# Patient Record
Sex: MALE | Race: WHITE | NOT HISPANIC OR LATINO
[De-identification: names, ages, dates, MRNs, and addresses within clinical notes are randomized per-mention and may not be internally consistent; named-entity substitution may affect disease eponyms.]

---

## 2016-07-23 RX ORDER — ATORVASTATIN CALCIUM 80 MG/1
1 TABLET, FILM COATED ORAL
Qty: 0 | Refills: 0 | COMMUNITY
Start: 2016-07-23

## 2016-07-23 RX ORDER — DILTIAZEM HCL 120 MG
1 CAPSULE, EXT RELEASE 24 HR ORAL
Qty: 0 | Refills: 0 | COMMUNITY
Start: 2016-07-23 | End: 2016-08-22

## 2017-03-02 ENCOUNTER — APPOINTMENT (OUTPATIENT)
Dept: CARDIOLOGY | Facility: CLINIC | Age: 67
End: 2017-03-02

## 2017-03-02 ENCOUNTER — RX RENEWAL (OUTPATIENT)
Age: 67
End: 2017-03-02

## 2017-03-02 VITALS
OXYGEN SATURATION: 98 % | BODY MASS INDEX: 24.69 KG/M2 | HEIGHT: 65.5 IN | WEIGHT: 150 LBS | DIASTOLIC BLOOD PRESSURE: 79 MMHG | SYSTOLIC BLOOD PRESSURE: 127 MMHG | HEART RATE: 77 BPM

## 2017-03-02 DIAGNOSIS — I63.9 CEREBRAL INFARCTION, UNSPECIFIED: ICD-10-CM

## 2017-03-06 ENCOUNTER — MEDICATION RENEWAL (OUTPATIENT)
Age: 67
End: 2017-03-06

## 2017-03-23 ENCOUNTER — APPOINTMENT (OUTPATIENT)
Dept: PULMONOLOGY | Facility: CLINIC | Age: 67
End: 2017-03-23

## 2017-03-23 VITALS
DIASTOLIC BLOOD PRESSURE: 70 MMHG | BODY MASS INDEX: 24.42 KG/M2 | SYSTOLIC BLOOD PRESSURE: 120 MMHG | HEART RATE: 92 BPM | OXYGEN SATURATION: 97 % | WEIGHT: 149 LBS

## 2017-04-25 ENCOUNTER — RX RENEWAL (OUTPATIENT)
Age: 67
End: 2017-04-25

## 2017-04-27 ENCOUNTER — APPOINTMENT (OUTPATIENT)
Dept: PULMONOLOGY | Facility: CLINIC | Age: 67
End: 2017-04-27

## 2017-04-27 VITALS — BODY MASS INDEX: 24.09 KG/M2 | DIASTOLIC BLOOD PRESSURE: 70 MMHG | SYSTOLIC BLOOD PRESSURE: 156 MMHG | WEIGHT: 147 LBS

## 2017-04-27 VITALS — HEART RATE: 108 BPM | OXYGEN SATURATION: 90 %

## 2017-04-27 DIAGNOSIS — Z87.09 PERSONAL HISTORY OF OTHER DISEASES OF THE RESPIRATORY SYSTEM: ICD-10-CM

## 2017-04-27 RX ORDER — ALPRAZOLAM 0.5 MG/1
0.5 TABLET ORAL
Qty: 90 | Refills: 0 | Status: ACTIVE | COMMUNITY
Start: 2016-11-02

## 2017-05-15 ENCOUNTER — RX RENEWAL (OUTPATIENT)
Age: 67
End: 2017-05-15

## 2017-06-15 ENCOUNTER — MEDICATION RENEWAL (OUTPATIENT)
Age: 67
End: 2017-06-15

## 2017-07-13 ENCOUNTER — RX RENEWAL (OUTPATIENT)
Age: 67
End: 2017-07-13

## 2017-07-27 ENCOUNTER — APPOINTMENT (OUTPATIENT)
Dept: PULMONOLOGY | Facility: CLINIC | Age: 67
End: 2017-07-27
Payer: OTHER MISCELLANEOUS

## 2017-07-27 VITALS
SYSTOLIC BLOOD PRESSURE: 142 MMHG | HEART RATE: 110 BPM | OXYGEN SATURATION: 83 % | WEIGHT: 150 LBS | DIASTOLIC BLOOD PRESSURE: 80 MMHG | BODY MASS INDEX: 24.58 KG/M2

## 2017-07-27 VITALS — OXYGEN SATURATION: 91 % | HEART RATE: 93 BPM

## 2017-07-27 PROCEDURE — 99215 OFFICE O/P EST HI 40 MIN: CPT

## 2017-09-14 ENCOUNTER — APPOINTMENT (OUTPATIENT)
Dept: ULTRASOUND IMAGING | Facility: CLINIC | Age: 67
End: 2017-09-14

## 2017-09-14 ENCOUNTER — APPOINTMENT (OUTPATIENT)
Dept: CT IMAGING | Facility: CLINIC | Age: 67
End: 2017-09-14

## 2017-09-14 ENCOUNTER — NON-APPOINTMENT (OUTPATIENT)
Age: 67
End: 2017-09-14

## 2017-09-14 ENCOUNTER — OUTPATIENT (OUTPATIENT)
Dept: OUTPATIENT SERVICES | Facility: HOSPITAL | Age: 67
LOS: 1 days | End: 2017-09-14
Payer: MEDICARE

## 2017-09-14 ENCOUNTER — APPOINTMENT (OUTPATIENT)
Dept: CARDIOLOGY | Facility: CLINIC | Age: 67
End: 2017-09-14
Payer: MEDICARE

## 2017-09-14 VITALS
WEIGHT: 148 LBS | HEART RATE: 97 BPM | BODY MASS INDEX: 24.25 KG/M2 | DIASTOLIC BLOOD PRESSURE: 77 MMHG | SYSTOLIC BLOOD PRESSURE: 161 MMHG | OXYGEN SATURATION: 90 %

## 2017-09-14 DIAGNOSIS — N50.89 OTHER SPECIFIED DISORDERS OF THE MALE GENITAL ORGANS: ICD-10-CM

## 2017-09-14 DIAGNOSIS — R60.0 LOCALIZED EDEMA: ICD-10-CM

## 2017-09-14 PROCEDURE — 82565 ASSAY OF CREATININE: CPT

## 2017-09-14 PROCEDURE — 93000 ELECTROCARDIOGRAM COMPLETE: CPT

## 2017-09-14 PROCEDURE — 74177 CT ABD & PELVIS W/CONTRAST: CPT

## 2017-09-14 PROCEDURE — 76870 US EXAM SCROTUM: CPT | Mod: 26

## 2017-09-14 PROCEDURE — 99214 OFFICE O/P EST MOD 30 MIN: CPT

## 2017-09-14 PROCEDURE — 76870 US EXAM SCROTUM: CPT

## 2017-09-14 PROCEDURE — 74177 CT ABD & PELVIS W/CONTRAST: CPT | Mod: 26

## 2017-09-14 RX ORDER — PREDNISONE 10 MG/1
10 TABLET ORAL DAILY
Qty: 90 | Refills: 1 | Status: DISCONTINUED | COMMUNITY
Start: 2017-07-27 | End: 2017-09-14

## 2017-09-15 ENCOUNTER — APPOINTMENT (OUTPATIENT)
Dept: CARDIOLOGY | Facility: CLINIC | Age: 67
End: 2017-09-15
Payer: MEDICARE

## 2017-09-15 PROCEDURE — 93306 TTE W/DOPPLER COMPLETE: CPT

## 2017-10-27 ENCOUNTER — RESULT REVIEW (OUTPATIENT)
Age: 67
End: 2017-10-27

## 2017-10-30 ENCOUNTER — APPOINTMENT (OUTPATIENT)
Dept: UROLOGY | Facility: CLINIC | Age: 67
End: 2017-10-30
Payer: MEDICARE

## 2017-10-30 VITALS
HEART RATE: 109 BPM | RESPIRATION RATE: 20 BRPM | SYSTOLIC BLOOD PRESSURE: 138 MMHG | TEMPERATURE: 97.7 F | DIASTOLIC BLOOD PRESSURE: 84 MMHG

## 2017-10-30 VITALS — OXYGEN SATURATION: 88 %

## 2017-10-30 DIAGNOSIS — N43.3 HYDROCELE, UNSPECIFIED: ICD-10-CM

## 2017-10-30 PROCEDURE — 99203 OFFICE O/P NEW LOW 30 MIN: CPT

## 2017-11-04 PROBLEM — N43.3 HYDROCELE OF TESTIS: Status: ACTIVE | Noted: 2017-11-04

## 2017-11-07 ENCOUNTER — RX RENEWAL (OUTPATIENT)
Age: 67
End: 2017-11-07

## 2017-11-13 ENCOUNTER — APPOINTMENT (OUTPATIENT)
Dept: UROLOGY | Facility: CLINIC | Age: 67
End: 2017-11-13
Payer: MEDICARE

## 2017-11-13 ENCOUNTER — APPOINTMENT (OUTPATIENT)
Dept: PULMONOLOGY | Facility: CLINIC | Age: 67
End: 2017-11-13
Payer: OTHER MISCELLANEOUS

## 2017-11-13 VITALS — BODY MASS INDEX: 23.6 KG/M2 | WEIGHT: 144 LBS

## 2017-11-13 VITALS
HEART RATE: 112 BPM | DIASTOLIC BLOOD PRESSURE: 78 MMHG | OXYGEN SATURATION: 92 % | SYSTOLIC BLOOD PRESSURE: 132 MMHG | RESPIRATION RATE: 20 BRPM

## 2017-11-13 DIAGNOSIS — Z87.891 PERSONAL HISTORY OF NICOTINE DEPENDENCE: ICD-10-CM

## 2017-11-13 DIAGNOSIS — R31.0 GROSS HEMATURIA: ICD-10-CM

## 2017-11-13 LAB
BILIRUB UR QL STRIP: NEGATIVE
CLARITY UR: NORMAL
COLLECTION METHOD: NORMAL
GLUCOSE UR-MCNC: NEGATIVE
HCG UR QL: 1 EU/DL
HGB UR QL STRIP.AUTO: NORMAL
KETONES UR-MCNC: NEGATIVE
LEUKOCYTE ESTERASE UR QL STRIP: NEGATIVE
NITRITE UR QL STRIP: NEGATIVE
PH UR STRIP: 6
PROT UR STRIP-MCNC: NORMAL
SP GR UR STRIP: >=1.03

## 2017-11-13 PROCEDURE — 99213 OFFICE O/P EST LOW 20 MIN: CPT

## 2017-11-13 PROCEDURE — 81003 URINALYSIS AUTO W/O SCOPE: CPT | Mod: QW

## 2017-11-13 PROCEDURE — 99215 OFFICE O/P EST HI 40 MIN: CPT

## 2017-11-13 RX ORDER — DILTIAZEM HYDROCHLORIDE 180 MG/1
180 CAPSULE, COATED, EXTENDED RELEASE ORAL
Refills: 0 | Status: ACTIVE | COMMUNITY

## 2017-11-13 RX ORDER — METFORMIN HYDROCHLORIDE 500 MG/1
500 TABLET, EXTENDED RELEASE ORAL
Refills: 0 | Status: ACTIVE | COMMUNITY

## 2017-11-13 RX ORDER — LORATADINE 5 MG/5 ML
10 SOLUTION, ORAL ORAL
Refills: 0 | Status: ACTIVE | COMMUNITY

## 2017-11-13 RX ORDER — DOCUSATE SODIUM 100 MG/1
100 CAPSULE, LIQUID FILLED ORAL
Refills: 0 | Status: ACTIVE | COMMUNITY

## 2017-11-13 RX ORDER — DEXTROMETHORPHAN HYDROBROMIDE, GUAIFENESIN, AND PHENYLEPHRINE HYDROCHLORIDE 5; 100; 2.5 MG/5ML; MG/5ML; MG/5ML
2.5-5-1 SUSPENSION ORAL
Refills: 0 | Status: ACTIVE | COMMUNITY

## 2017-11-13 RX ORDER — PANTOPRAZOLE SODIUM 40 MG/10ML
40 INJECTION, POWDER, FOR SOLUTION INTRAVENOUS
Refills: 0 | Status: DISCONTINUED | COMMUNITY
End: 2017-11-13

## 2017-11-13 RX ORDER — CALCIUM CARBONATE/VITAMIN D3 500 MG-2.5
500-100 TABLET,CHEWABLE ORAL
Refills: 0 | Status: ACTIVE | COMMUNITY

## 2017-11-13 RX ORDER — LEVOTHYROXINE SODIUM 0.2 MG/1
200 TABLET ORAL
Refills: 0 | Status: ACTIVE | COMMUNITY

## 2017-11-13 RX ORDER — ASPIRIN 81 MG/1
81 TABLET, CHEWABLE ORAL
Refills: 0 | Status: ACTIVE | COMMUNITY

## 2017-11-15 LAB
APPEARANCE: ABNORMAL
BACTERIA: NEGATIVE
BILIRUBIN URINE: NEGATIVE
BLOOD URINE: ABNORMAL
CALCIUM OXALATE CRYSTALS: ABNORMAL
COLOR: ABNORMAL
CORE LAB FLUID CYTOLOGY: NORMAL
GLUCOSE QUALITATIVE U: NEGATIVE MG/DL
HYALINE CASTS: 0 /LPF
KETONES URINE: NEGATIVE
LEUKOCYTE ESTERASE URINE: NEGATIVE
MICROSCOPIC-UA: NORMAL
NITRITE URINE: NEGATIVE
PH URINE: 5
PROTEIN URINE: 30 MG/DL
RED BLOOD CELLS URINE: >720 /HPF
SPECIFIC GRAVITY URINE: 1.03
SQUAMOUS EPITHELIAL CELLS: 0 /HPF
UROBILINOGEN URINE: 1 MG/DL
WHITE BLOOD CELLS URINE: 2 /HPF

## 2017-11-20 PROBLEM — R31.0 GROSS HEMATURIA: Status: ACTIVE | Noted: 2017-11-13

## 2017-11-28 ENCOUNTER — INPATIENT (INPATIENT)
Facility: HOSPITAL | Age: 67
LOS: 78 days | Discharge: HOME | DRG: 981 | End: 2018-02-15
Attending: SURGERY | Admitting: HOSPITALIST
Payer: COMMERCIAL

## 2017-11-28 VITALS
RESPIRATION RATE: 20 BRPM | TEMPERATURE: 98 F | HEART RATE: 112 BPM | OXYGEN SATURATION: 83 % | SYSTOLIC BLOOD PRESSURE: 156 MMHG | DIASTOLIC BLOOD PRESSURE: 92 MMHG | WEIGHT: 143.96 LBS

## 2017-11-28 LAB
ALBUMIN SERPL ELPH-MCNC: 3.6 G/DL — SIGNIFICANT CHANGE UP (ref 3.3–5.2)
ALP SERPL-CCNC: 105 U/L — SIGNIFICANT CHANGE UP (ref 40–120)
ALT FLD-CCNC: 15 U/L — SIGNIFICANT CHANGE UP
ANION GAP SERPL CALC-SCNC: 16 MMOL/L — SIGNIFICANT CHANGE UP (ref 5–17)
AST SERPL-CCNC: 22 U/L — SIGNIFICANT CHANGE UP
BASOPHILS # BLD AUTO: 0 K/UL — SIGNIFICANT CHANGE UP (ref 0–0.2)
BASOPHILS NFR BLD AUTO: 0.2 % — SIGNIFICANT CHANGE UP (ref 0–2)
BILIRUB SERPL-MCNC: 0.5 MG/DL — SIGNIFICANT CHANGE UP (ref 0.4–2)
BUN SERPL-MCNC: 16 MG/DL — SIGNIFICANT CHANGE UP (ref 8–20)
CALCIUM SERPL-MCNC: 8.6 MG/DL — SIGNIFICANT CHANGE UP (ref 8.6–10.2)
CHLORIDE SERPL-SCNC: 100 MMOL/L — SIGNIFICANT CHANGE UP (ref 98–107)
CO2 SERPL-SCNC: 24 MMOL/L — SIGNIFICANT CHANGE UP (ref 22–29)
CREAT SERPL-MCNC: 0.73 MG/DL — SIGNIFICANT CHANGE UP (ref 0.5–1.3)
EOSINOPHIL # BLD AUTO: 0.1 K/UL — SIGNIFICANT CHANGE UP (ref 0–0.5)
EOSINOPHIL NFR BLD AUTO: 0.8 % — SIGNIFICANT CHANGE UP (ref 0–5)
GLUCOSE SERPL-MCNC: 144 MG/DL — HIGH (ref 70–115)
HCT VFR BLD CALC: 43.6 % — SIGNIFICANT CHANGE UP (ref 42–52)
HGB BLD-MCNC: 14.1 G/DL — SIGNIFICANT CHANGE UP (ref 14–18)
LYMPHOCYTES # BLD AUTO: 1.1 K/UL — SIGNIFICANT CHANGE UP (ref 1–4.8)
LYMPHOCYTES # BLD AUTO: 7.8 % — LOW (ref 20–55)
MCHC RBC-ENTMCNC: 28.8 PG — SIGNIFICANT CHANGE UP (ref 27–31)
MCHC RBC-ENTMCNC: 32.3 G/DL — SIGNIFICANT CHANGE UP (ref 32–36)
MCV RBC AUTO: 89 FL — SIGNIFICANT CHANGE UP (ref 80–94)
MONOCYTES # BLD AUTO: 0.9 K/UL — HIGH (ref 0–0.8)
MONOCYTES NFR BLD AUTO: 5.9 % — SIGNIFICANT CHANGE UP (ref 3–10)
NEUTROPHILS # BLD AUTO: 12.4 K/UL — HIGH (ref 1.8–8)
NEUTROPHILS NFR BLD AUTO: 85 % — HIGH (ref 37–73)
NT-PROBNP SERPL-SCNC: 374 PG/ML — HIGH (ref 0–300)
PLATELET # BLD AUTO: 296 K/UL — SIGNIFICANT CHANGE UP (ref 150–400)
POTASSIUM SERPL-MCNC: 4.3 MMOL/L — SIGNIFICANT CHANGE UP (ref 3.5–5.3)
POTASSIUM SERPL-SCNC: 4.3 MMOL/L — SIGNIFICANT CHANGE UP (ref 3.5–5.3)
PROT SERPL-MCNC: 7.3 G/DL — SIGNIFICANT CHANGE UP (ref 6.6–8.7)
RBC # BLD: 4.9 M/UL — SIGNIFICANT CHANGE UP (ref 4.6–6.2)
RBC # FLD: 15 % — SIGNIFICANT CHANGE UP (ref 11–15.6)
SODIUM SERPL-SCNC: 140 MMOL/L — SIGNIFICANT CHANGE UP (ref 135–145)
TROPONIN T SERPL-MCNC: <0.01 NG/ML — SIGNIFICANT CHANGE UP (ref 0–0.06)
WBC # BLD: 14.6 K/UL — HIGH (ref 4.8–10.8)
WBC # FLD AUTO: 14.6 K/UL — HIGH (ref 4.8–10.8)

## 2017-11-28 PROCEDURE — 71010: CPT | Mod: 26

## 2017-11-28 RX ORDER — IPRATROPIUM/ALBUTEROL SULFATE 18-103MCG
3 AEROSOL WITH ADAPTER (GRAM) INHALATION ONCE
Qty: 0 | Refills: 0 | Status: COMPLETED | OUTPATIENT
Start: 2017-11-28 | End: 2017-11-28

## 2017-11-28 RX ORDER — MAGNESIUM SULFATE 500 MG/ML
2 VIAL (ML) INJECTION ONCE
Qty: 0 | Refills: 0 | Status: COMPLETED | OUTPATIENT
Start: 2017-11-28 | End: 2017-11-28

## 2017-11-28 RX ADMIN — Medication 3 MILLILITER(S): at 23:07

## 2017-11-28 RX ADMIN — Medication 50 GRAM(S): at 22:57

## 2017-11-28 RX ADMIN — Medication 3 MILLILITER(S): at 22:57

## 2017-11-28 RX ADMIN — Medication 125 MILLIGRAM(S): at 22:57

## 2017-11-28 NOTE — ED PROVIDER NOTE - MEDICAL DECISION MAKING DETAILS
Pt removed off bipap, no resp distress. Pulse ox 88-90% on 4 L NC. pt will be admitted for copd eacerbation

## 2017-11-28 NOTE — ED PROVIDER NOTE - OBJECTIVE STATEMENT
66 yo M, oxygen dependent with hx of stage 4 COPD, presents to ED c/o low oxygenation saturation. Pt is o2 dependent on 4L NC but states his concentrator broke at 0600 today, therefore, was only able to be on 3L NC today. Pt denies SOB but was prompted to ED by wife who states pt appeared to have difficulty breathing. Highest SpO2 was 70% today. Pt denies difficulty breathing and chest pain. Wife also states pt is following urology for hematuria and may have passed a renal stone. No further complaints at this time.

## 2017-11-29 ENCOUNTER — APPOINTMENT (OUTPATIENT)
Dept: UROLOGY | Facility: CLINIC | Age: 67
End: 2017-11-29

## 2017-11-29 DIAGNOSIS — Z98.890 OTHER SPECIFIED POSTPROCEDURAL STATES: Chronic | ICD-10-CM

## 2017-11-29 DIAGNOSIS — Z86.73 PERSONAL HISTORY OF TRANSIENT ISCHEMIC ATTACK (TIA), AND CEREBRAL INFARCTION WITHOUT RESIDUAL DEFICITS: ICD-10-CM

## 2017-11-29 DIAGNOSIS — R91.8 OTHER NONSPECIFIC ABNORMAL FINDING OF LUNG FIELD: ICD-10-CM

## 2017-11-29 DIAGNOSIS — J44.1 CHRONIC OBSTRUCTIVE PULMONARY DISEASE WITH (ACUTE) EXACERBATION: ICD-10-CM

## 2017-11-29 DIAGNOSIS — Z29.9 ENCOUNTER FOR PROPHYLACTIC MEASURES, UNSPECIFIED: ICD-10-CM

## 2017-11-29 DIAGNOSIS — R59.0 LOCALIZED ENLARGED LYMPH NODES: ICD-10-CM

## 2017-11-29 DIAGNOSIS — E03.9 HYPOTHYROIDISM, UNSPECIFIED: ICD-10-CM

## 2017-11-29 DIAGNOSIS — E11.9 TYPE 2 DIABETES MELLITUS WITHOUT COMPLICATIONS: ICD-10-CM

## 2017-11-29 DIAGNOSIS — I10 ESSENTIAL (PRIMARY) HYPERTENSION: ICD-10-CM

## 2017-11-29 DIAGNOSIS — J18.9 PNEUMONIA, UNSPECIFIED ORGANISM: ICD-10-CM

## 2017-11-29 DIAGNOSIS — R09.02 HYPOXEMIA: ICD-10-CM

## 2017-11-29 DIAGNOSIS — N20.0 CALCULUS OF KIDNEY: ICD-10-CM

## 2017-11-29 LAB
GLUCOSE BLDC GLUCOMTR-MCNC: 179 MG/DL — HIGH (ref 70–99)
GLUCOSE BLDC GLUCOMTR-MCNC: 198 MG/DL — HIGH (ref 70–99)
GLUCOSE BLDC GLUCOMTR-MCNC: 221 MG/DL — HIGH (ref 70–99)
GLUCOSE BLDC GLUCOMTR-MCNC: 227 MG/DL — HIGH (ref 70–99)
RAPID RVP RESULT: SIGNIFICANT CHANGE UP

## 2017-11-29 PROCEDURE — 93010 ELECTROCARDIOGRAM REPORT: CPT

## 2017-11-29 PROCEDURE — 99223 1ST HOSP IP/OBS HIGH 75: CPT | Mod: GC

## 2017-11-29 PROCEDURE — 99291 CRITICAL CARE FIRST HOUR: CPT

## 2017-11-29 PROCEDURE — 99223 1ST HOSP IP/OBS HIGH 75: CPT

## 2017-11-29 RX ORDER — ALBUTEROL 90 UG/1
2.5 AEROSOL, METERED ORAL EVERY 4 HOURS
Qty: 0 | Refills: 0 | Status: DISCONTINUED | OUTPATIENT
Start: 2017-11-29 | End: 2018-01-16

## 2017-11-29 RX ORDER — DEXTROSE 50 % IN WATER 50 %
1 SYRINGE (ML) INTRAVENOUS ONCE
Qty: 0 | Refills: 0 | Status: DISCONTINUED | OUTPATIENT
Start: 2017-11-29 | End: 2017-12-11

## 2017-11-29 RX ORDER — ASPIRIN/CALCIUM CARB/MAGNESIUM 324 MG
81 TABLET ORAL DAILY
Qty: 0 | Refills: 0 | Status: DISCONTINUED | OUTPATIENT
Start: 2017-11-29 | End: 2017-12-07

## 2017-11-29 RX ORDER — AZITHROMYCIN 500 MG/1
TABLET, FILM COATED ORAL
Qty: 0 | Refills: 0 | Status: DISCONTINUED | OUTPATIENT
Start: 2017-11-29 | End: 2017-11-29

## 2017-11-29 RX ORDER — AZITHROMYCIN 500 MG/1
500 TABLET, FILM COATED ORAL ONCE
Qty: 0 | Refills: 0 | Status: DISCONTINUED | OUTPATIENT
Start: 2017-11-29 | End: 2017-11-29

## 2017-11-29 RX ORDER — FUROSEMIDE 40 MG
1 TABLET ORAL
Qty: 0 | Refills: 0 | COMMUNITY

## 2017-11-29 RX ORDER — DILTIAZEM HCL 120 MG
180 CAPSULE, EXT RELEASE 24 HR ORAL
Qty: 0 | Refills: 0 | Status: DISCONTINUED | OUTPATIENT
Start: 2017-11-29 | End: 2018-01-14

## 2017-11-29 RX ORDER — LORATADINE 10 MG/1
10 TABLET ORAL DAILY
Qty: 0 | Refills: 0 | Status: DISCONTINUED | OUTPATIENT
Start: 2017-11-29 | End: 2017-11-29

## 2017-11-29 RX ORDER — INSULIN LISPRO 100/ML
VIAL (ML) SUBCUTANEOUS
Qty: 0 | Refills: 0 | Status: DISCONTINUED | OUTPATIENT
Start: 2017-11-29 | End: 2017-12-11

## 2017-11-29 RX ORDER — DEXTROSE 50 % IN WATER 50 %
25 SYRINGE (ML) INTRAVENOUS ONCE
Qty: 0 | Refills: 0 | Status: DISCONTINUED | OUTPATIENT
Start: 2017-11-29 | End: 2017-12-11

## 2017-11-29 RX ORDER — SODIUM CHLORIDE 9 MG/ML
1000 INJECTION, SOLUTION INTRAVENOUS
Qty: 0 | Refills: 0 | Status: DISCONTINUED | OUTPATIENT
Start: 2017-11-29 | End: 2017-12-11

## 2017-11-29 RX ORDER — CLOPIDOGREL BISULFATE 75 MG/1
75 TABLET, FILM COATED ORAL DAILY
Qty: 0 | Refills: 0 | Status: DISCONTINUED | OUTPATIENT
Start: 2017-11-29 | End: 2017-12-07

## 2017-11-29 RX ORDER — LEVOTHYROXINE SODIUM 125 MCG
50 TABLET ORAL DAILY
Qty: 0 | Refills: 0 | Status: DISCONTINUED | OUTPATIENT
Start: 2017-11-29 | End: 2018-01-16

## 2017-11-29 RX ORDER — ALPRAZOLAM 0.25 MG
0.25 TABLET ORAL THREE TIMES A DAY
Qty: 0 | Refills: 0 | Status: DISCONTINUED | OUTPATIENT
Start: 2017-11-29 | End: 2017-11-29

## 2017-11-29 RX ORDER — SODIUM CHLORIDE 9 MG/ML
1000 INJECTION, SOLUTION INTRAVENOUS
Qty: 0 | Refills: 0 | Status: COMPLETED | OUTPATIENT
Start: 2017-11-29 | End: 2017-11-29

## 2017-11-29 RX ORDER — PANTOPRAZOLE SODIUM 20 MG/1
40 TABLET, DELAYED RELEASE ORAL
Qty: 0 | Refills: 0 | Status: DISCONTINUED | OUTPATIENT
Start: 2017-11-29 | End: 2017-12-20

## 2017-11-29 RX ORDER — FUROSEMIDE 40 MG
20 TABLET ORAL DAILY
Qty: 0 | Refills: 0 | Status: DISCONTINUED | OUTPATIENT
Start: 2017-11-29 | End: 2017-11-29

## 2017-11-29 RX ORDER — IPRATROPIUM/ALBUTEROL SULFATE 18-103MCG
3 AEROSOL WITH ADAPTER (GRAM) INHALATION EVERY 6 HOURS
Qty: 0 | Refills: 0 | Status: DISCONTINUED | OUTPATIENT
Start: 2017-11-29 | End: 2017-12-17

## 2017-11-29 RX ORDER — LORATADINE 10 MG/1
10 TABLET ORAL DAILY
Qty: 0 | Refills: 0 | Status: DISCONTINUED | OUTPATIENT
Start: 2017-11-30 | End: 2018-01-16

## 2017-11-29 RX ORDER — SACCHAROMYCES BOULARDII 250 MG
250 POWDER IN PACKET (EA) ORAL
Qty: 0 | Refills: 0 | Status: DISCONTINUED | OUTPATIENT
Start: 2017-11-29 | End: 2017-12-05

## 2017-11-29 RX ORDER — AZITHROMYCIN 500 MG/1
500 TABLET, FILM COATED ORAL EVERY 24 HOURS
Qty: 0 | Refills: 0 | Status: DISCONTINUED | OUTPATIENT
Start: 2017-11-29 | End: 2017-11-29

## 2017-11-29 RX ORDER — ACETYLCYSTEINE 200 MG/ML
1 VIAL (ML) MISCELLANEOUS EVERY 4 HOURS
Qty: 0 | Refills: 0 | Status: DISCONTINUED | OUTPATIENT
Start: 2017-11-29 | End: 2018-01-02

## 2017-11-29 RX ORDER — GLUCAGON INJECTION, SOLUTION 0.5 MG/.1ML
1 INJECTION, SOLUTION SUBCUTANEOUS ONCE
Qty: 0 | Refills: 0 | Status: DISCONTINUED | OUTPATIENT
Start: 2017-11-29 | End: 2017-12-11

## 2017-11-29 RX ORDER — DEXTROSE 50 % IN WATER 50 %
12.5 SYRINGE (ML) INTRAVENOUS ONCE
Qty: 0 | Refills: 0 | Status: DISCONTINUED | OUTPATIENT
Start: 2017-11-29 | End: 2017-12-11

## 2017-11-29 RX ORDER — ATORVASTATIN CALCIUM 80 MG/1
80 TABLET, FILM COATED ORAL AT BEDTIME
Qty: 0 | Refills: 0 | Status: DISCONTINUED | OUTPATIENT
Start: 2017-11-29 | End: 2018-01-16

## 2017-11-29 RX ORDER — ENOXAPARIN SODIUM 100 MG/ML
40 INJECTION SUBCUTANEOUS DAILY
Qty: 0 | Refills: 0 | Status: DISCONTINUED | OUTPATIENT
Start: 2017-11-29 | End: 2017-12-07

## 2017-11-29 RX ORDER — ALPRAZOLAM 0.25 MG
0.25 TABLET ORAL THREE TIMES A DAY
Qty: 0 | Refills: 0 | Status: DISCONTINUED | OUTPATIENT
Start: 2017-11-29 | End: 2017-12-06

## 2017-11-29 RX ORDER — ALPRAZOLAM 0.25 MG
0.25 TABLET ORAL ONCE
Qty: 0 | Refills: 0 | Status: DISCONTINUED | OUTPATIENT
Start: 2017-11-29 | End: 2017-11-29

## 2017-11-29 RX ADMIN — Medication 0.25 MILLIGRAM(S): at 04:04

## 2017-11-29 RX ADMIN — Medication 250 MILLIGRAM(S): at 06:31

## 2017-11-29 RX ADMIN — Medication 1200 MILLIGRAM(S): at 06:46

## 2017-11-29 RX ADMIN — Medication 1: at 21:59

## 2017-11-29 RX ADMIN — SODIUM CHLORIDE 100 MILLILITER(S): 9 INJECTION, SOLUTION INTRAVENOUS at 12:38

## 2017-11-29 RX ADMIN — Medication 0.25 MILLIGRAM(S): at 12:38

## 2017-11-29 RX ADMIN — Medication 0.25 MILLIGRAM(S): at 23:20

## 2017-11-29 RX ADMIN — LORATADINE 10 MILLIGRAM(S): 10 TABLET ORAL at 12:39

## 2017-11-29 RX ADMIN — Medication 180 MILLIGRAM(S): at 06:31

## 2017-11-29 RX ADMIN — Medication 180 MILLIGRAM(S): at 18:35

## 2017-11-29 RX ADMIN — Medication 3 MILLILITER(S): at 21:34

## 2017-11-29 RX ADMIN — CLOPIDOGREL BISULFATE 75 MILLIGRAM(S): 75 TABLET, FILM COATED ORAL at 12:39

## 2017-11-29 RX ADMIN — Medication 60 MILLIGRAM(S): at 18:35

## 2017-11-29 RX ADMIN — Medication 60 MILLIGRAM(S): at 12:39

## 2017-11-29 RX ADMIN — ATORVASTATIN CALCIUM 80 MILLIGRAM(S): 80 TABLET, FILM COATED ORAL at 23:20

## 2017-11-29 RX ADMIN — Medication 200 MILLIGRAM(S): at 06:45

## 2017-11-29 RX ADMIN — ENOXAPARIN SODIUM 40 MILLIGRAM(S): 100 INJECTION SUBCUTANEOUS at 12:46

## 2017-11-29 RX ADMIN — Medication 81 MILLIGRAM(S): at 12:39

## 2017-11-29 RX ADMIN — Medication 60 MILLIGRAM(S): at 23:21

## 2017-11-29 RX ADMIN — Medication 250 MILLIGRAM(S): at 18:35

## 2017-11-29 RX ADMIN — SODIUM CHLORIDE 100 MILLILITER(S): 9 INJECTION, SOLUTION INTRAVENOUS at 06:31

## 2017-11-29 RX ADMIN — Medication 60 MILLIGRAM(S): at 06:31

## 2017-11-29 RX ADMIN — Medication: at 12:52

## 2017-11-29 RX ADMIN — Medication 2: at 18:34

## 2017-11-29 RX ADMIN — Medication 3 MILLILITER(S): at 07:38

## 2017-11-29 RX ADMIN — Medication 50 MICROGRAM(S): at 06:31

## 2017-11-29 RX ADMIN — Medication 3 MILLILITER(S): at 14:34

## 2017-11-29 NOTE — ED ADULT NURSE NOTE - PMH
Chronic obstructive pulmonary disease, unspecified COPD type    CVA (cerebral vascular accident)    Hypertension

## 2017-11-29 NOTE — H&P ADULT - NSHPOUTPATIENTPROVIDERS_GEN_ALL_CORE
PMD: Russel Garcia  Pulmonology: CRYSTAL Trivedi ([facility])  Cardio: Dr Villegas PMD: Russel Garcia  Pulmonology: CRYSTAL Trivedi ([facility])  Cardio: Dr Villegas  Neuro: Dr Ho  Uro: pt doesn't remember the MD's name

## 2017-11-29 NOTE — PROGRESS NOTE ADULT - ASSESSMENT
67 YOM w/PMH of COPD stage 4 s/p right lung reduction in 2010, currently on home oxygen 4L (sat 92-93%), HTN, CVA on 2014, DM, hypothyroidism hydrocele, kidney stones admitted for COPD exacerbation      Assessment/Plan:    1. Acute on chronic hypoxic respiratory failure possibly secondary to o2 concentrator malfunction:  Wean o2 as tolerated to 4 liters.   Social work consult  Follow up CTA chest     2. Exacerbation of severe copd: IV steroids, nebs, antibiotics, o2    3. Hypertension: continue cardizem    4. Hyperlipidemia on statin therapy    VTE_ lovenox subcut

## 2017-11-29 NOTE — ED ADULT NURSE REASSESSMENT NOTE - NS ED NURSE REASSESS COMMENT FT1
pt status unchanged, refer to flowsheet and chart, pt safety maintained, pt hemodynamically stable, pt stable on venturi mask

## 2017-11-29 NOTE — H&P ADULT - NSHPREVIEWOFSYSTEMS_GEN_ALL_CORE
CONSTITUTIONAL: No weakness, fevers or chills  EYES/ENT: No visual changes;  No vertigo or throat pain   NECK: No pain or stiffness  RESPIRATORY: As per HPI  CARDIOVASCULAR: No chest pain or palpitations  GASTROINTESTINAL: No abdominal or epigastric pain. No nausea, vomiting, or hematemesis; No diarrhea or constipation. No melena or hematochezia.  GENITOURINARY: No dysuria, frequency or hematuria. Mentions passing a renal calculi 1 week ago and that was scheduled for cystoscopy tomorrow. Rest as per HPI  NEUROLOGICAL: No numbness or weakness  SKIN: No itching, burning, rashes, or lesions   All other review of systems is negative unless indicated above.

## 2017-11-29 NOTE — CONSULT NOTE ADULT - ASSESSMENT
Severe COPD with large emphysematous  component  problems with 02 delivery at home , denies any change in sputum or dyspnea  CTA - not read officially , i dont see PE, increased adenopathy with R hilar  mass like density, narrowing bronchus  with probable post obstructive Pna  hx CVA, no longer follwoed at [facility]  at this point would treat as exacerbation / pna  IV medrol, nebs, abx, wean 02 to 4 L   abg if any change in status  out pt PET scan, not candidate for any aggressive intervention  follows in our office  prognosis extremely guarded Severe COPD with large emphysematous  component  problems with 02 delivery at home , denies any change in sputum or dyspnea  CTA negative for PE, increased adenopathy, RLL with mass like density vs mucus plugging/ pneumonia  hx CVA, no longer followed at [place]  at this point would treat as exacerbation / pna  IV medrol, nebs, abx, wean 02 to 4 L   abg if any change in status  out pt CT scan follow up. not candidate for any aggressive intervention  pulmonary toilet nebs  follows in our office  prognosis extremely guarded Severe COPD with large emphysematous  component  problems with 02 delivery at home , denies any change in sputum or dyspnea  CTA negative for PE, increased adenopathy, RLL with mucus plugging/ pneumonia, endobronchial  abnormality not excluded  hx CVA, no longer followed at [facility]   treat as exacerbation / pna  IV medrol, nebs, abx, wean 02 to 4 L   abg if any change in status  out pt CT scan follow up  pulmonary toilet nebs  follows in our office  prognosis extremely guarded

## 2017-11-29 NOTE — H&P ADULT - HISTORY OF PRESENT ILLNESS
67 YOM w/PMH of COPD stage 4 s/p right lung reduction in 2010, currently on home oxygen 4L (sat 92-93%), HTN, CVA on 2014, DM, hypothyroidism hydrocele, kidney stones who came because he noted that his O2 sat was 76% today. He mentions more difficult to talk in full sencentces compared to his baseline. He also has a chronic prodive cough which has not changed in frequency or color. He mentions that his oxygen machine broke 4 days ago and he got a new one since, but is not sure if its working properly. He denies SOB, increased cough, chest pain, hemoptysis nausea, weakness. Pt has to sleep in a sit position since he gets SOB if he lyes flat, conditions that he attributes to his postnasal drip 67 YOM w/PMH of COPD stage 4 s/p right lung reduction in 2010, currently on home oxygen 4L (sat 92-93%), HTN, CVA on 2014, DM, hypothyroidism, hydrocele, kidney stones who came because he noted that his O2 sat was 76% today. He mentions more difficult to talk in full sencentces compared to his baseline. He also has a chronic productive cough which has not changed in frequency or color. He mentions that his oxygen machine broke 4 days ago and he got a new one since, but is not sure if its working properly. He denies SOB, increased cough, chest pain, hemoptysis nausea, weakness. Pt has to sleep in a sit position with 2 pillows since he gets SOB if he lyes flat, condition that he attributes to his postnasal drip

## 2017-11-29 NOTE — ED ADULT NURSE NOTE - OBJECTIVE STATEMENT
pt with complaints of SOB difficulty breathing, pt w/o oxygen at home today, noted with low O2 upon arrival with tachypnea.

## 2017-11-29 NOTE — H&P ADULT - PROBLEM SELECTOR PLAN 3
start ISS  hypoglicemia protocol  Follow HbA1c in the morning Start ISS  hypoglycemia protocol  Follow HbA1c in the morning c/w ASA 81mg  c/w plavix 75mg  c/w atorvastatin 80mg

## 2017-11-29 NOTE — H&P ADULT - NSHPSOCIALHISTORY_GEN_ALL_CORE
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

## 2017-11-29 NOTE — H&P ADULT - PROBLEM SELECTOR PLAN 2
Uncontrolled 156/92 (range <150/80 for this pt)  Will continue home meds Start ISS  hypoglycemia protocol  Follow HbA1c in the morning

## 2017-11-29 NOTE — H&P ADULT - ASSESSMENT
67 YOM w/PMH of COPD stage 4 s/p right lung reduction in 2010, currently on home oxygen 4L (sat 92-93%), HTN, CVA on 2014, DM, hypothyroidism hydrocele, kidney stones admitted for COPD exacerbation

## 2017-11-29 NOTE — PROGRESS NOTE ADULT - SUBJECTIVE AND OBJECTIVE BOX
CC: Hypoxia     INTERVAL HPI/OVERNIGHT EVENTS: Patient seen and examined, denies shortness of breath or worsening cough. Afebrile. Feels better now.       Vital Signs Last 24 Hrs  T(C): 36.7 (29 Nov 2017 03:50), Max: 36.8 (28 Nov 2017 21:35)  T(F): 98 (29 Nov 2017 03:50), Max: 98.2 (28 Nov 2017 21:35)  HR: 95 (29 Nov 2017 08:36) (95 - 118)  BP: 147/83 (29 Nov 2017 08:36) (127/74 - 156/92)  BP(mean): --  RR: 20 (29 Nov 2017 08:36) (20 - 20)  SpO2: 95% (29 Nov 2017 08:36) (83% - 96%)    PHYSICAL EXAM:    GENERAL: NAD, AOX3  HEAD:  Atraumatic, Normocephalic  EYES: EOMI, PERRLA, conjunctiva and sclera clear  ENMT: Moist mucous membranes  NECK: Supple, No JVD  CHEST/LUNG: Decreased breath sounds bilaterally   HEART: Regular rate and rhythm; No murmurs, rubs, or gallops  ABDOMEN: Soft, Nontender, Nondistended; Bowel sounds present  EXTREMITIES:  2+ Peripheral Pulses, No clubbing, cyanosis, or edema        MEDICATIONS  (STANDING):  ALBUTerol/ipratropium for Nebulization 3 milliLiter(s) Nebulizer every 6 hours  aspirin enteric coated 81 milliGRAM(s) Oral daily  atorvastatin 80 milliGRAM(s) Oral at bedtime  clopidogrel Tablet 75 milliGRAM(s) Oral daily  dextrose 5%. 1000 milliLiter(s) (50 mL/Hr) IV Continuous <Continuous>  dextrose 50% Injectable 12.5 Gram(s) IV Push once  dextrose 50% Injectable 25 Gram(s) IV Push once  dextrose 50% Injectable 25 Gram(s) IV Push once  diltiazem    milliGRAM(s) Oral <User Schedule>  enoxaparin Injectable 40 milliGRAM(s) SubCutaneous daily  guaiFENesin  milliGRAM(s) Oral every 12 hours  insulin lispro (HumaLOG) corrective regimen sliding scale   SubCutaneous Before meals and at bedtime  levoFLOXacin IVPB      levothyroxine 50 MICROGram(s) Oral daily  loratadine 10 milliGRAM(s) Oral daily  methylPREDNISolone sodium succinate Injectable 60 milliGRAM(s) IV Push every 6 hours  pantoprazole    Tablet 40 milliGRAM(s) Oral before breakfast  saccharomyces boulardii 250 milliGRAM(s) Oral two times a day    MEDICATIONS  (PRN):  acetylcysteine 20% Inhalation 1 milliLiter(s) Inhalation every 4 hours PRN poor cough clearance  ALBUTerol    0.083% 2.5 milliGRAM(s) Nebulizer every 4 hours PRN Shortness of Breath and/or Wheezing  ALPRAZolam 0.25 milliGRAM(s) Oral three times a day PRN anxiety  dextrose Gel 1 Dose(s) Oral once PRN Blood Glucose LESS THAN 70 milliGRAM(s)/deciliter  glucagon  Injectable 1 milliGRAM(s) IntraMuscular once PRN Glucose LESS THAN 70 milligrams/deciliter      Allergies    codeine (Unknown)  penicillin (Unknown)    Intolerances    Symbicort (Short breath)        LABS:                          14.1   14.6  )-----------( 296      ( 28 Nov 2017 22:06 )             43.6     11-28    140  |  100  |  16.0  ----------------------------<  144<H>  4.3   |  24.0  |  0.73    Ca    8.6      28 Nov 2017 22:06    TPro  7.3  /  Alb  3.6  /  TBili  0.5  /  DBili  x   /  AST  22  /  ALT  15  /  AlkPhos  105  11-28          RADIOLOGY & ADDITIONAL TESTS:

## 2017-11-29 NOTE — H&P ADULT - ATTENDING COMMENTS
Patient seen and evaluated in ER for COPD Exacerbation. Continue Steroids, Nebs and Antibiotics. Will consult  for oxygen arrangement at home.

## 2017-11-29 NOTE — ED ADULT NURSE REASSESSMENT NOTE - NS ED NURSE REASSESS COMMENT FT1
pt status unchanged, refer to flowsheet and chart, pt safety maintained, pt hemodynamically stable, report given to MASON Giordano

## 2017-11-29 NOTE — H&P ADULT - PROBLEM SELECTOR PLAN 1
Likely 2/2 oxygen therapy failure  continue supplemental O2, 4L  Maintenance of O2 sat 88-94&  Admit to hospitalist service, monitored bed with   Chest X ray done  Follow CTA  Will call pulmonology  Follow swallow evaluation  Diet: DASH, carbohydrate consistent when pt is not NPO and after swallow eval  Activity as tolerated Likely 2/2 oxygen therapy failure  continue supplemental O2, 4L  Maintenance of O2 sat 88-94%  Admit to hospitalist service, monitored bed with   Chest X ray done  Follow CTA  Will call pulmonology  Follow swallow evaluation  Diet: DASH, carbohydrate consistent when pt is not NPO and after swallow eval  Activity as tolerated Likely 2/2 oxygen therapy failure vs underlying pneumonia  continue supplemental O2, 4L  Maintenance of O2 sat 88-94%  Admit to hospitalist service, monitored bed with   Chest X ray done  Follow CTA  Will call pulmonology  Follow swallow evaluation  Diet: DASH, carbohydrate consistent  Activity as tolerated  Duonebs q6hrs  Solumedrol 60mg  q6hrs  Albuterol  start Azytromicin   Start rocephin Likely 2/2 oxygen therapy failure vs underlying pneumonia  continue supplemental O2, 4L  Maintenance of O2 sat 88-94%  Admit to hospitalist service, monitored bed with   Chest X ray done  Follow CTA  Will call pulmonology  Follow swallow evaluation  Diet: DASH, carbohydrate consistent  Activity as tolerated  Duonebs q6hrs  Solumedrol 60mg  q6hrs  Albuterol PRN q4hrs  start levoquin since pt is allergic to PCN  Start rocephin continue supplemental O2, 4L  Maintenance of O2 sat 88-94%  Admit to hospitalist service, monitored bed with   Chest X ray done  Follow CTA  Will call pulmonology  Diet: DASH, carbohydrate consistent  Activity as tolerated  Duonebs q6hrs  Solumedrol 60mg  q6hrs  Albuterol PRN q4hrs  start Levaquin since pt is allergic to PCN continue supplemental O2, 4L  Maintenance of O2 sat 88-94%  Admit to hospitalist service, monitored bed with   Chest X ray done  Follow CTA  Will call pulmonology  Diet: DASH, carbohydrate consistent  Activity as tolerated  Duonebs q6hrs  Solumedrol 60mg  q6hrs  Albuterol PRN q4hrs  Levaquin 500 mg continue supplemental O2, 10L. Will try to wane off as tolerated  Maintenance of O2 sat 88-94%  Admit to hospitalist service, monitored bed with   Chest X ray done  Follow CTA  Will call pulmonology  Diet: DASH, carbohydrate consistent  Activity as tolerated  Duonebs q6hrs  Solumedrol 60mg  q6hrs  Albuterol PRN q4hrs  Levaquin 500 mg

## 2017-11-29 NOTE — H&P ADULT - NSHPPHYSICALEXAM_GEN_ALL_CORE
Vital Signs Last 24 Hrs  T(C): 36.8 (28 Nov 2017 21:35), Max: 36.8 (28 Nov 2017 21:35)  T(F): 98.2 (28 Nov 2017 21:35), Max: 98.2 (28 Nov 2017 21:35)  HR: 118 (28 Nov 2017 22:15) (112 - 118)  BP: 156/92 (28 Nov 2017 21:35) (156/92 - 156/92)  RR: 20 (28 Nov 2017 21:35) (20 - 20)  SpO2: 95% (28 Nov 2017 22:15) (83% - 95%)    Tele: Synus tachycardia. O2 sat:     General: WN/WD NAD  Neurology: A&Ox3, no focalziation signs  Head:  Normocephalic, atraumatic  ENT:  Mucosa moist, no ulcerations  Neck:  Supple, no sinuses or palpable masses  Respiratory: CTA B/L, diminished breath sounds on both lungs  CV: RRR, S1S2, no murmur  Back: surgical scar noted on right CVA  Abdominal: Soft, NT, ND no palpable mass  MSK: No edema, + peripheral pulses, FROM all 4 extremity Vital Signs Last 24 Hrs  T(C): 36.8 (28 Nov 2017 21:35), Max: 36.8 (28 Nov 2017 21:35)  T(F): 98.2 (28 Nov 2017 21:35), Max: 98.2 (28 Nov 2017 21:35)  HR: 118 (28 Nov 2017 22:15) (112 - 118)  BP: 156/92 (28 Nov 2017 21:35) (156/92 - 156/92)  RR: 20 (28 Nov 2017 21:35) (20 - 20)  SpO2: 95% (28 Nov 2017 22:15) (83% - 95%)    Tele: Synus tachycardia. O2 sat: 94% on supplemental O2    General: WN/WD NAD  Neurology: A&Ox3, no focalization signs  Head:  Normocephalic, atraumatic  ENT:  Mucosa moist, no ulcerations  Neck:  Supple, no sinuses or palpable masses  Respiratory: CTA B/L, diminished breath sounds on both lungs  CV: RRR, S1S2, no murmur  Back: surgical scar noted on right CVA  Abdominal: Soft, NT, ND no palpable mass  MSK: No edema, + peripheral pulses, FROM all 4 extremity Vital Signs Last 24 Hrs  T(C): 36.8 (28 Nov 2017 21:35), Max: 36.8 (28 Nov 2017 21:35)  T(F): 98.2 (28 Nov 2017 21:35), Max: 98.2 (28 Nov 2017 21:35)  HR: 118 (28 Nov 2017 22:15) (112 - 118)  BP: 156/92 (28 Nov 2017 21:35) (156/92 - 156/92)  RR: 20 (28 Nov 2017 21:35) (20 - 20)  SpO2: 95% (28 Nov 2017 22:15) (83% - 95%)    Tele: Synus tachycardia. O2 sat: 94% on supplemental O2 10L through mask    General: WN/WD NAD  Neurology: A&Ox3, no focalization signs  Head:  Normocephalic, atraumatic  ENT:  Mucosa moist, no ulcerations  Neck:  Supple, no sinuses or palpable masses  Respiratory: CTA B/L, diminished breath sounds on both lungs  CV: RRR, S1S2, no murmur  Back: surgical scar noted on right CVA  Abdominal: Soft, NT, ND no palpable mass  MSK: No edema, + peripheral pulses, FROM all 4 extremity

## 2017-11-29 NOTE — CONSULT NOTE ADULT - SUBJECTIVE AND OBJECTIVE BOX
PULMONARY CONSULT NOTE      LILY FRANDYJANE-[id number]    Patient is a 67y old  Male who presents with a chief complaint of Came for rehab (29 Nov 2017 10:08)  67 YOM w/PMH of COPD stage 4 s/p right lung reduction in 2010, currently on home oxygen 4L (sat 92-93%), HTN, CVA on 2014, DM, hypothyroidism, hydrocele, kidney stones who came because he noted that his O2 sat was 76% today. He mentions more difficult to talk in full sentences  compared to his baseline. He also has a chronic productive cough which has not changed in frequency or color. He mentions that his oxygen machine broke 4 days ago and he got a new one since, but is not sure if its working properly. He denies SOB, increased cough, chest pain, hemoptysis nausea, weakness. Pt has to sleep in a sit position with 2 pillows since he gets SOB if he lyes flat, condition that he attributes to his postnasal drip     HISTORY OF PRESENT ILLNESS:    MEDICATIONS  (STANDING):  ALBUTerol/ipratropium for Nebulization 3 milliLiter(s) Nebulizer every 6 hours  aspirin enteric coated 81 milliGRAM(s) Oral daily  atorvastatin 80 milliGRAM(s) Oral at bedtime  clopidogrel Tablet 75 milliGRAM(s) Oral daily  dextrose 5%. 1000 milliLiter(s) (50 mL/Hr) IV Continuous <Continuous>  dextrose 50% Injectable 12.5 Gram(s) IV Push once  dextrose 50% Injectable 25 Gram(s) IV Push once  dextrose 50% Injectable 25 Gram(s) IV Push once  diltiazem    milliGRAM(s) Oral <User Schedule>  enoxaparin Injectable 40 milliGRAM(s) SubCutaneous daily  insulin lispro (HumaLOG) corrective regimen sliding scale   SubCutaneous Before meals and at bedtime  levoFLOXacin IVPB      levothyroxine 50 MICROGram(s) Oral daily  loratadine 10 milliGRAM(s) Oral daily  methylPREDNISolone sodium succinate Injectable 60 milliGRAM(s) IV Push every 6 hours  multiple electrolytes Injection Type 1 1000 milliLiter(s) (100 mL/Hr) IV Continuous <Continuous>  pantoprazole    Tablet 40 milliGRAM(s) Oral before breakfast  saccharomyces boulardii 250 milliGRAM(s) Oral two times a day      MEDICATIONS  (PRN):  ALBUTerol    0.083% 2.5 milliGRAM(s) Nebulizer every 4 hours PRN Shortness of Breath and/or Wheezing  ALPRAZolam 0.25 milliGRAM(s) Oral three times a day PRN anxiety  benzonatate 200 milliGRAM(s) Oral daily PRN Cough  dextrose Gel 1 Dose(s) Oral once PRN Blood Glucose LESS THAN 70 milliGRAM(s)/deciliter  glucagon  Injectable 1 milliGRAM(s) IntraMuscular once PRN Glucose LESS THAN 70 milligrams/deciliter  guaiFENesin ER 1200 milliGRAM(s) Oral every 12 hours PRN Cough      Allergies    codeine (Unknown)  penicillin (Unknown)    Intolerances    Symbicort (Short breath)      PAST MEDICAL & SURGICAL HISTORY:  Hypothyroidism  Hydrocele  Renal calculi  Diabetes mellitus  Hypertension  CVA (cerebral vascular accident)  Chronic obstructive pulmonary disease, unspecified COPD type  History of lung surgery      FAMILY HISTORY:  No pertinent family history in first degree relatives      SOCIAL HISTORY  Smoking History:     REVIEW OF SYSTEMS:    CONSTITUTIONAL:  No fevers, chills, sweats    HEENT:  Eyes:  No diplopia or blurred vision. ENT:  No earache, sore throat or runny nose.    CARDIOVASCULAR:  No pressure, squeezing, tightness, or heaviness about the chest; no palpitations.    RESPIRATORY:  No cough, shortness of breath, PND or orthopnea. Mild SOBOE    GASTROINTESTINAL:  No abdominal pain, nausea, vomiting or diarrhea.    GENITOURINARY:  No dysuria, frequency or urgency.    NEUROLOGIC:  No paresthesias, fasciculations, seizures or weakness.    PSYCHIATRIC:  No disorder of thought or mood.    Vital Signs Last 24 Hrs  T(C): 36.7 (29 Nov 2017 03:50), Max: 36.8 (28 Nov 2017 21:35)  T(F): 98 (29 Nov 2017 03:50), Max: 98.2 (28 Nov 2017 21:35)  HR: 95 (29 Nov 2017 08:36) (95 - 118)  BP: 147/83 (29 Nov 2017 08:36) (127/74 - 156/92)  BP(mean): --  RR: 20 (29 Nov 2017 08:36) (20 - 20)  SpO2: 95% (29 Nov 2017 08:36) (83% - 96%)    PHYSICAL EXAMINATION:    GENERAL: The patient is a well-developed, well-nourished _____in no apparent distress.     HEENT: Head is normocephalic and atraumatic. Extraocular muscles are intact. Mucous membranes are moist.     NECK: Supple.     LUNGS: Clear to auscultation without wheezing, rales, or rhonchi. Respirations unlabored    HEART: Regular rate and rhythm without murmur.    ABDOMEN: Soft, nontender, and nondistended.  No hepatosplenomegaly is noted.    EXTREMITIES: Without any cyanosis, clubbing, rash, lesions or edema.    NEUROLOGIC: Grossly intact.      LABS:                        14.1   14.6  )-----------( 296      ( 28 Nov 2017 22:06 )             43.6     11-28    140  |  100  |  16.0  ----------------------------<  144<H>  4.3   |  24.0  |  0.73    Ca    8.6      28 Nov 2017 22:06    TPro  7.3  /  Alb  3.6  /  TBili  0.5  /  DBili  x   /  AST  22  /  ALT  15  /  AlkPhos  105  11-28          CARDIAC MARKERS ( 28 Nov 2017 22:06 )  x     / <0.01 ng/mL / x     / x     / x            Serum Pro-Brain Natriuretic Peptide: 374 pg/mL (11-28-17 @ 22:06)          MICROBIOLOGY:    RADIOLOGY & ADDITIONAL STUDIES: PULMONARY CONSULT NOTE      FRANDY BAUTISTAJANE-[id number]    Patient is a 67y old  Male who presents with a chief complaint of Came for rehab (29 Nov 2017 10:08)  67 YOM w/PMH of COPD stage 4 s/p right lung reduction in 2010, currently on home oxygen 4L (sat 92-93%), HTN, CVA on 2014, DM, hypothyroidism, hydrocele, kidney stones who came because he noted that his O2 sat was 76% today. He mentions more difficult to talk in full sentences  compared to his baseline. He also has a chronic productive cough which has not changed in frequency or color. He mentions that his oxygen machine broke 4 days ago and he got a new one since, but is not sure if its working properly. He denies SOB, increased cough, chest pain, hemoptysis nausea, weakness. Pt has to sleep in a sit position with 2 pillows since he gets SOB if he lyes flat, condition that he attributes to his postnasal drip     HISTORY OF PRESENT ILLNESS:  feels sig better  denies fever, chills, chest pain  cough at baseline per pt  problems with 02 concentrator   on 4 L, uses advair and spiriva, has home neb- rarely uses  no longer followed at [facility]  currently feels sig better  MEDICATIONS  (STANDING):  ALBUTerol/ipratropium for Nebulization 3 milliLiter(s) Nebulizer every 6 hours  aspirin enteric coated 81 milliGRAM(s) Oral daily  atorvastatin 80 milliGRAM(s) Oral at bedtime  clopidogrel Tablet 75 milliGRAM(s) Oral daily  dextrose 5%. 1000 milliLiter(s) (50 mL/Hr) IV Continuous <Continuous>  dextrose 50% Injectable 12.5 Gram(s) IV Push once  dextrose 50% Injectable 25 Gram(s) IV Push once  dextrose 50% Injectable 25 Gram(s) IV Push once  diltiazem    milliGRAM(s) Oral <User Schedule>  enoxaparin Injectable 40 milliGRAM(s) SubCutaneous daily  insulin lispro (HumaLOG) corrective regimen sliding scale   SubCutaneous Before meals and at bedtime  levoFLOXacin IVPB      levothyroxine 50 MICROGram(s) Oral daily  loratadine 10 milliGRAM(s) Oral daily  methylPREDNISolone sodium succinate Injectable 60 milliGRAM(s) IV Push every 6 hours  multiple electrolytes Injection Type 1 1000 milliLiter(s) (100 mL/Hr) IV Continuous <Continuous>  pantoprazole    Tablet 40 milliGRAM(s) Oral before breakfast  saccharomyces boulardii 250 milliGRAM(s) Oral two times a day      MEDICATIONS  (PRN):  ALBUTerol    0.083% 2.5 milliGRAM(s) Nebulizer every 4 hours PRN Shortness of Breath and/or Wheezing  ALPRAZolam 0.25 milliGRAM(s) Oral three times a day PRN anxiety  benzonatate 200 milliGRAM(s) Oral daily PRN Cough  dextrose Gel 1 Dose(s) Oral once PRN Blood Glucose LESS THAN 70 milliGRAM(s)/deciliter  glucagon  Injectable 1 milliGRAM(s) IntraMuscular once PRN Glucose LESS THAN 70 milligrams/deciliter  guaiFENesin ER 1200 milliGRAM(s) Oral every 12 hours PRN Cough      Allergies    codeine (Unknown)  penicillin (Unknown)    Intolerances    Symbicort (Short breath)      PAST MEDICAL & SURGICAL HISTORY:  Hypothyroidism  Hydrocele  Renal calculi  Diabetes mellitus  Hypertension  CVA (cerebral vascular accident)  Chronic obstructive pulmonary disease, unspecified COPD type  History of lung surgery      FAMILY HISTORY:  No pertinent family history in first degree relatives      SOCIAL HISTORY  Smoking History:     REVIEW OF SYSTEMS:    CONSTITUTIONAL:  No fevers, chills, sweats    HEENT:  Eyes:  No diplopia or blurred vision. ENT:  No earache, sore throat or runny nose.    CARDIOVASCULAR:  No pressure, squeezing, tightness, or heaviness about the chest; no palpitations.    RESPIRATORY:  see hpi    GASTROINTESTINAL:  No abdominal pain, nausea, vomiting or diarrhea.    GENITOURINARY:  No dysuria, frequency or urgency.    NEUROLOGIC:  No paresthesias, fasciculations, seizures or weakness.    PSYCHIATRIC:  No disorder of thought or mood.    Vital Signs Last 24 Hrs  T(C): 36.7 (29 Nov 2017 03:50), Max: 36.8 (28 Nov 2017 21:35)  T(F): 98 (29 Nov 2017 03:50), Max: 98.2 (28 Nov 2017 21:35)  HR: 95 (29 Nov 2017 08:36) (95 - 118)  BP: 147/83 (29 Nov 2017 08:36) (127/74 - 156/92)  BP(mean): --  RR: 20 (29 Nov 2017 08:36) (20 - 20)  SpO2: 95% (29 Nov 2017 08:36) (83% - 96%)    PHYSICAL EXAMINATION:    GENERAL: The patient is a well-developed, well-nourished in no apparent distress.     HEENT: Head is normocephalic and atraumatic. Extraocular muscles are intact. Mucous membranes are moist.     NECK: Supple.     LUNGS: moderate air entry decr R base, without wheezing, rales, or rhonchi. Respirations unlabored    HEART: Regular rate and rhythm without murmur.    ABDOMEN: Soft, nontender, and nondistended.  No hepatosplenomegaly is noted.    EXTREMITIES: Without any cyanosis, clubbing, rash, lesions or edema.    NEUROLOGIC: Grossly intact.      LABS:                        14.1   14.6  )-----------( 296      ( 28 Nov 2017 22:06 )             43.6     11-28    140  |  100  |  16.0  ----------------------------<  144<H>  4.3   |  24.0  |  0.73    Ca    8.6      28 Nov 2017 22:06    TPro  7.3  /  Alb  3.6  /  TBili  0.5  /  DBili  x   /  AST  22  /  ALT  15  /  AlkPhos  105  11-28          CARDIAC MARKERS ( 28 Nov 2017 22:06 )  x     / <0.01 ng/mL / x     / x     / x            Serum Pro-Brain Natriuretic Peptide: 374 pg/mL (11-28-17 @ 22:06)          MICROBIOLOGY:    RADIOLOGY & ADDITIONAL STUDIES:  cxr and Ct scans reviewed and compared  office records reviewed

## 2017-11-29 NOTE — H&P ADULT - PROBLEM SELECTOR PLAN 4
Currently asymptomatic  pt is following with urology as outpatient c/w synthroid 50mcg  follow TSH in AM Will continue home meds

## 2017-11-29 NOTE — H&P ADULT - PMH
Chronic obstructive pulmonary disease, unspecified COPD type    CVA (cerebral vascular accident)    Diabetes mellitus    Hydrocele    Hypertension    Renal calculi Chronic obstructive pulmonary disease, unspecified COPD type    CVA (cerebral vascular accident)    Diabetes mellitus    Hydrocele    Hypertension    Hypothyroidism    Renal calculi

## 2017-11-29 NOTE — H&P ADULT - NSHPLABSRESULTS_GEN_ALL_CORE
CBC Full  -  ( 28 Nov 2017 22:06 )  WBC Count : 14.6 K/uL  Hemoglobin : 14.1 g/dL  Hematocrit : 43.6 %  Platelet Count - Automated : 296 K/uL  Mean Cell Volume : 89.0 fl  Mean Cell Hemoglobin : 28.8 pg  Mean Cell Hemoglobin Concentration : 32.3 g/dL  Auto Neutrophil # : 12.4 K/uL  Auto Lymphocyte # : 1.1 K/uL  Auto Monocyte # : 0.9 K/uL  Auto Eosinophil # : 0.1 K/uL  Auto Basophil # : 0.0 K/uL  Auto Neutrophil % : 85.0 %  Auto Lymphocyte % : 7.8 %  Auto Monocyte % : 5.9 %  Auto Eosinophil % : 0.8 %  Auto Basophil % : 0.2 %      11-28    140  |  100  |  16.0  ----------------------------<  144<H>  4.3   |  24.0  |  0.73    Ca    8.6      28 Nov 2017 22:06    TPro  7.3  /  Alb  3.6  /  TBili  0.5  /  DBili  x   /  AST  22  /  ALT  15  /  AlkPhos  105  11-28

## 2017-11-30 LAB
ANION GAP SERPL CALC-SCNC: 13 MMOL/L — SIGNIFICANT CHANGE UP (ref 5–17)
APPEARANCE UR: CLEAR — SIGNIFICANT CHANGE UP
BILIRUB UR-MCNC: NEGATIVE — SIGNIFICANT CHANGE UP
BUN SERPL-MCNC: 22 MG/DL — HIGH (ref 8–20)
CALCIUM SERPL-MCNC: 8.4 MG/DL — LOW (ref 8.6–10.2)
CHLORIDE SERPL-SCNC: 99 MMOL/L — SIGNIFICANT CHANGE UP (ref 98–107)
CO2 SERPL-SCNC: 28 MMOL/L — SIGNIFICANT CHANGE UP (ref 22–29)
COLOR SPEC: YELLOW — SIGNIFICANT CHANGE UP
CREAT SERPL-MCNC: 0.77 MG/DL — SIGNIFICANT CHANGE UP (ref 0.5–1.3)
CULTURE RESULTS: SIGNIFICANT CHANGE UP
DIFF PNL FLD: NEGATIVE — SIGNIFICANT CHANGE UP
EPI CELLS # UR: SIGNIFICANT CHANGE UP
GLUCOSE BLDC GLUCOMTR-MCNC: 219 MG/DL — HIGH (ref 70–99)
GLUCOSE BLDC GLUCOMTR-MCNC: 280 MG/DL — HIGH (ref 70–99)
GLUCOSE BLDC GLUCOMTR-MCNC: 287 MG/DL — HIGH (ref 70–99)
GLUCOSE BLDC GLUCOMTR-MCNC: 312 MG/DL — HIGH (ref 70–99)
GLUCOSE SERPL-MCNC: 230 MG/DL — HIGH (ref 70–115)
GLUCOSE UR QL: 50 MG/DL
GRAM STN FLD: SIGNIFICANT CHANGE UP
HBA1C BLD-MCNC: 6.1 % — HIGH (ref 4–5.6)
HCT VFR BLD CALC: 38.6 % — LOW (ref 42–52)
HGB BLD-MCNC: 12.4 G/DL — LOW (ref 14–18)
KETONES UR-MCNC: ABNORMAL
LEUKOCYTE ESTERASE UR-ACNC: NEGATIVE — SIGNIFICANT CHANGE UP
MCHC RBC-ENTMCNC: 28.4 PG — SIGNIFICANT CHANGE UP (ref 27–31)
MCHC RBC-ENTMCNC: 32.1 G/DL — SIGNIFICANT CHANGE UP (ref 32–36)
MCV RBC AUTO: 88.5 FL — SIGNIFICANT CHANGE UP (ref 80–94)
NITRITE UR-MCNC: NEGATIVE — SIGNIFICANT CHANGE UP
PH UR: 6 — SIGNIFICANT CHANGE UP (ref 5–8)
PLATELET # BLD AUTO: 281 K/UL — SIGNIFICANT CHANGE UP (ref 150–400)
POTASSIUM SERPL-MCNC: 4.6 MMOL/L — SIGNIFICANT CHANGE UP (ref 3.5–5.3)
POTASSIUM SERPL-SCNC: 4.6 MMOL/L — SIGNIFICANT CHANGE UP (ref 3.5–5.3)
PROT UR-MCNC: 30 MG/DL
RBC # BLD: 4.36 M/UL — LOW (ref 4.6–6.2)
RBC # FLD: 14.8 % — SIGNIFICANT CHANGE UP (ref 11–15.6)
SODIUM SERPL-SCNC: 140 MMOL/L — SIGNIFICANT CHANGE UP (ref 135–145)
SP GR SPEC: 1.02 — SIGNIFICANT CHANGE UP (ref 1.01–1.02)
SPECIMEN SOURCE: SIGNIFICANT CHANGE UP
TSH SERPL-MCNC: 0.98 UIU/ML — SIGNIFICANT CHANGE UP (ref 0.27–4.2)
UROBILINOGEN FLD QL: 1 MG/DL
WBC # BLD: 12.2 K/UL — HIGH (ref 4.8–10.8)
WBC # FLD AUTO: 12.2 K/UL — HIGH (ref 4.8–10.8)
WBC UR QL: SIGNIFICANT CHANGE UP

## 2017-11-30 PROCEDURE — 99233 SBSQ HOSP IP/OBS HIGH 50: CPT

## 2017-11-30 RX ADMIN — Medication 250 MILLIGRAM(S): at 17:13

## 2017-11-30 RX ADMIN — Medication 3 MILLILITER(S): at 03:58

## 2017-11-30 RX ADMIN — Medication 3: at 12:30

## 2017-11-30 RX ADMIN — Medication 50 MICROGRAM(S): at 05:56

## 2017-11-30 RX ADMIN — Medication 3: at 21:13

## 2017-11-30 RX ADMIN — Medication 3 MILLILITER(S): at 15:46

## 2017-11-30 RX ADMIN — ATORVASTATIN CALCIUM 80 MILLIGRAM(S): 80 TABLET, FILM COATED ORAL at 21:11

## 2017-11-30 RX ADMIN — Medication 4: at 17:13

## 2017-11-30 RX ADMIN — Medication 60 MILLIGRAM(S): at 12:30

## 2017-11-30 RX ADMIN — PANTOPRAZOLE SODIUM 40 MILLIGRAM(S): 20 TABLET, DELAYED RELEASE ORAL at 05:55

## 2017-11-30 RX ADMIN — Medication 3 MILLILITER(S): at 08:39

## 2017-11-30 RX ADMIN — Medication 250 MILLIGRAM(S): at 05:55

## 2017-11-30 RX ADMIN — Medication 60 MILLIGRAM(S): at 23:27

## 2017-11-30 RX ADMIN — Medication 180 MILLIGRAM(S): at 17:13

## 2017-11-30 RX ADMIN — Medication 60 MILLIGRAM(S): at 05:56

## 2017-11-30 RX ADMIN — LORATADINE 10 MILLIGRAM(S): 10 TABLET ORAL at 12:47

## 2017-11-30 RX ADMIN — Medication 60 MILLIGRAM(S): at 17:12

## 2017-11-30 RX ADMIN — Medication 0.25 MILLIGRAM(S): at 05:56

## 2017-11-30 RX ADMIN — CLOPIDOGREL BISULFATE 75 MILLIGRAM(S): 75 TABLET, FILM COATED ORAL at 12:30

## 2017-11-30 RX ADMIN — Medication 0.25 MILLIGRAM(S): at 12:30

## 2017-11-30 RX ADMIN — Medication 2: at 08:25

## 2017-11-30 RX ADMIN — Medication 180 MILLIGRAM(S): at 05:56

## 2017-11-30 RX ADMIN — Medication 100 MILLIGRAM(S): at 12:30

## 2017-11-30 RX ADMIN — Medication 100 MILLIGRAM(S): at 21:11

## 2017-11-30 RX ADMIN — Medication 81 MILLIGRAM(S): at 12:30

## 2017-11-30 RX ADMIN — Medication 0.25 MILLIGRAM(S): at 21:11

## 2017-11-30 RX ADMIN — Medication 3 MILLILITER(S): at 21:42

## 2017-11-30 NOTE — PROGRESS NOTE ADULT - ASSESSMENT
Severe , home 02 dep  no PE , mucus plugging and adenopathy  episodes of desaturation noted, refused ABG, currently in no distress , oxygenating adeqautely  improved mucociliary clearance  continue nebs, medrol, abx  mobilize as tolerated  prognosis overall extremely guarded

## 2017-11-30 NOTE — PROVIDER CONTACT NOTE (CHANGE IN STATUS NOTIFICATION) - SITUATION
Patient keeps desaturating down to 78% and low 80's, pt currently on 5L NC, pt O2 dependent at home 3L,

## 2017-11-30 NOTE — PROGRESS NOTE ADULT - ASSESSMENT
67 YOM w/PMH of COPD stage 4 s/p right lung reduction in 2010, currently on home oxygen 4L (sat 92-93%), HTN, CVA on 2014, DM, hypothyroidism hydrocele, kidney stones admitted for COPD exacerbation      Assessment/Plan:    1. Acute on chronic hypoxic respiratory failure possibly secondary to o2 concentrator malfunction:  CTA of the chest negative for PE, showed bilateral lower lobe infiltrates with mucous plugging. IV steroids, nebs, mucomyst. O2 as tolerated, stat abg ordered    2. Exacerbation of severe copd with bilateral lower lobe pneumonia:  IV steroids, nebs, antibiotics, o2    3. Hypertension: continue cardizem    4. Hyperlipidemia on statin therapy    VTE_ lovenox subcut     CM consulted for o2 needs at home

## 2017-11-30 NOTE — PROGRESS NOTE ADULT - SUBJECTIVE AND OBJECTIVE BOX
CC: SOB    INTERVAL HPI/OVERNIGHT EVENTS: patient seen and examined, laying comfortably in bed. COugh at baseline. Denies shortness of breath. Had an episode of hypoxia this morning, spo2 in the 70s on  home 4 liters of o2 increased to 5 liters.       Vital Signs Last 24 Hrs  T(C): 36.4 (30 Nov 2017 08:15), Max: 37.2 (29 Nov 2017 19:59)  T(F): 97.6 (30 Nov 2017 08:15), Max: 98.9 (29 Nov 2017 19:59)  HR: 93 (30 Nov 2017 08:15) (93 - 102)  BP: 114/64 (30 Nov 2017 08:15) (112/65 - 142/92)  BP(mean): --  RR: 18 (30 Nov 2017 08:15) (17 - 20)  SpO2: 98% (30 Nov 2017 04:24) (92% - 98%)    PHYSICAL EXAM:    GENERAL: NAD, well-groomed, well-developed  HEAD:  Atraumatic, Normocephalic  EYES: EOMI, PERRLA, conjunctiva and sclera clear  ENMT: Moist mucous membranes  NECK: Supple, No JVD  CHEST/LUNG: Decreased breath sounds bilaterally   HEART: Regular rate and rhythm; No murmurs, rubs, or gallops  ABDOMEN: Soft, Nontender, Nondistended; Bowel sounds present  EXTREMITIES:  2+ Peripheral Pulses, No clubbing, cyanosis, or edema        MEDICATIONS  (STANDING):  ALBUTerol/ipratropium for Nebulization 3 milliLiter(s) Nebulizer every 6 hours  ALPRAZolam 0.25 milliGRAM(s) Oral three times a day  aspirin enteric coated 81 milliGRAM(s) Oral daily  atorvastatin 80 milliGRAM(s) Oral at bedtime  benzonatate 100 milliGRAM(s) Oral three times a day  clopidogrel Tablet 75 milliGRAM(s) Oral daily  dextrose 5%. 1000 milliLiter(s) (50 mL/Hr) IV Continuous <Continuous>  dextrose 50% Injectable 12.5 Gram(s) IV Push once  dextrose 50% Injectable 25 Gram(s) IV Push once  dextrose 50% Injectable 25 Gram(s) IV Push once  diltiazem    milliGRAM(s) Oral <User Schedule>  enoxaparin Injectable 40 milliGRAM(s) SubCutaneous daily  Gauifenesin/Dextromethorphan 1200/600mg 1 Tablet(s) 1 Tablet(s) Oral two times a day  insulin lispro (HumaLOG) corrective regimen sliding scale   SubCutaneous Before meals and at bedtime  levoFLOXacin IVPB 500 milliGRAM(s) IV Intermittent every 24 hours  levoFLOXacin IVPB      levothyroxine 50 MICROGram(s) Oral daily  loratadine 10 milliGRAM(s) Oral daily  methylPREDNISolone sodium succinate Injectable 60 milliGRAM(s) IV Push every 6 hours  pantoprazole    Tablet 40 milliGRAM(s) Oral before breakfast  saccharomyces boulardii 250 milliGRAM(s) Oral two times a day    MEDICATIONS  (PRN):  acetylcysteine 20% Inhalation 1 milliLiter(s) Inhalation every 4 hours PRN poor cough clearance  ALBUTerol    0.083% 2.5 milliGRAM(s) Nebulizer every 4 hours PRN Shortness of Breath and/or Wheezing  dextrose Gel 1 Dose(s) Oral once PRN Blood Glucose LESS THAN 70 milliGRAM(s)/deciliter  glucagon  Injectable 1 milliGRAM(s) IntraMuscular once PRN Glucose LESS THAN 70 milligrams/deciliter      Allergies    codeine (Unknown)  penicillin (Unknown)    Intolerances    Symbicort (Short breath)        LABS:                          12.4   12.2  )-----------( 281      ( 30 Nov 2017 08:23 )             38.6     11-30    140  |  99  |  22.0<H>  ----------------------------<  230<H>  4.6   |  28.0  |  0.77    Ca    8.4<L>      30 Nov 2017 08:23    TPro  7.3  /  Alb  3.6  /  TBili  0.5  /  DBili  x   /  AST  22  /  ALT  15  /  AlkPhos  105  11-28          RADIOLOGY & ADDITIONAL TESTS:

## 2017-11-30 NOTE — PROGRESS NOTE ADULT - SUBJECTIVE AND OBJECTIVE BOX
PULMONARY PROGRESS NOTE      FRANDY BAUTISTAJANE-[id number]    Patient is a 67y old  Male who presents with a chief complaint of Came for rehab (2017 10:08)      INTERVAL HPI/OVERNIGHT EVENTS:  episodes of desaturation noted  currently resting comfortably in cahair  reading paper, on cannula sp02 92%  MEDICATIONS  (STANDING):  ALBUTerol/ipratropium for Nebulization 3 milliLiter(s) Nebulizer every 6 hours  ALPRAZolam 0.25 milliGRAM(s) Oral three times a day  aspirin enteric coated 81 milliGRAM(s) Oral daily  atorvastatin 80 milliGRAM(s) Oral at bedtime  benzonatate 100 milliGRAM(s) Oral three times a day  clopidogrel Tablet 75 milliGRAM(s) Oral daily  dextrose 5%. 1000 milliLiter(s) (50 mL/Hr) IV Continuous <Continuous>  dextrose 50% Injectable 12.5 Gram(s) IV Push once  dextrose 50% Injectable 25 Gram(s) IV Push once  dextrose 50% Injectable 25 Gram(s) IV Push once  diltiazem    milliGRAM(s) Oral <User Schedule>  enoxaparin Injectable 40 milliGRAM(s) SubCutaneous daily  Gauifenesin/Dextromethorphan 1200/600mg 1 Tablet(s) 1 Tablet(s) Oral two times a day  insulin lispro (HumaLOG) corrective regimen sliding scale   SubCutaneous Before meals and at bedtime  levoFLOXacin IVPB 500 milliGRAM(s) IV Intermittent every 24 hours  levoFLOXacin IVPB      levothyroxine 50 MICROGram(s) Oral daily  loratadine 10 milliGRAM(s) Oral daily  methylPREDNISolone sodium succinate Injectable 60 milliGRAM(s) IV Push every 6 hours  pantoprazole    Tablet 40 milliGRAM(s) Oral before breakfast  saccharomyces boulardii 250 milliGRAM(s) Oral two times a day      MEDICATIONS  (PRN):  acetylcysteine 20% Inhalation 1 milliLiter(s) Inhalation every 4 hours PRN poor cough clearance  ALBUTerol    0.083% 2.5 milliGRAM(s) Nebulizer every 4 hours PRN Shortness of Breath and/or Wheezing  dextrose Gel 1 Dose(s) Oral once PRN Blood Glucose LESS THAN 70 milliGRAM(s)/deciliter  glucagon  Injectable 1 milliGRAM(s) IntraMuscular once PRN Glucose LESS THAN 70 milligrams/deciliter      Allergies    codeine (Unknown)  penicillin (Unknown)    Intolerances    Symbicort (Short breath)      PAST MEDICAL & SURGICAL HISTORY:  Hypothyroidism  Hydrocele  Renal calculi  Diabetes mellitus  Hypertension  CVA (cerebral vascular accident)  Chronic obstructive pulmonary disease, unspecified COPD type  History of lung surgery      SOCIAL HISTORY  Smoking History:       REVIEW OF SYSTEMS:    CONSTITUTIONAL:  No distress    HEENT:  Eyes:  No diplopia or blurred vision. ENT:  No earache, sore throat or runny nose.    CARDIOVASCULAR:  No pressure, squeezing, tightness, heaviness or aching about the chest; no palpitations.    RESPIRATORY:  see hpi  GASTROINTESTINAL:  No nausea, vomiting or diarrhea.    GENITOURINARY:  No dysuria, frequency or urgency.    NEUROLOGIC:  No paresthesias, fasciculations, seizures or weakness.    PSYCHIATRIC:  No disorder of thought or mood.    Vital Signs Last 24 Hrs  T(C): 36.4 (2017 08:15), Max: 37.2 (2017 19:59)  T(F): 97.6 (2017 08:15), Max: 98.9 (2017 19:59)  HR: 93 (2017 08:15) (93 - 102)  BP: 114/64 (2017 08:15) (112/65 - 142/92)  BP(mean): --  RR: 18 (2017 08:15) (17 - 20)  SpO2: 98% (2017 04:24) (92% - 98%)    PHYSICAL EXAMINATION:    GENERAL: The patient is awake and alert in no apparent distress.     HEENT: Head is normocephalic and atraumatic. Extraocular muscles are intact. Mucous membranes are moist.    NECK: Supple.    LUNGS: moderate air entry with few crackles basei; respirations unlabored    HEART: Regular rate and rhythm without murmur.    ABDOMEN: Soft, nontender, and nondistended.      EXTREMITIES: Without any cyanosis, clubbing, rash, lesions or edema.    NEUROLOGIC: Grossly intact.    LABS:                        12.4   12.2  )-----------( 281      ( 2017 08:23 )             38.6     11-    140  |  99  |  22.0<H>  ----------------------------<  230<H>  4.6   |  28.0  |  0.77    Ca    8.4<L>      2017 08:23    TPro  7.3  /  Alb  3.6  /  TBili  0.5  /  DBili  x   /  AST  22  /  ALT  15  /  AlkPhos  105        Urinalysis Basic - ( 2017 14:09 )    Color: Yellow / Appearance: Clear / S.025 / pH: x  Gluc: x / Ketone: Trace  / Bili: Negative / Urobili: 1 mg/dL   Blood: x / Protein: 30 mg/dL / Nitrite: Negative   Leuk Esterase: Negative / RBC: x / WBC x   Sq Epi: x / Non Sq Epi: x / Bacteria: x        CARDIAC MARKERS ( 2017 22:06 )  x     / <0.01 ng/mL / x     / x     / x            Serum Pro-Brain Natriuretic Peptide: 374 pg/mL (17 @ 22:06)          MICROBIOLOGY:    RADIOLOGY & ADDITIONAL STUDIES: PULMONARY PROGRESS NOTE      FRANDY BAUTISTAJANE-[id number]    Patient is a 67y old  Male who presents with a chief complaint of Came for rehab (2017 10:08)      INTERVAL HPI/OVERNIGHT EVENTS:  episodes of desaturation noted  currently resting comfortably in cahair  reading paper, on cannula 4 L sp02 92%  MEDICATIONS  (STANDING):  ALBUTerol/ipratropium for Nebulization 3 milliLiter(s) Nebulizer every 6 hours  ALPRAZolam 0.25 milliGRAM(s) Oral three times a day  aspirin enteric coated 81 milliGRAM(s) Oral daily  atorvastatin 80 milliGRAM(s) Oral at bedtime  benzonatate 100 milliGRAM(s) Oral three times a day  clopidogrel Tablet 75 milliGRAM(s) Oral daily  dextrose 5%. 1000 milliLiter(s) (50 mL/Hr) IV Continuous <Continuous>  dextrose 50% Injectable 12.5 Gram(s) IV Push once  dextrose 50% Injectable 25 Gram(s) IV Push once  dextrose 50% Injectable 25 Gram(s) IV Push once  diltiazem    milliGRAM(s) Oral <User Schedule>  enoxaparin Injectable 40 milliGRAM(s) SubCutaneous daily  Gauifenesin/Dextromethorphan 1200/600mg 1 Tablet(s) 1 Tablet(s) Oral two times a day  insulin lispro (HumaLOG) corrective regimen sliding scale   SubCutaneous Before meals and at bedtime  levoFLOXacin IVPB 500 milliGRAM(s) IV Intermittent every 24 hours  levoFLOXacin IVPB      levothyroxine 50 MICROGram(s) Oral daily  loratadine 10 milliGRAM(s) Oral daily  methylPREDNISolone sodium succinate Injectable 60 milliGRAM(s) IV Push every 6 hours  pantoprazole    Tablet 40 milliGRAM(s) Oral before breakfast  saccharomyces boulardii 250 milliGRAM(s) Oral two times a day      MEDICATIONS  (PRN):  acetylcysteine 20% Inhalation 1 milliLiter(s) Inhalation every 4 hours PRN poor cough clearance  ALBUTerol    0.083% 2.5 milliGRAM(s) Nebulizer every 4 hours PRN Shortness of Breath and/or Wheezing  dextrose Gel 1 Dose(s) Oral once PRN Blood Glucose LESS THAN 70 milliGRAM(s)/deciliter  glucagon  Injectable 1 milliGRAM(s) IntraMuscular once PRN Glucose LESS THAN 70 milligrams/deciliter      Allergies    codeine (Unknown)  penicillin (Unknown)    Intolerances    Symbicort (Short breath)      PAST MEDICAL & SURGICAL HISTORY:  Hypothyroidism  Hydrocele  Renal calculi  Diabetes mellitus  Hypertension  CVA (cerebral vascular accident)  Chronic obstructive pulmonary disease, unspecified COPD type  History of lung surgery      SOCIAL HISTORY  Smoking History:       REVIEW OF SYSTEMS:    CONSTITUTIONAL:  No distress    HEENT:  Eyes:  No diplopia or blurred vision. ENT:  No earache, sore throat or runny nose.    CARDIOVASCULAR:  No pressure, squeezing, tightness, heaviness or aching about the chest; no palpitations.    RESPIRATORY:  see hpi  GASTROINTESTINAL:  No nausea, vomiting or diarrhea.    GENITOURINARY:  No dysuria, frequency or urgency.    NEUROLOGIC:  No paresthesias, fasciculations, seizures or weakness.    PSYCHIATRIC:  No disorder of thought or mood.    Vital Signs Last 24 Hrs  T(C): 36.4 (2017 08:15), Max: 37.2 (2017 19:59)  T(F): 97.6 (2017 08:15), Max: 98.9 (2017 19:59)  HR: 93 (2017 08:15) (93 - 102)  BP: 114/64 (2017 08:15) (112/65 - 142/92)  BP(mean): --  RR: 18 (2017 08:15) (17 - 20)  SpO2: 98% (2017 04:24) (92% - 98%)    PHYSICAL EXAMINATION:    GENERAL: The patient is awake and alert in no apparent distress.     HEENT: Head is normocephalic and atraumatic. Extraocular muscles are intact. Mucous membranes are moist.    NECK: Supple.    LUNGS: moderate air entry with few crackles basei; respirations unlabored    HEART: Regular rate and rhythm without murmur.    ABDOMEN: Soft, nontender, and nondistended.      EXTREMITIES: Without any cyanosis, clubbing, rash, lesions or edema.    NEUROLOGIC: Grossly intact.    LABS:                        12.4   12.2  )-----------( 281      ( 2017 08:23 )             38.6     -    140  |  99  |  22.0<H>  ----------------------------<  230<H>  4.6   |  28.0  |  0.77    Ca    8.4<L>      2017 08:23    TPro  7.3  /  Alb  3.6  /  TBili  0.5  /  DBili  x   /  AST  22  /  ALT  15  /  AlkPhos  105        Urinalysis Basic - ( 2017 14:09 )    Color: Yellow / Appearance: Clear / S.025 / pH: x  Gluc: x / Ketone: Trace  / Bili: Negative / Urobili: 1 mg/dL   Blood: x / Protein: 30 mg/dL / Nitrite: Negative   Leuk Esterase: Negative / RBC: x / WBC x   Sq Epi: x / Non Sq Epi: x / Bacteria: x        CARDIAC MARKERS ( 2017 22:06 )  x     / <0.01 ng/mL / x     / x     / x            Serum Pro-Brain Natriuretic Peptide: 374 pg/mL (17 @ 22:06)          MICROBIOLOGY:    RADIOLOGY & ADDITIONAL STUDIES:

## 2017-12-01 LAB
CULTURE RESULTS: SIGNIFICANT CHANGE UP
GLUCOSE BLDC GLUCOMTR-MCNC: 207 MG/DL — HIGH (ref 70–99)
GLUCOSE BLDC GLUCOMTR-MCNC: 229 MG/DL — HIGH (ref 70–99)
GLUCOSE BLDC GLUCOMTR-MCNC: 248 MG/DL — HIGH (ref 70–99)
GLUCOSE BLDC GLUCOMTR-MCNC: 353 MG/DL — HIGH (ref 70–99)

## 2017-12-01 PROCEDURE — 99232 SBSQ HOSP IP/OBS MODERATE 35: CPT

## 2017-12-01 PROCEDURE — 93010 ELECTROCARDIOGRAM REPORT: CPT

## 2017-12-01 PROCEDURE — 99233 SBSQ HOSP IP/OBS HIGH 50: CPT

## 2017-12-01 RX ORDER — SIMETHICONE 80 MG/1
80 TABLET, CHEWABLE ORAL ONCE
Qty: 0 | Refills: 0 | Status: COMPLETED | OUTPATIENT
Start: 2017-12-01 | End: 2017-12-01

## 2017-12-01 RX ADMIN — Medication 0.25 MILLIGRAM(S): at 21:47

## 2017-12-01 RX ADMIN — Medication 100 MILLIGRAM(S): at 12:39

## 2017-12-01 RX ADMIN — Medication 50 MICROGRAM(S): at 06:15

## 2017-12-01 RX ADMIN — Medication 3 MILLILITER(S): at 19:53

## 2017-12-01 RX ADMIN — Medication 4: at 12:39

## 2017-12-01 RX ADMIN — Medication 40 MILLIGRAM(S): at 17:52

## 2017-12-01 RX ADMIN — Medication 3 MILLILITER(S): at 03:31

## 2017-12-01 RX ADMIN — Medication 2: at 08:30

## 2017-12-01 RX ADMIN — Medication 0.25 MILLIGRAM(S): at 06:15

## 2017-12-01 RX ADMIN — Medication 100 MILLIGRAM(S): at 06:15

## 2017-12-01 RX ADMIN — Medication 250 MILLIGRAM(S): at 17:52

## 2017-12-01 RX ADMIN — Medication 100 MILLIGRAM(S): at 21:47

## 2017-12-01 RX ADMIN — Medication 60 MILLIGRAM(S): at 06:14

## 2017-12-01 RX ADMIN — Medication 180 MILLIGRAM(S): at 17:52

## 2017-12-01 RX ADMIN — CLOPIDOGREL BISULFATE 75 MILLIGRAM(S): 75 TABLET, FILM COATED ORAL at 12:39

## 2017-12-01 RX ADMIN — Medication 3 MILLILITER(S): at 15:39

## 2017-12-01 RX ADMIN — LORATADINE 10 MILLIGRAM(S): 10 TABLET ORAL at 12:39

## 2017-12-01 RX ADMIN — Medication 2: at 17:53

## 2017-12-01 RX ADMIN — PANTOPRAZOLE SODIUM 40 MILLIGRAM(S): 20 TABLET, DELAYED RELEASE ORAL at 06:15

## 2017-12-01 RX ADMIN — Medication 40 MILLIGRAM(S): at 21:47

## 2017-12-01 RX ADMIN — Medication 81 MILLIGRAM(S): at 12:39

## 2017-12-01 RX ADMIN — SIMETHICONE 80 MILLIGRAM(S): 80 TABLET, CHEWABLE ORAL at 21:06

## 2017-12-01 RX ADMIN — Medication 2: at 21:47

## 2017-12-01 RX ADMIN — ATORVASTATIN CALCIUM 80 MILLIGRAM(S): 80 TABLET, FILM COATED ORAL at 21:47

## 2017-12-01 RX ADMIN — Medication 0.25 MILLIGRAM(S): at 17:52

## 2017-12-01 RX ADMIN — Medication 250 MILLIGRAM(S): at 06:15

## 2017-12-01 RX ADMIN — Medication 3 MILLILITER(S): at 09:15

## 2017-12-01 RX ADMIN — Medication 180 MILLIGRAM(S): at 06:15

## 2017-12-01 NOTE — PROGRESS NOTE ADULT - SUBJECTIVE AND OBJECTIVE BOX
PULMONARY PROGRESS NOTE      FRANDY BAUTISTA  [id number]    Patient is a 67y old  Male who presents with a chief complaint of Came for rehab (2017 10:08)      INTERVAL HPI/OVERNIGHT EVENTS:    Patient is awake and alert  Comfortable   Less SOB    MEDICATIONS  (STANDING):  ALBUTerol/ipratropium for Nebulization 3 milliLiter(s) Nebulizer every 6 hours  ALPRAZolam 0.25 milliGRAM(s) Oral three times a day  aspirin enteric coated 81 milliGRAM(s) Oral daily  atorvastatin 80 milliGRAM(s) Oral at bedtime  benzonatate 100 milliGRAM(s) Oral three times a day  clopidogrel Tablet 75 milliGRAM(s) Oral daily  dextrose 5%. 1000 milliLiter(s) (50 mL/Hr) IV Continuous <Continuous>  dextrose 50% Injectable 12.5 Gram(s) IV Push once  dextrose 50% Injectable 25 Gram(s) IV Push once  dextrose 50% Injectable 25 Gram(s) IV Push once  diltiazem    milliGRAM(s) Oral <User Schedule>  enoxaparin Injectable 40 milliGRAM(s) SubCutaneous daily  Gauifenesin/Dextromethorphan 1200/600mg 1 Tablet(s) 1 Tablet(s) Oral two times a day  insulin lispro (HumaLOG) corrective regimen sliding scale   SubCutaneous Before meals and at bedtime  levoFLOXacin IVPB 500 milliGRAM(s) IV Intermittent every 24 hours  levoFLOXacin IVPB      levothyroxine 50 MICROGram(s) Oral daily  loratadine 10 milliGRAM(s) Oral daily  methylPREDNISolone sodium succinate Injectable 40 milliGRAM(s) IV Push every 8 hours  pantoprazole    Tablet 40 milliGRAM(s) Oral before breakfast  saccharomyces boulardii 250 milliGRAM(s) Oral two times a day      MEDICATIONS  (PRN):  acetylcysteine 20% Inhalation 1 milliLiter(s) Inhalation every 4 hours PRN poor cough clearance  ALBUTerol    0.083% 2.5 milliGRAM(s) Nebulizer every 4 hours PRN Shortness of Breath and/or Wheezing  dextrose Gel 1 Dose(s) Oral once PRN Blood Glucose LESS THAN 70 milliGRAM(s)/deciliter  glucagon  Injectable 1 milliGRAM(s) IntraMuscular once PRN Glucose LESS THAN 70 milligrams/deciliter      Allergies    codeine (Unknown)  penicillin (Unknown)    Intolerances    Symbicort (Short breath)      PAST MEDICAL & SURGICAL HISTORY:  Hypothyroidism  Hydrocele  Renal calculi  Diabetes mellitus  Hypertension  CVA (cerebral vascular accident)  Chronic obstructive pulmonary disease, unspecified COPD type  History of lung surgery        REVIEW OF SYSTEMS:    CONSTITUTIONAL:  No distress    HEENT:  Eyes:  No diplopia or blurred vision. ENT:  No earache, sore throat or runny nose.    CARDIOVASCULAR:  No pressure, squeezing, tightness, heaviness or aching about the chest; no palpitations.    RESPIRATORY:  Improved cough, shortness of breath, no PND or orthopnea. Mild SOBOE    GASTROINTESTINAL:  No nausea, vomiting or diarrhea.    GENITOURINARY:  No dysuria, frequency or urgency.    NEUROLOGIC:  No paresthesias, fasciculations, seizures or weakness.    PSYCHIATRIC:  No disorder of thought or mood.    Vital Signs Last 24 Hrs  T(C): 36.6 (01 Dec 2017 15:40), Max: 37.1 (01 Dec 2017 11:00)  T(F): 97.8 (01 Dec 2017 15:40), Max: 98.7 (01 Dec 2017 11:00)  HR: 87 (01 Dec 2017 15:40) (80 - 97)  BP: 133/80 (01 Dec 2017 15:40) (106/65 - 133/80)  BP(mean): --  RR: 18 (01 Dec 2017 11:) (18 - 20)  SpO2: 92% (01 Dec 2017 11:) (92% - 96%)    PHYSICAL EXAMINATION:    GENERAL: The patient is awake and alert in no apparent distress.     HEENT: Head is normocephalic and atraumatic. Extraocular muscles are intact. Mucous membranes are moist.    NECK: Supple.    LUNGS: Decreased BS; clear to auscultation without wheezing, rales or rhonchi; respirations unlabored    HEART: Regular rate and rhythm without murmur.    ABDOMEN: Soft, nontender, and nondistended.      EXTREMITIES: Without any cyanosis, clubbing, rash, lesions or edema.    NEUROLOGIC: Grossly intact.    LABS:                        12.4   12.2  )-----------( 281      ( 2017 08:23 )             38.6         140  |  99  |  22.0<H>  ----------------------------<  230<H>  4.6   |  28.0  |  0.77    Ca    8.4<L>      2017 08:23        Urinalysis Basic - ( 2017 14:09 )    Color: Yellow / Appearance: Clear / S.025 / pH: x  Gluc: x / Ketone: Trace  / Bili: Negative / Urobili: 1 mg/dL   Blood: x / Protein: 30 mg/dL / Nitrite: Negative   Leuk Esterase: Negative / RBC: x / WBC 0-2   Sq Epi: x / Non Sq Epi: Occasional / Bacteria: x              Serum Pro-Brain Natriuretic Peptide: 374 pg/mL (17 @ 22:06)          MICROBIOLOGY:    Culture - Sputum . (17 @ 08:52)    Gram Stain:   Greater than 10 Squamous epithelial cells per low power field.    Specimen Source: .Sputum Sputum    Culture Results:   Specimen unacceptable for culture. Please recollect and resubmit a new  specimen for culture, if necessary.  .  TYPE: (C=Critical, N=Notification, A=Abnormal) N  TESTS:  _ C spt  DATE/TIME CALLED: _ 2017 09:51:20  CALLED TO: Cait Yang RN  READ BACK (2 Patient Identifiers)(Y/N): _ Y  READ BACK VALUES (Y/N): _ Y  CALLED BY: Cait Borden        RADIOLOGY & ADDITIONAL STUDIES:       EXAM:  CT ANGIO CHEST (W)AW IC                          PROCEDURE DATE:  2017          INTERPRETATION:  CLINICAL INFORMATION: Shortness of breath and tachycardia    COMPARISON: CTA chest 2016    PROCEDURE:   CTA of the Chest was performed with intravenous contrast.  90 ml of Omnipaque 350 was injected intravenously. 0 ml were discarded.  Sagittal and coronal reformats were performed as well as 3D   Reconstructions.      FINDINGS:    CHEST:     LUNGS AND LARGE AIRWAYS: Diffuse emphysema with severe involvement of the   lower lobes, similar to prior study. Hyperlucent expanded left lower   lobe. Bilateral lower lobe bronchi or mucoid impacted extending into   distal airways. Bilateral lower lobe infiltrates are present.   Subsegmental atelectasis in the lingula.  PLEURA: No pleural effusion.  VESSELS: No evidence for pulmonary embolism.  HEART: Heart size is normal.No pericardial effusion.  MEDIASTINUM AND TEZ: Mediastinal lymphadenopathy noted. For example, a   subcarinal lymph node measures 3.0 x 2.4 cm..  CHEST WALL AND LOWER NECK: Within normal limits.  VISUALIZED UPPER ABDOMEN: Stable left-sided more gagging hernia   containing unobstructed colon..  BONES: Within normal limits.    IMPRESSION:     No evidence for pulmonary embolism.    Bilateral lower lobe infiltrates and lower lobe mucoid impacted airways.        PAN THOMAS M.D., ATTENDING RADIOLOGIST  This document has been electronically signed. 2017  6:30AM

## 2017-12-01 NOTE — DIETITIAN INITIAL EVALUATION ADULT. - PERTINENT LABORATORY DATA
11-30 Na140 mmol/L Glu 230 mg/dL<H> K+ 4.6 mmol/L Cr  0.77 mg/dL BUN 22.0 mg/dL<H> Phos n/a   Alb n/a   PAB n/a

## 2017-12-01 NOTE — PROGRESS NOTE ADULT - SUBJECTIVE AND OBJECTIVE BOX
CC: Feels weak     INTERVAL HPI/OVERNIGHT EVENTS: Patient seen and examined, while ambulating to the bathroom with assistance complaints of feeling weak and unsteady on his feet. Denies shortness of breath, cough or wheezing.       Vital Signs Last 24 Hrs  T(C): 36.9 (01 Dec 2017 04:46), Max: 36.9 (01 Dec 2017 04:46)  T(F): 98.5 (01 Dec 2017 04:46), Max: 98.5 (01 Dec 2017 04:46)  HR: 80 (01 Dec 2017 04:46) (80 - 97)  BP: 106/65 (01 Dec 2017 04:46) (106/65 - 131/701)  BP(mean): --  RR: 20 (01 Dec 2017 04:) (20 - 20)  SpO2: 96% (01 Dec 2017 04:) (93% - 96%)    PHYSICAL EXAM:    GENERAL: NAD, well-groomed, well-developed  HEAD:  Atraumatic, Normocephalic  EYES: EOMI, PERRLA, conjunctiva and sclera clear  ENMT: Moist mucous membranes  CHEST/LUNG: Decreased breath sounds bilaterally.   HEART: Regular rate and rhythm; No murmurs, rubs, or gallops  ABDOMEN: Soft, Nontender, Nondistended; Bowel sounds present  EXTREMITIES:  2+ Peripheral Pulses, No clubbing, cyanosis, or edema        MEDICATIONS  (STANDING):  ALBUTerol/ipratropium for Nebulization 3 milliLiter(s) Nebulizer every 6 hours  ALPRAZolam 0.25 milliGRAM(s) Oral three times a day  aspirin enteric coated 81 milliGRAM(s) Oral daily  atorvastatin 80 milliGRAM(s) Oral at bedtime  benzonatate 100 milliGRAM(s) Oral three times a day  clopidogrel Tablet 75 milliGRAM(s) Oral daily  dextrose 5%. 1000 milliLiter(s) (50 mL/Hr) IV Continuous <Continuous>  dextrose 50% Injectable 12.5 Gram(s) IV Push once  dextrose 50% Injectable 25 Gram(s) IV Push once  dextrose 50% Injectable 25 Gram(s) IV Push once  diltiazem    milliGRAM(s) Oral <User Schedule>  enoxaparin Injectable 40 milliGRAM(s) SubCutaneous daily  Gauifenesin/Dextromethorphan 1200/600mg 1 Tablet(s) 1 Tablet(s) Oral two times a day  insulin lispro (HumaLOG) corrective regimen sliding scale   SubCutaneous Before meals and at bedtime  levoFLOXacin IVPB 500 milliGRAM(s) IV Intermittent every 24 hours  levoFLOXacin IVPB      levothyroxine 50 MICROGram(s) Oral daily  loratadine 10 milliGRAM(s) Oral daily  methylPREDNISolone sodium succinate Injectable 40 milliGRAM(s) IV Push every 8 hours  pantoprazole    Tablet 40 milliGRAM(s) Oral before breakfast  saccharomyces boulardii 250 milliGRAM(s) Oral two times a day    MEDICATIONS  (PRN):  acetylcysteine 20% Inhalation 1 milliLiter(s) Inhalation every 4 hours PRN poor cough clearance  ALBUTerol    0.083% 2.5 milliGRAM(s) Nebulizer every 4 hours PRN Shortness of Breath and/or Wheezing  dextrose Gel 1 Dose(s) Oral once PRN Blood Glucose LESS THAN 70 milliGRAM(s)/deciliter  glucagon  Injectable 1 milliGRAM(s) IntraMuscular once PRN Glucose LESS THAN 70 milligrams/deciliter      Allergies    codeine (Unknown)  penicillin (Unknown)    Intolerances    Symbicort (Short breath)        LABS:                          12.4   12.2  )-----------( 281      ( 2017 08:23 )             38.6     11-30    140  |  99  |  22.0<H>  ----------------------------<  230<H>  4.6   |  28.0  |  0.77    Ca    8.4<L>      2017 08:23        Urinalysis Basic - ( 2017 14:09 )    Color: Yellow / Appearance: Clear / S.025 / pH: x  Gluc: x / Ketone: Trace  / Bili: Negative / Urobili: 1 mg/dL   Blood: x / Protein: 30 mg/dL / Nitrite: Negative   Leuk Esterase: Negative / RBC: x / WBC 0-2   Sq Epi: x / Non Sq Epi: Occasional / Bacteria: x        RADIOLOGY & ADDITIONAL TESTS:

## 2017-12-01 NOTE — DIETITIAN INITIAL EVALUATION ADULT. - PROBLEM SELECTOR PLAN 1
continue supplemental O2, 10L. Will try to wane off as tolerated  Maintenance of O2 sat 88-94%  Admit to hospitalist service, monitored bed with   Chest X ray done  Follow CTA  Will call pulmonology  Diet: DASH, carbohydrate consistent  Activity as tolerated  Duonebs q6hrs  Solumedrol 60mg  q6hrs  Albuterol PRN q4hrs  Levaquin 500 mg

## 2017-12-01 NOTE — PROGRESS NOTE ADULT - ASSESSMENT
67 YOM w/PMH of COPD stage 4 s/p right lung reduction in 2010, currently on home oxygen 4L (sat 92-93%), HTN, CVA on 2014, DM, hypothyroidism hydrocele, kidney stones admitted for COPD exacerbation. Weaned off ventimask to 4 liters nasal cannula. CTA of the chest was negative for PE but showed bilateral lower lobe infiltrates with mucous plugging> Evaluated by pulmonary medicine, started on iv steroids, nebulizer treatments, mucomyst and iv levaquin.       Assessment/Plan:    1. Acute on chronic hypoxic respiratory failure: Refused ABG yesterday, had an episode of hypoxia yesterday down to the 70s improved with increased o2 to 5 liters NC.   Taper steroids, continue nebs and Mucomyst  Day 3 of IV Levaquin  Ambulate with PT today  Oxygen was arranged by case management    2. Exacerbation of severe copd with bilateral lower lobe pneumonia:  wean IV steroids, nebs, antibiotics, o2    3. Hypertension: continue cardizem    4. Hyperlipidemia on statin therapy    VTE_ lovenox subcut     Guarded prognosis, Palliative care consult  PT evaluation ordered.     CM consulted for o2 needs at home

## 2017-12-01 NOTE — PROGRESS NOTE ADULT - ASSESSMENT
Severe COPD  Chronic hypoxia, home 02 dep  CT without PE but with mucus plugging and adenopathy and basilar infiltrates  Episodes of desaturation noted, refused ABG, currently in no distress , oxygenating well    Plan:  Encourage cough and mobilization of secretions  Drug nebs  IV steroids - decrease as tolerates  F/u chest CT as outpt  Mucomyst as needed  Abx  Mobilize as tolerated  Prognosis overall extremely guarded  GI/DVT prophylaxis

## 2017-12-01 NOTE — GOALS OF CARE CONVERSATION - PERSONAL ADVANCE DIRECTIVE - CONVERSATION DETAILS
Mtg with Pt to review Plan of Care.  Pt is alert and oriented O2 nasally in place.  Pt is waiting for his wife to bring in his walker so he can ambulate more.  Pt has mild dyspnea when speaking.  Pt verbalizes that his wife is his HCP and she is aware of his directives.  Pt did not wish to discuss.  Palliataive will follow as needed.

## 2017-12-01 NOTE — DIETITIAN INITIAL EVALUATION ADULT. - NUTRITION INTERVENTIONS
high protein snacks, add consistent CHO/nutrient dense snacks/food preferences as requested by patient (specify)

## 2017-12-02 LAB
ANION GAP SERPL CALC-SCNC: 12 MMOL/L — SIGNIFICANT CHANGE UP (ref 5–17)
BUN SERPL-MCNC: 23 MG/DL — HIGH (ref 8–20)
CALCIUM SERPL-MCNC: 8.2 MG/DL — LOW (ref 8.6–10.2)
CHLORIDE SERPL-SCNC: 97 MMOL/L — LOW (ref 98–107)
CO2 SERPL-SCNC: 28 MMOL/L — SIGNIFICANT CHANGE UP (ref 22–29)
CREAT SERPL-MCNC: 0.66 MG/DL — SIGNIFICANT CHANGE UP (ref 0.5–1.3)
GLUCOSE BLDC GLUCOMTR-MCNC: 194 MG/DL — HIGH (ref 70–99)
GLUCOSE BLDC GLUCOMTR-MCNC: 254 MG/DL — HIGH (ref 70–99)
GLUCOSE BLDC GLUCOMTR-MCNC: 278 MG/DL — HIGH (ref 70–99)
GLUCOSE BLDC GLUCOMTR-MCNC: 279 MG/DL — HIGH (ref 70–99)
GLUCOSE SERPL-MCNC: 277 MG/DL — HIGH (ref 70–115)
HCT VFR BLD CALC: 41.3 % — LOW (ref 42–52)
HGB BLD-MCNC: 13.3 G/DL — LOW (ref 14–18)
LEGIONELLA AG UR QL: NEGATIVE — SIGNIFICANT CHANGE UP
MCHC RBC-ENTMCNC: 28.4 PG — SIGNIFICANT CHANGE UP (ref 27–31)
MCHC RBC-ENTMCNC: 32.2 G/DL — SIGNIFICANT CHANGE UP (ref 32–36)
MCV RBC AUTO: 88.2 FL — SIGNIFICANT CHANGE UP (ref 80–94)
PLATELET # BLD AUTO: 350 K/UL — SIGNIFICANT CHANGE UP (ref 150–400)
POTASSIUM SERPL-MCNC: 4.6 MMOL/L — SIGNIFICANT CHANGE UP (ref 3.5–5.3)
POTASSIUM SERPL-SCNC: 4.6 MMOL/L — SIGNIFICANT CHANGE UP (ref 3.5–5.3)
RBC # BLD: 4.68 M/UL — SIGNIFICANT CHANGE UP (ref 4.6–6.2)
RBC # FLD: 15 % — SIGNIFICANT CHANGE UP (ref 11–15.6)
SODIUM SERPL-SCNC: 137 MMOL/L — SIGNIFICANT CHANGE UP (ref 135–145)
WBC # BLD: 14.8 K/UL — HIGH (ref 4.8–10.8)
WBC # FLD AUTO: 14.8 K/UL — HIGH (ref 4.8–10.8)

## 2017-12-02 PROCEDURE — 74000: CPT | Mod: 26

## 2017-12-02 PROCEDURE — 99233 SBSQ HOSP IP/OBS HIGH 50: CPT

## 2017-12-02 PROCEDURE — 99232 SBSQ HOSP IP/OBS MODERATE 35: CPT

## 2017-12-02 RX ORDER — INSULIN GLARGINE 100 [IU]/ML
5 INJECTION, SOLUTION SUBCUTANEOUS AT BEDTIME
Qty: 0 | Refills: 0 | Status: DISCONTINUED | OUTPATIENT
Start: 2017-12-02 | End: 2017-12-11

## 2017-12-02 RX ORDER — SENNA PLUS 8.6 MG/1
2 TABLET ORAL AT BEDTIME
Qty: 0 | Refills: 0 | Status: DISCONTINUED | OUTPATIENT
Start: 2017-12-02 | End: 2017-12-12

## 2017-12-02 RX ORDER — POLYETHYLENE GLYCOL 3350 17 G/17G
17 POWDER, FOR SOLUTION ORAL DAILY
Qty: 0 | Refills: 0 | Status: DISCONTINUED | OUTPATIENT
Start: 2017-12-02 | End: 2017-12-12

## 2017-12-02 RX ORDER — ONDANSETRON 8 MG/1
4 TABLET, FILM COATED ORAL EVERY 8 HOURS
Qty: 0 | Refills: 0 | Status: DISCONTINUED | OUTPATIENT
Start: 2017-12-02 | End: 2018-01-16

## 2017-12-02 RX ORDER — DOCUSATE SODIUM 100 MG
100 CAPSULE ORAL
Qty: 0 | Refills: 0 | Status: DISCONTINUED | OUTPATIENT
Start: 2017-12-02 | End: 2017-12-08

## 2017-12-02 RX ADMIN — INSULIN GLARGINE 5 UNIT(S): 100 INJECTION, SOLUTION SUBCUTANEOUS at 22:59

## 2017-12-02 RX ADMIN — ATORVASTATIN CALCIUM 80 MILLIGRAM(S): 80 TABLET, FILM COATED ORAL at 22:59

## 2017-12-02 RX ADMIN — Medication 3 MILLILITER(S): at 08:15

## 2017-12-02 RX ADMIN — Medication 10 MILLIGRAM(S): at 15:10

## 2017-12-02 RX ADMIN — Medication 180 MILLIGRAM(S): at 18:08

## 2017-12-02 RX ADMIN — Medication 250 MILLIGRAM(S): at 05:19

## 2017-12-02 RX ADMIN — Medication 0.25 MILLIGRAM(S): at 13:44

## 2017-12-02 RX ADMIN — LORATADINE 10 MILLIGRAM(S): 10 TABLET ORAL at 13:45

## 2017-12-02 RX ADMIN — Medication 0.25 MILLIGRAM(S): at 05:19

## 2017-12-02 RX ADMIN — POLYETHYLENE GLYCOL 3350 17 GRAM(S): 17 POWDER, FOR SOLUTION ORAL at 12:33

## 2017-12-02 RX ADMIN — SENNA PLUS 2 TABLET(S): 8.6 TABLET ORAL at 22:59

## 2017-12-02 RX ADMIN — ONDANSETRON 4 MILLIGRAM(S): 8 TABLET, FILM COATED ORAL at 14:21

## 2017-12-02 RX ADMIN — Medication 40 MILLIGRAM(S): at 13:44

## 2017-12-02 RX ADMIN — Medication 0.25 MILLIGRAM(S): at 22:59

## 2017-12-02 RX ADMIN — Medication 81 MILLIGRAM(S): at 13:44

## 2017-12-02 RX ADMIN — Medication 100 MILLIGRAM(S): at 05:19

## 2017-12-02 RX ADMIN — Medication 3: at 13:44

## 2017-12-02 RX ADMIN — Medication 40 MILLIGRAM(S): at 05:19

## 2017-12-02 RX ADMIN — Medication 1: at 23:00

## 2017-12-02 RX ADMIN — Medication 40 MILLIGRAM(S): at 22:59

## 2017-12-02 RX ADMIN — Medication 100 MILLIGRAM(S): at 22:59

## 2017-12-02 RX ADMIN — Medication 100 MILLIGRAM(S): at 13:45

## 2017-12-02 RX ADMIN — Medication 3 MILLILITER(S): at 15:22

## 2017-12-02 RX ADMIN — Medication 180 MILLIGRAM(S): at 06:03

## 2017-12-02 RX ADMIN — Medication 3 MILLILITER(S): at 02:18

## 2017-12-02 RX ADMIN — Medication 3: at 18:08

## 2017-12-02 RX ADMIN — Medication 3: at 09:06

## 2017-12-02 RX ADMIN — Medication 3 MILLILITER(S): at 20:18

## 2017-12-02 RX ADMIN — Medication 100 MILLIGRAM(S): at 18:08

## 2017-12-02 RX ADMIN — Medication 50 MICROGRAM(S): at 05:19

## 2017-12-02 RX ADMIN — PANTOPRAZOLE SODIUM 40 MILLIGRAM(S): 20 TABLET, DELAYED RELEASE ORAL at 05:19

## 2017-12-02 RX ADMIN — CLOPIDOGREL BISULFATE 75 MILLIGRAM(S): 75 TABLET, FILM COATED ORAL at 13:44

## 2017-12-02 RX ADMIN — Medication 250 MILLIGRAM(S): at 18:08

## 2017-12-02 NOTE — PROGRESS NOTE ADULT - ASSESSMENT
Stable respiratory status on supplemental O2, treated for pneumonitis but he does not appear acute infected.  Major problem appears to be nausea and abdominal distention.

## 2017-12-02 NOTE — PROGRESS NOTE ADULT - SUBJECTIVE AND OBJECTIVE BOX
PULMONARY PROGRESS NOTE      LILY [id number]    Patient is a 67y old  Male who presents with a chief complaint of Came for rehab (2017 10:08)    Patient has abdominal distention and gas.   Is very uncomfortable, but abdomen is soft, tho.    respiratory status appears stable on 4 L/Min O2  INTERVAL HPI/OVERNIGHT EVENTS:    MEDICATIONS  (STANDING):  ALBUTerol/ipratropium for Nebulization 3 milliLiter(s) Nebulizer every 6 hours  ALPRAZolam 0.25 milliGRAM(s) Oral three times a day  aspirin enteric coated 81 milliGRAM(s) Oral daily  atorvastatin 80 milliGRAM(s) Oral at bedtime  benzonatate 100 milliGRAM(s) Oral three times a day  clopidogrel Tablet 75 milliGRAM(s) Oral daily  dextrose 5%. 1000 milliLiter(s) (50 mL/Hr) IV Continuous <Continuous>  dextrose 50% Injectable 12.5 Gram(s) IV Push once  dextrose 50% Injectable 25 Gram(s) IV Push once  dextrose 50% Injectable 25 Gram(s) IV Push once  diltiazem    milliGRAM(s) Oral <User Schedule>  enoxaparin Injectable 40 milliGRAM(s) SubCutaneous daily  Gauifenesin/Dextromethorphan 1200/600mg 1 Tablet(s) 1 Tablet(s) Oral two times a day  insulin lispro (HumaLOG) corrective regimen sliding scale   SubCutaneous Before meals and at bedtime  levoFLOXacin IVPB 500 milliGRAM(s) IV Intermittent every 24 hours  levoFLOXacin IVPB      levothyroxine 50 MICROGram(s) Oral daily  loratadine 10 milliGRAM(s) Oral daily  methylPREDNISolone sodium succinate Injectable 40 milliGRAM(s) IV Push every 8 hours  pantoprazole    Tablet 40 milliGRAM(s) Oral before breakfast  saccharomyces boulardii 250 milliGRAM(s) Oral two times a day      MEDICATIONS  (PRN):  acetylcysteine 20% Inhalation 1 milliLiter(s) Inhalation every 4 hours PRN poor cough clearance  ALBUTerol    0.083% 2.5 milliGRAM(s) Nebulizer every 4 hours PRN Shortness of Breath and/or Wheezing  dextrose Gel 1 Dose(s) Oral once PRN Blood Glucose LESS THAN 70 milliGRAM(s)/deciliter  glucagon  Injectable 1 milliGRAM(s) IntraMuscular once PRN Glucose LESS THAN 70 milligrams/deciliter      Allergies    codeine (Unknown)  penicillin (Unknown)    Intolerances    Symbicort (Short breath)      PAST MEDICAL & SURGICAL HISTORY:  Hypothyroidism  Hydrocele  Renal calculi  Diabetes mellitus  Hypertension  CVA (cerebral vascular accident)  Chronic obstructive pulmonary disease, unspecified COPD type  History of lung surgery      SOCIAL HISTORY  Smoking History:       REVIEW OF SYSTEMS:    CONSTITUTIONAL:  No distress    HEENT:  Eyes:  No diplopia or blurred vision. ENT:  No earache, sore throat or runny nose.    CARDIOVASCULAR:  No pressure, squeezing, tightness, heaviness or aching about the chest; no palpitations.    RESPIRATORY:  No cough, shortness of breath, PND or orthopnea. Mild SOBOE    GASTROINTESTINAL:  No nausea, vomiting or diarrhea.    GENITOURINARY:  No dysuria, frequency or urgency.    MUSCULOSKELETAL:  No joint pain    SKIN:  No new lesions.    NEUROLOGIC:  No paresthesias, fasciculations, seizures or weakness.    PSYCHIATRIC:  No disorder of thought or mood.    ENDOCRINE:  No heat or cold intolerance, polyuria or polydipsia.    HEMATOLOGICAL:  No easy bruising or bleeding.     Vital Signs Last 24 Hrs  T(C): 36.6 (02 Dec 2017 09:40), Max: 36.6 (01 Dec 2017 15:40)  T(F): 97.8 (02 Dec 2017 09:40), Max: 97.9 (02 Dec 2017 04:30)  HR: 97 (02 Dec 2017 09:40) (87 - 97)  BP: 152/83 (02 Dec 2017 09:40) (133/80 - 154/87)  BP(mean): --  RR: 18 (02 Dec 2017 09:40) (18 - 91)  SpO2: 94% (02 Dec 2017 09:40) (94% - 98%)    PHYSICAL EXAMINATION:    GENERAL: The patient is awake and alert in no apparent distress.     HEENT: Head is normocephalic and atraumatic. Extraocular muscles are intact. Mucous membranes are moist.    NECK: Supple.    LUNGS: Clear to auscultation without wheezing, rales or rhonchi; respirations unlabored    HEART: Regular rate and rhythm without murmur.    ABDOMEN: Soft, nontender, and nondistended.      EXTREMITIES: Without any cyanosis, clubbing, rash, lesions or edema.    NEUROLOGIC: Grossly intact.    SKIN: No ulceration or induration present.      LABS:                        13.3   14.8  )-----------( 350      ( 02 Dec 2017 06:40 )             41.3     12-    137  |  97<L>  |  23.0<H>  ----------------------------<  277<H>  4.6   |  28.0  |  0.66    Ca    8.2<L>      02 Dec 2017 06:40        Urinalysis Basic - ( 2017 14:09 )    Color: Yellow / Appearance: Clear / S.025 / pH: x  Gluc: x / Ketone: Trace  / Bili: Negative / Urobili: 1 mg/dL   Blood: x / Protein: 30 mg/dL / Nitrite: Negative   Leuk Esterase: Negative / RBC: x / WBC 0-2   Sq Epi: x / Non Sq Epi: Occasional / Bacteria: x                      MICROBIOLOGY:    RADIOLOGY & ADDITIONAL STUDIES:

## 2017-12-02 NOTE — PROGRESS NOTE ADULT - SUBJECTIVE AND OBJECTIVE BOX
CHIEF COMPLAINT/INTERVAL HISTORY:    Patient is a 67y old  Male who presents with a chief complaint of Came for rehab (29 Nov 2017 10:08)    SUBJECTIVE & OBJECTIVE: Pt seen and examined at bedside. No overnight events. Complaining of dry heaves and constipation. Small BM last night.    ROS: No chest pain, palpitations, SOB, lightheadedness, dizziness, headache, fevers/chills, abdominal pain, dysuria or increased urinary frequency.    ICU Vital Signs Last 24 Hrs  T(C): 36.6 (02 Dec 2017 09:40), Max: 36.6 (01 Dec 2017 15:40)  T(F): 97.8 (02 Dec 2017 09:40), Max: 97.9 (02 Dec 2017 04:30)  HR: 97 (02 Dec 2017 09:40) (87 - 97)  BP: 152/83 (02 Dec 2017 09:40) (133/80 - 154/87)  RR: 18 (02 Dec 2017 09:40) (18 - 91)  SpO2: 94% (02 Dec 2017 09:40) (94% - 98%)    MEDICATIONS  (STANDING):  ALBUTerol/ipratropium for Nebulization 3 milliLiter(s) Nebulizer every 6 hours  ALPRAZolam 0.25 milliGRAM(s) Oral three times a day  aspirin enteric coated 81 milliGRAM(s) Oral daily  atorvastatin 80 milliGRAM(s) Oral at bedtime  benzonatate 100 milliGRAM(s) Oral three times a day  clopidogrel Tablet 75 milliGRAM(s) Oral daily  dextrose 5%. 1000 milliLiter(s) (50 mL/Hr) IV Continuous <Continuous>  dextrose 50% Injectable 12.5 Gram(s) IV Push once  dextrose 50% Injectable 25 Gram(s) IV Push once  dextrose 50% Injectable 25 Gram(s) IV Push once  diltiazem    milliGRAM(s) Oral <User Schedule>  docusate sodium 100 milliGRAM(s) Oral two times a day  enoxaparin Injectable 40 milliGRAM(s) SubCutaneous daily  Gauifenesin/Dextromethorphan 1200/600mg 1 Tablet(s) 1 Tablet(s) Oral two times a day  insulin lispro (HumaLOG) corrective regimen sliding scale   SubCutaneous Before meals and at bedtime  levoFLOXacin IVPB 500 milliGRAM(s) IV Intermittent every 24 hours  levoFLOXacin IVPB      levothyroxine 50 MICROGram(s) Oral daily  loratadine 10 milliGRAM(s) Oral daily  methylPREDNISolone sodium succinate Injectable 40 milliGRAM(s) IV Push every 8 hours  pantoprazole    Tablet 40 milliGRAM(s) Oral before breakfast  saccharomyces boulardii 250 milliGRAM(s) Oral two times a day  senna 2 Tablet(s) Oral at bedtime    MEDICATIONS  (PRN):  acetylcysteine 20% Inhalation 1 milliLiter(s) Inhalation every 4 hours PRN poor cough clearance  ALBUTerol    0.083% 2.5 milliGRAM(s) Nebulizer every 4 hours PRN Shortness of Breath and/or Wheezing  bisacodyl Suppository 10 milliGRAM(s) Rectal daily PRN Constipation  dextrose Gel 1 Dose(s) Oral once PRN Blood Glucose LESS THAN 70 milliGRAM(s)/deciliter  glucagon  Injectable 1 milliGRAM(s) IntraMuscular once PRN Glucose LESS THAN 70 milligrams/deciliter  ondansetron Injectable 4 milliGRAM(s) IV Push every 8 hours PRN Nausea and/or Vomiting  polyethylene glycol 3350 17 Gram(s) Oral daily PRN Constipation      LABS:                        13.3   14.8  )-----------( 350      ( 02 Dec 2017 06:40 )             41.3     12-02    137  |  97<L>  |  23.0<H>  ----------------------------<  277<H>  4.6   |  28.0  |  0.66    Ca    8.2<L>      02 Dec 2017 06:40      CAPILLARY BLOOD GLUCOSE      POCT Blood Glucose.: 254 mg/dL (02 Dec 2017 12:07)  POCT Blood Glucose.: 279 mg/dL (02 Dec 2017 08:23)  POCT Blood Glucose.: 229 mg/dL (01 Dec 2017 21:36)  POCT Blood Glucose.: 207 mg/dL (01 Dec 2017 16:31)      PHYSICAL EXAM:    GENERAL: elderly male, laying in bed, chronically ill appearing  HEAD:  Atraumatic, Normocephalic  EYES: EOMI, PERRLA, conjunctiva and sclera clear  ENMT: Moist mucous membranes  NECK: Supple, No JVD  NERVOUS SYSTEM:  Alert & Oriented X3   CHEST/LUNG: decreased air entry  HEART: Regular rate and rhythm; No murmurs, rubs, or gallops  ABDOMEN: Soft, Nontender, distended  EXTREMITIES:  No edema

## 2017-12-02 NOTE — PROGRESS NOTE ADULT - ASSESSMENT
Patient is a 67 year old male with PMH of COPD stage 4 s/p right lung reduction in 2010 (on home oxygen 4L (sat 92-93%), HTN, CVA on 2014, DM, hypothyroidism, hydrocele, and nephrolithiasis who is admitted for a COPD exacerbation. Patient has been weaned off ventimask to 4 liters nasal cannula and respiratory status is stable. CTA of the chest was negative for PE but showed bilateral lower lobe infiltrates with mucous plugging for which he was started on Levaquin.      Plan:    1. Acute on chronic hypoxic respiratory failure  -secondary to COPD exacerbation  -on home oxygen (4 liters)  -respiratory status stable on 4 liters  -continue bronchodilators and solumedrol - will taper as tolerated  -continue Mucomyst Refused ABG yesterday, had an episode of hypoxia yesterday down to the 70s improved with increased o2 to 5 liters NC.   -continue IV Levaquin (day 4)  -OOB to chair; PT evaluation  -CM on board to arrange for Home Oxygen   -Pulmonary on board - recommendations appreciated  -Overall prognosis very guarded - palliative care consult requested    2. CAP  -imaging with infiltrates and mucous plugging  -continue Levaquin (day 4)  -monitor WBC count     3. Hypertension  -BP acceptable  -continue cardizem    4. Hyperlipidemia   -continue Cardizem      5. Constipation  -start colace/senna  -miralax  -dulcolax suppository of the above regimen does not work    6. Hyperglycemia  -HbA1c 6.1 - pre-diabetic   -likely steroid induced  -continue sliding scale and add lantus at bedtime while on steroids    7. DVT Prophylaxis - Lovenox

## 2017-12-03 LAB
ANION GAP SERPL CALC-SCNC: 10 MMOL/L — SIGNIFICANT CHANGE UP (ref 5–17)
ANISOCYTOSIS BLD QL: SLIGHT — SIGNIFICANT CHANGE UP
BUN SERPL-MCNC: 24 MG/DL — HIGH (ref 8–20)
BURR CELLS BLD QL SMEAR: PRESENT — SIGNIFICANT CHANGE UP
CALCIUM SERPL-MCNC: 8.3 MG/DL — LOW (ref 8.6–10.2)
CHLORIDE SERPL-SCNC: 99 MMOL/L — SIGNIFICANT CHANGE UP (ref 98–107)
CO2 SERPL-SCNC: 32 MMOL/L — HIGH (ref 22–29)
CREAT SERPL-MCNC: 0.69 MG/DL — SIGNIFICANT CHANGE UP (ref 0.5–1.3)
EOSINOPHIL # BLD AUTO: 0 K/UL — SIGNIFICANT CHANGE UP (ref 0–0.5)
EOSINOPHIL NFR BLD AUTO: 0 % — SIGNIFICANT CHANGE UP (ref 0–6)
GLUCOSE BLDC GLUCOMTR-MCNC: 200 MG/DL — HIGH (ref 70–99)
GLUCOSE BLDC GLUCOMTR-MCNC: 202 MG/DL — HIGH (ref 70–99)
GLUCOSE BLDC GLUCOMTR-MCNC: 229 MG/DL — HIGH (ref 70–99)
GLUCOSE BLDC GLUCOMTR-MCNC: 233 MG/DL — HIGH (ref 70–99)
GLUCOSE SERPL-MCNC: 215 MG/DL — HIGH (ref 70–115)
HCT VFR BLD CALC: 42 % — SIGNIFICANT CHANGE UP (ref 42–52)
HGB BLD-MCNC: 13.4 G/DL — LOW (ref 14–18)
LYMPHOCYTES # BLD AUTO: 0.3 K/UL — LOW (ref 1–4.8)
LYMPHOCYTES # BLD AUTO: 2.8 % — LOW (ref 20–55)
MACROCYTES BLD QL: SLIGHT — SIGNIFICANT CHANGE UP
MAGNESIUM SERPL-MCNC: 2.3 MG/DL — SIGNIFICANT CHANGE UP (ref 1.8–2.6)
MCHC RBC-ENTMCNC: 28.3 PG — SIGNIFICANT CHANGE UP (ref 27–31)
MCHC RBC-ENTMCNC: 31.9 G/DL — LOW (ref 32–36)
MCV RBC AUTO: 88.8 FL — SIGNIFICANT CHANGE UP (ref 80–94)
MICROCYTES BLD QL: SLIGHT — SIGNIFICANT CHANGE UP
MONOCYTES # BLD AUTO: 0.5 K/UL — SIGNIFICANT CHANGE UP (ref 0–0.8)
MONOCYTES NFR BLD AUTO: 4.1 % — SIGNIFICANT CHANGE UP (ref 3–10)
NEUTROPHILS # BLD AUTO: 11.1 K/UL — HIGH (ref 1.8–8)
NEUTROPHILS NFR BLD AUTO: 92.8 % — HIGH (ref 37–73)
OVALOCYTES BLD QL SMEAR: SLIGHT — SIGNIFICANT CHANGE UP
PHOSPHATE SERPL-MCNC: 3.6 MG/DL — SIGNIFICANT CHANGE UP (ref 2.4–4.7)
PLAT MORPH BLD: ABNORMAL
PLATELET # BLD AUTO: 314 K/UL — SIGNIFICANT CHANGE UP (ref 150–400)
PLATELET CLUMP BLD QL SMEAR: SIGNIFICANT CHANGE UP
POIKILOCYTOSIS BLD QL AUTO: SLIGHT — SIGNIFICANT CHANGE UP
POTASSIUM SERPL-MCNC: 5.4 MMOL/L — HIGH (ref 3.5–5.3)
POTASSIUM SERPL-SCNC: 5.4 MMOL/L — HIGH (ref 3.5–5.3)
RBC # BLD: 4.73 M/UL — SIGNIFICANT CHANGE UP (ref 4.6–6.2)
RBC # FLD: 14.8 % — SIGNIFICANT CHANGE UP (ref 11–15.6)
RBC BLD AUTO: ABNORMAL
SODIUM SERPL-SCNC: 141 MMOL/L — SIGNIFICANT CHANGE UP (ref 135–145)
WBC # BLD: 11.9 K/UL — HIGH (ref 4.8–10.8)
WBC # FLD AUTO: 11.9 K/UL — HIGH (ref 4.8–10.8)

## 2017-12-03 PROCEDURE — 99233 SBSQ HOSP IP/OBS HIGH 50: CPT

## 2017-12-03 RX ORDER — SODIUM POLYSTYRENE SULFONATE 4.1 MEQ/G
30 POWDER, FOR SUSPENSION ORAL ONCE
Qty: 0 | Refills: 0 | Status: COMPLETED | OUTPATIENT
Start: 2017-12-03 | End: 2017-12-03

## 2017-12-03 RX ADMIN — Medication 2: at 17:34

## 2017-12-03 RX ADMIN — Medication 3 MILLILITER(S): at 15:04

## 2017-12-03 RX ADMIN — Medication 0.25 MILLIGRAM(S): at 13:43

## 2017-12-03 RX ADMIN — Medication 2: at 11:15

## 2017-12-03 RX ADMIN — INSULIN GLARGINE 5 UNIT(S): 100 INJECTION, SOLUTION SUBCUTANEOUS at 22:31

## 2017-12-03 RX ADMIN — PANTOPRAZOLE SODIUM 40 MILLIGRAM(S): 20 TABLET, DELAYED RELEASE ORAL at 05:49

## 2017-12-03 RX ADMIN — Medication 250 MILLIGRAM(S): at 05:49

## 2017-12-03 RX ADMIN — SODIUM POLYSTYRENE SULFONATE 30 GRAM(S): 4.1 POWDER, FOR SUSPENSION ORAL at 10:09

## 2017-12-03 RX ADMIN — Medication 100 MILLIGRAM(S): at 05:48

## 2017-12-03 RX ADMIN — Medication 180 MILLIGRAM(S): at 05:49

## 2017-12-03 RX ADMIN — Medication 81 MILLIGRAM(S): at 11:18

## 2017-12-03 RX ADMIN — CLOPIDOGREL BISULFATE 75 MILLIGRAM(S): 75 TABLET, FILM COATED ORAL at 11:18

## 2017-12-03 RX ADMIN — Medication 180 MILLIGRAM(S): at 17:34

## 2017-12-03 RX ADMIN — Medication 3 MILLILITER(S): at 03:30

## 2017-12-03 RX ADMIN — Medication 40 MILLIGRAM(S): at 05:49

## 2017-12-03 RX ADMIN — ONDANSETRON 4 MILLIGRAM(S): 8 TABLET, FILM COATED ORAL at 15:25

## 2017-12-03 RX ADMIN — Medication 40 MILLIGRAM(S): at 17:34

## 2017-12-03 RX ADMIN — Medication 1: at 22:30

## 2017-12-03 RX ADMIN — Medication 2: at 08:13

## 2017-12-03 RX ADMIN — Medication 100 MILLIGRAM(S): at 05:49

## 2017-12-03 RX ADMIN — Medication 3 MILLILITER(S): at 20:33

## 2017-12-03 RX ADMIN — SENNA PLUS 2 TABLET(S): 8.6 TABLET ORAL at 22:16

## 2017-12-03 RX ADMIN — ATORVASTATIN CALCIUM 80 MILLIGRAM(S): 80 TABLET, FILM COATED ORAL at 22:16

## 2017-12-03 RX ADMIN — Medication 50 MICROGRAM(S): at 05:50

## 2017-12-03 RX ADMIN — Medication 100 MILLIGRAM(S): at 21:39

## 2017-12-03 RX ADMIN — Medication 100 MILLIGRAM(S): at 17:34

## 2017-12-03 RX ADMIN — Medication 0.25 MILLIGRAM(S): at 22:16

## 2017-12-03 RX ADMIN — Medication 250 MILLIGRAM(S): at 17:35

## 2017-12-03 RX ADMIN — Medication 0.25 MILLIGRAM(S): at 05:48

## 2017-12-03 RX ADMIN — POLYETHYLENE GLYCOL 3350 17 GRAM(S): 17 POWDER, FOR SOLUTION ORAL at 13:44

## 2017-12-03 RX ADMIN — LORATADINE 10 MILLIGRAM(S): 10 TABLET ORAL at 11:18

## 2017-12-03 NOTE — PROGRESS NOTE ADULT - SUBJECTIVE AND OBJECTIVE BOX
CHIEF COMPLAINT/INTERVAL HISTORY:    Patient is a 67y old  Male who presents with a chief complaint of Came for rehab (29 Nov 2017 10:08)    SUBJECTIVE & OBJECTIVE: Pt seen and examined at bedside. No overnight events. Reports feeling better today, but still has not have a bowel movement. Passing gas, denies nausea or vomiting this morning. Refusing enema. AXR with large stool burden but no evidence of obstruction.    ROS: No chest pain, palpitations, SOB, lightheadedness, dizziness, headache, nausea/vomiting, fevers/chills, abdominal pain, dysuria or increased urinary frequency.    ICU Vital Signs Last 24 Hrs  T(C): 36.6 (03 Dec 2017 10:08), Max: 36.6 (02 Dec 2017 16:36)  T(F): 97.9 (03 Dec 2017 10:08), Max: 97.9 (02 Dec 2017 16:36)  HR: 79 (03 Dec 2017 10:08) (79 - 89)  BP: 120/72 (03 Dec 2017 10:08) (120/72 - 157/89)  RR: 20 (03 Dec 2017 10:08) (18 - 20)  SpO2: 96% (03 Dec 2017 10:08) (95% - 98%)    MEDICATIONS  (STANDING):  ALBUTerol/ipratropium for Nebulization 3 milliLiter(s) Nebulizer every 6 hours  ALPRAZolam 0.25 milliGRAM(s) Oral three times a day  aspirin enteric coated 81 milliGRAM(s) Oral daily  atorvastatin 80 milliGRAM(s) Oral at bedtime  benzonatate 100 milliGRAM(s) Oral three times a day  clopidogrel Tablet 75 milliGRAM(s) Oral daily  dextrose 5%. 1000 milliLiter(s) (50 mL/Hr) IV Continuous <Continuous>  dextrose 50% Injectable 12.5 Gram(s) IV Push once  dextrose 50% Injectable 25 Gram(s) IV Push once  dextrose 50% Injectable 25 Gram(s) IV Push once  diltiazem    milliGRAM(s) Oral <User Schedule>  docusate sodium 100 milliGRAM(s) Oral two times a day  enoxaparin Injectable 40 milliGRAM(s) SubCutaneous daily  Gauifenesin/Dextromethorphan 1200/600mg 1 Tablet(s) 1 Tablet(s) Oral two times a day  insulin glargine Injectable (LANTUS) 5 Unit(s) SubCutaneous at bedtime  insulin lispro (HumaLOG) corrective regimen sliding scale   SubCutaneous Before meals and at bedtime  levoFLOXacin IVPB 500 milliGRAM(s) IV Intermittent every 24 hours  levoFLOXacin IVPB      levothyroxine 50 MICROGram(s) Oral daily  loratadine 10 milliGRAM(s) Oral daily  methylPREDNISolone sodium succinate Injectable 40 milliGRAM(s) IV Push every 12 hours  pantoprazole    Tablet 40 milliGRAM(s) Oral before breakfast  saccharomyces boulardii 250 milliGRAM(s) Oral two times a day  senna 2 Tablet(s) Oral at bedtime    MEDICATIONS  (PRN):  acetylcysteine 20% Inhalation 1 milliLiter(s) Inhalation every 4 hours PRN poor cough clearance  ALBUTerol    0.083% 2.5 milliGRAM(s) Nebulizer every 4 hours PRN Shortness of Breath and/or Wheezing  bisacodyl Suppository 10 milliGRAM(s) Rectal daily PRN Constipation  dextrose Gel 1 Dose(s) Oral once PRN Blood Glucose LESS THAN 70 milliGRAM(s)/deciliter  glucagon  Injectable 1 milliGRAM(s) IntraMuscular once PRN Glucose LESS THAN 70 milligrams/deciliter  ondansetron Injectable 4 milliGRAM(s) IV Push every 8 hours PRN Nausea and/or Vomiting  polyethylene glycol 3350 17 Gram(s) Oral daily PRN Constipation      LABS:                        13.4   11.9  )-----------( 314      ( 03 Dec 2017 06:50 )             42.0     12-03    141  |  99  |  24.0<H>  ----------------------------<  215<H>  5.4<H>   |  32.0<H>  |  0.69    Ca    8.3<L>      03 Dec 2017 06:50  Phos  3.6     12-03  Mg     2.3     12-03            CAPILLARY BLOOD GLUCOSE      POCT Blood Glucose.: 229 mg/dL (03 Dec 2017 10:58)  POCT Blood Glucose.: 233 mg/dL (03 Dec 2017 07:56)  POCT Blood Glucose.: 194 mg/dL (02 Dec 2017 22:57)  POCT Blood Glucose.: 278 mg/dL (02 Dec 2017 18:05)  POCT Blood Glucose.: 254 mg/dL (02 Dec 2017 12:07)    PHYSICAL EXAM:    GENERAL: elderly male, laying in bed, chronically ill appearing  HEAD:  Atraumatic, Normocephalic  EYES: EOMI, PERRLA, conjunctiva and sclera clear  ENMT: Moist mucous membranes  NECK: Supple, No JVD  NERVOUS SYSTEM:  Alert & Oriented X3   CHEST/LUNG: decreased air entry  HEART: Regular rate and rhythm; No murmurs, rubs, or gallops  ABDOMEN: Soft, Nontender, distended  EXTREMITIES:  No edema

## 2017-12-03 NOTE — PROGRESS NOTE ADULT - ASSESSMENT
Patient is a 67 year old male with PMH of COPD stage 4 s/p right lung reduction in 2010 (on home oxygen 4L (sat 92-93%), HTN, CVA on 2014, DM, hypothyroidism, hydrocele, and nephrolithiasis who is admitted for a COPD exacerbation. Patient has been weaned off ventimask to 4 liters nasal cannula and respiratory status is stable. CTA of the chest was negative for PE but showed bilateral lower lobe infiltrates with mucous plugging for which he was started on Levaquin.      Plan:    1. Acute on chronic hypoxic respiratory failure  -secondary to COPD exacerbation  -on home oxygen (4 liters)  -respiratory status stable on 4 liters  -bicarb trending up likely metabolic compensation - will check blood gas  -continue bronchodilators and taper solumedrol  to q12 hrs  -continue Mucomyst   -continue IV Levaquin (day 5)  -OOB to chair; PT evaluation  -CM on board to arrange for Home Oxygen   -Pulmonary on board - recommendations appreciated  -Overall prognosis very guarded - palliative care consult requested    2. CAP  -imaging with infiltrates and mucous plugging  -continue Levaquin (day 5)  -monitor WBC count     3. Hypertension  -BP acceptable  -continue cardizem    4. Hyperlipidemia   -continue Cardizem      5. Constipation  -no BM  -patient refusing enema  -continue colace/senna/miralax  -dulcolax suppository   -AXR with large stool burden. No evidence of obstruction. Passing gas.    6. Hyperglycemia  -HbA1c 6.1 - pre-diabetic   -likely steroid induced  -continue sliding scale and lantus at bedtime while on steroids    7. Hyperkalemia  -mild; K+ 5.4   -Kayexalate x1  -low K+ diet    8. DVT Prophylaxis - Lovenox       Attending Attestation:   I was physically present for the key portions of the evaluation and management (E/M) service provided.  I agree with the above history, physical, and plan which I have reviewed and edited where appropriate.     Plan discussed with patient.

## 2017-12-04 DIAGNOSIS — Z51.5 ENCOUNTER FOR PALLIATIVE CARE: ICD-10-CM

## 2017-12-04 DIAGNOSIS — K59.00 CONSTIPATION, UNSPECIFIED: ICD-10-CM

## 2017-12-04 LAB
ANION GAP SERPL CALC-SCNC: 12 MMOL/L — SIGNIFICANT CHANGE UP (ref 5–17)
BASOPHILS # BLD AUTO: 0 K/UL — SIGNIFICANT CHANGE UP (ref 0–0.2)
BASOPHILS NFR BLD AUTO: 0.1 % — SIGNIFICANT CHANGE UP (ref 0–2)
BUN SERPL-MCNC: 23 MG/DL — HIGH (ref 8–20)
CALCIUM SERPL-MCNC: 7.9 MG/DL — LOW (ref 8.6–10.2)
CHLORIDE SERPL-SCNC: 97 MMOL/L — LOW (ref 98–107)
CO2 SERPL-SCNC: 29 MMOL/L — SIGNIFICANT CHANGE UP (ref 22–29)
CREAT SERPL-MCNC: 0.71 MG/DL — SIGNIFICANT CHANGE UP (ref 0.5–1.3)
EOSINOPHIL # BLD AUTO: 0 K/UL — SIGNIFICANT CHANGE UP (ref 0–0.5)
EOSINOPHIL NFR BLD AUTO: 0 % — SIGNIFICANT CHANGE UP (ref 0–5)
GLUCOSE BLDC GLUCOMTR-MCNC: 144 MG/DL — HIGH (ref 70–99)
GLUCOSE BLDC GLUCOMTR-MCNC: 146 MG/DL — HIGH (ref 70–99)
GLUCOSE BLDC GLUCOMTR-MCNC: 181 MG/DL — HIGH (ref 70–99)
GLUCOSE SERPL-MCNC: 206 MG/DL — HIGH (ref 70–115)
HCT VFR BLD CALC: 41.8 % — LOW (ref 42–52)
HGB BLD-MCNC: 13.5 G/DL — LOW (ref 14–18)
LYMPHOCYTES # BLD AUTO: 0.5 K/UL — LOW (ref 1–4.8)
LYMPHOCYTES # BLD AUTO: 3 % — LOW (ref 20–55)
MAGNESIUM SERPL-MCNC: 2.2 MG/DL — SIGNIFICANT CHANGE UP (ref 1.6–2.6)
MCHC RBC-ENTMCNC: 28.1 PG — SIGNIFICANT CHANGE UP (ref 27–31)
MCHC RBC-ENTMCNC: 32.3 G/DL — SIGNIFICANT CHANGE UP (ref 32–36)
MCV RBC AUTO: 87.1 FL — SIGNIFICANT CHANGE UP (ref 80–94)
MONOCYTES # BLD AUTO: 1.3 K/UL — HIGH (ref 0–0.8)
MONOCYTES NFR BLD AUTO: 7.4 % — SIGNIFICANT CHANGE UP (ref 3–10)
NEUTROPHILS # BLD AUTO: 15.3 K/UL — HIGH (ref 1.8–8)
NEUTROPHILS NFR BLD AUTO: 89.2 % — HIGH (ref 37–73)
PHOSPHATE SERPL-MCNC: 3.1 MG/DL — SIGNIFICANT CHANGE UP (ref 2.4–4.7)
PLATELET # BLD AUTO: 296 K/UL — SIGNIFICANT CHANGE UP (ref 150–400)
POTASSIUM SERPL-MCNC: 4.6 MMOL/L — SIGNIFICANT CHANGE UP (ref 3.5–5.3)
POTASSIUM SERPL-SCNC: 4.6 MMOL/L — SIGNIFICANT CHANGE UP (ref 3.5–5.3)
RBC # BLD: 4.8 M/UL — SIGNIFICANT CHANGE UP (ref 4.6–6.2)
RBC # FLD: 14.7 % — SIGNIFICANT CHANGE UP (ref 11–15.6)
SODIUM SERPL-SCNC: 138 MMOL/L — SIGNIFICANT CHANGE UP (ref 135–145)
WBC # BLD: 17.2 K/UL — HIGH (ref 4.8–10.8)
WBC # FLD AUTO: 17.2 K/UL — HIGH (ref 4.8–10.8)

## 2017-12-04 PROCEDURE — 99233 SBSQ HOSP IP/OBS HIGH 50: CPT

## 2017-12-04 PROCEDURE — 99222 1ST HOSP IP/OBS MODERATE 55: CPT

## 2017-12-04 RX ORDER — LACTULOSE 10 G/15ML
10 SOLUTION ORAL THREE TIMES A DAY
Qty: 0 | Refills: 0 | Status: DISCONTINUED | OUTPATIENT
Start: 2017-12-04 | End: 2017-12-05

## 2017-12-04 RX ORDER — MINERAL OIL
133 OIL (ML) MISCELLANEOUS ONCE
Qty: 0 | Refills: 0 | Status: COMPLETED | OUTPATIENT
Start: 2017-12-04 | End: 2017-12-04

## 2017-12-04 RX ADMIN — Medication 250 MILLIGRAM(S): at 17:08

## 2017-12-04 RX ADMIN — Medication 81 MILLIGRAM(S): at 12:05

## 2017-12-04 RX ADMIN — PANTOPRAZOLE SODIUM 40 MILLIGRAM(S): 20 TABLET, DELAYED RELEASE ORAL at 06:21

## 2017-12-04 RX ADMIN — Medication 50 MICROGRAM(S): at 06:21

## 2017-12-04 RX ADMIN — Medication 0.25 MILLIGRAM(S): at 13:23

## 2017-12-04 RX ADMIN — Medication 100 MILLIGRAM(S): at 06:21

## 2017-12-04 RX ADMIN — Medication 180 MILLIGRAM(S): at 17:08

## 2017-12-04 RX ADMIN — LORATADINE 10 MILLIGRAM(S): 10 TABLET ORAL at 12:05

## 2017-12-04 RX ADMIN — Medication 180 MILLIGRAM(S): at 06:21

## 2017-12-04 RX ADMIN — Medication 3 MILLILITER(S): at 15:30

## 2017-12-04 RX ADMIN — Medication 40 MILLIGRAM(S): at 17:08

## 2017-12-04 RX ADMIN — Medication 40 MILLIGRAM(S): at 06:21

## 2017-12-04 RX ADMIN — Medication 0.25 MILLIGRAM(S): at 06:21

## 2017-12-04 RX ADMIN — Medication 100 MILLIGRAM(S): at 17:08

## 2017-12-04 RX ADMIN — Medication 3 MILLILITER(S): at 08:23

## 2017-12-04 RX ADMIN — Medication 133 MILLILITER(S): at 10:42

## 2017-12-04 RX ADMIN — Medication 250 MILLIGRAM(S): at 06:21

## 2017-12-04 RX ADMIN — Medication 3 MILLILITER(S): at 03:46

## 2017-12-04 RX ADMIN — Medication 3 MILLILITER(S): at 20:39

## 2017-12-04 RX ADMIN — CLOPIDOGREL BISULFATE 75 MILLIGRAM(S): 75 TABLET, FILM COATED ORAL at 12:05

## 2017-12-04 NOTE — PROGRESS NOTE ADULT - SUBJECTIVE AND OBJECTIVE BOX
CHIEF COMPLAINT/INTERVAL HISTORY:    Patient is a 67y old  Male who presents with a chief complaint of Came for rehab (29 Nov 2017 10:08)    SUBJECTIVE & OBJECTIVE: Pt seen and examined at bedside. No overnight events. Denies a bowel movement despite bowel regimen, in addition to 3 different enemas given throughout the day today. Refusing lactulose. Denies nausea, vomiting, abdominal pain. Passing gas. Abdomen soft. Made NPO. CT abdomen ordered. Surgery evaluation requested.    ROS: No chest pain, palpitations, SOB, lightheadedness, dizziness, headache, nausea/vomiting, fevers/chills, abdominal pain, dysuria or increased urinary frequency.    ICU Vital Signs Last 24 Hrs  T(C): 36.7 (04 Dec 2017 16:10), Max: 37 (04 Dec 2017 09:10)  T(F): 98.1 (04 Dec 2017 16:10), Max: 98.6 (04 Dec 2017 09:10)  HR: 109 (04 Dec 2017 16:10) (101 - 109)  BP: 148/72 (04 Dec 2017 17:15) (142/92 - 170/98)  RR: 18 (04 Dec 2017 16:10) (18 - 20)  SpO2: 90% (04 Dec 2017 09:10) (90% - 90%)      MEDICATIONS  (STANDING):  ALBUTerol/ipratropium for Nebulization 3 milliLiter(s) Nebulizer every 6 hours  ALPRAZolam 0.25 milliGRAM(s) Oral three times a day  aspirin enteric coated 81 milliGRAM(s) Oral daily  atorvastatin 80 milliGRAM(s) Oral at bedtime  benzonatate 100 milliGRAM(s) Oral three times a day  clopidogrel Tablet 75 milliGRAM(s) Oral daily  dextrose 5%. 1000 milliLiter(s) (50 mL/Hr) IV Continuous <Continuous>  dextrose 50% Injectable 12.5 Gram(s) IV Push once  dextrose 50% Injectable 25 Gram(s) IV Push once  dextrose 50% Injectable 25 Gram(s) IV Push once  diltiazem    milliGRAM(s) Oral <User Schedule>  docusate sodium 100 milliGRAM(s) Oral two times a day  enoxaparin Injectable 40 milliGRAM(s) SubCutaneous daily  Gauifenesin/Dextromethorphan 1200/600mg 1 Tablet(s) 1 Tablet(s) Oral two times a day  insulin glargine Injectable (LANTUS) 5 Unit(s) SubCutaneous at bedtime  insulin lispro (HumaLOG) corrective regimen sliding scale   SubCutaneous Before meals and at bedtime  lactulose Syrup 10 Gram(s) Oral three times a day  levoFLOXacin IVPB 500 milliGRAM(s) IV Intermittent every 24 hours  levoFLOXacin IVPB      levothyroxine 50 MICROGram(s) Oral daily  loratadine 10 milliGRAM(s) Oral daily  pantoprazole    Tablet 40 milliGRAM(s) Oral before breakfast  saccharomyces boulardii 250 milliGRAM(s) Oral two times a day  senna 2 Tablet(s) Oral at bedtime    MEDICATIONS  (PRN):  acetylcysteine 20% Inhalation 1 milliLiter(s) Inhalation every 4 hours PRN poor cough clearance  ALBUTerol    0.083% 2.5 milliGRAM(s) Nebulizer every 4 hours PRN Shortness of Breath and/or Wheezing  bisacodyl Suppository 10 milliGRAM(s) Rectal daily PRN Constipation  dextrose Gel 1 Dose(s) Oral once PRN Blood Glucose LESS THAN 70 milliGRAM(s)/deciliter  glucagon  Injectable 1 milliGRAM(s) IntraMuscular once PRN Glucose LESS THAN 70 milligrams/deciliter  ondansetron Injectable 4 milliGRAM(s) IV Push every 8 hours PRN Nausea and/or Vomiting  polyethylene glycol 3350 17 Gram(s) Oral daily PRN Constipation      LABS:                        13.5   17.2  )-----------( 296      ( 04 Dec 2017 05:49 )             41.8     12-04    138  |  97<L>  |  23.0<H>  ----------------------------<  206<H>  4.6   |  29.0  |  0.71    Ca    7.9<L>      04 Dec 2017 05:47  Phos  3.1     12-04  Mg     2.2     12-04      CAPILLARY BLOOD GLUCOSE      POCT Blood Glucose.: 144 mg/dL (04 Dec 2017 16:35)  POCT Blood Glucose.: 146 mg/dL (04 Dec 2017 11:25)  POCT Blood Glucose.: 181 mg/dL (04 Dec 2017 07:38)  POCT Blood Glucose.: 200 mg/dL (03 Dec 2017 22:29)    TELE MONITOR   -strip reviewed - artifact     PHYSICAL EXAM:  GENERAL: elderly male, laying in bed, chronically ill appearing  HEAD:  Atraumatic, Normocephalic  EYES: EOMI, PERRLA, conjunctiva and sclera clear  ENMT: Moist mucous membranes  NECK: Supple, No JVD  NERVOUS SYSTEM:  Alert & Oriented X3   CHEST/LUNG: decreased air entry  HEART: Regular rate and rhythm; audible S1/S2  ABDOMEN: Soft, Nontender, distended, +BS, no rebound, no guarding  EXTREMITIES:  No edema

## 2017-12-04 NOTE — CONSULT NOTE ADULT - SUBJECTIVE AND OBJECTIVE BOX
66 y/o M with Stage 4 COPD admitted for acute on chronic COPD exacerbation 11/29 with constipation, last BM approximately 11/29. Pt denies h/o constipation and normally has BM at least every 2 days. Since admission pt states "florastor given initially caused constipation, but has otherwise been tolerating regular diet. Yesterday pt noticed bloating of abdomen with some nausea without vomiting. Denies f/c/CP. Pt has received senna/colase, miralax, lactulose and dulcolax suppositories without relief of symptoms. Received 3 enemas today passed flatus without BM        Patient History:    Past Medical History:  Chronic obstructive pulmonary disease, unspecified COPD type    CVA (cerebral vascular accident)    Diabetes mellitus    Hydrocele    Hypertension    Hypothyroidism    Renal calculi.     Past Surgical History:  History of lung surgery.        Home Medications:   * Patient Currently Takes Medications as of 29-Nov-2017 02:22 documented in Structured Notes  · 	Accu-check FastClix lancet : 1 application subcutaneous 2 times a day   	Dx Code: E11.8   · 	accuchcek Rebecca Plus test strip : 1 application subcutaneous 2 times a day   	Dx Code: E11.8   · 	accuchcek Rebecca test strip : 1 application subcutaneous 2 times a day  · 	diltiazem 180 mg/24 hours oral capsule, extended release: 1 cap(s) orally 2 times a day  · 	levothyroxine 50 mcg (0.05 mg) oral tablet: 1 tab(s) orally once a day  · 	metFORMIN 500 mg oral tablet: 1 tab(s) orally 2 times a day  · 	atorvastatin 80 mg oral tablet: 1 tab(s) orally once a day  · 	Vitamin D3 1000 intl units oral tablet: 1 tab(s) orally once a day  · 	pantoprazole 40 mg oral delayed release tablet: 1 tab(s) orally once a day (before a meal)  · 	clopidogrel 75 mg oral tablet: 1 tab(s) orally once a day  · 	Xanax 0.25 mg oral tablet: 1 tab(s) orally 3 times a day, As Needed  · 	benzonatate 200 mg oral capsule: 1 cap(s) orally once a day, As Needed  · 	Claritin 10 mg oral tablet: 1 tab(s) orally once a day  · 	Spiriva 18 mcg inhalation capsule: 1 each inhaled once a day  · 	Advair Diskus 250 mcg-50 mcg inhalation powder: 1 puff(s) inhaled 2 times a day  · 	aspirin 81 mg oral tablet: 1 tab(s) orally once a day  · 	Mucinex 1200 mg oral tablet, extended release: 1 tab(s) orally every 12 hours  · 	furosemide 20 mg oral tablet: 1 tab(s) orally once a day  · 	albuterol 2.5 mg/3 mL (0.083%) inhalation solution: 3 milliliter(s) inhaled every 6 hours, As Needed  · 	ProAir HFA 90 mcg/inh inhalation aerosol: 2 puff(s) inhaled 4 times a day, As Needed    .  Allergies and Intolerances:        Allergies:  	penicillin: Drug, Unknown  	codeine: Drug, Unknown       Intolerances:  	Symbicort: Drug, Short breath    Vital Signs Last 24 Hrs  T(C): 36.7 (04 Dec 2017 16:10), Max: 37 (04 Dec 2017 09:10)  T(F): 98.1 (04 Dec 2017 16:10), Max: 98.6 (04 Dec 2017 09:10)  HR: 109 (04 Dec 2017 16:10) (101 - 109)  BP: 148/72 (04 Dec 2017 17:15) (142/92 - 170/98)  BP(mean): --  RR: 18 (04 Dec 2017 16:10) (18 - 20)  SpO2: 90% (04 Dec 2017 09:10) (90% - 90%)    NAD, AAOX3  Head NCAT, EOMI  Chest expansion symmetric, Lungs decreased breath sounds b/l  RRR, S1S2nl  Abd soft, mildly distended NTTP, no rebound/guarding

## 2017-12-04 NOTE — CONSULT NOTE ADULT - PROBLEM SELECTOR RECOMMENDATION 4
Patient was seen by PC nurse Mireya Beasley on 12/1/17- did not wish to discuss AD at the time.  Met with patient and wife. Attributes current exacerbation to a broken Oxygen machine., States he has been fine at home otherwise. Last hospitalization July 2016. Patient anxious to go home. Will refer to [facility] upon discharge.  Further goals of care discussion ongoing

## 2017-12-04 NOTE — CONSULT NOTE ADULT - ASSESSMENT
68 y/o M with constipation 2/2 immobility  Hemodynamically stable without clinical signs of acute abdomen  -Rectal disimpaction bedside  -C/W stool softeners/laxatives  -oob ambulation with PT  -Rest of care per primary team

## 2017-12-04 NOTE — CONSULT NOTE ADULT - SUBJECTIVE AND OBJECTIVE BOX
Palliative Medicine Initial Consultation Note    HPI:  67yr man hx of COPD on home O2 4L presented with hs of low O2 sats. Denies SOB, cough. Patient admitted with exac of COPD    PERTINENT PMH REVIEWED:  [x ] YES [ ] NO         Chronic obstructive pulmonary disease, unspecified COPD type    CVA (cerebral vascular accident)    Diabetes mellitus    Hydrocele    Hypertension    Hypothyroidism    Renal calculi.     Past Surgical History:  History of lung    SOCIAL HISTORY:  EtOH [ ] Yes  [ x] No                                    Drugs [ ] Yes [ x] No                                   [ x] former smoker [ ] nonsmoker                                    Admitted from: [ x] home [ ] SNF _________ [ ] TOM ________    Surrogate/HCP/Guardian:  NO HCP form in chart- Wife Sandra Cartagena surrogate    FAMILY HISTORY:  No pertinent family history in first degree relatives      Baseline ADLs (prior to admission):  Independent [ ] moderately [ ] fully   Dependent   [ ] moderately [ ]fully    MEDICATIONS  (STANDING):  ALBUTerol/ipratropium for Nebulization 3 milliLiter(s) Nebulizer every 6 hours  ALPRAZolam 0.25 milliGRAM(s) Oral three times a day  aspirin enteric coated 81 milliGRAM(s) Oral daily  atorvastatin 80 milliGRAM(s) Oral at bedtime  benzonatate 100 milliGRAM(s) Oral three times a day  clopidogrel Tablet 75 milliGRAM(s) Oral daily  dextrose 5%. 1000 milliLiter(s) (50 mL/Hr) IV Continuous <Continuous>  dextrose 50% Injectable 12.5 Gram(s) IV Push once  dextrose 50% Injectable 25 Gram(s) IV Push once  dextrose 50% Injectable 25 Gram(s) IV Push once  diltiazem    milliGRAM(s) Oral <User Schedule>  docusate sodium 100 milliGRAM(s) Oral two times a day  enoxaparin Injectable 40 milliGRAM(s) SubCutaneous daily  Gauifenesin/Dextromethorphan 1200/600mg 1 Tablet(s) 1 Tablet(s) Oral two times a day  insulin glargine Injectable (LANTUS) 5 Unit(s) SubCutaneous at bedtime  insulin lispro (HumaLOG) corrective regimen sliding scale   SubCutaneous Before meals and at bedtime  lactulose Syrup 10 Gram(s) Oral three times a day  levoFLOXacin IVPB 500 milliGRAM(s) IV Intermittent every 24 hours  levoFLOXacin IVPB      levothyroxine 50 MICROGram(s) Oral daily  loratadine 10 milliGRAM(s) Oral daily  methylPREDNISolone sodium succinate Injectable 40 milliGRAM(s) IV Push every 12 hours  pantoprazole    Tablet 40 milliGRAM(s) Oral before breakfast  saccharomyces boulardii 250 milliGRAM(s) Oral two times a day  senna 2 Tablet(s) Oral at bedtime    MEDICATIONS  (PRN):  acetylcysteine 20% Inhalation 1 milliLiter(s) Inhalation every 4 hours PRN poor cough clearance  ALBUTerol    0.083% 2.5 milliGRAM(s) Nebulizer every 4 hours PRN Shortness of Breath and/or Wheezing  bisacodyl Suppository 10 milliGRAM(s) Rectal daily PRN Constipation  dextrose Gel 1 Dose(s) Oral once PRN Blood Glucose LESS THAN 70 milliGRAM(s)/deciliter  glucagon  Injectable 1 milliGRAM(s) IntraMuscular once PRN Glucose LESS THAN 70 milligrams/deciliter  ondansetron Injectable 4 milliGRAM(s) IV Push every 8 hours PRN Nausea and/or Vomiting  polyethylene glycol 3350 17 Gram(s) Oral daily PRN Constipation      Allergies    codeine (Unknown)  penicillin (Unknown)    Intolerances    Symbicort (Short breath)      REVIEW OF SYSTEMS       [ ] Unable to obtain due to poor mentation     General: no fevers, no fatigue, no loss of appetite    Skin: no rashes, skin changes  	  Ophthalmologic: no eye pain or blurred vision  	  ENMT:	no sore throat, no ear pain    Respiratory and Thorax: no cough,  no  SOB 	    Cardiovascular:	no chest pain, no leg swelling    Gastrointestinal:	no  n/v/d,c    Genitourinary:	no FUD    Musculoskeletal: no muscle pain	    Neurological: no seizures, no dizziness    Hematology/Lymphatics: no petechia or purpura	    Endocrine: no polyuria, no polydipsia	      Karnofsky Performance Score/Palliative Performance Status Version 2:         %    Vital Signs Last 24 Hrs  T(C): 37 (04 Dec 2017 09:10), Max: 37 (04 Dec 2017 09:10)  T(F): 98.6 (04 Dec 2017 09:10), Max: 98.6 (04 Dec 2017 09:10)  HR: 102 (04 Dec 2017 09:10) (100 - 110)  BP: 142/92 (04 Dec 2017 09:10) (140/82 - 161/94)  BP(mean): --  RR: 18 (04 Dec 2017 09:10) (18 - 20)  SpO2: 90% (04 Dec 2017 09:10) (90% - 90%)    PHYSICAL EXAM:    General: [ ] alert  [ ] oriented x ____ [ ] lethargic [ ] agitated                  [ ] cachexia  [ ] nonverbal  [ ] coma    HEENT: [ ] normal  [ ] dry mouth  [ ] ET tube/trach    Lungs: [ ] comfortable [ ] tachypnea/labored breathing  [ ] excessive secretions    CV: [ ] normal  [ ] tachycardia    GI: [ ] normal  [ ] distended  [ ] tender  [ ] no BS               [ ] PEG/NG/OG tube    : [ ] normal  [ ] incontinent  [ ] oliguria/anuria  [ ] whitman    MSK: [ ] normal  [ ] weakness  [ ] edema             [ ] ambulatory  [ ] bedbound/wheelchair bound    Skin: [ ] normal  [ ] pressure ulcers- Stage_____  [ ] no rash    LABS:                        13.5   17.2  )-----------( 296      ( 04 Dec 2017 05:49 )             41.8     12-04    138  |  97<L>  |  23.0<H>  ----------------------------<  206<H>  4.6   |  29.0  |  0.71    Ca    7.9<L>      04 Dec 2017 05:47  Phos  3.1     12-04  Mg     2.2     12-04          I&O's Summary    03 Dec 2017 07:01  -  04 Dec 2017 07:00  --------------------------------------------------------  IN: 237 mL / OUT: 0 mL / NET: 237 mL        RADIOLOGY & ADDITIONAL STUDIES:  < from: Xray Chest 1 View AP/PA. (11.28.17 @ 22:52) >     EXAM:  CHEST SINGLE VIEW FRONTAL                          PROCEDURE DATE:  11/28/2017          INTERPRETATION:  Chest portable semierect single AP view:    Clinical history:    Shortness of breath.    Findings:    Cardiac size appears normal. Calcified tortuous thoracic atherosclerotic   aorta.    Bilateral bullous emphysema, left greater than the right. Right upper   lobe and lower lobe focal segmental airspace lung disease. Small right   pleural fluid. No pneumothorax.    Impression:    Bullous emphysema.    Right upper lobe and lower lobe segmental airspace disease suggestive of   pneumonia, clinical and lab correlation requested.      < end of copied text >      < from: Xray Abdomen 1 View- AP Only (12.02.17 @ 19:47) >   EXAM:  XR ABDOMEN AP 1 VIEW                          PROCEDURE DATE:  12/02/2017          INTERPRETATION:  Clinical Information: Constipation and abdominal   distention.    Technique: AP abdomen.    Comparison: 09/14/2017.    Impression: Nonspecific bowel gas pattern. No dilated loops of small   bowel. The colon is =dilated. There is a large stool burden. No free air.          < end of copied text >  ADVANCE DIRECTIVES:  [ ] YES [x ] NO   DNR [ ] YES [ x] NO  Completed on:                     MOLST  [ ] YES [ x] NO   Completed on:  Living Will  [ ] YES [x ] NO   Completed on:    Thank you for the opportunity to assist with the care of this patient.   [facility] Consult Service [phone]. Palliative Medicine Initial Consultation Note    HPI:  67yr man hx of COPD on home O2 4L presented with hs of low O2 sats. Denies SOB, cough. Patient admitted with exac of COPD    PERTINENT PMH REVIEWED:  [x ] YES [ ] NO         Chronic obstructive pulmonary disease, unspecified COPD type    CVA (cerebral vascular accident)    Diabetes mellitus    Hydrocele    Hypertension    Hypothyroidism    Renal calculi.     Past Surgical History:  History of lung    SOCIAL HISTORY:  EtOH [ ] Yes  [ x] No                                    Drugs [ ] Yes [ x] No                                   [ x] former smoker [ ] nonsmoker                                    Admitted from: [ x] home [ ] SNF _________ [ ] TOM ________    Surrogate/HCP/Guardian:  NO HCP form in chart- Wife Sandra Cartagena surrogate    FAMILY HISTORY:  No pertinent family history in first degree relatives      Baseline ADLs (prior to admission):  Independent [ x] moderately [ ] fully   Dependent   [ ] moderately [ ]fully    MEDICATIONS  (STANDING):  ALBUTerol/ipratropium for Nebulization 3 milliLiter(s) Nebulizer every 6 hours  ALPRAZolam 0.25 milliGRAM(s) Oral three times a day  aspirin enteric coated 81 milliGRAM(s) Oral daily  atorvastatin 80 milliGRAM(s) Oral at bedtime  benzonatate 100 milliGRAM(s) Oral three times a day  clopidogrel Tablet 75 milliGRAM(s) Oral daily  dextrose 5%. 1000 milliLiter(s) (50 mL/Hr) IV Continuous <Continuous>  dextrose 50% Injectable 12.5 Gram(s) IV Push once  dextrose 50% Injectable 25 Gram(s) IV Push once  dextrose 50% Injectable 25 Gram(s) IV Push once  diltiazem    milliGRAM(s) Oral <User Schedule>  docusate sodium 100 milliGRAM(s) Oral two times a day  enoxaparin Injectable 40 milliGRAM(s) SubCutaneous daily  Gauifenesin/Dextromethorphan 1200/600mg 1 Tablet(s) 1 Tablet(s) Oral two times a day  insulin glargine Injectable (LANTUS) 5 Unit(s) SubCutaneous at bedtime  insulin lispro (HumaLOG) corrective regimen sliding scale   SubCutaneous Before meals and at bedtime  lactulose Syrup 10 Gram(s) Oral three times a day  levoFLOXacin IVPB 500 milliGRAM(s) IV Intermittent every 24 hours  levoFLOXacin IVPB      levothyroxine 50 MICROGram(s) Oral daily  loratadine 10 milliGRAM(s) Oral daily  methylPREDNISolone sodium succinate Injectable 40 milliGRAM(s) IV Push every 12 hours  pantoprazole    Tablet 40 milliGRAM(s) Oral before breakfast  saccharomyces boulardii 250 milliGRAM(s) Oral two times a day  senna 2 Tablet(s) Oral at bedtime    MEDICATIONS  (PRN):  acetylcysteine 20% Inhalation 1 milliLiter(s) Inhalation every 4 hours PRN poor cough clearance  ALBUTerol    0.083% 2.5 milliGRAM(s) Nebulizer every 4 hours PRN Shortness of Breath and/or Wheezing  bisacodyl Suppository 10 milliGRAM(s) Rectal daily PRN Constipation  dextrose Gel 1 Dose(s) Oral once PRN Blood Glucose LESS THAN 70 milliGRAM(s)/deciliter  glucagon  Injectable 1 milliGRAM(s) IntraMuscular once PRN Glucose LESS THAN 70 milligrams/deciliter  ondansetron Injectable 4 milliGRAM(s) IV Push every 8 hours PRN Nausea and/or Vomiting  polyethylene glycol 3350 17 Gram(s) Oral daily PRN Constipation      Allergies    codeine (Unknown)  penicillin (Unknown)    Intolerances    Symbicort (Short breath)      REVIEW OF SYSTEMS     General: no fevers, no fatigue, no loss of appetite    Skin: no rashes, skin changes  	  Ophthalmologic: no eye pain or blurred vision  	  ENMT:	no sore throat, no ear pain    Respiratory and Thorax: no cough,  no  SOB 	    Cardiovascular:	no chest pain, no leg swelling    Gastrointestinal:	no  n/v/d,c    Genitourinary:	no FUD    Musculoskeletal: no muscle pain	    Neurological: no seizures, no dizziness    Hematology/Lymphatics: no petechia or purpura	    Endocrine: no polyuria, no polydipsia	      Karnofsky Performance Score/Palliative Performance Status Version 2:    40    %    Vital Signs Last 24 Hrs  T(C): 37 (04 Dec 2017 09:10), Max: 37 (04 Dec 2017 09:10)  T(F): 98.6 (04 Dec 2017 09:10), Max: 98.6 (04 Dec 2017 09:10)  HR: 102 (04 Dec 2017 09:10) (100 - 110)  BP: 142/92 (04 Dec 2017 09:10) (140/82 - 161/94)  BP(mean): --  RR: 18 (04 Dec 2017 09:10) (18 - 20)  SpO2: 90% (04 Dec 2017 09:10) (90% - 90%)    PHYSICAL EXAM:    General: [x ] alert  [ x] oriented x 3____ [ ] lethargic [ ] agitated                  [ ] cachexia  [ ] nonverbal  [ ] coma    HEENT: [x ] normal  [ ] dry mouth  [ ] ET tube/trach    Lungs: [x ] comfortable [ ] tachypnea/labored breathing  [ ] excessive secretions    CV: [ x] normal  [ ] tachycardia    GI: [ x] normal  [ ] distended  [ ] tender  [ ] no BS               [ ] PEG/NG/OG tube    : [xnormal  [ ] incontinent  [ ] oliguria/anuria  [ ] whitman    MSK: [ x] normal  [ ] weakness  [ ] edema             [ ] ambulatory  [ ] bedbound/wheelchair bound    Skin: [ ] normal  [ ] pressure ulcers- Stage_____  [x ] no rash    LABS:                        13.5   17.2  )-----------( 296      ( 04 Dec 2017 05:49 )             41.8     12-04    138  |  97<L>  |  23.0<H>  ----------------------------<  206<H>  4.6   |  29.0  |  0.71    Ca    7.9<L>      04 Dec 2017 05:47  Phos  3.1     12-04  Mg     2.2     12-04          I&O's Summary    03 Dec 2017 07:01  -  04 Dec 2017 07:00  --------------------------------------------------------  IN: 237 mL / OUT: 0 mL / NET: 237 mL        RADIOLOGY & ADDITIONAL STUDIES:  < from: Xray Chest 1 View AP/PA. (11.28.17 @ 22:52) >     EXAM:  CHEST SINGLE VIEW FRONTAL                          PROCEDURE DATE:  11/28/2017          INTERPRETATION:  Chest portable semierect single AP view:    Clinical history:    Shortness of breath.    Findings:    Cardiac size appears normal. Calcified tortuous thoracic atherosclerotic   aorta.    Bilateral bullous emphysema, left greater than the right. Right upper   lobe and lower lobe focal segmental airspace lung disease. Small right   pleural fluid. No pneumothorax.    Impression:    Bullous emphysema.    Right upper lobe and lower lobe segmental airspace disease suggestive of   pneumonia, clinical and lab correlation requested.      < end of copied text >      < from: Xray Abdomen 1 View- AP Only (12.02.17 @ 19:47) >   EXAM:  XR ABDOMEN AP 1 VIEW                          PROCEDURE DATE:  12/02/2017          INTERPRETATION:  Clinical Information: Constipation and abdominal   distention.    Technique: AP abdomen.    Comparison: 09/14/2017.    Impression: Nonspecific bowel gas pattern. No dilated loops of small   bowel. The colon is =dilated. There is a large stool burden. No free air.          < end of copied text >  ADVANCE DIRECTIVES:  [ ] YES [x ] NO   DNR [ ] YES [ x] NO  Completed on:                     MOLST  [ ] YES [ x] NO   Completed on:  Living Will  [ ] YES [x ] NO   Completed on:    Thank you for the opportunity to assist with the care of this patient.   [facility] Consult Service [phone].

## 2017-12-04 NOTE — PROGRESS NOTE ADULT - ASSESSMENT
Patient is a 67 year old male with PMH of COPD stage 4 s/p right lung reduction in 2010 (on home oxygen 4L (sat 92-93%), HTN, CVA on 2014, DM, hypothyroidism, hydrocele, and nephrolithiasis who is admitted for a COPD exacerbation. Patient has been weaned off ventimask to 4 liters nasal cannula and respiratory status is stable. CTA of the chest was negative for PE but showed bilateral lower lobe infiltrates with mucous plugging for which he was started on Levaquin.  Respiratory status stable and steroids tapered to PO today. Hospital course complicated by constipation, which has not improved despite aggressive bowel regimen, laxatives and enemas. XRAY with large stool burden. Surgery consulted.    Plan:  1. Acute on chronic hypoxic respiratory failure - stable  -secondary to COPD exacerbation  -on home oxygen (4 liters)  -respiratory status stable on 4 liters  -bicarb elevated likely due to metabolic compensation for resp acidosis although patient refused blood gases    -continue bronchodilators and taper solumedrol  to prednisone today  -continue Mucomyst   -continue IV Levaquin (day 6); last day tomorrow  -OOB to chair; PT evaluation  -CM on board to arrange for Home Oxygen   -Pulmonary on board - recommendations appreciated  -Overall prognosis very guarded - palliative care consult appreciated. Patient not ready to address advanced directives.    2. CAP  -imaging with infiltrates and mucous plugging  -WBC trending up today, although patient is on steroids  -Denies fever or chills  -will check procal in AM and monitor WBC trend  -continue Levaquin (day 6/7)    3. Hypertension  -BP acceptable  -continue cardizem    4. Hyperlipidemia   -continue statin    5. Constipation  -history of chronic constipation likely from hypothyroidism  -no BM despite stool softeners, laxatives, and enemas  -refused lactulose but 3 different enemas administered today  -AXR with large stool burden. No evidence of obstruction. Passing gas.  -denies abdominal pain, no nausea/vomiting, passing gas, +BM  -NPO, CT abdomen ordered with PO and IV contrast after discussed with surgery  -Will follow up surgery recommendations    6. Hyperglycemia  -HbA1c 6.1 - pre-diabetic   -likely steroid induced  -continue sliding scale and lantus at bedtime while on steroids    7. Scrotal edema  -chronic  -patient following with  as outpatient  -scrotal elevation    8. DVT Prophylaxis - Lovenox     PT evaluation      Attending Attestation:   I was physically present for the key portions of the evaluation and management (E/M) service provided.  I agree with the above history, physical, and plan which I have reviewed and edited where appropriate.     Plan discussed with patient. Patient is a 67 year old male with PMH of COPD stage 4 s/p right lung reduction in 2010 (on home oxygen 4L (sat 92-93%), HTN, CVA on 2014, DM, hypothyroidism, hydrocele, and nephrolithiasis who is admitted for a COPD exacerbation. Patient has been weaned off ventimask to 4 liters nasal cannula and respiratory status is stable. CTA of the chest was negative for PE but showed bilateral lower lobe infiltrates with mucous plugging for which he was started on Levaquin.  Respiratory status stable and steroids tapered to PO today. Hospital course complicated by constipation, which has not improved despite aggressive bowel regimen, laxatives and enemas. XRAY with large stool burden. Surgery consulted.    Plan:  1. Acute on chronic hypoxic respiratory failure - stable  -secondary to COPD exacerbation  -on home oxygen (4 liters)  -respiratory status stable on 4 liters  -bicarb elevated likely due to metabolic compensation for resp acidosis although patient refused blood gases    -continue bronchodilators and taper solumedrol  to prednisone today  -continue Mucomyst   -continue IV Levaquin (day 6); last day tomorrow  -OOB to chair; PT evaluation  -CM on board to arrange for Home Oxygen   -Pulmonary on board - recommendations appreciated  -Overall prognosis very guarded - palliative care consult appreciated. Patient not ready to address advanced directives.    2. CAP  -imaging with infiltrates and mucous plugging  -WBC trending up today, although patient is on steroids  -Denies fever or chills  -will check procal in AM and monitor WBC trend  -continue Levaquin (day 6/7)    3. Hypertension  -BP acceptable  -continue cardizem    4. Hyperlipidemia   -continue statin    5. Constipation  -history of chronic constipation likely from hypothyroidism superimposed on immobility  -no BM despite stool softeners, laxatives, and enemas  -refused lactulose but 3 different enemas administered today  -AXR with large stool burden. No evidence of obstruction. Passing gas.  -denies abdominal pain, no nausea/vomiting, passing gas, +BM  -NPO, CT abdomen ordered with PO and IV contrast after discussed with surgery  -Will follow up surgery recommendations    6. Hyperglycemia  -HbA1c 6.1 - pre-diabetic   -likely steroid induced  -continue sliding scale and lantus at bedtime while on steroids    7. Scrotal edema  -chronic  -patient following with  as outpatient  -scrotal elevation    8. DVT Prophylaxis - Lovenox     PT evaluation      Attending Attestation:   I was physically present for the key portions of the evaluation and management (E/M) service provided.  I agree with the above history, physical, and plan which I have reviewed and edited where appropriate.     Plan discussed with patient.

## 2017-12-04 NOTE — PROGRESS NOTE ADULT - SUBJECTIVE AND OBJECTIVE BOX
PULMONARY PROGRESS NOTE      FRANDY BAUTISTAJANE-[id number]    Patient is a 67y old  Male who presents with a chief complaint of Came for rehab (29 Nov 2017 10:08)      INTERVAL HPI/OVERNIGHT EVENTS: Less sob. Nearer to baseline. Walked the length of the [place] yesterday. Has O2 at home 4 LPM. Coughing up thick, dark yellow sputum; no blood. Biggest complaint now is he has had constipation. Given enema.    MEDICATIONS  (STANDING):  ALBUTerol/ipratropium for Nebulization 3 milliLiter(s) Nebulizer every 6 hours  ALPRAZolam 0.25 milliGRAM(s) Oral three times a day  aspirin enteric coated 81 milliGRAM(s) Oral daily  atorvastatin 80 milliGRAM(s) Oral at bedtime  benzonatate 100 milliGRAM(s) Oral three times a day  clopidogrel Tablet 75 milliGRAM(s) Oral daily  dextrose 5%. 1000 milliLiter(s) (50 mL/Hr) IV Continuous <Continuous>  dextrose 50% Injectable 12.5 Gram(s) IV Push once  dextrose 50% Injectable 25 Gram(s) IV Push once  dextrose 50% Injectable 25 Gram(s) IV Push once  diltiazem    milliGRAM(s) Oral <User Schedule>  docusate sodium 100 milliGRAM(s) Oral two times a day  enoxaparin Injectable 40 milliGRAM(s) SubCutaneous daily  Gauifenesin/Dextromethorphan 1200/600mg 1 Tablet(s) 1 Tablet(s) Oral two times a day  insulin glargine Injectable (LANTUS) 5 Unit(s) SubCutaneous at bedtime  insulin lispro (HumaLOG) corrective regimen sliding scale   SubCutaneous Before meals and at bedtime  lactulose Syrup 10 Gram(s) Oral three times a day  levoFLOXacin IVPB 500 milliGRAM(s) IV Intermittent every 24 hours  levoFLOXacin IVPB      levothyroxine 50 MICROGram(s) Oral daily  loratadine 10 milliGRAM(s) Oral daily  methylPREDNISolone sodium succinate Injectable 40 milliGRAM(s) IV Push every 12 hours  pantoprazole    Tablet 40 milliGRAM(s) Oral before breakfast  saccharomyces boulardii 250 milliGRAM(s) Oral two times a day  senna 2 Tablet(s) Oral at bedtime      MEDICATIONS  (PRN):  acetylcysteine 20% Inhalation 1 milliLiter(s) Inhalation every 4 hours PRN poor cough clearance  ALBUTerol    0.083% 2.5 milliGRAM(s) Nebulizer every 4 hours PRN Shortness of Breath and/or Wheezing  bisacodyl Suppository 10 milliGRAM(s) Rectal daily PRN Constipation  dextrose Gel 1 Dose(s) Oral once PRN Blood Glucose LESS THAN 70 milliGRAM(s)/deciliter  glucagon  Injectable 1 milliGRAM(s) IntraMuscular once PRN Glucose LESS THAN 70 milligrams/deciliter  ondansetron Injectable 4 milliGRAM(s) IV Push every 8 hours PRN Nausea and/or Vomiting  polyethylene glycol 3350 17 Gram(s) Oral daily PRN Constipation      Allergies    codeine (Unknown)  penicillin (Unknown)    Intolerances    Symbicort (Short breath)      PAST MEDICAL & SURGICAL HISTORY:  Hypothyroidism  Hydrocele  Renal calculi  Diabetes mellitus  Hypertension  CVA (cerebral vascular accident)  Chronic obstructive pulmonary disease, unspecified COPD type  History of lung surgery      SOCIAL HISTORY  Smoking History: former smoker      REVIEW OF SYSTEMS:    CONSTITUTIONAL:  No distress    HEENT:  Eyes:  No diplopia or blurred vision. ENT:  No earache, sore throat or runny nose.    CARDIOVASCULAR:  No pressure, squeezing, tightness, heaviness or aching about the chest; no palpitations.    RESPIRATORY:  per HPI     GASTROINTESTINAL: per HPI.    GENITOURINARY:  No dysuria, frequency or urgency.    NEUROLOGIC:  No paresthesias, fasciculations, seizures or weakness.    PSYCHIATRIC:  No disorder of thought or mood.    Vital Signs Last 24 Hrs  T(C): 37 (04 Dec 2017 09:10), Max: 37 (04 Dec 2017 09:10)  T(F): 98.6 (04 Dec 2017 09:10), Max: 98.6 (04 Dec 2017 09:10)  HR: 102 (04 Dec 2017 09:10) (100 - 110)  BP: 142/92 (04 Dec 2017 09:10) (140/82 - 161/94)  BP(mean): --  RR: 18 (04 Dec 2017 09:10) (18 - 20)  SpO2: 90% (04 Dec 2017 09:10) (90% - 90%)    PHYSICAL EXAMINATION:    GENERAL: The patient is awake and alert in no apparent distress.     HEENT: Head is normocephalic and atraumatic. Extraocular muscles are intact. Mucous membranes are moist.    NECK: Supple.    LUNGS: Decreased at apices, no wheezing, rales or rhonchi; respirations unlabored    HEART: Regular rate and rhythm      ABDOMEN: Soft, nontender, and nondistended.      EXTREMITIES: Without any cyanosis, clubbing, rash, lesions or edema.    NEUROLOGIC: Grossly intact.    LABS:                        13.5   17.2  )-----------( 296      ( 04 Dec 2017 05:49 )             41.8     12-04    138  |  97<L>  |  23.0<H>  ----------------------------<  206<H>  4.6   |  29.0  |  0.71    Ca    7.9<L>      04 Dec 2017 05:47  Phos  3.1     12-04  Mg     2.2     12-04           RADIOLOGY & ADDITIONAL STUDIES:  < from: CT Angio Chest w/ IV Cont (11.30.17 @ 11:11) >     EXAM:  CT ANGIO CHEST (W)AW IC                          PROCEDURE DATE:  11/30/2017          INTERPRETATION:  CLINICAL INFORMATION: Shortness of breath and tachycardia    COMPARISON: CTA chest 7/20/2016    PROCEDURE:   CTA of the Chest was performed with intravenous contrast.  90 ml of Omnipaque 350 was injected intravenously. 0 ml were discarded.  Sagittal and coronal reformats were performed as well as 3D   Reconstructions.      FINDINGS:    CHEST:     LUNGS AND LARGE AIRWAYS: Diffuse emphysema with severe involvement of the   lower lobes, similar to prior study. Hyperlucent expanded left lower   lobe. Bilateral lower lobe bronchi or mucoid impacted extending into   distal airways. Bilateral lower lobe infiltrates are present.   Subsegmental atelectasis in the lingula.  PLEURA: No pleural effusion.  VESSELS: No evidence for pulmonary embolism.  HEART: Heart size is normal.No pericardial effusion.  MEDIASTINUM AND TEZ: Mediastinal lymphadenopathy noted. For example, a   subcarinal lymph node measures 3.0 x 2.4 cm..  CHEST WALL AND LOWER NECK: Within normal limits.  VISUALIZED UPPER ABDOMEN: Stable left-sided more gagging hernia   containing unobstructed colon..  BONES: Within normal limits.    IMPRESSION:     No evidence for pulmonary embolism.    Bilateral lower lobe infiltrates and lower lobe mucoid impacted airways.                    PAN THOMAS M.D., ATTENDING RADIOLOGIST    < end of copied text >

## 2017-12-04 NOTE — PROGRESS NOTE ADULT - ASSESSMENT
-Very severe GOLD stage 4 COPD  -Chronic hypoxic resp failure  -Abnormal CT chest; mucous plugging.  -Cough with mucous.  -AECOPD  -Refusing ABG    RECC:   Steroids with taper. Nebs. Add back LABA/ICS. Abx. Mucomyst prn. O2. Ambulate. Need f/u CT chest as outpt in 2-3 months. -Very severe GOLD stage 4 COPD  -Chronic hypoxic resp failure  -Abnormal CT chest; mucous plugging.  -Cough with mucous.  -AECOPD  -Refusing ABG    RECC:   Steroids with taper. Nebs.  Abx. Mucomyst prn. O2. Ambulate. Palliative f/u. Need f/u CT chest as outpt in 2-3 months.

## 2017-12-05 LAB
ANION GAP SERPL CALC-SCNC: 13 MMOL/L — SIGNIFICANT CHANGE UP (ref 5–17)
BASOPHILS # BLD AUTO: 0 K/UL — SIGNIFICANT CHANGE UP (ref 0–0.2)
BASOPHILS NFR BLD AUTO: 0.1 % — SIGNIFICANT CHANGE UP (ref 0–2)
BUN SERPL-MCNC: 17 MG/DL — SIGNIFICANT CHANGE UP (ref 8–20)
CALCIUM SERPL-MCNC: 8.2 MG/DL — LOW (ref 8.6–10.2)
CHLORIDE SERPL-SCNC: 97 MMOL/L — LOW (ref 98–107)
CO2 SERPL-SCNC: 28 MMOL/L — SIGNIFICANT CHANGE UP (ref 22–29)
CREAT SERPL-MCNC: 0.65 MG/DL — SIGNIFICANT CHANGE UP (ref 0.5–1.3)
EOSINOPHIL # BLD AUTO: 0 K/UL — SIGNIFICANT CHANGE UP (ref 0–0.5)
EOSINOPHIL NFR BLD AUTO: 0.1 % — SIGNIFICANT CHANGE UP (ref 0–5)
GLUCOSE BLDC GLUCOMTR-MCNC: 142 MG/DL — HIGH (ref 70–99)
GLUCOSE BLDC GLUCOMTR-MCNC: 161 MG/DL — HIGH (ref 70–99)
GLUCOSE BLDC GLUCOMTR-MCNC: 166 MG/DL — HIGH (ref 70–99)
GLUCOSE SERPL-MCNC: 140 MG/DL — HIGH (ref 70–115)
HCT VFR BLD CALC: 44.9 % — SIGNIFICANT CHANGE UP (ref 42–52)
HGB BLD-MCNC: 15 G/DL — SIGNIFICANT CHANGE UP (ref 14–18)
LYMPHOCYTES # BLD AUTO: 0.8 K/UL — LOW (ref 1–4.8)
LYMPHOCYTES # BLD AUTO: 5.2 % — LOW (ref 20–55)
MAGNESIUM SERPL-MCNC: 2.1 MG/DL — SIGNIFICANT CHANGE UP (ref 1.6–2.6)
MCHC RBC-ENTMCNC: 28.8 PG — SIGNIFICANT CHANGE UP (ref 27–31)
MCHC RBC-ENTMCNC: 33.4 G/DL — SIGNIFICANT CHANGE UP (ref 32–36)
MCV RBC AUTO: 86.2 FL — SIGNIFICANT CHANGE UP (ref 80–94)
MONOCYTES # BLD AUTO: 1.4 K/UL — HIGH (ref 0–0.8)
MONOCYTES NFR BLD AUTO: 8.4 % — SIGNIFICANT CHANGE UP (ref 3–10)
NEUTROPHILS # BLD AUTO: 14 K/UL — HIGH (ref 1.8–8)
NEUTROPHILS NFR BLD AUTO: 85.9 % — HIGH (ref 37–73)
PHOSPHATE SERPL-MCNC: 2.4 MG/DL — SIGNIFICANT CHANGE UP (ref 2.4–4.7)
PLATELET # BLD AUTO: 276 K/UL — SIGNIFICANT CHANGE UP (ref 150–400)
POTASSIUM SERPL-MCNC: 3.9 MMOL/L — SIGNIFICANT CHANGE UP (ref 3.5–5.3)
POTASSIUM SERPL-SCNC: 3.9 MMOL/L — SIGNIFICANT CHANGE UP (ref 3.5–5.3)
PROCALCITONIN SERPL-MCNC: 0.06 NG/ML — HIGH (ref 0–0.04)
RBC # BLD: 5.21 M/UL — SIGNIFICANT CHANGE UP (ref 4.6–6.2)
RBC # FLD: 14.7 % — SIGNIFICANT CHANGE UP (ref 11–15.6)
SODIUM SERPL-SCNC: 138 MMOL/L — SIGNIFICANT CHANGE UP (ref 135–145)
WBC # BLD: 16.3 K/UL — HIGH (ref 4.8–10.8)
WBC # FLD AUTO: 16.3 K/UL — HIGH (ref 4.8–10.8)

## 2017-12-05 PROCEDURE — 99233 SBSQ HOSP IP/OBS HIGH 50: CPT

## 2017-12-05 PROCEDURE — 93010 ELECTROCARDIOGRAM REPORT: CPT

## 2017-12-05 PROCEDURE — 74177 CT ABD & PELVIS W/CONTRAST: CPT | Mod: 26

## 2017-12-05 PROCEDURE — 99222 1ST HOSP IP/OBS MODERATE 55: CPT

## 2017-12-05 RX ORDER — IBUPROFEN 200 MG
400 TABLET ORAL EVERY 6 HOURS
Qty: 0 | Refills: 0 | Status: DISCONTINUED | OUTPATIENT
Start: 2017-12-05 | End: 2017-12-05

## 2017-12-05 RX ORDER — IBUPROFEN 200 MG
400 TABLET ORAL EVERY 6 HOURS
Qty: 0 | Refills: 0 | Status: DISCONTINUED | OUTPATIENT
Start: 2017-12-05 | End: 2017-12-07

## 2017-12-05 RX ORDER — MULTIVIT WITH MIN/MFOLATE/K2 340-15/3 G
300 POWDER (GRAM) ORAL ONCE
Qty: 0 | Refills: 0 | Status: COMPLETED | OUTPATIENT
Start: 2017-12-05 | End: 2017-12-05

## 2017-12-05 RX ORDER — LACTULOSE 10 G/15ML
30 SOLUTION ORAL THREE TIMES A DAY
Qty: 0 | Refills: 0 | Status: DISCONTINUED | OUTPATIENT
Start: 2017-12-05 | End: 2017-12-08

## 2017-12-05 RX ADMIN — Medication 81 MILLIGRAM(S): at 12:19

## 2017-12-05 RX ADMIN — Medication 50 MICROGRAM(S): at 05:53

## 2017-12-05 RX ADMIN — LACTULOSE 10 GRAM(S): 10 SOLUTION ORAL at 12:24

## 2017-12-05 RX ADMIN — CLOPIDOGREL BISULFATE 75 MILLIGRAM(S): 75 TABLET, FILM COATED ORAL at 12:19

## 2017-12-05 RX ADMIN — Medication 3 MILLILITER(S): at 08:08

## 2017-12-05 RX ADMIN — LACTULOSE 10 GRAM(S): 10 SOLUTION ORAL at 01:06

## 2017-12-05 RX ADMIN — Medication 0.25 MILLIGRAM(S): at 17:01

## 2017-12-05 RX ADMIN — Medication 100 MILLIGRAM(S): at 01:06

## 2017-12-05 RX ADMIN — Medication 300 MILLILITER(S): at 19:55

## 2017-12-05 RX ADMIN — Medication 0.25 MILLIGRAM(S): at 01:10

## 2017-12-05 RX ADMIN — Medication 180 MILLIGRAM(S): at 05:53

## 2017-12-05 RX ADMIN — INSULIN GLARGINE 5 UNIT(S): 100 INJECTION, SOLUTION SUBCUTANEOUS at 01:09

## 2017-12-05 RX ADMIN — Medication 100 MILLIGRAM(S): at 05:53

## 2017-12-05 RX ADMIN — Medication 180 MILLIGRAM(S): at 17:03

## 2017-12-05 RX ADMIN — Medication 100 MILLIGRAM(S): at 17:03

## 2017-12-05 RX ADMIN — Medication 100 MILLIGRAM(S): at 12:20

## 2017-12-05 RX ADMIN — LACTULOSE 10 GRAM(S): 10 SOLUTION ORAL at 05:53

## 2017-12-05 RX ADMIN — Medication 400 MILLIGRAM(S): at 20:00

## 2017-12-05 RX ADMIN — LORATADINE 10 MILLIGRAM(S): 10 TABLET ORAL at 12:19

## 2017-12-05 RX ADMIN — Medication 3 MILLILITER(S): at 03:20

## 2017-12-05 RX ADMIN — Medication 3 MILLILITER(S): at 14:28

## 2017-12-05 RX ADMIN — Medication 400 MILLIGRAM(S): at 19:29

## 2017-12-05 RX ADMIN — Medication 400 MILLIGRAM(S): at 12:28

## 2017-12-05 RX ADMIN — PANTOPRAZOLE SODIUM 40 MILLIGRAM(S): 20 TABLET, DELAYED RELEASE ORAL at 05:58

## 2017-12-05 RX ADMIN — SENNA PLUS 2 TABLET(S): 8.6 TABLET ORAL at 01:07

## 2017-12-05 RX ADMIN — ONDANSETRON 4 MILLIGRAM(S): 8 TABLET, FILM COATED ORAL at 19:29

## 2017-12-05 RX ADMIN — Medication 60 MILLIGRAM(S): at 05:53

## 2017-12-05 RX ADMIN — Medication 400 MILLIGRAM(S): at 12:58

## 2017-12-05 RX ADMIN — Medication 0.25 MILLIGRAM(S): at 05:58

## 2017-12-05 RX ADMIN — Medication 1 ENEMA: at 22:02

## 2017-12-05 RX ADMIN — ATORVASTATIN CALCIUM 80 MILLIGRAM(S): 80 TABLET, FILM COATED ORAL at 01:05

## 2017-12-05 RX ADMIN — Medication 250 MILLIGRAM(S): at 17:02

## 2017-12-05 NOTE — CONSULT NOTE ADULT - SUBJECTIVE AND OBJECTIVE BOX
HPI:  67 YOM w/PMH of COPD stage 4 s/p right lung reduction in 2010, currently on home oxygen 4L (sat 92-93%), HTN, CVA on 2014, DM, hypothyroidism, hydrocele, kidney stones who came because he noted that his O2 sat was 76% today. He mentions more difficult to talk in full sencentces compared to his baseline. He also has a chronic productive cough which has not changed in frequency or color. He mentions that his oxygen machine broke 4 days ago and he got a new one since, but is not sure if its working properly. He denies SOB, increased cough, chest pain, hemoptysis nausea, weakness. Pt has to sleep in a sit position with 2 pillows since he gets SOB if he lyes flat, condition that he attributes to his postnasal drip (29 Nov 2017 02:05)      PAST MEDICAL & SURGICAL HISTORY:  Hypothyroidism  Hydrocele  Renal calculi  Diabetes mellitus  Hypertension  CVA (cerebral vascular accident)  Chronic obstructive pulmonary disease, unspecified COPD type  History of lung surgery      ROS:  No Heartburn, regurgitation, dysphagia, odynophagia.  No dyspepsia  No abdominal pain.    No Nausea, vomiting.  No Bleeding.  No hematemesis.   No diarrhea.    No hematochesia.  No weight loss, anorexia.  No edema.      MEDICATIONS  (STANDING):  ALBUTerol/ipratropium for Nebulization 3 milliLiter(s) Nebulizer every 6 hours  ALPRAZolam 0.25 milliGRAM(s) Oral three times a day  aspirin enteric coated 81 milliGRAM(s) Oral daily  atorvastatin 80 milliGRAM(s) Oral at bedtime  benzonatate 100 milliGRAM(s) Oral three times a day  clopidogrel Tablet 75 milliGRAM(s) Oral daily  dextrose 5%. 1000 milliLiter(s) (50 mL/Hr) IV Continuous <Continuous>  dextrose 50% Injectable 12.5 Gram(s) IV Push once  dextrose 50% Injectable 25 Gram(s) IV Push once  dextrose 50% Injectable 25 Gram(s) IV Push once  diltiazem    milliGRAM(s) Oral <User Schedule>  docusate sodium 100 milliGRAM(s) Oral two times a day  enoxaparin Injectable 40 milliGRAM(s) SubCutaneous daily  Gauifenesin/Dextromethorphan 1200/600mg 1 Tablet(s) 1 Tablet(s) Oral two times a day  insulin glargine Injectable (LANTUS) 5 Unit(s) SubCutaneous at bedtime  insulin lispro (HumaLOG) corrective regimen sliding scale   SubCutaneous Before meals and at bedtime  lactulose Syrup 10 Gram(s) Oral three times a day  levoFLOXacin IVPB 500 milliGRAM(s) IV Intermittent every 24 hours  levoFLOXacin IVPB      levothyroxine 50 MICROGram(s) Oral daily  loratadine 10 milliGRAM(s) Oral daily  pantoprazole    Tablet 40 milliGRAM(s) Oral before breakfast  predniSONE   Tablet 60 milliGRAM(s) Oral daily  saccharomyces boulardii 250 milliGRAM(s) Oral two times a day  senna 2 Tablet(s) Oral at bedtime    MEDICATIONS  (PRN):  acetylcysteine 20% Inhalation 1 milliLiter(s) Inhalation every 4 hours PRN poor cough clearance  ALBUTerol    0.083% 2.5 milliGRAM(s) Nebulizer every 4 hours PRN Shortness of Breath and/or Wheezing  bisacodyl Suppository 10 milliGRAM(s) Rectal daily PRN Constipation  dextrose Gel 1 Dose(s) Oral once PRN Blood Glucose LESS THAN 70 milliGRAM(s)/deciliter  glucagon  Injectable 1 milliGRAM(s) IntraMuscular once PRN Glucose LESS THAN 70 milligrams/deciliter  ibuprofen  Tablet 400 milliGRAM(s) Oral every 6 hours PRN severe pain  ondansetron Injectable 4 milliGRAM(s) IV Push every 8 hours PRN Nausea and/or Vomiting  polyethylene glycol 3350 17 Gram(s) Oral daily PRN Constipation      Allergies    codeine (Unknown)  penicillin (Unknown)    Intolerances    Symbicort (Short breath)      SOCIAL HISTORY:    ENDOSCOPIC/GI HISTORY:    FAMILY HISTORY:  No pertinent family history in first degree relatives      Vital Signs Last 24 Hrs  T(C): 37 (05 Dec 2017 16:28), Max: 37 (05 Dec 2017 16:28)  T(F): 98.6 (05 Dec 2017 16:28), Max: 98.6 (05 Dec 2017 16:28)  HR: 116 (05 Dec 2017 16:28) (103 - 137)  BP: 144/92 (05 Dec 2017 16:28) (127/87 - 156/91)  BP(mean): --  RR: 20 (05 Dec 2017 16:28) (18 - 20)  SpO2: 95% (05 Dec 2017 04:25) (92% - 95%)    PHYSICAL EXAM:    GENERAL: NAD, well-groomed, well-developed  HEAD:  Atraumatic, Normocephalic  EYES: EOMI, PERRLA, conjunctiva and sclera clear  ENMT: No tonsillar erythema, exudates, or enlargement; Moist mucous membranes, Good dentition, No lesions  NECK: Supple, No JVD, Normal thyroid  CHEST/LUNG: Clear to percussion bilaterally; No rales, rhonchi, wheezing, or rubs  HEART: Regular rate and rhythm; No murmurs, rubs, or gallops  ABDOMEN: Soft, Nontender, Nondistended; Bowel sounds present  EXTREMITIES:  2+ Peripheral Pulses, No clubbing, cyanosis, or edema  LYMPH: No lymphadenopathy noted  SKIN: No rashes or lesions      LABS:                        15.0   16.3  )-----------( 276      ( 05 Dec 2017 06:28 )             44.9     12-05    138  |  97<L>  |  17.0  ----------------------------<  140<H>  3.9   |  28.0  |  0.65    Ca    8.2<L>      05 Dec 2017 06:28  Phos  2.4     12-05  Mg     2.1     12-05                   RADIOLOGY & ADDITIONAL STUDIES: HPI:  67 YOM w/PMH of COPD stage 4 s/p right lung reduction in 2010, currently on home oxygen 4L (sat 92-93%), HTN, CVA on 2014, DM, hypothyroidism, hydrocele, kidney stones who came because he noted that his O2 sat was 76% today. He mentions more difficult to talk in full sencentces compared to his baseline.    GI evaluation was called for constipation. Patient has not moved his bowel since thursday. He has not been passing flatus. He was evaluated by surgery. He has been receiving enemas and even miralax with no response. His abdomen is mildly distended. He is denying any nausea. His last colonoscopy was about 5 years ago which was normal. He feels constipated whenever he has to take a lot of medications.     PAST MEDICAL & SURGICAL HISTORY:  Hypothyroidism  Hydrocele  Renal calculi  Diabetes mellitus  Hypertension  CVA (cerebral vascular accident)  Chronic obstructive pulmonary disease, unspecified COPD type  History of lung surgery      ROS:  No Heartburn, regurgitation, dysphagia, odynophagia.  No dyspepsia  No abdominal pain.    No Nausea, vomiting.  No Bleeding.  No hematemesis.   No diarrhea.    No hematochezia  No weight loss, anorexia.  No edema.   Constipation and abdominal distension +     MEDICATIONS  (STANDING):  ALBUTerol/ipratropium for Nebulization 3 milliLiter(s) Nebulizer every 6 hours  ALPRAZolam 0.25 milliGRAM(s) Oral three times a day  aspirin enteric coated 81 milliGRAM(s) Oral daily  atorvastatin 80 milliGRAM(s) Oral at bedtime  benzonatate 100 milliGRAM(s) Oral three times a day  clopidogrel Tablet 75 milliGRAM(s) Oral daily  dextrose 5%. 1000 milliLiter(s) (50 mL/Hr) IV Continuous <Continuous>  dextrose 50% Injectable 12.5 Gram(s) IV Push once  dextrose 50% Injectable 25 Gram(s) IV Push once  dextrose 50% Injectable 25 Gram(s) IV Push once  diltiazem    milliGRAM(s) Oral <User Schedule>  docusate sodium 100 milliGRAM(s) Oral two times a day  enoxaparin Injectable 40 milliGRAM(s) SubCutaneous daily  Gauifenesin/Dextromethorphan 1200/600mg 1 Tablet(s) 1 Tablet(s) Oral two times a day  insulin glargine Injectable (LANTUS) 5 Unit(s) SubCutaneous at bedtime  insulin lispro (HumaLOG) corrective regimen sliding scale   SubCutaneous Before meals and at bedtime  lactulose Syrup 10 Gram(s) Oral three times a day  levoFLOXacin IVPB 500 milliGRAM(s) IV Intermittent every 24 hours  levoFLOXacin IVPB      levothyroxine 50 MICROGram(s) Oral daily  loratadine 10 milliGRAM(s) Oral daily  pantoprazole    Tablet 40 milliGRAM(s) Oral before breakfast  predniSONE   Tablet 60 milliGRAM(s) Oral daily  saccharomyces boulardii 250 milliGRAM(s) Oral two times a day  senna 2 Tablet(s) Oral at bedtime    MEDICATIONS  (PRN):  acetylcysteine 20% Inhalation 1 milliLiter(s) Inhalation every 4 hours PRN poor cough clearance  ALBUTerol    0.083% 2.5 milliGRAM(s) Nebulizer every 4 hours PRN Shortness of Breath and/or Wheezing  bisacodyl Suppository 10 milliGRAM(s) Rectal daily PRN Constipation  dextrose Gel 1 Dose(s) Oral once PRN Blood Glucose LESS THAN 70 milliGRAM(s)/deciliter  glucagon  Injectable 1 milliGRAM(s) IntraMuscular once PRN Glucose LESS THAN 70 milligrams/deciliter  ibuprofen  Tablet 400 milliGRAM(s) Oral every 6 hours PRN severe pain  ondansetron Injectable 4 milliGRAM(s) IV Push every 8 hours PRN Nausea and/or Vomiting  polyethylene glycol 3350 17 Gram(s) Oral daily PRN Constipation      Allergies    codeine (Unknown)  penicillin (Unknown)    Intolerances    Symbicort (Short breath)      SOCIAL HISTORY:NC    ENDOSCOPIC/GI HISTORY:As in HPI    FAMILY HISTORY:  No pertinent family history in first degree relatives      Vital Signs Last 24 Hrs  T(C): 37 (05 Dec 2017 16:28), Max: 37 (05 Dec 2017 16:28)  T(F): 98.6 (05 Dec 2017 16:28), Max: 98.6 (05 Dec 2017 16:28)  HR: 116 (05 Dec 2017 16:28) (103 - 137)  BP: 144/92 (05 Dec 2017 16:28) (127/87 - 156/91)  BP(mean): --  RR: 20 (05 Dec 2017 16:28) (18 - 20)  SpO2: 95% (05 Dec 2017 04:25) (92% - 95%)    PHYSICAL EXAM:    GENERAL: NAD, well-groomed, well-developed  HEAD:  Atraumatic, Normocephalic  EYES: EOMI, PERRLA, conjunctiva and sclera clear  ENMT: No tonsillar erythema, exudates, or enlargement; Moist mucous membranes, Good dentition, No lesions  NECK: Supple, No JVD, Normal thyroid  CHEST/LUNG:Barrell chest. Distant breath sounds  HEART: Regular rate and rhythm; No murmurs, rubs, or gallops  ABDOMEN: Soft, Nontender, Nondistended; Bowel sounds present  RECTAL: Enlarged prostate. Empty rectal vault  : Hydrocele, urinary catheter in place  EXTREMITIES:  2+ Peripheral Pulses, No clubbing, cyanosis, or edema  LYMPH: No lymphadenopathy noted  SKIN: No rashes or lesions      LABS:                        15.0   16.3  )-----------( 276      ( 05 Dec 2017 06:28 )             44.9     12-05    138  |  97<L>  |  17.0  ----------------------------<  140<H>  3.9   |  28.0  |  0.65    Ca    8.2<L>      05 Dec 2017 06:28  Phos  2.4     12-05  Mg     2.1     12-05                   RADIOLOGY & ADDITIONAL STUDIES:  < from: CT Abdomen and Pelvis w/ Oral Cont and w/ IV Cont (12.05.17 @ 00:38) >    IMPRESSION:     Stool and air distended right and transverse colon. Relative transition   point distal to the splenic flecture without obvious obstructing mass.   Overall favor ileus/obstipation, however follow-up colonoscopy to exclude   underlying structures recommended.    Colon containing left anterior diaphragmatic hernia without acute   associated findings or obstruction at this time, grossly similar to 9/14.    Bibasilar pneumonia as described.    Other incidental commentsas above.    < end of copied text >

## 2017-12-05 NOTE — PROGRESS NOTE ADULT - ASSESSMENT
66 y/o M with constipation 2/2 immobility. Hemodynamically stable without clinical signs of acute abdomen  -Rectal disimpaction bedside unsuccessful 2/2 no stool in the rectal vault  -C/W stool softeners/laxatives, May continue to try soap suds enema along with adding dulcolex suppository.   -oob ambulation with PT  -recommend IVF if continued NPO  -Continue care per primary team 66 y/o M with constipation 2/2 immobility. Hemodynamically stable without clinical signs of acute abdomen.     -Rectal disimpaction bedside unsuccessful 2/2 no stool in the rectal vault  -C/W stool softeners/laxatives, May continue to try soap suds enema along with adding dulcolex suppository.   -oob ambulation with PT  -recommend IVF if continued NPO  -Continue care per primary team

## 2017-12-05 NOTE — CONSULT NOTE ADULT - SUBJECTIVE AND OBJECTIVE BOX
Vascular Attending:        HPI:  67 YOM w/PMH of COPD stage 4 s/p right lung reduction in 2010, currently on home oxygen 4L (sat 92-93%), HTN, CVA on 2014, DM, hypothyroidism, hydrocele, kidney stones who came because he noted that his O2 sat was 76% today. He mentions more difficult to talk in full sencentces compared to his baseline. He also has a chronic productive cough which has not changed in frequency or color. He mentions that his oxygen machine broke 4 days ago and he got a new one since, but is not sure if its working properly. He denies SOB, increased cough, chest pain, hemoptysis nausea, weakness. Pt has to sleep in a sit position with 2 pillows since he gets SOB if he lyes flat, condition that he attributes to his postnasal drip (29 Nov 2017 02:05)    Vascular Surgery HPI:  67 year old male with multiple comorbidities admitted secondary to desaturation , scrotal edema.  Currently complaining of constipation. Investigative CTA of abdomen pelvis revealed incidental finding of right CFA localized dissection.  Patient with no complaint of extremity pain at rest.  Reports difficulty with ambulating secondary to his dependency on supplemental O2.  Denies calf pain with ambulation.  No reports of recent intravenous catheterizations to groin. Patient denies any history of past catheterizations.       PAST MEDICAL & SURGICAL HISTORY:  Hypothyroidism  Hydrocele  Renal calculi  Diabetes mellitus  Hypertension  CVA (cerebral vascular accident)  Chronic obstructive pulmonary disease, unspecified COPD type  History of lung surgery      REVIEW OF SYSTEMS    negative except what is listed in the HPI        	      MEDICATIONS  (STANDING):  ALBUTerol/ipratropium for Nebulization 3 milliLiter(s) Nebulizer every 6 hours  ALPRAZolam 0.25 milliGRAM(s) Oral three times a day  aspirin enteric coated 81 milliGRAM(s) Oral daily  atorvastatin 80 milliGRAM(s) Oral at bedtime  clopidogrel Tablet 75 milliGRAM(s) Oral daily  dextrose 5%. 1000 milliLiter(s) (50 mL/Hr) IV Continuous <Continuous>  dextrose 50% Injectable 12.5 Gram(s) IV Push once  dextrose 50% Injectable 25 Gram(s) IV Push once  dextrose 50% Injectable 25 Gram(s) IV Push once  diltiazem    milliGRAM(s) Oral <User Schedule>  docusate sodium 100 milliGRAM(s) Oral two times a day  enoxaparin Injectable 40 milliGRAM(s) SubCutaneous daily  Gauifenesin/Dextromethorphan 1200/600mg 1 Tablet(s) 1 Tablet(s) Oral two times a day  insulin glargine Injectable (LANTUS) 5 Unit(s) SubCutaneous at bedtime  insulin lispro (HumaLOG) corrective regimen sliding scale   SubCutaneous Before meals and at bedtime  lactulose Syrup 10 Gram(s) Oral three times a day  levothyroxine 50 MICROGram(s) Oral daily  loratadine 10 milliGRAM(s) Oral daily  magnesium citrate Solution 300 milliLiter(s) Oral once  pantoprazole    Tablet 40 milliGRAM(s) Oral before breakfast  predniSONE   Tablet 60 milliGRAM(s) Oral daily  senna 2 Tablet(s) Oral at bedtime  sodium biphosphate Rectal Enema 1 Enema Rectal every 4 hours    MEDICATIONS  (PRN):  acetylcysteine 20% Inhalation 1 milliLiter(s) Inhalation every 4 hours PRN poor cough clearance  ALBUTerol    0.083% 2.5 milliGRAM(s) Nebulizer every 4 hours PRN Shortness of Breath and/or Wheezing  bisacodyl Suppository 10 milliGRAM(s) Rectal daily PRN Constipation  dextrose Gel 1 Dose(s) Oral once PRN Blood Glucose LESS THAN 70 milliGRAM(s)/deciliter  glucagon  Injectable 1 milliGRAM(s) IntraMuscular once PRN Glucose LESS THAN 70 milligrams/deciliter  ibuprofen  Tablet 400 milliGRAM(s) Oral every 6 hours PRN severe pain  ondansetron Injectable 4 milliGRAM(s) IV Push every 8 hours PRN Nausea and/or Vomiting  polyethylene glycol 3350 17 Gram(s) Oral daily PRN Constipation      Allergies    codeine (Unknown)  penicillin (Unknown)    Intolerances    Symbicort (Short breath)      SOCIAL HISTORY:      Vital Signs Last 24 Hrs  T(C): 37 (05 Dec 2017 16:28), Max: 37 (05 Dec 2017 16:28)  T(F): 98.6 (05 Dec 2017 16:28), Max: 98.6 (05 Dec 2017 16:28)  HR: 116 (05 Dec 2017 16:28) (103 - 137)  BP: 144/92 (05 Dec 2017 16:28) (127/87 - 156/91)  BP(mean): --  RR: 20 (05 Dec 2017 16:28) (18 - 20)  SpO2: 95% (05 Dec 2017 04:25) (92% - 95%)    PHYSICAL EXAM:      Constitutional:  No distress, speaks in short sentences in between breaths    ENMT:  Moist     Neck:  supple , no audible bruit     Chest:  bowel sounds heard left lower lung field     Cardiovascular: s1/s2    Gastrointestinal:  markedly distended, + bowel sounds    :  scrotal edema     Extremities:  warm no ulcers, no gross edema    Vascular:  equal palpable pulses to bilateral Femorals/Popliteals/PT/DP    Neurological:  no gross distal sensory deficits        LABS:                        15.0   16.3  )-----------( 276      ( 05 Dec 2017 06:28 )             44.9     12-05    138  |  97<L>  |  17.0  ----------------------------<  140<H>  3.9   |  28.0  |  0.65      Ca    8.2<L>      05 Dec 2017 06:28  Phos  2.4     12-05  Mg     2.1     12-05    RADIOLOGY & ADDITIONAL STUDies:      < from: CT Abdomen and Pelvis w/ Oral Cont and w/ IV Cont (12.05.17 @ 00:38) >      IMPRESSION:     Stool and air distended right and transverse colon. Relative transition   point distal to the splenic flecture without obvious obstructing mass.   Overall favor ileus/obstipation, however follow-up colonoscopy to exclude   underlying structures recommended.    Colon containing left anterior diaphragmatic hernia without acute   associated findings or obstruction at this time, grossly similar to 9/14.    < end of copied text >    ** Right focal region of CFA dissection before the SFA/DFA bifurcation.. The distal sfa/dfa than can be seen remains patent.    Impression and Plan:    Patient with constipation, diaphragmatic herniation   Radiological incidental finding of Right CFA focal dissection. Likely secondary to past access/catherization.   Patient asymptomatic, distal extremity perfused well    - No further vascular surgical intervention warranted at this time   - Will report findings to attending

## 2017-12-05 NOTE — CONSULT NOTE ADULT - ASSESSMENT
Patient with COPD exacerbation and now with constipation.     1. Will recommend to give fleet enema again twice  2. Try magnesium citrate 300 mL po x 1 dose  3. Assess the response. if no bowel movement, will try 2 L golytely Patient with COPD exacerbation and now with constipation.     1. Will recommend to give fleet enema again twice  2. Try magnesium citrate 300 mL po x 1 dose  3. Assess the response. if no bowel movement, will try 2 L golytely    4. if possible discontinue Dextromethorphan

## 2017-12-05 NOTE — PROGRESS NOTE ADULT - ASSESSMENT
-Very severe GOLD stage 4 COPD  -Chronic hypoxic resp failure  -Abnormal CT chest; mucous plugging.  -Cough with mucous. Slight improvement.   -AECOPD  -Refusing ABG    RECC:   Steroids with taper. Nebs.  Abx. Mucomyst prn. O2. Ambulate. Palliative f/u. Need f/u CT chest as outpt in 2-3 months. Consitipation per surgery, PMT.

## 2017-12-05 NOTE — PROGRESS NOTE ADULT - SUBJECTIVE AND OBJECTIVE BOX
CHIEF COMPLAINT/INTERVAL HISTORY:    Patient is a 67y old  Male who presents with a chief complaint of Came for rehab (29 Nov 2017 10:08)    SUBJECTIVE & OBJECTIVE: Pt seen and examined at bedside. No BM overnight. Made NPO. CT Abdomen completed. Surgery and GI consulted. Not passing gas today. Denies abdominal pain, nausea or vomiting.    ROS: No chest pain, palpitations, SOB, lightheadedness, dizziness, headache, nausea/vomiting, fevers/chills, abdominal pain, dysuria or increased urinary frequency.    ICU Vital Signs Last 24 Hrs  T(C): 37 (05 Dec 2017 16:28), Max: 37 (05 Dec 2017 16:28)  T(F): 98.6 (05 Dec 2017 16:28), Max: 98.6 (05 Dec 2017 16:28)  HR: 116 (05 Dec 2017 16:28) (103 - 137)  BP: 144/92 (05 Dec 2017 16:28) (127/87 - 156/91)  RR: 20 (05 Dec 2017 16:28) (18 - 20)  SpO2: 95% (05 Dec 2017 04:25) (92% - 95%)    MEDICATIONS  (STANDING):  ALBUTerol/ipratropium for Nebulization 3 milliLiter(s) Nebulizer every 6 hours  ALPRAZolam 0.25 milliGRAM(s) Oral three times a day  aspirin enteric coated 81 milliGRAM(s) Oral daily  atorvastatin 80 milliGRAM(s) Oral at bedtime  benzonatate 100 milliGRAM(s) Oral three times a day  clopidogrel Tablet 75 milliGRAM(s) Oral daily  dextrose 5%. 1000 milliLiter(s) (50 mL/Hr) IV Continuous <Continuous>  dextrose 50% Injectable 12.5 Gram(s) IV Push once  dextrose 50% Injectable 25 Gram(s) IV Push once  dextrose 50% Injectable 25 Gram(s) IV Push once  diltiazem    milliGRAM(s) Oral <User Schedule>  docusate sodium 100 milliGRAM(s) Oral two times a day  enoxaparin Injectable 40 milliGRAM(s) SubCutaneous daily  Gauifenesin/Dextromethorphan 1200/600mg 1 Tablet(s) 1 Tablet(s) Oral two times a day  insulin glargine Injectable (LANTUS) 5 Unit(s) SubCutaneous at bedtime  insulin lispro (HumaLOG) corrective regimen sliding scale   SubCutaneous Before meals and at bedtime  lactulose Syrup 10 Gram(s) Oral three times a day  levoFLOXacin IVPB 500 milliGRAM(s) IV Intermittent every 24 hours  levoFLOXacin IVPB      levothyroxine 50 MICROGram(s) Oral daily  loratadine 10 milliGRAM(s) Oral daily  pantoprazole    Tablet 40 milliGRAM(s) Oral before breakfast  predniSONE   Tablet 60 milliGRAM(s) Oral daily  saccharomyces boulardii 250 milliGRAM(s) Oral two times a day  senna 2 Tablet(s) Oral at bedtime    MEDICATIONS  (PRN):  acetylcysteine 20% Inhalation 1 milliLiter(s) Inhalation every 4 hours PRN poor cough clearance  ALBUTerol    0.083% 2.5 milliGRAM(s) Nebulizer every 4 hours PRN Shortness of Breath and/or Wheezing  bisacodyl Suppository 10 milliGRAM(s) Rectal daily PRN Constipation  dextrose Gel 1 Dose(s) Oral once PRN Blood Glucose LESS THAN 70 milliGRAM(s)/deciliter  glucagon  Injectable 1 milliGRAM(s) IntraMuscular once PRN Glucose LESS THAN 70 milligrams/deciliter  ibuprofen  Tablet 400 milliGRAM(s) Oral every 6 hours PRN severe pain  ondansetron Injectable 4 milliGRAM(s) IV Push every 8 hours PRN Nausea and/or Vomiting  polyethylene glycol 3350 17 Gram(s) Oral daily PRN Constipation      LABS:                        15.0   16.3  )-----------( 276      ( 05 Dec 2017 06:28 )             44.9     12-05    138  |  97<L>  |  17.0  ----------------------------<  140<H>  3.9   |  28.0  |  0.65    Ca    8.2<L>      05 Dec 2017 06:28  Phos  2.4     12-05  Mg     2.1     12-05      CAPILLARY BLOOD GLUCOSE      POCT Blood Glucose.: 166 mg/dL (05 Dec 2017 16:42)  POCT Blood Glucose.: 161 mg/dL (05 Dec 2017 08:20)  POCT Blood Glucose.: 142 mg/dL (05 Dec 2017 01:02)      RECENT CULTURES:      RADIOLOGY & ADDITIONAL TESTS:  < from: CT Abdomen and Pelvis w/ Oral Cont and w/ IV Cont (12.05.17 @ 00:38) >    Stool and air distended right and transverse colon. Relative transition   point distal to the splenic flecture without obvious obstructing mass.   Overall favor ileus/obstipation, however follow-up colonoscopy to exclude   underlying structures recommended.    Colon containing left anterior diaphragmatic hernia without acute   associated findings or obstruction at this time, grossly similar to 9/14.    Bibasilar pneumonia as described.    < end of copied text >    PHYSICAL EXAM:  GENERAL: elderly male, laying in bed, chronically ill appearing  HEAD:  Atraumatic, Normocephalic  EYES: EOMI, PERRLA, conjunctiva and sclera clear  ENMT: Moist mucous membranes  NECK: Supple, No JVD  NERVOUS SYSTEM:  Alert & Oriented X3   CHEST/LUNG: diminished breath sounds  HEART: Regular rate and rhythm; audible S1/S2  ABDOMEN: Soft, Nontender, distended, hypoactive breath sounds, no rebound, no guarding  EXTREMITIES:  No edema

## 2017-12-05 NOTE — PROGRESS NOTE ADULT - SUBJECTIVE AND OBJECTIVE BOX
PULMONARY PROGRESS NOTE      FRANDY BAUTISTAJANE-[id number]    Patient is a 67y old  Male who presents with a chief complaint of Came for rehab (29 Nov 2017 10:08)      INTERVAL HPI/OVERNIGHT EVENTS: Fatigued. Dyspnea improving. Cough less. Still issues with constipation.     MEDICATIONS  (STANDING):  ALBUTerol/ipratropium for Nebulization 3 milliLiter(s) Nebulizer every 6 hours  ALPRAZolam 0.25 milliGRAM(s) Oral three times a day  aspirin enteric coated 81 milliGRAM(s) Oral daily  atorvastatin 80 milliGRAM(s) Oral at bedtime  benzonatate 100 milliGRAM(s) Oral three times a day  clopidogrel Tablet 75 milliGRAM(s) Oral daily  dextrose 5%. 1000 milliLiter(s) (50 mL/Hr) IV Continuous <Continuous>  dextrose 50% Injectable 12.5 Gram(s) IV Push once  dextrose 50% Injectable 25 Gram(s) IV Push once  dextrose 50% Injectable 25 Gram(s) IV Push once  diltiazem    milliGRAM(s) Oral <User Schedule>  docusate sodium 100 milliGRAM(s) Oral two times a day  enoxaparin Injectable 40 milliGRAM(s) SubCutaneous daily  Gauifenesin/Dextromethorphan 1200/600mg 1 Tablet(s) 1 Tablet(s) Oral two times a day  insulin glargine Injectable (LANTUS) 5 Unit(s) SubCutaneous at bedtime  insulin lispro (HumaLOG) corrective regimen sliding scale   SubCutaneous Before meals and at bedtime  lactulose Syrup 10 Gram(s) Oral three times a day  levoFLOXacin IVPB 500 milliGRAM(s) IV Intermittent every 24 hours  levoFLOXacin IVPB      levothyroxine 50 MICROGram(s) Oral daily  loratadine 10 milliGRAM(s) Oral daily  pantoprazole    Tablet 40 milliGRAM(s) Oral before breakfast  predniSONE   Tablet 60 milliGRAM(s) Oral daily  saccharomyces boulardii 250 milliGRAM(s) Oral two times a day  senna 2 Tablet(s) Oral at bedtime      MEDICATIONS  (PRN):  acetylcysteine 20% Inhalation 1 milliLiter(s) Inhalation every 4 hours PRN poor cough clearance  ALBUTerol    0.083% 2.5 milliGRAM(s) Nebulizer every 4 hours PRN Shortness of Breath and/or Wheezing  bisacodyl Suppository 10 milliGRAM(s) Rectal daily PRN Constipation  dextrose Gel 1 Dose(s) Oral once PRN Blood Glucose LESS THAN 70 milliGRAM(s)/deciliter  glucagon  Injectable 1 milliGRAM(s) IntraMuscular once PRN Glucose LESS THAN 70 milligrams/deciliter  ondansetron Injectable 4 milliGRAM(s) IV Push every 8 hours PRN Nausea and/or Vomiting  polyethylene glycol 3350 17 Gram(s) Oral daily PRN Constipation      Allergies    codeine (Unknown)  penicillin (Unknown)    Intolerances    Symbicort (Short breath)      PAST MEDICAL & SURGICAL HISTORY:  Hypothyroidism  Hydrocele  Renal calculi  Diabetes mellitus  Hypertension  CVA (cerebral vascular accident)  Chronic obstructive pulmonary disease, unspecified COPD type  History of lung surgery      SOCIAL HISTORY  Smoking History: former smoker      REVIEW OF SYSTEMS:    CONSTITUTIONAL:  No distress    HEENT:  Eyes:  No diplopia or blurred vision. ENT:  No earache, sore throat or runny nose.    CARDIOVASCULAR:  No pressure, squeezing, tightness, heaviness or aching about the chest; no palpitations.    RESPIRATORY:  per HPI     GASTROINTESTINAL:  No nausea, vomiting or diarrhea.    GENITOURINARY:  No dysuria, frequency or urgency.    NEUROLOGIC:  No paresthesias, fasciculations, seizures or weakness.    PSYCHIATRIC:  No disorder of thought or mood.    Vital Signs Last 24 Hrs  T(C): 36.5 (05 Dec 2017 10:38), Max: 36.9 (05 Dec 2017 01:00)  T(F): 97.7 (05 Dec 2017 10:38), Max: 98.5 (05 Dec 2017 01:00)  HR: 137 (05 Dec 2017 10:44) (103 - 137)  BP: 156/91 (05 Dec 2017 10:38) (127/87 - 170/98)  BP(mean): --  RR: 18 (05 Dec 2017 10:38) (18 - 18)  SpO2: 95% (05 Dec 2017 04:25) (92% - 95%)    PHYSICAL EXAMINATION:    GENERAL: The patient is awake and alert in no apparent distress.     HEENT: Head is normocephalic and atraumatic. Extraocular muscles are intact. Mucous membranes are moist.    NECK: Supple.    LUNGS: currently w/o wheeze, diminished at apices, respirations unlabored    HEART: Regular rate and rhythm      ABDOMEN: Soft, nontender,        EXTREMITIES: Without any cyanosis, clubbing, rash, lesions or edema.    NEUROLOGIC: Grossly intact.    LABS:                        15.0   16.3  )-----------( 276      ( 05 Dec 2017 06:28 )             44.9     12-05    138  |  97<L>  |  17.0  ----------------------------<  140<H>  3.9   |  28.0  |  0.65    Ca    8.2<L>      05 Dec 2017 06:28  Phos  2.4     12-05  Mg     2.1     12-05

## 2017-12-05 NOTE — PROGRESS NOTE ADULT - ASSESSMENT
Patient is a 67 year old male with PMH of COPD stage 4 s/p right lung reduction in 2010 (on home oxygen 4L (sat 92-93%), HTN, CVA on 2014, DM, hypothyroidism, hydrocele, and nephrolithiasis who is admitted for a COPD exacerbation. Patient has been weaned off ventimask to 4 liters nasal cannula and respiratory status is stable. CTA of the chest was negative for PE but showed bilateral lower lobe infiltrates with mucous plugging for which he was started on Levaquin.  Respiratory status stable and steroids tapered to PO today. Hospital course complicated by constipation, which has not improved despite aggressive bowel regimen, laxatives and enemas. XRAY with large stool burden. Surgery consulted.    Plan:  1. Acute on chronic hypoxic respiratory failure - stable  -secondary to COPD exacerbation  -on home oxygen (4 liters)  -respiratory status stable on 4 liters  -bicarb elevated likely due to metabolic compensation for resp acidosis although patient refuses blood gases    -continue bronchodilators and prednisone as per pulm  -continue Mucomyst PRN  -continue IV Levaquin day 7/7  -OOB to chair; PT evaluation pending  -CM on board to arrange for Home Oxygen   -Overall prognosis very guarded - palliative care consult appreciated. Patient not ready to address advanced directives.    2. CAP  -imaging with infiltrates and mucous plugging  -productive cough with tan mucous improving  -Denies fever or chills  -Procal very minimally elevated  -complete course of Levaquin today and monitor WBC count    3. Constipation  -history of chronic constipation likely from hypothyroidism + immobility  -no BM despite stool softeners, laxatives, and enemas  -AXR with large stool burden. No evidence of obstruction.   -denies abdominal pain, no nausea/vomiting, but is not passing gas today  -NPO  -CT abdomen reviewed - ileus vs obstipation.   -Surgery recommendations appreciated - continue medical management as above  -GI evaluation pending.    4. Hypertension  -BP acceptable  -continue cardizem    5. Hyperlipidemia   -continue statin    6. Hyperglycemia  -HbA1c 6.1 - pre-diabetic   -likely steroid induced  -continue sliding scale and lantus at bedtime while on steroids    7. Scrotal edema  -chronic  -patient following with  as outpatient  -scrotal elevation  -s/p whitman insertion by surgery (CT without bladder distention).   -TOV in AM    8. Right femoral artery dissection  -incidental finding on CT  -vascular evaluation    9. DVT Prophylaxis - Lovenox     PT evaluation ordered     Plan discussed with patient.

## 2017-12-05 NOTE — PROGRESS NOTE ADULT - SUBJECTIVE AND OBJECTIVE BOX
INTERVAL HPI/OVERNIGHT EVENTS: No acute events overnight    SUBJECTIVE: Denies pain this AM. Afebrile. NPO. Minimal ambuation. +Urination. - Flatus. -BM after 3 enemas and multiple medications. Denies F/C/N/V/CP/SOB.       MEDICATIONS  (STANDING):  ALBUTerol/ipratropium for Nebulization 3 milliLiter(s) Nebulizer every 6 hours  ALPRAZolam 0.25 milliGRAM(s) Oral three times a day  aspirin enteric coated 81 milliGRAM(s) Oral daily  atorvastatin 80 milliGRAM(s) Oral at bedtime  benzonatate 100 milliGRAM(s) Oral three times a day  clopidogrel Tablet 75 milliGRAM(s) Oral daily  dextrose 5%. 1000 milliLiter(s) (50 mL/Hr) IV Continuous <Continuous>  dextrose 50% Injectable 12.5 Gram(s) IV Push once  dextrose 50% Injectable 25 Gram(s) IV Push once  dextrose 50% Injectable 25 Gram(s) IV Push once  diltiazem    milliGRAM(s) Oral <User Schedule>  docusate sodium 100 milliGRAM(s) Oral two times a day  enoxaparin Injectable 40 milliGRAM(s) SubCutaneous daily  Gauifenesin/Dextromethorphan 1200/600mg 1 Tablet(s) 1 Tablet(s) Oral two times a day  insulin glargine Injectable (LANTUS) 5 Unit(s) SubCutaneous at bedtime  insulin lispro (HumaLOG) corrective regimen sliding scale   SubCutaneous Before meals and at bedtime  lactulose Syrup 10 Gram(s) Oral three times a day  levoFLOXacin IVPB 500 milliGRAM(s) IV Intermittent every 24 hours  levoFLOXacin IVPB      levothyroxine 50 MICROGram(s) Oral daily  loratadine 10 milliGRAM(s) Oral daily  pantoprazole    Tablet 40 milliGRAM(s) Oral before breakfast  predniSONE   Tablet 60 milliGRAM(s) Oral daily  saccharomyces boulardii 250 milliGRAM(s) Oral two times a day  senna 2 Tablet(s) Oral at bedtime    MEDICATIONS  (PRN):  acetylcysteine 20% Inhalation 1 milliLiter(s) Inhalation every 4 hours PRN poor cough clearance  ALBUTerol    0.083% 2.5 milliGRAM(s) Nebulizer every 4 hours PRN Shortness of Breath and/or Wheezing  bisacodyl Suppository 10 milliGRAM(s) Rectal daily PRN Constipation  dextrose Gel 1 Dose(s) Oral once PRN Blood Glucose LESS THAN 70 milliGRAM(s)/deciliter  glucagon  Injectable 1 milliGRAM(s) IntraMuscular once PRN Glucose LESS THAN 70 milligrams/deciliter  ondansetron Injectable 4 milliGRAM(s) IV Push every 8 hours PRN Nausea and/or Vomiting  polyethylene glycol 3350 17 Gram(s) Oral daily PRN Constipation      Vital Signs Last 24 Hrs  T(C): 36.9 (05 Dec 2017 04:25), Max: 37 (04 Dec 2017 09:10)  T(F): 98.5 (05 Dec 2017 04:25), Max: 98.6 (04 Dec 2017 09:10)  HR: 103 (05 Dec 2017 04:25) (102 - 109)  BP: 150/87 (05 Dec 2017 04:25) (127/87 - 170/98)  BP(mean): --  RR: 18 (05 Dec 2017 04:25) (18 - 18)  SpO2: 95% (05 Dec 2017 04:25) (90% - 95%)    PE  Gen: AAO x3, NAD  Pulm: CTAB, Symmetrical chest rise. Left thoracic wall tenderness.   CV: RRR  Abd: Soft, NT, mildly distended, -R/-G  CRISTIAN: No evidence of anal fissures or external hemorrhoids. Significant prostate enlargement, but is not tender or boggy. Minimal stool in the rectal vault. No palpable masses. No gail blood.   Ext: No C/C/E  Vasc: 2+ Radial, DP pulses  Neuro: No focal neurological deficits      I&O's Detail    04 Dec 2017 07:01  -  05 Dec 2017 07:00  --------------------------------------------------------  IN:    Solution: 100 mL  Total IN: 100 mL    OUT:    Voided: 1200 mL  Total OUT: 1200 mL    Total NET: -1100 mL          LABS:                        15.0   16.3  )-----------( 276      ( 05 Dec 2017 06:28 )             44.9     12-05    138  |  97<L>  |  17.0  ----------------------------<  140<H>  3.9   |  28.0  |  0.65    Ca    8.2<L>      05 Dec 2017 06:28  Phos  2.4     12-05  Mg     2.1     12-05 INTERVAL HPI/OVERNIGHT EVENTS: No acute events overnight    SUBJECTIVE: Denies pain this AM. Afebrile. NPO. Minimal ambuation. +Urination. - Flatus. -BM after 3 enemas and multiple medications. Denies F/C/N/V/CP/SOB.   Roy inserted by ACS/trauma team this AM with 300 output immediately upon insertion.       MEDICATIONS  (STANDING):  ALBUTerol/ipratropium for Nebulization 3 milliLiter(s) Nebulizer every 6 hours  ALPRAZolam 0.25 milliGRAM(s) Oral three times a day  aspirin enteric coated 81 milliGRAM(s) Oral daily  atorvastatin 80 milliGRAM(s) Oral at bedtime  benzonatate 100 milliGRAM(s) Oral three times a day  clopidogrel Tablet 75 milliGRAM(s) Oral daily  dextrose 5%. 1000 milliLiter(s) (50 mL/Hr) IV Continuous <Continuous>  dextrose 50% Injectable 12.5 Gram(s) IV Push once  dextrose 50% Injectable 25 Gram(s) IV Push once  dextrose 50% Injectable 25 Gram(s) IV Push once  diltiazem    milliGRAM(s) Oral <User Schedule>  docusate sodium 100 milliGRAM(s) Oral two times a day  enoxaparin Injectable 40 milliGRAM(s) SubCutaneous daily  Gauifenesin/Dextromethorphan 1200/600mg 1 Tablet(s) 1 Tablet(s) Oral two times a day  insulin glargine Injectable (LANTUS) 5 Unit(s) SubCutaneous at bedtime  insulin lispro (HumaLOG) corrective regimen sliding scale   SubCutaneous Before meals and at bedtime  lactulose Syrup 10 Gram(s) Oral three times a day  levoFLOXacin IVPB 500 milliGRAM(s) IV Intermittent every 24 hours  levoFLOXacin IVPB      levothyroxine 50 MICROGram(s) Oral daily  loratadine 10 milliGRAM(s) Oral daily  pantoprazole    Tablet 40 milliGRAM(s) Oral before breakfast  predniSONE   Tablet 60 milliGRAM(s) Oral daily  saccharomyces boulardii 250 milliGRAM(s) Oral two times a day  senna 2 Tablet(s) Oral at bedtime    MEDICATIONS  (PRN):  acetylcysteine 20% Inhalation 1 milliLiter(s) Inhalation every 4 hours PRN poor cough clearance  ALBUTerol    0.083% 2.5 milliGRAM(s) Nebulizer every 4 hours PRN Shortness of Breath and/or Wheezing  bisacodyl Suppository 10 milliGRAM(s) Rectal daily PRN Constipation  dextrose Gel 1 Dose(s) Oral once PRN Blood Glucose LESS THAN 70 milliGRAM(s)/deciliter  glucagon  Injectable 1 milliGRAM(s) IntraMuscular once PRN Glucose LESS THAN 70 milligrams/deciliter  ondansetron Injectable 4 milliGRAM(s) IV Push every 8 hours PRN Nausea and/or Vomiting  polyethylene glycol 3350 17 Gram(s) Oral daily PRN Constipation      Vital Signs Last 24 Hrs  T(C): 36.9 (05 Dec 2017 04:25), Max: 37 (04 Dec 2017 09:10)  T(F): 98.5 (05 Dec 2017 04:25), Max: 98.6 (04 Dec 2017 09:10)  HR: 103 (05 Dec 2017 04:25) (102 - 109)  BP: 150/87 (05 Dec 2017 04:25) (127/87 - 170/98)  BP(mean): --  RR: 18 (05 Dec 2017 04:25) (18 - 18)  SpO2: 95% (05 Dec 2017 04:25) (90% - 95%)    PE  Gen: AAO x3, NAD  Pulm: CTAB, Symmetrical chest rise. Left thoracic wall tenderness.   CV: RRR  Abd: Soft, NT, mildly distended, -R/-G  CRISTIAN: No evidence of anal fissures or external hemorrhoids. Significant prostate enlargement, but is not tender or boggy. Minimal stool in the rectal vault. No palpable masses. No gail blood.   Ext: No C/C/E  Vasc: 2+ Radial, DP pulses  Neuro: No focal neurological deficits      I&O's Detail    04 Dec 2017 07:01  -  05 Dec 2017 07:00  --------------------------------------------------------  IN:    Solution: 100 mL  Total IN: 100 mL    OUT:    Voided: 1200 mL  Total OUT: 1200 mL    Total NET: -1100 mL          LABS:                        15.0   16.3  )-----------( 276      ( 05 Dec 2017 06:28 )             44.9     12-05    138  |  97<L>  |  17.0  ----------------------------<  140<H>  3.9   |  28.0  |  0.65    Ca    8.2<L>      05 Dec 2017 06:28  Phos  2.4     12-05  Mg     2.1     12-05

## 2017-12-06 DIAGNOSIS — D72.829 ELEVATED WHITE BLOOD CELL COUNT, UNSPECIFIED: ICD-10-CM

## 2017-12-06 DIAGNOSIS — K59.00 CONSTIPATION, UNSPECIFIED: ICD-10-CM

## 2017-12-06 LAB
ANION GAP SERPL CALC-SCNC: 12 MMOL/L — SIGNIFICANT CHANGE UP (ref 5–17)
BASOPHILS # BLD AUTO: 0 K/UL — SIGNIFICANT CHANGE UP (ref 0–0.2)
BUN SERPL-MCNC: 42 MG/DL — HIGH (ref 8–20)
CALCIUM SERPL-MCNC: 8.2 MG/DL — LOW (ref 8.6–10.2)
CHLORIDE SERPL-SCNC: 99 MMOL/L — SIGNIFICANT CHANGE UP (ref 98–107)
CO2 SERPL-SCNC: 30 MMOL/L — HIGH (ref 22–29)
CREAT SERPL-MCNC: 0.77 MG/DL — SIGNIFICANT CHANGE UP (ref 0.5–1.3)
EOSINOPHIL # BLD AUTO: 0 K/UL — SIGNIFICANT CHANGE UP (ref 0–0.5)
EOSINOPHIL NFR BLD AUTO: 0.1 % — SIGNIFICANT CHANGE UP (ref 0–5)
GLUCOSE BLDC GLUCOMTR-MCNC: 144 MG/DL — HIGH (ref 70–99)
GLUCOSE BLDC GLUCOMTR-MCNC: 227 MG/DL — HIGH (ref 70–99)
GLUCOSE SERPL-MCNC: 180 MG/DL — HIGH (ref 70–115)
HCT VFR BLD CALC: 47.5 % — SIGNIFICANT CHANGE UP (ref 42–52)
HGB BLD-MCNC: 15.7 G/DL — SIGNIFICANT CHANGE UP (ref 14–18)
LYMPHOCYTES # BLD AUTO: 0.8 K/UL — LOW (ref 1–4.8)
LYMPHOCYTES # BLD AUTO: 3.6 % — LOW (ref 20–55)
MAGNESIUM SERPL-MCNC: 3.4 MG/DL — HIGH (ref 1.8–2.6)
MAGNESIUM SERPL-MCNC: 4.1 MG/DL — HIGH (ref 1.8–2.6)
MCHC RBC-ENTMCNC: 28.4 PG — SIGNIFICANT CHANGE UP (ref 27–31)
MCHC RBC-ENTMCNC: 33.1 G/DL — SIGNIFICANT CHANGE UP (ref 32–36)
MCV RBC AUTO: 85.9 FL — SIGNIFICANT CHANGE UP (ref 80–94)
MONOCYTES # BLD AUTO: 1.6 K/UL — HIGH (ref 0–0.8)
MONOCYTES NFR BLD AUTO: 7.7 % — SIGNIFICANT CHANGE UP (ref 3–10)
NEUTROPHILS # BLD AUTO: 18.4 K/UL — HIGH (ref 1.8–8)
NEUTROPHILS NFR BLD AUTO: 88.2 % — HIGH (ref 37–73)
PHOSPHATE SERPL-MCNC: 3.8 MG/DL — SIGNIFICANT CHANGE UP (ref 2.4–4.7)
PLATELET # BLD AUTO: 307 K/UL — SIGNIFICANT CHANGE UP (ref 150–400)
POTASSIUM SERPL-MCNC: 4.2 MMOL/L — SIGNIFICANT CHANGE UP (ref 3.5–5.3)
POTASSIUM SERPL-SCNC: 4.2 MMOL/L — SIGNIFICANT CHANGE UP (ref 3.5–5.3)
RBC # BLD: 5.53 M/UL — SIGNIFICANT CHANGE UP (ref 4.6–6.2)
RBC # FLD: 14.6 % — SIGNIFICANT CHANGE UP (ref 11–15.6)
SODIUM SERPL-SCNC: 141 MMOL/L — SIGNIFICANT CHANGE UP (ref 135–145)
WBC # BLD: 20.8 K/UL — HIGH (ref 4.8–10.8)
WBC # FLD AUTO: 20.8 K/UL — HIGH (ref 4.8–10.8)

## 2017-12-06 PROCEDURE — 99233 SBSQ HOSP IP/OBS HIGH 50: CPT

## 2017-12-06 PROCEDURE — 99222 1ST HOSP IP/OBS MODERATE 55: CPT

## 2017-12-06 PROCEDURE — 99232 SBSQ HOSP IP/OBS MODERATE 35: CPT

## 2017-12-06 PROCEDURE — 74020: CPT | Mod: 26

## 2017-12-06 RX ORDER — ALPRAZOLAM 0.25 MG
0.25 TABLET ORAL THREE TIMES A DAY
Qty: 0 | Refills: 0 | Status: DISCONTINUED | OUTPATIENT
Start: 2017-12-07 | End: 2017-12-14

## 2017-12-06 RX ORDER — FUROSEMIDE 40 MG
20 TABLET ORAL ONCE
Qty: 0 | Refills: 0 | Status: COMPLETED | OUTPATIENT
Start: 2017-12-06 | End: 2017-12-06

## 2017-12-06 RX ORDER — METHYLNALTREXONE BROMIDE 12 MG/.6ML
12 INJECTION, SOLUTION SUBCUTANEOUS EVERY OTHER DAY
Qty: 0 | Refills: 0 | Status: DISCONTINUED | OUTPATIENT
Start: 2017-12-06 | End: 2017-12-07

## 2017-12-06 RX ORDER — SODIUM CHLORIDE 9 MG/ML
500 INJECTION INTRAMUSCULAR; INTRAVENOUS; SUBCUTANEOUS
Qty: 0 | Refills: 0 | Status: COMPLETED | OUTPATIENT
Start: 2017-12-06 | End: 2017-12-06

## 2017-12-06 RX ADMIN — SENNA PLUS 2 TABLET(S): 8.6 TABLET ORAL at 00:04

## 2017-12-06 RX ADMIN — Medication 0.25 MILLIGRAM(S): at 22:27

## 2017-12-06 RX ADMIN — Medication 0.25 MILLIGRAM(S): at 13:23

## 2017-12-06 RX ADMIN — LACTULOSE 20 GRAM(S): 10 SOLUTION ORAL at 00:05

## 2017-12-06 RX ADMIN — Medication 30 MILLILITER(S): at 01:01

## 2017-12-06 RX ADMIN — Medication 50 MICROGRAM(S): at 06:07

## 2017-12-06 RX ADMIN — Medication 60 MILLIGRAM(S): at 06:07

## 2017-12-06 RX ADMIN — Medication 3 MILLILITER(S): at 21:08

## 2017-12-06 RX ADMIN — Medication 1 ENEMA: at 03:08

## 2017-12-06 RX ADMIN — Medication 20 MILLIGRAM(S): at 08:45

## 2017-12-06 RX ADMIN — Medication 400 MILLIGRAM(S): at 18:09

## 2017-12-06 RX ADMIN — Medication 10 MILLIGRAM(S): at 03:52

## 2017-12-06 RX ADMIN — Medication 400 MILLIGRAM(S): at 18:39

## 2017-12-06 RX ADMIN — SODIUM CHLORIDE 75 MILLILITER(S): 9 INJECTION INTRAMUSCULAR; INTRAVENOUS; SUBCUTANEOUS at 18:16

## 2017-12-06 RX ADMIN — INSULIN GLARGINE 5 UNIT(S): 100 INJECTION, SOLUTION SUBCUTANEOUS at 00:14

## 2017-12-06 RX ADMIN — Medication 180 MILLIGRAM(S): at 18:10

## 2017-12-06 RX ADMIN — ATORVASTATIN CALCIUM 80 MILLIGRAM(S): 80 TABLET, FILM COATED ORAL at 00:03

## 2017-12-06 NOTE — PROGRESS NOTE ADULT - ASSESSMENT
Patient is a 67 year old male with PMH of COPD stage 4 s/p right lung reduction in 2010 (on home oxygen 4L (sat 92-93%), HTN, CVA on 2014, DM, hypothyroidism, hydrocele, and nephrolithiasis who is admitted for a COPD exacerbation. Patient has been weaned off ventimask to 4 liters nasal cannula and respiratory status is stable. CTA of the chest was negative for PE but showed bilateral lower lobe infiltrates with mucous plugging for which he was started on Levaquin.  Respiratory status stable and steroids tapered to PO today. Hospital course complicated by constipation, which has not improved despite aggressive bowel regimen, laxatives and enemas. XRAY with large stool burden. Surgery consulted.    Plan:  1. Acute on chronic hypoxic respiratory failure - stable  -secondary to COPD exacerbation  -on home oxygen (4 liters)  -respiratory status stable on 4 liters  -bicarb elevated likely due to metabolic compensation for resp acidosis although patient refuses blood gases    -continue bronchodilators and prednisone as per pulm  -continue Mucomyst PRN  -completed course of Levaquin   -OOB to chair; PT evaluation pending  -CM on board to arrange for Home Oxygen   -Overall prognosis very guarded - palliative care consult appreciated. Patient not ready to address advanced directives.    2. CAP  -imaging with infiltrates and mucous plugging  -productive cough with tan mucous improving  -Procal very minimally elevated  -complete course of Levaquin on 12/5 but leukocytosis worsening  -remains afebrile, no chills - ID evaluation     3. Constipation  -history of chronic constipation likely from hypothyroidism + immobility  -no BM despite stool softeners, laxatives, and enemas  -no BM overnight despite mag citrate and fleet enemas  -AXR with large stool burden.    -denies abdominal pain, but episodes of coffee ground emesis today  -passing gas  -CT abdomen reviewed - ileus vs obstipation.   -GI recommendations appreciated but patient refusing Relistor  -NPO   -Surgery on board, recommendations appreciated - OR in AM    4. Hypertension  -BP acceptable  -continue cardizem    5. Hyperlipidemia   -continue statin    6. Hyperglycemia  -HbA1c 6.1 - pre-diabetic   -likely steroid induced  -continue sliding scale      7. Scrotal edema  -chronic  -patient following with  as outpatient  -scrotal elevation  -s/p whitman insertion by surgery   -CT without bladder distention  -Will maintain whitman today since patient is going to the OR in AM    8. Right femoral artery dissection  -incidental finding on CT  -no intervention as per vascular    9. Hypermagnesemia   -after receiving mag citrate on 12/5  -s/p IV lasix - repeat Mag trending down  -gentle hydration and repeat labs in AM    10. DVT Prophylaxis - Lovenox given today but will hold going forth. OR in AM    Plan discussed with patient, wife, Dr. Solano, Surgery PA and RN

## 2017-12-06 NOTE — PROGRESS NOTE ADULT - SUBJECTIVE AND OBJECTIVE BOX
PULMONARY PROGRESS NOTE      FRANDY BAUTISTA  MRN-[id number]    Patient is a 67y old  Male who presents with a chief complaint of Came for rehab (29 Nov 2017 10:08)      INTERVAL HPI/OVERNIGHT EVENTS:    Patient is awake and alert  Breathing is at baseline  Patient without BM if a few days and w/u including abd xray and CT c/w LBO  Surgical intervention is planned    MEDICATIONS  (STANDING):  ALBUTerol/ipratropium for Nebulization 3 milliLiter(s) Nebulizer every 6 hours  ALPRAZolam 0.25 milliGRAM(s) Oral three times a day  aspirin enteric coated 81 milliGRAM(s) Oral daily  atorvastatin 80 milliGRAM(s) Oral at bedtime  clopidogrel Tablet 75 milliGRAM(s) Oral daily  dextrose 5%. 1000 milliLiter(s) (50 mL/Hr) IV Continuous <Continuous>  dextrose 50% Injectable 12.5 Gram(s) IV Push once  dextrose 50% Injectable 25 Gram(s) IV Push once  dextrose 50% Injectable 25 Gram(s) IV Push once  diltiazem    milliGRAM(s) Oral <User Schedule>  docusate sodium 100 milliGRAM(s) Oral two times a day  enoxaparin Injectable 40 milliGRAM(s) SubCutaneous daily  Gauifenesin/Dextromethorphan 1200/600mg 1 Tablet(s) 1 Tablet(s) Oral two times a day  insulin glargine Injectable (LANTUS) 5 Unit(s) SubCutaneous at bedtime  insulin lispro (HumaLOG) corrective regimen sliding scale   SubCutaneous Before meals and at bedtime  lactulose Syrup 30 Gram(s) Oral three times a day  levothyroxine 50 MICROGram(s) Oral daily  loratadine 10 milliGRAM(s) Oral daily  methylnaltrexone Injectable 12 milliGRAM(s) SubCutaneous every other day  pantoprazole    Tablet 40 milliGRAM(s) Oral before breakfast  predniSONE   Tablet 60 milliGRAM(s) Oral daily  senna 2 Tablet(s) Oral at bedtime  sodium chloride 0.9%. 500 milliLiter(s) (75 mL/Hr) IV Continuous <Continuous>      MEDICATIONS  (PRN):  acetylcysteine 20% Inhalation 1 milliLiter(s) Inhalation every 4 hours PRN poor cough clearance  ALBUTerol    0.083% 2.5 milliGRAM(s) Nebulizer every 4 hours PRN Shortness of Breath and/or Wheezing  bisacodyl Suppository 10 milliGRAM(s) Rectal daily PRN Constipation  dextrose Gel 1 Dose(s) Oral once PRN Blood Glucose LESS THAN 70 milliGRAM(s)/deciliter  glucagon  Injectable 1 milliGRAM(s) IntraMuscular once PRN Glucose LESS THAN 70 milligrams/deciliter  ibuprofen  Tablet 400 milliGRAM(s) Oral every 6 hours PRN severe pain  ondansetron Injectable 4 milliGRAM(s) IV Push every 8 hours PRN Nausea and/or Vomiting  polyethylene glycol 3350 17 Gram(s) Oral daily PRN Constipation      Allergies    codeine (Unknown)  penicillin (Unknown)    Intolerances    Symbicort (Short breath)      PAST MEDICAL & SURGICAL HISTORY:  Hypothyroidism  Hydrocele  Renal calculi  Diabetes mellitus  Hypertension  CVA (cerebral vascular accident)  Chronic obstructive pulmonary disease, unspecified COPD type  History of lung surgery        REVIEW OF SYSTEMS:    CONSTITUTIONAL:  No distress    HEENT:  Eyes:  No diplopia or blurred vision. ENT:  No earache, sore throat or runny nose.    CARDIOVASCULAR:  No pressure, squeezing, tightness, heaviness or aching about the chest; no palpitations.    RESPIRATORY:  No cough, shortness of breath, PND or orthopnea. Mild SOBOE    GASTROINTESTINAL:  No nausea, vomiting or diarrhea.    GENITOURINARY:  No dysuria, frequency or urgency.    NEUROLOGIC:  No paresthesias, fasciculations, seizures or weakness.    PSYCHIATRIC:  No disorder of thought or mood.    Vital Signs Last 24 Hrs  T(C): 36.3 (06 Dec 2017 08:41), Max: 37 (05 Dec 2017 16:28)  T(F): 97.4 (06 Dec 2017 08:41), Max: 98.6 (05 Dec 2017 16:28)  HR: 99 (06 Dec 2017 08:41) (95 - 116)  BP: 140/90 (06 Dec 2017 10:02) (122/79 - 150/101)  BP(mean): --  RR: 18 (06 Dec 2017 08:41) (18 - 20)  SpO2: 93% (06 Dec 2017 14:21) (93% - 97%)    PHYSICAL EXAMINATION:    GENERAL: The patient is awake and alert in no apparent distress.     HEENT: Head is normocephalic and atraumatic. Extraocular muscles are intact. Mucous membranes are moist.    NECK: Supple.    LUNGS: Clear to auscultation without wheezing, rales or rhonchi; respirations unlabored    HEART: Regular rate and rhythm without murmur.    ABDOMEN: Soft, nontender, and nondistended.      EXTREMITIES: Without any cyanosis, clubbing, rash, lesions or edema.    NEUROLOGIC: Grossly intact.    LABS:                        15.7   20.8  )-----------( 307      ( 06 Dec 2017 05:59 )             47.5     12-06    141  |  99  |  42.0<H>  ----------------------------<  180<H>  4.2   |  30.0<H>  |  0.77    Ca    8.2<L>      06 Dec 2017 05:59  Phos  3.8     12-06  Mg     4.1     12-06      Procalcitonin, Serum: 0.06 ng/mL (12-05-17 @ 06:28)      MICROBIOLOGY:    Culture - Sputum . (11.30.17 @ 08:52)    Gram Stain:   Greater than 10 Squamous epithelial cells per low power field.    Specimen Source: .Sputum Sputum    Culture Results:   Specimen unacceptable for culture. Please recollect and resubmit a new  specimen for culture, if necessary.  .  TYPE: (C=Critical, N=Notification, A=Abnormal) N  TESTS:  _ C spt  DATE/TIME CALLED: _ 11/30/2017 09:51:20  CALLED TO: Cait Yang RN  READ BACK (2 Patient Identifiers)(Y/N): _ Y  READ BACK VALUES (Y/N): _ Y  CALLED BY: Cait Borden        RADIOLOGY & ADDITIONAL STUDIES:     EXAM:  ABDOMEN FLAT & UPRIGHT DECUB                          PROCEDURE DATE:  12/06/2017          INTERPRETATION:    Exam Date: 12/6/2017 1:10 PM  Clinical Information: Abdominal pain, constipation  Technique: Supine and upright views of the abdomen with comparison to   12/2/2016    Findings:    Markedly air distended small and large bowel with air in the rectum. Mild   amount stool left colon. No definite free air. Marked elevation of left   hemidiaphragm. Degenerative changes of visualized osseous structures.    Impression:    Diffuse air distended small and large bowel particularly the right colon   with air seen into the rectum      DAVID ALEXANDRE M.D., ATTENDING RADIOLOGIST  This document has been electronically signed. Dec  6 2017  2:39PM         EXAM:  CT ABDOMEN AND PELVIS OC IC                          PROCEDURE DATE:  12/05/2017          INTERPRETATION:  Clinical information: constipation with dilated colon on   XRAY and no BM despite multiple enemas    Comparison: 9/14    PROCEDURE:   CT of the Abdomen and Pelvis was performed with intravenous contrast.  90ml of Omnipaque 350 was injected intravenously. 10cc was discarded.    FINDINGS:    LOWER CHEST: Extensive bibasilar endobronchial mucoid impaction and   bibasilar airspace opacities, grossly stable from chest CT 11/29..   Emphysema. Extensive coronary calcifications.    ABDOMEN:  LIVER: Within normal limits  BILE DUCTS: Normal caliber  GALLBLADDER: No calcified gallstones. Normal caliber wall  PANCREAS: Within normal limits  SPLEEN: Within normal limits  ADRENALS: Within normal limits  KIDNEYS: Bilateral cysts and hypodensities to small to characterize.    PELVIS:  REPRODUCTIVE ORGANS: No pelvic masses  BLADDER: Within normal limits    PERITONEUM:No ascites, no free air.  BOWEL: NG tube noted is dilated colon up to 7 cm filled with air and   stool. There is a relative transition point in the proximal ascending   colon just distal to the flecture, no compelling evidence for obstructing   mass. Large left anterior diaphragmatic hernia containing a loop of the   transverse colon, however no evidence of obstruction or incarceration at   this time. The hernia is stable from prior. Enteric contrast is seen to   the distal small bowel. Reflux of enteric contrast in thedistal   esophagus.  VESSELS: Focal dissection right common femoral artery, stable. Extensive   arterial atherosclerotic changes.  RETROPERITONEUM: No retroperitoneal or pelvic adenopathy  ABDOMINAL WALL: Within normal limits  MUSCULOSKELETAL: Withinnormal limits    IMPRESSION:     Stool and air distended right and transverse colon. Relative transition   point distal to the splenic flecture without obvious obstructing mass.   Overall favor ileus/obstipation, however follow-up colonoscopy to exclude   underlying structures recommended.    Colon containing left anterior diaphragmatic hernia without acute   associated findings or obstruction at this time, grossly similar to 9/14.    Bibasilar pneumonia as described.    Other incidental commentsas above.        DELANEY SEGURA M.D., ATTENDING RADIOLOGIST  This document has been electronically signed. Dec  5 2017  9:32AM       EXAM:  CT ANGIO CHEST (W)AW IC                          PROCEDURE DATE:  11/30/2017          INTERPRETATION:  CLINICAL INFORMATION: Shortness of breath and tachycardia    COMPARISON: CTA chest 7/20/2016    PROCEDURE:   CTA of the Chest was performed with intravenous contrast.  90 ml of Omnipaque 350 was injected intravenously. 0 ml were discarded.  Sagittal and coronal reformats were performed as well as 3D   Reconstructions.      FINDINGS:    CHEST:     LUNGS AND LARGE AIRWAYS: Diffuse emphysema with severe involvement of the   lower lobes, similar to prior study. Hyperlucent expanded left lower   lobe. Bilateral lower lobe bronchi or mucoid impacted extending into   distal airways. Bilateral lower lobe infiltrates are present.   Subsegmental atelectasis in the lingula.  PLEURA: No pleural effusion.  VESSELS: No evidence for pulmonary embolism.  HEART: Heart size is normal.No pericardial effusion.  MEDIASTINUM AND TEZ: Mediastinal lymphadenopathy noted. For example, a   subcarinal lymph node measures 3.0 x 2.4 cm..  CHEST WALL AND LOWER NECK: Within normal limits.  VISUALIZED UPPER ABDOMEN: Stable left-sided more gagging hernia   containing unobstructed colon..  BONES: Within normal limits.    IMPRESSION:     No evidence for pulmonary embolism.    Bilateral lower lobe infiltrates and lower lobe mucoid impacted airways.        PAN THOMAS M.D., ATTENDING RADIOLOGIST  This document has been electronically signed. Nov 29 2017  6:30AM

## 2017-12-06 NOTE — PROGRESS NOTE ADULT - SUBJECTIVE AND OBJECTIVE BOX
CHIEF COMPLAINT/INTERVAL HISTORY:    Patient is a 67y old  Male who presents with a chief complaint of Came for rehab (29 Nov 2017 10:08)    SUBJECTIVE & OBJECTIVE: Pt seen and examined at bedside.     ROS: No chest pain, palpitations, SOB, lightheadedness, dizziness, headache, nausea/vomiting, fevers/chills, abdominal pain, dysuria or increased urinary frequency.      ICU Vital Signs Last 24 Hrs  T(C): 36.3 (06 Dec 2017 08:41), Max: 36.7 (06 Dec 2017 04:21)  T(F): 97.4 (06 Dec 2017 08:41), Max: 98 (06 Dec 2017 04:21)  HR: 99 (06 Dec 2017 08:41) (95 - 110)  BP: 140/90 (06 Dec 2017 10:02) (122/79 - 150/101)  RR: 18 (06 Dec 2017 08:41) (18 - 20)  SpO2: 93% (06 Dec 2017 14:21) (93% - 97%)    MEDICATIONS  (STANDING):  ALBUTerol/ipratropium for Nebulization 3 milliLiter(s) Nebulizer every 6 hours  ALPRAZolam 0.25 milliGRAM(s) Oral three times a day  aspirin enteric coated 81 milliGRAM(s) Oral daily  atorvastatin 80 milliGRAM(s) Oral at bedtime  clopidogrel Tablet 75 milliGRAM(s) Oral daily  dextrose 5%. 1000 milliLiter(s) (50 mL/Hr) IV Continuous <Continuous>  dextrose 50% Injectable 12.5 Gram(s) IV Push once  dextrose 50% Injectable 25 Gram(s) IV Push once  dextrose 50% Injectable 25 Gram(s) IV Push once  diltiazem    milliGRAM(s) Oral <User Schedule>  docusate sodium 100 milliGRAM(s) Oral two times a day  enoxaparin Injectable 40 milliGRAM(s) SubCutaneous daily  Gauifenesin/Dextromethorphan 1200/600mg 1 Tablet(s) 1 Tablet(s) Oral two times a day  insulin glargine Injectable (LANTUS) 5 Unit(s) SubCutaneous at bedtime  insulin lispro (HumaLOG) corrective regimen sliding scale   SubCutaneous Before meals and at bedtime  lactulose Syrup 30 Gram(s) Oral three times a day  levothyroxine 50 MICROGram(s) Oral daily  loratadine 10 milliGRAM(s) Oral daily  methylnaltrexone Injectable 12 milliGRAM(s) SubCutaneous every other day  pantoprazole    Tablet 40 milliGRAM(s) Oral before breakfast  predniSONE   Tablet 60 milliGRAM(s) Oral daily  senna 2 Tablet(s) Oral at bedtime  sodium chloride 0.9%. 500 milliLiter(s) (75 mL/Hr) IV Continuous <Continuous>    MEDICATIONS  (PRN):  acetylcysteine 20% Inhalation 1 milliLiter(s) Inhalation every 4 hours PRN poor cough clearance  ALBUTerol    0.083% 2.5 milliGRAM(s) Nebulizer every 4 hours PRN Shortness of Breath and/or Wheezing  bisacodyl Suppository 10 milliGRAM(s) Rectal daily PRN Constipation  dextrose Gel 1 Dose(s) Oral once PRN Blood Glucose LESS THAN 70 milliGRAM(s)/deciliter  glucagon  Injectable 1 milliGRAM(s) IntraMuscular once PRN Glucose LESS THAN 70 milligrams/deciliter  ibuprofen  Tablet 400 milliGRAM(s) Oral every 6 hours PRN severe pain  ondansetron Injectable 4 milliGRAM(s) IV Push every 8 hours PRN Nausea and/or Vomiting  polyethylene glycol 3350 17 Gram(s) Oral daily PRN Constipation      LABS:                        15.7   20.8  )-----------( 307      ( 06 Dec 2017 05:59 )             47.5     12-06    141  |  99  |  42.0<H>  ----------------------------<  180<H>  4.2   |  30.0<H>  |  0.77    Ca    8.2<L>      06 Dec 2017 05:59  Phos  3.8     12-06  Mg     3.4     12-06      CAPILLARY BLOOD GLUCOSE      POCT Blood Glucose.: 227 mg/dL (06 Dec 2017 16:14)  POCT Blood Glucose.: 144 mg/dL (06 Dec 2017 00:09)      RECENT CULTURES:      RADIOLOGY & ADDITIONAL TESTS:      PHYSICAL EXAM:    GENERAL: NAD, well-groomed, well-developed  HEAD:  Atraumatic, Normocephalic  EYES: EOMI, PERRLA, conjunctiva and sclera clear  ENMT: Moist mucous membranes  NECK: Supple, No JVD  NERVOUS SYSTEM:  Alert & Oriented X3, Motor Strength 5/5 B/L upper and lower extremities; DTRs 2+ intact and symmetric  CHEST/LUNG: Clear to auscultation bilaterally; No rales, rhonchi, wheezing, or rubs  HEART: Regular rate and rhythm; No murmurs, rubs, or gallops  ABDOMEN: Soft, Nontender, Nondistended; Bowel sounds present  EXTREMITIES:  2+ Peripheral Pulses, No clubbing, cyanosis, or edema        DVT/GI ppx  Discussed with pt @ bedside CHIEF COMPLAINT/INTERVAL HISTORY:    Patient is a 67y old  Male who presents with a chief complaint of Came for rehab (29 Nov 2017 10:08)    SUBJECTIVE & OBJECTIVE: Pt seen and examined at bedside. NO BM overnight. Passing gas. Denies abdominal pain but had two episodes of coffee ground emesis. NPO after midnight for OR tomorrow. Denies fever or chills, but worsening leukocytosis.     ROS: No chest pain, palpitations, SOB, lightheadedness, dizziness, headache, fevers/chills, abdominal pain, dysuria or increased urinary frequency.      ICU Vital Signs Last 24 Hrs  T(C): 36.3 (06 Dec 2017 08:41), Max: 36.7 (06 Dec 2017 04:21)  T(F): 97.4 (06 Dec 2017 08:41), Max: 98 (06 Dec 2017 04:21)  HR: 99 (06 Dec 2017 08:41) (95 - 110)  BP: 140/90 (06 Dec 2017 10:02) (122/79 - 150/101)  RR: 18 (06 Dec 2017 08:41) (18 - 20)  SpO2: 93% (06 Dec 2017 14:21) (93% - 97%)    MEDICATIONS  (STANDING):  ALBUTerol/ipratropium for Nebulization 3 milliLiter(s) Nebulizer every 6 hours  ALPRAZolam 0.25 milliGRAM(s) Oral three times a day  aspirin enteric coated 81 milliGRAM(s) Oral daily  atorvastatin 80 milliGRAM(s) Oral at bedtime  clopidogrel Tablet 75 milliGRAM(s) Oral daily  dextrose 5%. 1000 milliLiter(s) (50 mL/Hr) IV Continuous <Continuous>  dextrose 50% Injectable 12.5 Gram(s) IV Push once  dextrose 50% Injectable 25 Gram(s) IV Push once  dextrose 50% Injectable 25 Gram(s) IV Push once  diltiazem    milliGRAM(s) Oral <User Schedule>  docusate sodium 100 milliGRAM(s) Oral two times a day  enoxaparin Injectable 40 milliGRAM(s) SubCutaneous daily  Gauifenesin/Dextromethorphan 1200/600mg 1 Tablet(s) 1 Tablet(s) Oral two times a day  insulin glargine Injectable (LANTUS) 5 Unit(s) SubCutaneous at bedtime  insulin lispro (HumaLOG) corrective regimen sliding scale   SubCutaneous Before meals and at bedtime  lactulose Syrup 30 Gram(s) Oral three times a day  levothyroxine 50 MICROGram(s) Oral daily  loratadine 10 milliGRAM(s) Oral daily  methylnaltrexone Injectable 12 milliGRAM(s) SubCutaneous every other day  pantoprazole    Tablet 40 milliGRAM(s) Oral before breakfast  predniSONE   Tablet 60 milliGRAM(s) Oral daily  senna 2 Tablet(s) Oral at bedtime  sodium chloride 0.9%. 500 milliLiter(s) (75 mL/Hr) IV Continuous <Continuous>    MEDICATIONS  (PRN):  acetylcysteine 20% Inhalation 1 milliLiter(s) Inhalation every 4 hours PRN poor cough clearance  ALBUTerol    0.083% 2.5 milliGRAM(s) Nebulizer every 4 hours PRN Shortness of Breath and/or Wheezing  bisacodyl Suppository 10 milliGRAM(s) Rectal daily PRN Constipation  dextrose Gel 1 Dose(s) Oral once PRN Blood Glucose LESS THAN 70 milliGRAM(s)/deciliter  glucagon  Injectable 1 milliGRAM(s) IntraMuscular once PRN Glucose LESS THAN 70 milligrams/deciliter  ibuprofen  Tablet 400 milliGRAM(s) Oral every 6 hours PRN severe pain  ondansetron Injectable 4 milliGRAM(s) IV Push every 8 hours PRN Nausea and/or Vomiting  polyethylene glycol 3350 17 Gram(s) Oral daily PRN Constipation      LABS:                        15.7   20.8  )-----------( 307      ( 06 Dec 2017 05:59 )             47.5     12-06    141  |  99  |  42.0<H>  ----------------------------<  180<H>  4.2   |  30.0<H>  |  0.77    Ca    8.2<L>      06 Dec 2017 05:59  Phos  3.8     12-06  Mg     3.4     12-06      CAPILLARY BLOOD GLUCOSE      POCT Blood Glucose.: 227 mg/dL (06 Dec 2017 16:14)  POCT Blood Glucose.: 144 mg/dL (06 Dec 2017 00:09)        PHYSICAL EXAM:    GENERAL: elderly male, laying in bed, chronically ill appearing  HEAD:  Atraumatic, Normocephalic  EYES: EOMI, PERRLA, conjunctiva and sclera clear  ENMT: Moist mucous membranes  NECK: Supple, No JVD  NERVOUS SYSTEM:  Alert & Oriented X3   CHEST/LUNG: diminished breath sounds  HEART: Regular rate and rhythm; audible S1/S2  ABDOMEN: Soft, Nontender, distended, hypoactive breath sounds, no rebound, no guarding  EXTREMITIES:  No edema

## 2017-12-06 NOTE — PROGRESS NOTE ADULT - PROBLEM SELECTOR PLAN 1
refractory thus far and I wonder if the diaphragmatic might be contributory. Will get FUA and review cat scan with radiology.

## 2017-12-06 NOTE — PROGRESS NOTE ADULT - SUBJECTIVE AND OBJECTIVE BOX
Pt seen and examined f/u constipation  He has multiple enemas and lactulose as well as dose of mag citrate with little, if any BM. Notes nausea and dry heaves with clear liquids. Denies abdominal pain.    REVIEW OF SYSTEMS:    CONSTITUTIONAL: No fever, weight loss, or fatigue  EYES: No eye pain, visual disturbances, or discharge  ENMT:  No difficulty hearing, tinnitus, vertigo; No sinus or throat pain  RESPIRATORY: No cough, wheezing, chills or hemoptysis; No shortness of breath  CARDIOVASCULAR: No chest pain, palpitations, dizziness, or leg swelling  GASTROINTESTINAL: as above    MEDICATIONS:  MEDICATIONS  (STANDING):  ALBUTerol/ipratropium for Nebulization 3 milliLiter(s) Nebulizer every 6 hours  ALPRAZolam 0.25 milliGRAM(s) Oral three times a day  aspirin enteric coated 81 milliGRAM(s) Oral daily  atorvastatin 80 milliGRAM(s) Oral at bedtime  clopidogrel Tablet 75 milliGRAM(s) Oral daily  dextrose 5%. 1000 milliLiter(s) (50 mL/Hr) IV Continuous <Continuous>  dextrose 50% Injectable 12.5 Gram(s) IV Push once  dextrose 50% Injectable 25 Gram(s) IV Push once  dextrose 50% Injectable 25 Gram(s) IV Push once  diltiazem    milliGRAM(s) Oral <User Schedule>  docusate sodium 100 milliGRAM(s) Oral two times a day  enoxaparin Injectable 40 milliGRAM(s) SubCutaneous daily  Gauifenesin/Dextromethorphan 1200/600mg 1 Tablet(s) 1 Tablet(s) Oral two times a day  insulin glargine Injectable (LANTUS) 5 Unit(s) SubCutaneous at bedtime  insulin lispro (HumaLOG) corrective regimen sliding scale   SubCutaneous Before meals and at bedtime  lactulose Syrup 30 Gram(s) Oral three times a day  levothyroxine 50 MICROGram(s) Oral daily  loratadine 10 milliGRAM(s) Oral daily  pantoprazole    Tablet 40 milliGRAM(s) Oral before breakfast  predniSONE   Tablet 60 milliGRAM(s) Oral daily  senna 2 Tablet(s) Oral at bedtime    MEDICATIONS  (PRN):  acetylcysteine 20% Inhalation 1 milliLiter(s) Inhalation every 4 hours PRN poor cough clearance  ALBUTerol    0.083% 2.5 milliGRAM(s) Nebulizer every 4 hours PRN Shortness of Breath and/or Wheezing  bisacodyl Suppository 10 milliGRAM(s) Rectal daily PRN Constipation  dextrose Gel 1 Dose(s) Oral once PRN Blood Glucose LESS THAN 70 milliGRAM(s)/deciliter  glucagon  Injectable 1 milliGRAM(s) IntraMuscular once PRN Glucose LESS THAN 70 milligrams/deciliter  ibuprofen  Tablet 400 milliGRAM(s) Oral every 6 hours PRN severe pain  ondansetron Injectable 4 milliGRAM(s) IV Push every 8 hours PRN Nausea and/or Vomiting  polyethylene glycol 3350 17 Gram(s) Oral daily PRN Constipation      Allergies    codeine (Unknown)  penicillin (Unknown)    Intolerances    Symbicort (Short breath)      Vital Signs Last 24 Hrs  T(C): 36.7 (06 Dec 2017 04:21), Max: 37 (05 Dec 2017 16:28)  T(F): 98 (06 Dec 2017 04:21), Max: 98.6 (05 Dec 2017 16:28)  HR: 95 (06 Dec 2017 04:21) (95 - 137)  BP: 122/79 (06 Dec 2017 04:21) (122/79 - 156/91)  BP(mean): --  RR: 19 (06 Dec 2017 04:21) (18 - 20)  SpO2: 97% (06 Dec 2017 04:21) (97% - 97%)    12-05 @ 07:01  -  12-06 @ 07:00  --------------------------------------------------------  IN: 0 mL / OUT: 1150 mL / NET: -1150 mL        PHYSICAL EXAM:    General: Well developed; well nourished; in no acute distress, appears tired and somnolent  HEENT: MMM, conjunctiva and sclera clear  Gastrointestinal:Abdomen: Soft non-tender but slightly distended and tympanitic with diminished bowel sounds.  Extremities: no cyanosis, clubbing or edema.  Skin: Warm and dry. No obvious rash    LABS:      CBC Full  -  ( 06 Dec 2017 05:59 )  WBC Count : 20.8 K/uL  Hemoglobin : 15.7 g/dL  Hematocrit : 47.5 %  Platelet Count - Automated : 307 K/uL  Mean Cell Volume : 85.9 fl  Mean Cell Hemoglobin : 28.4 pg  Mean Cell Hemoglobin Concentration : 33.1 g/dL  Auto Neutrophil # : 18.4 K/uL  Auto Lymphocyte # : 0.8 K/uL  Auto Monocyte # : 1.6 K/uL  Auto Eosinophil # : 0.0 K/uL  Auto Basophil # : 0.0 K/uL  Auto Neutrophil % : 88.2 %  Auto Lymphocyte % : 3.6 %  Auto Monocyte % : 7.7 %  Auto Eosinophil % : 0.1 %  Auto Basophil % : x    12-06    141  |  99  |  42.0<H>  ----------------------------<  180<H>  4.2   |  30.0<H>  |  0.77    Ca    8.2<L>      06 Dec 2017 05:59  Phos  3.8     12-06  Mg     4.1     12-06

## 2017-12-06 NOTE — CONSULT NOTE ADULT - PROBLEM SELECTOR RECOMMENDATION 3
bowel obstruction  Plan for surgery per surgical team  If develops diarrhea check Stool for C diff
s/p enema. Monitor.

## 2017-12-06 NOTE — PROGRESS NOTE ADULT - SUBJECTIVE AND OBJECTIVE BOX
INTERVAL HPI/OVERNIGHT EVENTS/SUBJECTIVE:  67y M with LBO. Pt seen and examined at bedside this AM. Had multiple enemas, mag citrate, lactulose, still no bowel movements. Patient complaining of vomiting and nausea, with abdominal pain. Whitman placed yesterday. Denies cp, sob, f/c, numbness/tingling, diarrhea.     ICU Vital Signs Last 24 Hrs  T(C): 36.3 (06 Dec 2017 08:41), Max: 37 (05 Dec 2017 16:28)  T(F): 97.4 (06 Dec 2017 08:41), Max: 98.6 (05 Dec 2017 16:28)  HR: 99 (06 Dec 2017 08:41) (95 - 116)  BP: 140/90 (06 Dec 2017 10:02) (122/79 - 150/101)  BP(mean): --  ABP: --  ABP(mean): --  RR: 18 (06 Dec 2017 08:41) (18 - 20)  SpO2: 97% (06 Dec 2017 04:21) (97% - 97%)      I&O's Detail    05 Dec 2017 07:01  -  06 Dec 2017 07:00  --------------------------------------------------------  IN:  Total IN: 0 mL    OUT:    Indwelling Catheter - Urethral: 1150 mL  Total OUT: 1150 mL    Total NET: -1150 mL      06 Dec 2017 07:01  -  06 Dec 2017 13:39  --------------------------------------------------------  IN:  Total IN: 0 mL    OUT:    Indwelling Catheter - Urethral: 550 mL  Total OUT: 550 mL    Total NET: -550 mL      MEDICATIONS  (STANDING):  ALBUTerol/ipratropium for Nebulization 3 milliLiter(s) Nebulizer every 6 hours  ALPRAZolam 0.25 milliGRAM(s) Oral three times a day  aspirin enteric coated 81 milliGRAM(s) Oral daily  atorvastatin 80 milliGRAM(s) Oral at bedtime  clopidogrel Tablet 75 milliGRAM(s) Oral daily  dextrose 5%. 1000 milliLiter(s) (50 mL/Hr) IV Continuous <Continuous>  dextrose 50% Injectable 12.5 Gram(s) IV Push once  dextrose 50% Injectable 25 Gram(s) IV Push once  dextrose 50% Injectable 25 Gram(s) IV Push once  diltiazem    milliGRAM(s) Oral <User Schedule>  docusate sodium 100 milliGRAM(s) Oral two times a day  enoxaparin Injectable 40 milliGRAM(s) SubCutaneous daily  Gauifenesin/Dextromethorphan 1200/600mg 1 Tablet(s) 1 Tablet(s) Oral two times a day  insulin glargine Injectable (LANTUS) 5 Unit(s) SubCutaneous at bedtime  insulin lispro (HumaLOG) corrective regimen sliding scale   SubCutaneous Before meals and at bedtime  lactulose Syrup 30 Gram(s) Oral three times a day  levothyroxine 50 MICROGram(s) Oral daily  loratadine 10 milliGRAM(s) Oral daily  methylnaltrexone Injectable 12 milliGRAM(s) SubCutaneous every other day  pantoprazole    Tablet 40 milliGRAM(s) Oral before breakfast  predniSONE   Tablet 60 milliGRAM(s) Oral daily  senna 2 Tablet(s) Oral at bedtime  sodium chloride 0.9%. 500 milliLiter(s) (75 mL/Hr) IV Continuous <Continuous>    MEDICATIONS  (PRN):  acetylcysteine 20% Inhalation 1 milliLiter(s) Inhalation every 4 hours PRN poor cough clearance  ALBUTerol    0.083% 2.5 milliGRAM(s) Nebulizer every 4 hours PRN Shortness of Breath and/or Wheezing  bisacodyl Suppository 10 milliGRAM(s) Rectal daily PRN Constipation  dextrose Gel 1 Dose(s) Oral once PRN Blood Glucose LESS THAN 70 milliGRAM(s)/deciliter  glucagon  Injectable 1 milliGRAM(s) IntraMuscular once PRN Glucose LESS THAN 70 milligrams/deciliter  ibuprofen  Tablet 400 milliGRAM(s) Oral every 6 hours PRN severe pain  ondansetron Injectable 4 milliGRAM(s) IV Push every 8 hours PRN Nausea and/or Vomiting  polyethylene glycol 3350 17 Gram(s) Oral daily PRN Constipation    MISC:     PHYSICAL EXAM:  Gen: AAO x3, NAD  Pulm:  Symmetrical chest rise, non-labored, no accessory muscle use.   CV: RRR, normal s1/s2.   Abd: Soft, mildly TTP, distended, -R/-G   Ext: no calf TTP or peripheral edema b/l.   Neuro: No focal neurological deficits      LABS:  CBC Full  -  ( 06 Dec 2017 05:59 )  WBC Count : 20.8 K/uL  Hemoglobin : 15.7 g/dL  Hematocrit : 47.5 %  Platelet Count - Automated : 307 K/uL  Mean Cell Volume : 85.9 fl  Mean Cell Hemoglobin : 28.4 pg  Mean Cell Hemoglobin Concentration : 33.1 g/dL  Auto Neutrophil # : 18.4 K/uL  Auto Lymphocyte # : 0.8 K/uL  Auto Monocyte # : 1.6 K/uL  Auto Eosinophil # : 0.0 K/uL  Auto Basophil # : 0.0 K/uL  Auto Neutrophil % : 88.2 %  Auto Lymphocyte % : 3.6 %  Auto Monocyte % : 7.7 %  Auto Eosinophil % : 0.1 %  Auto Basophil % : x    12-06    141  |  99  |  42.0<H>  ----------------------------<  180<H>  4.2   |  30.0<H>  |  0.77    Ca    8.2<L>      06 Dec 2017 05:59  Phos  3.8     12-06  Mg     4.1     12-06      RECENT CULTURES:  11-30 .Sputum Sputum XXXX   Greater than 10 Squamous epithelial cells per low power field.   Specimen unacceptable for culture. Please recollect and resubmit a new  specimen for culture, if necessary.    RADIOLOGY & ADDITIONAL STUDIES:  CT a/p 12/5 - Stool and air distended right and transverse colon. Relative transition   point distal to the splenic flecture without obvious obstructing mass.   Overall favor ileus/obstipation, however follow-up colonoscopy to exclude   underlying structures recommended.     Colon containing left anterior diaphragmatic hernia without acute associated   findings or obstruction at this time, grossly similar to 9/14.     Bibasilar pneumonia as described.       ASSESSMENT/PLAN:  67yMale with LBO. Increased WBC today (20.8 from 16.3). Will need to go to OR.   -Attending discussed at length with patient and wife that pt will need surgery  -f/u I/O - whitman   -pain control prn  -dvt ppx  -medical care per primary team  -will discuss further plan with attending

## 2017-12-06 NOTE — CONSULT NOTE ADULT - SUBJECTIVE AND OBJECTIVE BOX
[facility]  INFECTIOUS DISEASES AND INTERNAL MEDICINE at [place]  =======================================================  Kevin Manriquez MD  Diplomates American Board of Internal Medicine and Infectious Diseases  =======================================================      MRN-[id number]  FRANDY BAUTISTA     CC: Patient is a 67y old  Male who presents with a chief complaint of Came for rehab (29 Nov 2017 10:08)    66y/o  Male here with COPD exacerbation, Pneumonia. Patient complete 7 days of IV Levofloxacin. Was on IV steroids now on oral steroids for COPD exacerbation. Noted to have increasing leukocytosis. Patient also being worked up for bowel obstruction. ID consulted for evaluation of leukocytosis.       Past Medical & Surgical Hx:  Hypothyroidism  Hydrocele  Renal calculi  Diabetes mellitus  Hypertension  CVA (cerebral vascular accident)  Chronic obstructive pulmonary disease, unspecified COPD type  History of lung surgery      Social Hx:  Former smoker      FAMILY HISTORY:  No pertinent family history in first degree relatives      Allergies  codeine (Unknown)  penicillin (Unknown)  Intolerances  Symbicort (Short breath)      ANTIBIOTICS:   None       REVIEW OF SYSTEMS:  CONSTITUTIONAL:  No Fever or chills  HEENT:  No diplopia or blurred vision.  No earache, sore throat or runny nose.  CARDIOVASCULAR:  No pressure, squeezing, strangling, tightness, heaviness or aching about the chest, neck, axilla or epigastrium.  RESPIRATORY:  No cough, shortness of breath  GASTROINTESTINAL:  + Constipation  GENITOURINARY: + Roy  MUSCULOSKELETAL:  no joint aches, no muscle pain  SKIN:  No change in skin, hair or nails.  NEUROLOGIC:  No paresthesias, fasciculations,  PSYCHIATRIC:  No disorder of thought or mood.  ENDOCRINE:  No heat or cold intolerance  HEMATOLOGICAL:  No easy bruising or bleeding.         Physical Exam:  Vital Signs Last 24 Hrs  T(C): 36.3 (06 Dec 2017 08:41), Max: 36.7 (06 Dec 2017 04:21)  T(F): 97.4 (06 Dec 2017 08:41), Max: 98 (06 Dec 2017 04:21)  HR: 99 (06 Dec 2017 08:41) (95 - 110)  BP: 140/90 (06 Dec 2017 10:02) (122/79 - 150/101)  RR: 18 (06 Dec 2017 08:41) (18 - 20)  SpO2: 93% (06 Dec 2017 14:21) (93% - 97%)      GEN: NAD, pleasant  HEENT: normocephalic and atraumatic. EOMI. PERRL.    NECK: Supple.   LUNGS: Clear to auscultation.  HEART: Regular rate and rhythm   ABDOMEN: Soft, nontender, and nondistended.  Positive bowel sounds.    : + Roy  EXTREMITIES: Without any  edema.  MSK: No joint swelling  NEUROLOGIC: Alert and oriented   PSYCHIATRIC: Appropriate affect .        Labs:  12-06    141  |  99  |  42.0<H>  ----------------------------<  180<H>  4.2   |  30.0<H>  |  0.77    Ca    8.2<L>      06 Dec 2017 05:59  Phos  3.8     12-06  Mg     3.4     12-06                            15.7   20.8  )-----------( 307      ( 06 Dec 2017 05:59 )             47.5                       RECENT CULTURES:  11-30 @ 08:52 .Sputum Sputum     Specimen unacceptable for culture. Please recollect and resubmit a new  specimen for culture, if necessary.  Greater than 10 Squamous epithelial cells per low power field.      11-29 @ 10:34      [facility]

## 2017-12-06 NOTE — PROGRESS NOTE ADULT - ASSESSMENT
Severe COPD  Chronic hypoxia, home 02 dep  CT without PE but with mucus plugging and adenopathy and basilar infiltrates  Episodes of desaturation noted, refused ABG, currently in no distress , oxygenating well  LBO    Plan:  Encourage cough and mobilization of secretions  Drug nebs  Prednisone taper as tolerates  F/u chest CT as outpt  Mucomyst as needed  Completed Abx  Mobilize as tolerated  Prognosis overall extremely guarded  GI/DVT prophylaxis  Solumedrol and drug nebs on call to OR  BiPAP should be available if needed  Surgery planned under local anesthesia given very severe, O2 dependent COPD which places him at increased risk for respiratory complications including but not limited to worsening respiratory failure or possible intubation    Patient, wife, and surgery team understand these risks

## 2017-12-07 LAB
ANION GAP SERPL CALC-SCNC: 9 MMOL/L — SIGNIFICANT CHANGE UP (ref 5–17)
APTT BLD: 24.6 SEC — LOW (ref 27.5–37.4)
BASOPHILS # BLD AUTO: 0 K/UL — SIGNIFICANT CHANGE UP (ref 0–0.2)
BASOPHILS NFR BLD AUTO: 0.1 % — SIGNIFICANT CHANGE UP (ref 0–2)
BLD GP AB SCN SERPL QL: SIGNIFICANT CHANGE UP
BUN SERPL-MCNC: 40 MG/DL — HIGH (ref 8–20)
CALCIUM SERPL-MCNC: 7.5 MG/DL — LOW (ref 8.6–10.2)
CHLORIDE SERPL-SCNC: 96 MMOL/L — LOW (ref 98–107)
CO2 SERPL-SCNC: 32 MMOL/L — HIGH (ref 22–29)
CREAT SERPL-MCNC: 0.66 MG/DL — SIGNIFICANT CHANGE UP (ref 0.5–1.3)
EOSINOPHIL # BLD AUTO: 0 K/UL — SIGNIFICANT CHANGE UP (ref 0–0.5)
EOSINOPHIL NFR BLD AUTO: 0.4 % — SIGNIFICANT CHANGE UP (ref 0–5)
GLUCOSE BLDC GLUCOMTR-MCNC: 129 MG/DL — HIGH (ref 70–99)
GLUCOSE BLDC GLUCOMTR-MCNC: 161 MG/DL — HIGH (ref 70–99)
GLUCOSE BLDC GLUCOMTR-MCNC: 185 MG/DL — HIGH (ref 70–99)
GLUCOSE BLDC GLUCOMTR-MCNC: 186 MG/DL — HIGH (ref 70–99)
GLUCOSE SERPL-MCNC: 152 MG/DL — HIGH (ref 70–115)
HCT VFR BLD CALC: 41.8 % — LOW (ref 42–52)
HGB BLD-MCNC: 13.8 G/DL — LOW (ref 14–18)
INR BLD: 1.11 RATIO — SIGNIFICANT CHANGE UP (ref 0.88–1.16)
LYMPHOCYTES # BLD AUTO: 1.1 K/UL — SIGNIFICANT CHANGE UP (ref 1–4.8)
LYMPHOCYTES # BLD AUTO: 8.2 % — LOW (ref 20–55)
MAGNESIUM SERPL-MCNC: 2.9 MG/DL — HIGH (ref 1.6–2.6)
MCHC RBC-ENTMCNC: 28.2 PG — SIGNIFICANT CHANGE UP (ref 27–31)
MCHC RBC-ENTMCNC: 33 G/DL — SIGNIFICANT CHANGE UP (ref 32–36)
MCV RBC AUTO: 85.5 FL — SIGNIFICANT CHANGE UP (ref 80–94)
MONOCYTES # BLD AUTO: 1.4 K/UL — HIGH (ref 0–0.8)
MONOCYTES NFR BLD AUTO: 9.9 % — SIGNIFICANT CHANGE UP (ref 3–10)
NEUTROPHILS # BLD AUTO: 11.3 K/UL — HIGH (ref 1.8–8)
NEUTROPHILS NFR BLD AUTO: 81.1 % — HIGH (ref 37–73)
PHOSPHATE SERPL-MCNC: 3.5 MG/DL — SIGNIFICANT CHANGE UP (ref 2.4–4.7)
PLATELET # BLD AUTO: 292 K/UL — SIGNIFICANT CHANGE UP (ref 150–400)
POTASSIUM SERPL-MCNC: 4.2 MMOL/L — SIGNIFICANT CHANGE UP (ref 3.5–5.3)
POTASSIUM SERPL-SCNC: 4.2 MMOL/L — SIGNIFICANT CHANGE UP (ref 3.5–5.3)
PROTHROM AB SERPL-ACNC: 12.2 SEC — SIGNIFICANT CHANGE UP (ref 9.8–12.7)
RBC # BLD: 4.89 M/UL — SIGNIFICANT CHANGE UP (ref 4.6–6.2)
RBC # FLD: 14.6 % — SIGNIFICANT CHANGE UP (ref 11–15.6)
SODIUM SERPL-SCNC: 137 MMOL/L — SIGNIFICANT CHANGE UP (ref 135–145)
TYPE + AB SCN PNL BLD: SIGNIFICANT CHANGE UP
WBC # BLD: 13.9 K/UL — HIGH (ref 4.8–10.8)
WBC # FLD AUTO: 13.9 K/UL — HIGH (ref 4.8–10.8)

## 2017-12-07 PROCEDURE — 44310 ILEOSTOMY/JEJUNOSTOMY: CPT | Mod: 80

## 2017-12-07 PROCEDURE — 44310 ILEOSTOMY/JEJUNOSTOMY: CPT

## 2017-12-07 PROCEDURE — 99233 SBSQ HOSP IP/OBS HIGH 50: CPT

## 2017-12-07 PROCEDURE — 74000: CPT | Mod: 26

## 2017-12-07 PROCEDURE — 99232 SBSQ HOSP IP/OBS MODERATE 35: CPT

## 2017-12-07 RX ORDER — FENTANYL CITRATE 50 UG/ML
25 INJECTION INTRAVENOUS
Qty: 0 | Refills: 0 | Status: DISCONTINUED | OUTPATIENT
Start: 2017-12-07 | End: 2017-12-07

## 2017-12-07 RX ORDER — SODIUM CHLORIDE 9 MG/ML
1000 INJECTION, SOLUTION INTRAVENOUS
Qty: 0 | Refills: 0 | Status: DISCONTINUED | OUTPATIENT
Start: 2017-12-07 | End: 2017-12-07

## 2017-12-07 RX ORDER — CLOPIDOGREL BISULFATE 75 MG/1
75 TABLET, FILM COATED ORAL DAILY
Qty: 0 | Refills: 0 | Status: DISCONTINUED | OUTPATIENT
Start: 2017-12-07 | End: 2018-01-13

## 2017-12-07 RX ORDER — KETOROLAC TROMETHAMINE 30 MG/ML
15 SYRINGE (ML) INJECTION EVERY 8 HOURS
Qty: 0 | Refills: 0 | Status: DISCONTINUED | OUTPATIENT
Start: 2017-12-07 | End: 2017-12-09

## 2017-12-07 RX ORDER — CIPROFLOXACIN LACTATE 400MG/40ML
400 VIAL (ML) INTRAVENOUS ONCE
Qty: 0 | Refills: 0 | Status: DISCONTINUED | OUTPATIENT
Start: 2017-12-07 | End: 2017-12-10

## 2017-12-07 RX ORDER — ONDANSETRON 8 MG/1
4 TABLET, FILM COATED ORAL ONCE
Qty: 0 | Refills: 0 | Status: DISCONTINUED | OUTPATIENT
Start: 2017-12-07 | End: 2017-12-07

## 2017-12-07 RX ORDER — ENOXAPARIN SODIUM 100 MG/ML
40 INJECTION SUBCUTANEOUS DAILY
Qty: 0 | Refills: 0 | Status: DISCONTINUED | OUTPATIENT
Start: 2017-12-07 | End: 2018-01-02

## 2017-12-07 RX ORDER — ASPIRIN/CALCIUM CARB/MAGNESIUM 324 MG
81 TABLET ORAL DAILY
Qty: 0 | Refills: 0 | Status: DISCONTINUED | OUTPATIENT
Start: 2017-12-07 | End: 2017-12-24

## 2017-12-07 RX ORDER — IPRATROPIUM/ALBUTEROL SULFATE 18-103MCG
3 AEROSOL WITH ADAPTER (GRAM) INHALATION ONCE
Qty: 0 | Refills: 0 | Status: DISCONTINUED | OUTPATIENT
Start: 2017-12-07 | End: 2018-01-16

## 2017-12-07 RX ADMIN — Medication 1: at 22:05

## 2017-12-07 RX ADMIN — ATORVASTATIN CALCIUM 80 MILLIGRAM(S): 80 TABLET, FILM COATED ORAL at 22:02

## 2017-12-07 RX ADMIN — Medication 3 MILLILITER(S): at 21:34

## 2017-12-07 RX ADMIN — Medication 15 MILLIGRAM(S): at 22:30

## 2017-12-07 RX ADMIN — Medication 3 MILLILITER(S): at 11:18

## 2017-12-07 RX ADMIN — Medication 0.25 MILLIGRAM(S): at 22:02

## 2017-12-07 RX ADMIN — Medication 15 MILLIGRAM(S): at 22:02

## 2017-12-07 RX ADMIN — SENNA PLUS 2 TABLET(S): 8.6 TABLET ORAL at 22:02

## 2017-12-07 RX ADMIN — INSULIN GLARGINE 5 UNIT(S): 100 INJECTION, SOLUTION SUBCUTANEOUS at 22:02

## 2017-12-07 RX ADMIN — LACTULOSE 30 GRAM(S): 10 SOLUTION ORAL at 22:02

## 2017-12-07 RX ADMIN — Medication 1: at 17:27

## 2017-12-07 NOTE — PROGRESS NOTE ADULT - ASSESSMENT
67y Male s/p post diverting ilieostomy 2/2 large bowel obstruction.   -Continue diabetic clear liquid diet  -continue seral abdominal exams and pain control prn  -Continue home meds  -KUB obtained: Dilated bowel loops, Malencot drain identifiable.  -dvt ppx: lovenox, Continue home ASA, Plavix  -medical care per primary team

## 2017-12-07 NOTE — PROGRESS NOTE ADULT - ASSESSMENT
67y Male with resolving LBO. Leukocytosis resolving. Will still need to go to OR. Patient poor surgical candidate with many comorbidities. Diverting ileostomy to be done under local anesthesia 2/2 high pre-operative risk. Patient agreeable to surgical plan.  -NPO w/ IVF.  -continue seral abdominal exams and pain control prn  -dvt ppx  -medical care per primary team  -will discuss further plan with attending

## 2017-12-07 NOTE — PROGRESS NOTE ADULT - ASSESSMENT
Patient is a 67 year old male with PMH of COPD stage 4 s/p right lung reduction in 2010 (on home oxygen 4L (sat 92-93%), HTN, CVA on 2014, DM, hypothyroidism, hydrocele, and nephrolithiasis who is admitted for a COPD exacerbation. Patient has been weaned off ventimask to 4 liters nasal cannula and respiratory status is stable. CTA of the chest was negative for PE but showed bilateral lower lobe infiltrates with mucous plugging for which he was started on Levaquin.  Respiratory status stable and steroids tapered to PO today. Hospital course complicated by constipation, which has not improved despite aggressive bowel regimen, laxatives and enemas. XRAY with large stool burden. Surgery consulted.    Plan:  1. Acute on chronic hypoxic respiratory failure - stable  -secondary to COPD exacerbation  -on home oxygen (4 liters)  -respiratory status stable on 4 liters  -bicarb elevated likely due to metabolic compensation for resp acidosis although patient refuses blood gases    -continue bronchodilators and prednisone taper as per pulm  -continue Mucomyst PRN  -completed course of Levaquin   -OOB to chair; PT evaluation    -CM on board to arrange for Home Oxygen   -Overall prognosis very guarded - palliative care consult appreciated. Patient not ready to address advanced directives.    2. CAP  -imaging with infiltrates and mucous plugging  -productive cough with tan mucous improving  -Procal very minimally elevated  -complete course of Levaquin on 12/5   -leukocytosis improving; ID evaluation appreciated  -will observe all antibiotics    3. Constipation  -history of chronic constipation likely from hypothyroidism + immobility  -acute onset possibly related to motility disorder related to large diaphragmatic hernia  -no BM despite stool softeners, laxatives, and enemas  -no BM overnight despite mag citrate and fleet enemas  -AXR with large stool burden.    -CT abdomen reviewed - ileus vs obstipation.   -KUB with diffuse dilation  -GI unable to proceed with flexible sigmoidoscopy due to inability to prep  -s/p ileostomy today under local anesthesia    -clear liquid diet  -GI and surgery recommendations appreciated    4. Hypertension  -BP acceptable  -continue cardizem    5. Hyperlipidemia   -continue statin    6. Hyperglycemia  -HbA1c 6.1 - pre-diabetic   -likely steroid induced  -continue sliding scale and lantus    7. Scrotal edema  -chronic  -patient following with  as outpatient  -recommend scrotal elevation  -CT without bladder distention  -Will maintain whitman but will attempt TOV in AM    8. Right femoral artery dissection  -incidental finding on CT  -no intervention as per vascular    9. Hypermagnesemia - improving  -after receiving mag citrate on 12/5  -magnesium trending down s/p IV lasix and hydration    10. DVT Prophylaxis - Lovenox held today given OR procedure.     Plan discussed with patient, wife, RN

## 2017-12-07 NOTE — PROGRESS NOTE ADULT - PROBLEM SELECTOR PLAN 1
with transition zone in area of spleenic flexure on cat scan but diffuse dilation on KUB yesterday. Possible stricture or mass in the area but not obvious on cat scan. Possible motility disorder related to bowel in the LUQ stuck up in a large diaphragmatic hernia. Unable ot perform colonoscopy as unable to prep patient and he has not responded completely to laxatives and enemas which he barely tolerated. Agree with need for surgery.

## 2017-12-07 NOTE — PROGRESS NOTE ADULT - ASSESSMENT
Severe COPD  Chronic hypoxia, home 02 dep  CT without PE but with mucus plugging and adenopathy and basilar infiltrates  Episodes of desaturation noted, refused ABG, currently in no distress , oxygenating well  LBO    Plan:  Encourage cough and mobilization of secretions  Drug nebs  Prednisone taper as tolerates  F/u chest CT as outpt  Mucomyst as needed  Completed Abx  Mobilize as tolerated  Prognosis overall extremely guarded  GI/DVT prophylaxis  Solumedrol and drug nebs on call to OR  BiPAP should be available if needed  Surgery planned under local anesthesia given very severe, O2 dependent COPD which places him at increased risk for respiratory complications including but not limited to worsening respiratory failure or possible intubation    Reviewed risks again with patient, wife, and anesthesia team; they understand these risks  He understands that GA may be required and that decision will be made intra-op

## 2017-12-07 NOTE — PROGRESS NOTE ADULT - SUBJECTIVE AND OBJECTIVE BOX
INTERVAL HPI/OVERNIGHT EVENTS: No acute events overnight    SUBJECTIVE: Denies pain this AM. Afebrile. NPO at this time for diverting ileostomy under local anesthesia today. Ambulating without assistance. +Urination. + Flatus. - BM. Denies F/C/N/V/CP/SOB.       MEDICATIONS  (STANDING):  ALBUTerol/ipratropium for Nebulization 3 milliLiter(s) Nebulizer every 6 hours  ALBUTerol/ipratropium for Nebulization. 3 milliLiter(s) Nebulizer once  ALPRAZolam 0.25 milliGRAM(s) Oral three times a day  atorvastatin 80 milliGRAM(s) Oral at bedtime  ciprofloxacin   IVPB 400 milliGRAM(s) IV Intermittent once  dextrose 5%. 1000 milliLiter(s) (50 mL/Hr) IV Continuous <Continuous>  dextrose 50% Injectable 12.5 Gram(s) IV Push once  dextrose 50% Injectable 25 Gram(s) IV Push once  dextrose 50% Injectable 25 Gram(s) IV Push once  diltiazem    milliGRAM(s) Oral <User Schedule>  docusate sodium 100 milliGRAM(s) Oral two times a day  Gauifenesin/Dextromethorphan 1200/600mg 1 Tablet(s) 1 Tablet(s) Oral two times a day  insulin glargine Injectable (LANTUS) 5 Unit(s) SubCutaneous at bedtime  insulin lispro (HumaLOG) corrective regimen sliding scale   SubCutaneous Before meals and at bedtime  lactulose Syrup 30 Gram(s) Oral three times a day  levothyroxine 50 MICROGram(s) Oral daily  loratadine 10 milliGRAM(s) Oral daily  methylnaltrexone Injectable 12 milliGRAM(s) SubCutaneous every other day  pantoprazole    Tablet 40 milliGRAM(s) Oral before breakfast  predniSONE   Tablet 60 milliGRAM(s) Oral daily  senna 2 Tablet(s) Oral at bedtime    MEDICATIONS  (PRN):  acetylcysteine 20% Inhalation 1 milliLiter(s) Inhalation every 4 hours PRN poor cough clearance  ALBUTerol    0.083% 2.5 milliGRAM(s) Nebulizer every 4 hours PRN Shortness of Breath and/or Wheezing  bisacodyl Suppository 10 milliGRAM(s) Rectal daily PRN Constipation  dextrose Gel 1 Dose(s) Oral once PRN Blood Glucose LESS THAN 70 milliGRAM(s)/deciliter  glucagon  Injectable 1 milliGRAM(s) IntraMuscular once PRN Glucose LESS THAN 70 milligrams/deciliter  ibuprofen  Tablet 400 milliGRAM(s) Oral every 6 hours PRN severe pain  ondansetron Injectable 4 milliGRAM(s) IV Push every 8 hours PRN Nausea and/or Vomiting  polyethylene glycol 3350 17 Gram(s) Oral daily PRN Constipation      Vital Signs Last 24 Hrs  T(C): 37 (07 Dec 2017 08:43), Max: 37.3 (06 Dec 2017 17:40)  T(F): 98.6 (07 Dec 2017 08:43), Max: 99.1 (06 Dec 2017 17:40)  HR: 84 (07 Dec 2017 08:43) (73 - 99)  BP: 114/72 (07 Dec 2017 08:43) (111/69 - 127/74)  BP(mean): --  RR: 20 (07 Dec 2017 08:43) (18 - 20)  SpO2: 96% (07 Dec 2017 08:43) (93% - 96%)    PE  Gen: AAO x3, NAD  Pulm: CTAB, Symmetrical chest rise  CV: RRR  Abd: Soft, NT, mildly distended, -R/-G  Ext: No C/C/E  Vasc: 2+ Radial, DP pulses  Neuro: No focal neurological deficits      I&O's Detail    06 Dec 2017 07:01  -  07 Dec 2017 07:00  --------------------------------------------------------  IN:  Total IN: 0 mL    OUT:    Indwelling Catheter - Urethral: 1350 mL  Total OUT: 1350 mL    Total NET: -1350 mL          LABS:                        13.8   13.9  )-----------( 292      ( 07 Dec 2017 01:50 )             41.8     12-07    137  |  96<L>  |  40.0<H>  ----------------------------<  152<H>  4.2   |  32.0<H>  |  0.66    Ca    7.5<L>      07 Dec 2017 01:50  Phos  3.5     12-07  Mg     2.9     12-07      PT/INR - ( 07 Dec 2017 01:50 )   PT: 12.2 sec;   INR: 1.11 ratio         PTT - ( 07 Dec 2017 01:50 )  PTT:24.6 sec

## 2017-12-07 NOTE — PROGRESS NOTE ADULT - SUBJECTIVE AND OBJECTIVE BOX
POST-OPERATIVE NOTE    PROCEDURE: Diverting ileostomy w/ local anesthesia    INTERVAL HPI: Pain well controlled. Tolerating PO intake. Adequate UOP w/ whitman catheter in place. Ostomy with serosanguinous output. No ambulation since OR. Denies N/V/CP/SOB.     MEDICATIONS  (STANDING):  ALBUTerol/ipratropium for Nebulization 3 milliLiter(s) Nebulizer every 6 hours  ALBUTerol/ipratropium for Nebulization. 3 milliLiter(s) Nebulizer once  ALPRAZolam 0.25 milliGRAM(s) Oral three times a day  aspirin enteric coated 81 milliGRAM(s) Oral daily  atorvastatin 80 milliGRAM(s) Oral at bedtime  ciprofloxacin   IVPB 400 milliGRAM(s) IV Intermittent once  clopidogrel Tablet 75 milliGRAM(s) Oral daily  dextrose 5%. 1000 milliLiter(s) (50 mL/Hr) IV Continuous <Continuous>  dextrose 50% Injectable 12.5 Gram(s) IV Push once  dextrose 50% Injectable 25 Gram(s) IV Push once  dextrose 50% Injectable 25 Gram(s) IV Push once  diltiazem    milliGRAM(s) Oral <User Schedule>  docusate sodium 100 milliGRAM(s) Oral two times a day  enoxaparin Injectable 40 milliGRAM(s) SubCutaneous daily  Gauifenesin/Dextromethorphan 1200/600mg 1 Tablet(s) 1 Tablet(s) Oral two times a day  insulin glargine Injectable (LANTUS) 5 Unit(s) SubCutaneous at bedtime  insulin lispro (HumaLOG) corrective regimen sliding scale   SubCutaneous Before meals and at bedtime  lactulose Syrup 30 Gram(s) Oral three times a day  levothyroxine 50 MICROGram(s) Oral daily  loratadine 10 milliGRAM(s) Oral daily  pantoprazole    Tablet 40 milliGRAM(s) Oral before breakfast  predniSONE   Tablet 60 milliGRAM(s) Oral daily  senna 2 Tablet(s) Oral at bedtime    MEDICATIONS  (PRN):  acetylcysteine 20% Inhalation 1 milliLiter(s) Inhalation every 4 hours PRN poor cough clearance  ALBUTerol    0.083% 2.5 milliGRAM(s) Nebulizer every 4 hours PRN Shortness of Breath and/or Wheezing  bisacodyl Suppository 10 milliGRAM(s) Rectal daily PRN Constipation  dextrose Gel 1 Dose(s) Oral once PRN Blood Glucose LESS THAN 70 milliGRAM(s)/deciliter  glucagon  Injectable 1 milliGRAM(s) IntraMuscular once PRN Glucose LESS THAN 70 milligrams/deciliter  ketorolac   Injectable 15 milliGRAM(s) IV Push every 8 hours PRN Severe Pain (7 - 10)  ondansetron Injectable 4 milliGRAM(s) IV Push every 8 hours PRN Nausea and/or Vomiting  polyethylene glycol 3350 17 Gram(s) Oral daily PRN Constipation      Vital Signs Last 24 Hrs  T(C): 36.6 (07 Dec 2017 20:30), Max: 37 (07 Dec 2017 08:43)  T(F): 97.9 (07 Dec 2017 20:30), Max: 98.6 (07 Dec 2017 08:43)  HR: 78 (07 Dec 2017 20:30) (73 - 99)  BP: 138/84 (07 Dec 2017 20:30) (103/64 - 138/84)  BP(mean): --  RR: 18 (07 Dec 2017 20:30) (17 - 23)  SpO2: 97% (07 Dec 2017 20:30) (93% - 98%)    PE  Gen: AAO x3, NAD  Pulm: CTAB, Symmetrical chest rise  CV: RRR  Abd: Ostomy site with red rubber catheter and malecot catheter drain in place.  Ostomy appliance leaking from the later aspect. Changed and ostomy paste applied with new dressing. Abd Soft, NT, ND, -R/-G  Ext: No C/C/E  Vasc: 2+ Radial, DP pulses  Neuro: No focal neurological deficits    I&O's Detail    06 Dec 2017 07:01  -  07 Dec 2017 07:00  --------------------------------------------------------  IN:  Total IN: 0 mL    OUT:    Indwelling Catheter - Urethral: 1350 mL  Total OUT: 1350 mL    Total NET: -1350 mL      07 Dec 2017 07:01  -  07 Dec 2017 20:47  --------------------------------------------------------  IN:  Total IN: 0 mL    OUT:    Ileostomy: 1100 mL  Total OUT: 1100 mL    Total NET: -1100 mL          LABS:                        13.8   13.9  )-----------( 292      ( 07 Dec 2017 01:50 )             41.8     12-07    137  |  96<L>  |  40.0<H>  ----------------------------<  152<H>  4.2   |  32.0<H>  |  0.66    Ca    7.5<L>      07 Dec 2017 01:50  Phos  3.5     12-07  Mg     2.9     12-07      PT/INR - ( 07 Dec 2017 01:50 )   PT: 12.2 sec;   INR: 1.11 ratio         PTT - ( 07 Dec 2017 01:50 )  PTT:24.6 sec

## 2017-12-07 NOTE — PROGRESS NOTE ADULT - SUBJECTIVE AND OBJECTIVE BOX
CHIEF COMPLAINT/INTERVAL HISTORY:    Patient is a 67y old  Male who presents with a chief complaint of Came for rehab (29 Nov 2017 10:08)    SUBJECTIVE & OBJECTIVE: Pt seen and examined at bedside s/p ileostomy earlier today. Denies any significant discomfort at this time. Clear liquid diet. Analgesia PRN.    ROS: No chest pain, palpitations, SOB, light headedness, dizziness, headache, nausea/vomiting, fevers/chills, abdominal pain, dysuria or increased urinary frequency.      ICU Vital Signs Last 24 Hrs  T(C): 36.6 (07 Dec 2017 15:53), Max: 37.3 (06 Dec 2017 17:40)  T(F): 97.8 (07 Dec 2017 15:53), Max: 99.1 (06 Dec 2017 17:40)  HR: 98 (07 Dec 2017 15:53) (73 - 99)  BP: 120/78 (07 Dec 2017 15:53) (103/64 - 127/74)  RR: 21 (07 Dec 2017 14:30) (17 - 23)  SpO2: 94% (07 Dec 2017 14:30) (93% - 98%)      MEDICATIONS  (STANDING):  ALBUTerol/ipratropium for Nebulization 3 milliLiter(s) Nebulizer every 6 hours  ALBUTerol/ipratropium for Nebulization. 3 milliLiter(s) Nebulizer once  ALPRAZolam 0.25 milliGRAM(s) Oral three times a day  aspirin enteric coated 81 milliGRAM(s) Oral daily  atorvastatin 80 milliGRAM(s) Oral at bedtime  ciprofloxacin   IVPB 400 milliGRAM(s) IV Intermittent once  clopidogrel Tablet 75 milliGRAM(s) Oral daily  dextrose 5%. 1000 milliLiter(s) (50 mL/Hr) IV Continuous <Continuous>  dextrose 50% Injectable 12.5 Gram(s) IV Push once  dextrose 50% Injectable 25 Gram(s) IV Push once  dextrose 50% Injectable 25 Gram(s) IV Push once  diltiazem    milliGRAM(s) Oral <User Schedule>  docusate sodium 100 milliGRAM(s) Oral two times a day  enoxaparin Injectable 40 milliGRAM(s) SubCutaneous daily  Gauifenesin/Dextromethorphan 1200/600mg 1 Tablet(s) 1 Tablet(s) Oral two times a day  insulin glargine Injectable (LANTUS) 5 Unit(s) SubCutaneous at bedtime  insulin lispro (HumaLOG) corrective regimen sliding scale   SubCutaneous Before meals and at bedtime  lactulose Syrup 30 Gram(s) Oral three times a day  levothyroxine 50 MICROGram(s) Oral daily  loratadine 10 milliGRAM(s) Oral daily  pantoprazole    Tablet 40 milliGRAM(s) Oral before breakfast  predniSONE   Tablet 60 milliGRAM(s) Oral daily  senna 2 Tablet(s) Oral at bedtime    MEDICATIONS  (PRN):  acetylcysteine 20% Inhalation 1 milliLiter(s) Inhalation every 4 hours PRN poor cough clearance  ALBUTerol    0.083% 2.5 milliGRAM(s) Nebulizer every 4 hours PRN Shortness of Breath and/or Wheezing  bisacodyl Suppository 10 milliGRAM(s) Rectal daily PRN Constipation  dextrose Gel 1 Dose(s) Oral once PRN Blood Glucose LESS THAN 70 milliGRAM(s)/deciliter  glucagon  Injectable 1 milliGRAM(s) IntraMuscular once PRN Glucose LESS THAN 70 milligrams/deciliter  ketorolac   Injectable 15 milliGRAM(s) IV Push every 8 hours PRN Severe Pain (7 - 10)  ondansetron Injectable 4 milliGRAM(s) IV Push every 8 hours PRN Nausea and/or Vomiting  polyethylene glycol 3350 17 Gram(s) Oral daily PRN Constipation      LABS:                        13.8   13.9  )-----------( 292      ( 07 Dec 2017 01:50 )             41.8     12-07    137  |  96<L>  |  40.0<H>  ----------------------------<  152<H>  4.2   |  32.0<H>  |  0.66    Ca    7.5<L>      07 Dec 2017 01:50  Phos  3.5     12-07  Mg     2.9     12-07      PT/INR - ( 07 Dec 2017 01:50 )   PT: 12.2 sec;   INR: 1.11 ratio         PTT - ( 07 Dec 2017 01:50 )  PTT:24.6 sec      CAPILLARY BLOOD GLUCOSE      POCT Blood Glucose.: 129 mg/dL (07 Dec 2017 08:06)  POCT Blood Glucose.: 161 mg/dL (07 Dec 2017 00:10)    Physical Exam:  GENERAL: elderly male, laying in bed, chronically ill appearing  HEAD:  Atraumatic, Normocephalic  EYES: EOMI, PERRLA, conjunctiva and sclera clear  ENMT: Moist mucous membranes  NECK: Supple   NERVOUS SYSTEM:  Alert & Oriented X3   CHEST/LUNG: coarse breath sounds  HEART: Regular rate and rhythm; audible S1/S2  ABDOMEN: Soft, Nontender, nondistended, +ileostomy  : scrotal edema, +whitman  EXTREMITIES:  No edema

## 2017-12-07 NOTE — PROGRESS NOTE ADULT - SUBJECTIVE AND OBJECTIVE BOX
PULMONARY PROGRESS NOTE      FRANDY BAUTISTA  MRN-[id number]    Patient is a 67y old  Male who presents with a chief complaint of Came for rehab (29 Nov 2017 10:08)      INTERVAL HPI/OVERNIGHT EVENTS:    Patient seen earlier pre-op  Patient without respiratory complaints  Anxious about operation    MEDICATIONS  (STANDING):  ALBUTerol/ipratropium for Nebulization 3 milliLiter(s) Nebulizer every 6 hours  ALBUTerol/ipratropium for Nebulization. 3 milliLiter(s) Nebulizer once  ALPRAZolam 0.25 milliGRAM(s) Oral three times a day  atorvastatin 80 milliGRAM(s) Oral at bedtime  ciprofloxacin   IVPB 400 milliGRAM(s) IV Intermittent once  dextrose 5%. 1000 milliLiter(s) (50 mL/Hr) IV Continuous <Continuous>  dextrose 50% Injectable 12.5 Gram(s) IV Push once  dextrose 50% Injectable 25 Gram(s) IV Push once  dextrose 50% Injectable 25 Gram(s) IV Push once  diltiazem    milliGRAM(s) Oral <User Schedule>  docusate sodium 100 milliGRAM(s) Oral two times a day  Gauifenesin/Dextromethorphan 1200/600mg 1 Tablet(s) 1 Tablet(s) Oral two times a day  insulin glargine Injectable (LANTUS) 5 Unit(s) SubCutaneous at bedtime  insulin lispro (HumaLOG) corrective regimen sliding scale   SubCutaneous Before meals and at bedtime  lactulose Syrup 30 Gram(s) Oral three times a day  levothyroxine 50 MICROGram(s) Oral daily  loratadine 10 milliGRAM(s) Oral daily  methylnaltrexone Injectable 12 milliGRAM(s) SubCutaneous every other day  pantoprazole    Tablet 40 milliGRAM(s) Oral before breakfast  predniSONE   Tablet 60 milliGRAM(s) Oral daily  senna 2 Tablet(s) Oral at bedtime      MEDICATIONS  (PRN):  acetylcysteine 20% Inhalation 1 milliLiter(s) Inhalation every 4 hours PRN poor cough clearance  ALBUTerol    0.083% 2.5 milliGRAM(s) Nebulizer every 4 hours PRN Shortness of Breath and/or Wheezing  bisacodyl Suppository 10 milliGRAM(s) Rectal daily PRN Constipation  dextrose Gel 1 Dose(s) Oral once PRN Blood Glucose LESS THAN 70 milliGRAM(s)/deciliter  glucagon  Injectable 1 milliGRAM(s) IntraMuscular once PRN Glucose LESS THAN 70 milligrams/deciliter  ibuprofen  Tablet 400 milliGRAM(s) Oral every 6 hours PRN severe pain  ondansetron Injectable 4 milliGRAM(s) IV Push every 8 hours PRN Nausea and/or Vomiting  polyethylene glycol 3350 17 Gram(s) Oral daily PRN Constipation      Allergies    codeine (Unknown)  penicillin (Unknown)    Intolerances    Symbicort (Short breath)      PAST MEDICAL & SURGICAL HISTORY:  Hypothyroidism  Hydrocele  Renal calculi  Diabetes mellitus  Hypertension  CVA (cerebral vascular accident)  Chronic obstructive pulmonary disease, unspecified COPD type  History of lung surgery        REVIEW OF SYSTEMS:    CONSTITUTIONAL:  No distress    HEENT:  Eyes:  No diplopia or blurred vision. ENT:  No earache, sore throat or runny nose.    CARDIOVASCULAR:  No pressure, squeezing, tightness, heaviness or aching about the chest; no palpitations.    RESPIRATORY:  No cough, shortness of breath, PND or orthopnea. Mild SOBOE    GASTROINTESTINAL:  No nausea, vomiting or diarrhea.    GENITOURINARY:  No dysuria, frequency or urgency.    NEUROLOGIC:  No paresthesias, fasciculations, seizures or weakness.    PSYCHIATRIC:  No disorder of thought or mood.    Vital Signs Last 24 Hrs  T(C): 37 (07 Dec 2017 08:43), Max: 37.3 (06 Dec 2017 17:40)  T(F): 98.6 (07 Dec 2017 08:43), Max: 99.1 (06 Dec 2017 17:40)  HR: 84 (07 Dec 2017 08:43) (73 - 99)  BP: 114/72 (07 Dec 2017 08:43) (111/69 - 127/74)  BP(mean): --  RR: 20 (07 Dec 2017 08:43) (18 - 20)  SpO2: 96% (07 Dec 2017 08:43) (93% - 96%)    PHYSICAL EXAMINATION:    GENERAL: The patient is awake and alert in no apparent distress.     HEENT: Head is normocephalic and atraumatic. Extraocular muscles are intact. Mucous membranes are moist.    NECK: Supple.    LUNGS: Clear to auscultation without wheezing, rales or rhonchi; respirations unlabored    HEART: Regular rate and rhythm without murmur.    ABDOMEN: Soft, nontender, and nondistended.      EXTREMITIES: Without any cyanosis, clubbing, rash, lesions or edema.    NEUROLOGIC: Grossly intact.    LABS:                        13.8   13.9  )-----------( 292      ( 07 Dec 2017 01:50 )             41.8     12-07    137  |  96<L>  |  40.0<H>  ----------------------------<  152<H>  4.2   |  32.0<H>  |  0.66    Ca    7.5<L>      07 Dec 2017 01:50  Phos  3.5     12-07  Mg     2.9     12-07      PT/INR - ( 07 Dec 2017 01:50 )   PT: 12.2 sec;   INR: 1.11 ratio         PTT - ( 07 Dec 2017 01:50 )  PTT:24.6 sec      Procalcitonin, Serum: 0.06 ng/mL (12-05-17 @ 06:28)        RADIOLOGY & ADDITIONAL STUDIES:     EXAM:  ABDOMEN FLAT & UPRIGHT DECUB                          PROCEDURE DATE:  12/06/2017          INTERPRETATION:    Exam Date: 12/6/2017 1:10 PM  Clinical Information: Abdominal pain, constipation  Technique: Supine and upright views of the abdomen with comparison to   12/2/2016    Findings:    Markedly air distended small and large bowel with air in the rectum. Mild   amount stool left colon. No definite free air. Marked elevation of left   hemidiaphragm. Degenerative changes of visualized osseous structures.    Impression:    Diffuse air distended small and large bowel particularly the right colon   with air seen into the rectum        DAVID ALEXANDRE M.D., ATTENDING RADIOLOGIST  This document has been electronically signed. Dec  6 2017  2:39PM

## 2017-12-07 NOTE — PROGRESS NOTE ADULT - SUBJECTIVE AND OBJECTIVE BOX
Pt seen and examined f/u constipation and distended colon  This morning he feels somewhat better being NPO without further vomiting. Passing some flatus but no BM. No abdominal pain at this time. WBC down somewhat but he is on steroids for his COPD. KUB yesterday showed showed dilated small and large bowel throughout with air. He is passing some flatus but still no BM.    REVIEW OF SYSTEMS:    CONSTITUTIONAL: No fever, weight loss, or fatigue  EYES: No eye pain, visual disturbances, or discharge  ENMT:  No difficulty hearing, tinnitus, vertigo; No sinus or throat pain  RESPIRATORY: No cough, wheezing, chills or hemoptysis; No shortness of breath  CARDIOVASCULAR: No chest pain, palpitations, dizziness, or leg swelling  GASTROINTESTINAL: as above    MEDICATIONS:  MEDICATIONS  (STANDING):  ALBUTerol/ipratropium for Nebulization 3 milliLiter(s) Nebulizer every 6 hours  ALPRAZolam 0.25 milliGRAM(s) Oral three times a day  atorvastatin 80 milliGRAM(s) Oral at bedtime  dextrose 5%. 1000 milliLiter(s) (50 mL/Hr) IV Continuous <Continuous>  dextrose 50% Injectable 12.5 Gram(s) IV Push once  dextrose 50% Injectable 25 Gram(s) IV Push once  dextrose 50% Injectable 25 Gram(s) IV Push once  diltiazem    milliGRAM(s) Oral <User Schedule>  docusate sodium 100 milliGRAM(s) Oral two times a day  Gauifenesin/Dextromethorphan 1200/600mg 1 Tablet(s) 1 Tablet(s) Oral two times a day  insulin glargine Injectable (LANTUS) 5 Unit(s) SubCutaneous at bedtime  insulin lispro (HumaLOG) corrective regimen sliding scale   SubCutaneous Before meals and at bedtime  lactulose Syrup 30 Gram(s) Oral three times a day  levothyroxine 50 MICROGram(s) Oral daily  loratadine 10 milliGRAM(s) Oral daily  methylnaltrexone Injectable 12 milliGRAM(s) SubCutaneous every other day  pantoprazole    Tablet 40 milliGRAM(s) Oral before breakfast  predniSONE   Tablet 60 milliGRAM(s) Oral daily  senna 2 Tablet(s) Oral at bedtime    MEDICATIONS  (PRN):  acetylcysteine 20% Inhalation 1 milliLiter(s) Inhalation every 4 hours PRN poor cough clearance  ALBUTerol    0.083% 2.5 milliGRAM(s) Nebulizer every 4 hours PRN Shortness of Breath and/or Wheezing  bisacodyl Suppository 10 milliGRAM(s) Rectal daily PRN Constipation  dextrose Gel 1 Dose(s) Oral once PRN Blood Glucose LESS THAN 70 milliGRAM(s)/deciliter  glucagon  Injectable 1 milliGRAM(s) IntraMuscular once PRN Glucose LESS THAN 70 milligrams/deciliter  ibuprofen  Tablet 400 milliGRAM(s) Oral every 6 hours PRN severe pain  ondansetron Injectable 4 milliGRAM(s) IV Push every 8 hours PRN Nausea and/or Vomiting  polyethylene glycol 3350 17 Gram(s) Oral daily PRN Constipation      Allergies    codeine (Unknown)  penicillin (Unknown)    Intolerances    Symbicort (Short breath)      Vital Signs Last 24 Hrs  T(C): 36.3 (07 Dec 2017 06:15), Max: 37.3 (06 Dec 2017 17:40)  T(F): 97.4 (07 Dec 2017 06:15), Max: 99.1 (06 Dec 2017 17:40)  HR: 73 (07 Dec 2017 06:15) (73 - 99)  BP: 127/74 (07 Dec 2017 06:15) (111/69 - 150/101)  BP(mean): --  RR: 18 (07 Dec 2017 06:15) (18 - 20)  SpO2: 93% (06 Dec 2017 14:21) (93% - 93%)    12-06 @ 07:01  -  12-07 @ 07:00  --------------------------------------------------------  IN: 0 mL / OUT: 1350 mL / NET: -1350 mL        PHYSICAL EXAM:    General: Well developed; well nourished; in no acute distress  HEENT: MMM, conjunctiva and sclera clear  Gastrointestinal:Abdomen: Soft and somewhat distended still with increased tympany but no tenderness. BS present. No masses  Extremities: no cyanosis, clubbing or edema.  Skin: Warm and dry. No obvious rash    LABS:      CBC Full  -  ( 07 Dec 2017 01:50 )  WBC Count : 13.9 K/uL  Hemoglobin : 13.8 g/dL  Hematocrit : 41.8 %  Platelet Count - Automated : 292 K/uL  Mean Cell Volume : 85.5 fl  Mean Cell Hemoglobin : 28.2 pg  Mean Cell Hemoglobin Concentration : 33.0 g/dL  Auto Neutrophil # : 11.3 K/uL  Auto Lymphocyte # : 1.1 K/uL  Auto Monocyte # : 1.4 K/uL  Auto Eosinophil # : 0.0 K/uL  Auto Basophil # : 0.0 K/uL  Auto Neutrophil % : 81.1 %  Auto Lymphocyte % : 8.2 %  Auto Monocyte % : 9.9 %  Auto Eosinophil % : 0.4 %  Auto Basophil % : 0.1 %    12-07    137  |  96<L>  |  40.0<H>  ----------------------------<  152<H>  4.2   |  32.0<H>  |  0.66    Ca    7.5<L>      07 Dec 2017 01:50  Phos  3.5     12-07  Mg     2.9     12-07      PT/INR - ( 07 Dec 2017 01:50 )   PT: 12.2 sec;   INR: 1.11 ratio         PTT - ( 07 Dec 2017 01:50 )  PTT:24.6 sec                  RADIOLOGY & ADDITIONAL STUDIES (The following images were personally reviewed):  < from: Xray Abdomen Flat & Upright Decub (12.06.17 @ 13:10) >  INTERPRETATION:    Exam Date: 12/6/2017 1:10 PM  Clinical Information: Abdominal pain, constipation  Technique: Supine and upright views of the abdomen with comparison to   12/2/2016    Findings:    Markedly air distended small and large bowel with air in the rectum. Mild   amount stool left colon. No definite free air. Marked elevation of left   hemidiaphragm. Degenerative changes of visualized osseous structures.    Impression:    Diffuse air distended small and large bowel particularly the right colon   with air seen into the rectum    < end of copied text >

## 2017-12-08 ENCOUNTER — TRANSCRIPTION ENCOUNTER (OUTPATIENT)
Age: 67
End: 2017-12-08

## 2017-12-08 ENCOUNTER — MOBILE ON CALL (OUTPATIENT)
Age: 67
End: 2017-12-08

## 2017-12-08 LAB
ANION GAP SERPL CALC-SCNC: 9 MMOL/L — SIGNIFICANT CHANGE UP (ref 5–17)
BASOPHILS # BLD AUTO: 0 K/UL — SIGNIFICANT CHANGE UP (ref 0–0.2)
BASOPHILS NFR BLD AUTO: 0.1 % — SIGNIFICANT CHANGE UP (ref 0–2)
BUN SERPL-MCNC: 34 MG/DL — HIGH (ref 8–20)
CALCIUM SERPL-MCNC: 8.2 MG/DL — LOW (ref 8.6–10.2)
CHLORIDE SERPL-SCNC: 97 MMOL/L — LOW (ref 98–107)
CO2 SERPL-SCNC: 34 MMOL/L — HIGH (ref 22–29)
CREAT SERPL-MCNC: 0.76 MG/DL — SIGNIFICANT CHANGE UP (ref 0.5–1.3)
EOSINOPHIL # BLD AUTO: 0.1 K/UL — SIGNIFICANT CHANGE UP (ref 0–0.5)
EOSINOPHIL NFR BLD AUTO: 0.4 % — SIGNIFICANT CHANGE UP (ref 0–6)
GLUCOSE BLDC GLUCOMTR-MCNC: 133 MG/DL — HIGH (ref 70–99)
GLUCOSE BLDC GLUCOMTR-MCNC: 134 MG/DL — HIGH (ref 70–99)
GLUCOSE BLDC GLUCOMTR-MCNC: 149 MG/DL — HIGH (ref 70–99)
GLUCOSE BLDC GLUCOMTR-MCNC: 171 MG/DL — HIGH (ref 70–99)
GLUCOSE SERPL-MCNC: 118 MG/DL — HIGH (ref 70–115)
HCT VFR BLD CALC: 46.9 % — SIGNIFICANT CHANGE UP (ref 42–52)
HGB BLD-MCNC: 14.8 G/DL — SIGNIFICANT CHANGE UP (ref 14–18)
LYMPHOCYTES # BLD AUTO: 1.4 K/UL — SIGNIFICANT CHANGE UP (ref 1–4.8)
LYMPHOCYTES # BLD AUTO: 8.2 % — LOW (ref 20–55)
MAGNESIUM SERPL-MCNC: 2.5 MG/DL — SIGNIFICANT CHANGE UP (ref 1.8–2.6)
MCHC RBC-ENTMCNC: 27.6 PG — SIGNIFICANT CHANGE UP (ref 27–31)
MCHC RBC-ENTMCNC: 31.6 G/DL — LOW (ref 32–36)
MCV RBC AUTO: 87.3 FL — SIGNIFICANT CHANGE UP (ref 80–94)
MONOCYTES # BLD AUTO: 1.8 K/UL — HIGH (ref 0–0.8)
MONOCYTES NFR BLD AUTO: 10.8 % — HIGH (ref 3–10)
NEUTROPHILS # BLD AUTO: 13.5 K/UL — HIGH (ref 1.8–8)
NEUTROPHILS NFR BLD AUTO: 80.1 % — HIGH (ref 37–73)
PHOSPHATE SERPL-MCNC: 4.2 MG/DL — SIGNIFICANT CHANGE UP (ref 2.4–4.7)
PLATELET # BLD AUTO: 302 K/UL — SIGNIFICANT CHANGE UP (ref 150–400)
POTASSIUM SERPL-MCNC: 5.2 MMOL/L — SIGNIFICANT CHANGE UP (ref 3.5–5.3)
POTASSIUM SERPL-SCNC: 5.2 MMOL/L — SIGNIFICANT CHANGE UP (ref 3.5–5.3)
RBC # BLD: 5.37 M/UL — SIGNIFICANT CHANGE UP (ref 4.6–6.2)
RBC # FLD: 14.5 % — SIGNIFICANT CHANGE UP (ref 11–15.6)
SODIUM SERPL-SCNC: 140 MMOL/L — SIGNIFICANT CHANGE UP (ref 135–145)
WBC # BLD: 16.8 K/UL — HIGH (ref 4.8–10.8)
WBC # FLD AUTO: 16.8 K/UL — HIGH (ref 4.8–10.8)

## 2017-12-08 PROCEDURE — 99232 SBSQ HOSP IP/OBS MODERATE 35: CPT

## 2017-12-08 PROCEDURE — 99233 SBSQ HOSP IP/OBS HIGH 50: CPT

## 2017-12-08 RX ORDER — SODIUM CHLORIDE 9 MG/ML
1000 INJECTION, SOLUTION INTRAVENOUS
Qty: 0 | Refills: 0 | Status: DISCONTINUED | OUTPATIENT
Start: 2017-12-08 | End: 2017-12-08

## 2017-12-08 RX ORDER — SODIUM CHLORIDE 9 MG/ML
1000 INJECTION INTRAMUSCULAR; INTRAVENOUS; SUBCUTANEOUS
Qty: 0 | Refills: 0 | Status: COMPLETED | OUTPATIENT
Start: 2017-12-08 | End: 2017-12-09

## 2017-12-08 RX ADMIN — SODIUM CHLORIDE 75 MILLILITER(S): 9 INJECTION INTRAMUSCULAR; INTRAVENOUS; SUBCUTANEOUS at 17:32

## 2017-12-08 RX ADMIN — Medication 180 MILLIGRAM(S): at 17:32

## 2017-12-08 RX ADMIN — INSULIN GLARGINE 5 UNIT(S): 100 INJECTION, SOLUTION SUBCUTANEOUS at 22:19

## 2017-12-08 RX ADMIN — Medication 0.25 MILLIGRAM(S): at 22:19

## 2017-12-08 RX ADMIN — ATORVASTATIN CALCIUM 80 MILLIGRAM(S): 80 TABLET, FILM COATED ORAL at 22:19

## 2017-12-08 RX ADMIN — Medication 60 MILLIGRAM(S): at 06:44

## 2017-12-08 RX ADMIN — PANTOPRAZOLE SODIUM 40 MILLIGRAM(S): 20 TABLET, DELAYED RELEASE ORAL at 06:45

## 2017-12-08 RX ADMIN — Medication 1 TABLET(S): at 17:32

## 2017-12-08 RX ADMIN — Medication 0.25 MILLIGRAM(S): at 13:51

## 2017-12-08 RX ADMIN — Medication 3 MILLILITER(S): at 15:31

## 2017-12-08 RX ADMIN — LACTULOSE 30 GRAM(S): 10 SOLUTION ORAL at 06:47

## 2017-12-08 RX ADMIN — SENNA PLUS 2 TABLET(S): 8.6 TABLET ORAL at 22:19

## 2017-12-08 RX ADMIN — LORATADINE 10 MILLIGRAM(S): 10 TABLET ORAL at 12:07

## 2017-12-08 RX ADMIN — Medication 1: at 12:06

## 2017-12-08 RX ADMIN — Medication 0.25 MILLIGRAM(S): at 06:45

## 2017-12-08 RX ADMIN — Medication 100 MILLIGRAM(S): at 06:47

## 2017-12-08 RX ADMIN — Medication 50 MICROGRAM(S): at 06:44

## 2017-12-08 RX ADMIN — Medication 81 MILLIGRAM(S): at 12:06

## 2017-12-08 RX ADMIN — SODIUM CHLORIDE 75 MILLILITER(S): 9 INJECTION, SOLUTION INTRAVENOUS at 12:06

## 2017-12-08 RX ADMIN — Medication 3 MILLILITER(S): at 08:18

## 2017-12-08 RX ADMIN — CLOPIDOGREL BISULFATE 75 MILLIGRAM(S): 75 TABLET, FILM COATED ORAL at 12:06

## 2017-12-08 RX ADMIN — Medication 180 MILLIGRAM(S): at 06:44

## 2017-12-08 NOTE — PROGRESS NOTE ADULT - ASSESSMENT
67 year old male POD#1 s/p loop ileostomy created under local anesthesia for colonic obstruction in improving condition with no new issues. Ostomy functional. Pain adequately controlled.     1. Pain control available   2. Tolerating diabetic CLD - f/u tolerance and ostomy function   3. Encourage ICS use and OOB->chair   4. DVT PPx with Lov - on home ASA and plavix too   5. Medical mgmt per primary team, surgery following

## 2017-12-08 NOTE — PROGRESS NOTE ADULT - SUBJECTIVE AND OBJECTIVE BOX
CHIEF COMPLAINT/INTERVAL HISTORY:    Patient is a 67y old  Male who presents with a chief complaint of Came for rehab (29 Nov 2017 10:08)    SUBJECTIVE & OBJECTIVE: Pt seen and examined at bedside. Large output from ostomy site overnight and this morning. Changed LR to NS given elevated bicarb. Keep input = output. Denies nausea, vomiting. Tolerating clears.    ROS: No chest pain, palpitations, SOB, lightheadedness, dizziness, headache, nausea/vomiting, fevers/chills, abdominal pain, dysuria or increased urinary frequency.    ICU Vital Signs Last 24 Hrs  T(C): 36.4 (08 Dec 2017 15:30), Max: 36.8 (08 Dec 2017 10:17)  T(F): 97.5 (08 Dec 2017 15:30), Max: 98.2 (08 Dec 2017 10:17)  HR: 110 (08 Dec 2017 15:30) (73 - 110)  BP: 116/75 (08 Dec 2017 15:30) (116/75 - 138/84)  RR: 18 (08 Dec 2017 15:30) (16 - 18)  SpO2: 93% (08 Dec 2017 15:30) (93% - 97%)    MEDICATIONS  (STANDING):  ALBUTerol/ipratropium for Nebulization 3 milliLiter(s) Nebulizer every 6 hours  ALBUTerol/ipratropium for Nebulization. 3 milliLiter(s) Nebulizer once  ALPRAZolam 0.25 milliGRAM(s) Oral three times a day  aspirin enteric coated 81 milliGRAM(s) Oral daily  atorvastatin 80 milliGRAM(s) Oral at bedtime  ciprofloxacin   IVPB 400 milliGRAM(s) IV Intermittent once  clopidogrel Tablet 75 milliGRAM(s) Oral daily  dextrose 5%. 1000 milliLiter(s) (50 mL/Hr) IV Continuous <Continuous>  dextrose 50% Injectable 12.5 Gram(s) IV Push once  dextrose 50% Injectable 25 Gram(s) IV Push once  dextrose 50% Injectable 25 Gram(s) IV Push once  diltiazem    milliGRAM(s) Oral <User Schedule>  enoxaparin Injectable 40 milliGRAM(s) SubCutaneous daily  Gauifenesin/Dextromethorphan 1200/600mg 1 Tablet(s) 1 Tablet(s) Oral two times a day  insulin glargine Injectable (LANTUS) 5 Unit(s) SubCutaneous at bedtime  insulin lispro (HumaLOG) corrective regimen sliding scale   SubCutaneous Before meals and at bedtime  levothyroxine 50 MICROGram(s) Oral daily  loratadine 10 milliGRAM(s) Oral daily  multivitamin 1 Tablet(s) Oral daily  pantoprazole    Tablet 40 milliGRAM(s) Oral before breakfast  senna 2 Tablet(s) Oral at bedtime  sodium chloride 0.9%. 1000 milliLiter(s) (75 mL/Hr) IV Continuous <Continuous>    MEDICATIONS  (PRN):  acetylcysteine 20% Inhalation 1 milliLiter(s) Inhalation every 4 hours PRN poor cough clearance  ALBUTerol    0.083% 2.5 milliGRAM(s) Nebulizer every 4 hours PRN Shortness of Breath and/or Wheezing  dextrose Gel 1 Dose(s) Oral once PRN Blood Glucose LESS THAN 70 milliGRAM(s)/deciliter  glucagon  Injectable 1 milliGRAM(s) IntraMuscular once PRN Glucose LESS THAN 70 milligrams/deciliter  ketorolac   Injectable 15 milliGRAM(s) IV Push every 8 hours PRN Severe Pain (7 - 10)  ondansetron Injectable 4 milliGRAM(s) IV Push every 8 hours PRN Nausea and/or Vomiting  polyethylene glycol 3350 17 Gram(s) Oral daily PRN Constipation      LABS:                        14.8   16.8  )-----------( 302      ( 08 Dec 2017 08:34 )             46.9     12-08    140  |  97<L>  |  34.0<H>  ----------------------------<  118<H>  5.2   |  34.0<H>  |  0.76    Ca    8.2<L>      08 Dec 2017 08:34  Phos  4.2     12-08  Mg     2.5     12-08      PT/INR - ( 07 Dec 2017 01:50 )   PT: 12.2 sec;   INR: 1.11 ratio         PTT - ( 07 Dec 2017 01:50 )  PTT:24.6 sec      CAPILLARY BLOOD GLUCOSE      POCT Blood Glucose.: 149 mg/dL (08 Dec 2017 16:20)  POCT Blood Glucose.: 171 mg/dL (08 Dec 2017 11:28)  POCT Blood Glucose.: 133 mg/dL (08 Dec 2017 08:07)  POCT Blood Glucose.: 186 mg/dL (07 Dec 2017 22:03)    RADIOLOGY & ADDITIONAL TESTS:  < from: Xray Kidney Ureter Bladder (12.07.17 @ 19:07) >  Impression:  There appears to be a right abdominal ostomy and gastrostomy tube as new   findings since the prior day..    < end of copied text >      PHYSICAL EXAM:  GENERAL: elderly male, laying in bed, chronically ill appearing  HEAD:  Atraumatic, Normocephalic  EYES: EOMI, PERRLA, conjunctiva and sclera clear  ENMT: Moist mucous membranes  NECK: Supple   NERVOUS SYSTEM:  Alert & Oriented X3   CHEST/LUNG: coarse breath sounds  HEART: Regular rate and rhythm; audible S1/S2  ABDOMEN: Soft, Nontender, nondistended, +ileostomy  : scrotal edema, +whitman  EXTREMITIES:  No edema

## 2017-12-08 NOTE — PROGRESS NOTE ADULT - SUBJECTIVE AND OBJECTIVE BOX
PULMONARY PROGRESS NOTE      LILY [id number]    Patient is a 67y old  Male who presents with a chief complaint of Came for rehab (29 Nov 2017 10:08)      INTERVAL HPI/OVERNIGHT EVENTS:  s/p loop ileostomy day1. No cough or sob. feels breathing at baseline    MEDICATIONS  (STANDING):  ALBUTerol/ipratropium for Nebulization 3 milliLiter(s) Nebulizer every 6 hours  ALBUTerol/ipratropium for Nebulization. 3 milliLiter(s) Nebulizer once  ALPRAZolam 0.25 milliGRAM(s) Oral three times a day  aspirin enteric coated 81 milliGRAM(s) Oral daily  atorvastatin 80 milliGRAM(s) Oral at bedtime  ciprofloxacin   IVPB 400 milliGRAM(s) IV Intermittent once  clopidogrel Tablet 75 milliGRAM(s) Oral daily  dextrose 5%. 1000 milliLiter(s) (50 mL/Hr) IV Continuous <Continuous>  dextrose 50% Injectable 12.5 Gram(s) IV Push once  dextrose 50% Injectable 25 Gram(s) IV Push once  dextrose 50% Injectable 25 Gram(s) IV Push once  diltiazem    milliGRAM(s) Oral <User Schedule>  docusate sodium 100 milliGRAM(s) Oral two times a day  enoxaparin Injectable 40 milliGRAM(s) SubCutaneous daily  Gauifenesin/Dextromethorphan 1200/600mg 1 Tablet(s) 1 Tablet(s) Oral two times a day  insulin glargine Injectable (LANTUS) 5 Unit(s) SubCutaneous at bedtime  insulin lispro (HumaLOG) corrective regimen sliding scale   SubCutaneous Before meals and at bedtime  lactated ringers. 1000 milliLiter(s) (75 mL/Hr) IV Continuous <Continuous>  lactulose Syrup 30 Gram(s) Oral three times a day  levothyroxine 50 MICROGram(s) Oral daily  loratadine 10 milliGRAM(s) Oral daily  pantoprazole    Tablet 40 milliGRAM(s) Oral before breakfast  predniSONE   Tablet 60 milliGRAM(s) Oral daily  senna 2 Tablet(s) Oral at bedtime      MEDICATIONS  (PRN):  acetylcysteine 20% Inhalation 1 milliLiter(s) Inhalation every 4 hours PRN poor cough clearance  ALBUTerol    0.083% 2.5 milliGRAM(s) Nebulizer every 4 hours PRN Shortness of Breath and/or Wheezing  bisacodyl Suppository 10 milliGRAM(s) Rectal daily PRN Constipation  dextrose Gel 1 Dose(s) Oral once PRN Blood Glucose LESS THAN 70 milliGRAM(s)/deciliter  glucagon  Injectable 1 milliGRAM(s) IntraMuscular once PRN Glucose LESS THAN 70 milligrams/deciliter  ketorolac   Injectable 15 milliGRAM(s) IV Push every 8 hours PRN Severe Pain (7 - 10)  ondansetron Injectable 4 milliGRAM(s) IV Push every 8 hours PRN Nausea and/or Vomiting  polyethylene glycol 3350 17 Gram(s) Oral daily PRN Constipation      Allergies    codeine (Unknown)  penicillin (Unknown)    Intolerances    Symbicort (Short breath)      PAST MEDICAL & SURGICAL HISTORY:  Hypothyroidism  Hydrocele  Renal calculi  Diabetes mellitus  Hypertension  CVA (cerebral vascular accident)  Chronic obstructive pulmonary disease, unspecified COPD type  History of lung surgery      SOCIAL HISTORY  Smoking History:       REVIEW OF SYSTEMS:    CONSTITUTIONAL:  No distress    HEENT:  Eyes:  No diplopia or blurred vision. ENT:  No earache, sore throat or runny nose.    CARDIOVASCULAR:  No pressure, squeezing, tightness, heaviness or aching about the chest; no palpitations.    RESPIRATORY:  No cough, shortness of breath, PND or orthopnea. Mild SOBOE    GASTROINTESTINAL:  No nausea, vomiting or diarrhea.    GENITOURINARY:  No dysuria, frequency or urgency.    NEUROLOGIC:  No paresthesias, fasciculations, seizures or weakness.    Extremities: No cyanosis, clubbing or edema    PSYCHIATRIC:  No disorder of thought or mood.    Vital Signs Last 24 Hrs  T(C): 36.8 (08 Dec 2017 10:17), Max: 36.8 (08 Dec 2017 10:17)  T(F): 98.2 (08 Dec 2017 10:17), Max: 98.2 (08 Dec 2017 10:17)  HR: 81 (08 Dec 2017 10:17) (73 - 98)  BP: 130/85 (08 Dec 2017 10:17) (103/64 - 138/84)  BP(mean): --  RR: 18 (08 Dec 2017 10:17) (16 - 23)  SpO2: 94% (08 Dec 2017 10:17) (93% - 98%)    PHYSICAL EXAMINATION:    GENERAL: The patient is awake and alert in no apparent distress.     HEENT: Head is normocephalic and atraumatic. Extraocular muscles are intact. Mucous membranes are moist.    NECK: Supple.    LUNGS: Clear to auscultation without wheezing, rales or rhonchi; respirations unlabored    HEART: Regular rate and rhythm without murmur.    ABDOMEN: Soft, nontender, and nondistended.  recent surg    EXTREMITIES: Without any cyanosis, clubbing, rash, lesions or edema.    NEUROLOGIC: Grossly intact.    LABS:                        14.8   16.8  )-----------( 302      ( 08 Dec 2017 08:34 )             46.9     12-08    140  |  97<L>  |  34.0<H>  ----------------------------<  118<H>  5.2   |  34.0<H>  |  0.76    Ca    8.2<L>      08 Dec 2017 08:34  Phos  4.2     12-08  Mg     2.5     12-08      PT/INR - ( 07 Dec 2017 01:50 )   PT: 12.2 sec;   INR: 1.11 ratio         PTT - ( 07 Dec 2017 01:50 )  PTT:24.6 sec                    MICROBIOLOGY:    RADIOLOGY & ADDITIONAL STUDIES:

## 2017-12-08 NOTE — CHART NOTE - NSCHARTNOTEFT_GEN_A_CORE
Source: Patient [ x]  Family [ ]   other [ ]    Pt s/p diverting ileostomy secondary to large bowel obstruction    Current Diet: Diet, Consistent Carbohydrate Clear Liquid (12-07-17 @ 17:27)    Patient reports [ ] nausea  [ ] vomiting [ ] diarrhea [ ] constipation  [ ]chewing problems [ ] swallowing issues  [ ] other:     PO intake:  < 50% [ ]   50-75%  [x ]   %  [ ]  other : Pt tolerating clears at this time, prefers sugar free food items    Source for PO intake [x ] Patient [ ] family [ ] chart [ ] staff [ ] other    Current Weight: (12/8) 65kg (11/29) 65kg    % Weight Change - pt seems to be maintaining wt since admission    Pertinent Medications: MEDICATIONS  (STANDING):  ALBUTerol/ipratropium for Nebulization 3 milliLiter(s) Nebulizer every 6 hours  ALBUTerol/ipratropium for Nebulization. 3 milliLiter(s) Nebulizer once  ALPRAZolam 0.25 milliGRAM(s) Oral three times a day  aspirin enteric coated 81 milliGRAM(s) Oral daily  atorvastatin 80 milliGRAM(s) Oral at bedtime  ciprofloxacin   IVPB 400 milliGRAM(s) IV Intermittent once  clopidogrel Tablet 75 milliGRAM(s) Oral daily  dextrose 5%. 1000 milliLiter(s) (50 mL/Hr) IV Continuous <Continuous>  dextrose 50% Injectable 12.5 Gram(s) IV Push once  dextrose 50% Injectable 25 Gram(s) IV Push once  dextrose 50% Injectable 25 Gram(s) IV Push once  diltiazem    milliGRAM(s) Oral <User Schedule>  docusate sodium 100 milliGRAM(s) Oral two times a day  enoxaparin Injectable 40 milliGRAM(s) SubCutaneous daily  Gauifenesin/Dextromethorphan 1200/600mg 1 Tablet(s) 1 Tablet(s) Oral two times a day  insulin glargine Injectable (LANTUS) 5 Unit(s) SubCutaneous at bedtime  insulin lispro (HumaLOG) corrective regimen sliding scale   SubCutaneous Before meals and at bedtime  lactulose Syrup 30 Gram(s) Oral three times a day  levothyroxine 50 MICROGram(s) Oral daily  loratadine 10 milliGRAM(s) Oral daily  pantoprazole    Tablet 40 milliGRAM(s) Oral before breakfast  predniSONE   Tablet 60 milliGRAM(s) Oral daily  senna 2 Tablet(s) Oral at bedtime    MEDICATIONS  (PRN):  acetylcysteine 20% Inhalation 1 milliLiter(s) Inhalation every 4 hours PRN poor cough clearance  ALBUTerol    0.083% 2.5 milliGRAM(s) Nebulizer every 4 hours PRN Shortness of Breath and/or Wheezing  bisacodyl Suppository 10 milliGRAM(s) Rectal daily PRN Constipation  dextrose Gel 1 Dose(s) Oral once PRN Blood Glucose LESS THAN 70 milliGRAM(s)/deciliter  glucagon  Injectable 1 milliGRAM(s) IntraMuscular once PRN Glucose LESS THAN 70 milligrams/deciliter  ketorolac   Injectable 15 milliGRAM(s) IV Push every 8 hours PRN Severe Pain (7 - 10)  ondansetron Injectable 4 milliGRAM(s) IV Push every 8 hours PRN Nausea and/or Vomiting  polyethylene glycol 3350 17 Gram(s) Oral daily PRN Constipation    Pertinent Labs: CBC Full  -  ( 08 Dec 2017 08:34 )  WBC Count : 16.8 K/uL  Hemoglobin : 14.8 g/dL  Hematocrit : 46.9 %  Platelet Count - Automated : 302 K/uL  Mean Cell Volume : 87.3 fl  Mean Cell Hemoglobin : 27.6 pg  Mean Cell Hemoglobin Concentration : 31.6 g/dL  Auto Neutrophil # : 13.5 K/uL  Auto Lymphocyte # : 1.4 K/uL  Auto Monocyte # : 1.8 K/uL  Auto Eosinophil # : 0.1 K/uL  Auto Basophil # : 0.0 K/uL  Auto Neutrophil % : 80.1 %  Auto Lymphocyte % : 8.2 %  Auto Monocyte % : 10.8 %  Auto Eosinophil % : 0.4 %  Auto Basophil % : 0.1 %  12-08 Na140 mmol/L Glu 118 mg/dL<H> K+ 5.2 mmol/L Cr  0.76 mg/dL BUN 34.0 mg/dL<H> Phos 4.2 mg/dL Alb n/a   PAB n/a       Skin: no skin breakdown noted, +ileostomy    Nutrition focused physical exam conducted - found signs of malnutrition [ ]absent [x ]present    Subcutaneous fat loss: [ ] Orbital fat pads region, [ ]Buccal fat region, [ ]Triceps region,  [ ]Ribs region    Muscle wasting: [x ]Temples region, [ ]Clavicle region, [ ]Shoulder region, [ ]Scapula region, [ ]Interosseous region,  [ ]thigh region, [ ]Calf region    Estimated Needs:   [x ] no change since previous assessment  [ ] recalculated:     Current Nutrition Diagnosis: Pt remains at nutrition risk secondary to increased nutrient needs related to increased physiologic demand with healing as evidenced by pt s/p diverting ileostomy and mild muscle wasting noted at temporal region    Recommendations:   Advance diet to cons cho, low fiber when medically feasible  Monitor weekly weight  MVI daily    Monitoring and Evaluation:   [x ] PO intake [ x] Tolerance to diet prescription [X] Weights  [X] Follow up per protocol [X] Labs:

## 2017-12-08 NOTE — PROGRESS NOTE ADULT - SUBJECTIVE AND OBJECTIVE BOX
HPI/OVERNIGHT EVENTS: Patient seen and examined at beside this AM. No acute events overnight. POD#1 s/p loop ileostomy created under local anesthesia for colonic obstruction. Tolerated procedure well with no complications. Having CLD with no n/v. Requests to continue CLD for today before trying regular diet. Denies abdominal pain at this time. Denies fever, chills, chest pain, SOB, abd pain or any other concerning symptoms. Seen by dietary who recommend multivitamins and monitoring patient's weight.     MEDICATIONS  (STANDING):  ALBUTerol/ipratropium for Nebulization 3 milliLiter(s) Nebulizer every 6 hours  ALBUTerol/ipratropium for Nebulization. 3 milliLiter(s) Nebulizer once  ALPRAZolam 0.25 milliGRAM(s) Oral three times a day  aspirin enteric coated 81 milliGRAM(s) Oral daily  atorvastatin 80 milliGRAM(s) Oral at bedtime  ciprofloxacin   IVPB 400 milliGRAM(s) IV Intermittent once  clopidogrel Tablet 75 milliGRAM(s) Oral daily  dextrose 5%. 1000 milliLiter(s) (50 mL/Hr) IV Continuous <Continuous>  dextrose 50% Injectable 12.5 Gram(s) IV Push once  dextrose 50% Injectable 25 Gram(s) IV Push once  dextrose 50% Injectable 25 Gram(s) IV Push once  diltiazem    milliGRAM(s) Oral <User Schedule>  docusate sodium 100 milliGRAM(s) Oral two times a day  enoxaparin Injectable 40 milliGRAM(s) SubCutaneous daily  Gauifenesin/Dextromethorphan 1200/600mg 1 Tablet(s) 1 Tablet(s) Oral two times a day  insulin glargine Injectable (LANTUS) 5 Unit(s) SubCutaneous at bedtime  insulin lispro (HumaLOG) corrective regimen sliding scale   SubCutaneous Before meals and at bedtime  lactated ringers. 1000 milliLiter(s) (75 mL/Hr) IV Continuous <Continuous>  lactulose Syrup 30 Gram(s) Oral three times a day  levothyroxine 50 MICROGram(s) Oral daily  loratadine 10 milliGRAM(s) Oral daily  pantoprazole    Tablet 40 milliGRAM(s) Oral before breakfast  predniSONE   Tablet 60 milliGRAM(s) Oral daily  senna 2 Tablet(s) Oral at bedtime    MEDICATIONS  (PRN):  acetylcysteine 20% Inhalation 1 milliLiter(s) Inhalation every 4 hours PRN poor cough clearance  ALBUTerol    0.083% 2.5 milliGRAM(s) Nebulizer every 4 hours PRN Shortness of Breath and/or Wheezing  bisacodyl Suppository 10 milliGRAM(s) Rectal daily PRN Constipation  dextrose Gel 1 Dose(s) Oral once PRN Blood Glucose LESS THAN 70 milliGRAM(s)/deciliter  glucagon  Injectable 1 milliGRAM(s) IntraMuscular once PRN Glucose LESS THAN 70 milligrams/deciliter  ketorolac   Injectable 15 milliGRAM(s) IV Push every 8 hours PRN Severe Pain (7 - 10)  ondansetron Injectable 4 milliGRAM(s) IV Push every 8 hours PRN Nausea and/or Vomiting  polyethylene glycol 3350 17 Gram(s) Oral daily PRN Constipation      Vital Signs Last 24 Hrs  T(C): 36.8 (08 Dec 2017 10:17), Max: 36.8 (08 Dec 2017 10:17)  T(F): 98.2 (08 Dec 2017 10:17), Max: 98.2 (08 Dec 2017 10:17)  HR: 81 (08 Dec 2017 10:17) (73 - 98)  BP: 130/85 (08 Dec 2017 10:17) (103/64 - 138/84)  BP(mean): --  RR: 18 (08 Dec 2017 10:17) (16 - 23)  SpO2: 94% (08 Dec 2017 10:17) (93% - 98%)    Constitutional: patient resting comfortably in bed, in no acute distress  HEENT: EOMI / PERRL b/l, no active drainage or redness  Neck: No JVD, full ROM without pain  Respiratory: respirations are unlabored, no accessory muscle use, no conversational dyspnea  Cardiovascular: regular rate & rhythm  Gastrointestinal: Ostomy site with red rubber catheter and malecot catheter drain with adequate output present; abdomen soft, minimally tender, non-distended, no rebound tenderness / guarding  Neurological: GCS: 15 (4/5/6). A&O x 3  Psychiatric: Normal mood, normal affect  Musculoskeletal: No joint pain, swelling or deformity; no limitation of movement    I&O's Detail    07 Dec 2017 07:01  -  08 Dec 2017 07:00  --------------------------------------------------------  IN:  Total IN: 0 mL    OUT:    Ileostomy: 2150 mL    Indwelling Catheter - Urethral: 900 mL    Voided: 480 mL  Total OUT: 3530 mL    Total NET: -3530 mL      08 Dec 2017 07:01  -  08 Dec 2017 10:48  --------------------------------------------------------  IN:  Total IN: 0 mL    OUT:    Ileostomy: 450 mL  Total OUT: 450 mL    Total NET: -450 mL          LABS:                        14.8   16.8  )-----------( 302      ( 08 Dec 2017 08:34 )             46.9     12-08    140  |  97<L>  |  34.0<H>  ----------------------------<  118<H>  5.2   |  34.0<H>  |  0.76    Ca    8.2<L>      08 Dec 2017 08:34  Phos  4.2     12-08  Mg     2.5     12-08      PT/INR - ( 07 Dec 2017 01:50 )   PT: 12.2 sec;   INR: 1.11 ratio         PTT - ( 07 Dec 2017 01:50 )  PTT:24.6 sec

## 2017-12-08 NOTE — PROGRESS NOTE ADULT - ASSESSMENT
Patient is a 67 year old male with PMH of COPD stage 4 s/p right lung reduction in 2010 (on home oxygen 4L (sat 92-93%), HTN, CVA on 2014, DM, hypothyroidism, hydrocele, and nephrolithiasis who is admitted for a COPD exacerbation. Patient has been weaned off ventimask to 4 liters nasal cannula and respiratory status is stable. CTA of the chest was negative for PE but showed bilateral lower lobe infiltrates with mucous plugging for which he was started on Levaquin.  Respiratory status stable and steroids are being tapered. Hospital course complicated by constipation, which had not improved despite aggressive bowel regimen, laxatives and enemas. XRAY with large stool burden. CT with diaphragmatic hernia and patient was consequently taken to the OR for loop ileostomy under local anesthesia.     Plan:    1. Constipation  -history of chronic constipation likely from hypothyroidism   -acute onset possibly related to motility disorder related to large diaphragmatic hernia  -no BM despite stool softeners, laxatives, and enemas  -AXR with large stool burden.    -CT abdomen reviewed - ileus vs obstipation.   -KUB with diffuse dilation  -GI unable to proceed with flexible sigmoidoscopy due to inability to prep  -s/p loop ileostomy on 12/7 under local anesthesia    -large output from ostomy   -IV hydration to keep input = output  -clear liquid diet  -GI and surgery recommendations appreciated    2. Acute on chronic hypoxic respiratory failure - stable  -secondary to COPD exacerbation  -on home oxygen (4 liters)  -respiratory status stable on 4 liters  -bicarb elevated likely due to metabolic compensation for resp acidosis although patient refuses blood gases    -continue bronchodilators and continue to taper prednisone (change to 40 mg in AM)  -continue Mucomyst PRN  -completed course of Levaquin   -OOB to chair  -CM on board to arrange for Home Oxygen   -Overall prognosis very guarded - palliative care consult appreciated. Patient not ready to address advanced directives.    3. CAP  -imaging with infiltrates and mucous plugging  -productive cough with tan mucous improving  -Procal very minimally elevated  -complete course of Levaquin on 12/5   -ID evaluation appreciated - observe off antibiotics  -received 1 dose of Cipro post op on 12/7  -monitor WBC     4. Hypertension  -BP acceptable  -continue cardizem    5. Hyperlipidemia   -continue statin    6. Hyperglycemia  -HbA1c 6.1 - pre-diabetic   -likely steroid induced  -continue sliding scale and lantus    7. Scrotal edema  -chronic  -patient following with  as outpatient  -recommend scrotal elevation  -CT without bladder distention  -Roy in place; trial and void in AM    8. Right femoral artery dissection  -incidental finding on CT  -no intervention as per vascular    9. Hypermagnesemia - resolved  -after receiving mag citrate on 12/5  -magnesium trending down s/p IV lasix and hydration    10. DVT Prophylaxis - Lovenox resumed post-op    Plan discussed with patient, wife, RN and surgery PA

## 2017-12-08 NOTE — PROGRESS NOTE ADULT - ASSESSMENT
COPD at baseline  Residual mucous plugging  will sign off with following recommendations    Plan:  ambulate  incentive rajwinder  taper prednisone over 12 days  Complete ABX  Home O2 if room air sats <88%  resume pulm meds on dc  Home nebulizer with albuterol  f/u with us in 2wks  recall prn

## 2017-12-09 LAB
GLUCOSE BLDC GLUCOMTR-MCNC: 129 MG/DL — HIGH (ref 70–99)
GLUCOSE BLDC GLUCOMTR-MCNC: 133 MG/DL — HIGH (ref 70–99)
GLUCOSE BLDC GLUCOMTR-MCNC: 181 MG/DL — HIGH (ref 70–99)
GLUCOSE BLDC GLUCOMTR-MCNC: 226 MG/DL — HIGH (ref 70–99)
OB PNL GAST: POSITIVE

## 2017-12-09 PROCEDURE — 99233 SBSQ HOSP IP/OBS HIGH 50: CPT | Mod: GC

## 2017-12-09 RX ORDER — TAMSULOSIN HYDROCHLORIDE 0.4 MG/1
0.4 CAPSULE ORAL AT BEDTIME
Qty: 0 | Refills: 0 | Status: DISCONTINUED | OUTPATIENT
Start: 2017-12-09 | End: 2018-01-16

## 2017-12-09 RX ORDER — ACETAMINOPHEN 500 MG
650 TABLET ORAL EVERY 6 HOURS
Qty: 0 | Refills: 0 | Status: DISCONTINUED | OUTPATIENT
Start: 2017-12-09 | End: 2018-01-16

## 2017-12-09 RX ORDER — SODIUM CHLORIDE 9 MG/ML
1000 INJECTION INTRAMUSCULAR; INTRAVENOUS; SUBCUTANEOUS
Qty: 0 | Refills: 0 | Status: DISCONTINUED | OUTPATIENT
Start: 2017-12-09 | End: 2017-12-10

## 2017-12-09 RX ORDER — ONDANSETRON 8 MG/1
4 TABLET, FILM COATED ORAL ONCE
Qty: 0 | Refills: 0 | Status: COMPLETED | OUTPATIENT
Start: 2017-12-09 | End: 2017-12-09

## 2017-12-09 RX ORDER — ACETAMINOPHEN 500 MG
1000 TABLET ORAL ONCE
Qty: 0 | Refills: 0 | Status: COMPLETED | OUTPATIENT
Start: 2017-12-09 | End: 2017-12-10

## 2017-12-09 RX ADMIN — CLOPIDOGREL BISULFATE 75 MILLIGRAM(S): 75 TABLET, FILM COATED ORAL at 12:37

## 2017-12-09 RX ADMIN — Medication 1 TABLET(S): at 12:44

## 2017-12-09 RX ADMIN — Medication 2: at 12:35

## 2017-12-09 RX ADMIN — Medication 1: at 22:16

## 2017-12-09 RX ADMIN — LORATADINE 10 MILLIGRAM(S): 10 TABLET ORAL at 12:38

## 2017-12-09 RX ADMIN — PANTOPRAZOLE SODIUM 40 MILLIGRAM(S): 20 TABLET, DELAYED RELEASE ORAL at 06:10

## 2017-12-09 RX ADMIN — Medication 81 MILLIGRAM(S): at 12:37

## 2017-12-09 RX ADMIN — Medication 180 MILLIGRAM(S): at 06:08

## 2017-12-09 RX ADMIN — SODIUM CHLORIDE 75 MILLILITER(S): 9 INJECTION INTRAMUSCULAR; INTRAVENOUS; SUBCUTANEOUS at 06:08

## 2017-12-09 RX ADMIN — ONDANSETRON 4 MILLIGRAM(S): 8 TABLET, FILM COATED ORAL at 18:29

## 2017-12-09 RX ADMIN — Medication 0.25 MILLIGRAM(S): at 20:20

## 2017-12-09 RX ADMIN — Medication 3 MILLILITER(S): at 08:18

## 2017-12-09 RX ADMIN — INSULIN GLARGINE 5 UNIT(S): 100 INJECTION, SOLUTION SUBCUTANEOUS at 22:15

## 2017-12-09 RX ADMIN — Medication 0.25 MILLIGRAM(S): at 13:53

## 2017-12-09 RX ADMIN — ONDANSETRON 4 MILLIGRAM(S): 8 TABLET, FILM COATED ORAL at 23:45

## 2017-12-09 RX ADMIN — ENOXAPARIN SODIUM 40 MILLIGRAM(S): 100 INJECTION SUBCUTANEOUS at 12:37

## 2017-12-09 RX ADMIN — Medication 50 MICROGRAM(S): at 06:08

## 2017-12-09 RX ADMIN — Medication 0.25 MILLIGRAM(S): at 06:08

## 2017-12-09 RX ADMIN — Medication 3 MILLILITER(S): at 15:33

## 2017-12-09 RX ADMIN — Medication 180 MILLIGRAM(S): at 17:40

## 2017-12-09 RX ADMIN — Medication 40 MILLIGRAM(S): at 06:08

## 2017-12-09 RX ADMIN — SODIUM CHLORIDE 100 MILLILITER(S): 9 INJECTION INTRAMUSCULAR; INTRAVENOUS; SUBCUTANEOUS at 17:41

## 2017-12-09 NOTE — PROGRESS NOTE ADULT - SUBJECTIVE AND OBJECTIVE BOX
INTERVAL HPI/OVERNIGHT EVENTS: No acute events overnight.    SUBJECTIVE: Pain is controlled. Copious stool and gas output from loop ileostomy. Tolerating diet without n/v/d. No chest pain, dyspnea, fevers, chills. Doing well overall.      MEDICATIONS  (STANDING):  ALBUTerol/ipratropium for Nebulization 3 milliLiter(s) Nebulizer every 6 hours  ALBUTerol/ipratropium for Nebulization. 3 milliLiter(s) Nebulizer once  ALPRAZolam 0.25 milliGRAM(s) Oral three times a day  aspirin enteric coated 81 milliGRAM(s) Oral daily  atorvastatin 80 milliGRAM(s) Oral at bedtime  ciprofloxacin   IVPB 400 milliGRAM(s) IV Intermittent once  clopidogrel Tablet 75 milliGRAM(s) Oral daily  dextrose 5%. 1000 milliLiter(s) (50 mL/Hr) IV Continuous <Continuous>  dextrose 50% Injectable 12.5 Gram(s) IV Push once  dextrose 50% Injectable 25 Gram(s) IV Push once  dextrose 50% Injectable 25 Gram(s) IV Push once  diltiazem    milliGRAM(s) Oral <User Schedule>  enoxaparin Injectable 40 milliGRAM(s) SubCutaneous daily  Gauifenesin/Dextromethorphan 1200/600mg 1 Tablet(s) 1 Tablet(s) Oral two times a day  insulin glargine Injectable (LANTUS) 5 Unit(s) SubCutaneous at bedtime  insulin lispro (HumaLOG) corrective regimen sliding scale   SubCutaneous Before meals and at bedtime  levothyroxine 50 MICROGram(s) Oral daily  loratadine 10 milliGRAM(s) Oral daily  multivitamin 1 Tablet(s) Oral daily  pantoprazole    Tablet 40 milliGRAM(s) Oral before breakfast  predniSONE   Tablet 40 milliGRAM(s) Oral daily  senna 2 Tablet(s) Oral at bedtime    MEDICATIONS  (PRN):  acetylcysteine 20% Inhalation 1 milliLiter(s) Inhalation every 4 hours PRN poor cough clearance  ALBUTerol    0.083% 2.5 milliGRAM(s) Nebulizer every 4 hours PRN Shortness of Breath and/or Wheezing  dextrose Gel 1 Dose(s) Oral once PRN Blood Glucose LESS THAN 70 milliGRAM(s)/deciliter  glucagon  Injectable 1 milliGRAM(s) IntraMuscular once PRN Glucose LESS THAN 70 milligrams/deciliter  ketorolac   Injectable 15 milliGRAM(s) IV Push every 8 hours PRN Severe Pain (7 - 10)  ondansetron Injectable 4 milliGRAM(s) IV Push every 8 hours PRN Nausea and/or Vomiting  polyethylene glycol 3350 17 Gram(s) Oral daily PRN Constipation      Vital Signs Last 24 Hrs  T(C): 36.7 (09 Dec 2017 04:50), Max: 36.8 (08 Dec 2017 10:17)  T(F): 98 (09 Dec 2017 04:50), Max: 98.2 (08 Dec 2017 10:17)  HR: 92 (09 Dec 2017 04:50) (81 - 110)  BP: 120/76 (09 Dec 2017 04:50) (113/78 - 130/85)  BP(mean): --  RR: 20 (09 Dec 2017 04:50) (18 - 20)  SpO2: 94% (09 Dec 2017 08:18) (93% - 95%)    NAD, AOx3, resting comfortably in bed  No respiratory distress  Abdomen soft, nontender, nondistended. Normal bowel sounds. No guarding or rebound tenderness.  Loop ileostomy bag with liquid brown stool and gas  No peripheral edema. Normal ROM.      I&O's Detail    08 Dec 2017 07:01  -  09 Dec 2017 07:00  --------------------------------------------------------  IN:    lactated ringers.: 450 mL    Oral Fluid: 420 mL    sodium chloride 0.9%: 825 mL  Total IN: 1695 mL    OUT:    Ileostomy: 4400 mL    Indwelling Catheter - Urethral: 1000 mL  Total OUT: 5400 mL    Total NET: -3705 mL          LABS:                        14.8   16.8  )-----------( 302      ( 08 Dec 2017 08:34 )             46.9     12-08    140  |  97<L>  |  34.0<H>  ----------------------------<  118<H>  5.2   |  34.0<H>  |  0.76    Ca    8.2<L>      08 Dec 2017 08:34  Phos  4.2     12-08  Mg     2.5     12-08

## 2017-12-09 NOTE — PROGRESS NOTE ADULT - ASSESSMENT
68 y/o M POD2 s/p loop ileostomy. Ostomy functional. Hemodynamically stable.     -Continue pain control  -Tolerating diabetic clears, will advance to regular diabetic w/ snacks and 1 glucerna shake per meal  -Encourage OOB, ambulation, IS  -Continue home medications  -Bowel regimen  -DVT ppx: lovenox, ASA, plavix

## 2017-12-09 NOTE — PROGRESS NOTE ADULT - SUBJECTIVE AND OBJECTIVE BOX
CHIEF COMPLAINT/INTERVAL HISTORY: 67 year old  Male presents with a chief complaint of SOB (29 Nov 2017 10:08)    SUBJECTIVE & OBJECTIVE: Pt seen and examined at bedside. Large output from ostomy site.   C/o generalized weakness, nervous   Other ROS reviewed and neg     Vital Signs Last 24 Hrs  T(C): 36.7 (09 Dec 2017 04:50), Max: 36.7 (09 Dec 2017 04:50)  T(F): 98 (09 Dec 2017 04:50), Max: 98 (09 Dec 2017 04:50)  HR: 118 (09 Dec 2017 11:12) (84 - 118)  BP: 116/80 (09 Dec 2017 11:12) (113/78 - 120/76)  RR: 18 (09 Dec 2017 11:12) (18 - 20)  SpO2: 94% (09 Dec 2017 08:18) (93% - 95%)                       14.8   16.8  )-----------( 302      ( 08 Dec 2017 08:34 )             46.9     08 Dec 2017 08:34    140    |  97     |  34.0   ----------------------------<  118    5.2     |  34.0   |  0.76     Ca    8.2        08 Dec 2017 08:34  Phos  4.2       08 Dec 2017 08:34  Mg     2.5       08 Dec 2017 08:34    POCT Blood Glucose.: 226 mg/dL (09 Dec 2017 12:19)  POCT Blood Glucose.: 133 mg/dL (09 Dec 2017 07:59)  POCT Blood Glucose.: 134 mg/dL (08 Dec 2017 21:56)  POCT Blood Glucose.: 149 mg/dL (08 Dec 2017 16:20)    MEDICATIONS  (STANDING):  ALBUTerol/ipratropium for Nebulization 3 milliLiter(s) Nebulizer every 6 hours  ALBUTerol/ipratropium for Nebulization. 3 milliLiter(s) Nebulizer once  ALPRAZolam 0.25 milliGRAM(s) Oral three times a day  aspirin enteric coated 81 milliGRAM(s) Oral daily  atorvastatin 80 milliGRAM(s) Oral at bedtime  ciprofloxacin   IVPB 400 milliGRAM(s) IV Intermittent once  clopidogrel Tablet 75 milliGRAM(s) Oral daily  dextrose 5%. 1000 milliLiter(s) (50 mL/Hr) IV Continuous <Continuous>  dextrose 50% Injectable 12.5 Gram(s) IV Push once  dextrose 50% Injectable 25 Gram(s) IV Push once  dextrose 50% Injectable 25 Gram(s) IV Push once  diltiazem    milliGRAM(s) Oral <User Schedule>  enoxaparin Injectable 40 milliGRAM(s) SubCutaneous daily  Gauifenesin/Dextromethorphan 1200/600mg 1 Tablet(s) 1 Tablet(s) Oral two times a day  insulin glargine Injectable (LANTUS) 5 Unit(s) SubCutaneous at bedtime  insulin lispro (HumaLOG) corrective regimen sliding scale   SubCutaneous Before meals and at bedtime  levothyroxine 50 MICROGram(s) Oral daily  loratadine 10 milliGRAM(s) Oral daily  multivitamin 1 Tablet(s) Oral daily  pantoprazole    Tablet 40 milliGRAM(s) Oral before breakfast  predniSONE   Tablet 40 milliGRAM(s) Oral daily  senna 2 Tablet(s) Oral at bedtime  sodium chloride 0.9%. 1000 milliLiter(s) (100 mL/Hr) IV Continuous <Continuous>  tamsulosin 0.4 milliGRAM(s) Oral at bedtime    MEDICATIONS  (PRN):  acetylcysteine 20% Inhalation 1 milliLiter(s) Inhalation every 4 hours PRN poor cough clearance  ALBUTerol    0.083% 2.5 milliGRAM(s) Nebulizer every 4 hours PRN Shortness of Breath and/or Wheezing  dextrose Gel 1 Dose(s) Oral once PRN Blood Glucose LESS THAN 70 milliGRAM(s)/deciliter  glucagon  Injectable 1 milliGRAM(s) IntraMuscular once PRN Glucose LESS THAN 70 milligrams/deciliter  ketorolac   Injectable 15 milliGRAM(s) IV Push every 8 hours PRN Severe Pain (7 - 10)  ondansetron Injectable 4 milliGRAM(s) IV Push every 8 hours PRN Nausea and/or Vomiting  polyethylene glycol 3350 17 Gram(s) Oral daily PRN Constipation    RADIOLOGY & ADDITIONAL TESTS: personally visualized      PHYSICAL EXAM:  GENERAL: elderly male, comfortable in the chair, chronically ill appearing  HEAD:  Atraumatic, Normocephalic  EYES: EOMI, PERRLA, conjunctiva and sclera clear  ENMT: Moist mucous membranes  NECK: Supple, no JVD  NERVOUS SYSTEM:  Alert & Oriented X3 in NAD  CHEST/LUNG: coarse breath sounds minimally decreased at bases  HEART: Regular rate and rhythm; +S1/S2  ABDOMEN: Soft, NT, + BS, +ileostomy with large output >3L  : scrotal edema, +whitman catheter with clear urine  EXTREMITIES:  No edema

## 2017-12-09 NOTE — PROGRESS NOTE ADULT - ASSESSMENT
67 year old male with PMH of COPD stage 4 s/p right lung reduction in 2010 (on home oxygen 4L (sat 92-93%), HTN, CVA on 2014, DM, hypothyroidism, hydrocele, and nephrolithiasis who is admitted for a COPD exacerbation. Patient has been weaned off ventimask to 4 liters nasal cannula and respiratory status is stable. CTA of the chest was negative for PE but showed bilateral lower lobe infiltrates with mucous plugging for which he was started on Levaquin.  Respiratory status stable and steroids are being tapered. Hospital course complicated by constipation, which had not improved despite aggressive bowel regimen, laxatives and enemas. XRAY with large stool burden. CT with diaphragmatic hernia and patient was consequently taken to the OR for loop ileostomy under local anesthesia.     Plan:    1. Constipation s/p loop ileostomy on 12/7 under local anesthesia  with large volume output - restart IVF, diet advanced as per surgical zohaib    2. Acute on chronic hypoxic respiratory failure secondary to COPD exacerbation - now resolved, on home oxygen (4 liters)  - bicarb elevated likely due to metabolic compensation for resp acidosis although patient refuses blood gases    - continue bronchodilators and continue to taper prednisone (change to 40 mg in AM)  - continue Mucomyst PRN  - completed course of Levaquin     3. CAP - completed course of Levaquin on 12/5     4. Hypertension - BP acceptable  - continue cardizem    5. Hyperlipidemia   - continue statin    6. Hyperglycemia  - HbA1c 6.1 - pre-diabetic   - likely steroid induced  - continue sliding scale and lantus    7. Scrotal edema chronic with urinary retention - start flomax and voiding trial in 48 hours  - patient following with  as outpatient  - recommend scrotal elevation      8. Right femoral artery dissection  - incidental finding on CT, no intervention as per vascular    9. DVT Prophylaxis - Lovenox

## 2017-12-10 LAB
ANION GAP SERPL CALC-SCNC: 14 MMOL/L — SIGNIFICANT CHANGE UP (ref 5–17)
BUN SERPL-MCNC: 24 MG/DL — HIGH (ref 8–20)
CALCIUM SERPL-MCNC: 8.3 MG/DL — LOW (ref 8.6–10.2)
CHLORIDE SERPL-SCNC: 96 MMOL/L — LOW (ref 98–107)
CO2 SERPL-SCNC: 28 MMOL/L — SIGNIFICANT CHANGE UP (ref 22–29)
CREAT SERPL-MCNC: 0.57 MG/DL — SIGNIFICANT CHANGE UP (ref 0.5–1.3)
GLUCOSE SERPL-MCNC: 120 MG/DL — HIGH (ref 70–115)
HCT VFR BLD CALC: 41.2 % — LOW (ref 42–52)
HGB BLD-MCNC: 13.8 G/DL — LOW (ref 14–18)
MAGNESIUM SERPL-MCNC: 2 MG/DL — SIGNIFICANT CHANGE UP (ref 1.6–2.6)
MCHC RBC-ENTMCNC: 28.5 PG — SIGNIFICANT CHANGE UP (ref 27–31)
MCHC RBC-ENTMCNC: 33.5 G/DL — SIGNIFICANT CHANGE UP (ref 32–36)
MCV RBC AUTO: 85.1 FL — SIGNIFICANT CHANGE UP (ref 80–94)
PLATELET # BLD AUTO: 275 K/UL — SIGNIFICANT CHANGE UP (ref 150–400)
POTASSIUM SERPL-MCNC: 3.9 MMOL/L — SIGNIFICANT CHANGE UP (ref 3.5–5.3)
POTASSIUM SERPL-SCNC: 3.9 MMOL/L — SIGNIFICANT CHANGE UP (ref 3.5–5.3)
RBC # BLD: 4.84 M/UL — SIGNIFICANT CHANGE UP (ref 4.6–6.2)
RBC # FLD: 14.2 % — SIGNIFICANT CHANGE UP (ref 11–15.6)
SODIUM SERPL-SCNC: 138 MMOL/L — SIGNIFICANT CHANGE UP (ref 135–145)
WBC # BLD: 20.6 K/UL — HIGH (ref 4.8–10.8)
WBC # FLD AUTO: 20.6 K/UL — HIGH (ref 4.8–10.8)

## 2017-12-10 PROCEDURE — 99233 SBSQ HOSP IP/OBS HIGH 50: CPT | Mod: GC

## 2017-12-10 PROCEDURE — 74000: CPT | Mod: 26

## 2017-12-10 RX ORDER — SODIUM CHLORIDE 9 MG/ML
1000 INJECTION, SOLUTION INTRAVENOUS
Qty: 0 | Refills: 0 | Status: DISCONTINUED | OUTPATIENT
Start: 2017-12-10 | End: 2017-12-14

## 2017-12-10 RX ADMIN — Medication 0.25 MILLIGRAM(S): at 15:11

## 2017-12-10 RX ADMIN — SODIUM CHLORIDE 100 MILLILITER(S): 9 INJECTION, SOLUTION INTRAVENOUS at 22:59

## 2017-12-10 RX ADMIN — Medication 400 MILLIGRAM(S): at 00:30

## 2017-12-10 RX ADMIN — SENNA PLUS 2 TABLET(S): 8.6 TABLET ORAL at 21:49

## 2017-12-10 RX ADMIN — Medication 0.25 MILLIGRAM(S): at 21:49

## 2017-12-10 RX ADMIN — SODIUM CHLORIDE 100 MILLILITER(S): 9 INJECTION, SOLUTION INTRAVENOUS at 01:34

## 2017-12-10 RX ADMIN — ONDANSETRON 4 MILLIGRAM(S): 8 TABLET, FILM COATED ORAL at 06:15

## 2017-12-10 RX ADMIN — ATORVASTATIN CALCIUM 80 MILLIGRAM(S): 80 TABLET, FILM COATED ORAL at 21:50

## 2017-12-10 RX ADMIN — Medication 1000 MILLIGRAM(S): at 01:00

## 2017-12-10 RX ADMIN — TAMSULOSIN HYDROCHLORIDE 0.4 MILLIGRAM(S): 0.4 CAPSULE ORAL at 21:49

## 2017-12-10 NOTE — PROGRESS NOTE ADULT - ASSESSMENT
68 y/o M POD3 s/p loop ileostomy. Malecot catheter dislodge last night, replaced with two red rubber catheters with improvement in ostomy output. Hemodynamically stable.     Plan:  -Continue pain control  -Continue consistent carb diet w/ snacks and 1 glucerna shake per meal  -Encourage OOB, ambulation, IS  -Continue home medications  -Bowel regimen  -DVT ppx: lovenox, ASA, plavix

## 2017-12-10 NOTE — PROGRESS NOTE ADULT - SUBJECTIVE AND OBJECTIVE BOX
INTERVAL HPI/OVERNIGHT EVENTS: Episode of nausea and vomiting yesterday evening. Malecot catheter dislodged from ostomy, and replaced with two red rubber catheters. Ostomy function resumed.     SUBJECTIVE: 1 episode of n/v last night. Out of bed to chair. No fevers, chills, chest pain, dyspnea. Adequate urine output. Large ostomy output since surgery, receiving IVF @ 100 cc/hr.       MEDICATIONS  (STANDING):  ALBUTerol/ipratropium for Nebulization 3 milliLiter(s) Nebulizer every 6 hours  ALBUTerol/ipratropium for Nebulization. 3 milliLiter(s) Nebulizer once  ALPRAZolam 0.25 milliGRAM(s) Oral three times a day  aspirin enteric coated 81 milliGRAM(s) Oral daily  atorvastatin 80 milliGRAM(s) Oral at bedtime  ciprofloxacin   IVPB 400 milliGRAM(s) IV Intermittent once  clopidogrel Tablet 75 milliGRAM(s) Oral daily  dextrose 5%. 1000 milliLiter(s) (50 mL/Hr) IV Continuous <Continuous>  dextrose 50% Injectable 12.5 Gram(s) IV Push once  dextrose 50% Injectable 25 Gram(s) IV Push once  dextrose 50% Injectable 25 Gram(s) IV Push once  diltiazem    milliGRAM(s) Oral <User Schedule>  enoxaparin Injectable 40 milliGRAM(s) SubCutaneous daily  Gauifenesin/Dextromethorphan 1200/600mg 1 Tablet(s) 1 Tablet(s) Oral two times a day  insulin glargine Injectable (LANTUS) 5 Unit(s) SubCutaneous at bedtime  insulin lispro (HumaLOG) corrective regimen sliding scale   SubCutaneous Before meals and at bedtime  lactated ringers. 1000 milliLiter(s) (100 mL/Hr) IV Continuous <Continuous>  levothyroxine 50 MICROGram(s) Oral daily  loratadine 10 milliGRAM(s) Oral daily  multivitamin 1 Tablet(s) Oral daily  pantoprazole    Tablet 40 milliGRAM(s) Oral before breakfast  predniSONE   Tablet 40 milliGRAM(s) Oral daily  senna 2 Tablet(s) Oral at bedtime  tamsulosin 0.4 milliGRAM(s) Oral at bedtime    MEDICATIONS  (PRN):  acetaminophen  Suppository. 650 milliGRAM(s) Rectal every 6 hours PRN Moderate Pain (4 - 6)  acetylcysteine 20% Inhalation 1 milliLiter(s) Inhalation every 4 hours PRN poor cough clearance  ALBUTerol    0.083% 2.5 milliGRAM(s) Nebulizer every 4 hours PRN Shortness of Breath and/or Wheezing  dextrose Gel 1 Dose(s) Oral once PRN Blood Glucose LESS THAN 70 milliGRAM(s)/deciliter  glucagon  Injectable 1 milliGRAM(s) IntraMuscular once PRN Glucose LESS THAN 70 milligrams/deciliter  ondansetron Injectable 4 milliGRAM(s) IV Push every 8 hours PRN Nausea and/or Vomiting  polyethylene glycol 3350 17 Gram(s) Oral daily PRN Constipation      Vital Signs Last 24 Hrs  T(C): 36.6 (10 Dec 2017 05:25), Max: 36.9 (09 Dec 2017 16:18)  T(F): 97.8 (10 Dec 2017 05:25), Max: 98.5 (09 Dec 2017 16:18)  HR: 96 (10 Dec 2017 05:25) (96 - 118)  BP: 119/71 (10 Dec 2017 05:25) (108/71 - 149/88)  BP(mean): --  RR: 18 (10 Dec 2017 05:25) (18 - 20)  SpO2: 98% (10 Dec 2017 05:25) (98% - 98%)    NAD, AOx3, resting comfortably in bed  No respiratory distress  Abdomen soft, nontender, nondistended. Normal bowel sounds. No guarding or rebound tenderness.  Loop ileostomy bag with liquid brown stool and gas  No peripheral edema. Normal ROM.      I&O's Detail    09 Dec 2017 07:01  -  10 Dec 2017 07:00  --------------------------------------------------------  IN:    lactated ringers.: 600 mL    Oral Fluid: 1810 mL    sodium chloride 0.9%: 1400 mL    sodium chloride 0.9%: 300 mL  Total IN: 4110 mL    OUT:    Ileostomy: 2550 mL    Indwelling Catheter - Urethral: 1000 mL  Total OUT: 3550 mL    Total NET: 560 mL          LABS:                        13.8   20.6  )-----------( 275      ( 10 Dec 2017 08:07 )             41.2     12-10    138  |  96<L>  |  24.0<H>  ----------------------------<  120<H>  3.9   |  28.0  |  0.57    Ca    8.3<L>      10 Dec 2017 08:07  Mg     2.0     12-10

## 2017-12-10 NOTE — PROGRESS NOTE ADULT - SUBJECTIVE AND OBJECTIVE BOX
CHIEF COMPLAINT/INTERVAL HISTORY: 67 year old  Male presents with a chief complaint of SOB (29 Nov 2017 10:08)    SUBJECTIVE & OBJECTIVE: Pt seen and examined at bedside. Large output from ostomy site.   C/o generalized weakness, nervous.  12/9 pt had episode of dislodging drain from ileostomy site, followed by N/V and decreased po intake  Other ROS reviewed and neg     Vital Signs Last 24 Hrs  T(C): 36.8 (10 Dec 2017 12:07), Max: 36.9 (09 Dec 2017 16:18)  T(F): 98.2 (10 Dec 2017 12:07), Max: 98.5 (09 Dec 2017 16:18)  HR: 117 (10 Dec 2017 12:07) (96 - 117)  BP: 128/62 (10 Dec 2017 12:07) (108/71 - 149/88)  RR: 18 (10 Dec 2017 12:07) (18 - 20)  SpO2: 98% (10 Dec 2017 05:25) (98% - 98%)                        13.8   20.6  )-----------( 275      ( 10 Dec 2017 08:07 )             41.2     10 Dec 2017 08:07    138    |  96     |  24.0   ----------------------------<  120    3.9     |  28.0   |  0.57     Ca    8.3        10 Dec 2017 08:07  Mg     2.0       10 Dec 2017 08:07    POCT Blood Glucose.: 181 mg/dL (09 Dec 2017 20:22)  POCT Blood Glucose.: 129 mg/dL (09 Dec 2017 16:51)    MEDICATIONS  (STANDING):  ALBUTerol/ipratropium for Nebulization 3 milliLiter(s) Nebulizer every 6 hours  ALBUTerol/ipratropium for Nebulization. 3 milliLiter(s) Nebulizer once  ALPRAZolam 0.25 milliGRAM(s) Oral three times a day  aspirin enteric coated 81 milliGRAM(s) Oral daily  atorvastatin 80 milliGRAM(s) Oral at bedtime  ciprofloxacin   IVPB 400 milliGRAM(s) IV Intermittent once  clopidogrel Tablet 75 milliGRAM(s) Oral daily  dextrose 5%. 1000 milliLiter(s) (50 mL/Hr) IV Continuous <Continuous>  dextrose 50% Injectable 12.5 Gram(s) IV Push once  dextrose 50% Injectable 25 Gram(s) IV Push once  dextrose 50% Injectable 25 Gram(s) IV Push once  diltiazem    milliGRAM(s) Oral <User Schedule>  enoxaparin Injectable 40 milliGRAM(s) SubCutaneous daily  Gauifenesin/Dextromethorphan 1200/600mg 1 Tablet(s) 1 Tablet(s) Oral two times a day  insulin glargine Injectable (LANTUS) 5 Unit(s) SubCutaneous at bedtime  insulin lispro (HumaLOG) corrective regimen sliding scale   SubCutaneous Before meals and at bedtime  levothyroxine 50 MICROGram(s) Oral daily  loratadine 10 milliGRAM(s) Oral daily  multivitamin 1 Tablet(s) Oral daily  pantoprazole    Tablet 40 milliGRAM(s) Oral before breakfast  predniSONE   Tablet 40 milliGRAM(s) Oral daily  senna 2 Tablet(s) Oral at bedtime  sodium chloride 0.9%. 1000 milliLiter(s) (100 mL/Hr) IV Continuous <Continuous>  tamsulosin 0.4 milliGRAM(s) Oral at bedtime    MEDICATIONS  (PRN):  acetylcysteine 20% Inhalation 1 milliLiter(s) Inhalation every 4 hours PRN poor cough clearance  ALBUTerol    0.083% 2.5 milliGRAM(s) Nebulizer every 4 hours PRN Shortness of Breath and/or Wheezing  dextrose Gel 1 Dose(s) Oral once PRN Blood Glucose LESS THAN 70 milliGRAM(s)/deciliter  glucagon  Injectable 1 milliGRAM(s) IntraMuscular once PRN Glucose LESS THAN 70 milligrams/deciliter  ketorolac   Injectable 15 milliGRAM(s) IV Push every 8 hours PRN Severe Pain (7 - 10)  ondansetron Injectable 4 milliGRAM(s) IV Push every 8 hours PRN Nausea and/or Vomiting  polyethylene glycol 3350 17 Gram(s) Oral daily PRN Constipation    RADIOLOGY & ADDITIONAL TESTS: personally visualized    PHYSICAL EXAM:  GENERAL: elderly male, comfortable in the chair, chronically ill appearing  HEAD:  Atraumatic, Normocephalic  EYES: EOMI, PERRLA, conjunctiva and sclera clear  ENMT: Moist mucous membranes  NECK: Supple, no JVD  NERVOUS SYSTEM:  Alert & Oriented X3 in NAD  CHEST/LUNG: coarse breath sounds minimally decreased at bases  HEART: Regular rate and rhythm; +S1/S2  ABDOMEN: Soft, NT, + BS, +ileostomy with large output >3L  : scrotal edema, +whitman catheter with clear urine  EXTREMITIES:  No edema

## 2017-12-10 NOTE — PROGRESS NOTE ADULT - ASSESSMENT
67 year old male with PMH of COPD stage 4 s/p right lung reduction in 2010 (on home oxygen 4L (sat 92-93%), HTN, CVA on 2014, DM, hypothyroidism, hydrocele, and nephrolithiasis who is admitted for a COPD exacerbation. Patient has been weaned off ventimask to 4 liters nasal cannula and respiratory status is stable. CTA of the chest was negative for PE but showed bilateral lower lobe infiltrates with mucous plugging for which he was started on Levaquin.  Respiratory status stable and steroids are being tapered. Hospital course complicated by constipation, which had not improved despite aggressive bowel regimen, laxatives and enemas. XRAY with large stool burden. CT with diaphragmatic hernia and patient was consequently taken to the OR for loop ileostomy under local anesthesia.     Plan:    1. Constipation s/p loop ileostomy on 12/7 under local anesthesia  with large volume output - restarted IVF, diet advanced as per surgical team - not well tolerated due to edematous stoma with N/V and decreased intake - clinically dehydrated, though BUN improved and urine is light colored - will cont IVF, change to liquid diet, ostomy care - discussed with surgical team    2. Acute on chronic hypoxic respiratory failure secondary to COPD exacerbation - now resolved, on home oxygen (4 liters)  - bicarb elevated likely due to metabolic compensation for resp acidosis although patient refuses blood gases    - continue bronchodilators and continue to taper prednisone (change to 40 mg in AM)  - continue Mucomyst PRN  - completed course of Levaquin     3. CAP - completed course of Levaquin on 12/5     4. Hypertension - BP acceptable  - continue cardizem    5. Hyperlipidemia   - continue statin    6. Hyperglycemia  - HbA1c 6.1 - pre-diabetic   - likely steroid induced  - continue sliding scale and lantus    7. Scrotal edema chronic with urinary retention - started flomax and voiding trial in am  - patient following with  as outpatient  - recommend scrotal elevation      8. Right femoral artery dissection  - incidental finding on CT, no intervention as per vascular    9. DVT Prophylaxis - Lovenox     10. Leukocytosis - due to steroids

## 2017-12-11 LAB
ANION GAP SERPL CALC-SCNC: 14 MMOL/L — SIGNIFICANT CHANGE UP (ref 5–17)
BUN SERPL-MCNC: 24 MG/DL — HIGH (ref 8–20)
CALCIUM SERPL-MCNC: 8.4 MG/DL — LOW (ref 8.6–10.2)
CHLORIDE SERPL-SCNC: 92 MMOL/L — LOW (ref 98–107)
CO2 SERPL-SCNC: 30 MMOL/L — HIGH (ref 22–29)
CREAT SERPL-MCNC: 0.69 MG/DL — SIGNIFICANT CHANGE UP (ref 0.5–1.3)
GLUCOSE SERPL-MCNC: 116 MG/DL — HIGH (ref 70–115)
HCT VFR BLD CALC: 42.8 % — SIGNIFICANT CHANGE UP (ref 42–52)
HGB BLD-MCNC: 14.2 G/DL — SIGNIFICANT CHANGE UP (ref 14–18)
MAGNESIUM SERPL-MCNC: 1.9 MG/DL — SIGNIFICANT CHANGE UP (ref 1.6–2.6)
MCHC RBC-ENTMCNC: 28.7 PG — SIGNIFICANT CHANGE UP (ref 27–31)
MCHC RBC-ENTMCNC: 33.2 G/DL — SIGNIFICANT CHANGE UP (ref 32–36)
MCV RBC AUTO: 86.6 FL — SIGNIFICANT CHANGE UP (ref 80–94)
PLATELET # BLD AUTO: 273 K/UL — SIGNIFICANT CHANGE UP (ref 150–400)
POTASSIUM SERPL-MCNC: 4.2 MMOL/L — SIGNIFICANT CHANGE UP (ref 3.5–5.3)
POTASSIUM SERPL-SCNC: 4.2 MMOL/L — SIGNIFICANT CHANGE UP (ref 3.5–5.3)
PROCALCITONIN SERPL-MCNC: <0.05 NG/ML — SIGNIFICANT CHANGE UP (ref 0–0.04)
RBC # BLD: 4.94 M/UL — SIGNIFICANT CHANGE UP (ref 4.6–6.2)
RBC # FLD: 14.4 % — SIGNIFICANT CHANGE UP (ref 11–15.6)
SODIUM SERPL-SCNC: 136 MMOL/L — SIGNIFICANT CHANGE UP (ref 135–145)
WBC # BLD: 22.6 K/UL — HIGH (ref 4.8–10.8)
WBC # FLD AUTO: 22.6 K/UL — HIGH (ref 4.8–10.8)

## 2017-12-11 PROCEDURE — 99024 POSTOP FOLLOW-UP VISIT: CPT

## 2017-12-11 PROCEDURE — 99233 SBSQ HOSP IP/OBS HIGH 50: CPT | Mod: GC

## 2017-12-11 RX ADMIN — Medication 1 TABLET(S): at 12:55

## 2017-12-11 RX ADMIN — SODIUM CHLORIDE 100 MILLILITER(S): 9 INJECTION, SOLUTION INTRAVENOUS at 21:09

## 2017-12-11 RX ADMIN — SODIUM CHLORIDE 100 MILLILITER(S): 9 INJECTION, SOLUTION INTRAVENOUS at 17:18

## 2017-12-11 RX ADMIN — Medication 3 MILLILITER(S): at 20:51

## 2017-12-11 RX ADMIN — Medication 40 MILLIGRAM(S): at 05:44

## 2017-12-11 RX ADMIN — Medication 180 MILLIGRAM(S): at 05:44

## 2017-12-11 RX ADMIN — Medication 81 MILLIGRAM(S): at 12:54

## 2017-12-11 RX ADMIN — SENNA PLUS 2 TABLET(S): 8.6 TABLET ORAL at 21:09

## 2017-12-11 RX ADMIN — Medication 50 MICROGRAM(S): at 05:43

## 2017-12-11 RX ADMIN — Medication 180 MILLIGRAM(S): at 17:17

## 2017-12-11 RX ADMIN — LORATADINE 10 MILLIGRAM(S): 10 TABLET ORAL at 12:55

## 2017-12-11 RX ADMIN — Medication 3 MILLILITER(S): at 15:14

## 2017-12-11 RX ADMIN — PANTOPRAZOLE SODIUM 40 MILLIGRAM(S): 20 TABLET, DELAYED RELEASE ORAL at 05:45

## 2017-12-11 RX ADMIN — Medication 0.25 MILLIGRAM(S): at 21:09

## 2017-12-11 RX ADMIN — ATORVASTATIN CALCIUM 80 MILLIGRAM(S): 80 TABLET, FILM COATED ORAL at 21:09

## 2017-12-11 RX ADMIN — Medication 0.25 MILLIGRAM(S): at 05:44

## 2017-12-11 RX ADMIN — TAMSULOSIN HYDROCHLORIDE 0.4 MILLIGRAM(S): 0.4 CAPSULE ORAL at 21:09

## 2017-12-11 RX ADMIN — Medication 0.25 MILLIGRAM(S): at 14:13

## 2017-12-11 RX ADMIN — CLOPIDOGREL BISULFATE 75 MILLIGRAM(S): 75 TABLET, FILM COATED ORAL at 12:54

## 2017-12-11 NOTE — PROGRESS NOTE ADULT - ASSESSMENT
67 year old male with PMH of COPD stage 4 s/p right lung reduction in 2010 (on home oxygen 4L (sat 92-93%), HTN, CVA on 2014, DM, hypothyroidism, hydrocele, and nephrolithiasis who is admitted for a COPD exacerbation. Patient has been weaned off ventimask to 4 liters nasal cannula and respiratory status is stable. CTA of the chest was negative for PE but showed bilateral lower lobe infiltrates with mucous plugging for which he was started on Levaquin.  Respiratory status stable and steroids are being tapered. Hospital course complicated by constipation, which had not improved despite aggressive bowel regimen, laxatives and enemas. XRAY with large stool burden. CT with diaphragmatic hernia and patient was consequently taken to the OR for loop ileostomy under local anesthesia.     Plan:    1. Constipation s/p loop ileostomy on 12/7 under local anesthesia  with large volume output - restarted IVF, diet advanced as per surgical team - not well tolerated due to edematous stoma with N/V and decreased intake - clinically dehydrated, though BUN improved and urine is light colored - will cont IVF, tolerates liquid diet - advance in am, ostomy care - discussed with surgical team    2. Acute on chronic hypoxic respiratory failure secondary to COPD exacerbation - now resolved, on home oxygen (4 liters)  - bicarb elevated likely due to metabolic compensation for resp acidosis although patient refuses blood gases    - continue bronchodilators and continue to taper prednisone (changed to 20 mg)  - continue Mucomyst PRN  - completed course of Levaquin     3. CAP - completed course of Levaquin on 12/5     4. Hypertension - BP acceptable  - continue cardizem    5. Hyperlipidemia   - continue statin    6. Hyperglycemia  - HbA1c 6.1 - pre-diabetic   - likely steroid induced  - stop sliding scale and lantus as pt is refusing FS and  controlled glucose level    7. Scrotal edema chronic with urinary retention - started flomax and voiding trial in am  - patient following with  as outpatient  - recommend scrotal elevation      8. Right femoral artery dissection  - incidental finding on CT, no intervention as per vascular    9. DVT Prophylaxis - Lovenox     10. Leukocytosis - due to steroids + reactive s/p ileostomy

## 2017-12-11 NOTE — PROGRESS NOTE ADULT - SUBJECTIVE AND OBJECTIVE BOX
CHIEF COMPLAINT/INTERVAL HISTORY: 67 year old  Male presents with a chief complaint of SOB (29 Nov 2017 10:08)    SUBJECTIVE & OBJECTIVE: Pt seen and examined at bedside. Large output from ostomy site.   C/o generalized weakness, nervous. Today tolerates po, c/o thirst  Other ROS reviewed and neg     Vital Signs Last 24 Hrs  T(C): 36.3 (11 Dec 2017 16:01), Max: 36.3 (10 Dec 2017 20:53)  T(F): 97.4 (11 Dec 2017 16:01), Max: 97.4 (11 Dec 2017 16:01)  HR: 115 (11 Dec 2017 16:01) (110 - 118)  BP: 94/68 (11 Dec 2017 16:01) (94/68 - 135/89)  RR: 18 (11 Dec 2017 10:26) (16 - 18)  SpO2: 94% (11 Dec 2017 05:20) (94% - 97%)                        14.2   22.6  )-----------( 273      ( 11 Dec 2017 05:57 )             42.8     11 Dec 2017 05:57    136    |  92     |  24.0   ----------------------------<  116    4.2     |  30.0   |  0.69     Ca    8.4        11 Dec 2017 05:57  Mg     1.9       11 Dec 2017 05:57    MEDICATIONS  (STANDING):  ALBUTerol/ipratropium for Nebulization 3 milliLiter(s) Nebulizer every 6 hours  ALBUTerol/ipratropium for Nebulization. 3 milliLiter(s) Nebulizer once  ALPRAZolam 0.25 milliGRAM(s) Oral three times a day  aspirin enteric coated 81 milliGRAM(s) Oral daily  atorvastatin 80 milliGRAM(s) Oral at bedtime  clopidogrel Tablet 75 milliGRAM(s) Oral daily  diltiazem    milliGRAM(s) Oral <User Schedule>  enoxaparin Injectable 40 milliGRAM(s) SubCutaneous daily  Gauifenesin/Dextromethorphan 1200/600mg 1 Tablet(s) 1 Tablet(s) Oral two times a day  insulin glargine Injectable (LANTUS) 5 Unit(s) SubCutaneous at bedtime  lactated ringers. 1000 milliLiter(s) (100 mL/Hr) IV Continuous <Continuous>  levothyroxine 50 MICROGram(s) Oral daily  loratadine 10 milliGRAM(s) Oral daily  multivitamin 1 Tablet(s) Oral daily  pantoprazole    Tablet 40 milliGRAM(s) Oral before breakfast  predniSONE   Tablet 20 milliGRAM(s) Oral daily  senna 2 Tablet(s) Oral at bedtime  tamsulosin 0.4 milliGRAM(s) Oral at bedtime    MEDICATIONS  (PRN):  acetaminophen  Suppository. 650 milliGRAM(s) Rectal every 6 hours PRN Moderate Pain (4 - 6)  acetylcysteine 20% Inhalation 1 milliLiter(s) Inhalation every 4 hours PRN poor cough clearance  ALBUTerol    0.083% 2.5 milliGRAM(s) Nebulizer every 4 hours PRN Shortness of Breath and/or Wheezing  ondansetron Injectable 4 milliGRAM(s) IV Push every 8 hours PRN Nausea and/or Vomiting  polyethylene glycol 3350 17 Gram(s) Oral daily PRN Constipation    RADIOLOGY & ADDITIONAL TESTS: personally visualized    PHYSICAL EXAM:  GENERAL: elderly male, comfortable in the chair, chronically ill appearing  HEAD:  Atraumatic, Normocephalic  EYES: EOMI, PERRLA, conjunctiva and sclera clear  ENMT: Moist mucous membranes  NECK: Supple, no JVD  NERVOUS SYSTEM:  Alert & Oriented X3 in NAD  CHEST/LUNG: coarse breath sounds minimally decreased at bases  HEART: Regular rate and rhythm; +S1/S2  ABDOMEN: Soft, NT, + BS, +ileostomy with large output >3L  : scrotal edema, +whitman catheter with clear urine  EXTREMITIES:  No edema

## 2017-12-11 NOTE — PROGRESS NOTE ADULT - ASSESSMENT
68 y/o M POD4 s/p loop ileostomy. Malecot catheter replaced with two red rubber catheters 2 days ago with improvement in ostomy output. Hemodynamically stable.     Plan:  -Continue pain control  -Continue full liquid diet, advance as tolerated  -Encourage OOB, ambulation, IS  -Continue home medications  -Bowel regimen  -DVT ppx: lovenox, ASA, plavix  -Medical management as per primary team

## 2017-12-12 LAB
ANION GAP SERPL CALC-SCNC: 15 MMOL/L — SIGNIFICANT CHANGE UP (ref 5–17)
BUN SERPL-MCNC: 24 MG/DL — HIGH (ref 8–20)
CALCIUM SERPL-MCNC: 8.2 MG/DL — LOW (ref 8.6–10.2)
CHLORIDE SERPL-SCNC: 89 MMOL/L — LOW (ref 98–107)
CO2 SERPL-SCNC: 29 MMOL/L — SIGNIFICANT CHANGE UP (ref 22–29)
CREAT SERPL-MCNC: 0.64 MG/DL — SIGNIFICANT CHANGE UP (ref 0.5–1.3)
GLUCOSE SERPL-MCNC: 175 MG/DL — HIGH (ref 70–115)
HCT VFR BLD CALC: 39.4 % — LOW (ref 42–52)
HGB BLD-MCNC: 13.2 G/DL — LOW (ref 14–18)
MAGNESIUM SERPL-MCNC: 1.8 MG/DL — SIGNIFICANT CHANGE UP (ref 1.6–2.6)
MCHC RBC-ENTMCNC: 28.4 PG — SIGNIFICANT CHANGE UP (ref 27–31)
MCHC RBC-ENTMCNC: 33.5 G/DL — SIGNIFICANT CHANGE UP (ref 32–36)
MCV RBC AUTO: 84.7 FL — SIGNIFICANT CHANGE UP (ref 80–94)
PLATELET # BLD AUTO: 268 K/UL — SIGNIFICANT CHANGE UP (ref 150–400)
POTASSIUM SERPL-MCNC: 4.4 MMOL/L — SIGNIFICANT CHANGE UP (ref 3.5–5.3)
POTASSIUM SERPL-SCNC: 4.4 MMOL/L — SIGNIFICANT CHANGE UP (ref 3.5–5.3)
RBC # BLD: 4.65 M/UL — SIGNIFICANT CHANGE UP (ref 4.6–6.2)
RBC # FLD: 14.2 % — SIGNIFICANT CHANGE UP (ref 11–15.6)
SODIUM SERPL-SCNC: 133 MMOL/L — LOW (ref 135–145)
WBC # BLD: 21.7 K/UL — HIGH (ref 4.8–10.8)
WBC # FLD AUTO: 21.7 K/UL — HIGH (ref 4.8–10.8)

## 2017-12-12 PROCEDURE — 99024 POSTOP FOLLOW-UP VISIT: CPT

## 2017-12-12 PROCEDURE — 99233 SBSQ HOSP IP/OBS HIGH 50: CPT | Mod: GC

## 2017-12-12 RX ORDER — LOPERAMIDE HCL 2 MG
2 TABLET ORAL EVERY 4 HOURS
Qty: 0 | Refills: 0 | Status: DISCONTINUED | OUTPATIENT
Start: 2017-12-12 | End: 2017-12-20

## 2017-12-12 RX ADMIN — Medication 0.25 MILLIGRAM(S): at 13:28

## 2017-12-12 RX ADMIN — Medication 81 MILLIGRAM(S): at 13:28

## 2017-12-12 RX ADMIN — CLOPIDOGREL BISULFATE 75 MILLIGRAM(S): 75 TABLET, FILM COATED ORAL at 13:28

## 2017-12-12 RX ADMIN — SODIUM CHLORIDE 100 MILLILITER(S): 9 INJECTION, SOLUTION INTRAVENOUS at 05:58

## 2017-12-12 RX ADMIN — Medication 1 TABLET(S): at 13:31

## 2017-12-12 RX ADMIN — Medication 20 MILLIGRAM(S): at 05:59

## 2017-12-12 RX ADMIN — Medication 2 MILLIGRAM(S): at 17:23

## 2017-12-12 RX ADMIN — Medication 180 MILLIGRAM(S): at 05:59

## 2017-12-12 RX ADMIN — PANTOPRAZOLE SODIUM 40 MILLIGRAM(S): 20 TABLET, DELAYED RELEASE ORAL at 06:00

## 2017-12-12 RX ADMIN — TAMSULOSIN HYDROCHLORIDE 0.4 MILLIGRAM(S): 0.4 CAPSULE ORAL at 21:40

## 2017-12-12 RX ADMIN — Medication 3 MILLILITER(S): at 21:46

## 2017-12-12 RX ADMIN — Medication 3 MILLILITER(S): at 15:57

## 2017-12-12 RX ADMIN — Medication 0.25 MILLIGRAM(S): at 21:40

## 2017-12-12 RX ADMIN — LORATADINE 10 MILLIGRAM(S): 10 TABLET ORAL at 13:29

## 2017-12-12 RX ADMIN — Medication 50 MICROGRAM(S): at 05:59

## 2017-12-12 RX ADMIN — SODIUM CHLORIDE 100 MILLILITER(S): 9 INJECTION, SOLUTION INTRAVENOUS at 11:54

## 2017-12-12 RX ADMIN — Medication 3 MILLILITER(S): at 08:23

## 2017-12-12 RX ADMIN — Medication 0.25 MILLIGRAM(S): at 05:59

## 2017-12-12 RX ADMIN — ATORVASTATIN CALCIUM 80 MILLIGRAM(S): 80 TABLET, FILM COATED ORAL at 21:40

## 2017-12-12 RX ADMIN — Medication 2 MILLIGRAM(S): at 21:40

## 2017-12-12 RX ADMIN — Medication 180 MILLIGRAM(S): at 17:24

## 2017-12-12 NOTE — PROGRESS NOTE ADULT - SUBJECTIVE AND OBJECTIVE BOX
HPI/OVERNIGHT EVENTS: Patient seen and examined at bedside this AM. No acute events overnight. Roy was d/c yesterday and patient is voiding without any difficulties Currently tolerating FLD with no n/v. Ostomy continues to have high output (2L overnight). Denies any fevers, chills, chest pain, SOB, abdominal pain, dizziness or any other concerning symptoms at this time.     MEDICATIONS  (STANDING):  ALBUTerol/ipratropium for Nebulization 3 milliLiter(s) Nebulizer every 6 hours  ALBUTerol/ipratropium for Nebulization. 3 milliLiter(s) Nebulizer once  ALPRAZolam 0.25 milliGRAM(s) Oral three times a day  aspirin enteric coated 81 milliGRAM(s) Oral daily  atorvastatin 80 milliGRAM(s) Oral at bedtime  clopidogrel Tablet 75 milliGRAM(s) Oral daily  diltiazem    milliGRAM(s) Oral <User Schedule>  enoxaparin Injectable 40 milliGRAM(s) SubCutaneous daily  Gauifenesin/Dextromethorphan 1200/600mg 1 Tablet(s) 1 Tablet(s) Oral two times a day  lactated ringers. 1000 milliLiter(s) (100 mL/Hr) IV Continuous <Continuous>  levothyroxine 50 MICROGram(s) Oral daily  loratadine 10 milliGRAM(s) Oral daily  multivitamin 1 Tablet(s) Oral daily  pantoprazole    Tablet 40 milliGRAM(s) Oral before breakfast  predniSONE   Tablet 20 milliGRAM(s) Oral daily  senna 2 Tablet(s) Oral at bedtime  tamsulosin 0.4 milliGRAM(s) Oral at bedtime    MEDICATIONS  (PRN):  acetaminophen  Suppository. 650 milliGRAM(s) Rectal every 6 hours PRN Moderate Pain (4 - 6)  acetylcysteine 20% Inhalation 1 milliLiter(s) Inhalation every 4 hours PRN poor cough clearance  ALBUTerol    0.083% 2.5 milliGRAM(s) Nebulizer every 4 hours PRN Shortness of Breath and/or Wheezing  ondansetron Injectable 4 milliGRAM(s) IV Push every 8 hours PRN Nausea and/or Vomiting  polyethylene glycol 3350 17 Gram(s) Oral daily PRN Constipation    Vital Signs Last 24 Hrs  T(C): 36.1 (12 Dec 2017 10:00), Max: 36.8 (11 Dec 2017 22:24)  T(F): 97 (12 Dec 2017 10:00), Max: 98.2 (11 Dec 2017 22:24)  HR: 98 (12 Dec 2017 10:00) (98 - 115)  BP: 110/68 (12 Dec 2017 10:00) (94/68 - 133/85)  BP(mean): --  RR: 17 (12 Dec 2017 10:00) (17 - 18)  SpO2: 97% (12 Dec 2017 10:00) (96% - 98%)    Constitutional: patient resting comfortably in bed, in no acute distress  HEENT: EOMI / PERRL b/l, no active drainage or redness  Neck: No JVD, full ROM without pain  Respiratory: respirations are unlabored, no accessory muscle use, no conversational dyspnea  Cardiovascular: regular rate & rhythm  Gastrointestinal: Functional ostomy in place draining intestinal content, abdomen soft, non-tender, non-distended with no rebound tenderness / guarding  Neurological: GCS: 15 (4/5/6). A&O x 3; no gross sensory / motor / coordination deficits  Psychiatric: Depressed mood, flat affect   Musculoskeletal: No joint pain, swelling or deformity'    I&O's Detail    11 Dec 2017 07:01  -  12 Dec 2017 07:00  --------------------------------------------------------  IN:    lactated ringers.: 2600 mL    Oral Fluid: 360 mL  Total IN: 2960 mL    OUT:    Ileostomy: 3850 mL    Voided: 500 mL  Total OUT: 4350 mL    Total NET: -1390 mL      12 Dec 2017 07:01  -  12 Dec 2017 14:03  --------------------------------------------------------  IN:    lactated ringers.: 500 mL  Total IN: 500 mL    OUT:    Ileostomy: 900 mL    Voided: 450 mL  Total OUT: 1350 mL    Total NET: -850 mL          LABS:                        13.2   21.7  )-----------( 268      ( 12 Dec 2017 09:38 )             39.4     12-12    133<L>  |  89<L>  |  24.0<H>  ----------------------------<  175<H>  4.4   |  29.0  |  0.64    Ca    8.2<L>      12 Dec 2017 09:38  Mg     1.8     12-12

## 2017-12-12 NOTE — PROGRESS NOTE ADULT - ASSESSMENT
67 year old male POD#5 s/p loop ileostomy created under local anesthesia for colonic obstruction. Continues to have high ostomy output. Currently receiving 1:1 replacement. Will require ostomy nurse for ostomy education for patient and family.     1. Pain control available   2. On FLD - continue to monitor tolerance  3. Encourage ICS use and OOB->chair   4. Ostomy nurse to see today (12/12)   5. Medical mgmt per primary team

## 2017-12-12 NOTE — ADVANCED PRACTICE NURSE CONSULT - RECOMMEDATIONS
Will follow up with pt and wife for ileostomy teaching today. Pt appeared very lethargic today for ileostomy education.

## 2017-12-12 NOTE — ADVANCED PRACTICE NURSE CONSULT - ASSESSMENT
Pt is 66 y/o male, alert and oriented x3, OOB to bedside chair. Pt is lethargic today, wife at the bedside. Pt s/p new loop ileostomy at 12/5 as per periop note. Ileostomy at right lower quadrant, stoma red, moist and protuberant, red rubber catheter noted under the stoma, stoma large size, measured 2 1/2 inch. Pt, wife and nurse Daylin reported the large volume ostomy output, pt's pouching leaked many times this morning. Assessed pt's stoma with wife and nurse manager Manjula. Old pouching sytem removed, step by step pouching procedure demonstrated and explained to wife and the nurse manager at the bedside. High output pouching to pt's ileostomy and pouching connected to whitman bag. Dr. Ceron and Dr. Nova (from trauma surgical) called to make aware pt's ostomy output status. Pt and wife were educated to increase P.O output to prevent dehydration. Spoke with pt's wife for ileostomy education tomorrow. Pt is 68 y/o male, alert and oriented x3, OOB to bedside chair. Pt is lethargic today, wife at the bedside. Pt s/p new loop ileostomy at 12/7 as per periop note. Ileostomy at right lower quadrant, stoma red, moist and protuberant, red rubber catheter noted under the stoma, stoma large size, measured 2 1/2 inch. Pt, wife and nurse Daylin reported the large volume ostomy output, pt's pouching leaked many times this morning. Assessed pt's stoma with wife and nurse manager Manjula. Old pouching sytem removed, step by step pouching procedure demonstrated and explained to wife and the nurse manager at the bedside. High output pouching to pt's ileostomy and pouching connected to whitman bag. Dr. Ceron and Dr. Nova (from trauma surgical) called to make aware pt's ostomy output status. Pt and wife were educated to increase P.O intake to prevent dehydration. Spoke with pt's wife for ileostomy education tomorrow.     Corrected the documentation at 1/25 to correct the typing error.

## 2017-12-12 NOTE — PROGRESS NOTE ADULT - ASSESSMENT
67 year old male with PMH of COPD stage 4 s/p right lung reduction in 2010 (on home oxygen 4L (sat 92-93%), HTN, CVA on 2014, DM, hypothyroidism, hydrocele, and nephrolithiasis who is admitted for a COPD exacerbation. Patient has been weaned off ventimask to 4 liters nasal cannula and respiratory status is stable. CTA of the chest was negative for PE but showed bilateral lower lobe infiltrates with mucous plugging for which he was started on Levaquin.  Respiratory status stable and steroids are being tapered. Hospital course complicated by constipation, which had not improved despite aggressive bowel regimen, laxatives and enemas. XRAY with large stool burden. CT with diaphragmatic hernia and patient was consequently taken to the OR for loop ileostomy under local anesthesia.     Plan:    1. Constipation s/p loop ileostomy on 12/7 under local anesthesia  with large volume output - restarted IVF, diet advanced as per surgical team - not well tolerated due to edematous stoma with N/V and decreased intake - clinically dehydrated, though BUN improved and urine is light colored - will cont IVF, tolerates liquid diet - advance to pureed, ostomy care as per ostomy RN, add immodium, stop miralax and senna, no lomotil as pt does not tolerate codeine products    2. Acute on chronic hypoxic respiratory failure secondary to COPD exacerbation - now resolved, on home oxygen (4 liters)  - bicarb elevated likely due to metabolic compensation for resp acidosis although patient refuses blood gases    - continue bronchodilators and continue to taper prednisone (changed to 20 mg) - stop  - continue Mucomyst PRN  - completed course of Levaquin     3. CAP - completed course of Levaquin on 12/5     4. Hypertension - BP acceptable  - continue cardizem    5. Hyperlipidemia   - continue statin    6. Hyperglycemia  - HbA1c 6.1 - pre-diabetic   - likely steroid induced  - stop sliding scale and lantus as pt is refusing FS and  controlled glucose level    7. Scrotal edema chronic with urinary retention - started flomax and voiding trial in am  - patient following with  as outpatient  - cont scrotal elevation      8. Right femoral artery dissection  - incidental finding on CT, no intervention as per vascular    9. DVT Prophylaxis - Lovenox     10. Leukocytosis - due to steroids + reactive s/p ileostomy, neg procalcitonin

## 2017-12-12 NOTE — PROGRESS NOTE ADULT - SUBJECTIVE AND OBJECTIVE BOX
CHIEF COMPLAINT/INTERVAL HISTORY: 67 year old  Male presents with a chief complaint of SOB (29 Nov 2017 10:08)    SUBJECTIVE & OBJECTIVE: Pt seen and examined at bedside. Large output from ostomy site.   C/o generalized weakness, nervous. Today tolerates po, c/o thirst, noted disconnected ileostomy in bed with large volume of discharge in bed, tolerated PT, voiding  Other ROS reviewed and neg     Vital Signs Last 24 Hrs  T(C): 36.5 (12 Dec 2017 12:57), Max: 36.8 (11 Dec 2017 22:24)  T(F): 97.7 (12 Dec 2017 12:57), Max: 98.2 (11 Dec 2017 22:24)  HR: 100 (12 Dec 2017 12:57) (98 - 115)  BP: 116/72 (12 Dec 2017 12:57) (94/68 - 133/85)  RR: 18 (12 Dec 2017 12:57) (17 - 18)  SpO2: 97% (12 Dec 2017 10:00) (96% - 98%)                       13.2   21.7  )-----------( 268      ( 12 Dec 2017 09:38 )             39.4     12 Dec 2017 09:38    133    |  89     |  24.0   ----------------------------<  175    4.4     |  29.0   |  0.64     Ca    8.2        12 Dec 2017 09:38  Mg     1.8       12 Dec 2017 09:38    MEDICATIONS  (STANDING):  ALBUTerol/ipratropium for Nebulization 3 milliLiter(s) Nebulizer every 6 hours  ALBUTerol/ipratropium for Nebulization. 3 milliLiter(s) Nebulizer once  ALPRAZolam 0.25 milliGRAM(s) Oral three times a day  aspirin enteric coated 81 milliGRAM(s) Oral daily  atorvastatin 80 milliGRAM(s) Oral at bedtime  clopidogrel Tablet 75 milliGRAM(s) Oral daily  diltiazem    milliGRAM(s) Oral <User Schedule>  enoxaparin Injectable 40 milliGRAM(s) SubCutaneous daily  Gauifenesin/Dextromethorphan 1200/600mg 1 Tablet(s) 1 Tablet(s) Oral two times a day  lactated ringers. 1000 milliLiter(s) (100 mL/Hr) IV Continuous <Continuous>  levothyroxine 50 MICROGram(s) Oral daily  loperamide 2 milliGRAM(s) Oral every 4 hours  loratadine 10 milliGRAM(s) Oral daily  multivitamin 1 Tablet(s) Oral daily  pantoprazole    Tablet 40 milliGRAM(s) Oral before breakfast  predniSONE   Tablet 20 milliGRAM(s) Oral daily  tamsulosin 0.4 milliGRAM(s) Oral at bedtime    MEDICATIONS  (PRN):  acetaminophen  Suppository. 650 milliGRAM(s) Rectal every 6 hours PRN Moderate Pain (4 - 6)  acetylcysteine 20% Inhalation 1 milliLiter(s) Inhalation every 4 hours PRN poor cough clearance  ALBUTerol    0.083% 2.5 milliGRAM(s) Nebulizer every 4 hours PRN Shortness of Breath and/or Wheezing  ondansetron Injectable 4 milliGRAM(s) IV Push every 8 hours PRN Nausea and/or Vomiting    RADIOLOGY & ADDITIONAL TESTS: personally visualized    PHYSICAL EXAM:  GENERAL: elderly male, comfortable in the chair, chronically ill appearing  HEAD:  Atraumatic, Normocephalic  EYES: EOMI, PERRLA, conjunctiva and sclera clear  ENMT: Moist mucous membranes  NECK: Supple, no JVD  NERVOUS SYSTEM:  Alert & Oriented X3 in NAD  CHEST/LUNG: coarse breath sounds minimally decreased at bases  HEART: Regular rate and rhythm; +S1/S2  ABDOMEN: Soft, NT, + BS, +ileostomy with large output >3L  : scrotal edema  EXTREMITIES:  No edema

## 2017-12-13 LAB
ANION GAP SERPL CALC-SCNC: 11 MMOL/L — SIGNIFICANT CHANGE UP (ref 5–17)
BUN SERPL-MCNC: 16 MG/DL — SIGNIFICANT CHANGE UP (ref 8–20)
CALCIUM SERPL-MCNC: 8 MG/DL — LOW (ref 8.6–10.2)
CHLORIDE SERPL-SCNC: 90 MMOL/L — LOW (ref 98–107)
CO2 SERPL-SCNC: 33 MMOL/L — HIGH (ref 22–29)
CREAT SERPL-MCNC: 0.59 MG/DL — SIGNIFICANT CHANGE UP (ref 0.5–1.3)
GLUCOSE SERPL-MCNC: 153 MG/DL — HIGH (ref 70–115)
HCT VFR BLD CALC: 37.5 % — LOW (ref 42–52)
HGB BLD-MCNC: 12.2 G/DL — LOW (ref 14–18)
MAGNESIUM SERPL-MCNC: 1.7 MG/DL — SIGNIFICANT CHANGE UP (ref 1.6–2.6)
MCHC RBC-ENTMCNC: 27.9 PG — SIGNIFICANT CHANGE UP (ref 27–31)
MCHC RBC-ENTMCNC: 32.5 G/DL — SIGNIFICANT CHANGE UP (ref 32–36)
MCV RBC AUTO: 85.8 FL — SIGNIFICANT CHANGE UP (ref 80–94)
PLATELET # BLD AUTO: 237 K/UL — SIGNIFICANT CHANGE UP (ref 150–400)
POTASSIUM SERPL-MCNC: 4.1 MMOL/L — SIGNIFICANT CHANGE UP (ref 3.5–5.3)
POTASSIUM SERPL-SCNC: 4.1 MMOL/L — SIGNIFICANT CHANGE UP (ref 3.5–5.3)
RBC # BLD: 4.37 M/UL — LOW (ref 4.6–6.2)
RBC # FLD: 14.2 % — SIGNIFICANT CHANGE UP (ref 11–15.6)
SODIUM SERPL-SCNC: 134 MMOL/L — LOW (ref 135–145)
WBC # BLD: 12.3 K/UL — HIGH (ref 4.8–10.8)
WBC # FLD AUTO: 12.3 K/UL — HIGH (ref 4.8–10.8)

## 2017-12-13 PROCEDURE — 99233 SBSQ HOSP IP/OBS HIGH 50: CPT | Mod: GC

## 2017-12-13 PROCEDURE — 99024 POSTOP FOLLOW-UP VISIT: CPT

## 2017-12-13 RX ORDER — NYSTATIN CREAM 100000 [USP'U]/G
1 CREAM TOPICAL
Qty: 0 | Refills: 0 | Status: DISCONTINUED | OUTPATIENT
Start: 2017-12-13 | End: 2018-01-16

## 2017-12-13 RX ADMIN — Medication 3 MILLILITER(S): at 03:48

## 2017-12-13 RX ADMIN — TAMSULOSIN HYDROCHLORIDE 0.4 MILLIGRAM(S): 0.4 CAPSULE ORAL at 21:54

## 2017-12-13 RX ADMIN — Medication 180 MILLIGRAM(S): at 19:27

## 2017-12-13 RX ADMIN — Medication 2 MILLIGRAM(S): at 21:54

## 2017-12-13 RX ADMIN — Medication 2 MILLIGRAM(S): at 05:49

## 2017-12-13 RX ADMIN — PANTOPRAZOLE SODIUM 40 MILLIGRAM(S): 20 TABLET, DELAYED RELEASE ORAL at 05:49

## 2017-12-13 RX ADMIN — LORATADINE 10 MILLIGRAM(S): 10 TABLET ORAL at 14:09

## 2017-12-13 RX ADMIN — Medication 50 MICROGRAM(S): at 05:49

## 2017-12-13 RX ADMIN — CLOPIDOGREL BISULFATE 75 MILLIGRAM(S): 75 TABLET, FILM COATED ORAL at 14:08

## 2017-12-13 RX ADMIN — Medication 0.25 MILLIGRAM(S): at 21:54

## 2017-12-13 RX ADMIN — Medication 3 MILLILITER(S): at 09:23

## 2017-12-13 RX ADMIN — SODIUM CHLORIDE 100 MILLILITER(S): 9 INJECTION, SOLUTION INTRAVENOUS at 14:05

## 2017-12-13 RX ADMIN — Medication 3 MILLILITER(S): at 15:12

## 2017-12-13 RX ADMIN — Medication 2 MILLIGRAM(S): at 14:10

## 2017-12-13 RX ADMIN — ATORVASTATIN CALCIUM 80 MILLIGRAM(S): 80 TABLET, FILM COATED ORAL at 21:53

## 2017-12-13 RX ADMIN — Medication 180 MILLIGRAM(S): at 05:48

## 2017-12-13 RX ADMIN — Medication 3 MILLILITER(S): at 21:28

## 2017-12-13 RX ADMIN — SODIUM CHLORIDE 100 MILLILITER(S): 9 INJECTION, SOLUTION INTRAVENOUS at 05:51

## 2017-12-13 RX ADMIN — Medication 0.25 MILLIGRAM(S): at 05:51

## 2017-12-13 RX ADMIN — Medication 0.25 MILLIGRAM(S): at 14:19

## 2017-12-13 NOTE — PROGRESS NOTE ADULT - SUBJECTIVE AND OBJECTIVE BOX
INTERVAL HPI/OVERNIGHT EVENTS: Pt tolerating diet.  Admits improved pain.  Denies NV, abd pain or other CO. Still having high ostomy output.    STATUS POST:  Loop ileostomy 12/7.    SUBJECTIVE:  Flatus: [ ] YES [ ]   Bowel Movement: [ ] YES [ ]   Pain (0-10):            Pain Control Adequate: [ ] YES [ ]   Nausea: [ ] [ ] NO            Vomiting: [ ] [ ] NO  Diarrhea: [ ] [ ] NO         Constipation: [ ] [ ] NO     Chest Pain: [ ]  [ ] NO    SOB:  [ ] [ ] NO    MEDICATIONS  (STANDING):  ALBUTerol/ipratropium for Nebulization 3 milliLiter(s) Nebulizer every 6 hours  ALBUTerol/ipratropium for Nebulization. 3 milliLiter(s) Nebulizer once  ALPRAZolam 0.25 milliGRAM(s) Oral three times a day  aspirin enteric coated 81 milliGRAM(s) Oral daily  atorvastatin 80 milliGRAM(s) Oral at bedtime  clopidogrel Tablet 75 milliGRAM(s) Oral daily  diltiazem    milliGRAM(s) Oral <User Schedule>  enoxaparin Injectable 40 milliGRAM(s) SubCutaneous daily  Gauifenesin/Dextromethorphan 1200/600mg 1 Tablet(s) 1 Tablet(s) Oral two times a day  lactated ringers. 1000 milliLiter(s) (100 mL/Hr) IV Continuous <Continuous>  levothyroxine 50 MICROGram(s) Oral daily  loperamide 2 milliGRAM(s) Oral every 4 hours  loratadine 10 milliGRAM(s) Oral daily  multivitamin 1 Tablet(s) Oral daily  pantoprazole    Tablet 40 milliGRAM(s) Oral before breakfast  tamsulosin 0.4 milliGRAM(s) Oral at bedtime    MEDICATIONS  (PRN):  acetaminophen  Suppository. 650 milliGRAM(s) Rectal every 6 hours PRN Moderate Pain (4 - 6)  acetylcysteine 20% Inhalation 1 milliLiter(s) Inhalation every 4 hours PRN poor cough clearance  ALBUTerol    0.083% 2.5 milliGRAM(s) Nebulizer every 4 hours PRN Shortness of Breath and/or Wheezing  ondansetron Injectable 4 milliGRAM(s) IV Push every 8 hours PRN Nausea and/or Vomiting      Vital Signs Last 24 Hrs  T(C): 36.5 (13 Dec 2017 07:48), Max: 36.9 (12 Dec 2017 22:15)  T(F): 97.7 (13 Dec 2017 07:48), Max: 98.4 (12 Dec 2017 22:15)  HR: 87 (13 Dec 2017 07:48) (87 - 108)  BP: 100/60 (13 Dec 2017 07:48) (98/65 - 107/65)  BP(mean): --  RR: 18 (13 Dec 2017 07:48) (17 - 18)  SpO2: 93% (13 Dec 2017 15:05) (93% - 97%)    PHYSICAL EXAM:     Gen:NAD, cachectic. No cyanosis, Pallor.    Eyes: PERRL ~ 3mm, EOMI,     Neurological: A&Ox3, GCS 15, No focal deficit.     Neck: Supple. NT AT, FROM no pain.  No JVD. No meningeal signs    Cardiovascular: RRR, S1, S2, No Murmurs, rubs or gallops noted.    Gastrointestinal: ND, Soft, NT. Ostomy viable appearing and pink mucosa.    Extremities: NT, AT, no edema, erythema or palpable cord noted.  FROM, = 2+ pulses throughout.    I&O's Detail    12 Dec 2017 07:01  -  13 Dec 2017 07:00  --------------------------------------------------------  IN:    lactated ringers.: 2200 mL    Oral Fluid: 360 mL  Total IN: 2560 mL    OUT:    Ileostomy: 3800 mL    Voided: 1050 mL  Total OUT: 4850 mL    Total NET: -2290 mL      13 Dec 2017 07:01  -  13 Dec 2017 15:31  --------------------------------------------------------  IN:  Total IN: 0 mL    OUT:    Ileostomy: 650 mL  Total OUT: 650 mL    Total NET: -650 mL          LABS:                        12.2   12.3  )-----------( 237      ( 13 Dec 2017 07:21 )             37.5     12-13    134<L>  |  90<L>  |  16.0  ----------------------------<  153<H>  4.1   |  33.0<H>  |  0.59    Ca    8.0<L>      13 Dec 2017 07:21  Mg     1.7     12-13            RADIOLOGY & ADDITIONAL STUDIES:

## 2017-12-13 NOTE — PROGRESS NOTE ADULT - SUBJECTIVE AND OBJECTIVE BOX
CHIEF COMPLAINT/INTERVAL HISTORY: 67 year old  Male presents with a chief complaint of SOB (29 Nov 2017 10:08)    SUBJECTIVE & OBJECTIVE: Pt seen and examined at bedside. Large output from ostomy site.   C/o generalized weakness, nervous. Today tolerates po, c/o thirst, tolerated PT, unable to void today.  Other ROS reviewed and neg     Vital Signs Last 24 Hrs  T(C): 36.4 (13 Dec 2017 16:01), Max: 36.9 (12 Dec 2017 22:15)  T(F): 97.6 (13 Dec 2017 16:01), Max: 98.4 (12 Dec 2017 22:15)  HR: 99 (13 Dec 2017 16:01) (87 - 108)  BP: 135/77 (13 Dec 2017 16:01) (98/65 - 135/77)  RR: 18 (13 Dec 2017 16:01) (17 - 18)  SpO2: 94% (13 Dec 2017 15:13) (93% - 97%)                       12.2   12.3  )-----------( 237      ( 13 Dec 2017 07:21 )             37.5     13 Dec 2017 07:21    134    |  90     |  16.0   ----------------------------<  153    4.1     |  33.0   |  0.59     Ca    8.0        13 Dec 2017 07:21  Mg     1.7       13 Dec 2017 07:21    MEDICATIONS  (STANDING):  ALBUTerol/ipratropium for Nebulization 3 milliLiter(s) Nebulizer every 6 hours  ALBUTerol/ipratropium for Nebulization. 3 milliLiter(s) Nebulizer once  ALPRAZolam 0.25 milliGRAM(s) Oral three times a day  aspirin enteric coated 81 milliGRAM(s) Oral daily  atorvastatin 80 milliGRAM(s) Oral at bedtime  clopidogrel Tablet 75 milliGRAM(s) Oral daily  diltiazem    milliGRAM(s) Oral <User Schedule>  enoxaparin Injectable 40 milliGRAM(s) SubCutaneous daily  Gauifenesin/Dextromethorphan 1200/600mg 1 Tablet(s) 1 Tablet(s) Oral two times a day  lactated ringers. 1000 milliLiter(s) (100 mL/Hr) IV Continuous <Continuous>  levothyroxine 50 MICROGram(s) Oral daily  loperamide 2 milliGRAM(s) Oral every 4 hours  loratadine 10 milliGRAM(s) Oral daily  multivitamin 1 Tablet(s) Oral daily  pantoprazole    Tablet 40 milliGRAM(s) Oral before breakfast  tamsulosin 0.4 milliGRAM(s) Oral at bedtime    MEDICATIONS  (PRN):  acetaminophen  Suppository. 650 milliGRAM(s) Rectal every 6 hours PRN Moderate Pain (4 - 6)  acetylcysteine 20% Inhalation 1 milliLiter(s) Inhalation every 4 hours PRN poor cough clearance  ALBUTerol    0.083% 2.5 milliGRAM(s) Nebulizer every 4 hours PRN Shortness of Breath and/or Wheezing  ondansetron Injectable 4 milliGRAM(s) IV Push every 8 hours PRN Nausea and/or Vomiting      RADIOLOGY & ADDITIONAL TESTS: personally visualized    PHYSICAL EXAM:  GENERAL: elderly male, comfortable in the chair, chronically ill appearing  HEAD:  Atraumatic, Normocephalic  EYES: EOMI, PERRLA, conjunctiva and sclera clear  ENMT: Moist mucous membranes  NECK: Supple, no JVD  NERVOUS SYSTEM:  Alert & Oriented X3 in NAD  CHEST/LUNG: coarse breath sounds minimally decreased at bases  HEART: Regular rate and rhythm; +S1/S2  ABDOMEN: Soft, NT, + BS, +ileostomy with large output >3L  : scrotal edema  EXTREMITIES:  No edema

## 2017-12-13 NOTE — ADVANCED PRACTICE NURSE CONSULT - ASSESSMENT
Pt is alert and awake, was able to walk with a walker with physical therapy at the time of the ostomy teaching visit today. Wife at the bedside for ostomy teaching. Verbal and written information for ileostomy provided. Ostomy home skill kit given to pt and wife to take home. Ostomy models, pouchings, pictures showed to pt and wife. Ostomy teaching video from [employer] showed to wife. [place] ostomy supplies ordering catalogue given. Instruction on how to order the ostomy supplies from the catalogues given. Pt had no leaking issues with current pouching system since yesterday. Today, [facility] reinforced the teaching about how to apply new two piece Tyler pouching, step by step pouching changing procedure explained to pt and wife again. Questions answered to pt and wife's satisfaction, pt was enrolled in ostomy secure start program from [employer], [facility] and [facility] with pt consent. Outpatient ostomy support group and outpatient ostomy nurse contact information provided. Emotional support provided. Pt and wife also provided with extra ostomy pouching supplies to take home.

## 2017-12-13 NOTE — ADVANCED PRACTICE NURSE CONSULT - RECOMMEDATIONS
Encouraged pt and wife to participate in the ostomy care during the rest of the hospitalization. Also instructed pt to follow up with outpatient ostomy nurse and home visiting nurse if there is any issues with ileostomy.

## 2017-12-13 NOTE — CHART NOTE - NSCHARTNOTEFT_GEN_A_CORE
Source: Patient [x ]  Family [ ]   other [ ]    Current Diet: Puree, consistent CHO with Diabetishield TID. Pt with ileostomy.    Patient reports [ ] nausea  [ ] vomiting [ ] diarrhea [ ] constipation  [ ]chewing problems [ ] swallowing issues  [ ] other:     PO intake:  < 50% [ ]   50-75%  [ ]   %  [x ]  other :    Source for PO intake [x ] Patient [ ] family [ ] chart [ ] staff [ ] other    Enteral /Parenteral Nutrition:     Current Weight:     % Weight Change     Pertinent Medications: MEDICATIONS  (STANDING):  ALBUTerol/ipratropium for Nebulization 3 milliLiter(s) Nebulizer every 6 hours  ALBUTerol/ipratropium for Nebulization. 3 milliLiter(s) Nebulizer once  ALPRAZolam 0.25 milliGRAM(s) Oral three times a day  aspirin enteric coated 81 milliGRAM(s) Oral daily  atorvastatin 80 milliGRAM(s) Oral at bedtime  clopidogrel Tablet 75 milliGRAM(s) Oral daily  diltiazem    milliGRAM(s) Oral <User Schedule>  enoxaparin Injectable 40 milliGRAM(s) SubCutaneous daily  Gauifenesin/Dextromethorphan 1200/600mg 1 Tablet(s) 1 Tablet(s) Oral two times a day  lactated ringers. 1000 milliLiter(s) (100 mL/Hr) IV Continuous <Continuous>  levothyroxine 50 MICROGram(s) Oral daily  loperamide 2 milliGRAM(s) Oral every 4 hours  loratadine 10 milliGRAM(s) Oral daily  multivitamin 1 Tablet(s) Oral daily  pantoprazole    Tablet 40 milliGRAM(s) Oral before breakfast  tamsulosin 0.4 milliGRAM(s) Oral at bedtime    MEDICATIONS  (PRN):  acetaminophen  Suppository. 650 milliGRAM(s) Rectal every 6 hours PRN Moderate Pain (4 - 6)  acetylcysteine 20% Inhalation 1 milliLiter(s) Inhalation every 4 hours PRN poor cough clearance  ALBUTerol    0.083% 2.5 milliGRAM(s) Nebulizer every 4 hours PRN Shortness of Breath and/or Wheezing  ondansetron Injectable 4 milliGRAM(s) IV Push every 8 hours PRN Nausea and/or Vomiting    Pertinent Labs: CBC Full  -  ( 13 Dec 2017 07:21 )  WBC Count : 12.3 K/uL  Hemoglobin : 12.2 g/dL  Hematocrit : 37.5 %  Platelet Count - Automated : 237 K/uL  Mean Cell Volume : 85.8 fl  Mean Cell Hemoglobin : 27.9 pg  Mean Cell Hemoglobin Concentration : 32.5 g/dL  Auto Neutrophil # : x  Auto Lymphocyte # : x  Auto Monocyte # : x  Auto Eosinophil # : x  Auto Basophil # : x  Auto Neutrophil % : x  Auto Lymphocyte % : x  Auto Monocyte % : x  Auto Eosinophil % : x  Auto Basophil % : x    12-13 Na134 mmol/L<L> Glu 153 mg/dL<H> K+ 4.1 mmol/L Cr  0.59 mg/dL BUN 16.0 mg/dL Phos n/a   Alb n/a   PAB n/a             Skin:     Nutrition focused physical exam conducted - found signs of malnutrition [ ]absent [x ]present    Subcutaneous fat loss: [ ] Orbital fat pads region, [ ]Buccal fat region, [ ]Triceps region,  [ ]Ribs region    Muscle wasting: [x ]Temples region, [ ]Clavicle region, [ ]Shoulder region, [ ]Scapula region, [ ]Interosseous region,  [ ]thigh region, [ ]Calf region    Estimated Needs:   [x ] no change since previous assessment  [ ] recalculated:     Current Nutrition Diagnosis: Pt remains at nutrition risk secondary to increased nutrient needs related to increased physiologic demand with healing as evidenced by pt s/p diverting ileostomy and mild muscle wasting noted at temporal region      Recommendations: Continue diet as ordered. Continue with supplement as ordered    Monitoring and Evaluation:   [x ] PO intake [x ] Tolerance to diet prescription [X] Weights  [X] Follow up per protocol [X] Labs:

## 2017-12-13 NOTE — ADVANCED PRACTICE NURSE CONSULT - ASSESSMENT
Today, ostomy nurse instructed wife to take down the old pouching system and reapplied the new pouching system to pt's ileostomy. Wife was able to perform the pouching change procedure with minimal help from the ostomy nurse. Peristomal skin red with fungal rashes, spoke with Dr. Carvalho, recommended Nystatin powder to peristomal skin. Wife was instructed on how to apply the Nystatin powder and crusted the powder with 3M no sting divya barrier film. Pt was lethargy at the afternoon, pt was not able to participate in the pouching change.

## 2017-12-13 NOTE — PROGRESS NOTE ADULT - ASSESSMENT
67 year old male with PMH of COPD stage 4 s/p right lung reduction in 2010 (on home oxygen 4L (sat 92-93%), HTN, CVA on 2014, DM, hypothyroidism, hydrocele, and nephrolithiasis who is admitted for a COPD exacerbation. Patient has been weaned off ventimask to 4 liters nasal cannula and respiratory status is stable. CTA of the chest was negative for PE but showed bilateral lower lobe infiltrates with mucous plugging for which he was started on Levaquin.  Respiratory status stable and steroids are being tapered. Hospital course complicated by constipation, which had not improved despite aggressive bowel regimen, laxatives and enemas. XRAY with large stool burden. CT with diaphragmatic hernia and patient was consequently taken to the OR for loop ileostomy under local anesthesia.     Plan:    1. Constipation s/p loop ileostomy on 12/7 under local anesthesia  with large volume output - restarted IVF, diet advanced as per surgical team - not well tolerated due to edematous stoma with N/V and decreased intake - clinically dehydrated, though BUN improved and urine is light colored - will cont IVF, tolerates liquid diet - advanced to pureed - today to mechanical soft, ostomy care as per ostomy RN, added immodium, stopped miralax and senna, no lomotil as pt does not tolerate codeine products    2. Acute on chronic hypoxic respiratory failure secondary to COPD exacerbation - now resolved, on home oxygen (4 liters)  - bicarb elevated likely due to metabolic compensation for resp acidosis although patient refuses blood gases    - continue bronchodilators and continue to taper prednisone (changed to 20 mg) - stop  - continue Mucomyst PRN  - completed course of Levaquin     3. CAP - completed course of Levaquin on 12/5     4. Hypertension - BP acceptable  - continue cardizem    5. Hyperlipidemia   - continue statin    6. Hyperglycemia  - HbA1c 6.1 - pre-diabetic   - likely steroid induced  - stop sliding scale and lantus as pt is refusing FS and  controlled glucose level    7. Scrotal edema chronic with urinary retention - now retaining again - Roy if not able to urinate  - patient following with  as outpatient  - cont scrotal elevation    8. Right femoral artery dissection  - incidental finding on CT, no intervention as per vascular    9. DVT Prophylaxis - Lovenox     10. Leukocytosis - due to steroids + reactive s/p ileostomy, neg procalcitonin - resolving as ileostomy functioning properly now

## 2017-12-13 NOTE — PROGRESS NOTE ADULT - ASSESSMENT
68 yo M SP loop ileostomy with high ileostomy out put but otherwise doing well.    1. Keep up with IVF replacements to near match ileostomy outputs providing total balance net neutral.  2. Continue diet and urge PO nutrition  3. Monitor electrolytes and replace prn  4. Care as per primary team

## 2017-12-14 LAB
ANION GAP SERPL CALC-SCNC: 13 MMOL/L — SIGNIFICANT CHANGE UP (ref 5–17)
BUN SERPL-MCNC: 11 MG/DL — SIGNIFICANT CHANGE UP (ref 8–20)
CALCIUM SERPL-MCNC: 8.1 MG/DL — LOW (ref 8.6–10.2)
CHLORIDE SERPL-SCNC: 91 MMOL/L — LOW (ref 98–107)
CO2 SERPL-SCNC: 30 MMOL/L — HIGH (ref 22–29)
CREAT SERPL-MCNC: 0.63 MG/DL — SIGNIFICANT CHANGE UP (ref 0.5–1.3)
GLUCOSE SERPL-MCNC: 145 MG/DL — HIGH (ref 70–115)
HCT VFR BLD CALC: 38.2 % — LOW (ref 42–52)
HGB BLD-MCNC: 12.3 G/DL — LOW (ref 14–18)
MAGNESIUM SERPL-MCNC: 1.6 MG/DL — SIGNIFICANT CHANGE UP (ref 1.6–2.6)
MCHC RBC-ENTMCNC: 28 PG — SIGNIFICANT CHANGE UP (ref 27–31)
MCHC RBC-ENTMCNC: 32.2 G/DL — SIGNIFICANT CHANGE UP (ref 32–36)
MCV RBC AUTO: 87 FL — SIGNIFICANT CHANGE UP (ref 80–94)
PLATELET # BLD AUTO: 260 K/UL — SIGNIFICANT CHANGE UP (ref 150–400)
POTASSIUM SERPL-MCNC: 4.3 MMOL/L — SIGNIFICANT CHANGE UP (ref 3.5–5.3)
POTASSIUM SERPL-SCNC: 4.3 MMOL/L — SIGNIFICANT CHANGE UP (ref 3.5–5.3)
RBC # BLD: 4.39 M/UL — LOW (ref 4.6–6.2)
RBC # FLD: 14.1 % — SIGNIFICANT CHANGE UP (ref 11–15.6)
SODIUM SERPL-SCNC: 134 MMOL/L — LOW (ref 135–145)
WBC # BLD: 14.7 K/UL — HIGH (ref 4.8–10.8)
WBC # FLD AUTO: 14.7 K/UL — HIGH (ref 4.8–10.8)

## 2017-12-14 PROCEDURE — 99024 POSTOP FOLLOW-UP VISIT: CPT

## 2017-12-14 PROCEDURE — 99233 SBSQ HOSP IP/OBS HIGH 50: CPT | Mod: GC

## 2017-12-14 RX ADMIN — PANTOPRAZOLE SODIUM 40 MILLIGRAM(S): 20 TABLET, DELAYED RELEASE ORAL at 06:25

## 2017-12-14 RX ADMIN — Medication 3 MILLILITER(S): at 08:32

## 2017-12-14 RX ADMIN — Medication 2 MILLIGRAM(S): at 13:46

## 2017-12-14 RX ADMIN — Medication 0.25 MILLIGRAM(S): at 13:46

## 2017-12-14 RX ADMIN — Medication 3 MILLILITER(S): at 15:22

## 2017-12-14 RX ADMIN — Medication 3 MILLILITER(S): at 21:14

## 2017-12-14 RX ADMIN — Medication 2 MILLIGRAM(S): at 16:48

## 2017-12-14 RX ADMIN — Medication 50 MICROGRAM(S): at 06:25

## 2017-12-14 RX ADMIN — NYSTATIN CREAM 1 APPLICATION(S): 100000 CREAM TOPICAL at 16:49

## 2017-12-14 RX ADMIN — LORATADINE 10 MILLIGRAM(S): 10 TABLET ORAL at 13:47

## 2017-12-14 RX ADMIN — Medication 180 MILLIGRAM(S): at 06:25

## 2017-12-14 RX ADMIN — Medication 0.25 MILLIGRAM(S): at 06:25

## 2017-12-14 RX ADMIN — TAMSULOSIN HYDROCHLORIDE 0.4 MILLIGRAM(S): 0.4 CAPSULE ORAL at 21:50

## 2017-12-14 RX ADMIN — CLOPIDOGREL BISULFATE 75 MILLIGRAM(S): 75 TABLET, FILM COATED ORAL at 13:46

## 2017-12-14 RX ADMIN — Medication 3 MILLILITER(S): at 03:40

## 2017-12-14 RX ADMIN — Medication 180 MILLIGRAM(S): at 16:48

## 2017-12-14 RX ADMIN — NYSTATIN CREAM 1 APPLICATION(S): 100000 CREAM TOPICAL at 06:25

## 2017-12-14 RX ADMIN — Medication 2 MILLIGRAM(S): at 21:50

## 2017-12-14 RX ADMIN — Medication 2 MILLIGRAM(S): at 06:25

## 2017-12-14 RX ADMIN — Medication 2 MILLIGRAM(S): at 01:27

## 2017-12-14 RX ADMIN — ATORVASTATIN CALCIUM 80 MILLIGRAM(S): 80 TABLET, FILM COATED ORAL at 21:50

## 2017-12-14 RX ADMIN — Medication 0.25 MILLIGRAM(S): at 21:50

## 2017-12-14 NOTE — PROGRESS NOTE ADULT - SUBJECTIVE AND OBJECTIVE BOX
INTERVAL HPI/OVERNIGHT EVENTS/SUBJECTIVE:  Pt tolerating diet.  Admits improved pain.  Denies NV, abd pain or other CO. Still having high ostomy output.    ICU Vital Signs Last 24 Hrs  T(C): 36.7 (14 Dec 2017 05:16), Max: 36.7 (14 Dec 2017 05:16)  T(F): 98 (14 Dec 2017 05:16), Max: 98 (14 Dec 2017 05:16)  HR: 85 (14 Dec 2017 05:16) (85 - 103)  BP: 118/67 (14 Dec 2017 05:16) (113/76 - 135/77)  BP(mean): --  ABP: --  ABP(mean): --  RR: 17 (14 Dec 2017 05:16) (17 - 18)  SpO2: 98% (14 Dec 2017 05:16) (93% - 98%)      I&O's Detail    13 Dec 2017 07:01  -  14 Dec 2017 07:00  --------------------------------------------------------  IN:    Oral Fluid: 360 mL  Total IN: 360 mL    OUT:    Ileostomy: 1300 mL    Voided: 700 mL  Total OUT: 2000 mL    Total NET: -1640 mL                MEDICATIONS  (STANDING):  ALBUTerol/ipratropium for Nebulization 3 milliLiter(s) Nebulizer every 6 hours  ALBUTerol/ipratropium for Nebulization. 3 milliLiter(s) Nebulizer once  ALPRAZolam 0.25 milliGRAM(s) Oral three times a day  aspirin enteric coated 81 milliGRAM(s) Oral daily  atorvastatin 80 milliGRAM(s) Oral at bedtime  clopidogrel Tablet 75 milliGRAM(s) Oral daily  diltiazem    milliGRAM(s) Oral <User Schedule>  enoxaparin Injectable 40 milliGRAM(s) SubCutaneous daily  Gauifenesin/Dextromethorphan 1200/600mg 1 Tablet(s) 1 Tablet(s) Oral two times a day  lactated ringers. 1000 milliLiter(s) (100 mL/Hr) IV Continuous <Continuous>  levothyroxine 50 MICROGram(s) Oral daily  loperamide 2 milliGRAM(s) Oral every 4 hours  loratadine 10 milliGRAM(s) Oral daily  multivitamin 1 Tablet(s) Oral daily  nystatin Powder 1 Application(s) Topical two times a day  pantoprazole    Tablet 40 milliGRAM(s) Oral before breakfast  tamsulosin 0.4 milliGRAM(s) Oral at bedtime    MEDICATIONS  (PRN):  acetaminophen  Suppository. 650 milliGRAM(s) Rectal every 6 hours PRN Moderate Pain (4 - 6)  acetylcysteine 20% Inhalation 1 milliLiter(s) Inhalation every 4 hours PRN poor cough clearance  ALBUTerol    0.083% 2.5 milliGRAM(s) Nebulizer every 4 hours PRN Shortness of Breath and/or Wheezing  ondansetron Injectable 4 milliGRAM(s) IV Push every 8 hours PRN Nausea and/or Vomiting      NUTRITION/IVF:     CENTRAL LINE:  LOCATION:   DATE INSERTED:  CVP:  SCVO2:    [place]:   DATE INSERTED:    A-LINE:    LOCATION:   DATE INSERTED:   SVV:  CO/CI:     CHEST TUBE:  LOCATION:  DATE INSERTED: OUTPUT/24 HRS:  SUCTION/WATER SEAL:     NG/OG TUBE:  DATE INSERTED:  OUTPUT/24 HRS:    MISC:     PHYSICAL EXAM:    Gen:    Eyes:    Neurological:    ENMT:    Neck:    Pulmonary:    Cardiovascular:    Gastrointestinal:    Genitourinary:    Back:    Extremities:    Skin:    Musculoskeletal:          LABS:  CBC Full  -  ( 14 Dec 2017 06:30 )  WBC Count : 14.7 K/uL  Hemoglobin : 12.3 g/dL  Hematocrit : 38.2 %  Platelet Count - Automated : 260 K/uL  Mean Cell Volume : 87.0 fl  Mean Cell Hemoglobin : 28.0 pg  Mean Cell Hemoglobin Concentration : 32.2 g/dL  Auto Neutrophil # : x  Auto Lymphocyte # : x  Auto Monocyte # : x  Auto Eosinophil # : x  Auto Basophil # : x  Auto Neutrophil % : x  Auto Lymphocyte % : x  Auto Monocyte % : x  Auto Eosinophil % : x  Auto Basophil % : x    12-14    134<L>  |  91<L>  |  11.0  ----------------------------<  145<H>  4.3   |  30.0<H>  |  0.63    Ca    8.1<L>      14 Dec 2017 06:30  Mg     1.6     12-14          RECENT CULTURES:            CAPILLARY BLOOD GLUCOSE      RADIOLOGY & ADDITIONAL STUDIES:    ASSESSMENT/PLAN:  67yMale presenting with:    Neuro:    CV:    Pulm:    GI/Nutrition:    /Renal:    ID:    Lines/Tubes:    Endo:    Skin:    Proph:    Dispo:      CRITICAL CARE TIME SPENT: INTERVAL HPI/OVERNIGHT EVENTS/SUBJECTIVE:  Pt tolerating diet.  Admits improved pain.  Denies NV, abd pain or other CO. Ostomy output with recordings as below.     ICU Vital Signs Last 24 Hrs  T(C): 36.7 (14 Dec 2017 05:16), Max: 36.7 (14 Dec 2017 05:16)  T(F): 98 (14 Dec 2017 05:16), Max: 98 (14 Dec 2017 05:16)  HR: 85 (14 Dec 2017 05:16) (85 - 103)  BP: 118/67 (14 Dec 2017 05:16) (113/76 - 135/77)  BP(mean): --  ABP: --  ABP(mean): --  RR: 17 (14 Dec 2017 05:16) (17 - 18)  SpO2: 98% (14 Dec 2017 05:16) (93% - 98%)      I&O's Detail    13 Dec 2017 07:01  -  14 Dec 2017 07:00  --------------------------------------------------------  IN:    Oral Fluid: 360 mL  Total IN: 360 mL    OUT:    Ileostomy: 1300 mL    Voided: 700 mL  Total OUT: 2000 mL    Total NET: -1640 mL      MEDICATIONS  (STANDING):  ALBUTerol/ipratropium for Nebulization 3 milliLiter(s) Nebulizer every 6 hours  ALBUTerol/ipratropium for Nebulization. 3 milliLiter(s) Nebulizer once  ALPRAZolam 0.25 milliGRAM(s) Oral three times a day  aspirin enteric coated 81 milliGRAM(s) Oral daily  atorvastatin 80 milliGRAM(s) Oral at bedtime  clopidogrel Tablet 75 milliGRAM(s) Oral daily  diltiazem    milliGRAM(s) Oral <User Schedule>  enoxaparin Injectable 40 milliGRAM(s) SubCutaneous daily  Gauifenesin/Dextromethorphan 1200/600mg 1 Tablet(s) 1 Tablet(s) Oral two times a day  lactated ringers. 1000 milliLiter(s) (100 mL/Hr) IV Continuous <Continuous>  levothyroxine 50 MICROGram(s) Oral daily  loperamide 2 milliGRAM(s) Oral every 4 hours  loratadine 10 milliGRAM(s) Oral daily  multivitamin 1 Tablet(s) Oral daily  nystatin Powder 1 Application(s) Topical two times a day  pantoprazole    Tablet 40 milliGRAM(s) Oral before breakfast  tamsulosin 0.4 milliGRAM(s) Oral at bedtime    MEDICATIONS  (PRN):  acetaminophen  Suppository. 650 milliGRAM(s) Rectal every 6 hours PRN Moderate Pain (4 - 6)  acetylcysteine 20% Inhalation 1 milliLiter(s) Inhalation every 4 hours PRN poor cough clearance  ALBUTerol    0.083% 2.5 milliGRAM(s) Nebulizer every 4 hours PRN Shortness of Breath and/or Wheezing  ondansetron Injectable 4 milliGRAM(s) IV Push every 8 hours PRN Nausea and/or Vomiting      MISC:     PHYSICAL EXAM:    Gen: NAD, laying comfortably.    Neurological: AAOx3, no loss of sensation.     Neck: supple    Pulmonary: non-labored, no accessory muscle use.     Cardiovascular: s1/s2    Gastrointestinal: soft, NTND, ostomy with stool and gas, stoma pink, edematous. No guarding or rebound.     Extremities: no calf ttp or peripheral edema b/l    Skin: skin is warm and dry, no rashes.      LABS:  CBC Full  -  ( 14 Dec 2017 06:30 )  WBC Count : 14.7 K/uL  Hemoglobin : 12.3 g/dL  Hematocrit : 38.2 %  Platelet Count - Automated : 260 K/uL  Mean Cell Volume : 87.0 fl  Mean Cell Hemoglobin : 28.0 pg  Mean Cell Hemoglobin Concentration : 32.2 g/dL  Auto Neutrophil # : x  Auto Lymphocyte # : x  Auto Monocyte # : x  Auto Eosinophil # : x  Auto Basophil # : x  Auto Neutrophil % : x  Auto Lymphocyte % : x  Auto Monocyte % : x  Auto Eosinophil % : x  Auto Basophil % : x    12-14    134<L>  |  91<L>  |  11.0  ----------------------------<  145<H>  4.3   |  30.0<H>  |  0.63    Ca    8.1<L>      14 Dec 2017 06:30  Mg     1.6     12-14      ASSESSMENT/PLAN:  67yMale SP loop ileostomy with high ileostomy out put but otherwise doing well from surgical standpoint    1. Keep up with IVF replacements to near match ileostomy outputs providing total balance net neutral.  2. Continue diet and urge PO nutrition  3. Monitor electrolytes and replace prn  4. Care as per primary team  -will sign off

## 2017-12-14 NOTE — PROGRESS NOTE ADULT - SUBJECTIVE AND OBJECTIVE BOX
CHIEF COMPLAINT/INTERVAL HISTORY: 67 year old  Male presents with a chief complaint of SOB (29 Nov 2017 10:08)    SUBJECTIVE & OBJECTIVE: Pt seen and examined at bedside. Large output from ostomy site.   C/o generalized weakness, nervous. Today tolerates po, c/o thirst, tolerated PT, able to void today.  Other ROS reviewed and neg     Vital Signs Last 24 Hrs  T(C): 36.7 (14 Dec 2017 05:16), Max: 36.7 (14 Dec 2017 05:16)  T(F): 98 (14 Dec 2017 05:16), Max: 98 (14 Dec 2017 05:16)  HR: 85 (14 Dec 2017 05:16) (85 - 103)  BP: 118/67 (14 Dec 2017 05:16) (113/76 - 118/67)  RR: 17 (14 Dec 2017 05:16) (17 - 18)  SpO2: 98% (14 Dec 2017 05:16) (98% - 98%)                      12.3   14.7  )-----------( 260      ( 14 Dec 2017 06:30 )             38.2     14 Dec 2017 06:30    134    |  91     |  11.0   ----------------------------<  145    4.3     |  30.0   |  0.63     Ca    8.1        14 Dec 2017 06:30  Mg     1.6       14 Dec 2017 06:30    MEDICATIONS  (STANDING):  ALBUTerol/ipratropium for Nebulization 3 milliLiter(s) Nebulizer every 6 hours  ALBUTerol/ipratropium for Nebulization. 3 milliLiter(s) Nebulizer once  ALPRAZolam 0.25 milliGRAM(s) Oral three times a day  aspirin enteric coated 81 milliGRAM(s) Oral daily  atorvastatin 80 milliGRAM(s) Oral at bedtime  clopidogrel Tablet 75 milliGRAM(s) Oral daily  diltiazem    milliGRAM(s) Oral <User Schedule>  enoxaparin Injectable 40 milliGRAM(s) SubCutaneous daily  Gauifenesin/Dextromethorphan 1200/600mg 1 Tablet(s) 1 Tablet(s) Oral two times a day  lactated ringers. 1000 milliLiter(s) (100 mL/Hr) IV Continuous <Continuous>  levothyroxine 50 MICROGram(s) Oral daily  loperamide 2 milliGRAM(s) Oral every 4 hours  loratadine 10 milliGRAM(s) Oral daily  multivitamin 1 Tablet(s) Oral daily  pantoprazole    Tablet 40 milliGRAM(s) Oral before breakfast  tamsulosin 0.4 milliGRAM(s) Oral at bedtime    MEDICATIONS  (PRN):  acetaminophen  Suppository. 650 milliGRAM(s) Rectal every 6 hours PRN Moderate Pain (4 - 6)  acetylcysteine 20% Inhalation 1 milliLiter(s) Inhalation every 4 hours PRN poor cough clearance  ALBUTerol    0.083% 2.5 milliGRAM(s) Nebulizer every 4 hours PRN Shortness of Breath and/or Wheezing  ondansetron Injectable 4 milliGRAM(s) IV Push every 8 hours PRN Nausea and/or Vomiting      RADIOLOGY & ADDITIONAL TESTS: personally visualized    PHYSICAL EXAM:  GENERAL: elderly male, comfortable in the chair, chronically ill appearing  HEAD:  Atraumatic, Normocephalic  EYES: EOMI, PERRLA, conjunctiva and sclera clear  ENMT: Moist mucous membranes  NECK: Supple, no JVD  NERVOUS SYSTEM:  Alert & Oriented X3 in NAD  CHEST/LUNG: coarse breath sounds minimally decreased at bases  HEART: Regular rate and rhythm; +S1/S2  ABDOMEN: Soft, NT, + BS, +ileostomy with large output - more formed today  : scrotal edema  EXTREMITIES:  No edema

## 2017-12-14 NOTE — PROGRESS NOTE ADULT - ASSESSMENT
67 year old male with PMH of COPD stage 4 s/p right lung reduction in 2010 (on home oxygen 4L (sat 92-93%), HTN, CVA on 2014, DM, hypothyroidism, hydrocele, and nephrolithiasis who is admitted for a COPD exacerbation. Patient has been weaned off ventimask to 4 liters nasal cannula and respiratory status is stable. CTA of the chest was negative for PE but showed bilateral lower lobe infiltrates with mucous plugging for which he was started on Levaquin.  Respiratory status stable and steroids are being tapered. Hospital course complicated by constipation, which had not improved despite aggressive bowel regimen, laxatives and enemas. XRAY with large stool burden. CT with diaphragmatic hernia and patient was consequently taken to the OR for loop ileostomy under local anesthesia.     Plan:    1. Constipation s/p loop ileostomy on 12/7 under local anesthesia  with large volume output - restarted IVF, diet advanced as per surgical team - not well tolerated due to edematous stoma with N/V and decreased intake - clinically dehydrated, though BUN improved and urine is light colored - stop IVF, advance diet, ostomy care as per ostomy RN, added immodium, stopped miralax and senna, no lomotil as pt does not tolerate codeine products    2. Acute on chronic hypoxic respiratory failure secondary to COPD exacerbation - now resolved, on home oxygen (4 liters)  - bicarb elevated likely due to metabolic compensation for resp acidosis although patient refuses blood gases    - continue bronchodilators and continue to taper prednisone (changed to 20 mg) - stop  - continue Mucomyst PRN  - completed course of Levaquin     3. CAP - completed course of Levaquin on 12/5     4. Hypertension - BP acceptable  - continue cardizem    5. Hyperlipidemia   - continue statin    6. Hyperglycemia  - HbA1c 6.1 - pre-diabetic   - likely steroid induced  - stopped sliding scale and lantus as pt is refusing FS and  controlled glucose level    7. Scrotal edema chronic with urinary retention - now retaining again - Roy if not able to urinate  - patient following with  as outpatient  - cont scrotal elevation    8. Right femoral artery dissection  - incidental finding on CT, no intervention as per vascular    9. DVT Prophylaxis - Lovenox     10. Leukocytosis - due to steroids + reactive s/p ileostomy, neg procalcitonin - resolving as ileostomy functioning properly now

## 2017-12-15 ENCOUNTER — TRANSCRIPTION ENCOUNTER (OUTPATIENT)
Age: 67
End: 2017-12-15

## 2017-12-15 LAB
ANION GAP SERPL CALC-SCNC: 11 MMOL/L — SIGNIFICANT CHANGE UP (ref 5–17)
BUN SERPL-MCNC: 9 MG/DL — SIGNIFICANT CHANGE UP (ref 8–20)
CALCIUM SERPL-MCNC: 8.2 MG/DL — LOW (ref 8.6–10.2)
CHLORIDE SERPL-SCNC: 90 MMOL/L — LOW (ref 98–107)
CO2 SERPL-SCNC: 34 MMOL/L — HIGH (ref 22–29)
CREAT SERPL-MCNC: 0.7 MG/DL — SIGNIFICANT CHANGE UP (ref 0.5–1.3)
GLUCOSE SERPL-MCNC: 131 MG/DL — HIGH (ref 70–115)
POTASSIUM SERPL-MCNC: 3.8 MMOL/L — SIGNIFICANT CHANGE UP (ref 3.5–5.3)
POTASSIUM SERPL-SCNC: 3.8 MMOL/L — SIGNIFICANT CHANGE UP (ref 3.5–5.3)
SODIUM SERPL-SCNC: 135 MMOL/L — SIGNIFICANT CHANGE UP (ref 135–145)

## 2017-12-15 PROCEDURE — 99232 SBSQ HOSP IP/OBS MODERATE 35: CPT

## 2017-12-15 RX ORDER — NYSTATIN CREAM 100000 [USP'U]/G
1 CREAM TOPICAL
Qty: 1 | Refills: 0 | OUTPATIENT
Start: 2017-12-15 | End: 2017-12-24

## 2017-12-15 RX ORDER — GUAIFENESIN/DEXTROMETHORPHAN 600MG-30MG
1 TABLET, EXTENDED RELEASE 12 HR ORAL
Qty: 0 | Refills: 0 | COMMUNITY

## 2017-12-15 RX ORDER — LOPERAMIDE HCL 2 MG
1 TABLET ORAL
Qty: 60 | Refills: 0 | OUTPATIENT
Start: 2017-12-15 | End: 2017-12-24

## 2017-12-15 RX ORDER — DOCUSATE SODIUM 100 MG
1 CAPSULE ORAL
Qty: 0 | Refills: 0 | COMMUNITY

## 2017-12-15 RX ORDER — ALBUTEROL 90 UG/1
2 AEROSOL, METERED ORAL
Qty: 0 | Refills: 0 | COMMUNITY

## 2017-12-15 RX ORDER — TAMSULOSIN HYDROCHLORIDE 0.4 MG/1
1 CAPSULE ORAL
Qty: 30 | Refills: 0 | OUTPATIENT
Start: 2017-12-15 | End: 2018-01-13

## 2017-12-15 RX ORDER — ALPRAZOLAM 0.25 MG
0.5 TABLET ORAL EVERY 6 HOURS
Qty: 0 | Refills: 0 | Status: DISCONTINUED | OUTPATIENT
Start: 2017-12-15 | End: 2017-12-22

## 2017-12-15 RX ADMIN — ATORVASTATIN CALCIUM 80 MILLIGRAM(S): 80 TABLET, FILM COATED ORAL at 21:36

## 2017-12-15 RX ADMIN — Medication 3 MILLILITER(S): at 21:00

## 2017-12-15 RX ADMIN — NYSTATIN CREAM 1 APPLICATION(S): 100000 CREAM TOPICAL at 18:04

## 2017-12-15 RX ADMIN — Medication 1 TABLET(S): at 12:42

## 2017-12-15 RX ADMIN — Medication 180 MILLIGRAM(S): at 18:04

## 2017-12-15 RX ADMIN — Medication 3 MILLILITER(S): at 03:18

## 2017-12-15 RX ADMIN — Medication 2 MILLIGRAM(S): at 21:36

## 2017-12-15 RX ADMIN — Medication 2 MILLIGRAM(S): at 12:42

## 2017-12-15 RX ADMIN — Medication 3 MILLILITER(S): at 10:08

## 2017-12-15 RX ADMIN — Medication 3 MILLILITER(S): at 14:47

## 2017-12-15 RX ADMIN — TAMSULOSIN HYDROCHLORIDE 0.4 MILLIGRAM(S): 0.4 CAPSULE ORAL at 21:36

## 2017-12-15 RX ADMIN — PANTOPRAZOLE SODIUM 40 MILLIGRAM(S): 20 TABLET, DELAYED RELEASE ORAL at 06:36

## 2017-12-15 RX ADMIN — LORATADINE 10 MILLIGRAM(S): 10 TABLET ORAL at 12:42

## 2017-12-15 RX ADMIN — Medication 2 MILLIGRAM(S): at 02:22

## 2017-12-15 RX ADMIN — Medication 81 MILLIGRAM(S): at 12:42

## 2017-12-15 RX ADMIN — Medication 50 MICROGRAM(S): at 06:36

## 2017-12-15 RX ADMIN — Medication 2 MILLIGRAM(S): at 06:36

## 2017-12-15 RX ADMIN — CLOPIDOGREL BISULFATE 75 MILLIGRAM(S): 75 TABLET, FILM COATED ORAL at 12:42

## 2017-12-15 RX ADMIN — Medication 10 MILLIGRAM(S): at 12:42

## 2017-12-15 RX ADMIN — Medication 2 MILLIGRAM(S): at 18:04

## 2017-12-15 NOTE — DISCHARGE NOTE ADULT - PROVIDER TOKENS
TOKEN:'[id number]',[id number]' TOKEN:'[id number]'

## 2017-12-15 NOTE — DISCHARGE NOTE ADULT - PLAN OF CARE
resolved resume regular meds s/p ileostomy - cont proper care as per wound care nurse, imodium pre DMII - metformin, diet monitor BP synthroid BATHING: Please do not submerge wound underwater. You may shower and/or sponge bathe.   ACTIVITY: No heavy lifting or straining. Otherwise, you may return to your usual level of physical activity. If you are taking narcotic pain medication (such as Percocet) DO NOT drive a car, operate machinery or make important decisions.  DIET: Return to your usual diet.  NOTIFY YOUR SURGEON IF: You have any bleeding that does not stop, any pus draining from your wound(s), any fever (over 100.4 F) or chills, persistent nausea/vomiting, persistent diarrhea, or if your pain is not controlled on your discharge pain medications.  FOLLOW-UP: Please follow up with your primary care physician and Acute Care Surgery clinic [phone] in 10-14 days regarding your hospitalization. Call for appointment upon discharge. Continue STEROID TAPER as prescribed as well as home medications for your COPD. Please stop taking your albuterol inhaler - a new prescription has been sent to your pharmacy for a LEVALBUTEROL inhaler which has less effect on your heart rate. Call and make an appointment with your primary care provider or with your pulmonologist immediately after discharge for further follow-up. The contact information for the pulmonologist who saw you while in the hospital, Dr. Holt, has been provided below. BATHING: Please do not submerge wound underwater. You may shower and/or sponge bathe.   ACTIVITY: No heavy lifting or straining. Otherwise, you may return to your usual level of physical activity.   Medications: Tylenol for pain relief, as needed.  DIET: Return to your usual diet.  NOTIFY YOUR SURGEON IF: You have any bleeding that does not stop, any pus draining from your wound(s), any fever (over 100.4 F) or chills, persistent nausea/vomiting, persistent diarrhea, or if your pain is not controlled on your discharge pain medications.  FOLLOW-UP: Please follow up with your primary care physician and Acute Care Surgery clinic [phone] in 10-14 days regarding your hospitalization. Call for appointment upon discharge. Please resume blood pressure medications at doses listed above and monitor blood pressure at home. The cardiologist who saw you while in the hospital, Dr. Rizo, has recommended that the dose / frequency of your cardizem be changed - a new prescription for this has been sent to your pharmacy. Please take exactly as prescribed. Call and make a follow-up appointment with your cardiologist and/or your primary care provider upon discharge for further management. The contact information for Dr. Rizo has been provided below, if you would prefer to follow-up with him. Please resume all home diabetes medications at current doses, monitor blood sugar at home, limit your intake of carbohydrates and sugars, and follow-up with your endocrinologist and/or your primary care provider as per your usual schedule. Please call and make a follow-up appointment with urologist, Dr. Powell, for outpatient follow-up. A prescription for FLOMAX has been sent to your pharmacy - please take as prescribed, and follow-up with Dr. Powell or your primary care provider for further management / further medication refills as needed. Please stop taking albuterol - a new prescription has been sent to your pharmacy for LEVALBUTEROL which has less effect on your heart rate and which you have been tolerating better. Continue STEROIDS (prednisone) as listed above, as well as home medications for your COPD. *** IT IS EXTREMELY IMPORTANT THAT YOU MAKE A FOLLOW-UP APPOINTMENT WITH YOUR PULMONOLOGIST IMMEDIATELY AFTER DISCHARGE, SO THAT HE CAN CONTINUE TO PRESCRIBE YOUR STEROID TAPER. IF YOU DO NOT FOLLOW-UP IMMEDIATELY AND YOU RUN OUT OF STEROIDS, YOU PUT YOURSELF AT RISK FOR ACUTE WITHDRAWAL FROM THE STEROIDS. The contact information for the pulmonologist who saw you while in the hospital, Dr. Holt, has been provided below. Please resume blood pressure medications at doses listed above and monitor blood pressure at home. The cardiologist who saw you while in the hospital, Dr. Rizo, has recommended that the dose / frequency of your CARDIZEM be changed - a new prescription for this has been sent to your pharmacy. Please take exactly as prescribed. Call and make a follow-up appointment with your cardiologist and/or your primary care provider upon discharge for further management. The contact information for Dr. Rizo has been provided below, if you would prefer to follow-up with him.

## 2017-12-15 NOTE — DISCHARGE NOTE ADULT - MEDICATION SUMMARY - MEDICATIONS TO TAKE
I will START or STAY ON the medications listed below when I get home from the hospital:    aspirin 81 mg oral tablet  -- 1 tab(s) by mouth once a day  -- Indication: For CAD    tamsulosin 0.4 mg oral capsule  -- 1 cap(s) by mouth once a day (at bedtime)  -- Indication: For BPH    diltiazem 180 mg/24 hours oral capsule, extended release  -- 1 cap(s) by mouth 2 times a day  -- Indication: For HTN    metFORMIN 500 mg oral tablet  -- 1 tab(s) by mouth 2 times a day  -- Indication: For PreDMII    loperamide 2 mg oral capsule  -- 1 cap(s) by mouth every 4 hours  -- Indication: For ileostomy    Claritin 10 mg oral tablet  -- 1 tab(s) by mouth once a day  -- Indication: For allergies    atorvastatin 80 mg oral tablet  -- 1 tab(s) by mouth once a day (at bedtime)  -- Indication: For HLD    clopidogrel 75 mg oral tablet  -- 1 tab(s) by mouth once a day  -- Indication: For CAD    Xanax 0.25 mg oral tablet  -- 1 tab(s) by mouth 3 to 4 times a day, As Needed  -- Indication: For Anxiety    Spiriva 18 mcg inhalation capsule  -- 1 cap(s) inhaled once a day  -- Indication: For COPD    albuterol 2.5 mg/3 mL (0.083%) inhalation solution  -- 3 milliliter(s) inhaled every 6 hours, As Needed  -- Indication: For COPD    ProAir HFA 90 mcg/inh inhalation aerosol  -- 2 puff(s) inhaled 4 times a day, As Needed  -- Indication: For COPD    Advair Diskus 250 mcg-50 mcg inhalation powder  -- 1 puff(s) inhaled 2 times a day  -- Indication: For COPD    nystatin 100,000 units/g topical powder  -- 1 application on skin 2 times a day  -- Indication: For wound care    pantoprazole 40 mg oral delayed release tablet  -- 1 tab(s) by mouth once a day (before a meal)  -- Indication: For GI prophylaxis    levothyroxine 50 mcg (0.05 mg) oral tablet  -- 1 tab(s) by mouth once a day  -- Indication: For Hypothyroidism    Multiple Vitamins oral tablet  -- 1 tab(s) by mouth once a day  -- Indication: For supplement    Vitamin D3 1000 intl units oral tablet  -- 1 tab(s) by mouth once a day  -- Indication: For supplement I will START or STAY ON the medications listed below when I get home from the hospital:    predniSONE 10 mg oral tablet  -- 1 tab(s) by mouth 2 times a day   -- It is very important that you take or use this exactly as directed.  Do not skip doses or discontinue unless directed by your doctor.  Obtain medical advice before taking any non-prescription drugs as some may affect the action of this medication.  Take with food or milk.    -- Indication: For COPD exacerbation    aspirin 81 mg oral tablet, chewable  -- 1 tab(s) by mouth once a day  -- Indication: For History of CVA (cerebrovascular accident)    acetaminophen 325 mg oral tablet  -- 2 tab(s) by mouth every 6 hours, As needed, Fever, pain, headache  -- Indication: For Pain    tamsulosin 0.4 mg oral capsule  -- 1 cap(s) by mouth once a day (at bedtime)  -- Indication: For Urinary retention    dilTIAZem 120 mg oral tablet  -- 1 tab(s) by mouth every 8 hours  -- Indication: For Tachycardia    metFORMIN 500 mg oral tablet  -- 1 tab(s) by mouth 2 times a day  -- Indication: For Diabetes mellitus    loratadine 10 mg oral tablet  -- 1 tab(s) by mouth once a day  -- Indication: For Antihistamine    atorvastatin 40 mg oral tablet  -- 1 tab(s) by mouth once a day (at bedtime)  -- Indication: For History of CVA (cerebrovascular accident)    clopidogrel 75 mg oral tablet  -- 1 tab(s) by mouth once a day  -- Indication: For CAD    Tessalon Perles 100 mg oral capsule  -- 1 cap(s) by mouth 3 times a day   -- May cause drowsiness.  Alcohol may intensify this effect.  Use care when operating dangerous machinery.  Swallow whole.  Do not crush.    -- Indication: For Cough    ALPRAZolam 0.25 mg oral tablet  -- 1 tab(s) by mouth every 6 hours  -- Indication: For Anxiety    tiotropium 18 mcg inhalation capsule  -- 1 cap(s) inhaled once a day  -- Indication: For COPD exacerbation    levalbuterol 0.63 mg/3 mL inhalation solution  -- 3 milliliter(s) by nebulizer every 8 hours   -- For inhalation only.  It is very important that you take or use this exactly as directed.  Do not skip doses or discontinue unless directed by your doctor.  Obtain medical advice before taking any non-prescription drugs as some may affect the action of this medication.    -- Indication: For COPD exacerbation    Advair Diskus 250 mcg-50 mcg inhalation powder  -- 1 puff(s) inhaled 2 times a day  -- Indication: For COPD exacerbation    ProAir HFA 90 mcg/inh inhalation aerosol  -- 2 puff(s) inhaled 4 times a day, As Needed  -- Indication: For COPD exacerbation    pantoprazole 40 mg oral delayed release tablet  -- 1 tab(s) by mouth 2 times a day (before meals)  -- Indication: For GERD    levothyroxine 50 mcg (0.05 mg) oral tablet  -- 1 tab(s) by mouth once a day  -- Indication: For Hypothyroidism    Multiple Vitamins oral tablet  -- 1 tab(s) by mouth once a day  -- Indication: For Nurtitional supplement    Vitamin D3 1000 intl units oral tablet  -- 1 tab(s) by mouth once a day  -- Indication: For Nutritional supplement

## 2017-12-15 NOTE — PROGRESS NOTE ADULT - SUBJECTIVE AND OBJECTIVE BOX
Internal Medicine Hospitalist - Dr. Abdiel BAUTISTA    [id number]    67y      Male    Patient is a 67y old  Male who presents with a chief complaint of Came for rehab (29 Nov 2017 10:08)    INTERVAL HPI/ OVERNIGHT EVENTS: Patient is seen and examined, report breathing is better, cont. to copious amount in ileostomy     REVIEW OF SYSTEMS:    Denied fever, chills, abd. pain, nausea, vomiting, chest pain, SOB, headache, dizziness    PHYSICAL EXAM:    Vital Signs Last 24 Hrs  T(C): 36.7 (15 Dec 2017 07:03), Max: 37 (14 Dec 2017 22:40)  T(F): 98.1 (15 Dec 2017 07:03), Max: 98.6 (14 Dec 2017 22:40)  HR: 89 (15 Dec 2017 07:03) (89 - 92)  BP: 98/60 (15 Dec 2017 07:03) (98/60 - 115/75)  BP(mean): --  RR: 18 (15 Dec 2017 07:03) (18 - 18)  SpO2: --    GENERAL: NAD  CHEST/LUNG: diminished air entry   HEART: S1S2+ audible  ABDOMEN: Soft, Nontender, Nondistended; Bowel sounds present, positive ileostomy   EXTREMITIES:  no edema      LABS:                        12.3   14.7  )-----------( 260      ( 14 Dec 2017 06:30 )             38.2     12-15    135  |  90<L>  |  9.0  ----------------------------<  131<H>  3.8   |  34.0<H>  |  0.70    Ca    8.2<L>      15 Dec 2017 07:13  Mg     1.6     12-14              MEDICATIONS  (STANDING):  ALBUTerol/ipratropium for Nebulization 3 milliLiter(s) Nebulizer every 6 hours  ALBUTerol/ipratropium for Nebulization. 3 milliLiter(s) Nebulizer once  aspirin enteric coated 81 milliGRAM(s) Oral daily  atorvastatin 80 milliGRAM(s) Oral at bedtime  clopidogrel Tablet 75 milliGRAM(s) Oral daily  diltiazem    milliGRAM(s) Oral <User Schedule>  enoxaparin Injectable 40 milliGRAM(s) SubCutaneous daily  Gauifenesin/Dextromethorphan 1200/600mg 1 Tablet(s) 1 Tablet(s) Oral two times a day  levothyroxine 50 MICROGram(s) Oral daily  loperamide 2 milliGRAM(s) Oral every 4 hours  loratadine 10 milliGRAM(s) Oral daily  multivitamin 1 Tablet(s) Oral daily  nystatin Powder 1 Application(s) Topical two times a day  pantoprazole    Tablet 40 milliGRAM(s) Oral before breakfast  predniSONE   Tablet 10 milliGRAM(s) Oral daily  tamsulosin 0.4 milliGRAM(s) Oral at bedtime    MEDICATIONS  (PRN):  acetaminophen  Suppository. 650 milliGRAM(s) Rectal every 6 hours PRN Moderate Pain (4 - 6)  acetylcysteine 20% Inhalation 1 milliLiter(s) Inhalation every 4 hours PRN poor cough clearance  ALBUTerol    0.083% 2.5 milliGRAM(s) Nebulizer every 4 hours PRN Shortness of Breath and/or Wheezing  ondansetron Injectable 4 milliGRAM(s) IV Push every 8 hours PRN Nausea and/or Vomiting      RADIOLOGY & ADDITIONAL TEST

## 2017-12-15 NOTE — DISCHARGE NOTE ADULT - HOSPITAL COURSE
67 year old male with PMH of COPD stage 4 s/p right lung reduction in 2010 (on home oxygen 4L (sat 92-93%), HTN, CVA on 2014, DM, hypothyroidism, hydrocele, and nephrolithiasis who is admitted for a COPD exacerbation. Patient has been weaned off ventimask to 4 liters nasal cannula and respiratory status is stable. CTA of the chest was negative for PE but showed bilateral lower lobe infiltrates with mucous plugging for which he was started on Levaquin.  Respiratory status stable and steroids are being tapered. Hospital course complicated by constipation, which had not improved despite aggressive bowel regimen, laxatives and enemas. XRAY with large stool burden. CT with diaphragmatic hernia and patient was consequently taken to the OR for loop ileostomy under local anesthesia.   Plan:  1. Constipation s/p loop ileostomy on 12/7 under local anesthesia  with large volume output - wound care, imodium, f/u with surgery    2. Acute on chronic hypoxic respiratory failure secondary to COPD exacerbation - now resolved, on home oxygen (4 liters)  - bicarb elevated likely due to metabolic compensation for resp acidosis although patient refuses blood gases    - continue bronchodilators and continue to taper prednisone (changed to 20 mg) - stopped  - completed course of Levaquin     3. CAP - completed course of Levaquin on 12/5     4. Hypertension - BP acceptable  - continue cardizem    5. Hyperlipidemia   - continue statin    6. Hyperglycemia  - HbA1c 6.1 - pre-diabetic   - likely steroid induced  - stopped sliding scale and lantus as pt is refusing FS and  controlled glucose level  - resume metformin on DC (consider to hold if develops diarrhea)    7. Scrotal edema chronic with urinary retention - resolved, cont flomax  - patient following with  as outpatient  - cont scrotal elevation    8. Right femoral artery dissection  - incidental finding on CT, no intervention as per vascular    Medically stable for DC pending PT reevaluation 67 year old male with PMH of COPD stage 4 s/p right lung reduction in 2010 (on home oxygen 4L (sat 92-93%), HTN, CVA on 2014, DM, hypothyroidism, hydrocele, and nephrolithiasis who is admitted for a COPD exacerbation. Patient has been weaned off ventimask to 4 liters nasal cannula and respiratory status is stable. CTA of the chest was negative for PE but showed bilateral lower lobe infiltrates with mucous plugging for which he was started on Levaquin.  Respiratory status stable and steroids are being tapered. Hospital course complicated by constipation, which had not improved despite aggressive bowel regimen, laxatives and enemas. XRAY with large stool burden. CT with diaphragmatic hernia and patient was consequently taken to the OR for loop ileostomy under local anesthesia.   Plan:  1. Constipation s/p loop ileostomy on 12/7 under local anesthesia  with large volume output - wound care, imodium, f/u with surgery    2. Acute on chronic hypoxic respiratory failure secondary to COPD exacerbation - now resolved, on home oxygen (4 liters)  - bicarb elevated likely due to metabolic compensation for resp acidosis although patient refuses blood gases    - continue bronchodilators and continue to taper prednisone (changed to 20 mg) - stopped  - completed course of Levaquin     3. CAP - completed course of Levaquin on 12/5     4. Hypertension - BP acceptable  - continue cardizem    5. Hyperlipidemia   - continue statin    6. Hyperglycemia  - HbA1c 6.1 - pre-diabetic   - likely steroid induced  - stopped sliding scale and lantus as pt is refusing FS and  controlled glucose level  - resume metformin on DC (consider to hold if develops diarrhea)    7. Scrotal edema chronic with urinary retention - resolved, cont flomax  - patient following with  as outpatient  - cont scrotal elevation    8. Right femoral artery dissection  - incidental finding on CT, no intervention as per vascular    Above D/C prepped per medicine prior to pt's care assumed by ACS.    Pt remained in house on the medical service with high ileostomy output. Taken back to the OR 1/16 for reversal of jejunostomy, creation of ileostomy.  Admitted to SICU immediately post op as pt was pressor dependent.  With IVF resuscitation, able to d/c pressors, extubated.  Pt transferred to floor 1/19.  Pt refusing to participate with PT at first, then amendable.  Pt had functional ileostomy, tolerating diet.  TPN was initiated to optimize pt's nutritional status, completed 7 day course on 2/1/18.  Urology was consulted for hydrocele, recommends outpatient follow up.  Pt recommended Acute rehab.  Pt refuses, wants to go home.  Pt medically stable for dc home. 67 year old male with PMH of COPD stage 4 s/p right lung reduction in 2010 (on home oxygen 4L (sat 92-93%), HTN, CVA on 2014, DM, hypothyroidism, hydrocele, and nephrolithiasis who is admitted for a COPD exacerbation. Patient has been weaned off ventimask to 4 liters nasal cannula and respiratory status is stable. CTA of the chest was negative for PE but showed bilateral lower lobe infiltrates with mucous plugging for which he was started on Levaquin.  Respiratory status stable and steroids are being tapered. Hospital course complicated by constipation, which had not improved despite aggressive bowel regimen, laxatives and enemas. XRAY with large stool burden. CT with diaphragmatic hernia and patient was consequently taken to the OR for loop ileostomy under local anesthesia.   Plan:  1. Constipation s/p loop ileostomy on 12/7 under local anesthesia  with large volume output - wound care, imodium, f/u with surgery    2. Acute on chronic hypoxic respiratory failure secondary to COPD exacerbation - now resolved, on home oxygen (4 liters)  - bicarb elevated likely due to metabolic compensation for resp acidosis although patient refuses blood gases    - continue bronchodilators and continue to taper prednisone (changed to 20 mg) - stopped  - completed course of Levaquin     3. CAP - completed course of Levaquin on 12/5     4. Hypertension - BP acceptable  - continue cardizem    5. Hyperlipidemia   - continue statin    6. Hyperglycemia  - HbA1c 6.1 - pre-diabetic   - likely steroid induced  - stopped sliding scale and lantus as pt is refusing FS and  controlled glucose level  - resume metformin on DC (consider to hold if develops diarrhea)    7. Scrotal edema chronic with urinary retention - resolved, cont flomax  - patient following with  as outpatient  - cont scrotal elevation    8. Right femoral artery dissection  - incidental finding on CT, no intervention as per vascular    Above D/C prepped per medicine prior to pt's care assumed by ACS.    Pt remained in house on the medical service with high ileostomy output. Taken back to the OR 1/16 for reversal of jejunostomy, creation of ileostomy.  Admitted to SICU immediately post op as pt was pressor dependent.  With IVF resuscitation, able to d/c pressors, extubated.  Pt transferred to floor 1/19.  Pt refusing to participate with PT at first, then amendable.  Pt had functional ileostomy, tolerating diet.  TPN was initiated to optimize pt's nutritional status, completed 7 day course on 2/1/18.  Urology was consulted for hydrocele, recommends outpatient follow up.  On 2/8 patient had deterioration in clinical status - likely COPD exacerbation. Dr. Benito rx'ed course of levaquin and steroid taper. At time, ACS team felt patient required transfer to SICU due to pulmonary decline - patient refused, pt had competency to refuse. Pt subsequently improved. Physical therapy has recommended Acute rehab however patient refused and will be d/c'ed home with home care.      Patient is advised to RETURN TO THE EMERGENCY DEPARTMENT for any of the following - worsening pain, fever/chills, nausea/vomiting, altered mental status, chest pain, shortness of breath, or any other new / worsening symptom. 67 year old male with PMH of COPD stage 4 s/p right lung reduction in 2010 (on home oxygen 4L (sat 92-93%), HTN, CVA on 2014, DM, hypothyroidism, hydrocele, and nephrolithiasis who is admitted for a COPD exacerbation. Patient has been weaned off ventimask to 4 liters nasal cannula and respiratory status is stable. CTA of the chest was negative for PE but showed bilateral lower lobe infiltrates with mucous plugging for which he was started on Levaquin.  Respiratory status stable and steroids are being tapered. Hospital course complicated by constipation, which had not improved despite aggressive bowel regimen, laxatives and enemas. XRAY with large stool burden. CT with diaphragmatic hernia and patient was consequently taken to the OR for loop ileostomy under local anesthesia.   Plan:  1. Constipation s/p loop ileostomy on 12/7 under local anesthesia  with large volume output  2. Acute on chronic hypoxic respiratory failure secondary to COPD exacerbation  - bicarb elevated likely due to metabolic compensation for resp acidosis although patient refuses blood gases    - continue bronchodilators and continue to taper prednisone  - completed course of Levaquin     3. CAP - completed course of Levaquin    4. Hypertension - BP acceptable  - continue cardizem    5. Hyperlipidemia   - continue statin    6. Hyperglycemia  - HbA1c 6.1 - pre-diabetic   - likely steroid induced  - stopped sliding scale and lantus as pt is refusing FS and  controlled glucose level  - resume metformin on DC (consider to hold if develops diarrhea)    7. Scrotal edema chronic with urinary retention - resolved, cont flomax  - patient following with  as outpatient  - cont scrotal elevation    8. Right femoral artery dissection  - incidental finding on CT, no intervention as per vascular    Above D/C prepped per medicine prior to pt's care assumed by ACS.    Pt remained in house on the medical service with high ileostomy output. Taken back to the OR 1/16 for reversal of jejunostomy, creation of ileostomy.  Admitted to SICU immediately post op as pt was pressor dependent.  With IVF resuscitation, able to d/c pressors, extubated.  Pt transferred to floor 1/19.  Pt had functional ileostomy, tolerating diet.  TPN was initiated to optimize pt's nutritional status, completed 7 day course on 2/1/18.  Urology was consulted for hydrocele, recommends outpatient follow up.  Cardiology continued to follow patient, and adjusted cardizem for better control of tachycardia. On 2/8 patient had deterioration in clinical status - likely COPD exacerbation. Dr. Benito rx'ed course of levaquin and steroid taper. At time, ACS team felt patient required transfer to SICU due to pulmonary decline - patient refused, pt had competency to refuse. Pt subsequently improved. Physical therapy has recommended Acute rehab however patient refused and will be d/c'ed home with home care.      Patient is advised to RETURN TO THE EMERGENCY DEPARTMENT for any of the following - worsening pain, fever/chills, nausea/vomiting, altered mental status, chest pain, shortness of breath, or any other new / worsening symptom.

## 2017-12-15 NOTE — DISCHARGE NOTE ADULT - HOME CARE AGENCY
[facility] [phone] [facility] [phone] FOR RN, HOME PT, HHA, START OF CARE 2/11/18 [facility] [phone] FOR RN, HOME PT, HHA, START OF CARE 2/16/18

## 2017-12-15 NOTE — PROGRESS NOTE ADULT - ASSESSMENT
67 year old male with PMH of COPD stage 4 s/p right lung reduction in 2010 (on home oxygen 4L (sat 92-93%), HTN, CVA on 2014, DM, hypothyroidism, hydrocele, and nephrolithiasis who is admitted for a COPD exacerbation. Patient has been weaned off ventimask to 4 liters nasal cannula and respiratory status is stable. CTA of the chest was negative for PE but showed bilateral lower lobe infiltrates with mucous plugging for which he was started on Levaquin.  Respiratory status stable and steroids are being tapered. Hospital course complicated by constipation, which had not improved despite aggressive bowel regimen, laxatives and enemas. XRAY with large stool burden. CT with diaphragmatic hernia and patient was consequently taken to the OR for loop ileostomy under local anesthesia. Pt cont, to have copious amount in ileostomy. Need pt reval today.     Plan:    1. Constipation s/p loop ileostomy on 12/7 under local anesthesia  with large volume output -   , advance diet, ostomy care as per ostomy RN, added immodium, stopped miralax and senna, no lomotil as pt does not tolerate codeine products    2. Acute on chronic hypoxic respiratory failure secondary to COPD exacerbation - now resolved, on home oxygen (4 liters)  - bicarb elevated likely due to metabolic compensation for resp acidosis although patient refuses blood gases    - continue bronchodilators and continue to taper prednisone   - continue Mucomyst PRN  - completed course of Levaquin     3. CAP - completed course of Levaquin on 12/5     4. Hypertension - BP acceptable  - continue cardizem    5. Hyperlipidemia   - continue statin    6. Hyperglycemia  - HbA1c 6.1 - pre-diabetic   - likely steroid induced  - stopped sliding scale and lantus as pt is refusing FS and  controlled glucose level    7. Scrotal edema chronic with urinary retention - now retaining again - Roy if not able to urinate  - patient following with  as outpatient  - cont scrotal elevation    8. Right femoral artery dissection  - incidental finding on CT, no intervention as per vascular    9. DVT Prophylaxis - Lovenox     10. Leukocytosis - due to steroids + reactive s/p ileostomy, neg procalcitonin - resolving as ileostomy functioning properly now

## 2017-12-15 NOTE — DISCHARGE NOTE ADULT - CARE PROVIDERS DIRECT ADDRESSES
,DirectAddress_Unknown,[email or web address],DirectAddress_Unknown,[email or web address]

## 2017-12-15 NOTE — DISCHARGE NOTE ADULT - PATIENT PORTAL LINK FT
“You can access the [facility] Patient Portal, offered by [facility], by registering with the following website: [email or web address]”

## 2017-12-15 NOTE — DISCHARGE NOTE ADULT - MEDICATION SUMMARY - MEDICATIONS TO CHANGE
I will SWITCH the dose or number of times a day I take the medications listed below when I get home from the hospital:    Xanax 0.25 mg oral tablet  -- 1 tab(s) by mouth 3 times a day, As Needed I will SWITCH the dose or number of times a day I take the medications listed below when I get home from the hospital:    benzonatate 200 mg oral capsule  -- 1 cap(s) by mouth once a day, As Needed    predniSONE 10 mg oral tablet  -- 1 tab(s) by mouth once a day (last dose 7/26/16)    diltiazem 180 mg/24 hours oral capsule, extended release  -- 1 cap(s) by mouth 2 times a day    atorvastatin 80 mg oral tablet  -- 1 tab(s) by mouth once a day    atorvastatin 80 mg oral tablet  -- 1 tab(s) by mouth once a day (at bedtime)    benzonatate 200 mg oral capsule  -- 1 cap(s) by mouth once a day (at bedtime) and As Needed

## 2017-12-15 NOTE — DISCHARGE NOTE ADULT - MEDICATION SUMMARY - MEDICATIONS TO STOP TAKING
I will STOP taking the medications listed below when I get home from the hospital:    Mucinex 1200 mg oral tablet, extended release  -- 1 tab(s) by mouth every 12 hours    benzonatate 200 mg oral capsule  -- 1 cap(s) by mouth once a day, As Needed    bisacodyl 10 mg rectal suppository  -- 1 suppository(ies) rectally once a day, As needed, Constipation    Colace 100 mg oral capsule  -- 1 cap(s) by mouth once a day, As Needed, Constipation    predniSONE 10 mg oral tablet  -- 1 tab(s) by mouth once a day (last dose 7/26/16)    magnesium oxide 400 mg (241.3 mg elemental magnesium) oral tablet  -- 1 tab(s) by mouth 2 times a day (with meals)    accuchcek Rebecca test strip   -- 1 application subcutaneous 2 times a day    accuchcek Rebecca Plus test strip   -- 1 application subcutaneous 2 times a day   Dx Code: E11.8    Accu-check FastClix lancet   -- 1 application subcutaneous 2 times a day   Dx Code: E11.8    furosemide 20 mg oral tablet  -- 1 tab(s) by mouth once a day    Mucinex DM Maximum Strength 60 mg-1200 mg oral tablet, extended release  -- 1 tab(s) by mouth 2 times a day    docusate sodium 100 mg oral capsule  -- 1 cap(s) by mouth once a day (at bedtime)    benzonatate 200 mg oral capsule  -- 1 cap(s) by mouth once a day (at bedtime) and As Needed I will STOP taking the medications listed below when I get home from the hospital:    Mucinex 1200 mg oral tablet, extended release  -- 1 tab(s) by mouth every 12 hours    bisacodyl 10 mg rectal suppository  -- 1 suppository(ies) rectally once a day, As needed, Constipation    Colace 100 mg oral capsule  -- 1 cap(s) by mouth once a day, As Needed, Constipation    magnesium oxide 400 mg (241.3 mg elemental magnesium) oral tablet  -- 1 tab(s) by mouth 2 times a day (with meals)    accuchcek Rebecca test strip   -- 1 application subcutaneous 2 times a day    accuchcek Rebecca Plus test strip   -- 1 application subcutaneous 2 times a day   Dx Code: E11.8    Accu-check FastClix lancet   -- 1 application subcutaneous 2 times a day   Dx Code: E11.8    furosemide 20 mg oral tablet  -- 1 tab(s) by mouth once a day    albuterol 2.5 mg/3 mL (0.083%) inhalation solution  -- 3 milliliter(s) inhaled every 6 hours, As Needed    Mucinex DM Maximum Strength 60 mg-1200 mg oral tablet, extended release  -- 1 tab(s) by mouth 2 times a day    docusate sodium 100 mg oral capsule  -- 1 cap(s) by mouth once a day (at bedtime)

## 2017-12-15 NOTE — DISCHARGE NOTE ADULT - CARE PLAN
Principal Discharge DX:	COPD exacerbation  Goal:	resolved  Instructions for follow-up, activity and diet:	resume regular meds  Secondary Diagnosis:	Small bowel obstruction  Instructions for follow-up, activity and diet:	s/p ileostomy - cont proper care as per wound care nurse, imodium  Secondary Diagnosis:	Type 2 diabetes mellitus without complication, without long-term current use of insulin  Instructions for follow-up, activity and diet:	pre DMII - metformin, diet  Secondary Diagnosis:	Essential hypertension  Instructions for follow-up, activity and diet:	monitor BP  Secondary Diagnosis:	Acquired hypothyroidism  Instructions for follow-up, activity and diet:	synthroid Principal Discharge DX:	COPD exacerbation  Goal:	resolved  Assessment and plan of treatment:	resume regular meds  Secondary Diagnosis:	Small bowel obstruction  Assessment and plan of treatment:	BATHING: Please do not submerge wound underwater. You may shower and/or sponge bathe.   ACTIVITY: No heavy lifting or straining. Otherwise, you may return to your usual level of physical activity. If you are taking narcotic pain medication (such as Percocet) DO NOT drive a car, operate machinery or make important decisions.  DIET: Return to your usual diet.  NOTIFY YOUR SURGEON IF: You have any bleeding that does not stop, any pus draining from your wound(s), any fever (over 100.4 F) or chills, persistent nausea/vomiting, persistent diarrhea, or if your pain is not controlled on your discharge pain medications.  FOLLOW-UP: Please follow up with your primary care physician and Acute Care Surgery clinic [phone] in 10-14 days regarding your hospitalization. Call for appointment upon discharge.  Secondary Diagnosis:	Type 2 diabetes mellitus without complication, without long-term current use of insulin  Assessment and plan of treatment:	pre DMII - metformin, diet  Secondary Diagnosis:	Essential hypertension  Assessment and plan of treatment:	monitor BP  Secondary Diagnosis:	Acquired hypothyroidism  Assessment and plan of treatment:	synthroid Principal Discharge DX:	COPD exacerbation  Goal:	resolved  Assessment and plan of treatment:	Continue STEROID TAPER as prescribed as well as home medications for your COPD. Please stop taking your albuterol inhaler - a new prescription has been sent to your pharmacy for a LEVALBUTEROL inhaler which has less effect on your heart rate. Call and make an appointment with your primary care provider or with your pulmonologist immediately after discharge for further follow-up. The contact information for the pulmonologist who saw you while in the hospital, Dr. Holt, has been provided below.  Secondary Diagnosis:	Ileostomy dysfunction  Assessment and plan of treatment:	BATHING: Please do not submerge wound underwater. You may shower and/or sponge bathe.   ACTIVITY: No heavy lifting or straining. Otherwise, you may return to your usual level of physical activity.   Medications: Tylenol for pain relief, as needed.  DIET: Return to your usual diet.  NOTIFY YOUR SURGEON IF: You have any bleeding that does not stop, any pus draining from your wound(s), any fever (over 100.4 F) or chills, persistent nausea/vomiting, persistent diarrhea, or if your pain is not controlled on your discharge pain medications.  FOLLOW-UP: Please follow up with your primary care physician and Acute Care Surgery clinic [phone] in 10-14 days regarding your hospitalization. Call for appointment upon discharge.  Secondary Diagnosis:	Essential hypertension  Assessment and plan of treatment:	Please resume blood pressure medications at doses listed above and monitor blood pressure at home. The cardiologist who saw you while in the hospital, Dr. Rizo, has recommended that the dose / frequency of your cardizem be changed - a new prescription for this has been sent to your pharmacy. Please take exactly as prescribed. Call and make a follow-up appointment with your cardiologist and/or your primary care provider upon discharge for further management. The contact information for Dr. Rizo has been provided below, if you would prefer to follow-up with him.  Secondary Diagnosis:	Type 2 diabetes mellitus without complication, without long-term current use of insulin  Assessment and plan of treatment:	Please resume all home diabetes medications at current doses, monitor blood sugar at home, limit your intake of carbohydrates and sugars, and follow-up with your endocrinologist and/or your primary care provider as per your usual schedule.  Secondary Diagnosis:	Hydrocele, unspecified hydrocele type  Assessment and plan of treatment:	Please call and make a follow-up appointment with urologist, Dr. Powell, for outpatient follow-up. A prescription for FLOMAX has been sent to your pharmacy - please take as prescribed, and follow-up with Dr. Powell or your primary care provider for further management / further medication refills as needed. Principal Discharge DX:	COPD exacerbation  Goal:	resolved  Assessment and plan of treatment:	Please stop taking albuterol - a new prescription has been sent to your pharmacy for LEVALBUTEROL which has less effect on your heart rate and which you have been tolerating better. Continue STEROIDS (prednisone) as listed above, as well as home medications for your COPD. *** IT IS EXTREMELY IMPORTANT THAT YOU MAKE A FOLLOW-UP APPOINTMENT WITH YOUR PULMONOLOGIST IMMEDIATELY AFTER DISCHARGE, SO THAT HE CAN CONTINUE TO PRESCRIBE YOUR STEROID TAPER. IF YOU DO NOT FOLLOW-UP IMMEDIATELY AND YOU RUN OUT OF STEROIDS, YOU PUT YOURSELF AT RISK FOR ACUTE WITHDRAWAL FROM THE STEROIDS. The contact information for the pulmonologist who saw you while in the hospital, Dr. Holt, has been provided below.  Secondary Diagnosis:	Ileostomy dysfunction  Assessment and plan of treatment:	BATHING: Please do not submerge wound underwater. You may shower and/or sponge bathe.   ACTIVITY: No heavy lifting or straining. Otherwise, you may return to your usual level of physical activity.   Medications: Tylenol for pain relief, as needed.  DIET: Return to your usual diet.  NOTIFY YOUR SURGEON IF: You have any bleeding that does not stop, any pus draining from your wound(s), any fever (over 100.4 F) or chills, persistent nausea/vomiting, persistent diarrhea, or if your pain is not controlled on your discharge pain medications.  FOLLOW-UP: Please follow up with your primary care physician and Acute Care Surgery clinic [phone] in 10-14 days regarding your hospitalization. Call for appointment upon discharge.  Secondary Diagnosis:	Essential hypertension  Assessment and plan of treatment:	Please resume blood pressure medications at doses listed above and monitor blood pressure at home. The cardiologist who saw you while in the hospital, Dr. Rizo, has recommended that the dose / frequency of your CARDIZEM be changed - a new prescription for this has been sent to your pharmacy. Please take exactly as prescribed. Call and make a follow-up appointment with your cardiologist and/or your primary care provider upon discharge for further management. The contact information for Dr. Rizo has been provided below, if you would prefer to follow-up with him.  Secondary Diagnosis:	Type 2 diabetes mellitus without complication, without long-term current use of insulin  Assessment and plan of treatment:	Please resume all home diabetes medications at current doses, monitor blood sugar at home, limit your intake of carbohydrates and sugars, and follow-up with your endocrinologist and/or your primary care provider as per your usual schedule.  Secondary Diagnosis:	Hydrocele, unspecified hydrocele type  Assessment and plan of treatment:	Please call and make a follow-up appointment with urologist, Dr. Powell, for outpatient follow-up. A prescription for FLOMAX has been sent to your pharmacy - please take as prescribed, and follow-up with Dr. Powell or your primary care provider for further management / further medication refills as needed.

## 2017-12-15 NOTE — DISCHARGE NOTE ADULT - CARE PROVIDER_API CALL
Russel Villegas), Medicine  [address] [place] [address]  Phone: [phone]  Fax: [phone]    Jose Alberto Reece (MD), Surgery; Surgical Critical Care  [address] [place] [address]  Phone: [phone]  Fax: [phone] Jose Alberto Reece), Surgery; Surgical Critical Care  [address] [facility]  [address]st [place] [address]  Phone: [phone]  Fax: [phone]    Kevon Zelaya), Urology  [address] [place], [place] [id number]  Phone: [phone]  Fax: [phone]    James Holt), Critical Care Medicine; Pulmonary Disease; Sleep Medicine  [address] [facility] [address]  [place], [place] [id number]  Phone: [phone]  Fax: [phone]    Darius Villegas), Cardiovascular Disease  [address] [facility] [address]  [place] [address]  Phone: [phone]  Fax: [phone]

## 2017-12-16 LAB
ANION GAP SERPL CALC-SCNC: 14 MMOL/L — SIGNIFICANT CHANGE UP (ref 5–17)
BUN SERPL-MCNC: 22 MG/DL — HIGH (ref 8–20)
CALCIUM SERPL-MCNC: 8.5 MG/DL — LOW (ref 8.6–10.2)
CHLORIDE SERPL-SCNC: 75 MMOL/L — LOW (ref 98–107)
CO2 SERPL-SCNC: 34 MMOL/L — HIGH (ref 22–29)
CREAT SERPL-MCNC: 0.98 MG/DL — SIGNIFICANT CHANGE UP (ref 0.5–1.3)
GLUCOSE SERPL-MCNC: 157 MG/DL — HIGH (ref 70–115)
POTASSIUM SERPL-MCNC: 4 MMOL/L — SIGNIFICANT CHANGE UP (ref 3.5–5.3)
POTASSIUM SERPL-SCNC: 4 MMOL/L — SIGNIFICANT CHANGE UP (ref 3.5–5.3)
SODIUM SERPL-SCNC: 123 MMOL/L — LOW (ref 135–145)

## 2017-12-16 PROCEDURE — 99233 SBSQ HOSP IP/OBS HIGH 50: CPT

## 2017-12-16 RX ORDER — SODIUM CHLORIDE 9 MG/ML
1000 INJECTION INTRAMUSCULAR; INTRAVENOUS; SUBCUTANEOUS
Qty: 0 | Refills: 0 | Status: DISCONTINUED | OUTPATIENT
Start: 2017-12-16 | End: 2017-12-20

## 2017-12-16 RX ADMIN — Medication 1 TABLET(S): at 11:29

## 2017-12-16 RX ADMIN — CLOPIDOGREL BISULFATE 75 MILLIGRAM(S): 75 TABLET, FILM COATED ORAL at 11:29

## 2017-12-16 RX ADMIN — Medication 81 MILLIGRAM(S): at 11:29

## 2017-12-16 RX ADMIN — Medication 2 MILLIGRAM(S): at 17:41

## 2017-12-16 RX ADMIN — Medication 2 MILLIGRAM(S): at 15:34

## 2017-12-16 RX ADMIN — NYSTATIN CREAM 1 APPLICATION(S): 100000 CREAM TOPICAL at 05:56

## 2017-12-16 RX ADMIN — NYSTATIN CREAM 1 APPLICATION(S): 100000 CREAM TOPICAL at 17:41

## 2017-12-16 RX ADMIN — LORATADINE 10 MILLIGRAM(S): 10 TABLET ORAL at 11:29

## 2017-12-16 RX ADMIN — ATORVASTATIN CALCIUM 80 MILLIGRAM(S): 80 TABLET, FILM COATED ORAL at 22:08

## 2017-12-16 RX ADMIN — Medication 3 MILLILITER(S): at 03:39

## 2017-12-16 RX ADMIN — PANTOPRAZOLE SODIUM 40 MILLIGRAM(S): 20 TABLET, DELAYED RELEASE ORAL at 05:33

## 2017-12-16 RX ADMIN — SODIUM CHLORIDE 75 MILLILITER(S): 9 INJECTION INTRAMUSCULAR; INTRAVENOUS; SUBCUTANEOUS at 12:29

## 2017-12-16 RX ADMIN — Medication 2 MILLIGRAM(S): at 09:42

## 2017-12-16 RX ADMIN — Medication 2 MILLIGRAM(S): at 05:32

## 2017-12-16 RX ADMIN — Medication 2 MILLIGRAM(S): at 02:23

## 2017-12-16 RX ADMIN — Medication 10 MILLIGRAM(S): at 05:32

## 2017-12-16 RX ADMIN — Medication 180 MILLIGRAM(S): at 05:32

## 2017-12-16 RX ADMIN — Medication 2 MILLIGRAM(S): at 22:08

## 2017-12-16 RX ADMIN — Medication 50 MICROGRAM(S): at 05:32

## 2017-12-16 RX ADMIN — TAMSULOSIN HYDROCHLORIDE 0.4 MILLIGRAM(S): 0.4 CAPSULE ORAL at 22:08

## 2017-12-16 NOTE — PROGRESS NOTE ADULT - ASSESSMENT
67 year old male with PMH of COPD stage 4 s/p right lung reduction in 2010 (on home oxygen 4L (sat 92-93%), HTN, CVA on 2014, DM, hypothyroidism, hydrocele, and nephrolithiasis who is admitted for a COPD exacerbation. Patient has been weaned off ventimask to 4 liters nasal cannula and respiratory status is stable. CTA of the chest was negative for PE but showed bilateral lower lobe infiltrates with mucous plugging for which he was started on Levaquin.  Respiratory status stable and steroids are being tapered. Hospital course complicated by constipation, which had not improved despite aggressive bowel regimen, laxatives and enemas. XRAY with large stool burden. CT with diaphragmatic hernia and patient was consequently taken to the OR for loop ileostomy under local anesthesia. Pt cont, to have copious amount in ileostomy. Need pt reval today.     Plan:    1. Constipation s/p loop ileostomy on 12/7 under local anesthesia  with large volume output , improving- c/w immodium prn   tolerating diet,  ostomy care as per ostomy RN,  stopped miralax and senna, no lomotil as pt does not tolerate codeine products  pt report feeling weak and dehydrated, start IVF, BMP STAT     2. Acute on chronic hypoxic respiratory failure secondary to COPD exacerbation - now resolved, on home oxygen (4 liters)  - bicarb elevated likely due to metabolic compensation for resp acidosis although patient refuses blood gases    - continue bronchodilators and continue to taper prednisone   - continue Mucomyst PRN  - completed course of Levaquin     3. CAP - completed course of Levaquin on 12/5     4. Hypertension - stable   - continue cardizem    5. Hyperlipidemia   - continue statin    6. Hyperglycemia  - HbA1c 6.1 - pre-diabetic   - likely steroid induced  - stopped sliding scale and lantus as pt is refusing FS and  controlled glucose level    7. Scrotal edema chronic with urinary retention - now retaining again - Roy if not able to urinate  - patient following with  as outpatient  - cont scrotal elevation    8. Right femoral artery dissection  - incidental finding on CT, no intervention as per vascular    9. DVT Prophylaxis - Lovenox 67 year old male with PMH of COPD stage 4 s/p right lung reduction in 2010 (on home oxygen 4L (sat 92-93%), HTN, CVA on 2014, DM, hypothyroidism, hydrocele, and nephrolithiasis who is admitted for a COPD exacerbation. Patient has been weaned off ventimask to 4 liters nasal cannula and respiratory status is stable. CTA of the chest was negative for PE but showed bilateral lower lobe infiltrates with mucous plugging for which he was started on Levaquin.  Respiratory status stable and steroids are being tapered. Hospital course complicated by constipation, which had not improved despite aggressive bowel regimen, laxatives and enemas. XRAY with large stool burden. CT with diaphragmatic hernia and patient was consequently taken to the OR for loop ileostomy under local anesthesia. Pt cont, to have copious amount in ileostomy. Need pt reval today.     Plan:    1. Constipation s/p loop ileostomy on 12/7 under local anesthesia  with large volume output , improving- c/w immodium prn   tolerating diet,  ostomy care as per ostomy RN,  stopped miralax and senna, no lomotil as pt does not tolerate codeine products  pt report feeling weak and dehydrated, start IVF, BMP STAT     2. Acute on chronic hypoxic respiratory failure secondary to COPD exacerbation - now resolved, on home oxygen (4 liters)  - bicarb elevated likely due to metabolic compensation for resp acidosis although patient refuses blood gases    - continue bronchodilators and continue to taper prednisone   - continue Mucomyst PRN  - completed course of Levaquin     3. CAP - completed course of Levaquin on 12/5     4. Hypertension - stable   - continue cardizem    5. Hyperlipidemia   - continue statin    6.hyponatremia - start NS, f/u BMP     7. Scrotal edema chronic with urinary retention - now retaining again - Roy if not able to urinate  - patient following with  as outpatient  - cont scrotal elevation    8. Right femoral artery dissection  - incidental finding on CT, no intervention as per vascular    9. DVT Prophylaxis - Lovenox

## 2017-12-16 NOTE — PROGRESS NOTE ADULT - SUBJECTIVE AND OBJECTIVE BOX
Internal Medicine Hospitalist - Dr. Abdiel BAUTISTA    [id number]    67y      Male    Patient is a 67y old  Male who presents with a chief complaint of SOB (15 Dec 2017 10:17    INTERVAL HPI/ OVERNIGHT EVENTS: Patient is seen and examined, pt report he is not feeling well, feeling weak and dehydrated, denied fever, chills, abd. pain, nausea and vomiting.     REVIEW OF SYSTEMS:    Denied fever, chills, abd. pain, nausea, vomiting, chest pain, headache, dizziness    PHYSICAL EXAM:    Vital Signs Last 24 Hrs  T(C): 36.1 (16 Dec 2017 05:29), Max: 36.3 (15 Dec 2017 16:10)  T(F): 97 (16 Dec 2017 05:29), Max: 97.4 (15 Dec 2017 16:10)  HR: 100 (16 Dec 2017 05:29) (98 - 100)  BP: 108/71 (16 Dec 2017 05:29) (106/75 - 108/71)  BP(mean): --  RR: 20 (16 Dec 2017 05:29) (18 - 20)  SpO2: 92% (15 Dec 2017 14:30) (92% - 92%)    GENERAL: NAD  CHEST/LUNG: positive air entry   HEART: S1S2+ audible  ABDOMEN: Soft, Nontender, Nondistended; Bowel sounds present, positive ileostomy   EXTREMITIES:  no edema  CNS: AAO X 3    LABS:    12-15    135  |  90<L>  |  9.0  ----------------------------<  131<H>  3.8   |  34.0<H>  |  0.70    Ca    8.2<L>      15 Dec 2017 07:13              MEDICATIONS  (STANDING):  ALBUTerol/ipratropium for Nebulization 3 milliLiter(s) Nebulizer every 6 hours  ALBUTerol/ipratropium for Nebulization. 3 milliLiter(s) Nebulizer once  aspirin enteric coated 81 milliGRAM(s) Oral daily  atorvastatin 80 milliGRAM(s) Oral at bedtime  clopidogrel Tablet 75 milliGRAM(s) Oral daily  diltiazem    milliGRAM(s) Oral <User Schedule>  enoxaparin Injectable 40 milliGRAM(s) SubCutaneous daily  Gauifenesin/Dextromethorphan 1200/600mg 1 Tablet(s) 1 Tablet(s) Oral two times a day  levothyroxine 50 MICROGram(s) Oral daily  loperamide 2 milliGRAM(s) Oral every 4 hours  loratadine 10 milliGRAM(s) Oral daily  multivitamin 1 Tablet(s) Oral daily  nystatin Powder 1 Application(s) Topical two times a day  pantoprazole    Tablet 40 milliGRAM(s) Oral before breakfast  predniSONE   Tablet 10 milliGRAM(s) Oral daily  tamsulosin 0.4 milliGRAM(s) Oral at bedtime    MEDICATIONS  (PRN):  acetaminophen  Suppository. 650 milliGRAM(s) Rectal every 6 hours PRN Moderate Pain (4 - 6)  acetylcysteine 20% Inhalation 1 milliLiter(s) Inhalation every 4 hours PRN poor cough clearance  ALBUTerol    0.083% 2.5 milliGRAM(s) Nebulizer every 4 hours PRN Shortness of Breath and/or Wheezing  ALPRAZolam 0.5 milliGRAM(s) Oral every 6 hours PRN anxiety  ondansetron Injectable 4 milliGRAM(s) IV Push every 8 hours PRN Nausea and/or Vomiting      RADIOLOGY & ADDITIONAL TEST Internal Medicine Hospitalist - Dr. Abdiel BAUTISTA    [id number]    67y      Male    Patient is a 67y old  Male who presents with a chief complaint of SOB (15 Dec 2017 10:17    INTERVAL HPI/ OVERNIGHT EVENTS: Patient is seen and examined, pt report he is not feeling well, feeling weak and dehydrated, denied fever, chills, abd. pain, nausea and vomiting. Also noted to have decrease urinary output.     REVIEW OF SYSTEMS:    Denied fever, chills, abd. pain, nausea, vomiting, chest pain, headache, dizziness    PHYSICAL EXAM:    Vital Signs Last 24 Hrs  T(C): 36.1 (16 Dec 2017 05:29), Max: 36.3 (15 Dec 2017 16:10)  T(F): 97 (16 Dec 2017 05:29), Max: 97.4 (15 Dec 2017 16:10)  HR: 100 (16 Dec 2017 05:29) (98 - 100)  BP: 108/71 (16 Dec 2017 05:29) (106/75 - 108/71)  BP(mean): --  RR: 20 (16 Dec 2017 05:29) (18 - 20)  SpO2: 92% (15 Dec 2017 14:30) (92% - 92%)    GENERAL: NAD  CHEST/LUNG: positive air entry   HEART: S1S2+ audible  ABDOMEN: Soft, Nontender, Nondistended; Bowel sounds present, positive ileostomy   EXTREMITIES:  no edema  CNS: AAO X 3    LABS:    12-15    135  |  90<L>  |  9.0  ----------------------------<  131<H>  3.8   |  34.0<H>  |  0.70    Ca    8.2<L>      15 Dec 2017 07:13              MEDICATIONS  (STANDING):  ALBUTerol/ipratropium for Nebulization 3 milliLiter(s) Nebulizer every 6 hours  ALBUTerol/ipratropium for Nebulization. 3 milliLiter(s) Nebulizer once  aspirin enteric coated 81 milliGRAM(s) Oral daily  atorvastatin 80 milliGRAM(s) Oral at bedtime  clopidogrel Tablet 75 milliGRAM(s) Oral daily  diltiazem    milliGRAM(s) Oral <User Schedule>  enoxaparin Injectable 40 milliGRAM(s) SubCutaneous daily  Gauifenesin/Dextromethorphan 1200/600mg 1 Tablet(s) 1 Tablet(s) Oral two times a day  levothyroxine 50 MICROGram(s) Oral daily  loperamide 2 milliGRAM(s) Oral every 4 hours  loratadine 10 milliGRAM(s) Oral daily  multivitamin 1 Tablet(s) Oral daily  nystatin Powder 1 Application(s) Topical two times a day  pantoprazole    Tablet 40 milliGRAM(s) Oral before breakfast  predniSONE   Tablet 10 milliGRAM(s) Oral daily  tamsulosin 0.4 milliGRAM(s) Oral at bedtime    MEDICATIONS  (PRN):  acetaminophen  Suppository. 650 milliGRAM(s) Rectal every 6 hours PRN Moderate Pain (4 - 6)  acetylcysteine 20% Inhalation 1 milliLiter(s) Inhalation every 4 hours PRN poor cough clearance  ALBUTerol    0.083% 2.5 milliGRAM(s) Nebulizer every 4 hours PRN Shortness of Breath and/or Wheezing  ALPRAZolam 0.5 milliGRAM(s) Oral every 6 hours PRN anxiety  ondansetron Injectable 4 milliGRAM(s) IV Push every 8 hours PRN Nausea and/or Vomiting      RADIOLOGY & ADDITIONAL TEST

## 2017-12-17 LAB
ANION GAP SERPL CALC-SCNC: 14 MMOL/L — SIGNIFICANT CHANGE UP (ref 5–17)
BUN SERPL-MCNC: 26 MG/DL — HIGH (ref 8–20)
CALCIUM SERPL-MCNC: 8 MG/DL — LOW (ref 8.6–10.2)
CHLORIDE SERPL-SCNC: 79 MMOL/L — LOW (ref 98–107)
CO2 SERPL-SCNC: 36 MMOL/L — HIGH (ref 22–29)
CREAT SERPL-MCNC: 0.9 MG/DL — SIGNIFICANT CHANGE UP (ref 0.5–1.3)
GLUCOSE SERPL-MCNC: 149 MG/DL — HIGH (ref 70–115)
POTASSIUM SERPL-MCNC: 3.8 MMOL/L — SIGNIFICANT CHANGE UP (ref 3.5–5.3)
POTASSIUM SERPL-SCNC: 3.8 MMOL/L — SIGNIFICANT CHANGE UP (ref 3.5–5.3)
SODIUM SERPL-SCNC: 129 MMOL/L — LOW (ref 135–145)

## 2017-12-17 PROCEDURE — 99232 SBSQ HOSP IP/OBS MODERATE 35: CPT

## 2017-12-17 RX ADMIN — Medication 180 MILLIGRAM(S): at 17:20

## 2017-12-17 RX ADMIN — NYSTATIN CREAM 1 APPLICATION(S): 100000 CREAM TOPICAL at 06:30

## 2017-12-17 RX ADMIN — Medication 2 MILLIGRAM(S): at 11:16

## 2017-12-17 RX ADMIN — Medication 10 MILLIGRAM(S): at 06:21

## 2017-12-17 RX ADMIN — Medication 2 MILLIGRAM(S): at 02:32

## 2017-12-17 RX ADMIN — NYSTATIN CREAM 1 APPLICATION(S): 100000 CREAM TOPICAL at 17:17

## 2017-12-17 RX ADMIN — Medication 2 MILLIGRAM(S): at 06:21

## 2017-12-17 RX ADMIN — TAMSULOSIN HYDROCHLORIDE 0.4 MILLIGRAM(S): 0.4 CAPSULE ORAL at 21:50

## 2017-12-17 RX ADMIN — Medication 2 MILLIGRAM(S): at 13:53

## 2017-12-17 RX ADMIN — PANTOPRAZOLE SODIUM 40 MILLIGRAM(S): 20 TABLET, DELAYED RELEASE ORAL at 06:21

## 2017-12-17 RX ADMIN — Medication 1 TABLET(S): at 12:13

## 2017-12-17 RX ADMIN — Medication 180 MILLIGRAM(S): at 06:21

## 2017-12-17 RX ADMIN — Medication 0.5 MILLIGRAM(S): at 06:29

## 2017-12-17 RX ADMIN — Medication 81 MILLIGRAM(S): at 12:13

## 2017-12-17 RX ADMIN — ATORVASTATIN CALCIUM 80 MILLIGRAM(S): 80 TABLET, FILM COATED ORAL at 21:50

## 2017-12-17 RX ADMIN — Medication 2 MILLIGRAM(S): at 21:50

## 2017-12-17 RX ADMIN — Medication 0.5 MILLIGRAM(S): at 21:50

## 2017-12-17 RX ADMIN — Medication 2 MILLIGRAM(S): at 17:20

## 2017-12-17 RX ADMIN — CLOPIDOGREL BISULFATE 75 MILLIGRAM(S): 75 TABLET, FILM COATED ORAL at 12:13

## 2017-12-17 RX ADMIN — LORATADINE 10 MILLIGRAM(S): 10 TABLET ORAL at 12:13

## 2017-12-17 RX ADMIN — Medication 50 MICROGRAM(S): at 06:21

## 2017-12-17 NOTE — PROGRESS NOTE ADULT - ASSESSMENT
67 year old male with PMH of COPD stage 4 s/p right lung reduction in 2010 (on home oxygen 4L (sat 92-93%), HTN, CVA on 2014, DM, hypothyroidism, hydrocele, and nephrolithiasis who is admitted for a COPD exacerbation. Patient has been weaned off ventimask to 4 liters nasal cannula and respiratory status is stable. CTA of the chest was negative for PE but showed bilateral lower lobe infiltrates with mucous plugging for which he was started on Levaquin.  Respiratory status stable and steroids are being tapered. Hospital course complicated by constipation, which had not improved despite aggressive bowel regimen, laxatives and enemas. XRAY with large stool burden. CT with diaphragmatic hernia and patient was consequently taken to the OR for loop ileostomy under local anesthesia. Pt cont, to have copious amount in ileostomy. Pt is noted to have hyponatremia, started on IVF, improving      Plan:    1. Constipation s/p loop ileostomy on 12/7 under local anesthesia  with large volume output , improving- c/w immodium prn   tolerating diet,  ostomy care as per ostomy RN,  stopped miralax and senna, no lomotil as pt does not tolerate codeine products  pt report feeling weak and dehydrated, start IVF, BMP STAT     2. Acute on chronic hypoxic respiratory failure secondary to COPD exacerbation - now resolved, on home oxygen (4 liters)  - bicarb elevated likely due to metabolic compensation for resp acidosis although patient refuses blood gases    - continue bronchodilators and continue to taper prednisone   - continue Mucomyst PRN  - completed course of Levaquin     3. CAP - completed course of Levaquin on 12/5     4. Hypertension - stable   - continue cardizem    5. Hyperlipidemia   - continue statin    6.hyponatremia - 129 improving   c/w NS, f/u BMP in am     7. Scrotal edema chronic with urinary retention - now retaining again - Roy if not able to urinate  - patient following with  as outpatient  - cont scrotal elevation    8. Right femoral artery dissection  - incidental finding on CT, no intervention as per vascular    9. DVT Prophylaxis - Lovenox

## 2017-12-17 NOTE — PROGRESS NOTE ADULT - SUBJECTIVE AND OBJECTIVE BOX
Internal Medicine Hospitalist - Dr. Abdiel BAUTISTA    [id number]    67y      Male    Patient is a 67y old  Male who presents with a chief complaint of SOB (15 Dec 2017 10:17)    INTERVAL HPI/ OVERNIGHT EVENTS: Patient is seen and examined, feeling better, saying not feeling well enough to go home, noted to have hyponatremia, started on IVF     REVIEW OF SYSTEMS:    Denied fever, chills, abd. pain, nausea, vomiting, chest pain, headache, dizziness    PHYSICAL EXAM:    Vital Signs Last 24 Hrs  T(C): 36.6 (17 Dec 2017 08:29), Max: 36.6 (17 Dec 2017 08:29)  T(F): 97.9 (17 Dec 2017 08:29), Max: 97.9 (17 Dec 2017 08:29)  HR: 99 (17 Dec 2017 08:29) (98 - 108)  BP: 104/67 (17 Dec 2017 08:29) (94/70 - 107/71)  BP(mean): --  RR: 18 (17 Dec 2017 08:29) (18 - 20)  SpO2: 94% (17 Dec 2017 08:29) (94% - 94%)    GENERAL: NAD  HEENT: EOMI  Neck: supple  CHEST/LUNG: diminished breath sounds   HEART: S1S2+ audible  ABDOMEN: Soft, Nontender, Nondistended; Bowel sounds present  EXTREMITIES:  no edema  CNS: AAO X 3  Psychiatry: normal mood    LABS:    12-17    129<L>  |  79<L>  |  26.0<H>  ----------------------------<  149<H>  3.8   |  36.0<H>  |  0.90    Ca    8.0<L>      17 Dec 2017 09:20        MEDICATIONS  (STANDING):  ALBUTerol/ipratropium for Nebulization. 3 milliLiter(s) Nebulizer once  aspirin enteric coated 81 milliGRAM(s) Oral daily  atorvastatin 80 milliGRAM(s) Oral at bedtime  clopidogrel Tablet 75 milliGRAM(s) Oral daily  diltiazem    milliGRAM(s) Oral <User Schedule>  enoxaparin Injectable 40 milliGRAM(s) SubCutaneous daily  Gauifenesin/Dextromethorphan 1200/600mg 1 Tablet(s) 1 Tablet(s) Oral two times a day  levothyroxine 50 MICROGram(s) Oral daily  loperamide 2 milliGRAM(s) Oral every 4 hours  loratadine 10 milliGRAM(s) Oral daily  multivitamin 1 Tablet(s) Oral daily  nystatin Powder 1 Application(s) Topical two times a day  pantoprazole    Tablet 40 milliGRAM(s) Oral before breakfast  predniSONE   Tablet 10 milliGRAM(s) Oral daily  sodium chloride 0.9%. 1000 milliLiter(s) (75 mL/Hr) IV Continuous <Continuous>  tamsulosin 0.4 milliGRAM(s) Oral at bedtime    MEDICATIONS  (PRN):  acetaminophen  Suppository. 650 milliGRAM(s) Rectal every 6 hours PRN Moderate Pain (4 - 6)  acetylcysteine 20% Inhalation 1 milliLiter(s) Inhalation every 4 hours PRN poor cough clearance  ALBUTerol    0.083% 2.5 milliGRAM(s) Nebulizer every 4 hours PRN Shortness of Breath and/or Wheezing  ALPRAZolam 0.5 milliGRAM(s) Oral every 6 hours PRN anxiety  ondansetron Injectable 4 milliGRAM(s) IV Push every 8 hours PRN Nausea and/or Vomiting      RADIOLOGY & ADDITIONAL TEST

## 2017-12-18 LAB
ANION GAP SERPL CALC-SCNC: 11 MMOL/L — SIGNIFICANT CHANGE UP (ref 5–17)
BUN SERPL-MCNC: 27 MG/DL — HIGH (ref 8–20)
CALCIUM SERPL-MCNC: 7.7 MG/DL — LOW (ref 8.6–10.2)
CHLORIDE SERPL-SCNC: 81 MMOL/L — LOW (ref 98–107)
CO2 SERPL-SCNC: 35 MMOL/L — HIGH (ref 22–29)
CREAT SERPL-MCNC: 0.77 MG/DL — SIGNIFICANT CHANGE UP (ref 0.5–1.3)
GLUCOSE SERPL-MCNC: 133 MG/DL — HIGH (ref 70–115)
POTASSIUM SERPL-MCNC: 3.4 MMOL/L — LOW (ref 3.5–5.3)
POTASSIUM SERPL-SCNC: 3.4 MMOL/L — LOW (ref 3.5–5.3)
SODIUM SERPL-SCNC: 127 MMOL/L — LOW (ref 135–145)
URATE SERPL-MCNC: 8.1 MG/DL — HIGH (ref 3.4–7)

## 2017-12-18 PROCEDURE — 99233 SBSQ HOSP IP/OBS HIGH 50: CPT

## 2017-12-18 RX ORDER — POTASSIUM CHLORIDE 20 MEQ
40 PACKET (EA) ORAL ONCE
Qty: 0 | Refills: 0 | Status: COMPLETED | OUTPATIENT
Start: 2017-12-18 | End: 2017-12-18

## 2017-12-18 RX ORDER — POTASSIUM CHLORIDE 20 MEQ
40 PACKET (EA) ORAL ONCE
Qty: 0 | Refills: 0 | Status: DISCONTINUED | OUTPATIENT
Start: 2017-12-18 | End: 2017-12-18

## 2017-12-18 RX ORDER — TIOTROPIUM BROMIDE 18 UG/1
1 CAPSULE ORAL; RESPIRATORY (INHALATION) DAILY
Qty: 0 | Refills: 0 | Status: DISCONTINUED | OUTPATIENT
Start: 2017-12-18 | End: 2018-01-16

## 2017-12-18 RX ORDER — DIPHENHYDRAMINE HYDROCHLORIDE AND LIDOCAINE HYDROCHLORIDE AND ALUMINUM HYDROXIDE AND MAGNESIUM HYDRO
10 KIT
Qty: 0 | Refills: 0 | Status: DISCONTINUED | OUTPATIENT
Start: 2017-12-18 | End: 2018-01-16

## 2017-12-18 RX ADMIN — SODIUM CHLORIDE 75 MILLILITER(S): 9 INJECTION INTRAMUSCULAR; INTRAVENOUS; SUBCUTANEOUS at 05:49

## 2017-12-18 RX ADMIN — LORATADINE 10 MILLIGRAM(S): 10 TABLET ORAL at 13:45

## 2017-12-18 RX ADMIN — TAMSULOSIN HYDROCHLORIDE 0.4 MILLIGRAM(S): 0.4 CAPSULE ORAL at 21:44

## 2017-12-18 RX ADMIN — CLOPIDOGREL BISULFATE 75 MILLIGRAM(S): 75 TABLET, FILM COATED ORAL at 13:44

## 2017-12-18 RX ADMIN — DIPHENHYDRAMINE HYDROCHLORIDE AND LIDOCAINE HYDROCHLORIDE AND ALUMINUM HYDROXIDE AND MAGNESIUM HYDRO 10 MILLILITER(S): KIT at 13:42

## 2017-12-18 RX ADMIN — Medication 0.5 MILLIGRAM(S): at 21:48

## 2017-12-18 RX ADMIN — Medication 180 MILLIGRAM(S): at 18:56

## 2017-12-18 RX ADMIN — SODIUM CHLORIDE 75 MILLILITER(S): 9 INJECTION INTRAMUSCULAR; INTRAVENOUS; SUBCUTANEOUS at 19:27

## 2017-12-18 RX ADMIN — NYSTATIN CREAM 1 APPLICATION(S): 100000 CREAM TOPICAL at 05:44

## 2017-12-18 RX ADMIN — NYSTATIN CREAM 1 APPLICATION(S): 100000 CREAM TOPICAL at 18:57

## 2017-12-18 RX ADMIN — ATORVASTATIN CALCIUM 80 MILLIGRAM(S): 80 TABLET, FILM COATED ORAL at 21:44

## 2017-12-18 RX ADMIN — Medication 40 MILLIEQUIVALENT(S): at 13:43

## 2017-12-18 RX ADMIN — Medication 50 MICROGRAM(S): at 05:44

## 2017-12-18 RX ADMIN — Medication 2 MILLIGRAM(S): at 05:44

## 2017-12-18 RX ADMIN — PANTOPRAZOLE SODIUM 40 MILLIGRAM(S): 20 TABLET, DELAYED RELEASE ORAL at 05:44

## 2017-12-18 RX ADMIN — Medication 2 MILLIGRAM(S): at 18:56

## 2017-12-18 RX ADMIN — Medication 2 MILLIGRAM(S): at 13:44

## 2017-12-18 RX ADMIN — Medication 0.5 MILLIGRAM(S): at 13:42

## 2017-12-18 RX ADMIN — Medication 2 MILLIGRAM(S): at 21:44

## 2017-12-18 NOTE — PROVIDER CONTACT NOTE (MEDICATION) - SITUATION
Pt on Spiriva at home for maintenance.  Pt also on Advair @ home.  Pt states he cannot tolerate Symbicort.

## 2017-12-18 NOTE — PROGRESS NOTE ADULT - SUBJECTIVE AND OBJECTIVE BOX
Internal Medicine Hospitalist - Dr. Abdiel BAUTISTA    [id number]    67y      Male    Patient is a 67y old  Male who presents with a chief complaint of SOB (15 Dec 2017 10:17    INTERVAL HPI/ OVERNIGHT EVENTS: Patient is seen and examined, feeling better, still having copious ileostomy output.     REVIEW OF SYSTEMS:    Denied fever, chills, abd. pain, nausea, vomiting, chest pain, SOB, headache, dizziness    PHYSICAL EXAM:    Vital Signs Last 24 Hrs  T(C): 36.6 (18 Dec 2017 05:00), Max: 36.7 (17 Dec 2017 20:57)  T(F): 97.9 (18 Dec 2017 05:00), Max: 98.1 (17 Dec 2017 20:57)  HR: 89 (18 Dec 2017 05:00) (89 - 115)  BP: 101/52 (18 Dec 2017 05:00) (95/69 - 101/52)  BP(mean): --  RR: 18 (18 Dec 2017 05:00) (18 - 20)  SpO2: 93% (17 Dec 2017 20:57) (93% - 93%)    GENERAL: NAD  CHEST/LUNG: positive air entry   HEART: S1S2+ audible  ABDOMEN: Soft, Nontender, Nondistended; Bowel sounds present  EXTREMITIES:  no edema      LABS:    12-18    127<L>  |  81<L>  |  27.0<H>  ----------------------------<  133<H>  3.4<L>   |  35.0<H>  |  0.77    Ca    7.7<L>      18 Dec 2017 05:35        MEDICATIONS  (STANDING):  ALBUTerol/ipratropium for Nebulization. 3 milliLiter(s) Nebulizer once  aspirin enteric coated 81 milliGRAM(s) Oral daily  atorvastatin 80 milliGRAM(s) Oral at bedtime  clopidogrel Tablet 75 milliGRAM(s) Oral daily  diltiazem    milliGRAM(s) Oral <User Schedule>  enoxaparin Injectable 40 milliGRAM(s) SubCutaneous daily  Gauifenesin/Dextromethorphan 1200/600mg 1 Tablet(s) 1 Tablet(s) Oral two times a day  levothyroxine 50 MICROGram(s) Oral daily  loperamide 2 milliGRAM(s) Oral every 4 hours  loratadine 10 milliGRAM(s) Oral daily  multivitamin 1 Tablet(s) Oral daily  nystatin Powder 1 Application(s) Topical two times a day  pantoprazole    Tablet 40 milliGRAM(s) Oral before breakfast  potassium chloride   Powder 40 milliEquivalent(s) Oral once  sodium chloride 0.9%. 1000 milliLiter(s) (75 mL/Hr) IV Continuous <Continuous>  tamsulosin 0.4 milliGRAM(s) Oral at bedtime    MEDICATIONS  (PRN):  acetaminophen  Suppository. 650 milliGRAM(s) Rectal every 6 hours PRN Moderate Pain (4 - 6)  acetylcysteine 20% Inhalation 1 milliLiter(s) Inhalation every 4 hours PRN poor cough clearance  ALBUTerol    0.083% 2.5 milliGRAM(s) Nebulizer every 4 hours PRN Shortness of Breath and/or Wheezing  ALPRAZolam 0.5 milliGRAM(s) Oral every 6 hours PRN anxiety  ondansetron Injectable 4 milliGRAM(s) IV Push every 8 hours PRN Nausea and/or Vomiting      RADIOLOGY & ADDITIONAL TEST

## 2017-12-18 NOTE — CONSULT NOTE ADULT - SUBJECTIVE AND OBJECTIVE BOX
HPI:  67 YOM w/PMH of COPD stage 4 s/p right lung reduction in 2010, currently on home oxygen 4L (sat 92-93%), HTN, CVA on 2014, DM, hypothyroidism, hydrocele, kidney stones who came because he noted that his O2 sat was 76% today. He mentions more difficult to talk in full sencentces compared to his baseline. He also has a chronic productive cough which has not changed in frequency or color. He mentions that his oxygen machine broke 4 days ago and he got a new one since, but is not sure if its working properly. He denies SOB, increased cough, chest pain, hemoptysis nausea, weakness. Pt has to sleep in a sit position with 2 pillows since he gets SOB if he lyes flat, condition that he attributes to his postnasal drip. Pt has hyponatremia fo which we are consulted. Na 127 today    PAST MEDICAL & SURGICAL HISTORY:  Hypothyroidism  Hydrocele  Renal calculi  Diabetes mellitus  Hypertension  CVA (cerebral vascular accident)  Chronic obstructive pulmonary disease, unspecified COPD type  History of lung surgery      FAMILY HISTORY:  No pertinent family history in first degree relatives  [place]    Social History:Non smoker    MEDICATIONS  (STANDING):  ALBUTerol/ipratropium for Nebulization. 3 milliLiter(s) Nebulizer once  aspirin enteric coated 81 milliGRAM(s) Oral daily  atorvastatin 80 milliGRAM(s) Oral at bedtime  clopidogrel Tablet 75 milliGRAM(s) Oral daily  diltiazem    milliGRAM(s) Oral <User Schedule>  enoxaparin Injectable 40 milliGRAM(s) SubCutaneous daily  Gauifenesin/Dextromethorphan 1200/600mg 1 Tablet(s) 1 Tablet(s) Oral two times a day  levothyroxine 50 MICROGram(s) Oral daily  loperamide 2 milliGRAM(s) Oral every 4 hours  loratadine 10 milliGRAM(s) Oral daily  multivitamin 1 Tablet(s) Oral daily  nystatin Powder 1 Application(s) Topical two times a day  pantoprazole    Tablet 40 milliGRAM(s) Oral before breakfast  potassium chloride   Powder 40 milliEquivalent(s) Oral once  sodium chloride 0.9%. 1000 milliLiter(s) (75 mL/Hr) IV Continuous <Continuous>  tamsulosin 0.4 milliGRAM(s) Oral at bedtime  tiotropium 18 MICROgram(s) Capsule 1 Capsule(s) Inhalation daily    MEDICATIONS  (PRN):  acetaminophen  Suppository. 650 milliGRAM(s) Rectal every 6 hours PRN Moderate Pain (4 - 6)  acetylcysteine 20% Inhalation 1 milliLiter(s) Inhalation every 4 hours PRN poor cough clearance  ALBUTerol    0.083% 2.5 milliGRAM(s) Nebulizer every 4 hours PRN Shortness of Breath and/or Wheezing  ALPRAZolam 0.5 milliGRAM(s) Oral every 6 hours PRN anxiety  ondansetron Injectable 4 milliGRAM(s) IV Push every 8 hours PRN Nausea and/or Vomiting   Meds reviewed    Vital Signs Last 24 Hrs  T(C): 36.6 (18 Dec 2017 05:00), Max: 36.7 (17 Dec 2017 20:57)  T(F): 97.9 (18 Dec 2017 05:00), Max: 98.1 (17 Dec 2017 20:57)  HR: 89 (18 Dec 2017 05:00) (89 - 115)  BP: 101/52 (18 Dec 2017 05:00) (95/69 - 101/52)  BP(mean): --  RR: 18 (18 Dec 2017 05:00) (18 - 20)  SpO2: 93% (17 Dec 2017 20:57) (93% - 93%)    PHYSICAL EXAM:    GENERAL: appears chronically ill  HEAD:  NCAT  EYES: EOMI  NECK: Supple, neck  veins full  NERVOUS SYSTEM:  Alert & Oriented X3  CHEST/LUNG: Clear to percussion bilaterally  HEART: Regular rate and rhythm; No murmurs  ABDOMEN: Soft, Nontender, Nondistended; Bowel sounds present, body wall and flank edema  EXTREMITIES:  No edema  : scrotum edematous    LABS:    12-18    127<L>  |  81<L>  |  27.0<H>  ----------------------------<  133<H>  3.4<L>   |  35.0<H>  |  0.77    Ca    7.7<L>      18 Dec 2017 05:35                  RADIOLOGY & ADDITIONAL TESTS:

## 2017-12-18 NOTE — PROGRESS NOTE ADULT - ASSESSMENT
67 year old male with PMH of COPD stage 4 s/p right lung reduction in 2010 (on home oxygen 4L (sat 92-93%), HTN, CVA on 2014, DM, hypothyroidism, hydrocele, and nephrolithiasis who is admitted for a COPD exacerbation. Patient has been weaned off ventimask to 4 liters nasal cannula and respiratory status is stable. CTA of the chest was negative for PE but showed bilateral lower lobe infiltrates with mucous plugging for which he was started on Levaquin.  Respiratory status stable and steroids are being tapered. Hospital course complicated by constipation, which had not improved despite aggressive bowel regimen, laxatives and enemas. XRAY with large stool burden. CT with diaphragmatic hernia and patient was consequently taken to the OR for loop ileostomy under local anesthesia. Pt cont, to have copious amount in ileostomy. Pt is noted to have hyponatremia due to high output in ileostomy, started on IVF, sodium is still 127, renal consult requested     Plan:    1. Constipation s/p loop ileostomy on 12/7 under local anesthesia  with large volume output , improving- c/w immodium prn   tolerating diet,  ostomy care as per ostomy RN,  stopped miralax and senna, no lomotil as pt does not tolerate codeine products  pt report feeling weak and dehydrated, start IVF, BMP STAT     2. Acute on chronic hypoxic respiratory failure secondary to COPD exacerbation - now resolved, on home oxygen (4 liters)  - bicarb elevated likely due to metabolic compensation for resp acidosis although patient refuses blood gases    - continue bronchodilators and continue to taper prednisone   - continue Mucomyst PRN  - completed course of Levaquin     3. CAP - completed course of Levaquin on 12/5     4. Hypertension - stable   - continue cardizem    5. Hyperlipidemia   - continue statin    6.hyponatremia - 127 from 129  c/w NS, renal consult, may benefit from salt tablet   f/u BMP     7. Scrotal edema chronic with urinary retention - now retaining again - Roy if not able to urinate  - patient following with  as outpatient  - cont scrotal elevation    8. Right femoral artery dissection  - incidental finding on CT, no intervention as per vascular    9. DVT Prophylaxis - Lovenox

## 2017-12-19 LAB
ANION GAP SERPL CALC-SCNC: 13 MMOL/L — SIGNIFICANT CHANGE UP (ref 5–17)
BUN SERPL-MCNC: 18 MG/DL — SIGNIFICANT CHANGE UP (ref 8–20)
CALCIUM SERPL-MCNC: 7.8 MG/DL — LOW (ref 8.6–10.2)
CHLORIDE SERPL-SCNC: 85 MMOL/L — LOW (ref 98–107)
CO2 SERPL-SCNC: 32 MMOL/L — HIGH (ref 22–29)
CREAT SERPL-MCNC: 0.74 MG/DL — SIGNIFICANT CHANGE UP (ref 0.5–1.3)
GLUCOSE SERPL-MCNC: 119 MG/DL — HIGH (ref 70–115)
HCT VFR BLD CALC: 34.8 % — LOW (ref 42–52)
HGB BLD-MCNC: 11.7 G/DL — LOW (ref 14–18)
MCHC RBC-ENTMCNC: 28.4 PG — SIGNIFICANT CHANGE UP (ref 27–31)
MCHC RBC-ENTMCNC: 33.6 G/DL — SIGNIFICANT CHANGE UP (ref 32–36)
MCV RBC AUTO: 84.5 FL — SIGNIFICANT CHANGE UP (ref 80–94)
OSMOLALITY UR: 563 MOSM/KG — SIGNIFICANT CHANGE UP (ref 300–1000)
PLATELET # BLD AUTO: 257 K/UL — SIGNIFICANT CHANGE UP (ref 150–400)
POTASSIUM SERPL-MCNC: 3.8 MMOL/L — SIGNIFICANT CHANGE UP (ref 3.5–5.3)
POTASSIUM SERPL-SCNC: 3.8 MMOL/L — SIGNIFICANT CHANGE UP (ref 3.5–5.3)
RBC # BLD: 4.12 M/UL — LOW (ref 4.6–6.2)
RBC # FLD: 14.8 % — SIGNIFICANT CHANGE UP (ref 11–15.6)
SODIUM SERPL-SCNC: 130 MMOL/L — LOW (ref 135–145)
WBC # BLD: 11.8 K/UL — HIGH (ref 4.8–10.8)
WBC # FLD AUTO: 11.8 K/UL — HIGH (ref 4.8–10.8)

## 2017-12-19 PROCEDURE — 99232 SBSQ HOSP IP/OBS MODERATE 35: CPT

## 2017-12-19 RX ORDER — CHOLESTYRAMINE 4 G/9G
4 POWDER, FOR SUSPENSION ORAL DAILY
Qty: 0 | Refills: 0 | Status: DISCONTINUED | OUTPATIENT
Start: 2017-12-19 | End: 2017-12-20

## 2017-12-19 RX ADMIN — Medication 2 MILLIGRAM(S): at 11:21

## 2017-12-19 RX ADMIN — Medication 180 MILLIGRAM(S): at 05:52

## 2017-12-19 RX ADMIN — SODIUM CHLORIDE 75 MILLILITER(S): 9 INJECTION INTRAMUSCULAR; INTRAVENOUS; SUBCUTANEOUS at 11:43

## 2017-12-19 RX ADMIN — NYSTATIN CREAM 1 APPLICATION(S): 100000 CREAM TOPICAL at 06:00

## 2017-12-19 RX ADMIN — Medication 2 MILLIGRAM(S): at 22:07

## 2017-12-19 RX ADMIN — LORATADINE 10 MILLIGRAM(S): 10 TABLET ORAL at 11:21

## 2017-12-19 RX ADMIN — Medication 0.5 MILLIGRAM(S): at 13:35

## 2017-12-19 RX ADMIN — DIPHENHYDRAMINE HYDROCHLORIDE AND LIDOCAINE HYDROCHLORIDE AND ALUMINUM HYDROXIDE AND MAGNESIUM HYDRO 10 MILLILITER(S): KIT at 18:22

## 2017-12-19 RX ADMIN — Medication 180 MILLIGRAM(S): at 18:23

## 2017-12-19 RX ADMIN — Medication 0.5 MILLIGRAM(S): at 05:57

## 2017-12-19 RX ADMIN — CLOPIDOGREL BISULFATE 75 MILLIGRAM(S): 75 TABLET, FILM COATED ORAL at 11:20

## 2017-12-19 RX ADMIN — TAMSULOSIN HYDROCHLORIDE 0.4 MILLIGRAM(S): 0.4 CAPSULE ORAL at 22:07

## 2017-12-19 RX ADMIN — TIOTROPIUM BROMIDE 1 CAPSULE(S): 18 CAPSULE ORAL; RESPIRATORY (INHALATION) at 09:40

## 2017-12-19 RX ADMIN — DIPHENHYDRAMINE HYDROCHLORIDE AND LIDOCAINE HYDROCHLORIDE AND ALUMINUM HYDROXIDE AND MAGNESIUM HYDRO 10 MILLILITER(S): KIT at 05:51

## 2017-12-19 RX ADMIN — Medication 50 MICROGRAM(S): at 05:51

## 2017-12-19 RX ADMIN — Medication 0.5 MILLIGRAM(S): at 22:14

## 2017-12-19 RX ADMIN — Medication 2 MILLIGRAM(S): at 05:51

## 2017-12-19 RX ADMIN — SODIUM CHLORIDE 75 MILLILITER(S): 9 INJECTION INTRAMUSCULAR; INTRAVENOUS; SUBCUTANEOUS at 18:24

## 2017-12-19 RX ADMIN — PANTOPRAZOLE SODIUM 40 MILLIGRAM(S): 20 TABLET, DELAYED RELEASE ORAL at 05:52

## 2017-12-19 RX ADMIN — ATORVASTATIN CALCIUM 80 MILLIGRAM(S): 80 TABLET, FILM COATED ORAL at 22:07

## 2017-12-19 RX ADMIN — Medication 2 MILLIGRAM(S): at 18:24

## 2017-12-19 NOTE — PROGRESS NOTE ADULT - SUBJECTIVE AND OBJECTIVE BOX
Internal Medicine Hospitalist - Dr. Abdiel BAUTISTA    [id number]    67y      Male    Patient is a 67y old  Male who presents with a chief complaint of SOB (15 Dec 2017 10:17)    INTERVAL HPI/ OVERNIGHT EVENTS: Patient is seen and examined, feeling better, saying not feeling well enough to go home, wife present bedside.     REVIEW OF SYSTEMS:    Denied fever, chills, abd. pain, nausea, vomiting, chest pain, SOB, headache, dizziness    PHYSICAL EXAM:    Vital Signs Last 24 Hrs  T(C): 36.1 (19 Dec 2017 11:12), Max: 37.1 (18 Dec 2017 21:42)  T(F): 97 (19 Dec 2017 11:12), Max: 98.8 (18 Dec 2017 21:42)  HR: 98 (19 Dec 2017 11:12) (98 - 112)  BP: 102/68 (19 Dec 2017 11:12) (101/67 - 106/67)  BP(mean): --  RR: 18 (19 Dec 2017 11:12) (17 - 18)  SpO2: 97% (19 Dec 2017 04:30) (97% - 97%)    GENERAL: NAD  CHEST/LUNG: positive air entry  S1S2+ audible  ABDOMEN: Soft, Nontender, Nondistended; Bowel sounds present  EXTREMITIES:  no edema  CNS: AAO X 3    LABS:                        11.7   11.8  )-----------( 257      ( 19 Dec 2017 06:14 )             34.8     12-19    130<L>  |  85<L>  |  18.0  ----------------------------<  119<H>  3.8   |  32.0<H>  |  0.74    Ca    7.8<L>      19 Dec 2017 06:14        MEDICATIONS  (STANDING):  ALBUTerol/ipratropium for Nebulization. 3 milliLiter(s) Nebulizer once  aspirin enteric coated 81 milliGRAM(s) Oral daily  atorvastatin 80 milliGRAM(s) Oral at bedtime  clopidogrel Tablet 75 milliGRAM(s) Oral daily  diltiazem    milliGRAM(s) Oral <User Schedule>  enoxaparin Injectable 40 milliGRAM(s) SubCutaneous daily  FIRST- Mouthwash  BLM 10 milliLiter(s) Swish and Spit two times a day  Gauifenesin/Dextromethorphan 1200/600mg 1 Tablet(s) 1 Tablet(s) Oral two times a day  levothyroxine 50 MICROGram(s) Oral daily  loperamide 2 milliGRAM(s) Oral every 4 hours  loratadine 10 milliGRAM(s) Oral daily  multivitamin 1 Tablet(s) Oral daily  nystatin Powder 1 Application(s) Topical two times a day  pantoprazole    Tablet 40 milliGRAM(s) Oral before breakfast  sodium chloride 0.9%. 1000 milliLiter(s) (75 mL/Hr) IV Continuous <Continuous>  tamsulosin 0.4 milliGRAM(s) Oral at bedtime  tiotropium 18 MICROgram(s) Capsule 1 Capsule(s) Inhalation daily    MEDICATIONS  (PRN):  acetaminophen  Suppository. 650 milliGRAM(s) Rectal every 6 hours PRN Moderate Pain (4 - 6)  acetylcysteine 20% Inhalation 1 milliLiter(s) Inhalation every 4 hours PRN poor cough clearance  ALBUTerol    0.083% 2.5 milliGRAM(s) Nebulizer every 4 hours PRN Shortness of Breath and/or Wheezing  ALPRAZolam 0.5 milliGRAM(s) Oral every 6 hours PRN anxiety  ondansetron Injectable 4 milliGRAM(s) IV Push every 8 hours PRN Nausea and/or Vomiting      RADIOLOGY & ADDITIONAL TEST

## 2017-12-19 NOTE — PROGRESS NOTE ADULT - SUBJECTIVE AND OBJECTIVE BOX
NEPHROLOGY INTERVAL HPI/OVERNIGHT EVENTS:    Examined earlier  1600 cc out put from ostomy from 7-12    MEDICATIONS  (STANDING):  ALBUTerol/ipratropium for Nebulization. 3 milliLiter(s) Nebulizer once  aspirin enteric coated 81 milliGRAM(s) Oral daily  atorvastatin 80 milliGRAM(s) Oral at bedtime  clopidogrel Tablet 75 milliGRAM(s) Oral daily  diltiazem    milliGRAM(s) Oral <User Schedule>  enoxaparin Injectable 40 milliGRAM(s) SubCutaneous daily  FIRST- Mouthwash  BLM 10 milliLiter(s) Swish and Spit two times a day  Gauifenesin/Dextromethorphan 1200/600mg 1 Tablet(s) 1 Tablet(s) Oral two times a day  levothyroxine 50 MICROGram(s) Oral daily  loperamide 2 milliGRAM(s) Oral every 4 hours  loratadine 10 milliGRAM(s) Oral daily  multivitamin 1 Tablet(s) Oral daily  nystatin Powder 1 Application(s) Topical two times a day  pantoprazole    Tablet 40 milliGRAM(s) Oral before breakfast  sodium chloride 0.9%. 1000 milliLiter(s) (75 mL/Hr) IV Continuous <Continuous>  tamsulosin 0.4 milliGRAM(s) Oral at bedtime  tiotropium 18 MICROgram(s) Capsule 1 Capsule(s) Inhalation daily    MEDICATIONS  (PRN):  acetaminophen  Suppository. 650 milliGRAM(s) Rectal every 6 hours PRN Moderate Pain (4 - 6)  acetylcysteine 20% Inhalation 1 milliLiter(s) Inhalation every 4 hours PRN poor cough clearance  ALBUTerol    0.083% 2.5 milliGRAM(s) Nebulizer every 4 hours PRN Shortness of Breath and/or Wheezing  ALPRAZolam 0.5 milliGRAM(s) Oral every 6 hours PRN anxiety  ondansetron Injectable 4 milliGRAM(s) IV Push every 8 hours PRN Nausea and/or Vomiting      Allergies    codeine (Unknown)  penicillin (Unknown)    Intolerances    Symbicort (Short breath)      Vital Signs Last 24 Hrs  T(C): 36.1 (19 Dec 2017 11:12), Max: 37.1 (18 Dec 2017 21:42)  T(F): 97 (19 Dec 2017 11:12), Max: 98.8 (18 Dec 2017 21:42)  HR: 98 (19 Dec 2017 11:12) (98 - 112)  BP: 102/68 (19 Dec 2017 11:12) (101/67 - 106/67)  BP(mean): --  RR: 18 (19 Dec 2017 11:12) (17 - 18)  SpO2: 97% (19 Dec 2017 04:30) (97% - 97%)  Daily     Daily     PHYSICAL EXAM:  GENERAL: appears chronically ill  HEAD:  NCAT  EYES: EOMI  NECK: Supple, neck  veins full  NERVOUS SYSTEM:  Alert & Oriented X3  CHEST/LUNG: Clear to percussion bilaterally  HEART: Regular rate and rhythm; No murmurs  ABDOMEN: Soft, Nontender, Nondistended; Bowel sounds present, ostomy c/d/i  EXTREMITIES:  No edema  : scrotum edematous    LABS:                        11.7   11.8  )-----------( 257      ( 19 Dec 2017 06:14 )             34.8     12-19    130<L>  |  85<L>  |  18.0  ----------------------------<  119<H>  3.8   |  32.0<H>  |  0.74    Ca    7.8<L>      19 Dec 2017 06:14                  RADIOLOGY & ADDITIONAL TESTS:

## 2017-12-19 NOTE — CHART NOTE - NSCHARTNOTEFT_GEN_A_CORE
Source: Patient [x ]  Family [ ]   other [ ]    Current Diet:  consistent CHO/ no snacks, ileostomy, diabetishield TID    Patient reports [ ] nausea  [ ] vomiting [ ] diarrhea [ ] constipation  [ ]chewing problems [ ] swallowing issues  [ ] other:     PO intake:  < 50% [ ]   50-75%  [ ]   %  [x ]  other :    Source for PO intake [x ] Patient [ ] family [ ] chart [ ] staff [ ] other    Enteral /Parenteral Nutrition:     Current Weight:     % Weight Change     Pertinent Medications: MEDICATIONS  (STANDING):  ALBUTerol/ipratropium for Nebulization. 3 milliLiter(s) Nebulizer once  aspirin enteric coated 81 milliGRAM(s) Oral daily  atorvastatin 80 milliGRAM(s) Oral at bedtime  clopidogrel Tablet 75 milliGRAM(s) Oral daily  diltiazem    milliGRAM(s) Oral <User Schedule>  enoxaparin Injectable 40 milliGRAM(s) SubCutaneous daily  FIRST- Mouthwash  BLM 10 milliLiter(s) Swish and Spit two times a day  Gauifenesin/Dextromethorphan 1200/600mg 1 Tablet(s) 1 Tablet(s) Oral two times a day  levothyroxine 50 MICROGram(s) Oral daily  loperamide 2 milliGRAM(s) Oral every 4 hours  loratadine 10 milliGRAM(s) Oral daily  multivitamin 1 Tablet(s) Oral daily  nystatin Powder 1 Application(s) Topical two times a day  pantoprazole    Tablet 40 milliGRAM(s) Oral before breakfast  sodium chloride 0.9%. 1000 milliLiter(s) (75 mL/Hr) IV Continuous <Continuous>  tamsulosin 0.4 milliGRAM(s) Oral at bedtime  tiotropium 18 MICROgram(s) Capsule 1 Capsule(s) Inhalation daily    MEDICATIONS  (PRN):  acetaminophen  Suppository. 650 milliGRAM(s) Rectal every 6 hours PRN Moderate Pain (4 - 6)  acetylcysteine 20% Inhalation 1 milliLiter(s) Inhalation every 4 hours PRN poor cough clearance  ALBUTerol    0.083% 2.5 milliGRAM(s) Nebulizer every 4 hours PRN Shortness of Breath and/or Wheezing  ALPRAZolam 0.5 milliGRAM(s) Oral every 6 hours PRN anxiety  ondansetron Injectable 4 milliGRAM(s) IV Push every 8 hours PRN Nausea and/or Vomiting    Pertinent Labs: CBC Full  -  ( 19 Dec 2017 06:14 )  WBC Count : 11.8 K/uL  Hemoglobin : 11.7 g/dL  Hematocrit : 34.8 %  Platelet Count - Automated : 257 K/uL  Mean Cell Volume : 84.5 fl  Mean Cell Hemoglobin : 28.4 pg  Mean Cell Hemoglobin Concentration : 33.6 g/dL  Auto Neutrophil # : x  Auto Lymphocyte # : x  Auto Monocyte # : x  Auto Eosinophil # : x  Auto Basophil # : x  Auto Neutrophil % : x  Auto Lymphocyte % : x  Auto Monocyte % : x  Auto Eosinophil % : x  Auto Basophil % : x      12-19 Na130 mmol/L<L> Glu 119 mg/dL<H> K+ 3.8 mmol/L Cr  0.74 mg/dL BUN 18.0 mg/dL Phos n/a   Alb n/a   PAB n/a           Skin:     Nutrition focused physical exam conducted - found signs of malnutrition [ ]absent [x ]present    Subcutaneous fat loss: [ ] Orbital fat pads region, [ ]Buccal fat region, [ ]Triceps region,  [ ]Ribs region    Muscle wasting: [x ]Temples region, [ ]Clavicle region, [ ]Shoulder region, [ ]Scapula region, [ ]Interosseous region,  [ ]thigh region, [ ]Calf region    Estimated Needs:   [x ] no change since previous assessment  [ ] recalculated:     Current Nutrition Diagnosis:  Pt remains at nutrition risk secondary to increased nutrient needs related to increased physiologic demand with healing as evidenced by pt s/p diverting ileostomy and mild muscle wasting noted at temporal region      Recommendations:  Continue diabetishield TID    Monitoring and Evaluation:   [x ] PO intake [x ] Tolerance to diet prescription [X] Weights  [X] Follow up per protocol [X] Labs:

## 2017-12-19 NOTE — PROGRESS NOTE ADULT - ASSESSMENT
67 year old male with PMH of COPD stage 4 s/p right lung reduction in 2010 (on home oxygen 4L (sat 92-93%), HTN, CVA on 2014, DM, hypothyroidism, hydrocele, and nephrolithiasis who is admitted for a COPD exacerbation. Patient has been weaned off ventimask to 4 liters nasal cannula and respiratory status is stable. CTA of the chest was negative for PE but showed bilateral lower lobe infiltrates with mucous plugging for which he was started on Levaquin.  Respiratory status stable and steroids are being tapered. Hospital course complicated by constipation, which had not improved despite aggressive bowel regimen, laxatives and enemas. XRAY with large stool burden. CT with diaphragmatic hernia and patient was consequently taken to the OR for loop ileostomy under local anesthesia. Pt cont, to have copious amount in ileostomy. Pt is noted to have hyponatremia due to high output in ileostomy, started on IVF, sodium is 130, renal consulted, if clear by renal may be discharge home today or tomorrow.     Plan:    1. Constipation s/p loop ileostomy on 12/7 under local anesthesia  with large volume output , improving- c/w immodium prn   tolerating diet,  ostomy care as per ostomy RN,  stopped miralax and senna, no lomotil as pt does not tolerate codeine products  pt report feeling weak and dehydrated, start IVF, BMP STAT     2. Acute on chronic hypoxic respiratory failure secondary to COPD exacerbation - now resolved, on home oxygen (4 liters)  - bicarb elevated likely due to metabolic compensation for resp acidosis although patient refuses blood gases    - continue bronchodilators and continue to taper prednisone   - continue Mucomyst PRN  - completed course of Levaquin     3. CAP - completed course of Levaquin on 12/5     4. Hypertension - stable   - continue cardizem    5. Hyperlipidemia   - continue statin    6.hyponatremia - 130 today   c/w NS, renal consult appreciated, defer further management as per renal       7. Scrotal edema chronic with urinary retention - now retaining again - Roy if not able to urinate  - patient following with  as outpatient  - cont scrotal elevation    8. Right femoral artery dissection  - incidental finding on CT, no intervention as per vascular    9. DVT Prophylaxis - Lovenox 67 year old male with PMH of COPD stage 4 s/p right lung reduction in 2010 (on home oxygen 4L (sat 92-93%), HTN, CVA on 2014, DM, hypothyroidism, hydrocele, and nephrolithiasis who is admitted for a COPD exacerbation. Patient has been weaned off ventimask to 4 liters nasal cannula and respiratory status is stable. CTA of the chest was negative for PE but showed bilateral lower lobe infiltrates with mucous plugging for which he was started on Levaquin.  Respiratory status stable and steroids are being tapered. Hospital course complicated by constipation, which had not improved despite aggressive bowel regimen, laxatives and enemas. XRAY with large stool burden. CT with diaphragmatic hernia and patient was consequently taken to the OR for loop ileostomy under local anesthesia. Pt cont, to have copious amount in ileostomy. Pt is noted to have hyponatremia due to high output in ileostomy, started on IVF, sodium is 130, renal consulted, if clear by renal may be discharge home today or tomorrow.     Plan:    1. Constipation s/p loop ileostomy on 12/7 under local anesthesia  with large volume output , increase immodium and GI consulted   tolerating diet,  ostomy care as per ostomy RN,  stopped miralax and senna, no lomotil as pt does not tolerate codeine products  pt report feeling weak and dehydrated, start IVF, BMP STAT     2. Acute on chronic hypoxic respiratory failure secondary to COPD exacerbation - now resolved, on home oxygen (4 liters)  - bicarb elevated likely due to metabolic compensation for resp acidosis although patient refuses blood gases    - continue bronchodilators and continue to taper prednisone   - continue Mucomyst PRN  - completed course of Levaquin     3. CAP - completed course of Levaquin on 12/5     4. Hypertension - stable   - continue cardizem    5. Hyperlipidemia   - continue statin    6.hyponatremia due to high ileostomy output- 130 today   c/w NS, renal consult appreciated, defer further management as per renal       7. Scrotal edema chronic with urinary retention - now retaining again - Roy if not able to urinate  - patient following with  as outpatient  - cont scrotal elevation    8. Right femoral artery dissection  - incidental finding on CT, no intervention as per vascular    9. DVT Prophylaxis - Lovenox

## 2017-12-19 NOTE — PROGRESS NOTE ADULT - ASSESSMENT
HypoNatremia clinically pt looks Euvolemic -High Uosm  Elevated uric acid suggests pt on dry side high ostomy output  would escalate loperimide cont IVF  serum Na had been overall improving w IVF  AM labs

## 2017-12-20 LAB
ANION GAP SERPL CALC-SCNC: 11 MMOL/L — SIGNIFICANT CHANGE UP (ref 5–17)
BUN SERPL-MCNC: 13 MG/DL — SIGNIFICANT CHANGE UP (ref 8–20)
CALCIUM SERPL-MCNC: 7.6 MG/DL — LOW (ref 8.6–10.2)
CHLORIDE SERPL-SCNC: 90 MMOL/L — LOW (ref 98–107)
CO2 SERPL-SCNC: 31 MMOL/L — HIGH (ref 22–29)
CREAT SERPL-MCNC: 0.57 MG/DL — SIGNIFICANT CHANGE UP (ref 0.5–1.3)
GLUCOSE SERPL-MCNC: 117 MG/DL — HIGH (ref 70–115)
POTASSIUM SERPL-MCNC: 2.8 MMOL/L — CRITICAL LOW (ref 3.5–5.3)
POTASSIUM SERPL-SCNC: 2.8 MMOL/L — CRITICAL LOW (ref 3.5–5.3)
SODIUM SERPL-SCNC: 132 MMOL/L — LOW (ref 135–145)

## 2017-12-20 PROCEDURE — 99233 SBSQ HOSP IP/OBS HIGH 50: CPT

## 2017-12-20 PROCEDURE — 99232 SBSQ HOSP IP/OBS MODERATE 35: CPT

## 2017-12-20 RX ORDER — PANTOPRAZOLE SODIUM 20 MG/1
40 TABLET, DELAYED RELEASE ORAL
Qty: 0 | Refills: 0 | Status: DISCONTINUED | OUTPATIENT
Start: 2017-12-20 | End: 2017-12-24

## 2017-12-20 RX ORDER — POTASSIUM CHLORIDE 20 MEQ
40 PACKET (EA) ORAL EVERY 4 HOURS
Qty: 0 | Refills: 0 | Status: COMPLETED | OUTPATIENT
Start: 2017-12-20 | End: 2017-12-20

## 2017-12-20 RX ORDER — SODIUM CHLORIDE 9 MG/ML
1000 INJECTION, SOLUTION INTRAVENOUS
Qty: 0 | Refills: 0 | Status: DISCONTINUED | OUTPATIENT
Start: 2017-12-20 | End: 2017-12-22

## 2017-12-20 RX ORDER — CHOLESTYRAMINE 4 G/9G
4 POWDER, FOR SUSPENSION ORAL
Qty: 0 | Refills: 0 | Status: DISCONTINUED | OUTPATIENT
Start: 2017-12-20 | End: 2018-01-16

## 2017-12-20 RX ORDER — POTASSIUM CHLORIDE 20 MEQ
40 PACKET (EA) ORAL EVERY 4 HOURS
Qty: 0 | Refills: 0 | Status: DISCONTINUED | OUTPATIENT
Start: 2017-12-20 | End: 2017-12-20

## 2017-12-20 RX ORDER — LOPERAMIDE HCL 2 MG
8 TABLET ORAL EVERY 4 HOURS
Qty: 0 | Refills: 0 | Status: DISCONTINUED | OUTPATIENT
Start: 2017-12-20 | End: 2017-12-21

## 2017-12-20 RX ADMIN — SODIUM CHLORIDE 83 MILLILITER(S): 9 INJECTION, SOLUTION INTRAVENOUS at 17:06

## 2017-12-20 RX ADMIN — DIPHENHYDRAMINE HYDROCHLORIDE AND LIDOCAINE HYDROCHLORIDE AND ALUMINUM HYDROXIDE AND MAGNESIUM HYDRO 10 MILLILITER(S): KIT at 17:01

## 2017-12-20 RX ADMIN — Medication 0.5 MILLIGRAM(S): at 05:57

## 2017-12-20 RX ADMIN — Medication 40 MILLIEQUIVALENT(S): at 11:12

## 2017-12-20 RX ADMIN — TAMSULOSIN HYDROCHLORIDE 0.4 MILLIGRAM(S): 0.4 CAPSULE ORAL at 21:58

## 2017-12-20 RX ADMIN — Medication 2 MILLIGRAM(S): at 11:15

## 2017-12-20 RX ADMIN — CLOPIDOGREL BISULFATE 75 MILLIGRAM(S): 75 TABLET, FILM COATED ORAL at 11:14

## 2017-12-20 RX ADMIN — TIOTROPIUM BROMIDE 1 CAPSULE(S): 18 CAPSULE ORAL; RESPIRATORY (INHALATION) at 08:30

## 2017-12-20 RX ADMIN — Medication 2 MILLIGRAM(S): at 05:50

## 2017-12-20 RX ADMIN — PANTOPRAZOLE SODIUM 40 MILLIGRAM(S): 20 TABLET, DELAYED RELEASE ORAL at 05:50

## 2017-12-20 RX ADMIN — LORATADINE 10 MILLIGRAM(S): 10 TABLET ORAL at 11:16

## 2017-12-20 RX ADMIN — CHOLESTYRAMINE 4 GRAM(S): 4 POWDER, FOR SUSPENSION ORAL at 17:01

## 2017-12-20 RX ADMIN — Medication 2 MILLIGRAM(S): at 02:40

## 2017-12-20 RX ADMIN — ATORVASTATIN CALCIUM 80 MILLIGRAM(S): 80 TABLET, FILM COATED ORAL at 21:58

## 2017-12-20 RX ADMIN — DIPHENHYDRAMINE HYDROCHLORIDE AND LIDOCAINE HYDROCHLORIDE AND ALUMINUM HYDROXIDE AND MAGNESIUM HYDRO 10 MILLILITER(S): KIT at 05:49

## 2017-12-20 RX ADMIN — Medication 2 MILLIGRAM(S): at 12:21

## 2017-12-20 RX ADMIN — Medication 0.5 MILLIGRAM(S): at 11:24

## 2017-12-20 RX ADMIN — Medication 180 MILLIGRAM(S): at 17:02

## 2017-12-20 RX ADMIN — Medication 180 MILLIGRAM(S): at 05:50

## 2017-12-20 RX ADMIN — CHOLESTYRAMINE 4 GRAM(S): 4 POWDER, FOR SUSPENSION ORAL at 21:58

## 2017-12-20 RX ADMIN — Medication 0.5 MILLIGRAM(S): at 17:01

## 2017-12-20 RX ADMIN — Medication 50 MICROGRAM(S): at 05:51

## 2017-12-20 RX ADMIN — Medication 2 MILLIGRAM(S): at 17:02

## 2017-12-20 NOTE — PROGRESS NOTE ADULT - SUBJECTIVE AND OBJECTIVE BOX
NEPHROLOGY INTERVAL HPI/OVERNIGHT EVENTS:    Feels better   Eating well   Supplements noted     MEDICATIONS  (STANDING):  ALBUTerol/ipratropium for Nebulization. 3 milliLiter(s) Nebulizer once  aspirin enteric coated 81 milliGRAM(s) Oral daily  atorvastatin 80 milliGRAM(s) Oral at bedtime  cholestyramine Powder (Sugar-Free) 4 Gram(s) Oral daily  clopidogrel Tablet 75 milliGRAM(s) Oral daily  diltiazem    milliGRAM(s) Oral <User Schedule>  enoxaparin Injectable 40 milliGRAM(s) SubCutaneous daily  FIRST- Mouthwash  BLM 10 milliLiter(s) Swish and Spit two times a day  Gauifenesin/Dextromethorphan 1200/600mg 1 Tablet(s) 1 Tablet(s) Oral two times a day  levothyroxine 50 MICROGram(s) Oral daily  loperamide 2 milliGRAM(s) Oral every 4 hours  loratadine 10 milliGRAM(s) Oral daily  multivitamin 1 Tablet(s) Oral daily  nystatin Powder 1 Application(s) Topical two times a day  pantoprazole    Tablet 40 milliGRAM(s) Oral before breakfast  potassium chloride   Powder 40 milliEquivalent(s) Oral every 4 hours  sodium chloride 0.9%. 1000 milliLiter(s) (75 mL/Hr) IV Continuous <Continuous>  tamsulosin 0.4 milliGRAM(s) Oral at bedtime  tiotropium 18 MICROgram(s) Capsule 1 Capsule(s) Inhalation daily    MEDICATIONS  (PRN):  acetaminophen  Suppository. 650 milliGRAM(s) Rectal every 6 hours PRN Moderate Pain (4 - 6)  acetylcysteine 20% Inhalation 1 milliLiter(s) Inhalation every 4 hours PRN poor cough clearance  ALBUTerol    0.083% 2.5 milliGRAM(s) Nebulizer every 4 hours PRN Shortness of Breath and/or Wheezing  ALPRAZolam 0.5 milliGRAM(s) Oral every 6 hours PRN anxiety  ondansetron Injectable 4 milliGRAM(s) IV Push every 8 hours PRN Nausea and/or Vomiting      Allergies    codeine (Unknown)  penicillin (Unknown)    Intolerances    Symbicort (Short breath)    Vital Signs Last 24 Hrs  T(C): 36.8 (20 Dec 2017 12:39), Max: 36.8 (20 Dec 2017 12:39)  T(F): 98.2 (20 Dec 2017 12:39), Max: 98.2 (20 Dec 2017 12:39)  HR: 99 (20 Dec 2017 12:39) (80 - 111)  BP: 104/68 (20 Dec 2017 12:39) (99/75 - 104/68)  BP(mean): --  RR: 18 (20 Dec 2017 12:39) (17 - 18)  SpO2: 96% (20 Dec 2017 12:39) (96% - 97%)  Daily     Daily Weight in k.9 (20 Dec 2017 05:13)  I&O's Detail    19 Dec 2017 07:  -  20 Dec 2017 07:00  --------------------------------------------------------  IN:    Oral Fluid: 200 mL  Total IN: 200 mL    OUT:    Ileostomy: 3250 mL    Voided: 450 mL  Total OUT: 3700 mL    Total NET: -3500 mL      20 Dec 2017 07:  -  20 Dec 2017 13:56  --------------------------------------------------------  IN:  Total IN: 0 mL    OUT:    Ileostomy: 1600 mL  Total OUT: 1600 mL    Total NET: -1600 mL        I&O's Summary    19 Dec 2017 07:  -  20 Dec 2017 07:00  --------------------------------------------------------  IN: 200 mL / OUT: 3700 mL / NET: -3500 mL    20 Dec 2017 07:  -  20 Dec 2017 13:56  --------------------------------------------------------  IN: 0 mL / OUT: 1600 mL / NET: -1600 mL        PHYSICAL EXAM:  NECK: Supple, neck  veins full  NERVOUS SYSTEM:  Alert & Oriented X3  CHEST/LUNG: Clear to percussion bilaterally  HEART: Regular rate and rhythm; No murmurs  ABDOMEN: Soft, Nontender, Nondistended; Bowel sounds present, ostomy c/d/i  EXTREMITIES:  No edema    LABS:                        11.7   11.8  )-----------( 257      ( 19 Dec 2017 06:14 )             34.8     12-20    132<L>  |  90<L>  |  13.0  ----------------------------<  117<H>  2.8<LL>   |  31.0<H>  |  0.57    Ca    7.6<L>      20 Dec 2017 06:13                  RADIOLOGY & ADDITIONAL TESTS:

## 2017-12-20 NOTE — PROGRESS NOTE ADULT - ASSESSMENT
Improved hypovolemic hyponatremia   + high ouput ostomy  Continue the current measures   Potassium supplements noted   Add KCl to IVF   Check Magnesium

## 2017-12-20 NOTE — PROGRESS NOTE ADULT - ASSESSMENT
Patient with increase ileostomy output    1. Increase cholestyramine to bid. Continue imodium  2. May need addition of octerotide sub q if not improving with cholestyramine Patient with increase ileostomy output    1. Increase cholestyramine to bid. Continue loperamide but increase it to 8 mg po q 4 hourly  2. May need addition of octerotide sub q if not improving with cholestyramine  3. Check C diff as patient had recent antibiotics  4. Increase PPI to bid

## 2017-12-20 NOTE — PROGRESS NOTE ADULT - ASSESSMENT
67 year old male with PMH of COPD stage 4 s/p right lung reduction in 2010 (on home oxygen 4L (sat 92-93%), HTN, CVA on 2014, DM, hypothyroidism, hydrocele, and nephrolithiasis who is admitted for a COPD exacerbation. Patient has been weaned off ventimask to 4 liters nasal cannula and respiratory status is stable. CTA of the chest was negative for PE but showed bilateral lower lobe infiltrates with mucous plugging for which he was started on Levaquin.  Respiratory status stable and steroids are being tapered. Hospital course complicated by constipation, which had not improved despite aggressive bowel regimen, laxatives and enemas. XRAY with large stool burden. CT with diaphragmatic hernia and patient was consequently taken to the OR for loop ileostomy under local anesthesia. Pt cont, to have copious amount in ileostomy. Pt is noted to have hyponatremia due to high output in ileostomy, started on IVF, sodium is 130, renal consulted, if clear by renal may be discharge home today or tomorrow.     Plan:    1. Constipation s/p loop ileostomy on 12/7 under local anesthesia  with large volume output , increase immodium and GI consulted   tolerating diet,  ostomy care as per ostomy RN,  stopped miralax and senna, no lomotil as pt does not tolerate codeine products  pt report feeling weak and dehydrated, start IVF, BMP STAT   GI follow up    2. Acute on chronic hypoxic respiratory failure secondary to COPD exacerbation - now resolved, on home oxygen (4 liters)  - bicarb elevated likely due to metabolic compensation for resp acidosis although patient refuses blood gases    - continue bronchodilators and continue to taper prednisone   - continue Mucomyst PRN  - completed course of Levaquin     3. CAP - completed course of Levaquin on 12/5     4. Hypertension - stable   - continue cardizem    5. Hyperlipidemia   - continue statin    6.hyponatremia due to high ileostomy output- 132 today   c/w NS, renal consult appreciated, defer further management as per renal   Hypokalemia - replace      7. Scrotal edema chronic with urinary retention - now retaining again - Roy if not able to urinate  - patient following with  as outpatient  - cont scrotal elevation    8. Right femoral artery dissection  - incidental finding on CT, no intervention as per vascular    9. DVT Prophylaxis - Lovenox

## 2017-12-20 NOTE — PROGRESS NOTE ADULT - SUBJECTIVE AND OBJECTIVE BOX
INTERVAL HPI/OVERNIGHT EVENTS:    MEDICATIONS  (STANDING):  ALBUTerol/ipratropium for Nebulization. 3 milliLiter(s) Nebulizer once  aspirin enteric coated 81 milliGRAM(s) Oral daily  atorvastatin 80 milliGRAM(s) Oral at bedtime  cholestyramine Powder (Sugar-Free) 4 Gram(s) Oral daily  clopidogrel Tablet 75 milliGRAM(s) Oral daily  diltiazem    milliGRAM(s) Oral <User Schedule>  enoxaparin Injectable 40 milliGRAM(s) SubCutaneous daily  FIRST- Mouthwash  BLM 10 milliLiter(s) Swish and Spit two times a day  levothyroxine 50 MICROGram(s) Oral daily  loperamide 2 milliGRAM(s) Oral every 4 hours  loratadine 10 milliGRAM(s) Oral daily  multivitamin 1 Tablet(s) Oral daily  nystatin Powder 1 Application(s) Topical two times a day  pantoprazole    Tablet 40 milliGRAM(s) Oral before breakfast  sodium chloride 0.9% 1000 milliLiter(s) (83 mL/Hr) IV Continuous <Continuous>  tamsulosin 0.4 milliGRAM(s) Oral at bedtime  tiotropium 18 MICROgram(s) Capsule 1 Capsule(s) Inhalation daily    MEDICATIONS  (PRN):  acetaminophen  Suppository. 650 milliGRAM(s) Rectal every 6 hours PRN Moderate Pain (4 - 6)  acetylcysteine 20% Inhalation 1 milliLiter(s) Inhalation every 4 hours PRN poor cough clearance  ALBUTerol    0.083% 2.5 milliGRAM(s) Nebulizer every 4 hours PRN Shortness of Breath and/or Wheezing  ALPRAZolam 0.5 milliGRAM(s) Oral every 6 hours PRN anxiety  ondansetron Injectable 4 milliGRAM(s) IV Push every 8 hours PRN Nausea and/or Vomiting      Allergies    codeine (Unknown)  penicillin (Unknown)    Intolerances    Symbicort (Short breath)      Vital Signs Last 24 Hrs  T(C): 36.4 (20 Dec 2017 15:36), Max: 36.8 (20 Dec 2017 12:39)  T(F): 97.5 (20 Dec 2017 15:36), Max: 98.2 (20 Dec 2017 12:39)  HR: 99 (20 Dec 2017 12:39) (80 - 111)  BP: 106/74 (20 Dec 2017 15:36) (99/75 - 106/74)  BP(mean): --  RR: 18 (20 Dec 2017 15:36) (17 - 18)  SpO2: 96% (20 Dec 2017 12:39) (96% - 97%)    LABS:                        11.7   11.8  )-----------( 257      ( 19 Dec 2017 06:14 )             34.8     12-20    132<L>  |  90<L>  |  13.0  ----------------------------<  117<H>  2.8<LL>   |  31.0<H>  |  0.57    Ca    7.6<L>      20 Dec 2017 06:13            RADIOLOGY & ADDITIONAL TESTS: INTERVAL HPI/OVERNIGHT EVENTS:Patient with loop ileostomy for colonic obstruction with increased ileostomy output and also dehydration. Patient is eating normally and has no abdominal pain, nausea, vomiting. The ileostomy output has been high and now electrolytes are getting affected.Cholestyramine was added and he is also on imodium.     MEDICATIONS  (STANDING):  ALBUTerol/ipratropium for Nebulization. 3 milliLiter(s) Nebulizer once  aspirin enteric coated 81 milliGRAM(s) Oral daily  atorvastatin 80 milliGRAM(s) Oral at bedtime  cholestyramine Powder (Sugar-Free) 4 Gram(s) Oral daily  clopidogrel Tablet 75 milliGRAM(s) Oral daily  diltiazem    milliGRAM(s) Oral <User Schedule>  enoxaparin Injectable 40 milliGRAM(s) SubCutaneous daily  FIRST- Mouthwash  BLM 10 milliLiter(s) Swish and Spit two times a day  levothyroxine 50 MICROGram(s) Oral daily  loperamide 2 milliGRAM(s) Oral every 4 hours  loratadine 10 milliGRAM(s) Oral daily  multivitamin 1 Tablet(s) Oral daily  nystatin Powder 1 Application(s) Topical two times a day  pantoprazole    Tablet 40 milliGRAM(s) Oral before breakfast  sodium chloride 0.9% 1000 milliLiter(s) (83 mL/Hr) IV Continuous <Continuous>  tamsulosin 0.4 milliGRAM(s) Oral at bedtime  tiotropium 18 MICROgram(s) Capsule 1 Capsule(s) Inhalation daily    MEDICATIONS  (PRN):  acetaminophen  Suppository. 650 milliGRAM(s) Rectal every 6 hours PRN Moderate Pain (4 - 6)  acetylcysteine 20% Inhalation 1 milliLiter(s) Inhalation every 4 hours PRN poor cough clearance  ALBUTerol    0.083% 2.5 milliGRAM(s) Nebulizer every 4 hours PRN Shortness of Breath and/or Wheezing  ALPRAZolam 0.5 milliGRAM(s) Oral every 6 hours PRN anxiety  ondansetron Injectable 4 milliGRAM(s) IV Push every 8 hours PRN Nausea and/or Vomiting      Allergies    codeine (Unknown)  penicillin (Unknown)    Intolerances    Symbicort (Short breath)      Vital Signs Last 24 Hrs  T(C): 36.4 (20 Dec 2017 15:36), Max: 36.8 (20 Dec 2017 12:39)  T(F): 97.5 (20 Dec 2017 15:36), Max: 98.2 (20 Dec 2017 12:39)  HR: 99 (20 Dec 2017 12:39) (80 - 111)  BP: 106/74 (20 Dec 2017 15:36) (99/75 - 106/74)  BP(mean): --  RR: 18 (20 Dec 2017 15:36) (17 - 18)  SpO2: 96% (20 Dec 2017 12:39) (96% - 97%)    LABS:                        11.7   11.8  )-----------( 257      ( 19 Dec 2017 06:14 )             34.8     12-20    132<L>  |  90<L>  |  13.0  ----------------------------<  117<H>  2.8<LL>   |  31.0<H>  |  0.57    Ca    7.6<L>      20 Dec 2017 06:13            RADIOLOGY & ADDITIONAL TESTS:

## 2017-12-20 NOTE — PROGRESS NOTE ADULT - SUBJECTIVE AND OBJECTIVE BOX
HOSPITALIST PROGRESS NOTE    FRANDY BAUTISTA  [id number]  67yMale    Patient is a 67y old  Male who presents with a chief complaint of SOB (15 Dec 2017 10:17)      SUBJECTIVE:   Chart reviewed since last visit, discussed with Dr goddard  Patient seen and examined at bedside for hyponatremia, acute on chronic Resp failure  Denies any dyspnea, mild non-productive cough.  Still feels weak, though better than when he came in.        OBJECTIVE:  Vital Signs Last 24 Hrs  T(C): 36.8 (20 Dec 2017 12:39), Max: 36.8 (20 Dec 2017 12:39)  T(F): 98.2 (20 Dec 2017 12:39), Max: 98.2 (20 Dec 2017 12:39)  HR: 99 (20 Dec 2017 12:39) (80 - 111)  BP: 104/68 (20 Dec 2017 12:39) (99/75 - 104/68)  RR: 18 (20 Dec 2017 12:39) (17 - 18)  SpO2: 96% (20 Dec 2017 12:39) (96% - 97%)    PHYSICAL EXAMINATION  General: NAD[+]   Appears fatigued  HEENT: AT/NC[+]    Moist oral mucosa[+]     NECK: Supple[+]  JVD[-]    CVS: RRR[+]   S1+S2[+]      RESP: Fair air entry bilaterally[+]   Clear sounds[+]  restricted air flow    GI: Soft[+]  RLQ Ostomy with greenish brown liquid with speck of fecal matter. Nondistended[+]   Nontender[+]   Bowel Sounds[+]    : suprapubic tenderness[-]      MS: FROM[+]   Edema[-]  CNS: AAOx3[+]    generalized weakness  INTEG: Skin is Warm[+]  dry[+]    PSYCH: fatigued      I&O's Summary    19 Dec 2017 07:01  -  20 Dec 2017 07:00  --------------------------------------------------------  IN: 200 mL / OUT: 3700 mL / NET: -3500 mL    20 Dec 2017 07:01  -  20 Dec 2017 14:34  --------------------------------------------------------  IN: 0 mL / OUT: 1600 mL / NET: -1600 mL                            11.7   11.8  )-----------( 257      ( 19 Dec 2017 06:14 )             34.8       12-20    132<L>  |  90<L>  |  13.0  ----------------------------<  117<H>  2.8<LL>   |  31.0<H>  |  0.57    Ca    7.6<L>      20 Dec 2017 06:13                MEDICATIONS  (STANDING):  ALBUTerol/ipratropium for Nebulization. 3 milliLiter(s) Nebulizer once  aspirin enteric coated 81 milliGRAM(s) Oral daily  atorvastatin 80 milliGRAM(s) Oral at bedtime  cholestyramine Powder (Sugar-Free) 4 Gram(s) Oral daily  clopidogrel Tablet 75 milliGRAM(s) Oral daily  diltiazem    milliGRAM(s) Oral <User Schedule>  enoxaparin Injectable 40 milliGRAM(s) SubCutaneous daily  FIRST- Mouthwash  BLM 10 milliLiter(s) Swish and Spit two times a day  Gauifenesin/Dextromethorphan 1200/600mg 1 Tablet(s) 1 Tablet(s) Oral two times a day  levothyroxine 50 MICROGram(s) Oral daily  loperamide 2 milliGRAM(s) Oral every 4 hours  loratadine 10 milliGRAM(s) Oral daily  multivitamin 1 Tablet(s) Oral daily  nystatin Powder 1 Application(s) Topical two times a day  pantoprazole    Tablet 40 milliGRAM(s) Oral before breakfast  potassium chloride   Powder 40 milliEquivalent(s) Oral every 4 hours  sodium chloride 0.9% 1000 milliLiter(s) (83 mL/Hr) IV Continuous <Continuous>  tamsulosin 0.4 milliGRAM(s) Oral at bedtime  tiotropium 18 MICROgram(s) Capsule 1 Capsule(s) Inhalation daily      MEDICATIONS  (PRN):  acetaminophen  Suppository. 650 milliGRAM(s) Rectal every 6 hours PRN Moderate Pain (4 - 6)  acetylcysteine 20% Inhalation 1 milliLiter(s) Inhalation every 4 hours PRN poor cough clearance  ALBUTerol    0.083% 2.5 milliGRAM(s) Nebulizer every 4 hours PRN Shortness of Breath and/or Wheezing  ALPRAZolam 0.5 milliGRAM(s) Oral every 6 hours PRN anxiety  ondansetron Injectable 4 milliGRAM(s) IV Push every 8 hours PRN Nausea and/or Vomiting

## 2017-12-21 DIAGNOSIS — K94.13 ENTEROSTOMY MALFUNCTION: ICD-10-CM

## 2017-12-21 LAB
ALBUMIN SERPL ELPH-MCNC: 2.6 G/DL — LOW (ref 3.3–5.2)
ALP SERPL-CCNC: 92 U/L — SIGNIFICANT CHANGE UP (ref 40–120)
ALT FLD-CCNC: 12 U/L — SIGNIFICANT CHANGE UP
ANION GAP SERPL CALC-SCNC: 14 MMOL/L — SIGNIFICANT CHANGE UP (ref 5–17)
AST SERPL-CCNC: 15 U/L — SIGNIFICANT CHANGE UP
BILIRUB SERPL-MCNC: 0.4 MG/DL — SIGNIFICANT CHANGE UP (ref 0.4–2)
BUN SERPL-MCNC: 10 MG/DL — SIGNIFICANT CHANGE UP (ref 8–20)
C DIFF BY PCR RESULT: SIGNIFICANT CHANGE UP
C DIFF TOX GENS STL QL NAA+PROBE: SIGNIFICANT CHANGE UP
CALCIUM SERPL-MCNC: 8.4 MG/DL — LOW (ref 8.6–10.2)
CHLORIDE SERPL-SCNC: 90 MMOL/L — LOW (ref 98–107)
CO2 SERPL-SCNC: 32 MMOL/L — HIGH (ref 22–29)
CREAT SERPL-MCNC: 0.61 MG/DL — SIGNIFICANT CHANGE UP (ref 0.5–1.3)
GLUCOSE SERPL-MCNC: 128 MG/DL — HIGH (ref 70–115)
HCT VFR BLD CALC: 39.2 % — LOW (ref 42–52)
HGB BLD-MCNC: 12.6 G/DL — LOW (ref 14–18)
MAGNESIUM SERPL-MCNC: 1.7 MG/DL — SIGNIFICANT CHANGE UP (ref 1.6–2.6)
MCHC RBC-ENTMCNC: 27.6 PG — SIGNIFICANT CHANGE UP (ref 27–31)
MCHC RBC-ENTMCNC: 32.1 G/DL — SIGNIFICANT CHANGE UP (ref 32–36)
MCV RBC AUTO: 86 FL — SIGNIFICANT CHANGE UP (ref 80–94)
PLATELET # BLD AUTO: 359 K/UL — SIGNIFICANT CHANGE UP (ref 150–400)
POTASSIUM SERPL-MCNC: 3.5 MMOL/L — SIGNIFICANT CHANGE UP (ref 3.5–5.3)
POTASSIUM SERPL-SCNC: 3.5 MMOL/L — SIGNIFICANT CHANGE UP (ref 3.5–5.3)
PROT SERPL-MCNC: 7.1 G/DL — SIGNIFICANT CHANGE UP (ref 6.6–8.7)
RBC # BLD: 4.56 M/UL — LOW (ref 4.6–6.2)
RBC # FLD: 14.7 % — SIGNIFICANT CHANGE UP (ref 11–15.6)
SODIUM SERPL-SCNC: 136 MMOL/L — SIGNIFICANT CHANGE UP (ref 135–145)
WBC # BLD: 7.1 K/UL — SIGNIFICANT CHANGE UP (ref 4.8–10.8)
WBC # FLD AUTO: 7.1 K/UL — SIGNIFICANT CHANGE UP (ref 4.8–10.8)

## 2017-12-21 PROCEDURE — 99232 SBSQ HOSP IP/OBS MODERATE 35: CPT

## 2017-12-21 PROCEDURE — 99233 SBSQ HOSP IP/OBS HIGH 50: CPT

## 2017-12-21 RX ORDER — DIPHENOXYLATE HCL/ATROPINE 2.5-.025MG
1 TABLET ORAL
Qty: 0 | Refills: 0 | Status: DISCONTINUED | OUTPATIENT
Start: 2017-12-21 | End: 2017-12-28

## 2017-12-21 RX ORDER — FLUTICASONE PROPIONATE AND SALMETEROL 50; 250 UG/1; UG/1
1 POWDER ORAL; RESPIRATORY (INHALATION)
Qty: 0 | Refills: 0 | Status: DISCONTINUED | OUTPATIENT
Start: 2017-12-21 | End: 2018-01-16

## 2017-12-21 RX ORDER — DIPHENOXYLATE HCL/ATROPINE 2.5-.025MG
1 TABLET ORAL THREE TIMES A DAY
Qty: 0 | Refills: 0 | Status: DISCONTINUED | OUTPATIENT
Start: 2017-12-21 | End: 2017-12-21

## 2017-12-21 RX ADMIN — NYSTATIN CREAM 1 APPLICATION(S): 100000 CREAM TOPICAL at 06:28

## 2017-12-21 RX ADMIN — SODIUM CHLORIDE 83 MILLILITER(S): 9 INJECTION, SOLUTION INTRAVENOUS at 06:27

## 2017-12-21 RX ADMIN — CLOPIDOGREL BISULFATE 75 MILLIGRAM(S): 75 TABLET, FILM COATED ORAL at 12:31

## 2017-12-21 RX ADMIN — FLUTICASONE PROPIONATE AND SALMETEROL 1 DOSE(S): 50; 250 POWDER ORAL; RESPIRATORY (INHALATION) at 21:27

## 2017-12-21 RX ADMIN — Medication 0.5 MILLIGRAM(S): at 21:50

## 2017-12-21 RX ADMIN — SODIUM CHLORIDE 83 MILLILITER(S): 9 INJECTION, SOLUTION INTRAVENOUS at 22:18

## 2017-12-21 RX ADMIN — DIPHENHYDRAMINE HYDROCHLORIDE AND LIDOCAINE HYDROCHLORIDE AND ALUMINUM HYDROXIDE AND MAGNESIUM HYDRO 10 MILLILITER(S): KIT at 18:20

## 2017-12-21 RX ADMIN — ATORVASTATIN CALCIUM 80 MILLIGRAM(S): 80 TABLET, FILM COATED ORAL at 21:05

## 2017-12-21 RX ADMIN — Medication 180 MILLIGRAM(S): at 06:28

## 2017-12-21 RX ADMIN — Medication 81 MILLIGRAM(S): at 12:31

## 2017-12-21 RX ADMIN — PANTOPRAZOLE SODIUM 40 MILLIGRAM(S): 20 TABLET, DELAYED RELEASE ORAL at 06:27

## 2017-12-21 RX ADMIN — CHOLESTYRAMINE 4 GRAM(S): 4 POWDER, FOR SUSPENSION ORAL at 10:09

## 2017-12-21 RX ADMIN — Medication 8 MILLIGRAM(S): at 02:52

## 2017-12-21 RX ADMIN — Medication 50 MICROGRAM(S): at 06:27

## 2017-12-21 RX ADMIN — Medication 0.5 MILLIGRAM(S): at 06:27

## 2017-12-21 RX ADMIN — CHOLESTYRAMINE 4 GRAM(S): 4 POWDER, FOR SUSPENSION ORAL at 21:05

## 2017-12-21 RX ADMIN — Medication 8 MILLIGRAM(S): at 10:06

## 2017-12-21 RX ADMIN — LORATADINE 10 MILLIGRAM(S): 10 TABLET ORAL at 12:31

## 2017-12-21 RX ADMIN — PANTOPRAZOLE SODIUM 40 MILLIGRAM(S): 20 TABLET, DELAYED RELEASE ORAL at 18:20

## 2017-12-21 RX ADMIN — TAMSULOSIN HYDROCHLORIDE 0.4 MILLIGRAM(S): 0.4 CAPSULE ORAL at 21:05

## 2017-12-21 RX ADMIN — Medication 8 MILLIGRAM(S): at 06:28

## 2017-12-21 RX ADMIN — NYSTATIN CREAM 1 APPLICATION(S): 100000 CREAM TOPICAL at 18:20

## 2017-12-21 RX ADMIN — DIPHENHYDRAMINE HYDROCHLORIDE AND LIDOCAINE HYDROCHLORIDE AND ALUMINUM HYDROXIDE AND MAGNESIUM HYDRO 10 MILLILITER(S): KIT at 06:29

## 2017-12-21 RX ADMIN — Medication 1 TABLET(S): at 21:26

## 2017-12-21 RX ADMIN — Medication 1 TABLET(S): at 15:15

## 2017-12-21 NOTE — PROGRESS NOTE ADULT - PROBLEM SELECTOR PLAN 1
- pt with high ileostomy output. I suggested pt start octreotide sq to help decrease the output ( immodium not working). Pt refused SQ medication. Will D/C immodium and try lomotil

## 2017-12-21 NOTE — PROGRESS NOTE ADULT - SUBJECTIVE AND OBJECTIVE BOX
HOSPITALIST PROGRESS NOTE    FRANDY BAUTISTA  [id number]  67yMale    Patient is a 67y old  Male who presents with a chief complaint of SOB (15 Dec 2017 10:17)      SUBJECTIVE:   Chart reviewed since last visit.  Patient seen and examined at bedside for hyponatremia, ileostomy.  Continue to feel better, though still weak.  Denies any nausea, vomiting, abdominal pain.  Denies any chills, fever, dyspnea, cough, chest pain or palpitations      OBJECTIVE:  Vital Signs Last 24 Hrs  T(C): 36.6 (21 Dec 2017 04:57), Max: 36.8 (20 Dec 2017 12:39)  T(F): 97.9 (21 Dec 2017 04:57), Max: 98.2 (20 Dec 2017 12:39)  HR: 96 (21 Dec 2017 04:57) (96 - 120)  BP: 112/82 (21 Dec 2017 04:57) (104/68 - 112/82)  RR: 17 (21 Dec 2017 04:57) (17 - 18)  SpO2: 97% (21 Dec 2017 04:57) (96% - 97%)    PHYSICAL EXAMINATION  General: NAD[+]   Appears fatigued  HEENT: AT/NC[+]    Moist oral mucosa[+]     NECK: Supple[+]  JVD[-]    CVS: RRR[+]   S1+S2[+]      RESP: Fair air entry bilaterally[+]   Clear sounds[+]  restricted air flow    GI: Soft[+]  RLQ Ostomy with greenish brown liquid with speck of fecal matter. Nondistended[+]   Nontender[+]   Bowel Sounds[+]    : suprapubic tenderness[-]      MS: FROM[+]   Edema[-]  CNS: AAOx3[+]    generalized weakness  INTEG: Skin is Warm[+]  dry[+]    PSYCH: fatigued           I&O's Summary    20 Dec 2017 07:01  -  21 Dec 2017 07:00  --------------------------------------------------------  IN: 0 mL / OUT: 5190 mL / NET: -5190 mL    21 Dec 2017 07:01  -  21 Dec 2017 12:09  --------------------------------------------------------  IN: 0 mL / OUT: 1840 mL / NET: -1840 mL                            12.6   7.1   )-----------( 359      ( 21 Dec 2017 07:20 )             39.2       12-21    136  |  90<L>  |  10.0  ----------------------------<  128<H>  3.5   |  32.0<H>  |  0.61    Ca    8.4<L>      21 Dec 2017 07:20  Mg     1.7     12-21    TPro  7.1  /  Alb  2.6<L>  /  TBili  0.4  /  DBili  x   /  AST  15  /  ALT  12  /  AlkPhos  92  12-21              MEDICATIONS  (STANDING):  ALBUTerol/ipratropium for Nebulization. 3 milliLiter(s) Nebulizer once  aspirin enteric coated 81 milliGRAM(s) Oral daily  atorvastatin 80 milliGRAM(s) Oral at bedtime  cholestyramine Powder (Sugar-Free) 4 Gram(s) Oral two times a day  clopidogrel Tablet 75 milliGRAM(s) Oral daily  diltiazem    milliGRAM(s) Oral <User Schedule>  enoxaparin Injectable 40 milliGRAM(s) SubCutaneous daily  FIRST- Mouthwash  BLM 10 milliLiter(s) Swish and Spit two times a day  fluticasone propionate/ salmeterol 250-50 MICROgram(s) Diskus 1 Dose(s) Inhalation two times a day  levothyroxine 50 MICROGram(s) Oral daily  loperamide 8 milliGRAM(s) Oral every 4 hours  loratadine 10 milliGRAM(s) Oral daily  multivitamin 1 Tablet(s) Oral daily  nystatin Powder 1 Application(s) Topical two times a day  pantoprazole    Tablet 40 milliGRAM(s) Oral two times a day before meals  sodium chloride 0.9% 1000 milliLiter(s) (83 mL/Hr) IV Continuous <Continuous>  tamsulosin 0.4 milliGRAM(s) Oral at bedtime  tiotropium 18 MICROgram(s) Capsule 1 Capsule(s) Inhalation daily      MEDICATIONS  (PRN):  acetaminophen  Suppository. 650 milliGRAM(s) Rectal every 6 hours PRN Moderate Pain (4 - 6)  acetylcysteine 20% Inhalation 1 milliLiter(s) Inhalation every 4 hours PRN poor cough clearance  ALBUTerol    0.083% 2.5 milliGRAM(s) Nebulizer every 4 hours PRN Shortness of Breath and/or Wheezing  ALPRAZolam 0.5 milliGRAM(s) Oral every 6 hours PRN anxiety  ondansetron Injectable 4 milliGRAM(s) IV Push every 8 hours PRN Nausea and/or Vomiting

## 2017-12-21 NOTE — PROGRESS NOTE ADULT - ASSESSMENT
67 year old male with PMH of COPD stage 4 s/p right lung reduction in 2010 (on home oxygen 4L (sat 92-93%), HTN, CVA on 2014, DM, hypothyroidism, hydrocele, and nephrolithiasis who is admitted for a COPD exacerbation. Patient has been weaned off ventimask to 4 liters nasal cannula and respiratory status is stable. CTA of the chest was negative for PE but showed bilateral lower lobe infiltrates with mucous plugging for which he was started on Levaquin.  Respiratory status stable and steroids are being tapered. Hospital course complicated by constipation, which had not improved despite aggressive bowel regimen, laxatives and enemas. XRAY with large stool burden. CT with diaphragmatic hernia and patient was consequently taken to the OR for loop ileostomy under local anesthesia. Pt cont, to have copious amount in ileostomy. Pt is noted to have hyponatremia due to high output in ileostomy, started on IVF, along with potassium supplementation.    Continues to have high outptu (>5000ml)    Plan:    1. Constipation s/p loop ileostomy on 12/7 under local anesthesia  with large volume output , increase immodium and GI consulted   tolerating diet,  ostomy care as per ostomy RN,  stopped miralax and senna, no lomotil as pt does not tolerate codeine products  pt report feeling weak and dehydrated.  On loperamide, cholestyramine - doses increased by GI    2. Acute on chronic hypoxic respiratory failure secondary to COPD exacerbation - now resolved, on home oxygen (4 liters)  - bicarb elevated likely due to metabolic compensation for resp acidosis although patient refuses blood gases    - continue bronchodilators and continue to taper prednisone   - continue Mucomyst PRN  - completed course of Levaquin     3. CAP - completed course of Levaquin on 12/5     4. Hypertension - stable   - continue cardizem    5. Hyperlipidemia   - continue statin    6.hyponatremia due to high ileostomy output- 135 today   c/w NS, renal consult appreciated, defer further management as per renal   Hypokalemia - replace      7. Scrotal edema chronic with urinary retention - now retaining again - Roy if not able to urinate  - patient following with  as outpatient  - cont scrotal elevation    8. Right femoral artery dissection  - incidental finding on CT, no intervention as per vascular    9. DVT Prophylaxis - Lovenox

## 2017-12-21 NOTE — PROGRESS NOTE ADULT - SUBJECTIVE AND OBJECTIVE BOX
Patient is a 67y old  Male who presents with a chief complaint of SOB (15 Dec 2017 10:17)      HPI:  67 YOM w/PMH of COPD stage 4 s/p right lung reduction in 2010, currently on home oxygen 4L (sat 92-93%), HTN, CVA on 2014, DM, hypothyroidism, hydrocele, kidney stones      Patient continues with high ostomy outputs ( 4990 cc) . No abdominal pain. No fevers. Pt tolerating solid diet.         REVIEW OF SYSTEMS:  Constitutional: No fever, weight loss or fatigue  ENMT:  No difficulty hearing, tinnitus, vertigo; No sinus or throat pain  Respiratory: No cough, wheezing, chills or hemoptysis  Cardiovascular: No chest pain, palpitations, dizziness or leg swelling  Gastrointestinal: No abdominal or epigastric pain. No nausea, vomiting or hematemesis; No diarrhea or constipation. No melena or hematochezia.  Skin: No itching, burning, rashes or lesions   Musculoskeletal: No joint pain or swelling; No muscle, back or extremity pain    PAST MEDICAL & SURGICAL HISTORY:  Hypothyroidism  Hydrocele  Renal calculi  Diabetes mellitus  Hypertension  CVA (cerebral vascular accident)  Chronic obstructive pulmonary disease, unspecified COPD type  History of lung surgery      FAMILY HISTORY:  No pertinent family history in first degree relatives      SOCIAL HISTORY:  Smoking Status: [ ] Current, [ ] Former, [ ] Never  Pack Years:  [  ] EtOH-no  [  ] IVDA    MEDICATIONS:  MEDICATIONS  (STANDING):  ALBUTerol/ipratropium for Nebulization. 3 milliLiter(s) Nebulizer once  aspirin enteric coated 81 milliGRAM(s) Oral daily  atorvastatin 80 milliGRAM(s) Oral at bedtime  cholestyramine Powder (Sugar-Free) 4 Gram(s) Oral two times a day  clopidogrel Tablet 75 milliGRAM(s) Oral daily  diltiazem    milliGRAM(s) Oral <User Schedule>  enoxaparin Injectable 40 milliGRAM(s) SubCutaneous daily  FIRST- Mouthwash  BLM 10 milliLiter(s) Swish and Spit two times a day  fluticasone propionate/ salmeterol 250-50 MICROgram(s) Diskus 1 Dose(s) Inhalation two times a day  levothyroxine 50 MICROGram(s) Oral daily  loperamide 8 milliGRAM(s) Oral every 4 hours  loratadine 10 milliGRAM(s) Oral daily  multivitamin 1 Tablet(s) Oral daily  nystatin Powder 1 Application(s) Topical two times a day  pantoprazole    Tablet 40 milliGRAM(s) Oral two times a day before meals  sodium chloride 0.9% 1000 milliLiter(s) (83 mL/Hr) IV Continuous <Continuous>  tamsulosin 0.4 milliGRAM(s) Oral at bedtime  tiotropium 18 MICROgram(s) Capsule 1 Capsule(s) Inhalation daily    MEDICATIONS  (PRN):  acetaminophen  Suppository. 650 milliGRAM(s) Rectal every 6 hours PRN Moderate Pain (4 - 6)  acetylcysteine 20% Inhalation 1 milliLiter(s) Inhalation every 4 hours PRN poor cough clearance  ALBUTerol    0.083% 2.5 milliGRAM(s) Nebulizer every 4 hours PRN Shortness of Breath and/or Wheezing  ALPRAZolam 0.5 milliGRAM(s) Oral every 6 hours PRN anxiety  ondansetron Injectable 4 milliGRAM(s) IV Push every 8 hours PRN Nausea and/or Vomiting      Allergies    codeine (Unknown)  penicillin (Unknown)    Intolerances    Symbicort (Short breath)      Vital Signs Last 24 Hrs  T(C): 36.6 (21 Dec 2017 04:57), Max: 36.6 (21 Dec 2017 04:57)  T(F): 97.9 (21 Dec 2017 04:57), Max: 97.9 (21 Dec 2017 04:57)  HR: 96 (21 Dec 2017 04:57) (96 - 120)  BP: 112/82 (21 Dec 2017 04:57) (106/74 - 112/82)  BP(mean): --  RR: 17 (21 Dec 2017 04:57) (17 - 18)  SpO2: 97% (21 Dec 2017 04:57) (97% - 97%)    12-20 @ 07:01  -  12-21 @ 07:00  --------------------------------------------------------  IN: 0 mL / OUT: 5190 mL / NET: -5190 mL    12-21 @ 07:01 - 12-21 @ 12:49  --------------------------------------------------------  IN: 0 mL / OUT: 3690 mL / NET: -3690 mL          PHYSICAL EXAM:    General: Well developed; well nourished; in no acute distress  HEENT: MMM, conjunctiva and sclera clear  H- RRR  L -CTA  Gastrointestinal: Soft, non-tender non-distended; Normal bowel sounds; No rebound or guarding  Extremities: Normal range of motion, No clubbing, cyanosis or edema  Neurological: Alert and oriented x3  Skin: Warm and dry. No obvious rash      LABS:                        12.6   7.1   )-----------( 359      ( 21 Dec 2017 07:20 )             39.2     21 Dec 2017 07:20    136    |  90     |  10.0   ----------------------------<  128    3.5     |  32.0   |  0.61     Ca    8.4        21 Dec 2017 07:20  Mg     1.7       21 Dec 2017 07:20    TPro  7.1    /  Alb  2.6    /  TBili  0.4    /  DBili  x      /  AST  15     /  ALT  12     /  AlkPhos  92     / Amylase x      /Lipase x      21 Dec 2017 07:20              RADIOLOGY & ADDITIONAL STUDIES:     < from: CT Abdomen and Pelvis w/ Oral Cont and w/ IV Cont (12.05.17 @ 00:38) >  MPRESSION:     Stool and air distended right and transverse colon. Relative transition   point distal to the splenic flecture without obvious obstructing mass.   Overall favor ileus/obstipation, however follow-up colonoscopy to exclude   underlying structures recommended.    Colon containing left anterior diaphragmatic hernia without acute   associated findings or obstruction at this time, grossly similar to 9/14.      < end of copied text >

## 2017-12-21 NOTE — PROGRESS NOTE ADULT - SUBJECTIVE AND OBJECTIVE BOX
NEPHROLOGY INTERVAL HPI/OVERNIGHT EVENTS:    Feels better   interim noted   IVF noted     MEDICATIONS  (STANDING):  ALBUTerol/ipratropium for Nebulization. 3 milliLiter(s) Nebulizer once  aspirin enteric coated 81 milliGRAM(s) Oral daily  atorvastatin 80 milliGRAM(s) Oral at bedtime  cholestyramine Powder (Sugar-Free) 4 Gram(s) Oral two times a day  clopidogrel Tablet 75 milliGRAM(s) Oral daily  diltiazem    milliGRAM(s) Oral <User Schedule>  diphenoxylate/atropine 1 Tablet(s) Oral three times a day  enoxaparin Injectable 40 milliGRAM(s) SubCutaneous daily  FIRST- Mouthwash  BLM 10 milliLiter(s) Swish and Spit two times a day  fluticasone propionate/ salmeterol 250-50 MICROgram(s) Diskus 1 Dose(s) Inhalation two times a day  levothyroxine 50 MICROGram(s) Oral daily  loratadine 10 milliGRAM(s) Oral daily  multivitamin 1 Tablet(s) Oral daily  nystatin Powder 1 Application(s) Topical two times a day  pantoprazole    Tablet 40 milliGRAM(s) Oral two times a day before meals  sodium chloride 0.9% 1000 milliLiter(s) (83 mL/Hr) IV Continuous <Continuous>  tamsulosin 0.4 milliGRAM(s) Oral at bedtime  tiotropium 18 MICROgram(s) Capsule 1 Capsule(s) Inhalation daily    MEDICATIONS  (PRN):  acetaminophen  Suppository. 650 milliGRAM(s) Rectal every 6 hours PRN Moderate Pain (4 - 6)  acetylcysteine 20% Inhalation 1 milliLiter(s) Inhalation every 4 hours PRN poor cough clearance  ALBUTerol    0.083% 2.5 milliGRAM(s) Nebulizer every 4 hours PRN Shortness of Breath and/or Wheezing  ALPRAZolam 0.5 milliGRAM(s) Oral every 6 hours PRN anxiety  ondansetron Injectable 4 milliGRAM(s) IV Push every 8 hours PRN Nausea and/or Vomiting      Allergies    codeine (Unknown)  penicillin (Unknown)    Intolerances    Symbicort (Short breath)        Vital Signs Last 24 Hrs  T(C): 36.6 (21 Dec 2017 04:57), Max: 36.6 (21 Dec 2017 04:57)  T(F): 97.9 (21 Dec 2017 04:57), Max: 97.9 (21 Dec 2017 04:57)  HR: 96 (21 Dec 2017 04:57) (96 - 120)  BP: 112/82 (21 Dec 2017 04:57) (106/74 - 112/82)  BP(mean): --  RR: 17 (21 Dec 2017 04:57) (17 - 18)  SpO2: 97% (21 Dec 2017 04:57) (97% - 97%)  Daily     Daily   I&O's Detail    20 Dec 2017 07:01  -  21 Dec 2017 07:00  --------------------------------------------------------  IN:  Total IN: 0 mL    OUT:    Ileostomy: 4990 mL    Voided: 200 mL  Total OUT: 5190 mL    Total NET: -5190 mL      21 Dec 2017 07:01  -  21 Dec 2017 14:29  --------------------------------------------------------  IN:  Total IN: 0 mL    OUT:    Ileostomy: 3690 mL  Total OUT: 3690 mL    Total NET: -3690 mL        I&O's Summary    20 Dec 2017 07:01  -  21 Dec 2017 07:00  --------------------------------------------------------  IN: 0 mL / OUT: 5190 mL / NET: -5190 mL    21 Dec 2017 07:01  -  21 Dec 2017 14:29  --------------------------------------------------------  IN: 0 mL / OUT: 3690 mL / NET: -3690 mL        PHYSICAL EXAM:  NECK: Supple,   NERVOUS SYSTEM:  Alert & Oriented X3  CHEST/LUNG: Clear to percussion bilaterally  HEART: Regular rate and rhythm; No murmurs  ABDOMEN: Soft, Nontender, Nondistended; Bowel sounds present, ostomy c/d/i  EXTREMITIES:  No edema    LABS:                        12.6   7.1   )-----------( 359      ( 21 Dec 2017 07:20 )             39.2     12-21    136  |  90<L>  |  10.0  ----------------------------<  128<H>  3.5   |  32.0<H>  |  0.61    Ca    8.4<L>      21 Dec 2017 07:20  Mg     1.7     12-21    TPro  7.1  /  Alb  2.6<L>  /  TBili  0.4  /  DBili  x   /  AST  15  /  ALT  12  /  AlkPhos  92  12-21        Magnesium, Serum: 1.7 mg/dL (12-21 @ 07:20)          RADIOLOGY & ADDITIONAL TESTS:

## 2017-12-21 NOTE — PROGRESS NOTE ADULT - ASSESSMENT
Improved hypovolemic hyponatremia   + high output ostomy  Continue the current measures   Potassium supplements noted   Added  KCl to IVF   Labs in am

## 2017-12-22 LAB
ANION GAP SERPL CALC-SCNC: 10 MMOL/L — SIGNIFICANT CHANGE UP (ref 5–17)
BUN SERPL-MCNC: 10 MG/DL — SIGNIFICANT CHANGE UP (ref 8–20)
CALCIUM SERPL-MCNC: 8.4 MG/DL — LOW (ref 8.6–10.2)
CHLORIDE SERPL-SCNC: 99 MMOL/L — SIGNIFICANT CHANGE UP (ref 98–107)
CO2 SERPL-SCNC: 30 MMOL/L — HIGH (ref 22–29)
CREAT SERPL-MCNC: 0.65 MG/DL — SIGNIFICANT CHANGE UP (ref 0.5–1.3)
GLUCOSE SERPL-MCNC: 145 MG/DL — HIGH (ref 70–115)
HCT VFR BLD CALC: 37.1 % — LOW (ref 42–52)
HGB BLD-MCNC: 12.3 G/DL — LOW (ref 14–18)
MCHC RBC-ENTMCNC: 28.7 PG — SIGNIFICANT CHANGE UP (ref 27–31)
MCHC RBC-ENTMCNC: 33.2 G/DL — SIGNIFICANT CHANGE UP (ref 32–36)
MCV RBC AUTO: 86.5 FL — SIGNIFICANT CHANGE UP (ref 80–94)
PLATELET # BLD AUTO: 369 K/UL — SIGNIFICANT CHANGE UP (ref 150–400)
POTASSIUM SERPL-MCNC: 3.6 MMOL/L — SIGNIFICANT CHANGE UP (ref 3.5–5.3)
POTASSIUM SERPL-SCNC: 3.6 MMOL/L — SIGNIFICANT CHANGE UP (ref 3.5–5.3)
RBC # BLD: 4.29 M/UL — LOW (ref 4.6–6.2)
RBC # FLD: 15 % — SIGNIFICANT CHANGE UP (ref 11–15.6)
SODIUM SERPL-SCNC: 139 MMOL/L — SIGNIFICANT CHANGE UP (ref 135–145)
WBC # BLD: 9.8 K/UL — SIGNIFICANT CHANGE UP (ref 4.8–10.8)
WBC # FLD AUTO: 9.8 K/UL — SIGNIFICANT CHANGE UP (ref 4.8–10.8)

## 2017-12-22 PROCEDURE — 99232 SBSQ HOSP IP/OBS MODERATE 35: CPT

## 2017-12-22 PROCEDURE — 99233 SBSQ HOSP IP/OBS HIGH 50: CPT

## 2017-12-22 RX ORDER — OCTREOTIDE ACETATE 200 UG/ML
50 INJECTION, SOLUTION INTRAVENOUS; SUBCUTANEOUS THREE TIMES A DAY
Qty: 0 | Refills: 0 | Status: DISCONTINUED | OUTPATIENT
Start: 2017-12-22 | End: 2017-12-24

## 2017-12-22 RX ORDER — SODIUM CHLORIDE 9 MG/ML
1000 INJECTION, SOLUTION INTRAVENOUS
Qty: 0 | Refills: 0 | Status: DISCONTINUED | OUTPATIENT
Start: 2017-12-22 | End: 2017-12-23

## 2017-12-22 RX ADMIN — PANTOPRAZOLE SODIUM 40 MILLIGRAM(S): 20 TABLET, DELAYED RELEASE ORAL at 05:47

## 2017-12-22 RX ADMIN — LORATADINE 10 MILLIGRAM(S): 10 TABLET ORAL at 13:31

## 2017-12-22 RX ADMIN — DIPHENHYDRAMINE HYDROCHLORIDE AND LIDOCAINE HYDROCHLORIDE AND ALUMINUM HYDROXIDE AND MAGNESIUM HYDRO 10 MILLILITER(S): KIT at 05:55

## 2017-12-22 RX ADMIN — CHOLESTYRAMINE 4 GRAM(S): 4 POWDER, FOR SUSPENSION ORAL at 20:08

## 2017-12-22 RX ADMIN — Medication 1 TABLET(S): at 10:39

## 2017-12-22 RX ADMIN — OCTREOTIDE ACETATE 50 MICROGRAM(S): 200 INJECTION, SOLUTION INTRAVENOUS; SUBCUTANEOUS at 22:02

## 2017-12-22 RX ADMIN — Medication 1 TABLET(S): at 13:29

## 2017-12-22 RX ADMIN — PANTOPRAZOLE SODIUM 40 MILLIGRAM(S): 20 TABLET, DELAYED RELEASE ORAL at 18:00

## 2017-12-22 RX ADMIN — TIOTROPIUM BROMIDE 1 CAPSULE(S): 18 CAPSULE ORAL; RESPIRATORY (INHALATION) at 08:33

## 2017-12-22 RX ADMIN — Medication 1 TABLET(S): at 00:38

## 2017-12-22 RX ADMIN — NYSTATIN CREAM 1 APPLICATION(S): 100000 CREAM TOPICAL at 10:39

## 2017-12-22 RX ADMIN — TAMSULOSIN HYDROCHLORIDE 0.4 MILLIGRAM(S): 0.4 CAPSULE ORAL at 22:02

## 2017-12-22 RX ADMIN — ATORVASTATIN CALCIUM 80 MILLIGRAM(S): 80 TABLET, FILM COATED ORAL at 22:02

## 2017-12-22 RX ADMIN — Medication 81 MILLIGRAM(S): at 13:30

## 2017-12-22 RX ADMIN — Medication 1 TABLET(S): at 17:59

## 2017-12-22 RX ADMIN — DIPHENHYDRAMINE HYDROCHLORIDE AND LIDOCAINE HYDROCHLORIDE AND ALUMINUM HYDROXIDE AND MAGNESIUM HYDRO 10 MILLILITER(S): KIT at 17:59

## 2017-12-22 RX ADMIN — CLOPIDOGREL BISULFATE 75 MILLIGRAM(S): 75 TABLET, FILM COATED ORAL at 13:29

## 2017-12-22 RX ADMIN — FLUTICASONE PROPIONATE AND SALMETEROL 1 DOSE(S): 50; 250 POWDER ORAL; RESPIRATORY (INHALATION) at 22:03

## 2017-12-22 RX ADMIN — CHOLESTYRAMINE 4 GRAM(S): 4 POWDER, FOR SUSPENSION ORAL at 10:38

## 2017-12-22 RX ADMIN — Medication 180 MILLIGRAM(S): at 05:44

## 2017-12-22 RX ADMIN — Medication 0.5 MILLIGRAM(S): at 05:51

## 2017-12-22 RX ADMIN — NYSTATIN CREAM 1 APPLICATION(S): 100000 CREAM TOPICAL at 17:59

## 2017-12-22 RX ADMIN — Medication 1 TABLET(S): at 20:08

## 2017-12-22 RX ADMIN — Medication 50 MICROGRAM(S): at 05:44

## 2017-12-22 NOTE — PROGRESS NOTE ADULT - PROBLEM SELECTOR PLAN 1
high output as one might expect immediately post op-possibly improved now in lomotil. Continue to moniter output and continue lomotil 5 times daily.

## 2017-12-22 NOTE — PROGRESS NOTE ADULT - SUBJECTIVE AND OBJECTIVE BOX
Pt seen and examined f/u high ileal loop output  This morning he notes that the ileal effluent has more form and was just placed on lomotil yesterday which was increased to 5 times daily last evening. 24 hr output still high but just placed on the lomotil. Denies abdominal pain, nausea or vomiting.    REVIEW OF SYSTEMS:    CONSTITUTIONAL: No fever, weight loss, or fatigue  EYES: No eye pain, visual disturbances, or discharge  ENMT:  No difficulty hearing, tinnitus, vertigo; No sinus or throat pain  RESPIRATORY: No cough, wheezing, chills or hemoptysis; No shortness of breath  CARDIOVASCULAR: No chest pain, palpitations, dizziness, or leg swelling  GASTROINTESTINAL: No abdominal or epigastric pain. No nausea, vomiting, or hematemesis; No diarrhea or constipation. No melena or hematochezia.    MEDICATIONS:  MEDICATIONS  (STANDING):  ALBUTerol/ipratropium for Nebulization. 3 milliLiter(s) Nebulizer once  aspirin enteric coated 81 milliGRAM(s) Oral daily  atorvastatin 80 milliGRAM(s) Oral at bedtime  cholestyramine Powder (Sugar-Free) 4 Gram(s) Oral two times a day  clopidogrel Tablet 75 milliGRAM(s) Oral daily  diltiazem    milliGRAM(s) Oral <User Schedule>  diphenoxylate/atropine 1 Tablet(s) Oral five times a day  enoxaparin Injectable 40 milliGRAM(s) SubCutaneous daily  FIRST- Mouthwash  BLM 10 milliLiter(s) Swish and Spit two times a day  fluticasone propionate/ salmeterol 250-50 MICROgram(s) Diskus 1 Dose(s) Inhalation two times a day  levothyroxine 50 MICROGram(s) Oral daily  loratadine 10 milliGRAM(s) Oral daily  multivitamin 1 Tablet(s) Oral daily  nystatin Powder 1 Application(s) Topical two times a day  pantoprazole    Tablet 40 milliGRAM(s) Oral two times a day before meals  sodium chloride 0.9% 1000 milliLiter(s) (83 mL/Hr) IV Continuous <Continuous>  tamsulosin 0.4 milliGRAM(s) Oral at bedtime  tiotropium 18 MICROgram(s) Capsule 1 Capsule(s) Inhalation daily    MEDICATIONS  (PRN):  acetaminophen  Suppository. 650 milliGRAM(s) Rectal every 6 hours PRN Moderate Pain (4 - 6)  acetylcysteine 20% Inhalation 1 milliLiter(s) Inhalation every 4 hours PRN poor cough clearance  ALBUTerol    0.083% 2.5 milliGRAM(s) Nebulizer every 4 hours PRN Shortness of Breath and/or Wheezing  ALPRAZolam 0.5 milliGRAM(s) Oral every 6 hours PRN anxiety  ondansetron Injectable 4 milliGRAM(s) IV Push every 8 hours PRN Nausea and/or Vomiting      Allergies    codeine (Unknown)  penicillin (Unknown)    Intolerances    Symbicort (Short breath)      Vital Signs Last 24 Hrs  T(C): 36.7 (22 Dec 2017 04:32), Max: 36.7 (21 Dec 2017 15:44)  T(F): 98 (22 Dec 2017 04:32), Max: 98 (21 Dec 2017 15:44)  HR: 110 (22 Dec 2017 04:32) (110 - 124)  BP: 103/70 (22 Dec 2017 04:32) (94/68 - 103/70)  BP(mean): --  RR: 17 (22 Dec 2017 04:32) (17 - 18)  SpO2: 97% (22 Dec 2017 04:32) (97% - 97%)    12-21 @ 07:01  -  12-22 @ 07:00  --------------------------------------------------------  IN: 0 mL / OUT: 6165 mL / NET: -6165 mL        PHYSICAL EXAM:    General: Well developed; well nourished; in no acute distress  HEENT: MMM, conjunctiva and sclera clear  Gastrointestinal:Abdomen: Soft non-tender non-distended; Normal bowel sounds; No hepatosplenomegaly, ileostomy in RLQ with empty bag.  Extremities: no cyanosis, clubbing or edema.  Skin: Warm and dry. No obvious rash    LABS:      CBC Full  -  ( 21 Dec 2017 07:20 )  WBC Count : 7.1 K/uL  Hemoglobin : 12.6 g/dL  Hematocrit : 39.2 %  Platelet Count - Automated : 359 K/uL  Mean Cell Volume : 86.0 fl  Mean Cell Hemoglobin : 27.6 pg  Mean Cell Hemoglobin Concentration : 32.1 g/dL  Auto Neutrophil # : x  Auto Lymphocyte # : x  Auto Monocyte # : x  Auto Eosinophil # : x  Auto Basophil # : x  Auto Neutrophil % : x  Auto Lymphocyte % : x  Auto Monocyte % : x  Auto Eosinophil % : x  Auto Basophil % : x    12-22    139  |  99  |  10.0  ----------------------------<  145<H>  3.6   |  30.0<H>  |  0.65    Ca    8.4<L>      22 Dec 2017 07:14  Mg     1.7     12-21    TPro  7.1  /  Alb  2.6<L>  /  TBili  0.4  /  DBili  x   /  AST  15  /  ALT  12  /  AlkPhos  92  12-21                      RADIOLOGY & ADDITIONAL STUDIES (The following images were personally reviewed):

## 2017-12-22 NOTE — PROGRESS NOTE ADULT - SUBJECTIVE AND OBJECTIVE BOX
FRANDY BAUTISTA    [id number]    67y      Male    Patient is a 67y old  Male who presents with a chief complaint of SOB (15 Dec 2017 10:17)      INTERVAL HPI/OVERNIGHT EVENTS:    patient is not having any complains, denies chest pain, sob, dizziness, he has ileostomy output little thicker then before     REVIEW OF SYSTEMS:    CONSTITUTIONAL: No fever, has fatigue  RESPIRATORY: No cough, No shortness of breath  CARDIOVASCULAR: No chest pain, palpitations  GASTROINTESTINAL: No abdominal, No nausea, vomiting  NEUROLOGICAL: No headaches,  loss of strength.  MISCELLANEOUS: No joint swelling or pain       Vital Signs Last 24 Hrs  T(C): 36.7 (22 Dec 2017 09:30), Max: 36.7 (21 Dec 2017 15:44)  T(F): 98.1 (22 Dec 2017 09:30), Max: 98.1 (22 Dec 2017 09:30)  HR: 97 (22 Dec 2017 09:30) (97 - 124)  BP: 105/74 (22 Dec 2017 09:30) (94/68 - 105/74)  RR: 18 (22 Dec 2017 09:30) (17 - 18)  SpO2: 100% (22 Dec 2017 09:30) (97% - 100%)    PHYSICAL EXAM:    GENERAL: Elderly male looking comfortable   HEENT: PERRL, +EOMI  NECK: soft, Supple, No JVD,   CHEST/LUNG: Clear to auscultate bilaterally; No wheezing  HEART: S1S2+, Regular rate and rhythm; No murmurs  ABDOMEN: Soft, Nontender, Bowel sounds present, s/p Ileostomy connected with drain  EXTREMITIES:  1+ Peripheral Pulses, No edema  SKIN: No rashes or lesions  NEURO: AAOX3, no focal deficits, no motor r sensory loss  PSYCH: normal mood      LABS:                        12.3   9.8   )-----------( 369      ( 22 Dec 2017 07:14 )             37.1     12-22    139  |  99  |  10.0  ----------------------------<  145<H>  3.6   |  30.0<H>  |  0.65    Ca    8.4<L>      22 Dec 2017 07:14  Mg     1.7     12-21    TPro  7.1  /  Alb  2.6<L>  /  TBili  0.4  /  DBili  x   /  AST  15  /  ALT  12  /  AlkPhos  92  12-21            I&O's Summary    21 Dec 2017 07:01  -  22 Dec 2017 07:00  --------------------------------------------------------  IN: 0 mL / OUT: 6165 mL / NET: -6165 mL    22 Dec 2017 07:01  -  22 Dec 2017 11:53  --------------------------------------------------------  IN: 0 mL / OUT: 1200 mL / NET: -1200 mL        MEDICATIONS  (STANDING):  ALBUTerol/ipratropium for Nebulization. 3 milliLiter(s) Nebulizer once  aspirin enteric coated 81 milliGRAM(s) Oral daily  atorvastatin 80 milliGRAM(s) Oral at bedtime  cholestyramine Powder (Sugar-Free) 4 Gram(s) Oral two times a day  clopidogrel Tablet 75 milliGRAM(s) Oral daily  diltiazem    milliGRAM(s) Oral <User Schedule>  diphenoxylate/atropine 1 Tablet(s) Oral five times a day  enoxaparin Injectable 40 milliGRAM(s) SubCutaneous daily  FIRST- Mouthwash  BLM 10 milliLiter(s) Swish and Spit two times a day  fluticasone propionate/ salmeterol 250-50 MICROgram(s) Diskus 1 Dose(s) Inhalation two times a day  levothyroxine 50 MICROGram(s) Oral daily  loratadine 10 milliGRAM(s) Oral daily  multivitamin 1 Tablet(s) Oral daily  nystatin Powder 1 Application(s) Topical two times a day  pantoprazole    Tablet 40 milliGRAM(s) Oral two times a day before meals  sodium chloride 0.9% 1000 milliLiter(s) (150 mL/Hr) IV Continuous <Continuous>  tamsulosin 0.4 milliGRAM(s) Oral at bedtime  tiotropium 18 MICROgram(s) Capsule 1 Capsule(s) Inhalation daily    MEDICATIONS  (PRN):  acetaminophen  Suppository. 650 milliGRAM(s) Rectal every 6 hours PRN Moderate Pain (4 - 6)  acetylcysteine 20% Inhalation 1 milliLiter(s) Inhalation every 4 hours PRN poor cough clearance  ALBUTerol    0.083% 2.5 milliGRAM(s) Nebulizer every 4 hours PRN Shortness of Breath and/or Wheezing  ALPRAZolam 0.5 milliGRAM(s) Oral every 6 hours PRN anxiety  ondansetron Injectable 4 milliGRAM(s) IV Push every 8 hours PRN Nausea and/or Vomiting

## 2017-12-22 NOTE — PROGRESS NOTE ADULT - ASSESSMENT
67 year old male with PMH of COPD stage 4 s/p right lung reduction in 2010 (on home oxygen 4L (sat 92-93%), HTN, CVA on 2014, DM, hypothyroidism, hydrocele, and nephrolithiasis who is admitted for a COPD exacerbation. Patient has been weaned off ventimask to 4 liters nasal cannula and respiratory status is stable. CTA of the chest was negative for PE but showed bilateral lower lobe infiltrates with mucous plugging for which he was started on Levaquin.  Respiratory status stable and steroids are being tapered. Hospital course complicated by constipation, which had not improved despite aggressive bowel regimen, laxatives and enemas. XRAY with large stool burden. CT with diaphragmatic hernia and patient was consequently taken to the OR for loop ileostomy under local anesthesia. Pt cont, to have copious amount in ileostomy. Pt is noted to have hyponatremia due to high output in ileostomy, started on IVF, along with potassium supplementation.    Continues to have high outptu (>5000ml)    Plan:    1. Constipation s/p loop ileostomy on 12/7 under local anesthesia  with large volume output from the Ileostomy, immodium did not helped him, GI on board, they changed to lomotil, ileostomy secretion are getting thicker, he is tolerating diet,  ostomy care as per ostomy RN,  stopped miralax and senna, will increase fluids to 150ml/h to keep up with out put, will monitor BMP and out put, he still feeling weak, will continue with cholestyramine, will get PT involved and encouraged for oral hydration.     2. Acute on chronic hypoxic respiratory failure secondary to COPD exacerbation - now resolved, on home oxygen (4 liters)  - bicarb elevated likely due to metabolic compensation for resp acidosis although patient refuses blood gases    - continue bronchodilators and prednisone tapered off.   - continue Mucomyst PRN  - completed course of Levaquin     3. CAP - completed course of Levaquin on 12/5     4. Hypertension - stable   - continue cardizem    5. Hyperlipidemia   - continue statin    6.hyponatremia due to high ileostomy output, better now, c/w NS, renal consult appreciated, defer further management as per renal   Hypokalemia - replaced      7. Scrotal edema chronic with urinary retention - now retaining again - Roy if not able to urinate  - patient following with  as outpatient  - cont scrotal elevation    8. Right femoral artery dissection  - incidental finding on CT, no intervention as per vascular    9. DVT Prophylaxis - Lovenox 67 year old male with PMH of COPD stage 4 s/p right lung reduction in 2010 (on home oxygen 4L (sat 92-93%), HTN, CVA on 2014, DM, hypothyroidism, hydrocele, and nephrolithiasis who is admitted for a COPD exacerbation. Patient has been weaned off ventimask to 4 liters nasal cannula and respiratory status is stable. CTA of the chest was negative for PE but showed bilateral lower lobe infiltrates with mucous plugging for which he was started on Levaquin.  Respiratory status stable and steroids are being tapered. Hospital course complicated by constipation, which had not improved despite aggressive bowel regimen, laxatives and enemas. XRAY with large stool burden. CT with diaphragmatic hernia and patient was consequently taken to the OR for loop ileostomy under local anesthesia. Pt cont, to have copious amount in ileostomy. Pt is noted to have hyponatremia due to high output in ileostomy, started on IVF, along with potassium supplementation.    Continues to have high outptu (>5000ml)    Plan:    1. Constipation s/p loop ileostomy on 12/7 under local anesthesia  with large volume output from the Ileostomy, immodium did not helped him, GI on board, they changed to lomotil, ileostomy secretion are getting thicker, he is tolerating diet,  ostomy care as per ostomy RN,  stopped miralax and senna, will increase fluids to 150ml/h to keep up with out put, will monitor BMP and out put, he still feeling weak, will continue with cholestyramine, will get PT involved and encouraged for oral hydration, wife came and talk with me as he has lots of Ileostomy out put, she was told that he needs octreotide, she went to talk to him and convinced him to take that, will start him on Octreotide 50mcgs three times a day, will continue to monitor I/Os.      2. Acute on chronic hypoxic respiratory failure secondary to COPD exacerbation, now resolved, on home oxygen (4 liters)  - bicarb elevated likely due to metabolic compensation for resp acidosis although patient refuses blood gases    - continue bronchodilators and prednisone tapered off.   - continue Mucomyst PRN  - completed course of Levaquin     3. CAP - completed course of Levaquin on 12/5     4. Hypertension - stable   - continue cardizem    5. Hyperlipidemia   - continue statin    6.hyponatremia due to high ileostomy output, better now, c/w NS, renal consult appreciated, defer further management as per renal   Hypokalemia - replaced      7. Scrotal edema chronic with urinary retention - now retaining again - Roy if not able to urinate  - patient following with  as outpatient  - cont scrotal elevation    8. Right femoral artery dissection  - incidental finding on CT, no intervention as per vascular    9. DVT Prophylaxis - Lovenox

## 2017-12-23 LAB
ANION GAP SERPL CALC-SCNC: 12 MMOL/L — SIGNIFICANT CHANGE UP (ref 5–17)
ANION GAP SERPL CALC-SCNC: 9 MMOL/L — SIGNIFICANT CHANGE UP (ref 5–17)
BUN SERPL-MCNC: 10 MG/DL — SIGNIFICANT CHANGE UP (ref 8–20)
BUN SERPL-MCNC: 8 MG/DL — SIGNIFICANT CHANGE UP (ref 8–20)
CALCIUM SERPL-MCNC: 7.9 MG/DL — LOW (ref 8.6–10.2)
CALCIUM SERPL-MCNC: 8.1 MG/DL — LOW (ref 8.6–10.2)
CHLORIDE SERPL-SCNC: 102 MMOL/L — SIGNIFICANT CHANGE UP (ref 98–107)
CHLORIDE SERPL-SCNC: 103 MMOL/L — SIGNIFICANT CHANGE UP (ref 98–107)
CO2 SERPL-SCNC: 24 MMOL/L — SIGNIFICANT CHANGE UP (ref 22–29)
CO2 SERPL-SCNC: 28 MMOL/L — SIGNIFICANT CHANGE UP (ref 22–29)
CREAT SERPL-MCNC: 0.66 MG/DL — SIGNIFICANT CHANGE UP (ref 0.5–1.3)
CREAT SERPL-MCNC: 0.66 MG/DL — SIGNIFICANT CHANGE UP (ref 0.5–1.3)
GLUCOSE BLDC GLUCOMTR-MCNC: 127 MG/DL — HIGH (ref 70–99)
GLUCOSE BLDC GLUCOMTR-MCNC: 132 MG/DL — HIGH (ref 70–99)
GLUCOSE BLDC GLUCOMTR-MCNC: 142 MG/DL — HIGH (ref 70–99)
GLUCOSE BLDC GLUCOMTR-MCNC: 147 MG/DL — HIGH (ref 70–99)
GLUCOSE BLDC GLUCOMTR-MCNC: 150 MG/DL — HIGH (ref 70–99)
GLUCOSE SERPL-MCNC: 142 MG/DL — HIGH (ref 70–115)
GLUCOSE SERPL-MCNC: 161 MG/DL — HIGH (ref 70–115)
MAGNESIUM SERPL-MCNC: 1.5 MG/DL — LOW (ref 1.8–2.6)
PHOSPHATE SERPL-MCNC: 2.3 MG/DL — LOW (ref 2.4–4.7)
POTASSIUM SERPL-MCNC: 5 MMOL/L — SIGNIFICANT CHANGE UP (ref 3.5–5.3)
POTASSIUM SERPL-MCNC: 5 MMOL/L — SIGNIFICANT CHANGE UP (ref 3.5–5.3)
POTASSIUM SERPL-SCNC: 5 MMOL/L — SIGNIFICANT CHANGE UP (ref 3.5–5.3)
POTASSIUM SERPL-SCNC: 5 MMOL/L — SIGNIFICANT CHANGE UP (ref 3.5–5.3)
SODIUM SERPL-SCNC: 139 MMOL/L — SIGNIFICANT CHANGE UP (ref 135–145)
SODIUM SERPL-SCNC: 139 MMOL/L — SIGNIFICANT CHANGE UP (ref 135–145)

## 2017-12-23 PROCEDURE — 99232 SBSQ HOSP IP/OBS MODERATE 35: CPT

## 2017-12-23 PROCEDURE — 99233 SBSQ HOSP IP/OBS HIGH 50: CPT

## 2017-12-23 PROCEDURE — 93010 ELECTROCARDIOGRAM REPORT: CPT

## 2017-12-23 RX ORDER — SODIUM,POTASSIUM PHOSPHATES 278-250MG
1 POWDER IN PACKET (EA) ORAL
Qty: 0 | Refills: 0 | Status: COMPLETED | OUTPATIENT
Start: 2017-12-23 | End: 2017-12-26

## 2017-12-23 RX ORDER — MAGNESIUM SULFATE 500 MG/ML
1 VIAL (ML) INJECTION ONCE
Qty: 0 | Refills: 0 | Status: DISCONTINUED | OUTPATIENT
Start: 2017-12-23 | End: 2017-12-23

## 2017-12-23 RX ORDER — MAGNESIUM SULFATE 500 MG/ML
2 VIAL (ML) INJECTION ONCE
Qty: 0 | Refills: 0 | Status: COMPLETED | OUTPATIENT
Start: 2017-12-23 | End: 2017-12-23

## 2017-12-23 RX ORDER — SODIUM,POTASSIUM PHOSPHATES 278-250MG
1 POWDER IN PACKET (EA) ORAL THREE TIMES A DAY
Qty: 0 | Refills: 0 | Status: DISCONTINUED | OUTPATIENT
Start: 2017-12-23 | End: 2017-12-23

## 2017-12-23 RX ORDER — SODIUM,POTASSIUM PHOSPHATES 278-250MG
1 POWDER IN PACKET (EA) ORAL
Qty: 0 | Refills: 0 | Status: DISCONTINUED | OUTPATIENT
Start: 2017-12-23 | End: 2017-12-23

## 2017-12-23 RX ORDER — ALPRAZOLAM 0.25 MG
0.5 TABLET ORAL ONCE
Qty: 0 | Refills: 0 | Status: DISCONTINUED | OUTPATIENT
Start: 2017-12-23 | End: 2017-12-23

## 2017-12-23 RX ORDER — SODIUM CHLORIDE 9 MG/ML
1000 INJECTION INTRAMUSCULAR; INTRAVENOUS; SUBCUTANEOUS
Qty: 0 | Refills: 0 | Status: DISCONTINUED | OUTPATIENT
Start: 2017-12-23 | End: 2017-12-24

## 2017-12-23 RX ADMIN — Medication 1 TABLET(S): at 08:35

## 2017-12-23 RX ADMIN — PANTOPRAZOLE SODIUM 40 MILLIGRAM(S): 20 TABLET, DELAYED RELEASE ORAL at 18:16

## 2017-12-23 RX ADMIN — Medication 1 TABLET(S): at 21:44

## 2017-12-23 RX ADMIN — SODIUM CHLORIDE 125 MILLILITER(S): 9 INJECTION INTRAMUSCULAR; INTRAVENOUS; SUBCUTANEOUS at 09:14

## 2017-12-23 RX ADMIN — ATORVASTATIN CALCIUM 80 MILLIGRAM(S): 80 TABLET, FILM COATED ORAL at 21:44

## 2017-12-23 RX ADMIN — CLOPIDOGREL BISULFATE 75 MILLIGRAM(S): 75 TABLET, FILM COATED ORAL at 11:54

## 2017-12-23 RX ADMIN — OCTREOTIDE ACETATE 50 MICROGRAM(S): 200 INJECTION, SOLUTION INTRAVENOUS; SUBCUTANEOUS at 07:48

## 2017-12-23 RX ADMIN — Medication 0.5 MILLIGRAM(S): at 19:59

## 2017-12-23 RX ADMIN — NYSTATIN CREAM 1 APPLICATION(S): 100000 CREAM TOPICAL at 18:16

## 2017-12-23 RX ADMIN — DIPHENHYDRAMINE HYDROCHLORIDE AND LIDOCAINE HYDROCHLORIDE AND ALUMINUM HYDROXIDE AND MAGNESIUM HYDRO 10 MILLILITER(S): KIT at 07:05

## 2017-12-23 RX ADMIN — Medication 1 TABLET(S): at 11:53

## 2017-12-23 RX ADMIN — NYSTATIN CREAM 1 APPLICATION(S): 100000 CREAM TOPICAL at 07:06

## 2017-12-23 RX ADMIN — Medication 81 MILLIGRAM(S): at 11:53

## 2017-12-23 RX ADMIN — LORATADINE 10 MILLIGRAM(S): 10 TABLET ORAL at 11:53

## 2017-12-23 RX ADMIN — CHOLESTYRAMINE 4 GRAM(S): 4 POWDER, FOR SUSPENSION ORAL at 08:35

## 2017-12-23 RX ADMIN — DIPHENHYDRAMINE HYDROCHLORIDE AND LIDOCAINE HYDROCHLORIDE AND ALUMINUM HYDROXIDE AND MAGNESIUM HYDRO 10 MILLILITER(S): KIT at 18:16

## 2017-12-23 RX ADMIN — SODIUM CHLORIDE 150 MILLILITER(S): 9 INJECTION, SOLUTION INTRAVENOUS at 00:52

## 2017-12-23 RX ADMIN — TIOTROPIUM BROMIDE 1 CAPSULE(S): 18 CAPSULE ORAL; RESPIRATORY (INHALATION) at 08:31

## 2017-12-23 RX ADMIN — CHOLESTYRAMINE 4 GRAM(S): 4 POWDER, FOR SUSPENSION ORAL at 23:41

## 2017-12-23 RX ADMIN — Medication 1 TABLET(S): at 18:16

## 2017-12-23 RX ADMIN — Medication 50 MICROGRAM(S): at 07:04

## 2017-12-23 RX ADMIN — TAMSULOSIN HYDROCHLORIDE 0.4 MILLIGRAM(S): 0.4 CAPSULE ORAL at 21:44

## 2017-12-23 RX ADMIN — Medication 1 TABLET(S): at 00:52

## 2017-12-23 RX ADMIN — PANTOPRAZOLE SODIUM 40 MILLIGRAM(S): 20 TABLET, DELAYED RELEASE ORAL at 07:04

## 2017-12-23 NOTE — PROGRESS NOTE ADULT - ASSESSMENT
67 year old male with PMH of COPD stage 4 s/p right lung reduction in 2010 (on home oxygen 4L (sat 92-93%), HTN, CVA on 2014, DM, hypothyroidism, hydrocele, and nephrolithiasis who is admitted for a COPD exacerbation. Patient has been weaned off ventimask to 4 liters nasal cannula and respiratory status is stable. CTA of the chest was negative for PE but showed bilateral lower lobe infiltrates with mucous plugging for which he was started on Levaquin.  Respiratory status stable and steroids are being tapered. Hospital course complicated by constipation, which had not improved despite aggressive bowel regimen, laxatives and enemas. XRAY with large stool burden. CT with diaphragmatic hernia and patient was consequently taken to the OR for loop ileostomy under local anesthesia. Pt cont, to have copious amount in ileostomy. Pt is noted to have hyponatremia due to high output in ileostomy, started on IVF, along with potassium supplementation.    Continues to have high outptu (>5000ml)    Plan:    1. Constipation s/p loop ileostomy on 12/7 under local anesthesia  with large volume output from the Ileostomy, immodium did not helped him, GI on board, they changed to lomotil, ileostomy secretion are getting thicker, he is tolerating diet,  ostomy care as per ostomy RN,  stopped miralax and senna, will increase fluids to 150ml/h to keep up with out put, will monitor BMP and out put, he still feeling weak, will continue with cholestyramine, will get PT involved and encouraged for oral hydration, wife came and talk with me as he has lots of Ileostomy out put, she was told that he needs octreotide, she went to talk to him and convinced him to take that, will start him on Octreotide 50mcgs three times a day, will continue to monitor I/Os.      2. Acute on chronic hypoxic respiratory failure secondary to COPD exacerbation, now resolved, on home oxygen (4 liters)  - bicarb elevated likely due to metabolic compensation for resp acidosis although patient refuses blood gases    - continue bronchodilators and prednisone tapered off.   - continue Mucomyst PRN  - completed course of Levaquin     3. CAP - completed course of Levaquin on 12/5     4. Hypertension - stable   - continue cardizem    5. Hyperlipidemia   - continue statin    6.hyponatremia due to high ileostomy output, better now, c/w NS, renal consult appreciated, defer further management as per renal   Hypokalemia - replaced      7. Scrotal edema chronic with urinary retention - now retaining again - Roy if not able to urinate  - patient following with  as outpatient  - cont scrotal elevation    8. Right femoral artery dissection  - incidental finding on CT, no intervention as per vascular    9. DVT Prophylaxis - Lovenox 67 year old male with PMH of COPD stage 4 s/p right lung reduction in 2010 (on home oxygen 4L (sat 92-93%), HTN, CVA on 2014, DM, hypothyroidism, hydrocele, and nephrolithiasis who is admitted for a COPD exacerbation. Patient has been weaned off ventimask to 4 liters nasal cannula and respiratory status is stable. CTA of the chest was negative for PE but showed bilateral lower lobe infiltrates with mucous plugging for which he was started on Levaquin.  Respiratory status stable and steroids are being tapered. Hospital course complicated by constipation, which had not improved despite aggressive bowel regimen, laxatives and enemas. XRAY with large stool burden. CT with diaphragmatic hernia and patient was consequently taken to the OR for loop ileostomy under local anesthesia. Pt cont, to have copious amount in ileostomy. Pt is noted to have hyponatremia due to high output in ileostomy, started on IVF, along with potassium supplementation.    Continues to have high outptu (>5000ml)    Plan:    1. Constipation s/p loop ileostomy on 12/7 under local anesthesia  with large volume output from the Ileostomy, immodium did not helped him, GI on board, they changed to lomotil, ileostomy secretion are getting thicker, he is tolerating diet,  ostomy care as per ostomy RN,  stopped miralax and senna, will increase fluids to 150ml/h to keep up with out put, will monitor BMP and out put, he still feeling weak, will continue with cholestyramine, will get PT involved and encouraged for oral hydration, has been started him on Octreotide 50mcgs three times a day, Ileostomy out is less now, he put out total of 3L since yesterday, will continue to monitor I/Os, incidentally he got 500mcgs due to med errors, FS and cardiac monitoring done and poison controlled was consulted, patient remained vitally stable.     2. Acute on chronic hypoxic respiratory failure secondary to COPD exacerbation, now resolved, on home oxygen (4 liters)  - bicarb elevated likely due to metabolic compensation for resp acidosis although patient refuses blood gases    - continue bronchodilators and prednisone tapered off.   - continue Mucomyst PRN  - completed course of Levaquin     3. CAP - completed course of Levaquin on 12/5     4. Hypertension - stable   - continue cardizem    5. Hyperlipidemia   - continue statin    6.hyponatremia due to high ileostomy output, better now, c/w NS, renal consult appreciated, defer further management as per renal   Hypokalemia - replaced      7. Scrotal edema chronic with urinary retention - now retaining again - Roy if not able to urinate  - patient following with  as outpatient  - cont scrotal elevation    8. Right femoral artery dissection  - incidental finding on CT, no intervention as per vascular    9. DVT Prophylaxis - Lovenox

## 2017-12-23 NOTE — PROGRESS NOTE ADULT - SUBJECTIVE AND OBJECTIVE BOX
FRANDY BAUTISTA    [id number]    67y      Male    Patient is a 67y old  Male who presents with a chief complaint of SOB (15 Dec 2017 10:17)      INTERVAL HPI/OVERNIGHT EVENTS:    patient is not having any complains, denies chest pain, sob, dizziness, he has ileostomy output little thicker then before     REVIEW OF SYSTEMS:    CONSTITUTIONAL: No fever, has fatigue  RESPIRATORY: No cough, No shortness of breath  CARDIOVASCULAR: No chest pain, palpitations  GASTROINTESTINAL: No abdominal, No nausea, vomiting  NEUROLOGICAL: No headaches,  loss of strength.  MISCELLANEOUS: No joint swelling or pain        Vital Signs Last 24 Hrs  T(C): 36.7 (23 Dec 2017 10:45), Max: 37.3 (22 Dec 2017 16:35)  T(F): 98 (23 Dec 2017 10:45), Max: 99.1 (22 Dec 2017 16:35)  HR: 94 (23 Dec 2017 10:45) (78 - 109)  BP: 96/60 (23 Dec 2017 10:45) (88/64 - 104/68)  BP(mean): --  RR: 18 (23 Dec 2017 10:45) (16 - 18)  SpO2: 96% (23 Dec 2017 10:45) (94% - 98%)    PHYSICAL EXAM:    GENERAL: Elderly male looking comfortable   HEENT: PERRL, +EOMI  NECK: soft, Supple, No JVD,   CHEST/LUNG: Clear to auscultate bilaterally; No wheezing  HEART: S1S2+, Regular rate and rhythm; No murmurs  ABDOMEN: Soft, Nontender, Bowel sounds present, s/p Ileostomy connected with drain  EXTREMITIES:  1+ Peripheral Pulses, No edema  SKIN: No rashes or lesions  NEURO: AAOX3, no focal deficits, no motor r sensory loss  PSYCH: normal mood      LABS:                        12.3   9.8   )-----------( 369      ( 22 Dec 2017 07:14 )             37.1     12-23    139  |  102  |  10.0  ----------------------------<  142<H>  5.0   |  28.0  |  0.66    Ca    7.9<L>      23 Dec 2017 06:27  Phos  2.3     12-23  Mg     1.5     12-23              I&O's Summary    22 Dec 2017 07:01  -  23 Dec 2017 07:00  --------------------------------------------------------  IN: 500 mL / OUT: 3200 mL / NET: -2700 mL        MEDICATIONS  (STANDING):  ALBUTerol/ipratropium for Nebulization. 3 milliLiter(s) Nebulizer once  aspirin enteric coated 81 milliGRAM(s) Oral daily  atorvastatin 80 milliGRAM(s) Oral at bedtime  cholestyramine Powder (Sugar-Free) 4 Gram(s) Oral two times a day  clopidogrel Tablet 75 milliGRAM(s) Oral daily  diltiazem    milliGRAM(s) Oral <User Schedule>  diphenoxylate/atropine 1 Tablet(s) Oral five times a day  enoxaparin Injectable 40 milliGRAM(s) SubCutaneous daily  FIRST- Mouthwash  BLM 10 milliLiter(s) Swish and Spit two times a day  fluticasone propionate/ salmeterol 250-50 MICROgram(s) Diskus 1 Dose(s) Inhalation two times a day  levothyroxine 50 MICROGram(s) Oral daily  loratadine 10 milliGRAM(s) Oral daily  multivitamin 1 Tablet(s) Oral daily  nystatin Powder 1 Application(s) Topical two times a day  octreotide  Injectable 50 MICROGram(s) SubCutaneous three times a day  pantoprazole    Tablet 40 milliGRAM(s) Oral two times a day before meals  potassium acid phosphate/sodium acid phosphate tablet (K-PHOS No. 2) 1 Tablet(s) Oral three times a day with meals  sodium chloride 0.9%. 1000 milliLiter(s) (125 mL/Hr) IV Continuous <Continuous>  tamsulosin 0.4 milliGRAM(s) Oral at bedtime  tiotropium 18 MICROgram(s) Capsule 1 Capsule(s) Inhalation daily    MEDICATIONS  (PRN):  acetaminophen  Suppository. 650 milliGRAM(s) Rectal every 6 hours PRN Moderate Pain (4 - 6)  acetylcysteine 20% Inhalation 1 milliLiter(s) Inhalation every 4 hours PRN poor cough clearance  ALBUTerol    0.083% 2.5 milliGRAM(s) Nebulizer every 4 hours PRN Shortness of Breath and/or Wheezing  ondansetron Injectable 4 milliGRAM(s) IV Push every 8 hours PRN Nausea and/or Vomiting FRANDY BAUTISTA    [id number]    67y      Male    Patient is a 67y old  Male who presents with a chief complaint of SOB (15 Dec 2017 10:17)      INTERVAL HPI/OVERNIGHT EVENTS:    patient is not having any complains, his Ileostomy secretions are getting better after octreotide therapy, his out put is 3L from the Ileostomy, denies chest pain, sob, dizziness, he has ileostomy output little thicker then before     REVIEW OF SYSTEMS:    CONSTITUTIONAL: No fever, has fatigue  RESPIRATORY: No cough, No shortness of breath  CARDIOVASCULAR: No chest pain, palpitations  GASTROINTESTINAL: No abdominal, No nausea, vomiting  NEUROLOGICAL: No headaches,  loss of strength.  MISCELLANEOUS: No joint swelling or pain        Vital Signs Last 24 Hrs  T(C): 36.7 (23 Dec 2017 10:45), Max: 37.3 (22 Dec 2017 16:35)  T(F): 98 (23 Dec 2017 10:45), Max: 99.1 (22 Dec 2017 16:35)  HR: 94 (23 Dec 2017 10:45) (78 - 109)  BP: 96/60 (23 Dec 2017 10:45) (88/64 - 104/68)  RR: 18 (23 Dec 2017 10:45) (16 - 18)  SpO2: 96% (23 Dec 2017 10:45) (94% - 98%)    PHYSICAL EXAM:    GENERAL: Elderly male looking comfortable   HEENT: PERRL, +EOMI  NECK: soft, Supple, No JVD,   CHEST/LUNG: Clear to auscultate bilaterally; No wheezing  HEART: S1S2+, Regular rate and rhythm; No murmurs  ABDOMEN: Soft, Nontender, Bowel sounds present, s/p Ileostomy connected with drain  EXTREMITIES:  1+ Peripheral Pulses, No edema  SKIN: No rashes or lesions  NEURO: AAOX3, no focal deficits, no motor r sensory loss  PSYCH: normal mood      LABS:                        12.3   9.8   )-----------( 369      ( 22 Dec 2017 07:14 )             37.1     12-23    139  |  102  |  10.0  ----------------------------<  142<H>  5.0   |  28.0  |  0.66    Ca    7.9<L>      23 Dec 2017 06:27  Phos  2.3     12-23  Mg     1.5     12-23              I&O's Summary    22 Dec 2017 07:01  -  23 Dec 2017 07:00  --------------------------------------------------------  IN: 500 mL / OUT: 3200 mL / NET: -2700 mL        MEDICATIONS  (STANDING):  ALBUTerol/ipratropium for Nebulization. 3 milliLiter(s) Nebulizer once  aspirin enteric coated 81 milliGRAM(s) Oral daily  atorvastatin 80 milliGRAM(s) Oral at bedtime  cholestyramine Powder (Sugar-Free) 4 Gram(s) Oral two times a day  clopidogrel Tablet 75 milliGRAM(s) Oral daily  diltiazem    milliGRAM(s) Oral <User Schedule>  diphenoxylate/atropine 1 Tablet(s) Oral five times a day  enoxaparin Injectable 40 milliGRAM(s) SubCutaneous daily  FIRST- Mouthwash  BLM 10 milliLiter(s) Swish and Spit two times a day  fluticasone propionate/ salmeterol 250-50 MICROgram(s) Diskus 1 Dose(s) Inhalation two times a day  levothyroxine 50 MICROGram(s) Oral daily  loratadine 10 milliGRAM(s) Oral daily  multivitamin 1 Tablet(s) Oral daily  nystatin Powder 1 Application(s) Topical two times a day  octreotide  Injectable 50 MICROGram(s) SubCutaneous three times a day  pantoprazole    Tablet 40 milliGRAM(s) Oral two times a day before meals  potassium acid phosphate/sodium acid phosphate tablet (K-PHOS No. 2) 1 Tablet(s) Oral three times a day with meals  sodium chloride 0.9%. 1000 milliLiter(s) (125 mL/Hr) IV Continuous <Continuous>  tamsulosin 0.4 milliGRAM(s) Oral at bedtime  tiotropium 18 MICROgram(s) Capsule 1 Capsule(s) Inhalation daily    MEDICATIONS  (PRN):  acetaminophen  Suppository. 650 milliGRAM(s) Rectal every 6 hours PRN Moderate Pain (4 - 6)  acetylcysteine 20% Inhalation 1 milliLiter(s) Inhalation every 4 hours PRN poor cough clearance  ALBUTerol    0.083% 2.5 milliGRAM(s) Nebulizer every 4 hours PRN Shortness of Breath and/or Wheezing  ondansetron Injectable 4 milliGRAM(s) IV Push every 8 hours PRN Nausea and/or Vomiting

## 2017-12-23 NOTE — PROGRESS NOTE ADULT - SUBJECTIVE AND OBJECTIVE BOX
INTERVAL HPI/OVERNIGHT EVENTS:Fu for increased ileostomy output. No other complaints. Appears mildly thicker. On cholestyramine, lomotil and octreotide.       MEDICATIONS  (STANDING):  ALBUTerol/ipratropium for Nebulization. 3 milliLiter(s) Nebulizer once  aspirin enteric coated 81 milliGRAM(s) Oral daily  atorvastatin 80 milliGRAM(s) Oral at bedtime  cholestyramine Powder (Sugar-Free) 4 Gram(s) Oral two times a day  clopidogrel Tablet 75 milliGRAM(s) Oral daily  diltiazem    milliGRAM(s) Oral <User Schedule>  diphenoxylate/atropine 1 Tablet(s) Oral five times a day  enoxaparin Injectable 40 milliGRAM(s) SubCutaneous daily  FIRST- Mouthwash  BLM 10 milliLiter(s) Swish and Spit two times a day  fluticasone propionate/ salmeterol 250-50 MICROgram(s) Diskus 1 Dose(s) Inhalation two times a day  levothyroxine 50 MICROGram(s) Oral daily  loratadine 10 milliGRAM(s) Oral daily  multivitamin 1 Tablet(s) Oral daily  nystatin Powder 1 Application(s) Topical two times a day  octreotide  Injectable 50 MICROGram(s) SubCutaneous three times a day  pantoprazole    Tablet 40 milliGRAM(s) Oral two times a day before meals  potassium acid phosphate/sodium acid phosphate tablet (K-PHOS No. 2) 1 Tablet(s) Oral three times a day with meals  sodium chloride 0.9%. 1000 milliLiter(s) (125 mL/Hr) IV Continuous <Continuous>  tamsulosin 0.4 milliGRAM(s) Oral at bedtime  tiotropium 18 MICROgram(s) Capsule 1 Capsule(s) Inhalation daily    MEDICATIONS  (PRN):  acetaminophen  Suppository. 650 milliGRAM(s) Rectal every 6 hours PRN Moderate Pain (4 - 6)  acetylcysteine 20% Inhalation 1 milliLiter(s) Inhalation every 4 hours PRN poor cough clearance  ALBUTerol    0.083% 2.5 milliGRAM(s) Nebulizer every 4 hours PRN Shortness of Breath and/or Wheezing  ondansetron Injectable 4 milliGRAM(s) IV Push every 8 hours PRN Nausea and/or Vomiting      Allergies    codeine (Unknown)  penicillin (Unknown)    Intolerances    Symbicort (Short breath)      Vital Signs Last 24 Hrs  T(C): 36.7 (23 Dec 2017 10:45), Max: 37.3 (22 Dec 2017 16:35)  T(F): 98 (23 Dec 2017 10:45), Max: 99.1 (22 Dec 2017 16:35)  HR: 94 (23 Dec 2017 10:45) (78 - 109)  BP: 96/60 (23 Dec 2017 10:45) (88/64 - 104/68)  BP(mean): --  RR: 18 (23 Dec 2017 10:45) (16 - 18)  SpO2: 96% (23 Dec 2017 10:45) (94% - 98%)    LABS:                        12.3   9.8   )-----------( 369      ( 22 Dec 2017 07:14 )             37.1     12-23    139  |  102  |  10.0  ----------------------------<  142<H>  5.0   |  28.0  |  0.66    Ca    7.9<L>      23 Dec 2017 06:27  Phos  2.3     12-23  Mg     1.5     12-23            RADIOLOGY & ADDITIONAL TESTS:

## 2017-12-23 NOTE — PROGRESS NOTE ADULT - ASSESSMENT
Patient with COPD, colonic obstruction, loop ileostomy due to colonic obstruction and high ileostomy output.    1. Assess response to octreotide, lomotil  2. PPI bid  3. Continue cholestyramine

## 2017-12-23 NOTE — PROGRESS NOTE ADULT - SUBJECTIVE AND OBJECTIVE BOX
Patient seen and examined    Feels fine; OOB/ch  no c/o CP SOB NV   No swelling feet    Vital Signs Last 24 Hrs  T(C): 36.3 (23 Dec 2017 15:35), Max: 36.7 (23 Dec 2017 06:29)  T(F): 97.4 (23 Dec 2017 15:35), Max: 98.1 (23 Dec 2017 09:18)  HR: 94 (23 Dec 2017 18:18) (90 - 109)  BP: 98/62 (23 Dec 2017 18:18) (88/64 - 118/62)  BP(mean): --  RR: 18 (23 Dec 2017 15:35) (16 - 18)  SpO2: 97% (23 Dec 2017 15:35) (94% - 97%)    PHYSICAL EXAM    GENERAL: NAD,   EYES:  conjunctiva and sclera clear  NECK: Supple, No JVD/Bruit  NERVOUS SYSTEM:  A/O x3,   CHEST:  CTA ,No rales or rhonchi  HEART:  RRR, No murmurs  ABDOMEN: Soft, NT/ND BS+  EXTREMITIES:  No Edema;  SKIN: No rashes    23 Dec 2017 15:53    139    |  103    |  8.0    ----------------------------<  161    5.0     |  24.0   |  0.66     Ca    8.1        23 Dec 2017 15:53  Phos  2.3       23 Dec 2017 06:27  Mg     1.5       23 Dec 2017 06:27                            12.3   9.8   )-----------( 369      ( 22 Dec 2017 07:14 )             37.1       Na improved- 139 for 2 days  Continue same treatment  Shall sign off  Please call as needed  Thanks

## 2017-12-23 NOTE — CHART NOTE - NSCHARTNOTEFT_GEN_A_CORE
CC: medication error, patient received 500mcg of octreotide subcutaneous @ 6:00 on 12/23/17. The order read octreotide 50mcg subcutaneous TID    HPI:  67 YOM w/PMH of COPD stage 4 s/p right lung reduction in 2010, currently on home oxygen 4L (sat 92-93%), HTN, CVA on 2014, DM, hypothyroidism, hydrocele, kidney stones initially presented with COPD exacerbation, however hospital course complicated by severe obstructing constipation leading to ileostomy procedure. Patient had high output of his ileostomy and is currently on octreotide, cholestyramine, and lomitil to decrease output which seems to be working for the patient. The patient was notified of the medication error and expresses understanding. He asked is he is going to be OK. Patient denies nausea, vomiting, CP, SOB, change in illeostomy output, palpitations, lightheadedness, dizziness, syncope, fevers, chills or abdominal pain.     PHYSICAL EXAM:  Vital Signs Last 24 Hrs  T(C): 36.5 (23 Dec 2017 07:09), Max: 37.3 (22 Dec 2017 16:35)  T(F): 97.7 (23 Dec 2017 07:09), Max: 99.1 (22 Dec 2017 16:35)  HR: 109 (23 Dec 2017 07:09) (78 - 109)  BP: 100/70 (23 Dec 2017 07:09) (88/64 - 105/74)  RR: 18 (23 Dec 2017 07:09) (16 - 18)  SpO2: 95% (23 Dec 2017 07:09) (94% - 100%)    GEN: Patient sitting up in bed, alert, oriented, speaking clearly  HEENT: normocephalic and atraumatic, PERRLA, EOMI, moist mucus membranes  HEART: S1, S2, no MRG  LUNGS: Decreased air movement, no wheeze, rales, rhonchi  ABDOMEN: Soft, nontender, nondistended, ileostomy in place, brown semiformed stool in bag, bowel sounds present  EXTREMITIES: no swelling noted  NEURO: A+Ox3, moves upper and lower extremities, no focal deficits    LABS:    Basic Metabolic Panel in AM (12.23.17 @ 06:27)    Sodium, Serum: 139 mmol/L    Potassium, Serum: 5.0 mmol/L    Chloride, Serum: 102 mmol/L    Carbon Dioxide, Serum: 28.0 mmol/L    Anion Gap, Serum: 9 mmol/L    Blood Urea Nitrogen, Serum: 10.0 mg/dL    Creatinine, Serum: 0.66 mg/dL    Glucose, Serum: 142 mg/dL    Calcium, Total Serum: 7.9 mg/dL    eGFR if Non : 99    eGFR if : 115 mL/min/1.73M2    Magnesium, Serum in AM (12.23.17 @ 06:27)    Magnesium, Serum: 1.5 mg/dL  Phosphorus Level, Serum in AM (12.23.17 @ 06:27)    Phosphorus Level, Serum: 2.3 mg/dL    IMAGING: reviewed     ASSESSMENT:   67 YOM w/PMH of COPD stage 4 s/p right lung reduction in 2010, currently on home oxygen 4L (sat 92-93%), HTN, CVA on 2014, DM, hypothyroidism, hydrocele, kidney stones initially presented with COPD exacerbation, however hospital course complicated by severe obstructing constipation leading to ileostomy procedure. called now to assess medication overdose. Patient received 500mcg of octreotide instead of ordered 50mcg octreotide.     Side effect profile includes bradyarrhythmia, hypo/hyperglycemia, pancreatitis, cholecystitis, hypothyroidism, low B12, prolonged Qtc, and other subjective symptoms such as abdominal pain, dizziness, diarrhea, nausea, vomiting.     1) Medication error (500mcg Octreotide administer instead of 50mcg)  -Side effect profile as above  -Can use 500mcg q8 hours for diarrhea as off label use according to lexicomp  -One dose 500mcg octreotide given the patient @ 6:00 on 12/23/17  -Patient's VSS, no complaints of symptoms so far  -Poison control called at number [phone]  -Will do accuchecks q1 hour for 4 hours  -Vital signs q1 hour for 4 hours  -Half-life 1 1/2 hour - after 5 hours medication should be out of his system  -Will obtain EKG - not  OTC prolongation, peaked T waves  -Magnesium replaced with 2g magsulfate  -Phosphorus replaced with 2 doses of K-phos neutral after meals  -Hold off on further octreotide for 12/23/17, may resume on 12/24/17    2) High output ileostomy  -Still with high output   -Electrolyte replacement as needed  -Phos and mag replaced  -Potassium 5.0 this AM  -Will get about 2.2 meq of potassium with the K-phos neutral tablets   -Will d/c potassium in fluids as patient went from 3.6 to 5.0 potassium without additional supplementation besides the fluid  -EKG with peaked T waves  -BMP in afternoon    3) COPD  -Continue current management

## 2017-12-24 PROCEDURE — 99232 SBSQ HOSP IP/OBS MODERATE 35: CPT

## 2017-12-24 PROCEDURE — 99233 SBSQ HOSP IP/OBS HIGH 50: CPT

## 2017-12-24 RX ORDER — ASPIRIN/CALCIUM CARB/MAGNESIUM 324 MG
81 TABLET ORAL DAILY
Qty: 0 | Refills: 0 | Status: DISCONTINUED | OUTPATIENT
Start: 2017-12-24 | End: 2018-01-16

## 2017-12-24 RX ORDER — SODIUM CHLORIDE 9 MG/ML
1000 INJECTION INTRAMUSCULAR; INTRAVENOUS; SUBCUTANEOUS
Qty: 0 | Refills: 0 | Status: DISCONTINUED | OUTPATIENT
Start: 2017-12-24 | End: 2017-12-25

## 2017-12-24 RX ORDER — OCTREOTIDE ACETATE 200 UG/ML
100 INJECTION, SOLUTION INTRAVENOUS; SUBCUTANEOUS THREE TIMES A DAY
Qty: 0 | Refills: 0 | Status: DISCONTINUED | OUTPATIENT
Start: 2017-12-24 | End: 2017-12-28

## 2017-12-24 RX ORDER — PANTOPRAZOLE SODIUM 20 MG/1
40 TABLET, DELAYED RELEASE ORAL
Qty: 0 | Refills: 0 | Status: DISCONTINUED | OUTPATIENT
Start: 2017-12-24 | End: 2018-01-16

## 2017-12-24 RX ORDER — ALPRAZOLAM 0.25 MG
0.5 TABLET ORAL EVERY 6 HOURS
Qty: 0 | Refills: 0 | Status: DISCONTINUED | OUTPATIENT
Start: 2017-12-24 | End: 2017-12-31

## 2017-12-24 RX ADMIN — Medication 1 TABLET(S): at 10:20

## 2017-12-24 RX ADMIN — OCTREOTIDE ACETATE 100 MICROGRAM(S): 200 INJECTION, SOLUTION INTRAVENOUS; SUBCUTANEOUS at 21:54

## 2017-12-24 RX ADMIN — Medication 180 MILLIGRAM(S): at 18:42

## 2017-12-24 RX ADMIN — Medication 180 MILLIGRAM(S): at 06:41

## 2017-12-24 RX ADMIN — FLUTICASONE PROPIONATE AND SALMETEROL 1 DOSE(S): 50; 250 POWDER ORAL; RESPIRATORY (INHALATION) at 08:54

## 2017-12-24 RX ADMIN — Medication 81 MILLIGRAM(S): at 18:32

## 2017-12-24 RX ADMIN — Medication 50 MICROGRAM(S): at 06:41

## 2017-12-24 RX ADMIN — Medication 0.5 MILLIGRAM(S): at 18:32

## 2017-12-24 RX ADMIN — TAMSULOSIN HYDROCHLORIDE 0.4 MILLIGRAM(S): 0.4 CAPSULE ORAL at 21:54

## 2017-12-24 RX ADMIN — OCTREOTIDE ACETATE 100 MICROGRAM(S): 200 INJECTION, SOLUTION INTRAVENOUS; SUBCUTANEOUS at 15:41

## 2017-12-24 RX ADMIN — LORATADINE 10 MILLIGRAM(S): 10 TABLET ORAL at 14:56

## 2017-12-24 RX ADMIN — NYSTATIN CREAM 1 APPLICATION(S): 100000 CREAM TOPICAL at 18:35

## 2017-12-24 RX ADMIN — NYSTATIN CREAM 1 APPLICATION(S): 100000 CREAM TOPICAL at 06:43

## 2017-12-24 RX ADMIN — OCTREOTIDE ACETATE 50 MICROGRAM(S): 200 INJECTION, SOLUTION INTRAVENOUS; SUBCUTANEOUS at 06:44

## 2017-12-24 RX ADMIN — Medication 1 TABLET(S): at 20:21

## 2017-12-24 RX ADMIN — CHOLESTYRAMINE 4 GRAM(S): 4 POWDER, FOR SUSPENSION ORAL at 20:21

## 2017-12-24 RX ADMIN — Medication 1 TABLET(S): at 14:57

## 2017-12-24 RX ADMIN — DIPHENHYDRAMINE HYDROCHLORIDE AND LIDOCAINE HYDROCHLORIDE AND ALUMINUM HYDROXIDE AND MAGNESIUM HYDRO 10 MILLILITER(S): KIT at 06:43

## 2017-12-24 RX ADMIN — Medication 1 TABLET(S): at 02:30

## 2017-12-24 RX ADMIN — CLOPIDOGREL BISULFATE 75 MILLIGRAM(S): 75 TABLET, FILM COATED ORAL at 14:57

## 2017-12-24 RX ADMIN — PANTOPRAZOLE SODIUM 40 MILLIGRAM(S): 20 TABLET, DELAYED RELEASE ORAL at 06:41

## 2017-12-24 RX ADMIN — Medication 1 TABLET(S): at 15:04

## 2017-12-24 RX ADMIN — Medication 1 TABLET(S): at 10:19

## 2017-12-24 RX ADMIN — TIOTROPIUM BROMIDE 1 CAPSULE(S): 18 CAPSULE ORAL; RESPIRATORY (INHALATION) at 12:18

## 2017-12-24 RX ADMIN — Medication 0.5 MILLIGRAM(S): at 10:20

## 2017-12-24 RX ADMIN — Medication 1 TABLET(S): at 18:41

## 2017-12-24 RX ADMIN — ATORVASTATIN CALCIUM 80 MILLIGRAM(S): 80 TABLET, FILM COATED ORAL at 21:54

## 2017-12-24 RX ADMIN — DIPHENHYDRAMINE HYDROCHLORIDE AND LIDOCAINE HYDROCHLORIDE AND ALUMINUM HYDROXIDE AND MAGNESIUM HYDRO 10 MILLILITER(S): KIT at 18:33

## 2017-12-24 RX ADMIN — CHOLESTYRAMINE 4 GRAM(S): 4 POWDER, FOR SUSPENSION ORAL at 10:19

## 2017-12-24 RX ADMIN — FLUTICASONE PROPIONATE AND SALMETEROL 1 DOSE(S): 50; 250 POWDER ORAL; RESPIRATORY (INHALATION) at 21:37

## 2017-12-24 RX ADMIN — SODIUM CHLORIDE 80 MILLILITER(S): 9 INJECTION INTRAMUSCULAR; INTRAVENOUS; SUBCUTANEOUS at 15:46

## 2017-12-24 RX ADMIN — Medication 1 TABLET(S): at 18:32

## 2017-12-24 NOTE — PROGRESS NOTE ADULT - ASSESSMENT
67 year old male with PMH of COPD stage 4 s/p right lung reduction in 2010 (on home oxygen 4L (sat 92-93%), HTN, CVA on 2014, DM, hypothyroidism, hydrocele, and nephrolithiasis who is admitted for a COPD exacerbation. Patient has been weaned off ventimask to 4 liters nasal cannula and respiratory status is stable. CTA of the chest was negative for PE but showed bilateral lower lobe infiltrates with mucous plugging for which he was started on Levaquin.  Respiratory status stable and steroids are being tapered. Hospital course complicated by constipation, which had not improved despite aggressive bowel regimen, laxatives and enemas. XRAY with large stool burden. CT with diaphragmatic hernia and patient was consequently taken to the OR for loop ileostomy under local anesthesia. Pt cont, to have copious amount in ileostomy. Pt is noted to have hyponatremia due to high output in ileostomy, started on IVF, along with potassium supplementation.      Plan:    1. Constipation s/p loop ileostomy on 12/7 under local anesthesia  with large volume output from the Ileostomy, immodium did not helped him, GI on board, they changed to lomotil, ileostomy secretion are getting thicker, he is tolerating diet,  ostomy care as per ostomy RN,  stopped miralax and senna, will continue with cholestyramine, he has been continued with IV hydration, on Octreotide 50mcgs three times a day, Ileostomy out is less now, he put out total of 2.5 liters since yesterday, will continue to monitor I/Os, incidentally he got 500mcgs due to med errors, monitored closely, did ok, FS and cardiac monitoring done, patient remained vitally stable, since out put is improving will decrease IV fluid rate to 80ml/h and increase dose of Oteriotide as per GI, PT involved and encouraged for oral hydration,     2. Acute on chronic hypoxic respiratory failure secondary to COPD exacerbation, now resolved, on home oxygen (4 liters)  - bicarb elevated likely due to metabolic compensation for resp acidosis although patient refuses blood gases    - continue bronchodilators and prednisone tapered off.   - continue Mucomyst PRN  - completed course of Levaquin     3. CAP - completed course of Levaquin on 12/5     4. Hypertension - stable   - continue cardizem    5. Hyperlipidemia   - continue statin    6.hyponatremia due to high ileostomy output, better now, c/w NS, renal consult appreciated, defer further management as per renal   Hypokalemia - replaced      7. Scrotal edema chronic with urinary retention - now retaining again - Roy if not able to urinate  - patient following with  as outpatient  - cont scrotal elevation    8. Right femoral artery dissection  - incidental finding on CT, no intervention as per vascular    9. DVT Prophylaxis - Lovenox

## 2017-12-24 NOTE — PROGRESS NOTE ADULT - SUBJECTIVE AND OBJECTIVE BOX
FRANDY BAUTISTA    [id number]    67y      Male    Patient is a 67y old  Male who presents with a chief complaint of SOB (15 Dec 2017 10:17)      INTERVAL HPI/OVERNIGHT EVENTS:    Patient is not having any complains, he looks more energetic, his Ileostomy secretions are getting thicker on octreotide therapy, his out put is 2.5 liter in last 24hours from the Ileostomy, denies chest pain, sob, dizziness.     REVIEW OF SYSTEMS:    CONSTITUTIONAL: No fever, has fatigue  RESPIRATORY: No cough, No shortness of breath  CARDIOVASCULAR: No chest pain, palpitations  GASTROINTESTINAL: No abdominal, No nausea, vomiting  NEUROLOGICAL: No headaches,  loss of strength.  MISCELLANEOUS: No joint swelling or pain      Vital Signs Last 24 Hrs  T(C): 36.8 (24 Dec 2017 09:37), Max: 36.8 (24 Dec 2017 09:37)  T(F): 98.2 (24 Dec 2017 09:37), Max: 98.2 (24 Dec 2017 09:37)  HR: 89 (24 Dec 2017 09:37) (89 - 106)  BP: 108/68 (24 Dec 2017 09:37) (98/62 - 118/62)  RR: 18 (24 Dec 2017 09:37) (17 - 18)  SpO2: 97% (24 Dec 2017 09:37) (97% - 98%)    PHYSICAL EXAM:    GENERAL: Elderly male looking comfortable   HEENT: PERRL, +EOMI  NECK: soft, Supple, No JVD,   CHEST/LUNG: Decrease air entry bilaterally; No wheezing  HEART: S1S2+, Regular rate and rhythm; No murmurs  ABDOMEN: Soft, Nontender, Bowel sounds present, s/p Ileostomy connected with drain  EXTREMITIES:  1+ Peripheral Pulses, No edema  SKIN: No rashes or lesions  NEURO: AAOX3, no focal deficits, no motor r sensory loss  PSYCH: normal mood      LABS:    12-23    139  |  103  |  8.0  ----------------------------<  161<H>  5.0   |  24.0  |  0.66    Ca    8.1<L>      23 Dec 2017 15:53  Phos  2.3     12-23  Mg     1.5     12-23              I&O's Summary    23 Dec 2017 07:01  -  24 Dec 2017 07:00  --------------------------------------------------------  IN: 1525 mL / OUT: 2600 mL / NET: -1075 mL    24 Dec 2017 07:01  -  24 Dec 2017 13:09  --------------------------------------------------------  IN: 0 mL / OUT: 2000 mL / NET: -2000 mL        MEDICATIONS  (STANDING):  ALBUTerol/ipratropium for Nebulization. 3 milliLiter(s) Nebulizer once  aspirin enteric coated 81 milliGRAM(s) Oral daily  atorvastatin 80 milliGRAM(s) Oral at bedtime  cholestyramine Powder (Sugar-Free) 4 Gram(s) Oral two times a day  clopidogrel Tablet 75 milliGRAM(s) Oral daily  diltiazem    milliGRAM(s) Oral <User Schedule>  diphenoxylate/atropine 1 Tablet(s) Oral five times a day  enoxaparin Injectable 40 milliGRAM(s) SubCutaneous daily  FIRST- Mouthwash  BLM 10 milliLiter(s) Swish and Spit two times a day  fluticasone propionate/ salmeterol 250-50 MICROgram(s) Diskus 1 Dose(s) Inhalation two times a day  levothyroxine 50 MICROGram(s) Oral daily  loratadine 10 milliGRAM(s) Oral daily  multivitamin 1 Tablet(s) Oral daily  nystatin Powder 1 Application(s) Topical two times a day  octreotide  Injectable 100 MICROGram(s) SubCutaneous three times a day  pantoprazole    Tablet 40 milliGRAM(s) Oral two times a day before meals  potassium acid phosphate/sodium acid phosphate tablet (K-PHOS No. 2) 1 Tablet(s) Oral three times a day with meals  sodium chloride 0.9%. 1000 milliLiter(s) (80 mL/Hr) IV Continuous <Continuous>  tamsulosin 0.4 milliGRAM(s) Oral at bedtime  tiotropium 18 MICROgram(s) Capsule 1 Capsule(s) Inhalation daily    MEDICATIONS  (PRN):  acetaminophen  Suppository. 650 milliGRAM(s) Rectal every 6 hours PRN Moderate Pain (4 - 6)  acetylcysteine 20% Inhalation 1 milliLiter(s) Inhalation every 4 hours PRN poor cough clearance  ALBUTerol    0.083% 2.5 milliGRAM(s) Nebulizer every 4 hours PRN Shortness of Breath and/or Wheezing  ALPRAZolam 0.5 milliGRAM(s) Oral every 6 hours PRN Anxiety  ondansetron Injectable 4 milliGRAM(s) IV Push every 8 hours PRN Nausea and/or Vomiting

## 2017-12-24 NOTE — PROGRESS NOTE ADULT - SUBJECTIVE AND OBJECTIVE BOX
INTERVAL HPI/OVERNIGHT EVENTS:Patient with mild improvement in the consistency of the stools. Still having a significant output from the ileostomy. Eating again.     MEDICATIONS  (STANDING):  ALBUTerol/ipratropium for Nebulization. 3 milliLiter(s) Nebulizer once  aspirin enteric coated 81 milliGRAM(s) Oral daily  atorvastatin 80 milliGRAM(s) Oral at bedtime  cholestyramine Powder (Sugar-Free) 4 Gram(s) Oral two times a day  clopidogrel Tablet 75 milliGRAM(s) Oral daily  diltiazem    milliGRAM(s) Oral <User Schedule>  diphenoxylate/atropine 1 Tablet(s) Oral five times a day  enoxaparin Injectable 40 milliGRAM(s) SubCutaneous daily  FIRST- Mouthwash  BLM 10 milliLiter(s) Swish and Spit two times a day  fluticasone propionate/ salmeterol 250-50 MICROgram(s) Diskus 1 Dose(s) Inhalation two times a day  levothyroxine 50 MICROGram(s) Oral daily  loratadine 10 milliGRAM(s) Oral daily  multivitamin 1 Tablet(s) Oral daily  nystatin Powder 1 Application(s) Topical two times a day  octreotide  Injectable 50 MICROGram(s) SubCutaneous three times a day  pantoprazole    Tablet 40 milliGRAM(s) Oral two times a day before meals  potassium acid phosphate/sodium acid phosphate tablet (K-PHOS No. 2) 1 Tablet(s) Oral three times a day with meals  sodium chloride 0.9%. 1000 milliLiter(s) (125 mL/Hr) IV Continuous <Continuous>  tamsulosin 0.4 milliGRAM(s) Oral at bedtime  tiotropium 18 MICROgram(s) Capsule 1 Capsule(s) Inhalation daily    MEDICATIONS  (PRN):  acetaminophen  Suppository. 650 milliGRAM(s) Rectal every 6 hours PRN Moderate Pain (4 - 6)  acetylcysteine 20% Inhalation 1 milliLiter(s) Inhalation every 4 hours PRN poor cough clearance  ALBUTerol    0.083% 2.5 milliGRAM(s) Nebulizer every 4 hours PRN Shortness of Breath and/or Wheezing  ALPRAZolam 0.5 milliGRAM(s) Oral every 6 hours PRN Anxiety  ondansetron Injectable 4 milliGRAM(s) IV Push every 8 hours PRN Nausea and/or Vomiting      Allergies    codeine (Unknown)  penicillin (Unknown)    Intolerances    Symbicort (Short breath)      Vital Signs Last 24 Hrs  T(C): 36.8 (24 Dec 2017 09:37), Max: 36.8 (24 Dec 2017 09:37)  T(F): 98.2 (24 Dec 2017 09:37), Max: 98.2 (24 Dec 2017 09:37)  HR: 89 (24 Dec 2017 09:37) (89 - 106)  BP: 108/68 (24 Dec 2017 09:37) (98/62 - 118/62)  BP(mean): --  RR: 18 (24 Dec 2017 09:37) (17 - 18)  SpO2: 97% (24 Dec 2017 09:37) (97% - 98%)    LABS:    12-23    139  |  103  |  8.0  ----------------------------<  161<H>  5.0   |  24.0  |  0.66    Ca    8.1<L>      23 Dec 2017 15:53  Phos  2.3     12-23  Mg     1.5     12-23            RADIOLOGY & ADDITIONAL TESTS:

## 2017-12-24 NOTE — PROGRESS NOTE ADULT - ASSESSMENT
Patient with severe COPD, loop ileostomy with increased ileostomy output    1. Increase octerotide to tid. Continue lomotil and cholestyramine  2. If continues to have significant output, then NPO and TPN

## 2017-12-25 LAB
ANION GAP SERPL CALC-SCNC: 13 MMOL/L — SIGNIFICANT CHANGE UP (ref 5–17)
BUN SERPL-MCNC: 6 MG/DL — LOW (ref 8–20)
CALCIUM SERPL-MCNC: 7.7 MG/DL — LOW (ref 8.6–10.2)
CHLORIDE SERPL-SCNC: 106 MMOL/L — SIGNIFICANT CHANGE UP (ref 98–107)
CO2 SERPL-SCNC: 24 MMOL/L — SIGNIFICANT CHANGE UP (ref 22–29)
CREAT SERPL-MCNC: 0.56 MG/DL — SIGNIFICANT CHANGE UP (ref 0.5–1.3)
GLUCOSE SERPL-MCNC: 139 MG/DL — HIGH (ref 70–115)
HCT VFR BLD CALC: 33.5 % — LOW (ref 42–52)
HGB BLD-MCNC: 10.7 G/DL — LOW (ref 14–18)
MAGNESIUM SERPL-MCNC: 1.3 MG/DL — LOW (ref 1.8–2.6)
MCHC RBC-ENTMCNC: 28 PG — SIGNIFICANT CHANGE UP (ref 27–31)
MCHC RBC-ENTMCNC: 31.9 G/DL — LOW (ref 32–36)
MCV RBC AUTO: 87.7 FL — SIGNIFICANT CHANGE UP (ref 80–94)
PHOSPHATE SERPL-MCNC: 2.5 MG/DL — SIGNIFICANT CHANGE UP (ref 2.4–4.7)
PLATELET # BLD AUTO: 352 K/UL — SIGNIFICANT CHANGE UP (ref 150–400)
POTASSIUM SERPL-MCNC: 3.9 MMOL/L — SIGNIFICANT CHANGE UP (ref 3.5–5.3)
POTASSIUM SERPL-SCNC: 3.9 MMOL/L — SIGNIFICANT CHANGE UP (ref 3.5–5.3)
RBC # BLD: 3.82 M/UL — LOW (ref 4.6–6.2)
RBC # FLD: 15.3 % — SIGNIFICANT CHANGE UP (ref 11–15.6)
SODIUM SERPL-SCNC: 143 MMOL/L — SIGNIFICANT CHANGE UP (ref 135–145)
WBC # BLD: 7.2 K/UL — SIGNIFICANT CHANGE UP (ref 4.8–10.8)
WBC # FLD AUTO: 7.2 K/UL — SIGNIFICANT CHANGE UP (ref 4.8–10.8)

## 2017-12-25 PROCEDURE — 99232 SBSQ HOSP IP/OBS MODERATE 35: CPT

## 2017-12-25 PROCEDURE — 99233 SBSQ HOSP IP/OBS HIGH 50: CPT

## 2017-12-25 RX ORDER — SODIUM CHLORIDE 9 MG/ML
1000 INJECTION INTRAMUSCULAR; INTRAVENOUS; SUBCUTANEOUS
Qty: 0 | Refills: 0 | Status: DISCONTINUED | OUTPATIENT
Start: 2017-12-25 | End: 2017-12-30

## 2017-12-25 RX ORDER — MAGNESIUM SULFATE 500 MG/ML
2 VIAL (ML) INJECTION ONCE
Qty: 0 | Refills: 0 | Status: COMPLETED | OUTPATIENT
Start: 2017-12-25 | End: 2017-12-25

## 2017-12-25 RX ADMIN — Medication 180 MILLIGRAM(S): at 05:23

## 2017-12-25 RX ADMIN — Medication 1 TABLET(S): at 08:23

## 2017-12-25 RX ADMIN — DIPHENHYDRAMINE HYDROCHLORIDE AND LIDOCAINE HYDROCHLORIDE AND ALUMINUM HYDROXIDE AND MAGNESIUM HYDRO 10 MILLILITER(S): KIT at 17:44

## 2017-12-25 RX ADMIN — Medication 1 TABLET(S): at 12:45

## 2017-12-25 RX ADMIN — CLOPIDOGREL BISULFATE 75 MILLIGRAM(S): 75 TABLET, FILM COATED ORAL at 12:44

## 2017-12-25 RX ADMIN — LORATADINE 10 MILLIGRAM(S): 10 TABLET ORAL at 12:45

## 2017-12-25 RX ADMIN — Medication 50 MICROGRAM(S): at 05:23

## 2017-12-25 RX ADMIN — NYSTATIN CREAM 1 APPLICATION(S): 100000 CREAM TOPICAL at 17:44

## 2017-12-25 RX ADMIN — TAMSULOSIN HYDROCHLORIDE 0.4 MILLIGRAM(S): 0.4 CAPSULE ORAL at 22:04

## 2017-12-25 RX ADMIN — OCTREOTIDE ACETATE 100 MICROGRAM(S): 200 INJECTION, SOLUTION INTRAVENOUS; SUBCUTANEOUS at 22:11

## 2017-12-25 RX ADMIN — PANTOPRAZOLE SODIUM 40 MILLIGRAM(S): 20 TABLET, DELAYED RELEASE ORAL at 05:23

## 2017-12-25 RX ADMIN — CHOLESTYRAMINE 4 GRAM(S): 4 POWDER, FOR SUSPENSION ORAL at 21:24

## 2017-12-25 RX ADMIN — OCTREOTIDE ACETATE 100 MICROGRAM(S): 200 INJECTION, SOLUTION INTRAVENOUS; SUBCUTANEOUS at 05:23

## 2017-12-25 RX ADMIN — Medication 1 TABLET(S): at 12:44

## 2017-12-25 RX ADMIN — Medication 50 GRAM(S): at 12:47

## 2017-12-25 RX ADMIN — CHOLESTYRAMINE 4 GRAM(S): 4 POWDER, FOR SUSPENSION ORAL at 08:23

## 2017-12-25 RX ADMIN — Medication 0.5 MILLIGRAM(S): at 22:04

## 2017-12-25 RX ADMIN — Medication 180 MILLIGRAM(S): at 17:44

## 2017-12-25 RX ADMIN — DIPHENHYDRAMINE HYDROCHLORIDE AND LIDOCAINE HYDROCHLORIDE AND ALUMINUM HYDROXIDE AND MAGNESIUM HYDRO 10 MILLILITER(S): KIT at 05:23

## 2017-12-25 RX ADMIN — ATORVASTATIN CALCIUM 80 MILLIGRAM(S): 80 TABLET, FILM COATED ORAL at 22:04

## 2017-12-25 RX ADMIN — Medication 1 TABLET(S): at 17:44

## 2017-12-25 RX ADMIN — PANTOPRAZOLE SODIUM 40 MILLIGRAM(S): 20 TABLET, DELAYED RELEASE ORAL at 17:44

## 2017-12-25 RX ADMIN — Medication 81 MILLIGRAM(S): at 12:48

## 2017-12-25 RX ADMIN — Medication 1 TABLET(S): at 00:05

## 2017-12-25 RX ADMIN — FLUTICASONE PROPIONATE AND SALMETEROL 1 DOSE(S): 50; 250 POWDER ORAL; RESPIRATORY (INHALATION) at 21:25

## 2017-12-25 RX ADMIN — FLUTICASONE PROPIONATE AND SALMETEROL 1 DOSE(S): 50; 250 POWDER ORAL; RESPIRATORY (INHALATION) at 08:23

## 2017-12-25 RX ADMIN — NYSTATIN CREAM 1 APPLICATION(S): 100000 CREAM TOPICAL at 05:23

## 2017-12-25 RX ADMIN — OCTREOTIDE ACETATE 100 MICROGRAM(S): 200 INJECTION, SOLUTION INTRAVENOUS; SUBCUTANEOUS at 15:39

## 2017-12-25 RX ADMIN — TIOTROPIUM BROMIDE 1 CAPSULE(S): 18 CAPSULE ORAL; RESPIRATORY (INHALATION) at 12:15

## 2017-12-25 RX ADMIN — Medication 1 TABLET(S): at 19:45

## 2017-12-25 NOTE — PROGRESS NOTE ADULT - ASSESSMENT
67 year old male with PMH of COPD stage 4 s/p right lung reduction in 2010 (on home oxygen 4L (sat 92-93%), HTN, CVA on 2014, DM, hypothyroidism, hydrocele, and nephrolithiasis who is admitted for a COPD exacerbation. Patient has been weaned off ventimask to 4 liters nasal cannula and respiratory status is stable. CTA of the chest was negative for PE but showed bilateral lower lobe infiltrates with mucous plugging for which he was started on Levaquin.  Respiratory status stable and steroids are being tapered. Hospital course complicated by constipation, which had not improved despite aggressive bowel regimen, laxatives and enemas. XRAY with large stool burden. CT with diaphragmatic hernia and patient was consequently taken to the OR for loop ileostomy under local anesthesia. Pt cont, to have copious amount in ileostomy. Pt is noted to have hyponatremia due to high output in ileostomy, started on IVF, along with potassium supplementation.      Plan:    1. High out put from Ileostomy: patient had constipation s/p loop ileostomy on 12/7 under local anesthesia, he has large volume output from the Ileostomy, imodium did not helped him, GI on board, they changed to lomotil, ileostomy secretion are getting thicker, he is tolerating diet, ostomy care as per ostomy RN,  will continue with cholestyramine, he has been continued with IV hydration, Ileostomy out is still high, will continue to monitor I/Os, patient remained vitally stable, will increase IV fluid rate to 125ml/h and increased dose of Octreotide to 100mcgs tid, PT involved and encouraged for oral hydration.      2. Acute on chronic hypoxic respiratory failure secondary to COPD exacerbation, now resolved, on home oxygen (4 liters)  - bicarb elevated likely due to metabolic compensation for resp acidosis although patient refuses blood gases    - continue bronchodilators and prednisone tapered off.   - continue Mucomyst PRN  - completed course of Levaquin     3. CAP - completed course of Levaquin on 12/5     4. Hypertension - stable   - continue cardizem    5. Hyperlipidemia   - continue statin    6.hyponatremia due to high ileostomy output, better now, c/w NS, renal consult appreciated, defer further management as per renal   Hypokalemia - replaced      7. Scrotal edema chronic with urinary retention - now retaining again - Roy if not able to urinate  - patient following with  as outpatient  - cont scrotal elevation    8. Right femoral artery dissection  - incidental finding on CT, no intervention as per vascular    9. DVT Prophylaxis - Lovenox

## 2017-12-25 NOTE — PROGRESS NOTE ADULT - ASSESSMENT
Patient with severe COPD, loop ileostomy with increased ileostomy output    1.Monitor ileostomy output. Continue lomotil , sub Q octerotide and cholestyramine  2. If continues to have significant output, then NPO and TPN

## 2017-12-25 NOTE — PROGRESS NOTE ADULT - SUBJECTIVE AND OBJECTIVE BOX
INTERVAL HPI/OVERNIGHT EVENTS:    MEDICATIONS  (STANDING):  ALBUTerol/ipratropium for Nebulization. 3 milliLiter(s) Nebulizer once  aspirin  chewable 81 milliGRAM(s) Oral daily  atorvastatin 80 milliGRAM(s) Oral at bedtime  cholestyramine Powder (Sugar-Free) 4 Gram(s) Oral two times a day  clopidogrel Tablet 75 milliGRAM(s) Oral daily  diltiazem    milliGRAM(s) Oral <User Schedule>  diphenoxylate/atropine 1 Tablet(s) Oral five times a day  enoxaparin Injectable 40 milliGRAM(s) SubCutaneous daily  FIRST- Mouthwash  BLM 10 milliLiter(s) Swish and Spit two times a day  fluticasone propionate/ salmeterol 250-50 MICROgram(s) Diskus 1 Dose(s) Inhalation two times a day  levothyroxine 50 MICROGram(s) Oral daily  loratadine 10 milliGRAM(s) Oral daily  magnesium sulfate  IVPB 2 Gram(s) IV Intermittent once  multivitamin 1 Tablet(s) Oral daily  nystatin Powder 1 Application(s) Topical two times a day  octreotide  Injectable 100 MICROGram(s) SubCutaneous three times a day  pantoprazole   Suspension 40 milliGRAM(s) Oral two times a day before meals  potassium acid phosphate/sodium acid phosphate tablet (K-PHOS No. 2) 1 Tablet(s) Oral three times a day with meals  sodium chloride 0.9%. 1000 milliLiter(s) (80 mL/Hr) IV Continuous <Continuous>  tamsulosin 0.4 milliGRAM(s) Oral at bedtime  tiotropium 18 MICROgram(s) Capsule 1 Capsule(s) Inhalation daily    MEDICATIONS  (PRN):  acetaminophen  Suppository. 650 milliGRAM(s) Rectal every 6 hours PRN Moderate Pain (4 - 6)  acetylcysteine 20% Inhalation 1 milliLiter(s) Inhalation every 4 hours PRN poor cough clearance  ALBUTerol    0.083% 2.5 milliGRAM(s) Nebulizer every 4 hours PRN Shortness of Breath and/or Wheezing  ALPRAZolam 0.5 milliGRAM(s) Oral every 6 hours PRN Anxiety  ondansetron Injectable 4 milliGRAM(s) IV Push every 8 hours PRN Nausea and/or Vomiting      Allergies    codeine (Unknown)  penicillin (Unknown)    Intolerances    Symbicort (Short breath)      Vital Signs Last 24 Hrs  T(C): 36.6 (25 Dec 2017 09:47), Max: 36.9 (24 Dec 2017 20:00)  T(F): 97.8 (25 Dec 2017 09:47), Max: 98.4 (24 Dec 2017 20:00)  HR: 98 (25 Dec 2017 09:47) (77 - 108)  BP: 102/52 (25 Dec 2017 09:47) (102/52 - 109/66)  BP(mean): --  RR: 18 (25 Dec 2017 09:47) (17 - 18)  SpO2: 92% (25 Dec 2017 09:47) (92% - 98%)    LABS:                        10.7   7.2   )-----------( 352      ( 25 Dec 2017 06:21 )             33.5     12-25    143  |  106  |  6.0<L>  ----------------------------<  139<H>  3.9   |  24.0  |  0.56    Ca    7.7<L>      25 Dec 2017 06:21  Phos  2.5     12-25  Mg     1.3     12-25            RADIOLOGY & ADDITIONAL TESTS: INTERVAL HPI/OVERNIGHT EVENTS:Patient feels much better. Ileostomy output getting better. No other complaints.     MEDICATIONS  (STANDING):  ALBUTerol/ipratropium for Nebulization. 3 milliLiter(s) Nebulizer once  aspirin  chewable 81 milliGRAM(s) Oral daily  atorvastatin 80 milliGRAM(s) Oral at bedtime  cholestyramine Powder (Sugar-Free) 4 Gram(s) Oral two times a day  clopidogrel Tablet 75 milliGRAM(s) Oral daily  diltiazem    milliGRAM(s) Oral <User Schedule>  diphenoxylate/atropine 1 Tablet(s) Oral five times a day  enoxaparin Injectable 40 milliGRAM(s) SubCutaneous daily  FIRST- Mouthwash  BLM 10 milliLiter(s) Swish and Spit two times a day  fluticasone propionate/ salmeterol 250-50 MICROgram(s) Diskus 1 Dose(s) Inhalation two times a day  levothyroxine 50 MICROGram(s) Oral daily  loratadine 10 milliGRAM(s) Oral daily  magnesium sulfate  IVPB 2 Gram(s) IV Intermittent once  multivitamin 1 Tablet(s) Oral daily  nystatin Powder 1 Application(s) Topical two times a day  octreotide  Injectable 100 MICROGram(s) SubCutaneous three times a day  pantoprazole   Suspension 40 milliGRAM(s) Oral two times a day before meals  potassium acid phosphate/sodium acid phosphate tablet (K-PHOS No. 2) 1 Tablet(s) Oral three times a day with meals  sodium chloride 0.9%. 1000 milliLiter(s) (80 mL/Hr) IV Continuous <Continuous>  tamsulosin 0.4 milliGRAM(s) Oral at bedtime  tiotropium 18 MICROgram(s) Capsule 1 Capsule(s) Inhalation daily    MEDICATIONS  (PRN):  acetaminophen  Suppository. 650 milliGRAM(s) Rectal every 6 hours PRN Moderate Pain (4 - 6)  acetylcysteine 20% Inhalation 1 milliLiter(s) Inhalation every 4 hours PRN poor cough clearance  ALBUTerol    0.083% 2.5 milliGRAM(s) Nebulizer every 4 hours PRN Shortness of Breath and/or Wheezing  ALPRAZolam 0.5 milliGRAM(s) Oral every 6 hours PRN Anxiety  ondansetron Injectable 4 milliGRAM(s) IV Push every 8 hours PRN Nausea and/or Vomiting      Allergies    codeine (Unknown)  penicillin (Unknown)    Intolerances    Symbicort (Short breath)      Vital Signs Last 24 Hrs  T(C): 36.6 (25 Dec 2017 09:47), Max: 36.9 (24 Dec 2017 20:00)  T(F): 97.8 (25 Dec 2017 09:47), Max: 98.4 (24 Dec 2017 20:00)  HR: 98 (25 Dec 2017 09:47) (77 - 108)  BP: 102/52 (25 Dec 2017 09:47) (102/52 - 109/66)  BP(mean): --  RR: 18 (25 Dec 2017 09:47) (17 - 18)  SpO2: 92% (25 Dec 2017 09:47) (92% - 98%)    LABS:                        10.7   7.2   )-----------( 352      ( 25 Dec 2017 06:21 )             33.5     12-25    143  |  106  |  6.0<L>  ----------------------------<  139<H>  3.9   |  24.0  |  0.56    Ca    7.7<L>      25 Dec 2017 06:21  Phos  2.5     12-25  Mg     1.3     12-25            RADIOLOGY & ADDITIONAL TESTS:

## 2017-12-25 NOTE — PROGRESS NOTE ADULT - SUBJECTIVE AND OBJECTIVE BOX
FRANDY BAUTISTA    [id number]    67y      Male    Patient is a 67y old  Male who presents with a chief complaint of SOB (15 Dec 2017 10:17)      INTERVAL HPI/OVERNIGHT EVENTS:    Patient is not having any complains, his out put is more 5 liter again in last 24hours from the Ileostomy, denies chest pain, sob, dizziness.     REVIEW OF SYSTEMS:    CONSTITUTIONAL: No fever, has fatigue  RESPIRATORY: No cough, No shortness of breath  CARDIOVASCULAR: No chest pain, no palpitations.   GASTROINTESTINAL: No abdominal, No nausea, vomiting.   NEUROLOGICAL: No headaches,  loss of strength.  MISCELLANEOUS: No joint swelling or pain.         Vital Signs Last 24 Hrs  T(C): 36.6 (25 Dec 2017 09:47), Max: 36.9 (24 Dec 2017 20:00)  T(F): 97.8 (25 Dec 2017 09:47), Max: 98.4 (24 Dec 2017 20:00)  HR: 98 (25 Dec 2017 09:47) (77 - 108)  BP: 102/52 (25 Dec 2017 09:47) (102/52 - 109/66)  RR: 18 (25 Dec 2017 09:47) (17 - 18)  SpO2: 95% (25 Dec 2017 12:15) (92% - 98%)    PHYSICAL EXAM:    GENERAL: Elderly male looking comfortable  HEENT: PERRL, +EOMI  NECK: soft, Supple, No JVD,   CHEST/LUNG: Decrease air entry bilaterally; No wheezing  HEART: S1S2+, Regular rate and rhythm; No murmurs  ABDOMEN: Soft, Nontender, Bowel sounds present, s/p Ileostomy connected with drain  EXTREMITIES:  1+ Peripheral Pulses, No edema  SKIN: No rashes or lesions  NEURO: AAOX3, no focal deficits, no motor r sensory loss  PSYCH: normal mood      LABS:                        10.7   7.2   )-----------( 352      ( 25 Dec 2017 06:21 )             33.5     12-25    143  |  106  |  6.0<L>  ----------------------------<  139<H>  3.9   |  24.0  |  0.56    Ca    7.7<L>      25 Dec 2017 06:21  Phos  2.5     12-25  Mg     1.3     12-25        24 Dec 2017 07:01  -  25 Dec 2017 07:00  --------------------------------------------------------  IN: 3856 mL / OUT: 9100 mL / NET: -5244 mL    25 Dec 2017 07:01  -  25 Dec 2017 13:40  --------------------------------------------------------  IN: 0 mL / OUT: 2350 mL / NET: -2350 mL        MEDICATIONS  (STANDING):  ALBUTerol/ipratropium for Nebulization. 3 milliLiter(s) Nebulizer once  aspirin  chewable 81 milliGRAM(s) Oral daily  atorvastatin 80 milliGRAM(s) Oral at bedtime  cholestyramine Powder (Sugar-Free) 4 Gram(s) Oral two times a day  clopidogrel Tablet 75 milliGRAM(s) Oral daily  diltiazem    milliGRAM(s) Oral <User Schedule>  diphenoxylate/atropine 1 Tablet(s) Oral five times a day  enoxaparin Injectable 40 milliGRAM(s) SubCutaneous daily  FIRST- Mouthwash  BLM 10 milliLiter(s) Swish and Spit two times a day  fluticasone propionate/ salmeterol 250-50 MICROgram(s) Diskus 1 Dose(s) Inhalation two times a day  levothyroxine 50 MICROGram(s) Oral daily  loratadine 10 milliGRAM(s) Oral daily  multivitamin 1 Tablet(s) Oral daily  nystatin Powder 1 Application(s) Topical two times a day  octreotide  Injectable 100 MICROGram(s) SubCutaneous three times a day  pantoprazole   Suspension 40 milliGRAM(s) Oral two times a day before meals  potassium acid phosphate/sodium acid phosphate tablet (K-PHOS No. 2) 1 Tablet(s) Oral three times a day with meals  sodium chloride 0.9%. 1000 milliLiter(s) (125 mL/Hr) IV Continuous <Continuous>  tamsulosin 0.4 milliGRAM(s) Oral at bedtime  tiotropium 18 MICROgram(s) Capsule 1 Capsule(s) Inhalation daily    MEDICATIONS  (PRN):  acetaminophen  Suppository. 650 milliGRAM(s) Rectal every 6 hours PRN Moderate Pain (4 - 6)  acetylcysteine 20% Inhalation 1 milliLiter(s) Inhalation every 4 hours PRN poor cough clearance  ALBUTerol    0.083% 2.5 milliGRAM(s) Nebulizer every 4 hours PRN Shortness of Breath and/or Wheezing  ALPRAZolam 0.5 milliGRAM(s) Oral every 6 hours PRN Anxiety  ondansetron Injectable 4 milliGRAM(s) IV Push every 8 hours PRN Nausea and/or Vomiting

## 2017-12-26 LAB
ANION GAP SERPL CALC-SCNC: 12 MMOL/L — SIGNIFICANT CHANGE UP (ref 5–17)
BUN SERPL-MCNC: 4 MG/DL — LOW (ref 8–20)
CALCIUM SERPL-MCNC: 7.7 MG/DL — LOW (ref 8.6–10.2)
CHLORIDE SERPL-SCNC: 104 MMOL/L — SIGNIFICANT CHANGE UP (ref 98–107)
CO2 SERPL-SCNC: 24 MMOL/L — SIGNIFICANT CHANGE UP (ref 22–29)
CREAT SERPL-MCNC: 0.63 MG/DL — SIGNIFICANT CHANGE UP (ref 0.5–1.3)
GLUCOSE SERPL-MCNC: 137 MG/DL — HIGH (ref 70–115)
HCT VFR BLD CALC: 33.5 % — LOW (ref 42–52)
HGB BLD-MCNC: 10.5 G/DL — LOW (ref 14–18)
MAGNESIUM SERPL-MCNC: 1.6 MG/DL — SIGNIFICANT CHANGE UP (ref 1.6–2.6)
MCHC RBC-ENTMCNC: 27.9 PG — SIGNIFICANT CHANGE UP (ref 27–31)
MCHC RBC-ENTMCNC: 31.3 G/DL — LOW (ref 32–36)
MCV RBC AUTO: 89.1 FL — SIGNIFICANT CHANGE UP (ref 80–94)
PHOSPHATE SERPL-MCNC: 2 MG/DL — LOW (ref 2.4–4.7)
PLATELET # BLD AUTO: 350 K/UL — SIGNIFICANT CHANGE UP (ref 150–400)
POTASSIUM SERPL-MCNC: 4.2 MMOL/L — SIGNIFICANT CHANGE UP (ref 3.5–5.3)
POTASSIUM SERPL-SCNC: 4.2 MMOL/L — SIGNIFICANT CHANGE UP (ref 3.5–5.3)
RBC # BLD: 3.76 M/UL — LOW (ref 4.6–6.2)
RBC # FLD: 15.1 % — SIGNIFICANT CHANGE UP (ref 11–15.6)
SODIUM SERPL-SCNC: 140 MMOL/L — SIGNIFICANT CHANGE UP (ref 135–145)
WBC # BLD: 10.6 K/UL — SIGNIFICANT CHANGE UP (ref 4.8–10.8)
WBC # FLD AUTO: 10.6 K/UL — SIGNIFICANT CHANGE UP (ref 4.8–10.8)

## 2017-12-26 PROCEDURE — 99233 SBSQ HOSP IP/OBS HIGH 50: CPT

## 2017-12-26 PROCEDURE — 74177 CT ABD & PELVIS W/CONTRAST: CPT | Mod: 26

## 2017-12-26 RX ORDER — MAGNESIUM SULFATE 500 MG/ML
2 VIAL (ML) INJECTION ONCE
Qty: 0 | Refills: 0 | Status: COMPLETED | OUTPATIENT
Start: 2017-12-26 | End: 2017-12-26

## 2017-12-26 RX ORDER — SODIUM,POTASSIUM PHOSPHATES 278-250MG
1 POWDER IN PACKET (EA) ORAL
Qty: 0 | Refills: 0 | Status: COMPLETED | OUTPATIENT
Start: 2017-12-26 | End: 2017-12-29

## 2017-12-26 RX ADMIN — DIPHENHYDRAMINE HYDROCHLORIDE AND LIDOCAINE HYDROCHLORIDE AND ALUMINUM HYDROXIDE AND MAGNESIUM HYDRO 10 MILLILITER(S): KIT at 17:37

## 2017-12-26 RX ADMIN — Medication 1 TABLET(S): at 08:47

## 2017-12-26 RX ADMIN — SODIUM CHLORIDE 125 MILLILITER(S): 9 INJECTION INTRAMUSCULAR; INTRAVENOUS; SUBCUTANEOUS at 10:09

## 2017-12-26 RX ADMIN — PANTOPRAZOLE SODIUM 40 MILLIGRAM(S): 20 TABLET, DELAYED RELEASE ORAL at 06:01

## 2017-12-26 RX ADMIN — ATORVASTATIN CALCIUM 80 MILLIGRAM(S): 80 TABLET, FILM COATED ORAL at 23:40

## 2017-12-26 RX ADMIN — Medication 1 TABLET(S): at 08:37

## 2017-12-26 RX ADMIN — Medication 1 TABLET(S): at 13:21

## 2017-12-26 RX ADMIN — OCTREOTIDE ACETATE 100 MICROGRAM(S): 200 INJECTION, SOLUTION INTRAVENOUS; SUBCUTANEOUS at 23:40

## 2017-12-26 RX ADMIN — Medication 1 TABLET(S): at 17:35

## 2017-12-26 RX ADMIN — CHOLESTYRAMINE 4 GRAM(S): 4 POWDER, FOR SUSPENSION ORAL at 08:36

## 2017-12-26 RX ADMIN — TAMSULOSIN HYDROCHLORIDE 0.4 MILLIGRAM(S): 0.4 CAPSULE ORAL at 23:40

## 2017-12-26 RX ADMIN — Medication 180 MILLIGRAM(S): at 17:36

## 2017-12-26 RX ADMIN — Medication 1 TABLET(S): at 13:18

## 2017-12-26 RX ADMIN — Medication 1 TABLET(S): at 00:55

## 2017-12-26 RX ADMIN — Medication 1 TABLET(S): at 21:53

## 2017-12-26 RX ADMIN — FLUTICASONE PROPIONATE AND SALMETEROL 1 DOSE(S): 50; 250 POWDER ORAL; RESPIRATORY (INHALATION) at 08:38

## 2017-12-26 RX ADMIN — LORATADINE 10 MILLIGRAM(S): 10 TABLET ORAL at 13:24

## 2017-12-26 RX ADMIN — OCTREOTIDE ACETATE 100 MICROGRAM(S): 200 INJECTION, SOLUTION INTRAVENOUS; SUBCUTANEOUS at 18:13

## 2017-12-26 RX ADMIN — CLOPIDOGREL BISULFATE 75 MILLIGRAM(S): 75 TABLET, FILM COATED ORAL at 13:21

## 2017-12-26 RX ADMIN — Medication 1 TABLET(S): at 17:37

## 2017-12-26 RX ADMIN — DIPHENHYDRAMINE HYDROCHLORIDE AND LIDOCAINE HYDROCHLORIDE AND ALUMINUM HYDROXIDE AND MAGNESIUM HYDRO 10 MILLILITER(S): KIT at 06:01

## 2017-12-26 RX ADMIN — PANTOPRAZOLE SODIUM 40 MILLIGRAM(S): 20 TABLET, DELAYED RELEASE ORAL at 17:36

## 2017-12-26 RX ADMIN — OCTREOTIDE ACETATE 100 MICROGRAM(S): 200 INJECTION, SOLUTION INTRAVENOUS; SUBCUTANEOUS at 06:00

## 2017-12-26 RX ADMIN — NYSTATIN CREAM 1 APPLICATION(S): 100000 CREAM TOPICAL at 06:00

## 2017-12-26 RX ADMIN — Medication 50 GRAM(S): at 10:52

## 2017-12-26 RX ADMIN — Medication 81 MILLIGRAM(S): at 13:18

## 2017-12-26 RX ADMIN — FLUTICASONE PROPIONATE AND SALMETEROL 1 DOSE(S): 50; 250 POWDER ORAL; RESPIRATORY (INHALATION) at 20:39

## 2017-12-26 RX ADMIN — NYSTATIN CREAM 1 APPLICATION(S): 100000 CREAM TOPICAL at 17:38

## 2017-12-26 RX ADMIN — Medication 50 MICROGRAM(S): at 06:01

## 2017-12-26 RX ADMIN — TIOTROPIUM BROMIDE 1 CAPSULE(S): 18 CAPSULE ORAL; RESPIRATORY (INHALATION) at 12:45

## 2017-12-26 NOTE — PROGRESS NOTE ADULT - ASSESSMENT
Over all improvement in ileostomy output but high volume output persists.  Output becoming more formed.  Will not yet increase the dose of the Octreotide.  All labs acceptable.    Plan for CT scan A/P with oral and IV contrast to reexamine the GI tract. Ordered.

## 2017-12-26 NOTE — PROGRESS NOTE ADULT - SUBJECTIVE AND OBJECTIVE BOX
FRANDY BAUTISTA    [id number]    67y      Male    Patient is a 67y old  Male who presents with a chief complaint of SOB (15 Dec 2017 10:17)      INTERVAL HPI/OVERNIGHT EVENTS:    Patient is not having any complains, His Ileostomy   secretion are getting thicker, his out put is still high, denies chest pain, sob, dizziness.     REVIEW OF SYSTEMS:    CONSTITUTIONAL: No fever, has fatigue  RESPIRATORY: No cough, No shortness of breath  CARDIOVASCULAR: No chest pain, no palpitations.   GASTROINTESTINAL: No abdominal, No nausea, vomiting.   NEUROLOGICAL: No headaches,  loss of strength.  MISCELLANEOUS: No joint swelling or pain.       Vital Signs Last 24 Hrs  T(C): 37.1 (26 Dec 2017 13:13), Max: 37.5 (26 Dec 2017 05:17)  T(F): 98.8 (26 Dec 2017 13:13), Max: 99.5 (26 Dec 2017 05:17)  HR: 89 (26 Dec 2017 13:13) (89 - 113)  BP: 101/64 (26 Dec 2017 13:13) (95/66 - 121/80)  RR: 20 (26 Dec 2017 13:13) (20 - 20)  SpO2: 93% (26 Dec 2017 13:13) (93% - 94%)    PHYSICAL EXAM:    GENERAL: Elderly male looking comfortable  HEENT: PERRL, +EOMI  NECK: soft, Supple, No JVD,   CHEST/LUNG: Decrease air entry bilaterally; No wheezing  HEART: S1S2+, Regular rate and rhythm; No murmurs  ABDOMEN: Soft, Nontender, Bowel sounds present, s/p Ileostomy connected with drain  EXTREMITIES:  1+ Peripheral Pulses, No edema  SKIN: No rashes or lesions  NEURO: AAOX3, no focal deficits, no motor r sensory loss  PSYCH: normal mood      LABS:                        10.5   10.6  )-----------( 350      ( 26 Dec 2017 06:49 )             33.5     12-26    140  |  104  |  4.0<L>  ----------------------------<  137<H>  4.2   |  24.0  |  0.63    Ca    7.7<L>      26 Dec 2017 06:49  Phos  2.0     12-26  Mg     1.6     12-26              I&O's Summary    25 Dec 2017 07:01  -  26 Dec 2017 07:00  --------------------------------------------------------  IN: 2625 mL / OUT: 8750 mL / NET: -6125 mL    26 Dec 2017 07:01  -  26 Dec 2017 14:28  --------------------------------------------------------  IN: 0 mL / OUT: 1500 mL / NET: -1500 mL        MEDICATIONS  (STANDING):  ALBUTerol/ipratropium for Nebulization. 3 milliLiter(s) Nebulizer once  aspirin  chewable 81 milliGRAM(s) Oral daily  atorvastatin 80 milliGRAM(s) Oral at bedtime  cholestyramine Powder (Sugar-Free) 4 Gram(s) Oral two times a day  clopidogrel Tablet 75 milliGRAM(s) Oral daily  diltiazem    milliGRAM(s) Oral <User Schedule>  diphenoxylate/atropine 1 Tablet(s) Oral five times a day  enoxaparin Injectable 40 milliGRAM(s) SubCutaneous daily  FIRST- Mouthwash  BLM 10 milliLiter(s) Swish and Spit two times a day  fluticasone propionate/ salmeterol 250-50 MICROgram(s) Diskus 1 Dose(s) Inhalation two times a day  levothyroxine 50 MICROGram(s) Oral daily  loratadine 10 milliGRAM(s) Oral daily  multivitamin 1 Tablet(s) Oral daily  nystatin Powder 1 Application(s) Topical two times a day  octreotide  Injectable 100 MICROGram(s) SubCutaneous three times a day  pantoprazole   Suspension 40 milliGRAM(s) Oral two times a day before meals  potassium acid phosphate/sodium acid phosphate tablet (K-PHOS No. 2) 1 Tablet(s) Oral four times a day with meals  sodium chloride 0.9%. 1000 milliLiter(s) (125 mL/Hr) IV Continuous <Continuous>  tamsulosin 0.4 milliGRAM(s) Oral at bedtime  tiotropium 18 MICROgram(s) Capsule 1 Capsule(s) Inhalation daily    MEDICATIONS  (PRN):  acetaminophen  Suppository. 650 milliGRAM(s) Rectal every 6 hours PRN Moderate Pain (4 - 6)  acetylcysteine 20% Inhalation 1 milliLiter(s) Inhalation every 4 hours PRN poor cough clearance  ALBUTerol    0.083% 2.5 milliGRAM(s) Nebulizer every 4 hours PRN Shortness of Breath and/or Wheezing  ALPRAZolam 0.5 milliGRAM(s) Oral every 6 hours PRN Anxiety  ondansetron Injectable 4 milliGRAM(s) IV Push every 8 hours PRN Nausea and/or Vomiting

## 2017-12-26 NOTE — PROGRESS NOTE ADULT - ASSESSMENT
67 year old male with PMH of COPD stage 4 s/p right lung reduction in 2010 (on home oxygen 4L (sat 92-93%), HTN, CVA on 2014, DM, hypothyroidism, hydrocele, and nephrolithiasis who is admitted for a COPD exacerbation. Patient has been weaned off ventimask to 4 liters nasal cannula and respiratory status is stable. CTA of the chest was negative for PE but showed bilateral lower lobe infiltrates with mucous plugging for which he was started on Levaquin.  Respiratory status stable and steroids are being tapered. Hospital course complicated by constipation, which had not improved despite aggressive bowel regimen, laxatives and enemas. XRAY with large stool burden. CT with diaphragmatic hernia and patient was consequently taken to the OR for loop ileostomy under local anesthesia. Pt cont, to have copious amount in ileostomy. Pt is noted to have hyponatremia due to high output in ileostomy, started on IVF, along with potassium supplementation.      Plan:    1. High out put from Ileostomy: patient had constipation s/p loop ileostomy on 12/7 under local anesthesia, he has large volume output from the Ileostomy, imodium did not helped him, GI on board, they changed to lomotil, ileostomy secretion are getting thicker, he is tolerating diet, ostomy care as per ostomy RN,  will continue with cholestyramine, he has been continued with IV hydration, Ileostomy out is still high, but secretions are getting thicker, will continue to monitor I/Os, patient remained vitally stable, will increase IV fluid rate to 125ml/h and Octreotide to 100mcgs tid, PT involved and encouraged for oral hydration.      2. Acute on chronic hypoxic respiratory failure secondary to COPD exacerbation, now resolved, on home oxygen (4 liters)  - bicarb elevated likely due to metabolic compensation for resp acidosis although patient refuses blood gases    - continue bronchodilators and prednisone tapered off.   - continue Mucomyst PRN  - completed course of Levaquin     3. CAP - completed course of Levaquin on 12/5     4. Hypertension - stable   - continue cardizem    5. Hyperlipidemia   - continue statin    6.hyponatremia: resolved, it was due to high ileostomy output.    Hypokalemia - replaced.   Hypomagnesemia: replaced.     7. Scrotal edema chronic with urinary retention, no more retention, Roy if not able to urinate  - patient following with  as outpatient  - cont scrotal elevation    8. Right femoral artery dissection  - incidental finding on CT, no intervention as per vascular    9. DVT Prophylaxis - Lovenox

## 2017-12-26 NOTE — PROGRESS NOTE ADULT - SUBJECTIVE AND OBJECTIVE BOX
Patient seen and examined;  Status better than before.  I/O:  8300 cc of output from the ileostomy in past 24 hr.  Yet no dehydration or renal insufficiency.  Tolerates DASH / TLC diet.  No c/o abdominal pain or bleeding.  No Fever.  Ambulatory with PT.  Still weak.  Octreotide just increased to 100 ucg sq tid.  Pt and nurse allege that the ileostomy output is becoming more formed.      PAST MEDICAL & SURGICAL HISTORY:  Hypothyroidism  Hydrocele  Renal calculi  Diabetes mellitus  Hypertension  CVA (cerebral vascular accident)  Chronic obstructive pulmonary disease, unspecified COPD type  History of lung surgery      ROS:  No Heartburn, regurgitation, dysphagia, odynophagia.  No dyspepsia  No abdominal pain.    No Nausea, vomiting.  No Bleeding.  No hematemesis.   No diarrhea.    No hematochesia.  No weight loss, anorexia.  No edema.      MEDICATIONS  (STANDING):  ALBUTerol/ipratropium for Nebulization. 3 milliLiter(s) Nebulizer once  aspirin  chewable 81 milliGRAM(s) Oral daily  atorvastatin 80 milliGRAM(s) Oral at bedtime  cholestyramine Powder (Sugar-Free) 4 Gram(s) Oral two times a day  clopidogrel Tablet 75 milliGRAM(s) Oral daily  diltiazem    milliGRAM(s) Oral <User Schedule>  diphenoxylate/atropine 1 Tablet(s) Oral five times a day  enoxaparin Injectable 40 milliGRAM(s) SubCutaneous daily  FIRST- Mouthwash  BLM 10 milliLiter(s) Swish and Spit two times a day  fluticasone propionate/ salmeterol 250-50 MICROgram(s) Diskus 1 Dose(s) Inhalation two times a day  levothyroxine 50 MICROGram(s) Oral daily  loratadine 10 milliGRAM(s) Oral daily  multivitamin 1 Tablet(s) Oral daily  nystatin Powder 1 Application(s) Topical two times a day  octreotide  Injectable 100 MICROGram(s) SubCutaneous three times a day  pantoprazole   Suspension 40 milliGRAM(s) Oral two times a day before meals  sodium chloride 0.9%. 1000 milliLiter(s) (125 mL/Hr) IV Continuous <Continuous>  tamsulosin 0.4 milliGRAM(s) Oral at bedtime  tiotropium 18 MICROgram(s) Capsule 1 Capsule(s) Inhalation daily    MEDICATIONS  (PRN):  acetaminophen  Suppository. 650 milliGRAM(s) Rectal every 6 hours PRN Moderate Pain (4 - 6)  acetylcysteine 20% Inhalation 1 milliLiter(s) Inhalation every 4 hours PRN poor cough clearance  ALBUTerol    0.083% 2.5 milliGRAM(s) Nebulizer every 4 hours PRN Shortness of Breath and/or Wheezing  ALPRAZolam 0.5 milliGRAM(s) Oral every 6 hours PRN Anxiety  ondansetron Injectable 4 milliGRAM(s) IV Push every 8 hours PRN Nausea and/or Vomiting      Allergies    codeine (Unknown)  penicillin (Unknown)    Intolerances    Symbicort (Short breath)      Vital Signs Last 24 Hrs  T(C): 37.5 (26 Dec 2017 05:17), Max: 37.5 (26 Dec 2017 05:17)  T(F): 99.5 (26 Dec 2017 05:17), Max: 99.5 (26 Dec 2017 05:17)  HR: 95 (26 Dec 2017 05:17) (95 - 113)  BP: 99/64 (26 Dec 2017 05:17) (95/66 - 121/80)  BP(mean): --  RR: 20 (26 Dec 2017 05:17) (18 - 20)  SpO2: 94% (26 Dec 2017 05:17) (92% - 95%)    PHYSICAL EXAM:    GENERAL: NAD, well-groomed, well-developed  HEAD:  Atraumatic, Normocephalic  EYES: EOMI, PERRLA, conjunctiva and sclera clear  ENMT: No tonsillar erythema, exudates, or enlargement; Moist mucous membranes, Good dentition, No lesions  NECK: Supple, No JVD, Normal thyroid  CHEST/LUNG: Clear to percussion bilaterally; No rales, rhonchi, wheezing, or rubs  HEART: Regular rate and rhythm; No murmurs, rubs, or gallops  ABDOMEN: Soft, Nontender, Nondistended; Bowel sounds present  EXTREMITIES:  2+ Peripheral Pulses, No clubbing, cyanosis, or edema  LYMPH: No lymphadenopathy noted  SKIN: No rashes or lesions      LABS:                        10.5   10.6  )-----------( 350      ( 26 Dec 2017 06:49 )             33.5     12-26    140  |  104  |  4.0<L>  ----------------------------<  137<H>  4.2   |  24.0  |  0.63    Ca    7.7<L>      26 Dec 2017 06:49  Phos  2.0     12-26  Mg     1.6     12-26               RADIOLOGY & ADDITIONAL STUDIES:

## 2017-12-26 NOTE — CHART NOTE - NSCHARTNOTEFT_GEN_A_CORE
Source: Patient [x ]  Family [ ]   other [ ]    Current Diet:  ileostomy, regular with Ensure TID    Patient reports [ ] nausea  [ ] vomiting [ ] diarrhea [ ] constipation  [ ]chewing problems [ ] swallowing issues  [ ] other: ilieostomy    PO intake:  < 50% [ ]   50-75%  [x ]   %  [ ]  other :    Source for PO intake [x ] Patient [ ] family [ ] chart [ ] staff [ ] other    Enteral /Parenteral Nutrition:     Current Weight:  149# 12/20, 143# 11/28    % Weight Change     Pertinent Medications: MEDICATIONS  (STANDING):  ALBUTerol/ipratropium for Nebulization. 3 milliLiter(s) Nebulizer once  aspirin  chewable 81 milliGRAM(s) Oral daily  atorvastatin 80 milliGRAM(s) Oral at bedtime  cholestyramine Powder (Sugar-Free) 4 Gram(s) Oral two times a day  clopidogrel Tablet 75 milliGRAM(s) Oral daily  diltiazem    milliGRAM(s) Oral <User Schedule>  diphenoxylate/atropine 1 Tablet(s) Oral five times a day  enoxaparin Injectable 40 milliGRAM(s) SubCutaneous daily  FIRST- Mouthwash  BLM 10 milliLiter(s) Swish and Spit two times a day  fluticasone propionate/ salmeterol 250-50 MICROgram(s) Diskus 1 Dose(s) Inhalation two times a day  levothyroxine 50 MICROGram(s) Oral daily  loratadine 10 milliGRAM(s) Oral daily  multivitamin 1 Tablet(s) Oral daily  nystatin Powder 1 Application(s) Topical two times a day  octreotide  Injectable 100 MICROGram(s) SubCutaneous three times a day  pantoprazole   Suspension 40 milliGRAM(s) Oral two times a day before meals  sodium chloride 0.9%. 1000 milliLiter(s) (125 mL/Hr) IV Continuous <Continuous>  tamsulosin 0.4 milliGRAM(s) Oral at bedtime  tiotropium 18 MICROgram(s) Capsule 1 Capsule(s) Inhalation daily    MEDICATIONS  (PRN):  acetaminophen  Suppository. 650 milliGRAM(s) Rectal every 6 hours PRN Moderate Pain (4 - 6)  acetylcysteine 20% Inhalation 1 milliLiter(s) Inhalation every 4 hours PRN poor cough clearance  ALBUTerol    0.083% 2.5 milliGRAM(s) Nebulizer every 4 hours PRN Shortness of Breath and/or Wheezing  ALPRAZolam 0.5 milliGRAM(s) Oral every 6 hours PRN Anxiety  ondansetron Injectable 4 milliGRAM(s) IV Push every 8 hours PRN Nausea and/or Vomiting    Pertinent Labs: CBC Full  -  ( 26 Dec 2017 06:49 )  WBC Count : 10.6 K/uL  Hemoglobin : 10.5 g/dL  Hematocrit : 33.5 %  Platelet Count - Automated : 350 K/uL  Mean Cell Volume : 89.1 fl  Mean Cell Hemoglobin : 27.9 pg  Mean Cell Hemoglobin Concentration : 31.3 g/dL  Auto Neutrophil # : x  Auto Lymphocyte # : x  Auto Monocyte # : x  Auto Eosinophil # : x  Auto Basophil # : x  Auto Neutrophil % : x  Auto Lymphocyte % : x  Auto Monocyte % : x  Auto Eosinophil % : x  Auto Basophil % : x      12-26 Na140 mmol/L Glu 137 mg/dL<H> K+ 4.2 mmol/L Cr  0.63 mg/dL BUN 4.0 mg/dL<L> Phos 2.0 mg/dL<L> Alb n/a   PAB n/a           Skin:     Nutrition focused physical exam conducted - found signs of malnutrition [ ]absent [x ]present    Subcutaneous fat loss: [ ] Orbital fat pads region, [ ]Buccal fat region, [ ]Triceps region,  [ ]Ribs region    Muscle wasting: [x ]Temples region, [ ]Clavicle region, [ ]Shoulder region, [ ]Scapula region, [ ]Interosseous region,  [ ]thigh region, [ ]Calf region    Estimated Needs:   [x ] no change since previous assessment  [ ] recalculated:     Current Nutrition Diagnosis:  Pt remains at nutrition risk secondary to increased nutrient needs related to increased physiologic demand with healing as evidenced by pt s/p diverting ileostomy and mild muscle wasting noted at temporal region        Recommendations: Continue supplements as ordered  Monitoring and Evaluation:   [x ] PO intake [x ] Tolerance to diet prescription [X] Weights  [X] Follow up per protocol [X] Labs:

## 2017-12-27 LAB
ANION GAP SERPL CALC-SCNC: 11 MMOL/L — SIGNIFICANT CHANGE UP (ref 5–17)
BUN SERPL-MCNC: 5 MG/DL — LOW (ref 8–20)
CALCIUM SERPL-MCNC: 7.3 MG/DL — LOW (ref 8.6–10.2)
CHLORIDE SERPL-SCNC: 103 MMOL/L — SIGNIFICANT CHANGE UP (ref 98–107)
CO2 SERPL-SCNC: 24 MMOL/L — SIGNIFICANT CHANGE UP (ref 22–29)
CREAT SERPL-MCNC: 0.61 MG/DL — SIGNIFICANT CHANGE UP (ref 0.5–1.3)
GLUCOSE SERPL-MCNC: 137 MG/DL — HIGH (ref 70–115)
POTASSIUM SERPL-MCNC: 3.9 MMOL/L — SIGNIFICANT CHANGE UP (ref 3.5–5.3)
POTASSIUM SERPL-SCNC: 3.9 MMOL/L — SIGNIFICANT CHANGE UP (ref 3.5–5.3)
SODIUM SERPL-SCNC: 138 MMOL/L — SIGNIFICANT CHANGE UP (ref 135–145)

## 2017-12-27 PROCEDURE — 99233 SBSQ HOSP IP/OBS HIGH 50: CPT

## 2017-12-27 PROCEDURE — 99232 SBSQ HOSP IP/OBS MODERATE 35: CPT

## 2017-12-27 RX ADMIN — FLUTICASONE PROPIONATE AND SALMETEROL 1 DOSE(S): 50; 250 POWDER ORAL; RESPIRATORY (INHALATION) at 08:31

## 2017-12-27 RX ADMIN — CHOLESTYRAMINE 4 GRAM(S): 4 POWDER, FOR SUSPENSION ORAL at 22:50

## 2017-12-27 RX ADMIN — Medication 50 MICROGRAM(S): at 06:47

## 2017-12-27 RX ADMIN — Medication 81 MILLIGRAM(S): at 12:45

## 2017-12-27 RX ADMIN — CHOLESTYRAMINE 4 GRAM(S): 4 POWDER, FOR SUSPENSION ORAL at 01:56

## 2017-12-27 RX ADMIN — OCTREOTIDE ACETATE 100 MICROGRAM(S): 200 INJECTION, SOLUTION INTRAVENOUS; SUBCUTANEOUS at 23:42

## 2017-12-27 RX ADMIN — Medication 1 TABLET(S): at 12:45

## 2017-12-27 RX ADMIN — DIPHENHYDRAMINE HYDROCHLORIDE AND LIDOCAINE HYDROCHLORIDE AND ALUMINUM HYDROXIDE AND MAGNESIUM HYDRO 10 MILLILITER(S): KIT at 18:42

## 2017-12-27 RX ADMIN — Medication 180 MILLIGRAM(S): at 18:42

## 2017-12-27 RX ADMIN — Medication 1 TABLET(S): at 08:30

## 2017-12-27 RX ADMIN — LORATADINE 10 MILLIGRAM(S): 10 TABLET ORAL at 12:46

## 2017-12-27 RX ADMIN — Medication 1 TABLET(S): at 08:31

## 2017-12-27 RX ADMIN — Medication 1 TABLET(S): at 03:20

## 2017-12-27 RX ADMIN — Medication 1 TABLET(S): at 20:30

## 2017-12-27 RX ADMIN — CHOLESTYRAMINE 4 GRAM(S): 4 POWDER, FOR SUSPENSION ORAL at 08:30

## 2017-12-27 RX ADMIN — Medication 1 TABLET(S): at 18:42

## 2017-12-27 RX ADMIN — CLOPIDOGREL BISULFATE 75 MILLIGRAM(S): 75 TABLET, FILM COATED ORAL at 12:45

## 2017-12-27 RX ADMIN — Medication 1 TABLET(S): at 23:42

## 2017-12-27 RX ADMIN — Medication 0.5 MILLIGRAM(S): at 20:30

## 2017-12-27 RX ADMIN — PANTOPRAZOLE SODIUM 40 MILLIGRAM(S): 20 TABLET, DELAYED RELEASE ORAL at 06:47

## 2017-12-27 RX ADMIN — NYSTATIN CREAM 1 APPLICATION(S): 100000 CREAM TOPICAL at 06:55

## 2017-12-27 RX ADMIN — OCTREOTIDE ACETATE 100 MICROGRAM(S): 200 INJECTION, SOLUTION INTRAVENOUS; SUBCUTANEOUS at 06:53

## 2017-12-27 RX ADMIN — TIOTROPIUM BROMIDE 1 CAPSULE(S): 18 CAPSULE ORAL; RESPIRATORY (INHALATION) at 12:03

## 2017-12-27 RX ADMIN — Medication 1 TABLET(S): at 23:41

## 2017-12-27 RX ADMIN — FLUTICASONE PROPIONATE AND SALMETEROL 1 DOSE(S): 50; 250 POWDER ORAL; RESPIRATORY (INHALATION) at 20:32

## 2017-12-27 RX ADMIN — OCTREOTIDE ACETATE 100 MICROGRAM(S): 200 INJECTION, SOLUTION INTRAVENOUS; SUBCUTANEOUS at 18:42

## 2017-12-27 RX ADMIN — Medication 0.5 MILLIGRAM(S): at 06:52

## 2017-12-27 RX ADMIN — NYSTATIN CREAM 1 APPLICATION(S): 100000 CREAM TOPICAL at 18:42

## 2017-12-27 RX ADMIN — ATORVASTATIN CALCIUM 80 MILLIGRAM(S): 80 TABLET, FILM COATED ORAL at 23:41

## 2017-12-27 RX ADMIN — TAMSULOSIN HYDROCHLORIDE 0.4 MILLIGRAM(S): 0.4 CAPSULE ORAL at 23:41

## 2017-12-27 RX ADMIN — PANTOPRAZOLE SODIUM 40 MILLIGRAM(S): 20 TABLET, DELAYED RELEASE ORAL at 18:42

## 2017-12-27 RX ADMIN — DIPHENHYDRAMINE HYDROCHLORIDE AND LIDOCAINE HYDROCHLORIDE AND ALUMINUM HYDROXIDE AND MAGNESIUM HYDRO 10 MILLILITER(S): KIT at 06:47

## 2017-12-27 RX ADMIN — Medication 180 MILLIGRAM(S): at 06:47

## 2017-12-27 NOTE — PROGRESS NOTE ADULT - SUBJECTIVE AND OBJECTIVE BOX
INTERVAL HPI/OVERNIGHT EVENTS: Patient feels better. Had CT abdomen yesterday. Output mildly improved. Thicker consistency of stools.     MEDICATIONS  (STANDING):  ALBUTerol/ipratropium for Nebulization. 3 milliLiter(s) Nebulizer once  aspirin  chewable 81 milliGRAM(s) Oral daily  atorvastatin 80 milliGRAM(s) Oral at bedtime  cholestyramine Powder (Sugar-Free) 4 Gram(s) Oral two times a day  clopidogrel Tablet 75 milliGRAM(s) Oral daily  diltiazem    milliGRAM(s) Oral <User Schedule>  diphenoxylate/atropine 1 Tablet(s) Oral five times a day  enoxaparin Injectable 40 milliGRAM(s) SubCutaneous daily  FIRST- Mouthwash  BLM 10 milliLiter(s) Swish and Spit two times a day  fluticasone propionate/ salmeterol 250-50 MICROgram(s) Diskus 1 Dose(s) Inhalation two times a day  levothyroxine 50 MICROGram(s) Oral daily  loratadine 10 milliGRAM(s) Oral daily  multivitamin 1 Tablet(s) Oral daily  nystatin Powder 1 Application(s) Topical two times a day  octreotide  Injectable 100 MICROGram(s) SubCutaneous three times a day  pantoprazole   Suspension 40 milliGRAM(s) Oral two times a day before meals  potassium acid phosphate/sodium acid phosphate tablet (K-PHOS No. 2) 1 Tablet(s) Oral four times a day with meals  sodium chloride 0.9%. 1000 milliLiter(s) (125 mL/Hr) IV Continuous <Continuous>  tamsulosin 0.4 milliGRAM(s) Oral at bedtime  tiotropium 18 MICROgram(s) Capsule 1 Capsule(s) Inhalation daily    MEDICATIONS  (PRN):  acetaminophen  Suppository. 650 milliGRAM(s) Rectal every 6 hours PRN Moderate Pain (4 - 6)  acetylcysteine 20% Inhalation 1 milliLiter(s) Inhalation every 4 hours PRN poor cough clearance  ALBUTerol    0.083% 2.5 milliGRAM(s) Nebulizer every 4 hours PRN Shortness of Breath and/or Wheezing  ALPRAZolam 0.5 milliGRAM(s) Oral every 6 hours PRN Anxiety  ondansetron Injectable 4 milliGRAM(s) IV Push every 8 hours PRN Nausea and/or Vomiting      Allergies    codeine (Unknown)  penicillin (Unknown)    Intolerances    Symbicort (Short breath)      Vital Signs Last 24 Hrs  T(C): 37.2 (27 Dec 2017 05:11), Max: 37.6 (26 Dec 2017 15:38)  T(F): 98.9 (27 Dec 2017 05:11), Max: 99.7 (26 Dec 2017 15:38)  HR: 90 (27 Dec 2017 05:11) (89 - 114)  BP: 107/66 (27 Dec 2017 05:11) (91/58 - 114/59)  BP(mean): --  RR: 17 (27 Dec 2017 05:11) (17 - 20)  SpO2: 95% (27 Dec 2017 05:11) (93% - 95%)    LABS:                        10.5   10.6  )-----------( 350      ( 26 Dec 2017 06:49 )             33.5     12-27    138  |  103  |  5.0<L>  ----------------------------<  137<H>  3.9   |  24.0  |  0.61    Ca    7.3<L>      27 Dec 2017 06:29  Phos  2.0     12-26  Mg     1.6     12-26            RADIOLOGY & ADDITIONAL TESTS:

## 2017-12-27 NOTE — PROGRESS NOTE ADULT - SUBJECTIVE AND OBJECTIVE BOX
FRANDY BAUTISTA    [id number]    67y      Male    Patient is a 67y old  Male who presents with a chief complaint of SOB (15 Dec 2017 10:17)      INTERVAL HPI/OVERNIGHT EVENTS:    Patient is not having any complains, his Ileostomy  secretion are getting thicker, his out put is still high, he is feeling ok, has no complains like nausea, vomiting, dizziness.     REVIEW OF SYSTEMS:    CONSTITUTIONAL: No fever, has fatigue  RESPIRATORY: No cough, No shortness of breath  CARDIOVASCULAR: No chest pain, no palpitations.   GASTROINTESTINAL: No abdominal, No nausea, vomiting.   NEUROLOGICAL: No headaches,  loss of strength.  MISCELLANEOUS: No joint swelling or pain.       Vital Signs Last 24 Hrs  T(C): 37.2 (27 Dec 2017 05:11), Max: 37.6 (26 Dec 2017 15:38)  T(F): 98.9 (27 Dec 2017 05:11), Max: 99.7 (26 Dec 2017 15:38)  HR: 90 (27 Dec 2017 05:11) (89 - 114)  BP: 107/66 (27 Dec 2017 05:11) (91/58 - 114/59)  RR: 17 (27 Dec 2017 05:11) (17 - 20)  SpO2: 95% (27 Dec 2017 05:11) (93% - 95%)    PHYSICAL EXAM:    GENERAL: Elderly male looking comfortable  HEENT: PERRL, +EOMI  NECK: soft, Supple, No JVD,   CHEST/LUNG: Decrease air entry bilaterally; No wheezing  HEART: S1S2+, Regular rate and rhythm; No murmurs  ABDOMEN: Soft, Nontender, Bowel sounds present, s/p Ileostomy connected with drain  EXTREMITIES:  1+ Peripheral Pulses, No edema  SKIN: No rashes or lesions  NEURO: AAOX3, no focal deficits, no motor r sensory loss  PSYCH: normal mood      LABS:                        10.5   10.6  )-----------( 350      ( 26 Dec 2017 06:49 )             33.5     12-27    138  |  103  |  5.0<L>  ----------------------------<  137<H>  3.9   |  24.0  |  0.61    Ca    7.3<L>      27 Dec 2017 06:29  Phos  2.0     12-26  Mg     1.6     12-26              I&O's Summary    26 Dec 2017 07:01  -  27 Dec 2017 07:00  --------------------------------------------------------  IN: 1250 mL / OUT: 5200 mL / NET: -3950 mL        MEDICATIONS  (STANDING):  ALBUTerol/ipratropium for Nebulization. 3 milliLiter(s) Nebulizer once  aspirin  chewable 81 milliGRAM(s) Oral daily  atorvastatin 80 milliGRAM(s) Oral at bedtime  cholestyramine Powder (Sugar-Free) 4 Gram(s) Oral two times a day  clopidogrel Tablet 75 milliGRAM(s) Oral daily  diltiazem    milliGRAM(s) Oral <User Schedule>  diphenoxylate/atropine 1 Tablet(s) Oral five times a day  enoxaparin Injectable 40 milliGRAM(s) SubCutaneous daily  FIRST- Mouthwash  BLM 10 milliLiter(s) Swish and Spit two times a day  fluticasone propionate/ salmeterol 250-50 MICROgram(s) Diskus 1 Dose(s) Inhalation two times a day  levothyroxine 50 MICROGram(s) Oral daily  loratadine 10 milliGRAM(s) Oral daily  multivitamin 1 Tablet(s) Oral daily  nystatin Powder 1 Application(s) Topical two times a day  octreotide  Injectable 100 MICROGram(s) SubCutaneous three times a day  pantoprazole   Suspension 40 milliGRAM(s) Oral two times a day before meals  potassium acid phosphate/sodium acid phosphate tablet (K-PHOS No. 2) 1 Tablet(s) Oral four times a day with meals  sodium chloride 0.9%. 1000 milliLiter(s) (125 mL/Hr) IV Continuous <Continuous>  tamsulosin 0.4 milliGRAM(s) Oral at bedtime  tiotropium 18 MICROgram(s) Capsule 1 Capsule(s) Inhalation daily    MEDICATIONS  (PRN):  acetaminophen  Suppository. 650 milliGRAM(s) Rectal every 6 hours PRN Moderate Pain (4 - 6)  acetylcysteine 20% Inhalation 1 milliLiter(s) Inhalation every 4 hours PRN poor cough clearance  ALBUTerol    0.083% 2.5 milliGRAM(s) Nebulizer every 4 hours PRN Shortness of Breath and/or Wheezing  ALPRAZolam 0.5 milliGRAM(s) Oral every 6 hours PRN Anxiety  ondansetron Injectable 4 milliGRAM(s) IV Push every 8 hours PRN Nausea and/or Vomiting    < from: CT Abdomen and Pelvis w/ Oral Cont and w/ IV Cont (12.26.17 @ 16:47) >  Status post loop ileostomy. Few dilated jejunal loops, favor ileus,   follow-up clinically for partial obstruction. Details as above.    Transverse colon containing left diaphragmatic hernia. Decreased colonic   distention, enteric contrast seen in the rectum.    The basilar pneumonia stable. Other incidental findings as above.    Large bilateral scrotal hydrocele.      < end of copied text >

## 2017-12-27 NOTE — PROGRESS NOTE ADULT - ASSESSMENT
67 year old male with PMH of COPD stage 4 s/p right lung reduction in 2010 (on home oxygen 4L (sat 92-93%), HTN, CVA on 2014, DM, hypothyroidism, hydrocele, and nephrolithiasis who is admitted for a COPD exacerbation. Patient has been weaned off ventimask to 4 liters nasal cannula and respiratory status is stable. CTA of the chest was negative for PE but showed bilateral lower lobe infiltrates with mucous plugging for which he was started on Levaquin.  Respiratory status stable and steroids are being tapered. Hospital course complicated by constipation, which had not improved despite aggressive bowel regimen, laxatives and enemas. XRAY with large stool burden. CT with diaphragmatic hernia and patient was consequently taken to the OR for loop ileostomy under local anesthesia. Pt cont, to have copious amount in ileostomy. Pt is noted to have hyponatremia due to high output in ileostomy, started on IVF, along with potassium supplementation.      Plan:    1. High out put from Ileostomy: patient had constipation s/p loop ileostomy on 12/7 under local anesthesia, he has large volume output from the Ileostomy, imodium did not helped him, GI on board, they changed to lomotil, ileostomy secretion are getting thicker, he is tolerating diet, ostomy care as per ostomy RN,  will continue with cholestyramine, he has been continued with IV hydration, Ileostomy out is still high, but secretions are getting thicker, will continue to monitor I/Os, patient remained vitally stable, will increase IV fluid rate to 125ml/h and Octreotide to 100mcgs tid, PT involved and encouraged for oral hydration, has CT abdomen and Plevis done, results reviewed, will follow with GI.     2. Acute on chronic hypoxic respiratory failure secondary to COPD exacerbation, now resolved, on home oxygen (4 liters)  - bicarb elevated likely due to metabolic compensation for resp acidosis although patient refuses blood gases    - continue bronchodilators and prednisone tapered off.   - continue Mucomyst PRN  - completed course of Levaquin     3. CAP - completed course of Levaquin on 12/5     4. Hypertension - stable   - continue cardizem    5. Hyperlipidemia   - continue statin    6.hyponatremia: resolved, it was due to high ileostomy output.    Hypokalemia - replaced.   Hypophosphatemia: replaced.   Hypomagnesemia: replaced.     7. Scrotal edema chronic with urinary retention, no more retention, Roy if not able to urinate  - patient following with  as outpatient  - cont scrotal elevation    8. Right femoral artery dissection  - incidental finding on CT, no intervention as per vascular    9. DVT Prophylaxis - Lovenox

## 2017-12-28 PROCEDURE — 99233 SBSQ HOSP IP/OBS HIGH 50: CPT

## 2017-12-28 RX ORDER — OCTREOTIDE ACETATE 200 UG/ML
150 INJECTION, SOLUTION INTRAVENOUS; SUBCUTANEOUS EVERY 8 HOURS
Qty: 0 | Refills: 0 | Status: DISCONTINUED | OUTPATIENT
Start: 2017-12-28 | End: 2018-01-13

## 2017-12-28 RX ADMIN — DIPHENHYDRAMINE HYDROCHLORIDE AND LIDOCAINE HYDROCHLORIDE AND ALUMINUM HYDROXIDE AND MAGNESIUM HYDRO 10 MILLILITER(S): KIT at 17:43

## 2017-12-28 RX ADMIN — TAMSULOSIN HYDROCHLORIDE 0.4 MILLIGRAM(S): 0.4 CAPSULE ORAL at 21:15

## 2017-12-28 RX ADMIN — TIOTROPIUM BROMIDE 1 CAPSULE(S): 18 CAPSULE ORAL; RESPIRATORY (INHALATION) at 13:04

## 2017-12-28 RX ADMIN — Medication 1 TABLET(S): at 17:43

## 2017-12-28 RX ADMIN — CLOPIDOGREL BISULFATE 75 MILLIGRAM(S): 75 TABLET, FILM COATED ORAL at 13:01

## 2017-12-28 RX ADMIN — Medication 1 TABLET(S): at 13:01

## 2017-12-28 RX ADMIN — CHOLESTYRAMINE 4 GRAM(S): 4 POWDER, FOR SUSPENSION ORAL at 09:07

## 2017-12-28 RX ADMIN — Medication 1 TABLET(S): at 21:15

## 2017-12-28 RX ADMIN — FLUTICASONE PROPIONATE AND SALMETEROL 1 DOSE(S): 50; 250 POWDER ORAL; RESPIRATORY (INHALATION) at 09:07

## 2017-12-28 RX ADMIN — OCTREOTIDE ACETATE 150 MICROGRAM(S): 200 INJECTION, SOLUTION INTRAVENOUS; SUBCUTANEOUS at 21:15

## 2017-12-28 RX ADMIN — PANTOPRAZOLE SODIUM 40 MILLIGRAM(S): 20 TABLET, DELAYED RELEASE ORAL at 17:43

## 2017-12-28 RX ADMIN — Medication 1 TABLET(S): at 21:16

## 2017-12-28 RX ADMIN — PANTOPRAZOLE SODIUM 40 MILLIGRAM(S): 20 TABLET, DELAYED RELEASE ORAL at 06:05

## 2017-12-28 RX ADMIN — Medication 81 MILLIGRAM(S): at 13:01

## 2017-12-28 RX ADMIN — Medication 50 MICROGRAM(S): at 05:58

## 2017-12-28 RX ADMIN — OCTREOTIDE ACETATE 100 MICROGRAM(S): 200 INJECTION, SOLUTION INTRAVENOUS; SUBCUTANEOUS at 05:59

## 2017-12-28 RX ADMIN — Medication 1 TABLET(S): at 09:07

## 2017-12-28 RX ADMIN — Medication 1 TABLET(S): at 09:06

## 2017-12-28 RX ADMIN — OCTREOTIDE ACETATE 150 MICROGRAM(S): 200 INJECTION, SOLUTION INTRAVENOUS; SUBCUTANEOUS at 14:25

## 2017-12-28 RX ADMIN — CHOLESTYRAMINE 4 GRAM(S): 4 POWDER, FOR SUSPENSION ORAL at 21:16

## 2017-12-28 RX ADMIN — DIPHENHYDRAMINE HYDROCHLORIDE AND LIDOCAINE HYDROCHLORIDE AND ALUMINUM HYDROXIDE AND MAGNESIUM HYDRO 10 MILLILITER(S): KIT at 05:58

## 2017-12-28 RX ADMIN — Medication 180 MILLIGRAM(S): at 05:58

## 2017-12-28 RX ADMIN — NYSTATIN CREAM 1 APPLICATION(S): 100000 CREAM TOPICAL at 05:58

## 2017-12-28 RX ADMIN — Medication 0.5 MILLIGRAM(S): at 21:15

## 2017-12-28 RX ADMIN — ATORVASTATIN CALCIUM 80 MILLIGRAM(S): 80 TABLET, FILM COATED ORAL at 21:15

## 2017-12-28 RX ADMIN — Medication 180 MILLIGRAM(S): at 17:43

## 2017-12-28 RX ADMIN — LORATADINE 10 MILLIGRAM(S): 10 TABLET ORAL at 13:02

## 2017-12-28 RX ADMIN — SODIUM CHLORIDE 125 MILLILITER(S): 9 INJECTION INTRAMUSCULAR; INTRAVENOUS; SUBCUTANEOUS at 09:06

## 2017-12-28 RX ADMIN — NYSTATIN CREAM 1 APPLICATION(S): 100000 CREAM TOPICAL at 17:43

## 2017-12-28 RX ADMIN — FLUTICASONE PROPIONATE AND SALMETEROL 1 DOSE(S): 50; 250 POWDER ORAL; RESPIRATORY (INHALATION) at 21:17

## 2017-12-28 NOTE — PROGRESS NOTE ADULT - ASSESSMENT
67 year old male with PMH of COPD stage 4 s/p right lung reduction in 2010 (on home oxygen 4L (sat 92-93%), HTN, CVA on 2014, DM, hypothyroidism, hydrocele, and nephrolithiasis who is admitted for a COPD exacerbation. Patient has been weaned off ventimask to 4 liters nasal cannula and respiratory status is stable. CTA of the chest was negative for PE but showed bilateral lower lobe infiltrates with mucous plugging for which he was started on Levaquin.  Respiratory status stable and steroids are being tapered. Hospital course complicated by constipation, which had not improved despite aggressive bowel regimen, laxatives and enemas. XRAY with large stool burden. CT with diaphragmatic hernia and patient was consequently taken to the OR for loop ileostomy under local anesthesia. Pt cont, to have copious amount in ileostomy. Pt is noted to have hyponatremia due to high output in ileostomy, started on IVF, along with potassium supplementation.      Plan:    1. High out put from Ileostomy: patient had constipation s/p loop ileostomy on 12/7 under local anesthesia, he has large volume output from the Ileostomy, imodium did not helped him, GI on board, they changed to lomotil, ileostomy secretion are getting thicker, he is tolerating diet, ostomy care as per ostomy RN,  will continue with cholestyramine, he has been continued with IV hydration, Ileostomy out is still high, but secretions are getting thicker, will continue to monitor I/Os, patient remained vitally stable, will increase IV fluid rate to 125ml/h and Octreotide to 100mcgs tid, PT involved and encouraged for oral hydration, has CT abdomen and Plevis done, results reviewed, will follow with GI.     2. Acute on chronic hypoxic respiratory failure secondary to COPD exacerbation, now resolved, on home oxygen (4 liters)  - bicarb elevated likely due to metabolic compensation for resp acidosis although patient refuses blood gases    - continue bronchodilators and prednisone tapered off.   - continue Mucomyst PRN  - completed course of Levaquin     3. CAP - completed course of Levaquin on 12/5     4. Hypertension - stable   - continue cardizem    5. Hyperlipidemia   - continue statin    6.hyponatremia: resolved, it was due to high ileostomy output.    Hypokalemia - replaced.   Hypophosphatemia: replaced.   Hypomagnesemia: replaced.     7. Scrotal edema chronic with urinary retention, no more retention, Roy if not able to urinate  - patient following with  as outpatient  - cont scrotal elevation    8. Right femoral artery dissection  - incidental finding on CT, no intervention as per vascular    9. DVT Prophylaxis - Lovenox 67 year old male with PMH of COPD stage 4 s/p right lung reduction in 2010 (on home oxygen 4L (sat 92-93%), HTN, CVA on 2014, DM, hypothyroidism, hydrocele, and nephrolithiasis who is admitted for a COPD exacerbation. Patient has been weaned off ventimask to 4 liters nasal cannula and respiratory status is stable. CTA of the chest was negative for PE but showed bilateral lower lobe infiltrates with mucous plugging for which he was started on Levaquin.  Respiratory status stable and steroids are being tapered. Hospital course complicated by constipation, which had not improved despite aggressive bowel regimen, laxatives and enemas. XRAY with large stool burden. CT with diaphragmatic hernia and patient was consequently taken to the OR for loop ileostomy under local anesthesia. Pt cont, to have copious amount in ileostomy. Pt is noted to have hyponatremia due to high output in ileostomy, started on IVF, along with potassium supplementation.      Plan:    1. High out put from Ileostomy: patient had constipation s/p loop ileostomy on 12/7 under local anesthesia, he has large volume output from the Ileostomy, imodium did not helped him, GI on board, they changed to lomotil, ileostomy secretion are getting thicker, he is tolerating diet, ostomy care as per ostomy RN,  will continue with cholestyramine, he has been continued with IV hydration, Ileostomy out is still high, but secretions are getting thicker, will continue to monitor I/Os, patient remained vitally stable, continued IV fluid rate to 125ml/h and Gi has increased Octreotide to 150mcgs tid, PT involved and encouraged for oral hydration, has CT abdomen and Plevis done, results reviewed, will follow with GI.     2. Acute on chronic hypoxic respiratory failure secondary to COPD exacerbation, now resolved, on home oxygen (4 liters), bicarb elevated likely due to metabolic compensation for resp acidosis although patient refuses blood gases, continue bronchodilators and prednisone tapered off.   - continue Mucomyst PRN  - completed course of Levaquin     3. CAP - completed course of Levaquin on 12/5.     4. Hypertension - stable   - continue cardizem    5. Hyperlipidemia   - continue statin    6.hyponatremia: resolved, it was due to high ileostomy output.    Hypokalemia - replaced.   Hypophosphatemia: replaced.   Hypomagnesemia: replaced.     7. Scrotal edema chronic with urinary retention, no more retention, Roy if not able to urinate  - patient following with  as outpatient  - cont scrotal elevation    8. Right femoral artery dissection  - incidental finding on CT, no intervention as per vascular    9. DVT Prophylaxis - Lovenox

## 2017-12-28 NOTE — PROGRESS NOTE ADULT - SUBJECTIVE AND OBJECTIVE BOX
Patient seen and examined;  feels ok.  Appetite has returned.  Ileostomy output at 6800 cc/ 24 hrs.    Still on regular diet with supplements.    Still on Octreotide at 100 ucg/ day and Lomotil 1 tab 5x/ day.  Questran 4 gm qd.    Denies pain or fever;  IVF: NS at 125 cc/hr.  Labs all OK.      PAST MEDICAL & SURGICAL HISTORY:  Hypothyroidism  Hydrocele  Renal calculi  Diabetes mellitus  Hypertension  CVA (cerebral vascular accident)  Chronic obstructive pulmonary disease, unspecified COPD type  History of lung surgery      ROS:  No Heartburn, regurgitation, dysphagia, odynophagia.  No dyspepsia  No abdominal pain.    No Nausea, vomiting.  No Bleeding.  No hematemesis.   No diarrhea.    No hematochesia.  No weight loss, anorexia.  No edema.      MEDICATIONS  (STANDING):  ALBUTerol/ipratropium for Nebulization. 3 milliLiter(s) Nebulizer once  aspirin  chewable 81 milliGRAM(s) Oral daily  atorvastatin 80 milliGRAM(s) Oral at bedtime  cholestyramine Powder (Sugar-Free) 4 Gram(s) Oral two times a day  clopidogrel Tablet 75 milliGRAM(s) Oral daily  diltiazem    milliGRAM(s) Oral <User Schedule>  diphenoxylate/atropine 1 Tablet(s) Oral five times a day  enoxaparin Injectable 40 milliGRAM(s) SubCutaneous daily  FIRST- Mouthwash  BLM 10 milliLiter(s) Swish and Spit two times a day  fluticasone propionate/ salmeterol 250-50 MICROgram(s) Diskus 1 Dose(s) Inhalation two times a day  levothyroxine 50 MICROGram(s) Oral daily  loratadine 10 milliGRAM(s) Oral daily  multivitamin 1 Tablet(s) Oral daily  nystatin Powder 1 Application(s) Topical two times a day  octreotide  Injectable 150 MICROGram(s) SubCutaneous every 8 hours  pantoprazole   Suspension 40 milliGRAM(s) Oral two times a day before meals  potassium acid phosphate/sodium acid phosphate tablet (K-PHOS No. 2) 1 Tablet(s) Oral four times a day with meals  sodium chloride 0.9%. 1000 milliLiter(s) (125 mL/Hr) IV Continuous <Continuous>  tamsulosin 0.4 milliGRAM(s) Oral at bedtime  tiotropium 18 MICROgram(s) Capsule 1 Capsule(s) Inhalation daily    MEDICATIONS  (PRN):  acetaminophen  Suppository. 650 milliGRAM(s) Rectal every 6 hours PRN Moderate Pain (4 - 6)  acetylcysteine 20% Inhalation 1 milliLiter(s) Inhalation every 4 hours PRN poor cough clearance  ALBUTerol    0.083% 2.5 milliGRAM(s) Nebulizer every 4 hours PRN Shortness of Breath and/or Wheezing  ALPRAZolam 0.5 milliGRAM(s) Oral every 6 hours PRN Anxiety  ondansetron Injectable 4 milliGRAM(s) IV Push every 8 hours PRN Nausea and/or Vomiting      Allergies    codeine (Unknown)  penicillin (Unknown)    Intolerances    Symbicort (Short breath)      Vital Signs Last 24 Hrs  T(C): 36.9 (28 Dec 2017 05:13), Max: 37.1 (27 Dec 2017 15:54)  T(F): 98.5 (28 Dec 2017 05:13), Max: 98.8 (27 Dec 2017 15:54)  HR: 99 (28 Dec 2017 05:13) (90 - 112)  BP: 103/60 (28 Dec 2017 05:13) (92/64 - 105/55)  BP(mean): --  RR: 18 (28 Dec 2017 05:13) (18 - 20)  SpO2: 96% (28 Dec 2017 05:13) (92% - 96%)    PHYSICAL EXAM:    GENERAL: NAD, well-groomed, well-developed  HEAD:  Atraumatic, Normocephalic  EYES: EOMI, PERRLA, conjunctiva and sclera clear  ENMT: No tonsillar erythema, exudates, or enlargement; Moist mucous membranes, Good dentition, No lesions  NECK: Supple, No JVD, Normal thyroid  CHEST/LUNG: Clear to percussion bilaterally; No rales, rhonchi, wheezing, or rubs  HEART: Regular rate and rhythm; No murmurs, rubs, or gallops  ABDOMEN: Soft, Nontender, Nondistended; Bowel sounds present;  Ileostomy intact;  Generous output.    EXTREMITIES:  2+ Peripheral Pulses, No clubbing, cyanosis, or edema  LYMPH: No lymphadenopathy noted  SKIN: No rashes or lesions      LABS:    12-27    138  |  103  |  5.0<L>  ----------------------------<  137<H>  3.9   |  24.0  |  0.61    Ca    7.3<L>      27 Dec 2017 06:29               RADIOLOGY & ADDITIONAL STUDIES:  CT:  No obstruction.  Intact ileostomy.  Contrast to lieostomy and rectum.  Trnsverse colon still within the HH.

## 2017-12-28 NOTE — PROGRESS NOTE ADULT - SUBJECTIVE AND OBJECTIVE BOX
FRANDY BAUTISTA    [id number]    67y      Male    Patient is a 67y old  Male who presents with a chief complaint of SOB (15 Dec 2017 10:17)      INTERVAL HPI/OVERNIGHT EVENTS:    Patient is not having any complains, his Ileostomy  secretion are getting thicker, his out put is still high, he is feeling ok, has no complains like nausea, vomiting, dizziness.     REVIEW OF SYSTEMS:    CONSTITUTIONAL: No fever, has fatigue  RESPIRATORY: No cough, No shortness of breath  CARDIOVASCULAR: No chest pain, no palpitations.   GASTROINTESTINAL: No abdominal, No nausea, vomiting.   NEUROLOGICAL: No headaches,  loss of strength.  MISCELLANEOUS: No joint swelling or pain.       Vital Signs Last 24 Hrs  T(C): 36.6 (28 Dec 2017 15:41), Max: 37 (27 Dec 2017 23:00)  T(F): 97.9 (28 Dec 2017 15:41), Max: 98.6 (27 Dec 2017 23:00)  HR: 105 (28 Dec 2017 15:41) (99 - 105)  BP: 93/62 (28 Dec 2017 15:41) (93/62 - 104/66)  BP(mean): --  RR: 18 (28 Dec 2017 15:41) (18 - 20)  SpO2: 98% (28 Dec 2017 09:39) (96% - 98%)    PHYSICAL EXAM:    GENERAL: Elderly male looking comfortable  HEENT: PERRL, +EOMI  NECK: soft, Supple, No JVD,   CHEST/LUNG: Decrease air entry bilaterally; No wheezing  HEART: S1S2+, Regular rate and rhythm; No murmurs  ABDOMEN: Soft, Nontender, Bowel sounds present, s/p Ileostomy connected with drain  EXTREMITIES:  1+ Peripheral Pulses, No edema  SKIN: No rashes or lesions  NEURO: AAOX3, no focal deficits, no motor r sensory loss  PSYCH: normal mood      LABS:    12-27    138  |  103  |  5.0<L>  ----------------------------<  137<H>  3.9   |  24.0  |  0.61    Ca    7.3<L>      27 Dec 2017 06:29              I&O's Summary    27 Dec 2017 07:01  -  28 Dec 2017 07:00  --------------------------------------------------------  IN: 3000 mL / OUT: 7700 mL / NET: -4700 mL    28 Dec 2017 07:01  -  28 Dec 2017 16:49  --------------------------------------------------------  IN: 0 mL / OUT: 3400 mL / NET: -3400 mL        MEDICATIONS  (STANDING):  ALBUTerol/ipratropium for Nebulization. 3 milliLiter(s) Nebulizer once  aspirin  chewable 81 milliGRAM(s) Oral daily  atorvastatin 80 milliGRAM(s) Oral at bedtime  cholestyramine Powder (Sugar-Free) 4 Gram(s) Oral two times a day  clopidogrel Tablet 75 milliGRAM(s) Oral daily  diltiazem    milliGRAM(s) Oral <User Schedule>  diphenoxylate/atropine 1 Tablet(s) Oral five times a day  enoxaparin Injectable 40 milliGRAM(s) SubCutaneous daily  FIRST- Mouthwash  BLM 10 milliLiter(s) Swish and Spit two times a day  fluticasone propionate/ salmeterol 250-50 MICROgram(s) Diskus 1 Dose(s) Inhalation two times a day  levothyroxine 50 MICROGram(s) Oral daily  loratadine 10 milliGRAM(s) Oral daily  multivitamin 1 Tablet(s) Oral daily  nystatin Powder 1 Application(s) Topical two times a day  octreotide  Injectable 150 MICROGram(s) SubCutaneous every 8 hours  pantoprazole   Suspension 40 milliGRAM(s) Oral two times a day before meals  potassium acid phosphate/sodium acid phosphate tablet (K-PHOS No. 2) 1 Tablet(s) Oral four times a day with meals  sodium chloride 0.9%. 1000 milliLiter(s) (125 mL/Hr) IV Continuous <Continuous>  tamsulosin 0.4 milliGRAM(s) Oral at bedtime  tiotropium 18 MICROgram(s) Capsule 1 Capsule(s) Inhalation daily    MEDICATIONS  (PRN):  acetaminophen  Suppository. 650 milliGRAM(s) Rectal every 6 hours PRN Moderate Pain (4 - 6)  acetylcysteine 20% Inhalation 1 milliLiter(s) Inhalation every 4 hours PRN poor cough clearance  ALBUTerol    0.083% 2.5 milliGRAM(s) Nebulizer every 4 hours PRN Shortness of Breath and/or Wheezing  ALPRAZolam 0.5 milliGRAM(s) Oral every 6 hours PRN Anxiety  ondansetron Injectable 4 milliGRAM(s) IV Push every 8 hours PRN Nausea and/or Vomiting FRANDY BAUTISTA    [id number]    67y      Male    Patient is a 67y old  Male who presents with a chief complaint of SOB (15 Dec 2017 10:17)      INTERVAL HPI/OVERNIGHT EVENTS:    Patient is not having any complains, his Ileostomy secretion are thicker, his out put is still high, he is feeling ok, has no complains like nausea, vomiting, dizziness.     REVIEW OF SYSTEMS:    CONSTITUTIONAL: No fever, has fatigue  RESPIRATORY: No cough, No shortness of breath  CARDIOVASCULAR: No chest pain, no palpitations.   GASTROINTESTINAL: No abdominal, No nausea, vomiting.   NEUROLOGICAL: No headaches,  loss of strength.  MISCELLANEOUS: No joint swelling or pain.       Vital Signs Last 24 Hrs  T(C): 36.6 (28 Dec 2017 15:41), Max: 37 (27 Dec 2017 23:00)  T(F): 97.9 (28 Dec 2017 15:41), Max: 98.6 (27 Dec 2017 23:00)  HR: 105 (28 Dec 2017 15:41) (99 - 105)  BP: 93/62 (28 Dec 2017 15:41) (93/62 - 104/66)  RR: 18 (28 Dec 2017 15:41) (18 - 20)  SpO2: 98% (28 Dec 2017 09:39) (96% - 98%)    PHYSICAL EXAM:    GENERAL: Elderly male looking comfortable  HEENT: PERRL, +EOMI  NECK: soft, Supple, No JVD,   CHEST/LUNG: Decrease air entry bilaterally; No wheezing  HEART: S1S2+, Regular rate and rhythm; No murmurs  ABDOMEN: Soft, Nontender, Bowel sounds present, s/p Ileostomy connected with drain  EXTREMITIES:  1+ Peripheral Pulses, No edema  SKIN: No rashes or lesions  NEURO: AAOX3, no focal deficits, no motor r sensory loss  PSYCH: normal mood      LABS:    12-27    138  |  103  |  5.0<L>  ----------------------------<  137<H>  3.9   |  24.0  |  0.61    Ca    7.3<L>      27 Dec 2017 06:29              I&O's Summary    27 Dec 2017 07:01  -  28 Dec 2017 07:00  --------------------------------------------------------  IN: 3000 mL / OUT: 7700 mL / NET: -4700 mL    28 Dec 2017 07:01  -  28 Dec 2017 16:49  --------------------------------------------------------  IN: 0 mL / OUT: 3400 mL / NET: -3400 mL        MEDICATIONS  (STANDING):  ALBUTerol/ipratropium for Nebulization. 3 milliLiter(s) Nebulizer once  aspirin  chewable 81 milliGRAM(s) Oral daily  atorvastatin 80 milliGRAM(s) Oral at bedtime  cholestyramine Powder (Sugar-Free) 4 Gram(s) Oral two times a day  clopidogrel Tablet 75 milliGRAM(s) Oral daily  diltiazem    milliGRAM(s) Oral <User Schedule>  diphenoxylate/atropine 1 Tablet(s) Oral five times a day  enoxaparin Injectable 40 milliGRAM(s) SubCutaneous daily  FIRST- Mouthwash  BLM 10 milliLiter(s) Swish and Spit two times a day  fluticasone propionate/ salmeterol 250-50 MICROgram(s) Diskus 1 Dose(s) Inhalation two times a day  levothyroxine 50 MICROGram(s) Oral daily  loratadine 10 milliGRAM(s) Oral daily  multivitamin 1 Tablet(s) Oral daily  nystatin Powder 1 Application(s) Topical two times a day  octreotide  Injectable 150 MICROGram(s) SubCutaneous every 8 hours  pantoprazole   Suspension 40 milliGRAM(s) Oral two times a day before meals  potassium acid phosphate/sodium acid phosphate tablet (K-PHOS No. 2) 1 Tablet(s) Oral four times a day with meals  sodium chloride 0.9%. 1000 milliLiter(s) (125 mL/Hr) IV Continuous <Continuous>  tamsulosin 0.4 milliGRAM(s) Oral at bedtime  tiotropium 18 MICROgram(s) Capsule 1 Capsule(s) Inhalation daily    MEDICATIONS  (PRN):  acetaminophen  Suppository. 650 milliGRAM(s) Rectal every 6 hours PRN Moderate Pain (4 - 6)  acetylcysteine 20% Inhalation 1 milliLiter(s) Inhalation every 4 hours PRN poor cough clearance  ALBUTerol    0.083% 2.5 milliGRAM(s) Nebulizer every 4 hours PRN Shortness of Breath and/or Wheezing  ALPRAZolam 0.5 milliGRAM(s) Oral every 6 hours PRN Anxiety  ondansetron Injectable 4 milliGRAM(s) IV Push every 8 hours PRN Nausea and/or Vomiting

## 2017-12-28 NOTE — PROGRESS NOTE ADULT - ASSESSMENT
Persistent high output from il;eostomy.  Will change diet to low residue, regular.  OK to continue with the Ensure Enlive supplements.    Will increase the Octreotide to 150 ucg sq TID.  Will continue to monitor BW and ileostomy output.

## 2017-12-29 LAB
ANION GAP SERPL CALC-SCNC: 9 MMOL/L — SIGNIFICANT CHANGE UP (ref 5–17)
BUN SERPL-MCNC: 5 MG/DL — LOW (ref 8–20)
CALCIUM SERPL-MCNC: 7 MG/DL — LOW (ref 8.6–10.2)
CHLORIDE SERPL-SCNC: 103 MMOL/L — SIGNIFICANT CHANGE UP (ref 98–107)
CO2 SERPL-SCNC: 25 MMOL/L — SIGNIFICANT CHANGE UP (ref 22–29)
CREAT SERPL-MCNC: 0.52 MG/DL — SIGNIFICANT CHANGE UP (ref 0.5–1.3)
GLUCOSE SERPL-MCNC: 130 MG/DL — HIGH (ref 70–115)
HCT VFR BLD CALC: 30.7 % — LOW (ref 42–52)
HGB BLD-MCNC: 9.4 G/DL — LOW (ref 14–18)
MCHC RBC-ENTMCNC: 27.2 PG — SIGNIFICANT CHANGE UP (ref 27–31)
MCHC RBC-ENTMCNC: 30.6 G/DL — LOW (ref 32–36)
MCV RBC AUTO: 88.7 FL — SIGNIFICANT CHANGE UP (ref 80–94)
PLATELET # BLD AUTO: 293 K/UL — SIGNIFICANT CHANGE UP (ref 150–400)
POTASSIUM SERPL-MCNC: 3.2 MMOL/L — LOW (ref 3.5–5.3)
POTASSIUM SERPL-SCNC: 3.2 MMOL/L — LOW (ref 3.5–5.3)
RBC # BLD: 3.46 M/UL — LOW (ref 4.6–6.2)
RBC # FLD: 15.2 % — SIGNIFICANT CHANGE UP (ref 11–15.6)
SODIUM SERPL-SCNC: 137 MMOL/L — SIGNIFICANT CHANGE UP (ref 135–145)
WBC # BLD: 8.5 K/UL — SIGNIFICANT CHANGE UP (ref 4.8–10.8)
WBC # FLD AUTO: 8.5 K/UL — SIGNIFICANT CHANGE UP (ref 4.8–10.8)

## 2017-12-29 PROCEDURE — 99233 SBSQ HOSP IP/OBS HIGH 50: CPT

## 2017-12-29 RX ORDER — LOPERAMIDE HCL 2 MG
2 TABLET ORAL
Qty: 0 | Refills: 0 | Status: DISCONTINUED | OUTPATIENT
Start: 2017-12-29 | End: 2017-12-30

## 2017-12-29 RX ORDER — POTASSIUM CHLORIDE 20 MEQ
40 PACKET (EA) ORAL ONCE
Qty: 0 | Refills: 0 | Status: DISCONTINUED | OUTPATIENT
Start: 2017-12-29 | End: 2017-12-29

## 2017-12-29 RX ORDER — POTASSIUM CHLORIDE 20 MEQ
40 PACKET (EA) ORAL ONCE
Qty: 0 | Refills: 0 | Status: COMPLETED | OUTPATIENT
Start: 2017-12-29 | End: 2017-12-29

## 2017-12-29 RX ADMIN — ATORVASTATIN CALCIUM 80 MILLIGRAM(S): 80 TABLET, FILM COATED ORAL at 22:03

## 2017-12-29 RX ADMIN — CHOLESTYRAMINE 4 GRAM(S): 4 POWDER, FOR SUSPENSION ORAL at 22:03

## 2017-12-29 RX ADMIN — DIPHENHYDRAMINE HYDROCHLORIDE AND LIDOCAINE HYDROCHLORIDE AND ALUMINUM HYDROXIDE AND MAGNESIUM HYDRO 10 MILLILITER(S): KIT at 17:41

## 2017-12-29 RX ADMIN — OCTREOTIDE ACETATE 150 MICROGRAM(S): 200 INJECTION, SOLUTION INTRAVENOUS; SUBCUTANEOUS at 14:18

## 2017-12-29 RX ADMIN — PANTOPRAZOLE SODIUM 40 MILLIGRAM(S): 20 TABLET, DELAYED RELEASE ORAL at 05:06

## 2017-12-29 RX ADMIN — Medication 180 MILLIGRAM(S): at 17:41

## 2017-12-29 RX ADMIN — Medication 1 TABLET(S): at 10:18

## 2017-12-29 RX ADMIN — OCTREOTIDE ACETATE 150 MICROGRAM(S): 200 INJECTION, SOLUTION INTRAVENOUS; SUBCUTANEOUS at 05:06

## 2017-12-29 RX ADMIN — Medication 0.5 MILLIGRAM(S): at 10:26

## 2017-12-29 RX ADMIN — TAMSULOSIN HYDROCHLORIDE 0.4 MILLIGRAM(S): 0.4 CAPSULE ORAL at 22:04

## 2017-12-29 RX ADMIN — CHOLESTYRAMINE 4 GRAM(S): 4 POWDER, FOR SUSPENSION ORAL at 10:19

## 2017-12-29 RX ADMIN — Medication 0.5 MILLIGRAM(S): at 22:04

## 2017-12-29 RX ADMIN — FLUTICASONE PROPIONATE AND SALMETEROL 1 DOSE(S): 50; 250 POWDER ORAL; RESPIRATORY (INHALATION) at 22:03

## 2017-12-29 RX ADMIN — NYSTATIN CREAM 1 APPLICATION(S): 100000 CREAM TOPICAL at 17:42

## 2017-12-29 RX ADMIN — Medication 50 MICROGRAM(S): at 05:06

## 2017-12-29 RX ADMIN — Medication 40 MILLIEQUIVALENT(S): at 10:18

## 2017-12-29 RX ADMIN — Medication 1 TABLET(S): at 12:21

## 2017-12-29 RX ADMIN — TIOTROPIUM BROMIDE 1 CAPSULE(S): 18 CAPSULE ORAL; RESPIRATORY (INHALATION) at 14:08

## 2017-12-29 RX ADMIN — SODIUM CHLORIDE 125 MILLILITER(S): 9 INJECTION INTRAMUSCULAR; INTRAVENOUS; SUBCUTANEOUS at 22:08

## 2017-12-29 RX ADMIN — SODIUM CHLORIDE 125 MILLILITER(S): 9 INJECTION INTRAMUSCULAR; INTRAVENOUS; SUBCUTANEOUS at 12:21

## 2017-12-29 RX ADMIN — NYSTATIN CREAM 1 APPLICATION(S): 100000 CREAM TOPICAL at 05:15

## 2017-12-29 RX ADMIN — CLOPIDOGREL BISULFATE 75 MILLIGRAM(S): 75 TABLET, FILM COATED ORAL at 12:21

## 2017-12-29 RX ADMIN — OCTREOTIDE ACETATE 150 MICROGRAM(S): 200 INJECTION, SOLUTION INTRAVENOUS; SUBCUTANEOUS at 22:04

## 2017-12-29 RX ADMIN — Medication 2 MILLIGRAM(S): at 19:49

## 2017-12-29 RX ADMIN — PANTOPRAZOLE SODIUM 40 MILLIGRAM(S): 20 TABLET, DELAYED RELEASE ORAL at 17:41

## 2017-12-29 RX ADMIN — FLUTICASONE PROPIONATE AND SALMETEROL 1 DOSE(S): 50; 250 POWDER ORAL; RESPIRATORY (INHALATION) at 10:20

## 2017-12-29 RX ADMIN — LORATADINE 10 MILLIGRAM(S): 10 TABLET ORAL at 12:21

## 2017-12-29 RX ADMIN — Medication 81 MILLIGRAM(S): at 12:21

## 2017-12-29 RX ADMIN — DIPHENHYDRAMINE HYDROCHLORIDE AND LIDOCAINE HYDROCHLORIDE AND ALUMINUM HYDROXIDE AND MAGNESIUM HYDRO 10 MILLILITER(S): KIT at 05:05

## 2017-12-29 RX ADMIN — Medication 180 MILLIGRAM(S): at 05:05

## 2017-12-29 NOTE — PROGRESS NOTE ADULT - ASSESSMENT
67 year old male with PMH of COPD stage 4 s/p right lung reduction in 2010 (on home oxygen 4L (sat 92-93%), HTN, CVA on 2014, DM, hypothyroidism, hydrocele, and nephrolithiasis who is admitted for a COPD exacerbation. Patient has been weaned off ventimask to 4 liters nasal cannula and respiratory status is stable. CTA of the chest was negative for PE but showed bilateral lower lobe infiltrates with mucous plugging for which he was started on Levaquin.  Respiratory status stable and steroids are being tapered. Hospital course complicated by constipation, which had not improved despite aggressive bowel regimen, laxatives and enemas. XRAY with large stool burden. CT with diaphragmatic hernia and patient was consequently taken to the OR for loop ileostomy under local anesthesia. Pt cont, to have copious amount in ileostomy. Pt is noted to have hyponatremia due to high output in ileostomy, got IVF, hyponatremia and hypokalemia improved,   he has been having High out put from Ileostomy as patient had constipation s/p loop ileostomy on 12/7 under local anesthesia, he has large volume output from the Ileostomy, imodium did not helped him, GI on board, they changed to lomotil, ileostomy secretion are getting thicker, he is tolerating diet, ostomy care as per ostomy RN,  will continue with cholestyramine, he has been continued with IV hydration, Ileostomy out is still high, but secretions are getting thicker, will continue to monitor I/Os, patient remained vitally stable, continued IV fluid rate to 125ml/h and Gi has increased Octreotide to 150mcgs tid, PT involved and encouraged for oral hydration, has CT abdomen and Plevis done, results reviewed, will follow with GI. add Imodium at 2 mg po bid.      Plan:    1. High out put from Ileostomy: patient had constipation s/p loop ileostomy on 12/7 under local anesthesia, he has large volume output from the Ileostomy, imodium did not helped him, GI on board, they changed to lomotil, ileostomy secretion are getting thicker, he is tolerating diet, ostomy care as per ostomy RN,  will continue with cholestyramine, he has been continued with IV hydration, Ileostomy out is still high, but secretions are getting thicker, will continue to monitor I/Os, patient remained vitally stable, continued IV fluid rate to 125ml/h and Gi has increased Octreotide to 150mcgs tid, PT involved and encouraged for oral hydration, has CT abdomen and Plevis done, results reviewed, will follow with GI. add Imodium at 2 mg po bid.    2. Acute on chronic hypoxic respiratory failure secondary to COPD exacerbation, now resolved, on home oxygen (4 liters), bicarb elevated likely due to metabolic compensation for resp acidosis although patient refuses blood gases, continue bronchodilators and prednisone tapered off.   - continue Mucomyst PRN  - completed course of Levaquin     3. CAP - completed course of Levaquin on 12/5.     4. Hypertension - stable   - continue cardizem    5. Hyperlipidemia   - continue statin    6.hyponatremia: resolved, it was due to high ileostomy output.    Hypokalemia - replaced.   Hypophosphatemia: replaced.   Hypomagnesemia: replaced.     7. Scrotal edema chronic with urinary retention, no more retention, Roy if not able to urinate  - patient following with  as outpatient  - cont scrotal elevation    8. Right femoral artery dissection  - incidental finding on CT, no intervention as per vascular    9. DVT Prophylaxis - Lovenox 67 year old male with PMH of COPD stage 4 s/p right lung reduction in 2010 (on home oxygen 4L (sat 92-93%), HTN, CVA on 2014, DM, hypothyroidism, hydrocele, and nephrolithiasis who is admitted for a COPD exacerbation. Patient has been weaned off ventimask to 4 liters nasal cannula and respiratory status is stable. CTA of the chest was negative for PE but showed bilateral lower lobe infiltrates with mucous plugging for which he was started on Levaquin.  Respiratory status stable and steroids are being tapered. Hospital course complicated by constipation, which had not improved despite aggressive bowel regimen, laxatives and enemas. XRAY with large stool burden. CT with diaphragmatic hernia and patient was consequently taken to the OR for loop ileostomy under local anesthesia. Pt cont, to have copious amount in ileostomy. Pt is noted to have hyponatremia due to high output in ileostomy, got IVF, hyponatremia and hypokalemia improved, he has been having High out put from Ileostomy as patient had constipation s/p loop ileostomy on 12/7 under local anesthesia, he has large volume output from the Ileostomy, imodium did not helped him, GI on board, they changed to lomotil, ileostomy secretion are getting thicker, he is tolerating diet, ostomy care as per ostomy RN,  he is continued with cholestyramine, he has been continued with IV hydration, Ileostomy out is still high, but secretions are getting thicker, monitored, I/Os, being continued with IV fluid rate to 125ml/h and GI has increased Octreotide to 150mcgs tid,  has CT abdomen and Plevis done, results reviewed, GI add Imodium 2 mg po bid.   During the earlier part of the hospitalization patient was treated for acute on chronic hypoxic respiratory failure secondary to COPD exacerbation and CAP, was treated with steroids now tapered and duo nebs, SOB resolved, he uses home oxygen (4 liters), bicarb elevated likely due to metabolic compensation for resp acidosis  continue bronchodilators and prednisone tapered off and completed Levaquin as well.    His electrolytes monitored and replaced accordingly, he was foind to have right femoral artery dissection as incidental finding on CT, no intervention as per vascular, follow up with vascular clinic as out patient.     Plan:    1. High out put from Ileostomy: patient had constipation s/p loop ileostomy on 12/7 under local anesthesia, he has large volume output from the Ileostomy, imodium did not helped him, GI on board, they changed to lomotil, ileostomy secretion are getting thicker, he is tolerating diet, ostomy care as per ostomy RN,  will continue with cholestyramine, he has been continued with IV hydration, Ileostomy out is still high, but secretions are getting thicker, will continue to monitor I/Os, patient remained vitally stable, continued IV fluid rate to 125ml/h and Gi has increased Octreotide to 150mcgs tid, PT involved and encouraged for oral hydration, has CT abdomen and Plevis done, results reviewed, GI add Imodium 2 mg po bid.    2. Acute on chronic hypoxic respiratory failure secondary to COPD exacerbation, now resolved, on home oxygen (4 liters), bicarb elevated likely due to metabolic compensation for resp acidosis although patient refused blood gases, continue bronchodilators and prednisone tapered off.   - continue Mucomyst PRN  - completed course of Levaquin     3. CAP - completed course of Levaquin on 12/5.     4. Hypertension - stable   - continue cardizem    5. Hyperlipidemia   - continue statin    6.hyponatremia: resolved, it was due to high ileostomy output.    Hypokalemia - replaced.   Hypophosphatemia: replaced.   Hypomagnesemia: replaced.     7. Scrotal edema chronic with urinary retention, no more retention, Roy if not able to urinate  - patient following with  as outpatient  - cont scrotal elevation    8. Right femoral artery dissection  - incidental finding on CT, no intervention as per vascular    9. DVT Prophylaxis - Lovenox

## 2017-12-29 NOTE — PROGRESS NOTE ADULT - SUBJECTIVE AND OBJECTIVE BOX
Patient seen and examined;  chart reviewed.  Diet changed to Low residue.   Still on questran 4 gm BID;  Ileostomy output at 6550 for past 24 hrs.  No bleeding.  Off of the Lomotil.  Comfortable.  Minor cough.      PAST MEDICAL & SURGICAL HISTORY:  Hypothyroidism  Hydrocele  Renal calculi  Diabetes mellitus  Hypertension  CVA (cerebral vascular accident)  Chronic obstructive pulmonary disease, unspecified COPD type  History of lung surgery      ROS:  No Heartburn, regurgitation, dysphagia, odynophagia.  No dyspepsia  No abdominal pain.    No Nausea, vomiting.  No Bleeding.  No hematemesis.   No diarrhea.    No hematochesia.  No weight loss, anorexia.  No edema.      MEDICATIONS  (STANDING):  ALBUTerol/ipratropium for Nebulization. 3 milliLiter(s) Nebulizer once  aspirin  chewable 81 milliGRAM(s) Oral daily  atorvastatin 80 milliGRAM(s) Oral at bedtime  cholestyramine Powder (Sugar-Free) 4 Gram(s) Oral two times a day  clopidogrel Tablet 75 milliGRAM(s) Oral daily  diltiazem    milliGRAM(s) Oral <User Schedule>  enoxaparin Injectable 40 milliGRAM(s) SubCutaneous daily  FIRST- Mouthwash  BLM 10 milliLiter(s) Swish and Spit two times a day  fluticasone propionate/ salmeterol 250-50 MICROgram(s) Diskus 1 Dose(s) Inhalation two times a day  levothyroxine 50 MICROGram(s) Oral daily  loratadine 10 milliGRAM(s) Oral daily  multivitamin 1 Tablet(s) Oral daily  nystatin Powder 1 Application(s) Topical two times a day  octreotide  Injectable 150 MICROGram(s) SubCutaneous every 8 hours  pantoprazole   Suspension 40 milliGRAM(s) Oral two times a day before meals  potassium acid phosphate/sodium acid phosphate tablet (K-PHOS No. 2) 1 Tablet(s) Oral four times a day with meals  sodium chloride 0.9%. 1000 milliLiter(s) (125 mL/Hr) IV Continuous <Continuous>  tamsulosin 0.4 milliGRAM(s) Oral at bedtime  tiotropium 18 MICROgram(s) Capsule 1 Capsule(s) Inhalation daily    MEDICATIONS  (PRN):  acetaminophen  Suppository. 650 milliGRAM(s) Rectal every 6 hours PRN Moderate Pain (4 - 6)  acetylcysteine 20% Inhalation 1 milliLiter(s) Inhalation every 4 hours PRN poor cough clearance  ALBUTerol    0.083% 2.5 milliGRAM(s) Nebulizer every 4 hours PRN Shortness of Breath and/or Wheezing  ALPRAZolam 0.5 milliGRAM(s) Oral every 6 hours PRN Anxiety  ondansetron Injectable 4 milliGRAM(s) IV Push every 8 hours PRN Nausea and/or Vomiting      Allergies    codeine (Unknown)  penicillin (Unknown)    Intolerances    Symbicort (Short breath)      Vital Signs Last 24 Hrs  T(C): 36.7 (29 Dec 2017 04:45), Max: 36.8 (28 Dec 2017 22:44)  T(F): 98 (29 Dec 2017 04:45), Max: 98.2 (28 Dec 2017 22:44)  HR: 87 (29 Dec 2017 04:45) (87 - 105)  BP: 109/62 (29 Dec 2017 04:45) (93/62 - 109/62)  BP(mean): --  RR: 17 (29 Dec 2017 04:45) (17 - 18)  SpO2: 98% (29 Dec 2017 04:45) (98% - 98%)    PHYSICAL EXAM:    GENERAL: NAD, well-groomed, well-developed  HEAD:  Atraumatic, Normocephalic  EYES: EOMI, PERRLA, conjunctiva and sclera clear  ENMT: No tonsillar erythema, exudates, or enlargement; Moist mucous membranes, Good dentition, No lesions  NECK: Supple, No JVD, Normal thyroid  CHEST/LUNG: Clear to percussion bilaterally; No rales, rhonchi, wheezing, or rubs  HEART: Regular rate and rhythm; No murmurs, rubs, or gallops  ABDOMEN: Soft, Nontender, Nondistended; Bowel sounds present; intact ileostomy with red rubber tube around ileostomy, semiformed stool.    EXTREMITIES:  2+ Peripheral Pulses, No clubbing, cyanosis, or edema  LYMPH: No lymphadenopathy noted  SKIN: No rashes or lesions      LABS:                        9.4    8.5   )-----------( 293      ( 29 Dec 2017 06:27 )             30.7     12-29    137  |  103  |  5.0<L>  ----------------------------<  130<H>  3.2<L>   |  25.0  |  0.52    Ca    7.0<L>      29 Dec 2017 06:27               RADIOLOGY & ADDITIONAL STUDIES:

## 2017-12-29 NOTE — PROGRESS NOTE ADULT - ASSESSMENT
High out put from ileostomy.  Not much change with the diet adjustment and increased dose of the Octreotide.  Will add Imodium at 2 mg po bid. Lytes and BW will be followed.

## 2017-12-29 NOTE — PROGRESS NOTE ADULT - SUBJECTIVE AND OBJECTIVE BOX
FRANDY BAUTISTA    [id number]    67y      Male    Patient is a 67y old  Male who presents with a chief complaint of SOB (15 Dec 2017 10:17)      INTERVAL HPI/OVERNIGHT EVENTS:    Patient is not having any complains, out put is still high, he is feeling ok, has no complains like nausea, vomiting, dizziness.     REVIEW OF SYSTEMS:    CONSTITUTIONAL: No fever, has fatigue  RESPIRATORY: No cough, No shortness of breath  CARDIOVASCULAR: No chest pain, no palpitations.   GASTROINTESTINAL: No abdominal, No nausea, vomiting.   NEUROLOGICAL: No headaches,  loss of strength.  MISCELLANEOUS: No joint swelling or pain.       Vital Signs Last 24 Hrs  T(C): 36.2 (29 Dec 2017 16:39), Max: 36.8 (28 Dec 2017 22:44)  T(F): 97.1 (29 Dec 2017 16:39), Max: 98.3 (29 Dec 2017 11:00)  HR: 110 (29 Dec 2017 16:39) (87 - 110)  BP: 100/66 (29 Dec 2017 16:39) (100/66 - 109/62)  RR: 20 (29 Dec 2017 16:39) (17 - 20)  SpO2: 93% (29 Dec 2017 16:39) (93% - 98%)    PHYSICAL EXAM:    GENERAL: Elderly male looking comfortable  HEENT: PERRL, +EOMI  NECK: soft, Supple, No JVD,   CHEST/LUNG: Decrease air entry bilaterally; No wheezing  HEART: S1S2+, Regular rate and rhythm; No murmurs  ABDOMEN: Soft, Nontender, Bowel sounds present, s/p Ileostomy connected with drain  EXTREMITIES:  1+ Peripheral Pulses, No edema  SKIN: No rashes or lesions  NEURO: AAOX3, no focal deficits, no motor r sensory loss  PSYCH: normal mood    LABS:                        9.4    8.5   )-----------( 293      ( 29 Dec 2017 06:27 )             30.7     12-29    137  |  103  |  5.0<L>  ----------------------------<  130<H>  3.2<L>   |  25.0  |  0.52    Ca    7.0<L>      29 Dec 2017 06:27      28 Dec 2017 07:01  -  29 Dec 2017 07:00  --------------------------------------------------------  IN: 1500 mL / OUT: 7000 mL / NET: -5500 mL    29 Dec 2017 07:01  -  29 Dec 2017 18:40  --------------------------------------------------------  IN: 1650 mL / OUT: 3000 mL / NET: -1350 mL        MEDICATIONS  (STANDING):  ALBUTerol/ipratropium for Nebulization. 3 milliLiter(s) Nebulizer once  aspirin  chewable 81 milliGRAM(s) Oral daily  atorvastatin 80 milliGRAM(s) Oral at bedtime  cholestyramine Powder (Sugar-Free) 4 Gram(s) Oral two times a day  clopidogrel Tablet 75 milliGRAM(s) Oral daily  diltiazem    milliGRAM(s) Oral <User Schedule>  enoxaparin Injectable 40 milliGRAM(s) SubCutaneous daily  FIRST- Mouthwash  BLM 10 milliLiter(s) Swish and Spit two times a day  fluticasone propionate/ salmeterol 250-50 MICROgram(s) Diskus 1 Dose(s) Inhalation two times a day  levothyroxine 50 MICROGram(s) Oral daily  loperamide Solution 2 milliGRAM(s) Oral two times a day  loratadine 10 milliGRAM(s) Oral daily  multivitamin 1 Tablet(s) Oral daily  nystatin Powder 1 Application(s) Topical two times a day  octreotide  Injectable 150 MICROGram(s) SubCutaneous every 8 hours  pantoprazole   Suspension 40 milliGRAM(s) Oral two times a day before meals  sodium chloride 0.9%. 1000 milliLiter(s) (125 mL/Hr) IV Continuous <Continuous>  tamsulosin 0.4 milliGRAM(s) Oral at bedtime  tiotropium 18 MICROgram(s) Capsule 1 Capsule(s) Inhalation daily    MEDICATIONS  (PRN):  acetaminophen  Suppository. 650 milliGRAM(s) Rectal every 6 hours PRN Moderate Pain (4 - 6)  acetylcysteine 20% Inhalation 1 milliLiter(s) Inhalation every 4 hours PRN poor cough clearance  ALBUTerol    0.083% 2.5 milliGRAM(s) Nebulizer every 4 hours PRN Shortness of Breath and/or Wheezing  ALPRAZolam 0.5 milliGRAM(s) Oral every 6 hours PRN Anxiety  ondansetron Injectable 4 milliGRAM(s) IV Push every 8 hours PRN Nausea and/or Vomiting

## 2017-12-30 DIAGNOSIS — R19.7 DIARRHEA, UNSPECIFIED: ICD-10-CM

## 2017-12-30 LAB
ALBUMIN SERPL ELPH-MCNC: 2 G/DL — LOW (ref 3.3–5.2)
ALP SERPL-CCNC: 75 U/L — SIGNIFICANT CHANGE UP (ref 40–120)
ALT FLD-CCNC: 7 U/L — SIGNIFICANT CHANGE UP
ANION GAP SERPL CALC-SCNC: 11 MMOL/L — SIGNIFICANT CHANGE UP (ref 5–17)
AST SERPL-CCNC: 8 U/L — SIGNIFICANT CHANGE UP
BILIRUB SERPL-MCNC: 0.4 MG/DL — SIGNIFICANT CHANGE UP (ref 0.4–2)
BUN SERPL-MCNC: <3 MG/DL — LOW (ref 8–20)
CALCIUM SERPL-MCNC: 7.1 MG/DL — LOW (ref 8.6–10.2)
CHLORIDE SERPL-SCNC: 104 MMOL/L — SIGNIFICANT CHANGE UP (ref 98–107)
CO2 SERPL-SCNC: 26 MMOL/L — SIGNIFICANT CHANGE UP (ref 22–29)
CREAT SERPL-MCNC: 0.48 MG/DL — LOW (ref 0.5–1.3)
GLUCOSE SERPL-MCNC: 131 MG/DL — HIGH (ref 70–115)
HCT VFR BLD CALC: 31.2 % — LOW (ref 42–52)
HGB BLD-MCNC: 9.6 G/DL — LOW (ref 14–18)
MCHC RBC-ENTMCNC: 27.2 PG — SIGNIFICANT CHANGE UP (ref 27–31)
MCHC RBC-ENTMCNC: 30.8 G/DL — LOW (ref 32–36)
MCV RBC AUTO: 88.4 FL — SIGNIFICANT CHANGE UP (ref 80–94)
PLATELET # BLD AUTO: 299 K/UL — SIGNIFICANT CHANGE UP (ref 150–400)
POTASSIUM SERPL-MCNC: 3.1 MMOL/L — LOW (ref 3.5–5.3)
POTASSIUM SERPL-SCNC: 3.1 MMOL/L — LOW (ref 3.5–5.3)
PROT SERPL-MCNC: 5.4 G/DL — LOW (ref 6.6–8.7)
RBC # BLD: 3.53 M/UL — LOW (ref 4.6–6.2)
RBC # FLD: 15.3 % — SIGNIFICANT CHANGE UP (ref 11–15.6)
SODIUM SERPL-SCNC: 141 MMOL/L — SIGNIFICANT CHANGE UP (ref 135–145)
WBC # BLD: 7.4 K/UL — SIGNIFICANT CHANGE UP (ref 4.8–10.8)
WBC # FLD AUTO: 7.4 K/UL — SIGNIFICANT CHANGE UP (ref 4.8–10.8)

## 2017-12-30 PROCEDURE — 99233 SBSQ HOSP IP/OBS HIGH 50: CPT

## 2017-12-30 RX ORDER — LOPERAMIDE HCL 2 MG
2 TABLET ORAL
Qty: 0 | Refills: 0 | Status: DISCONTINUED | OUTPATIENT
Start: 2017-12-30 | End: 2018-01-16

## 2017-12-30 RX ORDER — SODIUM CHLORIDE 9 MG/ML
1000 INJECTION, SOLUTION INTRAVENOUS
Qty: 0 | Refills: 0 | Status: DISCONTINUED | OUTPATIENT
Start: 2017-12-30 | End: 2017-12-31

## 2017-12-30 RX ADMIN — Medication 180 MILLIGRAM(S): at 17:33

## 2017-12-30 RX ADMIN — PANTOPRAZOLE SODIUM 40 MILLIGRAM(S): 20 TABLET, DELAYED RELEASE ORAL at 17:33

## 2017-12-30 RX ADMIN — CHOLESTYRAMINE 4 GRAM(S): 4 POWDER, FOR SUSPENSION ORAL at 22:03

## 2017-12-30 RX ADMIN — DIPHENHYDRAMINE HYDROCHLORIDE AND LIDOCAINE HYDROCHLORIDE AND ALUMINUM HYDROXIDE AND MAGNESIUM HYDRO 10 MILLILITER(S): KIT at 06:30

## 2017-12-30 RX ADMIN — FLUTICASONE PROPIONATE AND SALMETEROL 1 DOSE(S): 50; 250 POWDER ORAL; RESPIRATORY (INHALATION) at 20:02

## 2017-12-30 RX ADMIN — Medication 2 MILLIGRAM(S): at 06:30

## 2017-12-30 RX ADMIN — OCTREOTIDE ACETATE 150 MICROGRAM(S): 200 INJECTION, SOLUTION INTRAVENOUS; SUBCUTANEOUS at 06:29

## 2017-12-30 RX ADMIN — LORATADINE 10 MILLIGRAM(S): 10 TABLET ORAL at 12:13

## 2017-12-30 RX ADMIN — TAMSULOSIN HYDROCHLORIDE 0.4 MILLIGRAM(S): 0.4 CAPSULE ORAL at 23:22

## 2017-12-30 RX ADMIN — Medication 2 MILLIGRAM(S): at 12:25

## 2017-12-30 RX ADMIN — Medication 50 MICROGRAM(S): at 06:30

## 2017-12-30 RX ADMIN — NYSTATIN CREAM 1 APPLICATION(S): 100000 CREAM TOPICAL at 06:31

## 2017-12-30 RX ADMIN — OCTREOTIDE ACETATE 150 MICROGRAM(S): 200 INJECTION, SOLUTION INTRAVENOUS; SUBCUTANEOUS at 14:01

## 2017-12-30 RX ADMIN — Medication 1 TABLET(S): at 12:13

## 2017-12-30 RX ADMIN — Medication 2 MILLIGRAM(S): at 23:22

## 2017-12-30 RX ADMIN — PANTOPRAZOLE SODIUM 40 MILLIGRAM(S): 20 TABLET, DELAYED RELEASE ORAL at 06:29

## 2017-12-30 RX ADMIN — ATORVASTATIN CALCIUM 80 MILLIGRAM(S): 80 TABLET, FILM COATED ORAL at 23:22

## 2017-12-30 RX ADMIN — Medication 0.5 MILLIGRAM(S): at 23:30

## 2017-12-30 RX ADMIN — TIOTROPIUM BROMIDE 1 CAPSULE(S): 18 CAPSULE ORAL; RESPIRATORY (INHALATION) at 13:10

## 2017-12-30 RX ADMIN — CLOPIDOGREL BISULFATE 75 MILLIGRAM(S): 75 TABLET, FILM COATED ORAL at 12:13

## 2017-12-30 RX ADMIN — Medication 81 MILLIGRAM(S): at 12:13

## 2017-12-30 RX ADMIN — FLUTICASONE PROPIONATE AND SALMETEROL 1 DOSE(S): 50; 250 POWDER ORAL; RESPIRATORY (INHALATION) at 09:28

## 2017-12-30 RX ADMIN — Medication 2 MILLIGRAM(S): at 17:33

## 2017-12-30 RX ADMIN — SODIUM CHLORIDE 100 MILLILITER(S): 9 INJECTION, SOLUTION INTRAVENOUS at 14:01

## 2017-12-30 RX ADMIN — DIPHENHYDRAMINE HYDROCHLORIDE AND LIDOCAINE HYDROCHLORIDE AND ALUMINUM HYDROXIDE AND MAGNESIUM HYDRO 10 MILLILITER(S): KIT at 17:35

## 2017-12-30 RX ADMIN — Medication 180 MILLIGRAM(S): at 06:29

## 2017-12-30 RX ADMIN — NYSTATIN CREAM 1 APPLICATION(S): 100000 CREAM TOPICAL at 17:33

## 2017-12-30 RX ADMIN — CHOLESTYRAMINE 4 GRAM(S): 4 POWDER, FOR SUSPENSION ORAL at 09:28

## 2017-12-30 RX ADMIN — SODIUM CHLORIDE 125 MILLILITER(S): 9 INJECTION INTRAMUSCULAR; INTRAVENOUS; SUBCUTANEOUS at 06:31

## 2017-12-30 NOTE — PROGRESS NOTE ADULT - SUBJECTIVE AND OBJECTIVE BOX
Pt seen and examined> He states that his ileostomy output is slightly more solid although amount is essentially unchanged.    REVIEW OF SYSTEMS:  Constitutional: No fever, weight loss or fatigue  Cardiovascular: No chest pain, palpitations, dizziness or leg swelling  Gastrointestinal: No abdominal or epigastric pain. No nausea, vomiting or hematemesis; No diarrhea or constipation. No melena or hematochezia.  Skin: No itching, burning, rashes or lesions       MEDICATIONS:  MEDICATIONS  (STANDING):  ALBUTerol/ipratropium for Nebulization. 3 milliLiter(s) Nebulizer once  aspirin  chewable 81 milliGRAM(s) Oral daily  atorvastatin 80 milliGRAM(s) Oral at bedtime  cholestyramine Powder (Sugar-Free) 4 Gram(s) Oral two times a day  clopidogrel Tablet 75 milliGRAM(s) Oral daily  diltiazem    milliGRAM(s) Oral <User Schedule>  enoxaparin Injectable 40 milliGRAM(s) SubCutaneous daily  FIRST- Mouthwash  BLM 10 milliLiter(s) Swish and Spit two times a day  fluticasone propionate/ salmeterol 250-50 MICROgram(s) Diskus 1 Dose(s) Inhalation two times a day  levothyroxine 50 MICROGram(s) Oral daily  loperamide Solution 2 milliGRAM(s) Oral two times a day  loratadine 10 milliGRAM(s) Oral daily  multiple electrolytes Injection Type 1 1000 milliLiter(s) (100 mL/Hr) IV Continuous <Continuous>  multivitamin 1 Tablet(s) Oral daily  nystatin Powder 1 Application(s) Topical two times a day  octreotide  Injectable 150 MICROGram(s) SubCutaneous every 8 hours  pantoprazole   Suspension 40 milliGRAM(s) Oral two times a day before meals  tamsulosin 0.4 milliGRAM(s) Oral at bedtime  tiotropium 18 MICROgram(s) Capsule 1 Capsule(s) Inhalation daily    MEDICATIONS  (PRN):  acetaminophen  Suppository. 650 milliGRAM(s) Rectal every 6 hours PRN Moderate Pain (4 - 6)  acetylcysteine 20% Inhalation 1 milliLiter(s) Inhalation every 4 hours PRN poor cough clearance  ALBUTerol    0.083% 2.5 milliGRAM(s) Nebulizer every 4 hours PRN Shortness of Breath and/or Wheezing  ALPRAZolam 0.5 milliGRAM(s) Oral every 6 hours PRN Anxiety  ondansetron Injectable 4 milliGRAM(s) IV Push every 8 hours PRN Nausea and/or Vomiting      Allergies    codeine (Unknown)  penicillin (Unknown)    Intolerances    Symbicort (Short breath)      Vital Signs Last 24 Hrs  T(C): 36.9 (30 Dec 2017 10:02), Max: 36.9 (30 Dec 2017 10:02)  T(F): 98.5 (30 Dec 2017 10:02), Max: 98.5 (30 Dec 2017 10:02)  HR: 87 (30 Dec 2017 10:02) (87 - 110)  BP: 112/73 (30 Dec 2017 10:02) (100/66 - 112/73)  BP(mean): --  RR: 18 (30 Dec 2017 10:02) (18 - 20)  SpO2: 98% (30 Dec 2017 10:02) (93% - 98%)    12-29 @ 07:01  -  12-30 @ 07:00  --------------------------------------------------------  IN: 3550 mL / OUT: 5850 mL / NET: -2300 mL        PHYSICAL EXAM:    General: Well developed; well nourished; in no acute distress  HEENT: MMM, conjunctiva pink and sclera anicteric.  Lungs: clear to auscultation bilaterally.  Cor: RRR S1, S2 only.  Gastrointestinal: Abdomen: Soft non-tender non-distended; Normal bowel sounds; + ileostomy  Extremities: no cyanosis, clubbing or edema.  Skin: Warm and dry. No obvious rash  Neuro: Pt. a + o x 3    LABS:      CBC Full  -  ( 30 Dec 2017 05:57 )  WBC Count : 7.4 K/uL  Hemoglobin : 9.6 g/dL  Hematocrit : 31.2 %  Platelet Count - Automated : 299 K/uL  Mean Cell Volume : 88.4 fl  Mean Cell Hemoglobin : 27.2 pg  Mean Cell Hemoglobin Concentration : 30.8 g/dL  Auto Neutrophil # : x  Auto Lymphocyte # : x  Auto Monocyte # : x  Auto Eosinophil # : x  Auto Basophil # : x  Auto Neutrophil % : x  Auto Lymphocyte % : x  Auto Monocyte % : x  Auto Eosinophil % : x  Auto Basophil % : x    12-30    141  |  104  |  <3.0<L>  ----------------------------<  131<H>  3.1<L>   |  26.0  |  0.48<L>    Ca    7.1<L>      30 Dec 2017 05:57    TPro  5.4<L>  /  Alb  2.0<L>  /  TBili  0.4  /  DBili  x   /  AST  8   /  ALT  7   /  AlkPhos  75  12-30                      RADIOLOGY & ADDITIONAL STUDIES (The following images were personally reviewed):

## 2017-12-30 NOTE — PROGRESS NOTE ADULT - ASSESSMENT
67 year old male with PMH of COPD stage 4 s/p right lung reduction in 2010 (on home oxygen 4L (sat 92-93%), HTN, CVA on 2014, DM, hypothyroidism, hydrocele, and nephrolithiasis who is admitted for a COPD exacerbation. Patient has been weaned off ventimask to 4 liters nasal cannula and respiratory status is stable. CTA of the chest was negative for PE but showed bilateral lower lobe infiltrates with mucous plugging for which he was started on Levaquin.  Respiratory status stable and steroids are being tapered. Hospital course complicated by constipation, which had not improved despite aggressive bowel regimen, laxatives and enemas. XRAY with large stool burden. CT with diaphragmatic hernia and patient was consequently taken to the OR for loop ileostomy under local anesthesia. Pt cont, to have copious amount in ileostomy. Pt is noted to have hyponatremia due to high output in ileostomy, got IVF, hyponatremia and hypokalemia improving, ileostomy secretion are getting thicker, he is tolerating diet, ostomy care as per ostomy RN,  he is continued with cholestyramine, he has been continued with IV hydration, Ileostomy out is still high, but secretions are getting thicker, monitored, I/Os, being continued with IV fluid rate to 125ml/h and GI has increased Octreotide to 150mcgs tid,  has CT abdomen and Plevis done, results reviewed, GI add Imodium 2 mg po bid. During the earlier part of the hospitalization patient was treated for acute on chronic hypoxic respiratory failure secondary to COPD exacerbation and CAP, was treated with steroids now tapered and duo nebs, SOB resolved, he uses home oxygen (4 liters), bicarb elevated likely due to metabolic compensation for resp acidosis  continue bronchodilators and prednisone tapered off and completed Levaquin as well.  His electrolytes monitored and replaced accordingly, he was found to have right femoral artery dissection as incidental finding on CT, no intervention as per vascular, follow up with vascular clinic as out patient.     Plan:    1. High out put from Ileostomy:   c/w lomotil, continue with cholestyramine,  on Octreotide to 150mcgs tid, Imodium 2 mg po bid.  ileostomy secretion are getting thicker, he is tolerating diet,   ostomy care as per ostomy RN,     IV hydration, Ileostomy out is still high, but secretions are getting thicker, change nacl to multiple electrolytes sol if no contraindications  encouraged for oral hydration,   monitor I/Os,   PT involved      2. Acute on chronic hypoxic respiratory failure secondary to COPD exacerbation, improving on home oxygen (4 liters),   bicarb elevated likely due to metabolic compensation for resp acidosis although patient refused blood gases,   continue bronchodilators and prednisone tapered off.   continue Mucomyst PRN  completed course of Levaquin     3. CAP - completed course of Levaquin on 12/5.     4. Hypertension - stable   - continue cardizem    5. Hyperlipidemia   - continue statin    6.hyponatremia: resolved, it was due to high ileostomy output.    Hypokalemia - replaced.   Hypophosphatemia: replaced.   Hypomagnesemia: replaced.   change IVF to multiple electrolyte sol    7. Scrotal edema chronic with urinary retention, no more retention, Roy if not able to urinate  - patient following with  as outpatient  - cont scrotal elevation    8. Right femoral artery dissection  - incidental finding on CT, no intervention as per vascular    9. DVT Prophylaxis - Lovenox 67 year old male with PMH of COPD stage 4 s/p right lung reduction in 2010 (on home oxygen 4L (sat 92-93%), HTN, CVA on 2014, DM, hypothyroidism, hydrocele, and nephrolithiasis who is admitted for a COPD exacerbation. Patient has been weaned off ventimask to 4 liters nasal cannula and respiratory status is stable. CTA of the chest was negative for PE but showed bilateral lower lobe infiltrates with mucous plugging for which he was started on Levaquin.  Respiratory status stable and steroids are being tapered. Hospital course complicated by constipation, which had not improved despite aggressive bowel regimen, laxatives and enemas. XRAY with large stool burden. CT with diaphragmatic hernia and patient was consequently taken to the OR for loop ileostomy under local anesthesia. Pt cont, to have copious amount in ileostomy. Pt is noted to have hyponatremia due to high output in ileostomy, got IVF, hyponatremia and hypokalemia improving, ileostomy secretion are getting thicker, he is tolerating diet, ostomy care as per ostomy RN,  he is continued with cholestyramine, he has been continued with IV hydration, Ileostomy out is still high, but secretions are getting thicker, monitored, I/Os, being continued with IV fluid rate to 125ml/h and GI has increased Octreotide to 150mcgs tid,  has CT abdomen and Plevis done, results reviewed, GI add Imodium 2 mg po bid. During the earlier part of the hospitalization patient was treated for acute on chronic hypoxic respiratory failure secondary to COPD exacerbation and CAP, was treated with steroids now tapered and duo nebs, SOB resolved, he uses home oxygen (4 liters), bicarb elevated likely due to metabolic compensation for resp acidosis  continue bronchodilators and prednisone tapered off and completed Levaquin as well.  His electrolytes monitored and replaced accordingly, he was found to have right femoral artery dissection as incidental finding on CT, no intervention as per vascular, follow up with vascular clinic as out patient.     Plan:    1. High out put from Ileostomy:   c/w lomotil, continue with cholestyramine,  on Octreotide to 150mcgs tid, Imodium 2 mg po bid.  ileostomy secretion are getting thicker, he is tolerating diet,   ostomy care as per ostomy RN,     IV hydration, Ileostomy out is still high, but secretions are getting thicker, change nacl to multiple electrolytes sol if no contraindications  encouraged for oral hydration,   monitor I/Os,   PT involved      2. Acute on chronic hypoxic respiratory failure secondary to COPD exacerbation, improved. on home oxygen (4 liters),   bicarb elevated likely due to metabolic compensation for resp acidosis although patient refused blood gases,   continue bronchodilators and prednisone tapered off.   continue Mucomyst PRN  completed course of Levaquin     3. CAP - completed course of Levaquin on 12/5.     4. Hypertension - stable   - continue cardizem    5. Hyperlipidemia   - continue statin    6.hyponatremia: resolved, it was due to high ileostomy output.    Hypokalemia - replaced.   Hypophosphatemia: replaced.   Hypomagnesemia: replaced.   change IVF to multiple electrolyte sol    7. Scrotal edema chronic with urinary retention, no more retention, Roy if not able to urinate  - patient following with  as outpatient  - cont scrotal elevation    8. Right femoral artery dissection  - incidental finding on CT, no intervention as per vascular    9. DVT Prophylaxis - Lovenox

## 2017-12-30 NOTE — PROGRESS NOTE ADULT - SUBJECTIVE AND OBJECTIVE BOX
FRANDY BAUTISTA    [id number]    67y      Male    Patient is a 67y old  Male who presents with a chief complaint of SOB (15 Dec 2017 10:17)      INTERVAL HPI/OVERNIGHT EVENTS:    Patient is not having any complains, patient reported decreasing output. has no complains. no nausea, vomiting, dizziness.     REVIEW OF SYSTEMS:    CONSTITUTIONAL: No fever/chills  RESPIRATORY: No cough, No shortness of breath  CARDIOVASCULAR: No chest pain, no palpitations.   GASTROINTESTINAL: No abdominal, No nausea, vomiting.   NEUROLOGICAL: No headaches,  loss of strength.  MISCELLANEOUS: No joint swelling or pain.       MEDICATIONS  (STANDING):  ALBUTerol/ipratropium for Nebulization. 3 milliLiter(s) Nebulizer once  aspirin  chewable 81 milliGRAM(s) Oral daily  atorvastatin 80 milliGRAM(s) Oral at bedtime  cholestyramine Powder (Sugar-Free) 4 Gram(s) Oral two times a day  clopidogrel Tablet 75 milliGRAM(s) Oral daily  diltiazem    milliGRAM(s) Oral <User Schedule>  enoxaparin Injectable 40 milliGRAM(s) SubCutaneous daily  FIRST- Mouthwash  BLM 10 milliLiter(s) Swish and Spit two times a day  fluticasone propionate/ salmeterol 250-50 MICROgram(s) Diskus 1 Dose(s) Inhalation two times a day  levothyroxine 50 MICROGram(s) Oral daily  loperamide Solution 2 milliGRAM(s) Oral two times a day  loratadine 10 milliGRAM(s) Oral daily  multivitamin 1 Tablet(s) Oral daily  nystatin Powder 1 Application(s) Topical two times a day  octreotide  Injectable 150 MICROGram(s) SubCutaneous every 8 hours  pantoprazole   Suspension 40 milliGRAM(s) Oral two times a day before meals  sodium chloride 0.9%. 1000 milliLiter(s) (125 mL/Hr) IV Continuous <Continuous>  tamsulosin 0.4 milliGRAM(s) Oral at bedtime  tiotropium 18 MICROgram(s) Capsule 1 Capsule(s) Inhalation daily    MEDICATIONS  (PRN):  acetaminophen  Suppository. 650 milliGRAM(s) Rectal every 6 hours PRN Moderate Pain (4 - 6)  acetylcysteine 20% Inhalation 1 milliLiter(s) Inhalation every 4 hours PRN poor cough clearance  ALBUTerol    0.083% 2.5 milliGRAM(s) Nebulizer every 4 hours PRN Shortness of Breath and/or Wheezing  ALPRAZolam 0.5 milliGRAM(s) Oral every 6 hours PRN Anxiety  ondansetron Injectable 4 milliGRAM(s) IV Push every 8 hours PRN Nausea and/or Vomiting      PHYSICAL EXAM:    Vital Signs Last 24 Hrs  T(C): 36.9 (30 Dec 2017 10:02), Max: 36.9 (30 Dec 2017 10:02)  T(F): 98.5 (30 Dec 2017 10:02), Max: 98.5 (30 Dec 2017 10:02)  HR: 87 (30 Dec 2017 10:02) (87 - 110)  BP: 112/73 (30 Dec 2017 10:02) (100/66 - 112/73)  BP(mean): --  RR: 18 (30 Dec 2017 10:02) (18 - 20)  SpO2: 98% (30 Dec 2017 10:02) (93% - 98%)    GENERAL: Elderly male looking comfortable. eating breakfast comfortably. able to complete multiple sentences.  HEENT: PERRL, +EOMI  NECK: soft, Supple, No JVD,   CHEST/LUNG: Decrease air entry bilaterally; very few scattered wheezing. no rales/ronchi  HEART: S1S2+, Regular rate and rhythm; No murmurs  ABDOMEN: Soft, Nontender, Bowel sounds present, s/p Ileostomy connected with drain. yellowish green liquid. no blood  EXTREMITIES:  1+ Peripheral Pulses, No edema  SKIN: No rashes or lesions  NEURO: AAOX3, no focal deficits, no motor r sensory loss  PSYCH: normal mood    LABS:                                   9.6    7.4   )-----------( 299      ( 30 Dec 2017 05:57 )             31.2     12-30    141  |  104  |  <3.0<L>  ----------------------------<  131<H>  3.1<L>   |  26.0  |  0.48<L>    Ca    7.1<L>      30 Dec 2017 05:57    TPro  5.4<L>  /  Alb  2.0<L>  /  TBili  0.4  /  DBili  x   /  AST  8   /  ALT  7   /  AlkPhos  75  12-30        I&O's Summary    29 Dec 2017 07:01  -  30 Dec 2017 07:00  --------------------------------------------------------  IN: 3550 mL / OUT: 5850 mL / NET: -2300 mL    < from: CT Angio Chest w/ IV Cont (11.30.17 @ 11:11) >  IMPRESSION:     No evidence for pulmonary embolism.    Bilateral lower lobe infiltrates and lower lobe mucoid impacted airways.    < end of copied text >      < from: CT Abdomen and Pelvis w/ Oral Cont and w/ IV Cont (12.26.17 @ 16:47) >  IMPRESSION:     Status post loop ileostomy. Few dilated jejunal loops, favor ileus,   follow-up clinically for partial obstruction. Details as above.    Transverse colon containing left diaphragmatic hernia. Decreased colonic   distention, enteric contrast seen in the rectum.    The basilar pneumonia stable. Other incidental findings as above.    Large bilateral scrotal hydrocele.    < end of copied text >

## 2017-12-30 NOTE — PROGRESS NOTE ADULT - PROBLEM SELECTOR PLAN 1
Status post surgery. High output diarrhea. Continue current Octreotide, Imodoim and Cholestyramine therapies with low residue diet. Continue to monitor ileostomy outputs. Will re-evaluate pt. Tuesday and would continue current management until then. Repeat labs ordered for the AM. Status post surgery. High output diarrhea. Continue current Octreotide 150 mcg SQ TID, Imodium increased to 2 mg. QID (from previous BID dosing). and Cholestyramine 4 g. BID with low residue diet. Continue to monitor ileostomy outputs. Will re-evaluate pt. Tuesday and would continue current management until then. Repeat labs ordered for the AM.

## 2017-12-31 LAB
ANION GAP SERPL CALC-SCNC: 12 MMOL/L — SIGNIFICANT CHANGE UP (ref 5–17)
ANION GAP SERPL CALC-SCNC: 9 MMOL/L — SIGNIFICANT CHANGE UP (ref 5–17)
BASOPHILS # BLD AUTO: 0 K/UL — SIGNIFICANT CHANGE UP (ref 0–0.2)
BASOPHILS NFR BLD AUTO: 0.4 % — SIGNIFICANT CHANGE UP (ref 0–2)
BUN SERPL-MCNC: <3 MG/DL — LOW (ref 8–20)
BUN SERPL-MCNC: <3 MG/DL — LOW (ref 8–20)
CALCIUM SERPL-MCNC: 7.4 MG/DL — LOW (ref 8.6–10.2)
CALCIUM SERPL-MCNC: 7.9 MG/DL — LOW (ref 8.6–10.2)
CHLORIDE SERPL-SCNC: 101 MMOL/L — SIGNIFICANT CHANGE UP (ref 98–107)
CHLORIDE SERPL-SCNC: 98 MMOL/L — SIGNIFICANT CHANGE UP (ref 98–107)
CO2 SERPL-SCNC: 29 MMOL/L — SIGNIFICANT CHANGE UP (ref 22–29)
CO2 SERPL-SCNC: 31 MMOL/L — HIGH (ref 22–29)
CREAT SERPL-MCNC: 0.48 MG/DL — LOW (ref 0.5–1.3)
CREAT SERPL-MCNC: 0.58 MG/DL — SIGNIFICANT CHANGE UP (ref 0.5–1.3)
EOSINOPHIL # BLD AUTO: 0.1 K/UL — SIGNIFICANT CHANGE UP (ref 0–0.5)
EOSINOPHIL NFR BLD AUTO: 0.8 % — SIGNIFICANT CHANGE UP (ref 0–6)
GLUCOSE SERPL-MCNC: 131 MG/DL — HIGH (ref 70–115)
GLUCOSE SERPL-MCNC: 184 MG/DL — HIGH (ref 70–115)
HCT VFR BLD CALC: 31.6 % — LOW (ref 42–52)
HGB BLD-MCNC: 9.8 G/DL — LOW (ref 14–18)
LYMPHOCYTES # BLD AUTO: 1.3 K/UL — SIGNIFICANT CHANGE UP (ref 1–4.8)
LYMPHOCYTES # BLD AUTO: 17.8 % — LOW (ref 20–55)
MAGNESIUM SERPL-MCNC: 1.4 MG/DL — LOW (ref 1.6–2.6)
MCHC RBC-ENTMCNC: 27.1 PG — SIGNIFICANT CHANGE UP (ref 27–31)
MCHC RBC-ENTMCNC: 31 G/DL — LOW (ref 32–36)
MCV RBC AUTO: 87.5 FL — SIGNIFICANT CHANGE UP (ref 80–94)
MONOCYTES # BLD AUTO: 0.6 K/UL — SIGNIFICANT CHANGE UP (ref 0–0.8)
MONOCYTES NFR BLD AUTO: 8.6 % — SIGNIFICANT CHANGE UP (ref 3–10)
NEUTROPHILS # BLD AUTO: 5.4 K/UL — SIGNIFICANT CHANGE UP (ref 1.8–8)
NEUTROPHILS NFR BLD AUTO: 72.3 % — SIGNIFICANT CHANGE UP (ref 37–73)
PHOSPHATE SERPL-MCNC: 2.2 MG/DL — LOW (ref 2.4–4.7)
PLATELET # BLD AUTO: 317 K/UL — SIGNIFICANT CHANGE UP (ref 150–400)
POTASSIUM SERPL-MCNC: 2.9 MMOL/L — CRITICAL LOW (ref 3.5–5.3)
POTASSIUM SERPL-MCNC: 3.4 MMOL/L — LOW (ref 3.5–5.3)
POTASSIUM SERPL-SCNC: 2.9 MMOL/L — CRITICAL LOW (ref 3.5–5.3)
POTASSIUM SERPL-SCNC: 3.4 MMOL/L — LOW (ref 3.5–5.3)
RBC # BLD: 3.61 M/UL — LOW (ref 4.6–6.2)
RBC # FLD: 15 % — SIGNIFICANT CHANGE UP (ref 11–15.6)
SODIUM SERPL-SCNC: 139 MMOL/L — SIGNIFICANT CHANGE UP (ref 135–145)
SODIUM SERPL-SCNC: 141 MMOL/L — SIGNIFICANT CHANGE UP (ref 135–145)
WBC # BLD: 7.5 K/UL — SIGNIFICANT CHANGE UP (ref 4.8–10.8)
WBC # FLD AUTO: 7.5 K/UL — SIGNIFICANT CHANGE UP (ref 4.8–10.8)

## 2017-12-31 PROCEDURE — 99232 SBSQ HOSP IP/OBS MODERATE 35: CPT

## 2017-12-31 PROCEDURE — 99233 SBSQ HOSP IP/OBS HIGH 50: CPT

## 2017-12-31 RX ORDER — MAGNESIUM SULFATE 500 MG/ML
1 VIAL (ML) INJECTION ONCE
Qty: 0 | Refills: 0 | Status: COMPLETED | OUTPATIENT
Start: 2017-12-31 | End: 2017-12-31

## 2017-12-31 RX ORDER — POTASSIUM CHLORIDE 20 MEQ
20 PACKET (EA) ORAL
Qty: 0 | Refills: 0 | Status: COMPLETED | OUTPATIENT
Start: 2017-12-31 | End: 2017-12-31

## 2017-12-31 RX ORDER — SODIUM,POTASSIUM PHOSPHATES 278-250MG
1 POWDER IN PACKET (EA) ORAL
Qty: 0 | Refills: 0 | Status: DISCONTINUED | OUTPATIENT
Start: 2017-12-31 | End: 2018-01-01

## 2017-12-31 RX ORDER — SODIUM CHLORIDE 9 MG/ML
1000 INJECTION, SOLUTION INTRAVENOUS
Qty: 0 | Refills: 0 | Status: DISCONTINUED | OUTPATIENT
Start: 2017-12-31 | End: 2018-01-05

## 2017-12-31 RX ADMIN — Medication 180 MILLIGRAM(S): at 18:51

## 2017-12-31 RX ADMIN — OCTREOTIDE ACETATE 150 MICROGRAM(S): 200 INJECTION, SOLUTION INTRAVENOUS; SUBCUTANEOUS at 00:37

## 2017-12-31 RX ADMIN — Medication 1 TABLET(S): at 13:28

## 2017-12-31 RX ADMIN — PANTOPRAZOLE SODIUM 40 MILLIGRAM(S): 20 TABLET, DELAYED RELEASE ORAL at 19:01

## 2017-12-31 RX ADMIN — Medication 0.5 MILLIGRAM(S): at 21:51

## 2017-12-31 RX ADMIN — ATORVASTATIN CALCIUM 80 MILLIGRAM(S): 80 TABLET, FILM COATED ORAL at 21:46

## 2017-12-31 RX ADMIN — Medication 81 MILLIGRAM(S): at 13:28

## 2017-12-31 RX ADMIN — FLUTICASONE PROPIONATE AND SALMETEROL 1 DOSE(S): 50; 250 POWDER ORAL; RESPIRATORY (INHALATION) at 21:46

## 2017-12-31 RX ADMIN — CLOPIDOGREL BISULFATE 75 MILLIGRAM(S): 75 TABLET, FILM COATED ORAL at 13:27

## 2017-12-31 RX ADMIN — OCTREOTIDE ACETATE 150 MICROGRAM(S): 200 INJECTION, SOLUTION INTRAVENOUS; SUBCUTANEOUS at 22:28

## 2017-12-31 RX ADMIN — Medication 1 TABLET(S): at 18:52

## 2017-12-31 RX ADMIN — FLUTICASONE PROPIONATE AND SALMETEROL 1 DOSE(S): 50; 250 POWDER ORAL; RESPIRATORY (INHALATION) at 09:37

## 2017-12-31 RX ADMIN — TAMSULOSIN HYDROCHLORIDE 0.4 MILLIGRAM(S): 0.4 CAPSULE ORAL at 21:47

## 2017-12-31 RX ADMIN — Medication 2 MILLIGRAM(S): at 05:32

## 2017-12-31 RX ADMIN — TIOTROPIUM BROMIDE 1 CAPSULE(S): 18 CAPSULE ORAL; RESPIRATORY (INHALATION) at 12:49

## 2017-12-31 RX ADMIN — Medication 2 MILLIGRAM(S): at 23:09

## 2017-12-31 RX ADMIN — SODIUM CHLORIDE 125 MILLILITER(S): 9 INJECTION, SOLUTION INTRAVENOUS at 13:31

## 2017-12-31 RX ADMIN — DIPHENHYDRAMINE HYDROCHLORIDE AND LIDOCAINE HYDROCHLORIDE AND ALUMINUM HYDROXIDE AND MAGNESIUM HYDRO 10 MILLILITER(S): KIT at 05:32

## 2017-12-31 RX ADMIN — Medication 2 MILLIGRAM(S): at 14:04

## 2017-12-31 RX ADMIN — LORATADINE 10 MILLIGRAM(S): 10 TABLET ORAL at 13:30

## 2017-12-31 RX ADMIN — PANTOPRAZOLE SODIUM 40 MILLIGRAM(S): 20 TABLET, DELAYED RELEASE ORAL at 05:33

## 2017-12-31 RX ADMIN — OCTREOTIDE ACETATE 150 MICROGRAM(S): 200 INJECTION, SOLUTION INTRAVENOUS; SUBCUTANEOUS at 18:55

## 2017-12-31 RX ADMIN — Medication 1 TABLET(S): at 13:26

## 2017-12-31 RX ADMIN — OCTREOTIDE ACETATE 150 MICROGRAM(S): 200 INJECTION, SOLUTION INTRAVENOUS; SUBCUTANEOUS at 09:36

## 2017-12-31 RX ADMIN — Medication 50 MICROGRAM(S): at 05:37

## 2017-12-31 RX ADMIN — NYSTATIN CREAM 1 APPLICATION(S): 100000 CREAM TOPICAL at 05:32

## 2017-12-31 RX ADMIN — CHOLESTYRAMINE 4 GRAM(S): 4 POWDER, FOR SUSPENSION ORAL at 09:34

## 2017-12-31 RX ADMIN — Medication 20 MILLIEQUIVALENT(S): at 13:27

## 2017-12-31 RX ADMIN — Medication 20 MILLIEQUIVALENT(S): at 13:29

## 2017-12-31 RX ADMIN — CHOLESTYRAMINE 4 GRAM(S): 4 POWDER, FOR SUSPENSION ORAL at 21:47

## 2017-12-31 RX ADMIN — Medication 1 TABLET(S): at 21:46

## 2017-12-31 RX ADMIN — Medication 20 MILLIEQUIVALENT(S): at 09:36

## 2017-12-31 RX ADMIN — Medication 100 GRAM(S): at 13:30

## 2017-12-31 RX ADMIN — NYSTATIN CREAM 1 APPLICATION(S): 100000 CREAM TOPICAL at 18:52

## 2017-12-31 RX ADMIN — Medication 2 MILLIGRAM(S): at 18:53

## 2017-12-31 RX ADMIN — DIPHENHYDRAMINE HYDROCHLORIDE AND LIDOCAINE HYDROCHLORIDE AND ALUMINUM HYDROXIDE AND MAGNESIUM HYDRO 10 MILLILITER(S): KIT at 18:52

## 2017-12-31 NOTE — PROGRESS NOTE ADULT - SUBJECTIVE AND OBJECTIVE BOX
FRANDY BAUTISTA    [id number]    67y      Male    Patient is a 67y old  Male who presents with a chief complaint of SOB (15 Dec 2017 10:17)      INTERVAL HPI/OVERNIGHT EVENTS:    patient reported decreasing output. no nausea, vomiting, dizziness.   chronic scrotal swelling and recurrent hydrocele. follows urology. right leg swelling due to pressure by hydrocele, long standing  no new symptoms    REVIEW OF SYSTEMS:    CONSTITUTIONAL: No fever/chills  RESPIRATORY: chronic intermittent cough, No shortness of breath  CARDIOVASCULAR: No chest pain, no palpitations.   GASTROINTESTINAL: No abdominal, No nausea, vomiting.   NEUROLOGICAL: No headaches,  loss of strength.  MISCELLANEOUS: No joint swelling or pain.       MEDICATIONS  (STANDING):  ALBUTerol/ipratropium for Nebulization. 3 milliLiter(s) Nebulizer once  aspirin  chewable 81 milliGRAM(s) Oral daily  atorvastatin 80 milliGRAM(s) Oral at bedtime  cholestyramine Powder (Sugar-Free) 4 Gram(s) Oral two times a day  clopidogrel Tablet 75 milliGRAM(s) Oral daily  diltiazem    milliGRAM(s) Oral <User Schedule>  enoxaparin Injectable 40 milliGRAM(s) SubCutaneous daily  FIRST- Mouthwash  BLM 10 milliLiter(s) Swish and Spit two times a day  fluticasone propionate/ salmeterol 250-50 MICROgram(s) Diskus 1 Dose(s) Inhalation two times a day  levothyroxine 50 MICROGram(s) Oral daily  loperamide Solution 2 milliGRAM(s) Oral four times a day  loratadine 10 milliGRAM(s) Oral daily  multiple electrolytes Injection Type 1 1000 milliLiter(s) (125 mL/Hr) IV Continuous <Continuous>  multivitamin 1 Tablet(s) Oral daily  nystatin Powder 1 Application(s) Topical two times a day  octreotide  Injectable 150 MICROGram(s) SubCutaneous every 8 hours  pantoprazole   Suspension 40 milliGRAM(s) Oral two times a day before meals  potassium acid phosphate/sodium acid phosphate tablet (K-PHOS No. 2) 1 Tablet(s) Oral four times a day with meals  tamsulosin 0.4 milliGRAM(s) Oral at bedtime  tiotropium 18 MICROgram(s) Capsule 1 Capsule(s) Inhalation daily    MEDICATIONS  (PRN):  acetaminophen  Suppository. 650 milliGRAM(s) Rectal every 6 hours PRN Moderate Pain (4 - 6)  acetylcysteine 20% Inhalation 1 milliLiter(s) Inhalation every 4 hours PRN poor cough clearance  ALBUTerol    0.083% 2.5 milliGRAM(s) Nebulizer every 4 hours PRN Shortness of Breath and/or Wheezing  ALPRAZolam 0.5 milliGRAM(s) Oral every 6 hours PRN Anxiety  ondansetron Injectable 4 milliGRAM(s) IV Push every 8 hours PRN Nausea and/or Vomiting        PHYSICAL EXAM:    Vital Signs Last 24 Hrs  T(C): 36.9 (30 Dec 2017 10:02), Max: 36.9 (30 Dec 2017 10:02)  T(F): 98.5 (30 Dec 2017 10:02), Max: 98.5 (30 Dec 2017 10:02)  HR: 87 (30 Dec 2017 10:02) (87 - 110)  BP: 112/73 (30 Dec 2017 10:02) (100/66 - 112/73)  BP(mean): --  RR: 18 (30 Dec 2017 10:02) (18 - 20)  SpO2: 98% (30 Dec 2017 10:02) (93% - 98%)    GENERAL: Elderly male looking comfortable. eating lunch comfortably. able to complete multiple sentences.  HEENT: PERRL, +EOMI  NECK: soft, Supple, No JVD,   CHEST/LUNG: Decrease air entry bilaterally; no wheezing. no rales/ronchi  HEART: S1S2+, Regular rate and rhythm; No murmurs  ABDOMEN: Soft, Nontender, Bowel sounds present, s/p Ileostomy connected with drain. yellowish green liquid with some whitish residue. no blood  EXTREMITIES:  1+ Peripheral Pulses, b/l ankle edema, left more than right  SKIN: No rashes or lesions  NEURO: AAOX3, no focal deficits, no motor r sensory loss  PSYCH: normal mood    LABS:                                     9.8    7.5   )-----------( 317      ( 31 Dec 2017 05:50 )             31.6       12-31    139  |  101  |  <3.0<L>  ----------------------------<  131<H>  2.9<LL>   |  29.0  |  0.48<L>    Ca    7.4<L>      31 Dec 2017 05:50  Phos  2.2     12-31  Mg     1.4     12-31    TPro  5.4<L>  /  Alb  2.0<L>  /  TBili  0.4  /  DBili  x   /  AST  8   /  ALT  7   /  AlkPhos  75  12-30      I&O's Summary    30 Dec 2017 07:01  -  31 Dec 2017 07:00  --------------------------------------------------------  IN: 500 mL / OUT: 5400 mL / NET: -4900 mL    31 Dec 2017 07:01  -  31 Dec 2017 14:37  --------------------------------------------------------  IN: 0 mL / OUT: 350 mL / NET: -350 mL        < from: CT Angio Chest w/ IV Cont (11.30.17 @ 11:11) >  IMPRESSION:     No evidence for pulmonary embolism.    Bilateral lower lobe infiltrates and lower lobe mucoid impacted airways.    < end of copied text >      < from: CT Abdomen and Pelvis w/ Oral Cont and w/ IV Cont (12.26.17 @ 16:47) >  IMPRESSION:     Status post loop ileostomy. Few dilated jejunal loops, favor ileus,   follow-up clinically for partial obstruction. Details as above.    Transverse colon containing left diaphragmatic hernia. Decreased colonic   distention, enteric contrast seen in the rectum.    The basilar pneumonia stable. Other incidental findings as above.    Large bilateral scrotal hydrocele.    < end of copied text >

## 2017-12-31 NOTE — PROGRESS NOTE ADULT - ASSESSMENT
67 year old male with PMH of COPD stage 4 s/p right lung reduction in 2010 (on home oxygen 4L (sat 92-93%), HTN, CVA on 2014, DM, hypothyroidism, hydrocele, and nephrolithiasis who is admitted for a COPD exacerbation. Patient has been weaned off ventimask to 4 liters nasal cannula and respiratory status is stable. CTA of the chest was negative for PE but showed bilateral lower lobe infiltrates with mucous plugging for which he was started on Levaquin.  Respiratory status stable and steroids are being tapered. Hospital course complicated by constipation, which had not improved despite aggressive bowel regimen, laxatives and enemas. XRAY with large stool burden. CT with diaphragmatic hernia and patient was consequently taken to the OR for loop ileostomy under local anesthesia. Pt cont, to have copious amount in ileostomy. Pt is noted to have hyponatremia due to high output in ileostomy, got IVF, hyponatremia and hypokalemia improving, ileostomy secretion are getting thicker, he is tolerating diet, ostomy care as per ostomy RN,  he is continued with cholestyramine, he has been continued with IV hydration, Ileostomy out is still high, but secretions are getting thicker, monitored, I/Os, being continued with IV fluid rate to 125ml/h and GI has increased Octreotide to 150mcgs tid,  has CT abdomen and Plevis done, results reviewed, GI add Imodium 2 mg po bid. During the earlier part of the hospitalization patient was treated for acute on chronic hypoxic respiratory failure secondary to COPD exacerbation and CAP, was treated with steroids now tapered and duo nebs, SOB resolved, he uses home oxygen (4 liters), bicarb elevated likely due to metabolic compensation for resp acidosis  continue bronchodilators and prednisone tapered off and completed Levaquin as well.  His electrolytes monitored and replaced accordingly, he was found to have right femoral artery dissection as incidental finding on CT, no intervention as per vascular, follow up with vascular clinic as out patient.     Plan:    1. High out put from Ileostomy:   c/w lomotil, cholestyramine,Octreotide, Imodium  ileostomy secretion still liquid with some residue, he is tolerating diet,   ostomy care as per ostomy RN,     IV hydration, Ileostomy out is still high, but secretions are getting thicker,   encouraged for oral hydration,   monitor I/Os,   PT involved      2. Acute on chronic hypoxic respiratory failure secondary to COPD exacerbation, improved. on home oxygen (4 liters),   bicarb elevated likely due to metabolic compensation for resp acidosis although patient refused blood gases,   continue bronchodilators and prednisone tapered off.   continue Mucomyst PRN  completed course of Levaquin     3. CAP - completed course of Levaquin on 12/5.     4. Hypertension - stable   - continue cardizem    5. Hyperlipidemia   - continue statin    6.hyponatremia: resolved, it was due to high ileostomy output.    Hypokalemia - replaced.   Hypophosphatemia: replaced.   Hypomagnesemia: replaced.   change IVF to multiple electrolyte sol  monitor Electrolytes    7. Scrotal edema chronic with urinary retention, no more retention, Roy if not able to urinate  - patient following with  as outpatient  - cont scrotal elevation    8. Right femoral artery dissection  - incidental finding on CT, no intervention as per vascular    9. ankle swelling: leg elevation. scrotal elevation. PP+    9. DVT Prophylaxis - Lovenox

## 2017-12-31 NOTE — PROGRESS NOTE ADULT - SUBJECTIVE AND OBJECTIVE BOX
Pt's condition unchanged. Ileostomy output yesterday roughly 5 liters which is down slightly from 12/29. K+ this AM noted 2.9. Supplementation ordered.    MEDICATIONS:  MEDICATIONS  (STANDING):  ALBUTerol/ipratropium for Nebulization. 3 milliLiter(s) Nebulizer once  aspirin  chewable 81 milliGRAM(s) Oral daily  atorvastatin 80 milliGRAM(s) Oral at bedtime  cholestyramine Powder (Sugar-Free) 4 Gram(s) Oral two times a day  clopidogrel Tablet 75 milliGRAM(s) Oral daily  diltiazem    milliGRAM(s) Oral <User Schedule>  enoxaparin Injectable 40 milliGRAM(s) SubCutaneous daily  FIRST- Mouthwash  BLM 10 milliLiter(s) Swish and Spit two times a day  fluticasone propionate/ salmeterol 250-50 MICROgram(s) Diskus 1 Dose(s) Inhalation two times a day  levothyroxine 50 MICROGram(s) Oral daily  loperamide Solution 2 milliGRAM(s) Oral four times a day  loratadine 10 milliGRAM(s) Oral daily  multiple electrolytes Injection Type 1 1000 milliLiter(s) (125 mL/Hr) IV Continuous <Continuous>  multivitamin 1 Tablet(s) Oral daily  nystatin Powder 1 Application(s) Topical two times a day  octreotide  Injectable 150 MICROGram(s) SubCutaneous every 8 hours  pantoprazole   Suspension 40 milliGRAM(s) Oral two times a day before meals  potassium chloride    Tablet ER 20 milliEquivalent(s) Oral every 2 hours  tamsulosin 0.4 milliGRAM(s) Oral at bedtime  tiotropium 18 MICROgram(s) Capsule 1 Capsule(s) Inhalation daily    MEDICATIONS  (PRN):  acetaminophen  Suppository. 650 milliGRAM(s) Rectal every 6 hours PRN Moderate Pain (4 - 6)  acetylcysteine 20% Inhalation 1 milliLiter(s) Inhalation every 4 hours PRN poor cough clearance  ALBUTerol    0.083% 2.5 milliGRAM(s) Nebulizer every 4 hours PRN Shortness of Breath and/or Wheezing  ALPRAZolam 0.5 milliGRAM(s) Oral every 6 hours PRN Anxiety  ondansetron Injectable 4 milliGRAM(s) IV Push every 8 hours PRN Nausea and/or Vomiting      Allergies    codeine (Unknown)  penicillin (Unknown)    Intolerances    Symbicort (Short breath)      Vital Signs Last 24 Hrs  T(C): 36.8 (31 Dec 2017 04:56), Max: 37 (30 Dec 2017 15:56)  T(F): 98.2 (31 Dec 2017 04:56), Max: 98.6 (30 Dec 2017 15:56)  HR: 84 (31 Dec 2017 04:56) (84 - 110)  BP: 93/64 (31 Dec 2017 04:56) (93/64 - 104/74)  BP(mean): --  RR: 18 (31 Dec 2017 04:56) (18 - 19)  SpO2: 95% (31 Dec 2017 04:56) (95% - 96%)    12-30 @ 07:01  -  12-31 @ 07:00  --------------------------------------------------------  IN: 500 mL / OUT: 5400 mL / NET: -4900 mL    12-31 @ 07:01  - 12-31 @ 10:28  --------------------------------------------------------  IN: 0 mL / OUT: 350 mL / NET: -350 mL        PHYSICAL EXAM:    General: Well developed; well nourished; in no acute distress  HEENT: MMM, conjunctiva pink and sclera anicteric.  Abdomen: Unchanged.  Extremities: no cyanosis, clubbing or edema.    LABS:    CBC Full  -  ( 31 Dec 2017 05:50 )  WBC Count : 7.5 K/uL  Hemoglobin : 9.8 g/dL  Hematocrit : 31.6 %  Platelet Count - Automated : 317 K/uL  Mean Cell Volume : 87.5 fl  Mean Cell Hemoglobin : 27.1 pg  Mean Cell Hemoglobin Concentration : 31.0 g/dL  Auto Neutrophil # : 5.4 K/uL  Auto Lymphocyte # : 1.3 K/uL  Auto Monocyte # : 0.6 K/uL  Auto Eosinophil # : 0.1 K/uL  Auto Basophil # : 0.0 K/uL  Auto Neutrophil % : 72.3 %  Auto Lymphocyte % : 17.8 %  Auto Monocyte % : 8.6 %  Auto Eosinophil % : 0.8 %  Auto Basophil % : 0.4 %    12-31    139  |  101  |  <3.0<L>  ----------------------------<  131<H>  2.9<LL>   |  29.0  |  0.48<L>    Ca    7.4<L>      31 Dec 2017 05:50  Phos  2.2     12-31  Mg     1.4     12-31    TPro  5.4<L>  /  Alb  2.0<L>  /  TBili  0.4  /  DBili  x   /  AST  8   /  ALT  7   /  AlkPhos  75  12-30                      RADIOLOGY & ADDITIONAL STUDIES (The following images were personally reviewed):

## 2017-12-31 NOTE — PROGRESS NOTE ADULT - PROBLEM SELECTOR PLAN 1
Slightly improved. Continue current management and diet for now. Will re-evaluate pt. Tuesday. Repeat labs ordered for the AM. Slightly improved. Continue current management and diet for now. Will re-evaluate pt. Tuesday. Magnesium and phosphorous also low and supplementation ordered. Repeat labs including magnesium and phosphorous ordered for tomorrow AM. ordered for the AM.

## 2018-01-01 LAB
ANION GAP SERPL CALC-SCNC: 10 MMOL/L — SIGNIFICANT CHANGE UP (ref 5–17)
BASOPHILS # BLD AUTO: 0 K/UL — SIGNIFICANT CHANGE UP (ref 0–0.2)
BASOPHILS NFR BLD AUTO: 0.6 % — SIGNIFICANT CHANGE UP (ref 0–2)
BUN SERPL-MCNC: <3 MG/DL — LOW (ref 8–20)
CALCIUM SERPL-MCNC: 7.3 MG/DL — LOW (ref 8.6–10.2)
CHLORIDE SERPL-SCNC: 101 MMOL/L — SIGNIFICANT CHANGE UP (ref 98–107)
CO2 SERPL-SCNC: 30 MMOL/L — HIGH (ref 22–29)
CREAT SERPL-MCNC: 0.51 MG/DL — SIGNIFICANT CHANGE UP (ref 0.5–1.3)
EOSINOPHIL # BLD AUTO: 0 K/UL — SIGNIFICANT CHANGE UP (ref 0–0.5)
EOSINOPHIL NFR BLD AUTO: 0.4 % — SIGNIFICANT CHANGE UP (ref 0–6)
GLUCOSE SERPL-MCNC: 153 MG/DL — HIGH (ref 70–115)
HCT VFR BLD CALC: 32.8 % — LOW (ref 42–52)
HGB BLD-MCNC: 10.2 G/DL — LOW (ref 14–18)
LYMPHOCYTES # BLD AUTO: 0.9 K/UL — LOW (ref 1–4.8)
LYMPHOCYTES # BLD AUTO: 11.7 % — LOW (ref 20–55)
MAGNESIUM SERPL-MCNC: 1.7 MG/DL — SIGNIFICANT CHANGE UP (ref 1.6–2.6)
MCHC RBC-ENTMCNC: 27.1 PG — SIGNIFICANT CHANGE UP (ref 27–31)
MCHC RBC-ENTMCNC: 31.1 G/DL — LOW (ref 32–36)
MCV RBC AUTO: 87.2 FL — SIGNIFICANT CHANGE UP (ref 80–94)
MONOCYTES # BLD AUTO: 0.6 K/UL — SIGNIFICANT CHANGE UP (ref 0–0.8)
MONOCYTES NFR BLD AUTO: 7.5 % — SIGNIFICANT CHANGE UP (ref 3–10)
NEUTROPHILS # BLD AUTO: 6.1 K/UL — SIGNIFICANT CHANGE UP (ref 1.8–8)
NEUTROPHILS NFR BLD AUTO: 79.7 % — HIGH (ref 37–73)
PHOSPHATE SERPL-MCNC: 2.3 MG/DL — LOW (ref 2.4–4.7)
PLATELET # BLD AUTO: 323 K/UL — SIGNIFICANT CHANGE UP (ref 150–400)
POTASSIUM SERPL-MCNC: 2.8 MMOL/L — CRITICAL LOW (ref 3.5–5.3)
POTASSIUM SERPL-SCNC: 2.8 MMOL/L — CRITICAL LOW (ref 3.5–5.3)
RBC # BLD: 3.76 M/UL — LOW (ref 4.6–6.2)
RBC # FLD: 15.2 % — SIGNIFICANT CHANGE UP (ref 11–15.6)
SODIUM SERPL-SCNC: 141 MMOL/L — SIGNIFICANT CHANGE UP (ref 135–145)
WBC # BLD: 7.7 K/UL — SIGNIFICANT CHANGE UP (ref 4.8–10.8)
WBC # FLD AUTO: 7.7 K/UL — SIGNIFICANT CHANGE UP (ref 4.8–10.8)

## 2018-01-01 PROCEDURE — 99233 SBSQ HOSP IP/OBS HIGH 50: CPT

## 2018-01-01 RX ORDER — SODIUM,POTASSIUM PHOSPHATES 278-250MG
1 POWDER IN PACKET (EA) ORAL
Qty: 0 | Refills: 0 | Status: COMPLETED | OUTPATIENT
Start: 2018-01-02 | End: 2018-01-06

## 2018-01-01 RX ORDER — MAGNESIUM SULFATE 500 MG/ML
1 VIAL (ML) INJECTION ONCE
Qty: 0 | Refills: 0 | Status: COMPLETED | OUTPATIENT
Start: 2018-01-01 | End: 2018-01-01

## 2018-01-01 RX ORDER — POTASSIUM PHOSPHATE, MONOBASIC POTASSIUM PHOSPHATE, DIBASIC 236; 224 MG/ML; MG/ML
15 INJECTION, SOLUTION INTRAVENOUS ONCE
Qty: 0 | Refills: 0 | Status: COMPLETED | OUTPATIENT
Start: 2018-01-01 | End: 2018-01-01

## 2018-01-01 RX ORDER — SODIUM,POTASSIUM PHOSPHATES 278-250MG
1 POWDER IN PACKET (EA) ORAL
Qty: 0 | Refills: 0 | Status: DISCONTINUED | OUTPATIENT
Start: 2018-01-01 | End: 2018-01-01

## 2018-01-01 RX ORDER — ALPRAZOLAM 0.25 MG
0.25 TABLET ORAL AT BEDTIME
Qty: 0 | Refills: 0 | Status: DISCONTINUED | OUTPATIENT
Start: 2018-01-01 | End: 2018-01-08

## 2018-01-01 RX ORDER — POTASSIUM CHLORIDE 20 MEQ
40 PACKET (EA) ORAL EVERY 4 HOURS
Qty: 0 | Refills: 0 | Status: COMPLETED | OUTPATIENT
Start: 2018-01-01 | End: 2018-01-01

## 2018-01-01 RX ADMIN — OCTREOTIDE ACETATE 150 MICROGRAM(S): 200 INJECTION, SOLUTION INTRAVENOUS; SUBCUTANEOUS at 06:31

## 2018-01-01 RX ADMIN — Medication 40 MILLIEQUIVALENT(S): at 21:23

## 2018-01-01 RX ADMIN — Medication 2 MILLIGRAM(S): at 17:54

## 2018-01-01 RX ADMIN — CHOLESTYRAMINE 4 GRAM(S): 4 POWDER, FOR SUSPENSION ORAL at 21:21

## 2018-01-01 RX ADMIN — OCTREOTIDE ACETATE 150 MICROGRAM(S): 200 INJECTION, SOLUTION INTRAVENOUS; SUBCUTANEOUS at 14:35

## 2018-01-01 RX ADMIN — CHOLESTYRAMINE 4 GRAM(S): 4 POWDER, FOR SUSPENSION ORAL at 09:13

## 2018-01-01 RX ADMIN — Medication 100 GRAM(S): at 14:35

## 2018-01-01 RX ADMIN — Medication 1 TABLET(S): at 13:07

## 2018-01-01 RX ADMIN — CLOPIDOGREL BISULFATE 75 MILLIGRAM(S): 75 TABLET, FILM COATED ORAL at 13:07

## 2018-01-01 RX ADMIN — OCTREOTIDE ACETATE 150 MICROGRAM(S): 200 INJECTION, SOLUTION INTRAVENOUS; SUBCUTANEOUS at 21:23

## 2018-01-01 RX ADMIN — Medication 2 MILLIGRAM(S): at 23:27

## 2018-01-01 RX ADMIN — FLUTICASONE PROPIONATE AND SALMETEROL 1 DOSE(S): 50; 250 POWDER ORAL; RESPIRATORY (INHALATION) at 09:14

## 2018-01-01 RX ADMIN — Medication 40 MILLIEQUIVALENT(S): at 17:49

## 2018-01-01 RX ADMIN — SODIUM CHLORIDE 125 MILLILITER(S): 9 INJECTION, SOLUTION INTRAVENOUS at 17:49

## 2018-01-01 RX ADMIN — TAMSULOSIN HYDROCHLORIDE 0.4 MILLIGRAM(S): 0.4 CAPSULE ORAL at 21:22

## 2018-01-01 RX ADMIN — ATORVASTATIN CALCIUM 80 MILLIGRAM(S): 80 TABLET, FILM COATED ORAL at 21:22

## 2018-01-01 RX ADMIN — NYSTATIN CREAM 1 APPLICATION(S): 100000 CREAM TOPICAL at 06:30

## 2018-01-01 RX ADMIN — Medication 1 TABLET(S): at 09:13

## 2018-01-01 RX ADMIN — SODIUM CHLORIDE 125 MILLILITER(S): 9 INJECTION, SOLUTION INTRAVENOUS at 09:14

## 2018-01-01 RX ADMIN — Medication 81 MILLIGRAM(S): at 13:07

## 2018-01-01 RX ADMIN — LORATADINE 10 MILLIGRAM(S): 10 TABLET ORAL at 13:07

## 2018-01-01 RX ADMIN — Medication 2 MILLIGRAM(S): at 06:30

## 2018-01-01 RX ADMIN — TIOTROPIUM BROMIDE 1 CAPSULE(S): 18 CAPSULE ORAL; RESPIRATORY (INHALATION) at 12:16

## 2018-01-01 RX ADMIN — PANTOPRAZOLE SODIUM 40 MILLIGRAM(S): 20 TABLET, DELAYED RELEASE ORAL at 17:49

## 2018-01-01 RX ADMIN — POTASSIUM PHOSPHATE, MONOBASIC POTASSIUM PHOSPHATE, DIBASIC 62.5 MILLIMOLE(S): 236; 224 INJECTION, SOLUTION INTRAVENOUS at 13:07

## 2018-01-01 RX ADMIN — FLUTICASONE PROPIONATE AND SALMETEROL 1 DOSE(S): 50; 250 POWDER ORAL; RESPIRATORY (INHALATION) at 21:21

## 2018-01-01 RX ADMIN — DIPHENHYDRAMINE HYDROCHLORIDE AND LIDOCAINE HYDROCHLORIDE AND ALUMINUM HYDROXIDE AND MAGNESIUM HYDRO 10 MILLILITER(S): KIT at 17:49

## 2018-01-01 RX ADMIN — NYSTATIN CREAM 1 APPLICATION(S): 100000 CREAM TOPICAL at 17:49

## 2018-01-01 RX ADMIN — Medication 0.25 MILLIGRAM(S): at 21:22

## 2018-01-01 RX ADMIN — Medication 50 MICROGRAM(S): at 06:29

## 2018-01-01 RX ADMIN — PANTOPRAZOLE SODIUM 40 MILLIGRAM(S): 20 TABLET, DELAYED RELEASE ORAL at 06:29

## 2018-01-01 RX ADMIN — Medication 180 MILLIGRAM(S): at 17:49

## 2018-01-01 RX ADMIN — Medication 180 MILLIGRAM(S): at 06:29

## 2018-01-01 RX ADMIN — DIPHENHYDRAMINE HYDROCHLORIDE AND LIDOCAINE HYDROCHLORIDE AND ALUMINUM HYDROXIDE AND MAGNESIUM HYDRO 10 MILLILITER(S): KIT at 06:29

## 2018-01-01 NOTE — PROGRESS NOTE ADULT - SUBJECTIVE AND OBJECTIVE BOX
FRANDY BAUTISTA    [id number]    67y      Male    Patient is a 67y old  Male who presents with a chief complaint of SOB (15 Dec 2017 10:17)      INTERVAL HPI/OVERNIGHT EVENTS:    decreasing output. no nausea, vomiting, dizziness.   asking for xanax 0.25 mg PRN for anxiety. usually at night. He feels anxious for what's going on with him and also more anxious as his wife is going for stress test tomorrow. HE has been taking PRN xanax at night since he was diagnosed of having COPD. denied alcohol use.   K and Phos low, mag borderline supplemented      REVIEW OF SYSTEMS:    CONSTITUTIONAL: No fever/chills  RESPIRATORY: chronic intermittent cough,not worsening. asking for tessalon otoniel. Robitussin does not help.  No shortness of breath  CARDIOVASCULAR: No chest pain, no palpitations.   GASTROINTESTINAL: No abdominal, No nausea, vomiting.   NEUROLOGICAL: No headaches,  loss of strength.  MISCELLANEOUS: No joint swelling or pain.       MEDICATIONS  (STANDING):  ALBUTerol/ipratropium for Nebulization. 3 milliLiter(s) Nebulizer once  aspirin  chewable 81 milliGRAM(s) Oral daily  atorvastatin 80 milliGRAM(s) Oral at bedtime  cholestyramine Powder (Sugar-Free) 4 Gram(s) Oral two times a day  clopidogrel Tablet 75 milliGRAM(s) Oral daily  diltiazem    milliGRAM(s) Oral <User Schedule>  enoxaparin Injectable 40 milliGRAM(s) SubCutaneous daily  FIRST- Mouthwash  BLM 10 milliLiter(s) Swish and Spit two times a day  fluticasone propionate/ salmeterol 250-50 MICROgram(s) Diskus 1 Dose(s) Inhalation two times a day  levothyroxine 50 MICROGram(s) Oral daily  loperamide Solution 2 milliGRAM(s) Oral four times a day  loratadine 10 milliGRAM(s) Oral daily  multiple electrolytes Injection Type 1 1000 milliLiter(s) (125 mL/Hr) IV Continuous <Continuous>  multivitamin 1 Tablet(s) Oral daily  nystatin Powder 1 Application(s) Topical two times a day  octreotide  Injectable 150 MICROGram(s) SubCutaneous every 8 hours  pantoprazole   Suspension 40 milliGRAM(s) Oral two times a day before meals  potassium chloride    Tablet ER 40 milliEquivalent(s) Oral every 4 hours  tamsulosin 0.4 milliGRAM(s) Oral at bedtime  tiotropium 18 MICROgram(s) Capsule 1 Capsule(s) Inhalation daily    MEDICATIONS  (PRN):  acetaminophen  Suppository. 650 milliGRAM(s) Rectal every 6 hours PRN Moderate Pain (4 - 6)  acetylcysteine 20% Inhalation 1 milliLiter(s) Inhalation every 4 hours PRN poor cough clearance  ALBUTerol    0.083% 2.5 milliGRAM(s) Nebulizer every 4 hours PRN Shortness of Breath and/or Wheezing  ondansetron Injectable 4 milliGRAM(s) IV Push every 8 hours PRN Nausea and/or Vomiting      PHYSICAL EXAM:    Vital Signs Last 24 Hrs  T(C): 37.1 (01 Jan 2018 09:47), Max: 37.1 (01 Jan 2018 09:47)  T(F): 98.7 (01 Jan 2018 09:47), Max: 98.7 (01 Jan 2018 09:47)  HR: 95 (01 Jan 2018 09:47) (86 - 95)  BP: 104/58 (01 Jan 2018 09:47) (104/58 - 119/64)  BP(mean): --  RR: 20 (01 Jan 2018 09:47) (18 - 20)  SpO2: 94% (01 Jan 2018 09:47) (94% - 99%)    GENERAL: Elderly male looking comfortable. eating comfortably. able to complete multiple sentences.  HEENT: PERRL, +EOMI  NECK: soft, Supple, No JVD,   CHEST/LUNG: Decrease air entry bilaterally; no wheezing. no rales/ronchi  HEART: S1S2+, Regular rate and rhythm; No murmurs  ABDOMEN: Soft, Nontender, Bowel sounds present, s/p Ileostomy connected with drain. yellowish green liquid with some whitish residue. no blood  EXTREMITIES:  1+ Peripheral Pulses, b/l ankle edema, left more than right  SKIN: No rashes or lesions  NEURO: AAOX3, no focal deficits, no motor r sensory loss  PSYCH: normal mood. anxious    LABS:                           10.2   7.7   )-----------( 323      ( 01 Jan 2018 08:03 )             32.8       01-01    141  |  101  |  <3.0<L>  ----------------------------<  153<H>  2.8<LL>   |  30.0<H>  |  0.51    Ca    7.3<L>      01 Jan 2018 08:03  Phos  2.3     01-01  Mg     1.7     01-01          I&O's Summary    31 Dec 2017 07:01  -  01 Jan 2018 07:00  --------------------------------------------------------  IN: 2875 mL / OUT: 4800 mL / NET: -1925 mL    01 Jan 2018 07:01  -  01 Jan 2018 18:00  --------------------------------------------------------  IN: 1250 mL / OUT: 4350 mL / NET: -3100 mL        < from: CT Angio Chest w/ IV Cont (11.30.17 @ 11:11) >  IMPRESSION:     No evidence for pulmonary embolism.    Bilateral lower lobe infiltrates and lower lobe mucoid impacted airways.    < end of copied text >      < from: CT Abdomen and Pelvis w/ Oral Cont and w/ IV Cont (12.26.17 @ 16:47) >  IMPRESSION:     Status post loop ileostomy. Few dilated jejunal loops, favor ileus,   follow-up clinically for partial obstruction. Details as above.    Transverse colon containing left diaphragmatic hernia. Decreased colonic   distention, enteric contrast seen in the rectum.    The basilar pneumonia stable. Other incidental findings as above.    Large bilateral scrotal hydrocele.    < end of copied text >

## 2018-01-01 NOTE — PROGRESS NOTE ADULT - ASSESSMENT
67 year old male with PMH of COPD stage 4 s/p right lung reduction in 2010 (on home oxygen 4L (sat 92-93%), HTN, CVA on 2014, DM, hypothyroidism, hydrocele, and nephrolithiasis who is admitted for a COPD exacerbation. Patient has been weaned off ventimask to 4 liters nasal cannula and respiratory status is stable. CTA of the chest was negative for PE but showed bilateral lower lobe infiltrates with mucous plugging for which he was started on Levaquin.  Respiratory status stable and steroids are being tapered. Hospital course complicated by constipation, which had not improved despite aggressive bowel regimen, laxatives and enemas. XRAY with large stool burden. CT with diaphragmatic hernia and patient was consequently taken to the OR for loop ileostomy under local anesthesia. Pt cont, to have copious amount in ileostomy. Pt is noted to have hyponatremia due to high output in ileostomy, got IVF, hyponatremia and hypokalemia improving, ileostomy secretion are getting thicker, he is tolerating diet, ostomy care as per ostomy RN,  he is continued with cholestyramine, he has been continued with IV hydration, Ileostomy out is still high, but secretions are getting thicker, monitored, I/Os, being continued with IV fluid rate to 125ml/h and GI has increased Octreotide to 150mcgs tid,  has CT abdomen and Plevis done, results reviewed, GI add Imodium 2 mg po bid. During the earlier part of the hospitalization patient was treated for acute on chronic hypoxic respiratory failure secondary to COPD exacerbation and CAP, was treated with steroids now tapered and duo nebs, SOB resolved, he uses home oxygen (4 liters), bicarb elevated likely due to metabolic compensation for resp acidosis  continue bronchodilators and prednisone tapered off and completed Levaquin as well.  His electrolytes monitored and replaced accordingly, he was found to have right femoral artery dissection as incidental finding on CT, no intervention as per vascular, follow up with vascular clinic as out patient.     Plan:    1. High out put from Ileostomy: decreasing gradually  c/w lomotil, cholestyramine,Octreotide, Imodium  ileostomy secretion still liquid with some residue, he is tolerating diet,   ostomy care as per ostomy RN,     IV hydration, Ileostomy out is still high, but secretions are getting thicker,   encouraged for oral hydration,   monitor I/Os,   PT involved      2. Acute on chronic hypoxic respiratory failure secondary to COPD exacerbation, improved. on home oxygen (4 liters),   continue bronchodilators and prednisone tapered off.   continue Mucomyst PRN  completed course of Levaquin   Trial of tessalon otoniel for cough. however can cause GI distress/constipation/nausea. stop of any s/e    3. CAP - completed course of Levaquin on 12/5.     4. Hypertension - stable   - continue cardizem    5. Hyperlipidemia   - continue statin    6.hyponatremia: resolved, it was due to high ileostomy output.    Hypokalemia - replaced.   Hypophosphatemia: replaced.   Hypomagnesemia: replaced.   on multiple electrolyte sol. consider KCL with IVF as if K persistently low and ongoing diarrhoea  monitor Electrolytes    7. Scrotal edema chronic with urinary retention, no more retention, Roy if not able to urinate  - patient following with  as outpatient  - cont scrotal elevation    8. Right femoral artery dissection  - incidental finding on CT, no intervention as per vascular    9. ankle swelling: leg elevation. scrotal elevation. PP+    10. anxiety: xanax as PRN. start at HS. fdiscussed about risk and benefit including sedation/risk of fall and injury and resp depression.     9. DVT Prophylaxis - Lovenox

## 2018-01-01 NOTE — PROGRESS NOTE ADULT - PROBLEM SELECTOR PLAN 1
Improving on Loperamide 2 mg. QID and Octreotide 150 mcg. SQ TID and low residue diet. Continue current management and diet. Continue to follow ileostomy outputs daily. K+ and Phosphorous supplementation as per medicine. Repeat labs ordered for tomorrow AM. Continue current IVF and rate for now. Improving on Loperamide 2 mg. QID and Octreotide 150 mcg. SQ TID and low residue diet. Continue current management and diet. Continue to follow ileostomy outputs daily. K-Phos and magnesium supplementation ordered today by myself. Repeat labs including BMP, cbc, magnesium and phosphorous levels ordered for tomorrow AM. Continue current IVF and rate for now.

## 2018-01-01 NOTE — PROGRESS NOTE ADULT - SUBJECTIVE AND OBJECTIVE BOX
Pt seen and examined> His ileostomy output from yesterday was down to 3750 cc. over 24 hours which is an improvement from previous days. His K+ and Phosphorous are low and supplementation has been ordered.      MEDICATIONS:  MEDICATIONS  (STANDING):  ALBUTerol/ipratropium for Nebulization. 3 milliLiter(s) Nebulizer once  aspirin  chewable 81 milliGRAM(s) Oral daily  atorvastatin 80 milliGRAM(s) Oral at bedtime  cholestyramine Powder (Sugar-Free) 4 Gram(s) Oral two times a day  clopidogrel Tablet 75 milliGRAM(s) Oral daily  diltiazem    milliGRAM(s) Oral <User Schedule>  enoxaparin Injectable 40 milliGRAM(s) SubCutaneous daily  FIRST- Mouthwash  BLM 10 milliLiter(s) Swish and Spit two times a day  fluticasone propionate/ salmeterol 250-50 MICROgram(s) Diskus 1 Dose(s) Inhalation two times a day  levothyroxine 50 MICROGram(s) Oral daily  loperamide Solution 2 milliGRAM(s) Oral four times a day  loratadine 10 milliGRAM(s) Oral daily  multiple electrolytes Injection Type 1 1000 milliLiter(s) (125 mL/Hr) IV Continuous <Continuous>  multivitamin 1 Tablet(s) Oral daily  nystatin Powder 1 Application(s) Topical two times a day  octreotide  Injectable 150 MICROGram(s) SubCutaneous every 8 hours  pantoprazole   Suspension 40 milliGRAM(s) Oral two times a day before meals  potassium acid phosphate/sodium acid phosphate tablet (K-PHOS No. 2) 1 Tablet(s) Oral four times a day with meals  tamsulosin 0.4 milliGRAM(s) Oral at bedtime  tiotropium 18 MICROgram(s) Capsule 1 Capsule(s) Inhalation daily    MEDICATIONS  (PRN):  acetaminophen  Suppository. 650 milliGRAM(s) Rectal every 6 hours PRN Moderate Pain (4 - 6)  acetylcysteine 20% Inhalation 1 milliLiter(s) Inhalation every 4 hours PRN poor cough clearance  ALBUTerol    0.083% 2.5 milliGRAM(s) Nebulizer every 4 hours PRN Shortness of Breath and/or Wheezing  ondansetron Injectable 4 milliGRAM(s) IV Push every 8 hours PRN Nausea and/or Vomiting      Allergies    codeine (Unknown)  penicillin (Unknown)    Intolerances    Symbicort (Short breath)      Vital Signs Last 24 Hrs  T(C): 37.1 (01 Jan 2018 09:47), Max: 37.5 (31 Dec 2017 16:19)  T(F): 98.7 (01 Jan 2018 09:47), Max: 99.5 (31 Dec 2017 16:19)  HR: 95 (01 Jan 2018 09:47) (86 - 112)  BP: 104/58 (01 Jan 2018 09:47) (95/60 - 119/64)  BP(mean): --  RR: 20 (01 Jan 2018 09:47) (18 - 20)  SpO2: 94% (01 Jan 2018 09:47) (94% - 99%)    12-31 @ 07:01 - 01-01 @ 07:00  --------------------------------------------------------  IN: 2875 mL / OUT: 4800 mL / NET: -1925 mL    01-01 @ 07:01 - 01-01 @ 12:26  --------------------------------------------------------  IN: 0 mL / OUT: 2100 mL / NET: -2100 mL        PHYSICAL EXAM:    General: Well developed; well nourished; in no acute distress  HEENT: MMM, conjunctiva pink and sclera anicteric.  Lungs: clear to auscultation bilaterally.  Cor: RRR S1, S2 only.  Gastrointestinal: Abdomen: Soft non-tender non-distended; Normal bowel sounds; No hepatosplenomegaly  + ileostomy  Extremities: no cyanosis, clubbing or edema.  Skin: Warm and dry. No obvious rash  Neuro: Pt. a + o x 3    LABS:      CBC Full  -  ( 01 Jan 2018 08:03 )  WBC Count : 7.7 K/uL  Hemoglobin : 10.2 g/dL  Hematocrit : 32.8 %  Platelet Count - Automated : 323 K/uL  Mean Cell Volume : 87.2 fl  Mean Cell Hemoglobin : 27.1 pg  Mean Cell Hemoglobin Concentration : 31.1 g/dL  Auto Neutrophil # : 6.1 K/uL  Auto Lymphocyte # : 0.9 K/uL  Auto Monocyte # : 0.6 K/uL  Auto Eosinophil # : 0.0 K/uL  Auto Basophil # : 0.0 K/uL  Auto Neutrophil % : 79.7 %  Auto Lymphocyte % : 11.7 %  Auto Monocyte % : 7.5 %  Auto Eosinophil % : 0.4 %  Auto Basophil % : 0.6 %    01-01    141  |  101  |  <3.0<L>  ----------------------------<  153<H>  2.8<LL>   |  30.0<H>  |  0.51    Ca    7.3<L>      01 Jan 2018 08:03  Phos  2.3     01-01  Mg     1.7     01-01                        RADIOLOGY & ADDITIONAL STUDIES (The following images were personally reviewed): Pt seen and examined> His ileostomy output from yesterday was down to 3750 cc. over 24 hours which is an improvement from previous days. His K+ and Phosphorous are low and magnesium borderline.      MEDICATIONS:  MEDICATIONS  (STANDING):  ALBUTerol/ipratropium for Nebulization. 3 milliLiter(s) Nebulizer once  aspirin  chewable 81 milliGRAM(s) Oral daily  atorvastatin 80 milliGRAM(s) Oral at bedtime  cholestyramine Powder (Sugar-Free) 4 Gram(s) Oral two times a day  clopidogrel Tablet 75 milliGRAM(s) Oral daily  diltiazem    milliGRAM(s) Oral <User Schedule>  enoxaparin Injectable 40 milliGRAM(s) SubCutaneous daily  FIRST- Mouthwash  BLM 10 milliLiter(s) Swish and Spit two times a day  fluticasone propionate/ salmeterol 250-50 MICROgram(s) Diskus 1 Dose(s) Inhalation two times a day  levothyroxine 50 MICROGram(s) Oral daily  loperamide Solution 2 milliGRAM(s) Oral four times a day  loratadine 10 milliGRAM(s) Oral daily  multiple electrolytes Injection Type 1 1000 milliLiter(s) (125 mL/Hr) IV Continuous <Continuous>  multivitamin 1 Tablet(s) Oral daily  nystatin Powder 1 Application(s) Topical two times a day  octreotide  Injectable 150 MICROGram(s) SubCutaneous every 8 hours  pantoprazole   Suspension 40 milliGRAM(s) Oral two times a day before meals  potassium acid phosphate/sodium acid phosphate tablet (K-PHOS No. 2) 1 Tablet(s) Oral four times a day with meals  tamsulosin 0.4 milliGRAM(s) Oral at bedtime  tiotropium 18 MICROgram(s) Capsule 1 Capsule(s) Inhalation daily    MEDICATIONS  (PRN):  acetaminophen  Suppository. 650 milliGRAM(s) Rectal every 6 hours PRN Moderate Pain (4 - 6)  acetylcysteine 20% Inhalation 1 milliLiter(s) Inhalation every 4 hours PRN poor cough clearance  ALBUTerol    0.083% 2.5 milliGRAM(s) Nebulizer every 4 hours PRN Shortness of Breath and/or Wheezing  ondansetron Injectable 4 milliGRAM(s) IV Push every 8 hours PRN Nausea and/or Vomiting      Allergies    codeine (Unknown)  penicillin (Unknown)    Intolerances    Symbicort (Short breath)      Vital Signs Last 24 Hrs  T(C): 37.1 (01 Jan 2018 09:47), Max: 37.5 (31 Dec 2017 16:19)  T(F): 98.7 (01 Jan 2018 09:47), Max: 99.5 (31 Dec 2017 16:19)  HR: 95 (01 Jan 2018 09:47) (86 - 112)  BP: 104/58 (01 Jan 2018 09:47) (95/60 - 119/64)  BP(mean): --  RR: 20 (01 Jan 2018 09:47) (18 - 20)  SpO2: 94% (01 Jan 2018 09:47) (94% - 99%)    12-31 @ 07:01 - 01-01 @ 07:00  --------------------------------------------------------  IN: 2875 mL / OUT: 4800 mL / NET: -1925 mL    01-01 @ 07:01 - 01-01 @ 12:26  --------------------------------------------------------  IN: 0 mL / OUT: 2100 mL / NET: -2100 mL        PHYSICAL EXAM:    General: Well developed; well nourished; in no acute distress  HEENT: MMM, conjunctiva pink and sclera anicteric.  Lungs: clear to auscultation bilaterally.  Cor: RRR S1, S2 only.  Gastrointestinal: Abdomen: Soft non-tender non-distended; Normal bowel sounds; No hepatosplenomegaly  + ileostomy  Extremities: no cyanosis, clubbing or edema.  Skin: Warm and dry. No obvious rash  Neuro: Pt. a + o x 3    LABS:      CBC Full  -  ( 01 Jan 2018 08:03 )  WBC Count : 7.7 K/uL  Hemoglobin : 10.2 g/dL  Hematocrit : 32.8 %  Platelet Count - Automated : 323 K/uL  Mean Cell Volume : 87.2 fl  Mean Cell Hemoglobin : 27.1 pg  Mean Cell Hemoglobin Concentration : 31.1 g/dL  Auto Neutrophil # : 6.1 K/uL  Auto Lymphocyte # : 0.9 K/uL  Auto Monocyte # : 0.6 K/uL  Auto Eosinophil # : 0.0 K/uL  Auto Basophil # : 0.0 K/uL  Auto Neutrophil % : 79.7 %  Auto Lymphocyte % : 11.7 %  Auto Monocyte % : 7.5 %  Auto Eosinophil % : 0.4 %  Auto Basophil % : 0.6 %    01-01    141  |  101  |  <3.0<L>  ----------------------------<  153<H>  2.8<LL>   |  30.0<H>  |  0.51    Ca    7.3<L>      01 Jan 2018 08:03  Phos  2.3     01-01  Mg     1.7     01-01                        RADIOLOGY & ADDITIONAL STUDIES (The following images were personally reviewed):

## 2018-01-02 LAB
ANION GAP SERPL CALC-SCNC: 7 MMOL/L — SIGNIFICANT CHANGE UP (ref 5–17)
BASOPHILS # BLD AUTO: 0 K/UL — SIGNIFICANT CHANGE UP (ref 0–0.2)
BASOPHILS NFR BLD AUTO: 0.4 % — SIGNIFICANT CHANGE UP (ref 0–2)
BUN SERPL-MCNC: <3 MG/DL — LOW (ref 8–20)
CALCIUM SERPL-MCNC: 7.4 MG/DL — LOW (ref 8.6–10.2)
CHLORIDE SERPL-SCNC: 101 MMOL/L — SIGNIFICANT CHANGE UP (ref 98–107)
CO2 SERPL-SCNC: 33 MMOL/L — HIGH (ref 22–29)
CREAT SERPL-MCNC: 0.54 MG/DL — SIGNIFICANT CHANGE UP (ref 0.5–1.3)
EOSINOPHIL # BLD AUTO: 0.1 K/UL — SIGNIFICANT CHANGE UP (ref 0–0.5)
EOSINOPHIL NFR BLD AUTO: 0.8 % — SIGNIFICANT CHANGE UP (ref 0–6)
GLUCOSE SERPL-MCNC: 123 MG/DL — HIGH (ref 70–115)
HCT VFR BLD CALC: 32.6 % — LOW (ref 42–52)
HGB BLD-MCNC: 10 G/DL — LOW (ref 14–18)
LYMPHOCYTES # BLD AUTO: 1 K/UL — SIGNIFICANT CHANGE UP (ref 1–4.8)
LYMPHOCYTES # BLD AUTO: 12.4 % — LOW (ref 20–55)
MAGNESIUM SERPL-MCNC: 1.7 MG/DL — SIGNIFICANT CHANGE UP (ref 1.6–2.6)
MCHC RBC-ENTMCNC: 27 PG — SIGNIFICANT CHANGE UP (ref 27–31)
MCHC RBC-ENTMCNC: 30.7 G/DL — LOW (ref 32–36)
MCV RBC AUTO: 87.9 FL — SIGNIFICANT CHANGE UP (ref 80–94)
MONOCYTES # BLD AUTO: 0.6 K/UL — SIGNIFICANT CHANGE UP (ref 0–0.8)
MONOCYTES NFR BLD AUTO: 8.1 % — SIGNIFICANT CHANGE UP (ref 3–10)
NEUTROPHILS # BLD AUTO: 6.2 K/UL — SIGNIFICANT CHANGE UP (ref 1.8–8)
NEUTROPHILS NFR BLD AUTO: 77.9 % — HIGH (ref 37–73)
PHOSPHATE SERPL-MCNC: 2.7 MG/DL — SIGNIFICANT CHANGE UP (ref 2.4–4.7)
PLATELET # BLD AUTO: 346 K/UL — SIGNIFICANT CHANGE UP (ref 150–400)
POTASSIUM SERPL-MCNC: 3.2 MMOL/L — LOW (ref 3.5–5.3)
POTASSIUM SERPL-SCNC: 3.2 MMOL/L — LOW (ref 3.5–5.3)
RBC # BLD: 3.71 M/UL — LOW (ref 4.6–6.2)
RBC # FLD: 15.3 % — SIGNIFICANT CHANGE UP (ref 11–15.6)
SODIUM SERPL-SCNC: 141 MMOL/L — SIGNIFICANT CHANGE UP (ref 135–145)
WBC # BLD: 8 K/UL — SIGNIFICANT CHANGE UP (ref 4.8–10.8)
WBC # FLD AUTO: 8 K/UL — SIGNIFICANT CHANGE UP (ref 4.8–10.8)

## 2018-01-02 PROCEDURE — 99233 SBSQ HOSP IP/OBS HIGH 50: CPT

## 2018-01-02 RX ORDER — ENOXAPARIN SODIUM 100 MG/ML
40 INJECTION SUBCUTANEOUS DAILY
Qty: 0 | Refills: 0 | Status: DISCONTINUED | OUTPATIENT
Start: 2018-01-02 | End: 2018-01-16

## 2018-01-02 RX ADMIN — FLUTICASONE PROPIONATE AND SALMETEROL 1 DOSE(S): 50; 250 POWDER ORAL; RESPIRATORY (INHALATION) at 10:29

## 2018-01-02 RX ADMIN — NYSTATIN CREAM 1 APPLICATION(S): 100000 CREAM TOPICAL at 06:33

## 2018-01-02 RX ADMIN — Medication 2 MILLIGRAM(S): at 06:51

## 2018-01-02 RX ADMIN — Medication 1 TABLET(S): at 13:50

## 2018-01-02 RX ADMIN — CLOPIDOGREL BISULFATE 75 MILLIGRAM(S): 75 TABLET, FILM COATED ORAL at 13:49

## 2018-01-02 RX ADMIN — LORATADINE 10 MILLIGRAM(S): 10 TABLET ORAL at 13:50

## 2018-01-02 RX ADMIN — Medication 0.25 MILLIGRAM(S): at 23:12

## 2018-01-02 RX ADMIN — FLUTICASONE PROPIONATE AND SALMETEROL 1 DOSE(S): 50; 250 POWDER ORAL; RESPIRATORY (INHALATION) at 20:43

## 2018-01-02 RX ADMIN — TAMSULOSIN HYDROCHLORIDE 0.4 MILLIGRAM(S): 0.4 CAPSULE ORAL at 21:17

## 2018-01-02 RX ADMIN — Medication 180 MILLIGRAM(S): at 06:32

## 2018-01-02 RX ADMIN — Medication 180 MILLIGRAM(S): at 17:44

## 2018-01-02 RX ADMIN — ATORVASTATIN CALCIUM 80 MILLIGRAM(S): 80 TABLET, FILM COATED ORAL at 21:17

## 2018-01-02 RX ADMIN — Medication 2 MILLIGRAM(S): at 17:42

## 2018-01-02 RX ADMIN — Medication 1 TABLET(S): at 21:17

## 2018-01-02 RX ADMIN — Medication 2 MILLIGRAM(S): at 23:12

## 2018-01-02 RX ADMIN — Medication 1 TABLET(S): at 13:51

## 2018-01-02 RX ADMIN — Medication 81 MILLIGRAM(S): at 13:50

## 2018-01-02 RX ADMIN — SODIUM CHLORIDE 125 MILLILITER(S): 9 INJECTION, SOLUTION INTRAVENOUS at 17:35

## 2018-01-02 RX ADMIN — CHOLESTYRAMINE 4 GRAM(S): 4 POWDER, FOR SUSPENSION ORAL at 20:44

## 2018-01-02 RX ADMIN — OCTREOTIDE ACETATE 150 MICROGRAM(S): 200 INJECTION, SOLUTION INTRAVENOUS; SUBCUTANEOUS at 14:33

## 2018-01-02 RX ADMIN — Medication 1 TABLET(S): at 10:32

## 2018-01-02 RX ADMIN — Medication 1 TABLET(S): at 17:43

## 2018-01-02 RX ADMIN — PANTOPRAZOLE SODIUM 40 MILLIGRAM(S): 20 TABLET, DELAYED RELEASE ORAL at 17:44

## 2018-01-02 RX ADMIN — OCTREOTIDE ACETATE 150 MICROGRAM(S): 200 INJECTION, SOLUTION INTRAVENOUS; SUBCUTANEOUS at 06:33

## 2018-01-02 RX ADMIN — TIOTROPIUM BROMIDE 1 CAPSULE(S): 18 CAPSULE ORAL; RESPIRATORY (INHALATION) at 12:12

## 2018-01-02 RX ADMIN — SODIUM CHLORIDE 125 MILLILITER(S): 9 INJECTION, SOLUTION INTRAVENOUS at 08:35

## 2018-01-02 RX ADMIN — OCTREOTIDE ACETATE 150 MICROGRAM(S): 200 INJECTION, SOLUTION INTRAVENOUS; SUBCUTANEOUS at 23:13

## 2018-01-02 RX ADMIN — NYSTATIN CREAM 1 APPLICATION(S): 100000 CREAM TOPICAL at 16:32

## 2018-01-02 RX ADMIN — PANTOPRAZOLE SODIUM 40 MILLIGRAM(S): 20 TABLET, DELAYED RELEASE ORAL at 06:32

## 2018-01-02 RX ADMIN — DIPHENHYDRAMINE HYDROCHLORIDE AND LIDOCAINE HYDROCHLORIDE AND ALUMINUM HYDROXIDE AND MAGNESIUM HYDRO 10 MILLILITER(S): KIT at 06:32

## 2018-01-02 RX ADMIN — Medication 2 MILLIGRAM(S): at 13:51

## 2018-01-02 RX ADMIN — Medication 50 MICROGRAM(S): at 06:32

## 2018-01-02 RX ADMIN — Medication 0.25 MILLIGRAM(S): at 06:51

## 2018-01-02 RX ADMIN — CHOLESTYRAMINE 4 GRAM(S): 4 POWDER, FOR SUSPENSION ORAL at 10:32

## 2018-01-02 NOTE — PROGRESS NOTE ADULT - SUBJECTIVE AND OBJECTIVE BOX
Patient seen and examined;  No change in clinical status.  Still with generous output from the ileostomy;  Today 4250; day prior: 3750.  Ileostomy bag disconnected and had a spill of ileal contents.  No bleeding or abd pain.  No fever.  No distention.  Tolerates diet.  Consumes entire tray and Ensure.          PAST MEDICAL & SURGICAL HISTORY:  Hypothyroidism  Hydrocele  Renal calculi  Diabetes mellitus  Hypertension  CVA (cerebral vascular accident)  Chronic obstructive pulmonary disease, unspecified COPD type  History of lung surgery      ROS:  No Heartburn, regurgitation, dysphagia, odynophagia.  No dyspepsia  No abdominal pain.    No Nausea, vomiting.  No Bleeding.  No hematemesis.   No diarrhea.    No hematochesia.  No weight loss, anorexia.  No edema.      MEDICATIONS  (STANDING):  ALBUTerol/ipratropium for Nebulization. 3 milliLiter(s) Nebulizer once  aspirin  chewable 81 milliGRAM(s) Oral daily  atorvastatin 80 milliGRAM(s) Oral at bedtime  cholestyramine Powder (Sugar-Free) 4 Gram(s) Oral two times a day  clopidogrel Tablet 75 milliGRAM(s) Oral daily  diltiazem    milliGRAM(s) Oral <User Schedule>  enoxaparin Injectable 40 milliGRAM(s) SubCutaneous daily  FIRST- Mouthwash  BLM 10 milliLiter(s) Swish and Spit two times a day  fluticasone propionate/ salmeterol 250-50 MICROgram(s) Diskus 1 Dose(s) Inhalation two times a day  levothyroxine 50 MICROGram(s) Oral daily  loperamide Solution 2 milliGRAM(s) Oral four times a day  loratadine 10 milliGRAM(s) Oral daily  multiple electrolytes Injection Type 1 1000 milliLiter(s) (125 mL/Hr) IV Continuous <Continuous>  multivitamin 1 Tablet(s) Oral daily  nystatin Powder 1 Application(s) Topical two times a day  octreotide  Injectable 150 MICROGram(s) SubCutaneous every 8 hours  pantoprazole   Suspension 40 milliGRAM(s) Oral two times a day before meals  potassium acid phosphate/sodium acid phosphate tablet (K-PHOS No. 2) 1 Tablet(s) Oral four times a day with meals  tamsulosin 0.4 milliGRAM(s) Oral at bedtime  tiotropium 18 MICROgram(s) Capsule 1 Capsule(s) Inhalation daily    MEDICATIONS  (PRN):  acetaminophen  Suppository. 650 milliGRAM(s) Rectal every 6 hours PRN Moderate Pain (4 - 6)  acetylcysteine 20% Inhalation 1 milliLiter(s) Inhalation every 4 hours PRN poor cough clearance  ALBUTerol    0.083% 2.5 milliGRAM(s) Nebulizer every 4 hours PRN Shortness of Breath and/or Wheezing  ALPRAZolam 0.25 milliGRAM(s) Oral at bedtime PRN anxiety  benzonatate 100 milliGRAM(s) Oral three times a day PRN Cough  ondansetron Injectable 4 milliGRAM(s) IV Push every 8 hours PRN Nausea and/or Vomiting      Allergies    codeine (Unknown)  penicillin (Unknown)    Intolerances    Symbicort (Short breath)      Vital Signs Last 24 Hrs  T(C): 36.7 (02 Jan 2018 05:07), Max: 36.8 (02 Jan 2018 00:01)  T(F): 98.1 (02 Jan 2018 05:07), Max: 98.2 (02 Jan 2018 00:01)  HR: 80 (02 Jan 2018 05:07) (80 - 90)  BP: 115/74 (02 Jan 2018 05:07) (115/74 - 118/62)  BP(mean): --  RR: 18 (02 Jan 2018 05:07) (18 - 18)  SpO2: 95% (02 Jan 2018 05:07) (95% - 95%)    PHYSICAL EXAM:    GENERAL: NAD, well-groomed, well-developed  HEAD:  Atraumatic, Normocephalic  EYES: EOMI, PERRLA, conjunctiva and sclera clear  ENMT: No tonsillar erythema, exudates, or enlargement; Moist mucous membranes, Good dentition, No lesions  NECK: Supple, No JVD, Normal thyroid  CHEST/LUNG: Clear to percussion bilaterally; No rales, rhonchi, wheezing, or rubs  HEART: Regular rate and rhythm; No murmurs, rubs, or gallops  ABDOMEN: Soft, Nontender, Nondistended; Bowel sounds present; flat;  No distention.  Intact loop ileostomy;  Intact ileostomy bag.    EXTREMITIES:  2+ Peripheral Pulses, No clubbing, cyanosis, or edema  LYMPH: No lymphadenopathy noted  SKIN: No rashes or lesions      LABS:                        10.0   8.0   )-----------( 346      ( 02 Jan 2018 06:06 )             32.6     01-02    141  |  101  |  <3.0<L>  ----------------------------<  123<H>  3.2<L>   |  33.0<H>  |  0.54    Ca    7.4<L>      02 Jan 2018 06:06  Phos  2.7     01-02  Mg     1.7     01-02               RADIOLOGY & ADDITIONAL STUDIES:

## 2018-01-02 NOTE — PROGRESS NOTE ADULT - ASSESSMENT
67 year old male with PMH of COPD stage 4 s/p right lung reduction in 2010 (on home oxygen 4L (sat 92-93%), HTN, CVA on 2014, DM, hypothyroidism, hydrocele, and nephrolithiasis who is admitted for a COPD exacerbation. Patient has been weaned off ventimask to 4 liters nasal cannula and respiratory status is stable. CTA of the chest was negative for PE but showed bilateral lower lobe infiltrates with mucous plugging for which he was started on Levaquin.  Respiratory status stable and steroids are being tapered. Hospital course complicated by constipation, which had not improved despite aggressive bowel regimen, laxatives and enemas. XRAY with large stool burden. CT with diaphragmatic hernia and patient was consequently taken to the OR for loop ileostomy under local anesthesia. Pt cont, to have copious amount in ileostomy. Pt is noted to have hyponatremia due to high output in ileostomy, got IVF, hyponatremia and hypokalemia improving, ileostomy secretion are getting thicker, he is tolerating diet, ostomy care as per ostomy RN,  he is continued with cholestyramine, he has been continued with IV hydration, Ileostomy out is still high, but secretions are getting thicker, monitored, I/Os, being continued with IV fluid rate to 125ml/h and GI has increased Octreotide to 150mcgs tid,  has CT abdomen and Plevis done, results reviewed, GI add Imodium 2 mg po bid. During the earlier part of the hospitalization patient was treated for acute on chronic hypoxic respiratory failure secondary to COPD exacerbation and CAP, was treated with steroids now tapered and duo nebs, SOB resolved, he uses home oxygen (4 liters), bicarb elevated likely due to metabolic compensation for resp acidosis  continue bronchodilators and prednisone tapered off and completed Levaquin as well.  His electrolytes monitored and replaced accordingly, he was found to have right femoral artery dissection as incidental finding on CT, no intervention as per vascular, follow up with vascular clinic as out patient.     Plan:    1. High out put from Ileostomy: decreasing gradually  c/w lomotil, cholestyramine,Octreotide, Imodium  ileostomy secretion still liquid with some residue, he is tolerating diet,   ostomy care as per ostomy RN,     IV hydration, Ileostomy out is still high, but secretions are getting thicker,   encouraged for oral hydration,   monitor I/Os,   PT involved      2. Acute on chronic hypoxic respiratory failure secondary to COPD exacerbation, improved. on home oxygen (4 liters),   continue bronchodilators and prednisone tapered off.   continue Mucomyst PRN  completed course of Levaquin   Trial of tessalon otoniel for cough. however can cause GI distress/constipation/nausea. stop of any s/e    3. CAP - completed course of Levaquin on 12/5.     4. Hypertension - stable   - continue cardizem    5. Hyperlipidemia   - continue statin    6.hyponatremia: resolved, it was due to high ileostomy output.    Hypokalemia - replaced.   Hypophosphatemia: replaced.   Hypomagnesemia: replaced.   on multiple electrolyte sol. consider KCL with IVF as if K persistently low and ongoing diarrhoea  monitor Electrolytes    7. Scrotal edema chronic with urinary retention, no more retention, Roy if not able to urinate  - patient following with  as outpatient  - cont scrotal elevation    8. Right femoral artery dissection  - incidental finding on CT, no intervention as per vascular    9. ankle swelling: leg elevation. scrotal elevation. PP+    10. anxiety: xanax at hs PRN.     9. DVT Prophylaxis - Lovenox

## 2018-01-02 NOTE — PROGRESS NOTE ADULT - ASSESSMENT
Still with high ileostomy output despite multiple medication.  Same medical regimen.  Needs potassium supplementation.  No change in medications for now.

## 2018-01-02 NOTE — PROGRESS NOTE ADULT - SUBJECTIVE AND OBJECTIVE BOX
FRANDY BAUTISTA    [id number]    67y      Male    Patient is a 67y old  Male who presents with a chief complaint of SOB (15 Dec 2017 10:17)      INTERVAL HPI/OVERNIGHT EVENTS:    decreasing output. no nausea, vomiting, dizziness. abd pain. colostomy bag spilled. fixed. no blood. appetite good  K low. on supplement  less anxious today      REVIEW OF SYSTEMS:    CONSTITUTIONAL: No fever/chills  Resp: chronic cough. not worsening  CARDIOVASCULAR: No chest pain, no palpitations.   GASTROINTESTINAL: No abdominal, No nausea, vomiting.   NEUROLOGICAL: No headaches,  loss of strength.  MISCELLANEOUS: No joint swelling or pain.       MEDICATIONS  (STANDING):  ALBUTerol/ipratropium for Nebulization. 3 milliLiter(s) Nebulizer once  aspirin  chewable 81 milliGRAM(s) Oral daily  atorvastatin 80 milliGRAM(s) Oral at bedtime  cholestyramine Powder (Sugar-Free) 4 Gram(s) Oral two times a day  clopidogrel Tablet 75 milliGRAM(s) Oral daily  diltiazem    milliGRAM(s) Oral <User Schedule>  enoxaparin Injectable 40 milliGRAM(s) SubCutaneous daily  FIRST- Mouthwash  BLM 10 milliLiter(s) Swish and Spit two times a day  fluticasone propionate/ salmeterol 250-50 MICROgram(s) Diskus 1 Dose(s) Inhalation two times a day  levothyroxine 50 MICROGram(s) Oral daily  loperamide Solution 2 milliGRAM(s) Oral four times a day  loratadine 10 milliGRAM(s) Oral daily  multiple electrolytes Injection Type 1 1000 milliLiter(s) (125 mL/Hr) IV Continuous <Continuous>  multivitamin 1 Tablet(s) Oral daily  nystatin Powder 1 Application(s) Topical two times a day  octreotide  Injectable 150 MICROGram(s) SubCutaneous every 8 hours  pantoprazole   Suspension 40 milliGRAM(s) Oral two times a day before meals  potassium acid phosphate/sodium acid phosphate tablet (K-PHOS No. 2) 1 Tablet(s) Oral four times a day with meals  tamsulosin 0.4 milliGRAM(s) Oral at bedtime  tiotropium 18 MICROgram(s) Capsule 1 Capsule(s) Inhalation daily    MEDICATIONS  (PRN):  acetaminophen  Suppository. 650 milliGRAM(s) Rectal every 6 hours PRN Moderate Pain (4 - 6)  acetylcysteine 20% Inhalation 1 milliLiter(s) Inhalation every 4 hours PRN poor cough clearance  ALBUTerol    0.083% 2.5 milliGRAM(s) Nebulizer every 4 hours PRN Shortness of Breath and/or Wheezing  ALPRAZolam 0.25 milliGRAM(s) Oral at bedtime PRN anxiety  benzonatate 100 milliGRAM(s) Oral three times a day PRN Cough  ondansetron Injectable 4 milliGRAM(s) IV Push every 8 hours PRN Nausea and/or Vomiting        PHYSICAL EXAM:    Vital Signs Last 24 Hrs  T(C): 36.6 (02 Jan 2018 10:30), Max: 36.8 (02 Jan 2018 00:01)  T(F): 97.8 (02 Jan 2018 10:30), Max: 98.2 (02 Jan 2018 00:01)  HR: 86 (02 Jan 2018 10:30) (80 - 90)  BP: 120/76 (02 Jan 2018 10:30) (115/74 - 120/76)  BP(mean): --  RR: 18 (02 Jan 2018 10:30) (18 - 18)  SpO2: 96% (02 Jan 2018 10:30) (95% - 96%)    GENERAL: Elderly male looking comfortable. eating comfortably. able to complete multiple sentences.  HEENT: PERRL, +EOMI  NECK: soft, Supple, No JVD,   CHEST/LUNG: Decrease air entry bilaterally; no wheezing. no rales/ronchi  HEART: S1S2+, Regular rate and rhythm; No murmurs  ABDOMEN: Soft, Nontender, Bowel sounds present, s/p Ileostomy connected with drain. yellowish green liquid with some whitish residue. no blood  EXTREMITIES:  1+ Peripheral Pulses, b/l ankle edema, left more than right  SKIN: No rashes or lesions  NEURO: AAOX3, no focal deficits, no motor r sensory loss  PSYCH: normal mood. anxious    LABS:                          10.0   8.0   )-----------( 346      ( 02 Jan 2018 06:06 )             32.6       01-02    141  |  101  |  <3.0<L>  ----------------------------<  123<H>  3.2<L>   |  33.0<H>  |  0.54    Ca    7.4<L>      02 Jan 2018 06:06  Phos  2.7     01-02  Mg     1.7     01-02        I&O's Summary    31 Dec 2017 07:01  -  01 Jan 2018 07:00  --------------------------------------------------------  IN: 2875 mL / OUT: 4800 mL / NET: -1925 mL    01 Jan 2018 07:01  -  01 Jan 2018 18:00  --------------------------------------------------------  IN: 1250 mL / OUT: 4350 mL / NET: -3100 mL        < from: CT Angio Chest w/ IV Cont (11.30.17 @ 11:11) >  IMPRESSION:     No evidence for pulmonary embolism.    Bilateral lower lobe infiltrates and lower lobe mucoid impacted airways.    < end of copied text >      < from: CT Abdomen and Pelvis w/ Oral Cont and w/ IV Cont (12.26.17 @ 16:47) >  IMPRESSION:     Status post loop ileostomy. Few dilated jejunal loops, favor ileus,   follow-up clinically for partial obstruction. Details as above.    Transverse colon containing left diaphragmatic hernia. Decreased colonic   distention, enteric contrast seen in the rectum.    The basilar pneumonia stable. Other incidental findings as above.    Large bilateral scrotal hydrocele.    < end of copied text >

## 2018-01-03 LAB
ALBUMIN SERPL ELPH-MCNC: 2 G/DL — LOW (ref 3.3–5.2)
ALP SERPL-CCNC: 78 U/L — SIGNIFICANT CHANGE UP (ref 40–120)
ALT FLD-CCNC: 8 U/L — SIGNIFICANT CHANGE UP
ANION GAP SERPL CALC-SCNC: 12 MMOL/L — SIGNIFICANT CHANGE UP (ref 5–17)
AST SERPL-CCNC: 13 U/L — SIGNIFICANT CHANGE UP
BILIRUB SERPL-MCNC: 0.5 MG/DL — SIGNIFICANT CHANGE UP (ref 0.4–2)
BUN SERPL-MCNC: <3 MG/DL — LOW (ref 8–20)
CALCIUM SERPL-MCNC: 7.4 MG/DL — LOW (ref 8.6–10.2)
CHLORIDE SERPL-SCNC: 99 MMOL/L — SIGNIFICANT CHANGE UP (ref 98–107)
CO2 SERPL-SCNC: 32 MMOL/L — HIGH (ref 22–29)
CREAT SERPL-MCNC: 0.55 MG/DL — SIGNIFICANT CHANGE UP (ref 0.5–1.3)
GLUCOSE SERPL-MCNC: 121 MG/DL — HIGH (ref 70–115)
MAGNESIUM SERPL-MCNC: 1.8 MG/DL — SIGNIFICANT CHANGE UP (ref 1.6–2.6)
PHOSPHATE SERPL-MCNC: 2.4 MG/DL — SIGNIFICANT CHANGE UP (ref 2.4–4.7)
POTASSIUM SERPL-MCNC: 3.2 MMOL/L — LOW (ref 3.5–5.3)
POTASSIUM SERPL-SCNC: 3.2 MMOL/L — LOW (ref 3.5–5.3)
PROT SERPL-MCNC: 5.5 G/DL — LOW (ref 6.6–8.7)
SODIUM SERPL-SCNC: 143 MMOL/L — SIGNIFICANT CHANGE UP (ref 135–145)

## 2018-01-03 PROCEDURE — 99233 SBSQ HOSP IP/OBS HIGH 50: CPT

## 2018-01-03 PROCEDURE — 99231 SBSQ HOSP IP/OBS SF/LOW 25: CPT

## 2018-01-03 RX ORDER — POTASSIUM CHLORIDE 20 MEQ
40 PACKET (EA) ORAL EVERY 4 HOURS
Qty: 0 | Refills: 0 | Status: COMPLETED | OUTPATIENT
Start: 2018-01-03 | End: 2018-01-03

## 2018-01-03 RX ORDER — POTASSIUM CHLORIDE 20 MEQ
40 PACKET (EA) ORAL DAILY
Qty: 0 | Refills: 0 | Status: DISCONTINUED | OUTPATIENT
Start: 2018-01-04 | End: 2018-01-04

## 2018-01-03 RX ADMIN — Medication 0.25 MILLIGRAM(S): at 21:40

## 2018-01-03 RX ADMIN — PANTOPRAZOLE SODIUM 40 MILLIGRAM(S): 20 TABLET, DELAYED RELEASE ORAL at 17:38

## 2018-01-03 RX ADMIN — FLUTICASONE PROPIONATE AND SALMETEROL 1 DOSE(S): 50; 250 POWDER ORAL; RESPIRATORY (INHALATION) at 09:09

## 2018-01-03 RX ADMIN — SODIUM CHLORIDE 125 MILLILITER(S): 9 INJECTION, SOLUTION INTRAVENOUS at 17:37

## 2018-01-03 RX ADMIN — Medication 1 TABLET(S): at 17:32

## 2018-01-03 RX ADMIN — LORATADINE 10 MILLIGRAM(S): 10 TABLET ORAL at 12:49

## 2018-01-03 RX ADMIN — Medication 2 MILLIGRAM(S): at 06:18

## 2018-01-03 RX ADMIN — Medication 2 MILLIGRAM(S): at 17:31

## 2018-01-03 RX ADMIN — FLUTICASONE PROPIONATE AND SALMETEROL 1 DOSE(S): 50; 250 POWDER ORAL; RESPIRATORY (INHALATION) at 21:38

## 2018-01-03 RX ADMIN — Medication 1 TABLET(S): at 21:40

## 2018-01-03 RX ADMIN — NYSTATIN CREAM 1 APPLICATION(S): 100000 CREAM TOPICAL at 06:27

## 2018-01-03 RX ADMIN — SODIUM CHLORIDE 125 MILLILITER(S): 9 INJECTION, SOLUTION INTRAVENOUS at 02:42

## 2018-01-03 RX ADMIN — OCTREOTIDE ACETATE 150 MICROGRAM(S): 200 INJECTION, SOLUTION INTRAVENOUS; SUBCUTANEOUS at 21:39

## 2018-01-03 RX ADMIN — Medication 40 MILLIEQUIVALENT(S): at 09:09

## 2018-01-03 RX ADMIN — Medication 81 MILLIGRAM(S): at 12:49

## 2018-01-03 RX ADMIN — Medication 1 TABLET(S): at 09:09

## 2018-01-03 RX ADMIN — TIOTROPIUM BROMIDE 1 CAPSULE(S): 18 CAPSULE ORAL; RESPIRATORY (INHALATION) at 12:02

## 2018-01-03 RX ADMIN — Medication 40 MILLIEQUIVALENT(S): at 14:57

## 2018-01-03 RX ADMIN — TAMSULOSIN HYDROCHLORIDE 0.4 MILLIGRAM(S): 0.4 CAPSULE ORAL at 21:40

## 2018-01-03 RX ADMIN — CHOLESTYRAMINE 4 GRAM(S): 4 POWDER, FOR SUSPENSION ORAL at 21:40

## 2018-01-03 RX ADMIN — Medication 180 MILLIGRAM(S): at 17:31

## 2018-01-03 RX ADMIN — ATORVASTATIN CALCIUM 80 MILLIGRAM(S): 80 TABLET, FILM COATED ORAL at 21:40

## 2018-01-03 RX ADMIN — DIPHENHYDRAMINE HYDROCHLORIDE AND LIDOCAINE HYDROCHLORIDE AND ALUMINUM HYDROXIDE AND MAGNESIUM HYDRO 10 MILLILITER(S): KIT at 06:17

## 2018-01-03 RX ADMIN — Medication 2 MILLIGRAM(S): at 12:49

## 2018-01-03 RX ADMIN — Medication 1 TABLET(S): at 12:49

## 2018-01-03 RX ADMIN — CHOLESTYRAMINE 4 GRAM(S): 4 POWDER, FOR SUSPENSION ORAL at 09:07

## 2018-01-03 RX ADMIN — PANTOPRAZOLE SODIUM 40 MILLIGRAM(S): 20 TABLET, DELAYED RELEASE ORAL at 06:19

## 2018-01-03 RX ADMIN — Medication 40 MILLIEQUIVALENT(S): at 17:34

## 2018-01-03 RX ADMIN — OCTREOTIDE ACETATE 150 MICROGRAM(S): 200 INJECTION, SOLUTION INTRAVENOUS; SUBCUTANEOUS at 06:19

## 2018-01-03 RX ADMIN — Medication 180 MILLIGRAM(S): at 06:18

## 2018-01-03 RX ADMIN — Medication 50 MICROGRAM(S): at 06:18

## 2018-01-03 RX ADMIN — NYSTATIN CREAM 1 APPLICATION(S): 100000 CREAM TOPICAL at 17:31

## 2018-01-03 RX ADMIN — CLOPIDOGREL BISULFATE 75 MILLIGRAM(S): 75 TABLET, FILM COATED ORAL at 12:48

## 2018-01-03 RX ADMIN — OCTREOTIDE ACETATE 150 MICROGRAM(S): 200 INJECTION, SOLUTION INTRAVENOUS; SUBCUTANEOUS at 14:56

## 2018-01-03 NOTE — PROGRESS NOTE ADULT - SUBJECTIVE AND OBJECTIVE BOX
FRANDY BAUTISTA    [id number]    67y      Male    Patient is a 67y old  Male who presents with a chief complaint of SOB (15 Dec 2017 10:17)      INTERVAL HPI/OVERNIGHT EVENTS:    decreasing output. no nausea, vomiting, dizziness. abd pain. colostomy bag spilled twice yesterday. seen b colostomy RN. fixed. no blood. appetite good  K low. on supplement  less anxious today      REVIEW OF SYSTEMS:    CONSTITUTIONAL: No fever/chills  Resp: chronic cough. not worsening  CARDIOVASCULAR: No chest pain, no palpitations.   GASTROINTESTINAL: No abdominal, No nausea, vomiting.   NEUROLOGICAL: No headaches,  loss of strength.  MISCELLANEOUS: No joint swelling or pain.       MEDICATIONS  (STANDING):  ALBUTerol/ipratropium for Nebulization. 3 milliLiter(s) Nebulizer once  aspirin  chewable 81 milliGRAM(s) Oral daily  atorvastatin 80 milliGRAM(s) Oral at bedtime  cholestyramine Powder (Sugar-Free) 4 Gram(s) Oral two times a day  clopidogrel Tablet 75 milliGRAM(s) Oral daily  diltiazem    milliGRAM(s) Oral <User Schedule>      MEDICATIONS  (STANDING):  ALBUTerol/ipratropium for Nebulization. 3 milliLiter(s) Nebulizer once  aspirin  chewable 81 milliGRAM(s) Oral daily  atorvastatin 80 milliGRAM(s) Oral at bedtime  cholestyramine Powder (Sugar-Free) 4 Gram(s) Oral two times a day  clopidogrel Tablet 75 milliGRAM(s) Oral daily  diltiazem    milliGRAM(s) Oral <User Schedule>  enoxaparin Injectable 40 milliGRAM(s) SubCutaneous daily  FIRST- Mouthwash  BLM 10 milliLiter(s) Swish and Spit two times a day  fluticasone propionate/ salmeterol 250-50 MICROgram(s) Diskus 1 Dose(s) Inhalation two times a day  levothyroxine 50 MICROGram(s) Oral daily  loperamide Solution 2 milliGRAM(s) Oral four times a day  loratadine 10 milliGRAM(s) Oral daily  multiple electrolytes Injection Type 1 1000 milliLiter(s) (125 mL/Hr) IV Continuous <Continuous>  multivitamin 1 Tablet(s) Oral daily  nystatin Powder 1 Application(s) Topical two times a day  octreotide  Injectable 150 MICROGram(s) SubCutaneous every 8 hours  pantoprazole   Suspension 40 milliGRAM(s) Oral two times a day before meals  potassium acid phosphate/sodium acid phosphate tablet (K-PHOS No. 2) 1 Tablet(s) Oral four times a day with meals  potassium chloride    Tablet ER 40 milliEquivalent(s) Oral every 4 hours  tamsulosin 0.4 milliGRAM(s) Oral at bedtime  tiotropium 18 MICROgram(s) Capsule 1 Capsule(s) Inhalation daily    MEDICATIONS  (PRN):  acetaminophen  Suppository. 650 milliGRAM(s) Rectal every 6 hours PRN Moderate Pain (4 - 6)  ALBUTerol    0.083% 2.5 milliGRAM(s) Nebulizer every 4 hours PRN Shortness of Breath and/or Wheezing  ALPRAZolam 0.25 milliGRAM(s) Oral at bedtime PRN anxiety  benzonatate 100 milliGRAM(s) Oral three times a day PRN Cough  ondansetron Injectable 4 milliGRAM(s) IV Push every 8 hours PRN Nausea and/or Vomiting      PHYSICAL EXAM:    Vital Signs Last 24 Hrs  T(C): 37 (03 Jan 2018 05:00), Max: 37 (03 Jan 2018 05:00)  T(F): 98.6 (03 Jan 2018 05:00), Max: 98.6 (03 Jan 2018 05:00)  HR: 77 (03 Jan 2018 05:00) (77 - 90)  BP: 114/70 (03 Jan 2018 05:00) (114/70 - 123/72)  BP(mean): --  RR: 18 (03 Jan 2018 05:00) (18 - 18)  SpO2: 95% (02 Jan 2018 21:13) (95% - 96%)    GENERAL: Elderly male looking comfortable. alying comfortably. able to complete multiple sentences. mild resp distress while talking  HEENT: PERRL, +EOMI  NECK: soft, Supple, No JVD,   CHEST/LUNG: Decrease air entry bilaterally; no wheezing. no rales/ronchi  HEART: S1S2+, Regular rate and rhythm; No murmurs  ABDOMEN: Soft, Nontender, Bowel sounds present, s/p Ileostomy connected with drain. yellowish green liquid with some whitish residue. no blood, thicker compared to yesterday  EXTREMITIES:  1+ Peripheral Pulses, b/l ankle edema, left more than right  SKIN: No rashes or lesions. scrotal edema and redness going down  NEURO: AAOX3, no focal deficits, no motor r sensory loss  PSYCH: normal mood. anxious    LABS:                                     10.0   8.0   )-----------( 346      ( 02 Jan 2018 06:06 )             32.6       01-03    143  |  99  |  <3.0<L>  ----------------------------<  121<H>  3.2<L>   |  32.0<H>  |  0.55    Ca    7.4<L>      03 Jan 2018 06:36  Phos  2.4     01-03  Mg     1.8     01-03    TPro  5.5<L>  /  Alb  2.0<L>  /  TBili  0.5  /  DBili  x   /  AST  13  /  ALT  8   /  AlkPhos  78  01-03        I&O's Summary    31 Dec 2017 07:01  -  01 Jan 2018 07:00  --------------------------------------------------------  IN: 2875 mL / OUT: 4800 mL / NET: -1925 mL    01 Jan 2018 07:01  -  01 Jan 2018 18:00  --------------------------------------------------------  IN: 1250 mL / OUT: 4350 mL / NET: -3100 mL        < from: CT Angio Chest w/ IV Cont (11.30.17 @ 11:11) >  IMPRESSION:     No evidence for pulmonary embolism.    Bilateral lower lobe infiltrates and lower lobe mucoid impacted airways.    < end of copied text >      < from: CT Abdomen and Pelvis w/ Oral Cont and w/ IV Cont (12.26.17 @ 16:47) >  IMPRESSION:     Status post loop ileostomy. Few dilated jejunal loops, favor ileus,   follow-up clinically for partial obstruction. Details as above.    Transverse colon containing left diaphragmatic hernia. Decreased colonic   distention, enteric contrast seen in the rectum.    The basilar pneumonia stable. Other incidental findings as above.    Large bilateral scrotal hydrocele.    < end of copied text >

## 2018-01-03 NOTE — PROGRESS NOTE ADULT - PROBLEM SELECTOR PLAN 1
- continues with high ileostomy outputs  -immodium recently increased to qid, questran bid, octreotide 150 tid

## 2018-01-03 NOTE — PROGRESS NOTE ADULT - SUBJECTIVE AND OBJECTIVE BOX
Patient is a 67y old  Male who presents with a chief complaint of SOB (15 Dec 2017 10:17)      HPI:  67 YOM w/PMH of COPD stage 4 s/p right lung reduction in 2010, currently on home oxygen 4L (sat 92-93%), HTN, CVA on 2014, DM, hypothyroidism, hydrocele, kidney stones- hx of ileostomy. Yesterday 600 cc recorded from ileostomy, but pt states the bag was leaking and the output was not recorded accurately.       NO abdominal pain. Tolerating po    REVIEW OF SYSTEMS:  Constitutional: No fever, weight loss or fatigue  ENMT:  No difficulty hearing, tinnitus, vertigo; No sinus or throat pain  Respiratory: No cough, wheezing, chills or hemoptysis  Cardiovascular: No chest pain, palpitations, dizziness or leg swelling  Gastrointestinal: No abdominal or epigastric pain. No nausea, vomiting or hematemesis; No diarrhea or constipation. No melena or hematochezia.  Skin: No itching, burning, rashes or lesions   Musculoskeletal: No joint pain or swelling; No muscle, back or extremity pain    PAST MEDICAL & SURGICAL HISTORY:  Hypothyroidism  Hydrocele  Renal calculi  Diabetes mellitus  Hypertension  CVA (cerebral vascular accident)  Chronic obstructive pulmonary disease, unspecified COPD type  History of lung surgery      FAMILY HISTORY:  No pertinent family history in first degree relatives      SOCIAL HISTORY:  Smoking Status: [ ] Current, [ ] Former, [ ] Never  Pack Years:  [  ] EtOH-no  [  ] IVDA-no    MEDICATIONS:  MEDICATIONS  (STANDING):  ALBUTerol/ipratropium for Nebulization. 3 milliLiter(s) Nebulizer once  aspirin  chewable 81 milliGRAM(s) Oral daily  atorvastatin 80 milliGRAM(s) Oral at bedtime  cholestyramine Powder (Sugar-Free) 4 Gram(s) Oral two times a day  clopidogrel Tablet 75 milliGRAM(s) Oral daily  diltiazem    milliGRAM(s) Oral <User Schedule>  enoxaparin Injectable 40 milliGRAM(s) SubCutaneous daily  FIRST- Mouthwash  BLM 10 milliLiter(s) Swish and Spit two times a day  fluticasone propionate/ salmeterol 250-50 MICROgram(s) Diskus 1 Dose(s) Inhalation two times a day  levothyroxine 50 MICROGram(s) Oral daily  loperamide Solution 2 milliGRAM(s) Oral four times a day  loratadine 10 milliGRAM(s) Oral daily  multiple electrolytes Injection Type 1 1000 milliLiter(s) (125 mL/Hr) IV Continuous <Continuous>  multivitamin 1 Tablet(s) Oral daily  nystatin Powder 1 Application(s) Topical two times a day  octreotide  Injectable 150 MICROGram(s) SubCutaneous every 8 hours  pantoprazole   Suspension 40 milliGRAM(s) Oral two times a day before meals  potassium acid phosphate/sodium acid phosphate tablet (K-PHOS No. 2) 1 Tablet(s) Oral four times a day with meals  potassium chloride    Tablet ER 40 milliEquivalent(s) Oral every 4 hours  tamsulosin 0.4 milliGRAM(s) Oral at bedtime  tiotropium 18 MICROgram(s) Capsule 1 Capsule(s) Inhalation daily    MEDICATIONS  (PRN):  acetaminophen  Suppository. 650 milliGRAM(s) Rectal every 6 hours PRN Moderate Pain (4 - 6)  ALBUTerol    0.083% 2.5 milliGRAM(s) Nebulizer every 4 hours PRN Shortness of Breath and/or Wheezing  ALPRAZolam 0.25 milliGRAM(s) Oral at bedtime PRN anxiety  benzonatate 100 milliGRAM(s) Oral three times a day PRN Cough  ondansetron Injectable 4 milliGRAM(s) IV Push every 8 hours PRN Nausea and/or Vomiting      Allergies    codeine (Unknown)  penicillin (Unknown)    Intolerances    Symbicort (Short breath)      Vital Signs Last 24 Hrs  T(C): 37 (03 Jan 2018 05:00), Max: 37 (03 Jan 2018 05:00)  T(F): 98.6 (03 Jan 2018 05:00), Max: 98.6 (03 Jan 2018 05:00)  HR: 77 (03 Jan 2018 05:00) (77 - 90)  BP: 114/70 (03 Jan 2018 05:00) (114/70 - 123/72)  BP(mean): --  RR: 18 (03 Jan 2018 05:00) (18 - 18)  SpO2: 95% (02 Jan 2018 21:13) (95% - 96%)    01-02 @ 07:01  -  01-03 @ 07:00  --------------------------------------------------------  IN: 1240 mL / OUT: 3200 mL / NET: -1960 mL          PHYSICAL EXAM:    General: Well developed; well nourished; in no acute distress  HEENT: MMM, conjunctiva and sclera clear  Gastrointestinal: Soft, non-tender non-distended; Normal bowel sounds; No rebound or guarding  Extremities: Normal range of motion, No clubbing, cyanosis or edema  Neurological: Alert and oriented x3  Skin: Warm and dry. No obvious rash      LABS:                        10.0   8.0   )-----------( 346      ( 02 Jan 2018 06:06 )             32.6     03 Jan 2018 06:36    143    |  99     |  <3.0   ----------------------------<  121    3.2     |  32.0   |  0.55     Ca    7.4        03 Jan 2018 06:36  Phos  2.4       03 Jan 2018 06:36  Mg     1.8       03 Jan 2018 06:36    TPro  5.5    /  Alb  2.0    /  TBili  0.5    /  DBili  x      /  AST  13     /  ALT  8      /  AlkPhos  78     / Amylase x      /Lipase x      03 Jan 2018 06:36              RADIOLOGY & ADDITIONAL STUDIES:     < from: CT Abdomen and Pelvis w/ Oral Cont and w/ IV Cont (12.26.17 @ 16:47) >  IMPRESSION:     Status post loop ileostomy. Few dilated jejunal loops, favor ileus,   follow-up clinically for partial obstruction. Details as above.    Transverse colon containing left diaphragmatic hernia. Decreased colonic   distention, enteric contrast seen in the rectum.    The basilar pneumonia stable. Other incidental findings as above.    Large bilateral scrotal hydrocele.    < end of copied text >

## 2018-01-03 NOTE — PROGRESS NOTE ADULT - ASSESSMENT
67 year old male with PMH of COPD stage 4 s/p right lung reduction in 2010 (on home oxygen 4L (sat 92-93%), HTN, CVA on 2014, DM, hypothyroidism, hydrocele, and nephrolithiasis who is admitted for a COPD exacerbation. Patient has been weaned off ventimask to 4 liters nasal cannula and respiratory status is stable. CTA of the chest was negative for PE but showed bilateral lower lobe infiltrates with mucous plugging for which he was started on Levaquin.  Respiratory status stable and steroids are being tapered. Hospital course complicated by constipation, which had not improved despite aggressive bowel regimen, laxatives and enemas. XRAY with large stool burden. CT with diaphragmatic hernia and patient was consequently taken to the OR for loop ileostomy under local anesthesia. Pt cont, to have copious amount in ileostomy. Pt is noted to have hyponatremia due to high output in ileostomy, got IVF, hyponatremia and hypokalemia improving, ileostomy secretion are getting thicker, he is tolerating diet, ostomy care as per ostomy RN,  he is continued with cholestyramine, he has been continued with IV hydration, Ileostomy out is still high, but secretions are getting thicker, monitored, I/Os, being continued with IV fluid rate to 125ml/h and GI has increased Octreotide to 150mcgs tid,  has CT abdomen and Plevis done, results reviewed, GI add Imodium 2 mg po bid. During the earlier part of the hospitalization patient was treated for acute on chronic hypoxic respiratory failure secondary to COPD exacerbation and CAP, was treated with steroids now tapered and duo nebs, SOB resolved, he uses home oxygen (4 liters), bicarb elevated likely due to metabolic compensation for resp acidosis  continue bronchodilators and prednisone tapered off and completed Levaquin as well.  His electrolytes monitored and replaced accordingly, he was found to have right femoral artery dissection as incidental finding on CT, no intervention as per vascular, follow up with vascular clinic as out patient.     Plan:    1. High out put from Ileostomy: decreasing gradually  c/w lomotil, cholestyramine,Octreotide, Imodium  ileostomy secretion still liquid with some residue, he is tolerating diet,   ostomy care as per ostomy RN,     IV hydration, Ileostomy out is still high, but secretions are getting thicker,   encouraged for oral hydration,   monitor I/Os,   PT involved      2. Acute on chronic hypoxic respiratory failure secondary to COPD exacerbation, improved. on home oxygen (4 liters),   continue bronchodilators and prednisone tapered off.   continue Mucomyst PRN  completed course of Levaquin   Trial of tessalon otoniel for cough.     3. CAP - completed course of Levaquin on 12/5.     4. Hypertension - stable   - continue cardizem    5. Hyperlipidemia   - continue statin    6.hyponatremia: resolved, it was due to high ileostomy output.    Hypokalemia - replaced. added KCL ER 40 mg daily  Hypophosphatemia: improved  Hypomagnesemia: improved  on multiple electrolyte sol.   monitor Electrolytes    7. Scrotal edema chronic with urinary retention, no more retention, Roy if not able to urinate  - swelling and redness going down  - patient following with  as outpatient  - cont scrotal elevation    8. Right femoral artery dissection  - incidental finding on CT, no intervention as per vascular    9. ankle swelling: leg elevation. scrotal elevation. PP+    10. anxiety: xanax at hs PRN.     9. DVT Prophylaxis - Lovenox

## 2018-01-03 NOTE — ADVANCED PRACTICE NURSE CONSULT - ASSESSMENT
Spoke with pt's wife Sandra, wife reported that she applied the pouching to pt's ileostomy last night and lasted, wife was changing the pouching for the patient while she was at the bedside, wife reported that she is comfortable with pouching change now, pt is more engage in the ileostomy care compared to before. The routine pouching lasted about 3 days after wife changed the pouching.

## 2018-01-03 NOTE — CHART NOTE - NSCHARTNOTEFT_GEN_A_CORE
Source: Patient [x ]  Family [ ]   other [ ]    Current Diet: low residue with Ensure TID    Patient reports [ ] nausea  [ ] vomiting [ ] diarrhea [ ] constipation  [ ]chewing problems [ ] swallowing issues  [ ] other: ileostomy    PO intake:  < 50% [ ]   50-75%  [ ]   %  [x ]  other :    Source for PO intake [x ] Patient [ ] family [x ] chart [ ] staff [ ] other    Enteral /Parenteral Nutrition:     Current Weight: 12/27 146#, 149# 12/20    % Weight Change     Pertinent Medications: MEDICATIONS  (STANDING):  ALBUTerol/ipratropium for Nebulization. 3 milliLiter(s) Nebulizer once  aspirin  chewable 81 milliGRAM(s) Oral daily  atorvastatin 80 milliGRAM(s) Oral at bedtime  cholestyramine Powder (Sugar-Free) 4 Gram(s) Oral two times a day  clopidogrel Tablet 75 milliGRAM(s) Oral daily  diltiazem    milliGRAM(s) Oral <User Schedule>  enoxaparin Injectable 40 milliGRAM(s) SubCutaneous daily  FIRST- Mouthwash  BLM 10 milliLiter(s) Swish and Spit two times a day  fluticasone propionate/ salmeterol 250-50 MICROgram(s) Diskus 1 Dose(s) Inhalation two times a day  levothyroxine 50 MICROGram(s) Oral daily  loperamide Solution 2 milliGRAM(s) Oral four times a day  loratadine 10 milliGRAM(s) Oral daily  multiple electrolytes Injection Type 1 1000 milliLiter(s) (125 mL/Hr) IV Continuous <Continuous>  multivitamin 1 Tablet(s) Oral daily  nystatin Powder 1 Application(s) Topical two times a day  octreotide  Injectable 150 MICROGram(s) SubCutaneous every 8 hours  pantoprazole   Suspension 40 milliGRAM(s) Oral two times a day before meals  potassium acid phosphate/sodium acid phosphate tablet (K-PHOS No. 2) 1 Tablet(s) Oral four times a day with meals  potassium chloride    Tablet ER 40 milliEquivalent(s) Oral every 4 hours  tamsulosin 0.4 milliGRAM(s) Oral at bedtime  tiotropium 18 MICROgram(s) Capsule 1 Capsule(s) Inhalation daily    MEDICATIONS  (PRN):  acetaminophen  Suppository. 650 milliGRAM(s) Rectal every 6 hours PRN Moderate Pain (4 - 6)  ALBUTerol    0.083% 2.5 milliGRAM(s) Nebulizer every 4 hours PRN Shortness of Breath and/or Wheezing  ALPRAZolam 0.25 milliGRAM(s) Oral at bedtime PRN anxiety  benzonatate 100 milliGRAM(s) Oral three times a day PRN Cough  ondansetron Injectable 4 milliGRAM(s) IV Push every 8 hours PRN Nausea and/or Vomiting    Pertinent Labs: CBC Full  -  ( 02 Jan 2018 06:06 )  WBC Count : 8.0 K/uL  Hemoglobin : 10.0 g/dL  Hematocrit : 32.6 %  Platelet Count - Automated : 346 K/uL  Mean Cell Volume : 87.9 fl  Mean Cell Hemoglobin : 27.0 pg  Mean Cell Hemoglobin Concentration : 30.7 g/dL  Auto Neutrophil # : 6.2 K/uL  Auto Lymphocyte # : 1.0 K/uL  Auto Monocyte # : 0.6 K/uL  Auto Eosinophil # : 0.1 K/uL  Auto Basophil # : 0.0 K/uL  Auto Neutrophil % : 77.9 %  Auto Lymphocyte % : 12.4 %  Auto Monocyte % : 8.1 %  Auto Eosinophil % : 0.8 %  Auto Basophil % : 0.4 %      01-03 Na143 mmol/L Glu 121 mg/dL<H> K+ 3.2 mmol/L<L> Cr  0.55 mg/dL BUN <3.0 mg/dL<L> Phos 2.4 mg/dL Alb 2.0 g/dL<L> PAB n/a           Skin:     Nutrition focused physical exam conducted - found signs of malnutrition [ ]absent [x ]present    Subcutaneous fat loss: [ ] Orbital fat pads region, [ ]Buccal fat region, [ ]Triceps region,  [ ]Ribs region    Muscle wasting: [x ]Temples region, [ ]Clavicle region, [ ]Shoulder region, [ ]Scapula region, [ ]Interosseous region,  [ ]thigh region, [ ]Calf region    Estimated Needs:   x[ ] no change since previous assessment  [ ] recalculated:     Current Nutrition Diagnosis: Pt remains at nutrition risk secondary to increased nutrient needs related to increased physiologic demand with healing as evidenced by pt s/p diverting ileostomy and mild muscle wasting noted at temporal region. Pt continues to eat well ~75%-100%. Takes Ensure supplement well.       Recommendations: Continue Ensure TID    Monitoring and Evaluation:   [x ] PO intake [x ] Tolerance to diet prescription [X] Weights  [X] Follow up per protocol [X] Labs:

## 2018-01-04 LAB
ANION GAP SERPL CALC-SCNC: 9 MMOL/L — SIGNIFICANT CHANGE UP (ref 5–17)
BUN SERPL-MCNC: <3 MG/DL — LOW (ref 8–20)
CALCIUM SERPL-MCNC: 7.7 MG/DL — LOW (ref 8.6–10.2)
CHLORIDE SERPL-SCNC: 101 MMOL/L — SIGNIFICANT CHANGE UP (ref 98–107)
CO2 SERPL-SCNC: 35 MMOL/L — HIGH (ref 22–29)
CREAT SERPL-MCNC: 0.56 MG/DL — SIGNIFICANT CHANGE UP (ref 0.5–1.3)
GLUCOSE SERPL-MCNC: 149 MG/DL — HIGH (ref 70–115)
MAGNESIUM SERPL-MCNC: 1.8 MG/DL — SIGNIFICANT CHANGE UP (ref 1.6–2.6)
PHOSPHATE SERPL-MCNC: 2.6 MG/DL — SIGNIFICANT CHANGE UP (ref 2.4–4.7)
POTASSIUM SERPL-MCNC: 3.6 MMOL/L — SIGNIFICANT CHANGE UP (ref 3.5–5.3)
POTASSIUM SERPL-SCNC: 3.6 MMOL/L — SIGNIFICANT CHANGE UP (ref 3.5–5.3)
SODIUM SERPL-SCNC: 145 MMOL/L — SIGNIFICANT CHANGE UP (ref 135–145)

## 2018-01-04 PROCEDURE — 99233 SBSQ HOSP IP/OBS HIGH 50: CPT

## 2018-01-04 RX ADMIN — Medication 81 MILLIGRAM(S): at 13:05

## 2018-01-04 RX ADMIN — Medication 1 TABLET(S): at 17:23

## 2018-01-04 RX ADMIN — CHOLESTYRAMINE 4 GRAM(S): 4 POWDER, FOR SUSPENSION ORAL at 08:28

## 2018-01-04 RX ADMIN — SODIUM CHLORIDE 125 MILLILITER(S): 9 INJECTION, SOLUTION INTRAVENOUS at 01:35

## 2018-01-04 RX ADMIN — OCTREOTIDE ACETATE 150 MICROGRAM(S): 200 INJECTION, SOLUTION INTRAVENOUS; SUBCUTANEOUS at 07:00

## 2018-01-04 RX ADMIN — Medication 2 MILLIGRAM(S): at 07:03

## 2018-01-04 RX ADMIN — Medication 180 MILLIGRAM(S): at 07:00

## 2018-01-04 RX ADMIN — FLUTICASONE PROPIONATE AND SALMETEROL 1 DOSE(S): 50; 250 POWDER ORAL; RESPIRATORY (INHALATION) at 08:29

## 2018-01-04 RX ADMIN — TIOTROPIUM BROMIDE 1 CAPSULE(S): 18 CAPSULE ORAL; RESPIRATORY (INHALATION) at 11:45

## 2018-01-04 RX ADMIN — Medication 1 TABLET(S): at 08:28

## 2018-01-04 RX ADMIN — Medication 1 TABLET(S): at 22:04

## 2018-01-04 RX ADMIN — Medication 50 MICROGRAM(S): at 07:00

## 2018-01-04 RX ADMIN — OCTREOTIDE ACETATE 150 MICROGRAM(S): 200 INJECTION, SOLUTION INTRAVENOUS; SUBCUTANEOUS at 14:19

## 2018-01-04 RX ADMIN — CHOLESTYRAMINE 4 GRAM(S): 4 POWDER, FOR SUSPENSION ORAL at 22:09

## 2018-01-04 RX ADMIN — FLUTICASONE PROPIONATE AND SALMETEROL 1 DOSE(S): 50; 250 POWDER ORAL; RESPIRATORY (INHALATION) at 22:05

## 2018-01-04 RX ADMIN — PANTOPRAZOLE SODIUM 40 MILLIGRAM(S): 20 TABLET, DELAYED RELEASE ORAL at 07:02

## 2018-01-04 RX ADMIN — OCTREOTIDE ACETATE 150 MICROGRAM(S): 200 INJECTION, SOLUTION INTRAVENOUS; SUBCUTANEOUS at 22:05

## 2018-01-04 RX ADMIN — TAMSULOSIN HYDROCHLORIDE 0.4 MILLIGRAM(S): 0.4 CAPSULE ORAL at 22:03

## 2018-01-04 RX ADMIN — Medication 40 MILLIEQUIVALENT(S): at 13:05

## 2018-01-04 RX ADMIN — SODIUM CHLORIDE 125 MILLILITER(S): 9 INJECTION, SOLUTION INTRAVENOUS at 08:29

## 2018-01-04 RX ADMIN — Medication 2 MILLIGRAM(S): at 00:00

## 2018-01-04 RX ADMIN — Medication 2 MILLIGRAM(S): at 13:06

## 2018-01-04 RX ADMIN — ATORVASTATIN CALCIUM 80 MILLIGRAM(S): 80 TABLET, FILM COATED ORAL at 22:03

## 2018-01-04 RX ADMIN — DIPHENHYDRAMINE HYDROCHLORIDE AND LIDOCAINE HYDROCHLORIDE AND ALUMINUM HYDROXIDE AND MAGNESIUM HYDRO 10 MILLILITER(S): KIT at 07:00

## 2018-01-04 RX ADMIN — Medication 0.25 MILLIGRAM(S): at 22:03

## 2018-01-04 RX ADMIN — Medication 1 TABLET(S): at 13:05

## 2018-01-04 RX ADMIN — CLOPIDOGREL BISULFATE 75 MILLIGRAM(S): 75 TABLET, FILM COATED ORAL at 13:05

## 2018-01-04 RX ADMIN — Medication 2 MILLIGRAM(S): at 17:22

## 2018-01-04 RX ADMIN — PANTOPRAZOLE SODIUM 40 MILLIGRAM(S): 20 TABLET, DELAYED RELEASE ORAL at 17:23

## 2018-01-04 RX ADMIN — LORATADINE 10 MILLIGRAM(S): 10 TABLET ORAL at 13:06

## 2018-01-04 RX ADMIN — Medication 180 MILLIGRAM(S): at 17:23

## 2018-01-04 NOTE — PROGRESS NOTE ADULT - ASSESSMENT
S/P ileostomy double lumen for anatomical defect of  transverse colon within the HH sac.  Therefore not a candidate for colonoscopy. Awaiting improvement of ileostomy output prior to Dcing IVF.  Same medications.  Still needs multiple K supplements.

## 2018-01-04 NOTE — PROGRESS NOTE ADULT - SUBJECTIVE AND OBJECTIVE BOX
FRANDY BAUTISTA    [id number]    67y      Male    Patient is a 67y old  Male who presents with a chief complaint of SOB (15 Dec 2017 10:17)      INTERVAL HPI/OVERNIGHT EVENTS:    decreasing output. no nausea, vomiting, dizziness. abd pain. colostomy bag did not spill anymore. seen by colostomy RN. no blood. appetite good  K normal today. on supplement        REVIEW OF SYSTEMS:    CONSTITUTIONAL: No fever/chills  Resp: chronic cough. better. no SOB  CARDIOVASCULAR: No chest pain, no palpitations.   GASTROINTESTINAL: No abdominal, No nausea, vomiting.   NEUROLOGICAL: No headaches,  loss of strength.  MISCELLANEOUS: No joint swelling or pain.       MEDICATIONS  (STANDING):  ALBUTerol/ipratropium for Nebulization. 3 milliLiter(s) Nebulizer once  aspirin  chewable 81 milliGRAM(s) Oral daily  atorvastatin 80 milliGRAM(s) Oral at bedtime  cholestyramine Powder (Sugar-Free) 4 Gram(s) Oral two times a day  clopidogrel Tablet 75 milliGRAM(s) Oral daily  diltiazem    milliGRAM(s) Oral <User Schedule>  enoxaparin Injectable 40 milliGRAM(s) SubCutaneous daily  FIRST- Mouthwash  BLM 10 milliLiter(s) Swish and Spit two times a day  fluticasone propionate/ salmeterol 250-50 MICROgram(s) Diskus 1 Dose(s) Inhalation two times a day  levothyroxine 50 MICROGram(s) Oral daily  loperamide Solution 2 milliGRAM(s) Oral four times a day  loratadine 10 milliGRAM(s) Oral daily  multiple electrolytes Injection Type 1 1000 milliLiter(s) (125 mL/Hr) IV Continuous <Continuous>  multivitamin 1 Tablet(s) Oral daily  nystatin Powder 1 Application(s) Topical two times a day  octreotide  Injectable 150 MICROGram(s) SubCutaneous every 8 hours  pantoprazole   Suspension 40 milliGRAM(s) Oral two times a day before meals  potassium acid phosphate/sodium acid phosphate tablet (K-PHOS No. 2) 1 Tablet(s) Oral four times a day with meals  tamsulosin 0.4 milliGRAM(s) Oral at bedtime  tiotropium 18 MICROgram(s) Capsule 1 Capsule(s) Inhalation daily    MEDICATIONS  (PRN):  acetaminophen  Suppository. 650 milliGRAM(s) Rectal every 6 hours PRN Moderate Pain (4 - 6)  ALBUTerol    0.083% 2.5 milliGRAM(s) Nebulizer every 4 hours PRN Shortness of Breath and/or Wheezing  ALPRAZolam 0.25 milliGRAM(s) Oral at bedtime PRN anxiety  benzonatate 100 milliGRAM(s) Oral three times a day PRN Cough  ondansetron Injectable 4 milliGRAM(s) IV Push every 8 hours PRN Nausea and/or Vomiting        PHYSICAL EXAM:    Vital Signs Last 24 Hrs  T(C): 36.4 (04 Jan 2018 05:31), Max: 36.8 (03 Jan 2018 17:04)  T(F): 97.5 (04 Jan 2018 05:31), Max: 98.2 (03 Jan 2018 17:04)  HR: 76 (04 Jan 2018 05:31) (76 - 103)  BP: 106/66 (04 Jan 2018 05:31) (92/61 - 122/61)  BP(mean): --  RR: 18 (04 Jan 2018 05:31) (18 - 18)  SpO2: 95% (04 Jan 2018 05:31) (95% - 96%)    GENERAL: Elderly male looking comfortable. sitting comfortably. able to complete multiple sentences.   HEENT: PERRL, +EOMI  NECK: soft, Supple, No JVD,   CHEST/LUNG: Decrease air entry bilaterally; no wheezing. no rales/ronchi  HEART: S1S2+, Regular rate and rhythm; No murmurs  ABDOMEN: Soft, Nontender, Bowel sounds present, s/p Ileostomy connected with drain.  green liquid with some whitish residue. no blood, thicker compared to yesterday  EXTREMITIES:  1+ Peripheral Pulses, b/l ankle edema, left more than right  SKIN: No rashes or lesions. scrotal edema and redness going down  NEURO: AAOX3, no focal deficits, no motor r sensory loss  PSYCH: normal mood. anxious    LABS:                          01-04    145  |  101  |  <3.0<L>  ----------------------------<  149<H>  3.6   |  35.0<H>  |  0.56    Ca    7.7<L>      04 Jan 2018 12:43  Phos  2.6     01-04  Mg     1.8     01-04    TPro  5.5<L>  /  Alb  2.0<L>  /  TBili  0.5  /  DBili  x   /  AST  13  /  ALT  8   /  AlkPhos  78  01-03          I&O's Summary    31 Dec 2017 07:01  -  01 Jan 2018 07:00  --------------------------------------------------------  IN: 2875 mL / OUT: 4800 mL / NET: -1925 mL    01 Jan 2018 07:01  -  01 Jan 2018 18:00  --------------------------------------------------------  IN: 1250 mL / OUT: 4350 mL / NET: -3100 mL        < from: CT Angio Chest w/ IV Cont (11.30.17 @ 11:11) >  IMPRESSION:     No evidence for pulmonary embolism.    Bilateral lower lobe infiltrates and lower lobe mucoid impacted airways.    < end of copied text >      < from: CT Abdomen and Pelvis w/ Oral Cont and w/ IV Cont (12.26.17 @ 16:47) >  IMPRESSION:     Status post loop ileostomy. Few dilated jejunal loops, favor ileus,   follow-up clinically for partial obstruction. Details as above.    Transverse colon containing left diaphragmatic hernia. Decreased colonic   distention, enteric contrast seen in the rectum.    The basilar pneumonia stable. Other incidental findings as above.    Large bilateral scrotal hydrocele.    < end of copied text >

## 2018-01-04 NOTE — PROGRESS NOTE ADULT - ASSESSMENT
67 year old male with PMH of COPD stage 4 s/p right lung reduction in 2010 (on home oxygen 4L (sat 92-93%), HTN, CVA on 2014, DM, hypothyroidism, hydrocele, and nephrolithiasis who is admitted for a COPD exacerbation. Patient has been weaned off ventimask to 4 liters nasal cannula and respiratory status is stable. CTA of the chest was negative for PE but showed bilateral lower lobe infiltrates with mucous plugging for which he was started on Levaquin.  Respiratory status stable and steroids are being tapered. Hospital course complicated by constipation, which had not improved despite aggressive bowel regimen, laxatives and enemas. XRAY with large stool burden. CT with diaphragmatic hernia and patient was consequently taken to the OR for loop ileostomy under local anesthesia. Pt cont, to have copious amount in ileostomy. Pt is noted to have hyponatremia due to high output in ileostomy, got IVF, hyponatremia and hypokalemia improving, ileostomy secretion are getting thicker, he is tolerating diet, ostomy care as per ostomy RN,  he is continued with cholestyramine, he has been continued with IV hydration, Ileostomy out is still high, but secretions are getting thicker, monitored, I/Os, being continued with IV fluid rate to 125ml/h and GI has increased Octreotide to 150mcgs tid,  has CT abdomen and Plevis done, results reviewed, GI add Imodium 2 mg po bid. During the earlier part of the hospitalization patient was treated for acute on chronic hypoxic respiratory failure secondary to COPD exacerbation and CAP, was treated with steroids now tapered and duo nebs, SOB resolved, he uses home oxygen (4 liters), bicarb elevated likely due to metabolic compensation for resp acidosis  continue bronchodilators and prednisone tapered off and completed Levaquin as well.  His electrolytes monitored and replaced accordingly, he was found to have right femoral artery dissection as incidental finding on CT, no intervention as per vascular, follow up with vascular clinic as out patient.     Plan:    1. High out put from Ileostomy: decreasing gradually  c/w lomotil, cholestyramine,Octreotide, Imodium  ileostomy secretion still liquid with some residue, he is tolerating diet,   ostomy care as per ostomy RN,     IV hydration, Ileostomy out is still high, but secretions are getting thicker,   encouraged for oral hydration,   monitor I/Os,   PT involved      2. Acute on chronic hypoxic respiratory failure secondary to COPD exacerbation, improved. on home oxygen (4 liters),   continue bronchodilators and prednisone tapered off.   continue Mucomyst PRN  completed course of Levaquin   cough better with tensilon otoniel    3. CAP - completed course of Levaquin on 12/5.     4. Hypertension - stable   - continue cardizem    5. Hyperlipidemia   - continue statin    6.hyponatremia: resolved, it was due to high ileostomy output.    Hypokalemia - on KCL ER 40 mg daily  Hypophosphatemia: improved  Hypomagnesemia: improved  on multiple electrolyte sol.   monitor Electrolytes    7. Scrotal edema chronic with urinary retention, no more retention, Roy if not able to urinate  - swelling and redness going down  - patient following with  as outpatient  - cont scrotal elevation    8. Right femoral artery dissection  - incidental finding on CT, no intervention as per vascular    9. ankle swelling: leg elevation. scrotal elevation. PP+    10. anxiety: xanax at hs PRN.     9. DVT Prophylaxis - Lovenox

## 2018-01-04 NOTE — PROGRESS NOTE ADULT - SUBJECTIVE AND OBJECTIVE BOX
Patient seen and examined;  Clinical status is unchanged. Last 24 hr output from the ileostomy:  5250 cc.  Day before was an abberation as ostomy bag was disconnected and spilled contents not recorded.  K remains low despite 4 doses of KCL and K phos.         PAST MEDICAL & SURGICAL HISTORY:  Hypothyroidism  Hydrocele  Renal calculi  Diabetes mellitus  Hypertension  CVA (cerebral vascular accident)  Chronic obstructive pulmonary disease, unspecified COPD type  History of lung surgery      ROS:  No Heartburn, regurgitation, dysphagia, odynophagia.  No dyspepsia  No abdominal pain.    No Nausea, vomiting.  No Bleeding.  No hematemesis.   No diarrhea.    No hematochesia.  No weight loss, anorexia.  No edema.      MEDICATIONS  (STANDING):  ALBUTerol/ipratropium for Nebulization. 3 milliLiter(s) Nebulizer once  aspirin  chewable 81 milliGRAM(s) Oral daily  atorvastatin 80 milliGRAM(s) Oral at bedtime  cholestyramine Powder (Sugar-Free) 4 Gram(s) Oral two times a day  clopidogrel Tablet 75 milliGRAM(s) Oral daily  diltiazem    milliGRAM(s) Oral <User Schedule>  enoxaparin Injectable 40 milliGRAM(s) SubCutaneous daily  FIRST- Mouthwash  BLM 10 milliLiter(s) Swish and Spit two times a day  fluticasone propionate/ salmeterol 250-50 MICROgram(s) Diskus 1 Dose(s) Inhalation two times a day  levothyroxine 50 MICROGram(s) Oral daily  loperamide Solution 2 milliGRAM(s) Oral four times a day  loratadine 10 milliGRAM(s) Oral daily  multiple electrolytes Injection Type 1 1000 milliLiter(s) (125 mL/Hr) IV Continuous <Continuous>  multivitamin 1 Tablet(s) Oral daily  nystatin Powder 1 Application(s) Topical two times a day  octreotide  Injectable 150 MICROGram(s) SubCutaneous every 8 hours  pantoprazole   Suspension 40 milliGRAM(s) Oral two times a day before meals  potassium acid phosphate/sodium acid phosphate tablet (K-PHOS No. 2) 1 Tablet(s) Oral four times a day with meals  potassium chloride    Tablet ER 40 milliEquivalent(s) Oral daily  tamsulosin 0.4 milliGRAM(s) Oral at bedtime  tiotropium 18 MICROgram(s) Capsule 1 Capsule(s) Inhalation daily    MEDICATIONS  (PRN):  acetaminophen  Suppository. 650 milliGRAM(s) Rectal every 6 hours PRN Moderate Pain (4 - 6)  ALBUTerol    0.083% 2.5 milliGRAM(s) Nebulizer every 4 hours PRN Shortness of Breath and/or Wheezing  ALPRAZolam 0.25 milliGRAM(s) Oral at bedtime PRN anxiety  benzonatate 100 milliGRAM(s) Oral three times a day PRN Cough  ondansetron Injectable 4 milliGRAM(s) IV Push every 8 hours PRN Nausea and/or Vomiting      Allergies    codeine (Unknown)  no veges (Unknown)  penicillin (Unknown)    Intolerances    Symbicort (Short breath)      Vital Signs Last 24 Hrs  T(C): 36.4 (04 Jan 2018 05:31), Max: 36.8 (03 Jan 2018 17:04)  T(F): 97.5 (04 Jan 2018 05:31), Max: 98.2 (03 Jan 2018 17:04)  HR: 76 (04 Jan 2018 05:31) (76 - 103)  BP: 106/66 (04 Jan 2018 05:31) (92/61 - 122/61)  BP(mean): --  RR: 18 (04 Jan 2018 05:31) (18 - 18)  SpO2: 95% (04 Jan 2018 05:31) (95% - 96%)    PHYSICAL EXAM:    GENERAL: NAD, well-groomed, well-developed  HEAD:  Atraumatic, Normocephalic  EYES: EOMI, PERRLA, conjunctiva and sclera clear  ENMT: No tonsillar erythema, exudates, or enlargement; Moist mucous membranes, Good dentition, No lesions  NECK: Supple, No JVD, Normal thyroid  CHEST/LUNG: Clear to percussion bilaterally; No rales, rhonchi, wheezing, or rubs  HEART: Regular rate and rhythm; No murmurs, rubs, or gallops  ABDOMEN: Soft, Nontender, Nondistended; Bowel sounds present;  Intact ileostomy site and drainage bag.  Benign abdomen.    EXTREMITIES:  2+ Peripheral Pulses, No clubbing, cyanosis, or edema  LYMPH: No lymphadenopathy noted  SKIN: No rashes or lesions      LABS:    01-03    143  |  99  |  <3.0<L>  ----------------------------<  121<H>  3.2<L>   |  32.0<H>  |  0.55    Ca    7.4<L>      03 Jan 2018 06:36  Phos  2.6     01-04  Mg     1.8     01-04    TPro  5.5<L>  /  Alb  2.0<L>  /  TBili  0.5  /  DBili  x   /  AST  13  /  ALT  8   /  AlkPhos  78  01-03             RADIOLOGY & ADDITIONAL STUDIES:

## 2018-01-05 LAB
ALBUMIN SERPL ELPH-MCNC: 2.4 G/DL — LOW (ref 3.3–5.2)
ALP SERPL-CCNC: 84 U/L — SIGNIFICANT CHANGE UP (ref 40–120)
ALT FLD-CCNC: 9 U/L — SIGNIFICANT CHANGE UP
ANION GAP SERPL CALC-SCNC: 8 MMOL/L — SIGNIFICANT CHANGE UP (ref 5–17)
AST SERPL-CCNC: 12 U/L — SIGNIFICANT CHANGE UP
BILIRUB SERPL-MCNC: 0.7 MG/DL — SIGNIFICANT CHANGE UP (ref 0.4–2)
BUN SERPL-MCNC: <3 MG/DL — LOW (ref 8–20)
CALCIUM SERPL-MCNC: 8 MG/DL — LOW (ref 8.6–10.2)
CHLORIDE SERPL-SCNC: 94 MMOL/L — LOW (ref 98–107)
CO2 SERPL-SCNC: 34 MMOL/L — HIGH (ref 22–29)
CREAT SERPL-MCNC: 0.56 MG/DL — SIGNIFICANT CHANGE UP (ref 0.5–1.3)
GLUCOSE SERPL-MCNC: 128 MG/DL — HIGH (ref 70–115)
MAGNESIUM SERPL-MCNC: 1.8 MG/DL — SIGNIFICANT CHANGE UP (ref 1.6–2.6)
PHOSPHATE SERPL-MCNC: 2.5 MG/DL — SIGNIFICANT CHANGE UP (ref 2.4–4.7)
POTASSIUM SERPL-MCNC: 3.6 MMOL/L — SIGNIFICANT CHANGE UP (ref 3.5–5.3)
POTASSIUM SERPL-SCNC: 3.6 MMOL/L — SIGNIFICANT CHANGE UP (ref 3.5–5.3)
PROT SERPL-MCNC: 6.1 G/DL — LOW (ref 6.6–8.7)
SODIUM SERPL-SCNC: 136 MMOL/L — SIGNIFICANT CHANGE UP (ref 135–145)

## 2018-01-05 PROCEDURE — 99233 SBSQ HOSP IP/OBS HIGH 50: CPT

## 2018-01-05 RX ORDER — SODIUM CHLORIDE 0.65 %
1 AEROSOL, SPRAY (ML) NASAL
Qty: 0 | Refills: 0 | Status: DISCONTINUED | OUTPATIENT
Start: 2018-01-05 | End: 2018-01-16

## 2018-01-05 RX ORDER — SODIUM CHLORIDE 9 MG/ML
1000 INJECTION, SOLUTION INTRAVENOUS
Qty: 0 | Refills: 0 | Status: DISCONTINUED | OUTPATIENT
Start: 2018-01-05 | End: 2018-01-08

## 2018-01-05 RX ADMIN — CHOLESTYRAMINE 4 GRAM(S): 4 POWDER, FOR SUSPENSION ORAL at 08:08

## 2018-01-05 RX ADMIN — FLUTICASONE PROPIONATE AND SALMETEROL 1 DOSE(S): 50; 250 POWDER ORAL; RESPIRATORY (INHALATION) at 22:41

## 2018-01-05 RX ADMIN — Medication 81 MILLIGRAM(S): at 13:04

## 2018-01-05 RX ADMIN — Medication 1 TABLET(S): at 13:05

## 2018-01-05 RX ADMIN — Medication 1 SPRAY(S): at 17:10

## 2018-01-05 RX ADMIN — Medication 1 TABLET(S): at 13:04

## 2018-01-05 RX ADMIN — Medication 2 MILLIGRAM(S): at 17:10

## 2018-01-05 RX ADMIN — Medication 2 MILLIGRAM(S): at 00:27

## 2018-01-05 RX ADMIN — OCTREOTIDE ACETATE 150 MICROGRAM(S): 200 INJECTION, SOLUTION INTRAVENOUS; SUBCUTANEOUS at 05:56

## 2018-01-05 RX ADMIN — Medication 180 MILLIGRAM(S): at 17:10

## 2018-01-05 RX ADMIN — LORATADINE 10 MILLIGRAM(S): 10 TABLET ORAL at 13:05

## 2018-01-05 RX ADMIN — TIOTROPIUM BROMIDE 1 CAPSULE(S): 18 CAPSULE ORAL; RESPIRATORY (INHALATION) at 12:13

## 2018-01-05 RX ADMIN — OCTREOTIDE ACETATE 150 MICROGRAM(S): 200 INJECTION, SOLUTION INTRAVENOUS; SUBCUTANEOUS at 22:41

## 2018-01-05 RX ADMIN — FLUTICASONE PROPIONATE AND SALMETEROL 1 DOSE(S): 50; 250 POWDER ORAL; RESPIRATORY (INHALATION) at 08:08

## 2018-01-05 RX ADMIN — SODIUM CHLORIDE 75 MILLILITER(S): 9 INJECTION, SOLUTION INTRAVENOUS at 13:04

## 2018-01-05 RX ADMIN — NYSTATIN CREAM 1 APPLICATION(S): 100000 CREAM TOPICAL at 17:08

## 2018-01-05 RX ADMIN — Medication 1 TABLET(S): at 22:41

## 2018-01-05 RX ADMIN — PANTOPRAZOLE SODIUM 40 MILLIGRAM(S): 20 TABLET, DELAYED RELEASE ORAL at 17:10

## 2018-01-05 RX ADMIN — TAMSULOSIN HYDROCHLORIDE 0.4 MILLIGRAM(S): 0.4 CAPSULE ORAL at 22:44

## 2018-01-05 RX ADMIN — ATORVASTATIN CALCIUM 80 MILLIGRAM(S): 80 TABLET, FILM COATED ORAL at 22:41

## 2018-01-05 RX ADMIN — Medication 2 MILLIGRAM(S): at 05:55

## 2018-01-05 RX ADMIN — Medication 1 TABLET(S): at 17:10

## 2018-01-05 RX ADMIN — OCTREOTIDE ACETATE 150 MICROGRAM(S): 200 INJECTION, SOLUTION INTRAVENOUS; SUBCUTANEOUS at 13:05

## 2018-01-05 RX ADMIN — CLOPIDOGREL BISULFATE 75 MILLIGRAM(S): 75 TABLET, FILM COATED ORAL at 13:04

## 2018-01-05 RX ADMIN — CHOLESTYRAMINE 4 GRAM(S): 4 POWDER, FOR SUSPENSION ORAL at 22:40

## 2018-01-05 RX ADMIN — Medication 1 TABLET(S): at 08:08

## 2018-01-05 RX ADMIN — NYSTATIN CREAM 1 APPLICATION(S): 100000 CREAM TOPICAL at 06:03

## 2018-01-05 RX ADMIN — Medication 0.25 MILLIGRAM(S): at 22:41

## 2018-01-05 RX ADMIN — Medication 50 MICROGRAM(S): at 05:55

## 2018-01-05 RX ADMIN — PANTOPRAZOLE SODIUM 40 MILLIGRAM(S): 20 TABLET, DELAYED RELEASE ORAL at 06:01

## 2018-01-05 RX ADMIN — Medication 2 MILLIGRAM(S): at 13:05

## 2018-01-05 NOTE — PROGRESS NOTE ADULT - ASSESSMENT
Excessive output from ileostomy yet no signs of renal insufficiency.  Will decrease IVF to RL at 75/ hr.  Follow th ostomy outputs.    Pt reports that surgery was done with local anesthesia due to his poor pulmonary status.    Suggest reconsult surgery re; consideration of surgical repair of the transverse colon and reversal of ileostomy.   Consider Pulm eval for operative risk.

## 2018-01-05 NOTE — PROGRESS NOTE ADULT - ASSESSMENT
67 year old male with PMH of COPD stage 4 s/p right lung reduction in 2010 (on home oxygen 4L (sat 92-93%), HTN, CVA on 2014, DM, hypothyroidism, hydrocele, and nephrolithiasis who is admitted for a COPD exacerbation. Patient has been weaned off ventimask to 4 liters nasal cannula and respiratory status is stable. CTA of the chest was negative for PE but showed bilateral lower lobe infiltrates with mucous plugging for which he was started on Levaquin.  Respiratory status stable and steroids are being tapered. Hospital course complicated by constipation, which had not improved despite aggressive bowel regimen, laxatives and enemas. XRAY with large stool burden. CT with diaphragmatic hernia and patient was consequently taken to the OR for loop ileostomy under local anesthesia. Pt cont, to have copious amount in ileostomy. Pt is noted to have hyponatremia due to high output in ileostomy, got IVF, hyponatremia and hypokalemia improving, ileostomy secretion are getting thicker, he is tolerating diet, ostomy care as per ostomy RN,  he is continued with cholestyramine, he has been continued with IV hydration, Ileostomy out is still high, but secretions are getting thicker, monitored, I/Os, being continued with IV fluid rate to 125ml/h and GI has increased Octreotide to 150mcgs tid,  has CT abdomen and Plevis done, results reviewed, GI add Imodium 2 mg po bid. During the earlier part of the hospitalization patient was treated for acute on chronic hypoxic respiratory failure secondary to COPD exacerbation and CAP, was treated with steroids now tapered and duo nebs, SOB resolved, he uses home oxygen (4 liters), bicarb elevated likely due to metabolic compensation for resp acidosis  continue bronchodilators and prednisone tapered off and completed Levaquin as well.  His electrolytes monitored and replaced accordingly, he was found to have right femoral artery dissection as incidental finding on CT, no intervention as per vascular, follow up with vascular clinic as out patient.     Plan:    1. High out put from Ileostomy: decreasing gradually  c/w lomotil, cholestyramine,Octreotide, Imodium  he is tolerating diet,   ostomy care as per ostomy RN,     IV hydration, Ileostomy output is still high, but secretions are getting thicker,   encouraged for oral hydration,   monitor I/Os,   PT involved      2. Acute on chronic hypoxic respiratory failure secondary to COPD exacerbation, improved. on home oxygen (4 liters),   continue bronchodilators and prednisone tapered off.   continue Mucomyst PRN  completed course of Levaquin   cough better with tensilon otoniel    3. CAP - completed course of Levaquin on 12/5.     4. Hypertension - stable   - continue cardizem    5. Hyperlipidemia   - continue statin    6.hyponatremia: resolved, it was due to high ileostomy output.    Hypokalemia - on kphos  Hypophosphatemia: improved  Hypomagnesemia: improved  on multiple electrolyte sol.   monitor Electrolytes    7. Scrotal edema chronic with urinary retention, no more retention, Roy if not able to urinate  - swelling and redness going down  - patient following with  as outpatient  - cont scrotal elevation    8. Right femoral artery dissection  - incidental finding on CT, no intervention as per vascular    9. ankle swelling: leg elevation. scrotal elevation. PP+    10. anxiety: xanax at hs PRN.     11. nasal congestion, PND: no sign of acute sinusitis. try saline spray QID. Nasonex if saline not helping.     9. DVT Prophylaxis - Lovenox

## 2018-01-05 NOTE — PROGRESS NOTE ADULT - SUBJECTIVE AND OBJECTIVE BOX
Patient seen and examined;  Status is unchanged.  Ileoastomy output:  3350 but significant spillage occurred.  Denies abd pain, fever.  Alleges that hospital mouthwash causes thirst and therefore increase fluid intake.  Pt has stopped the PO mouthwash.  Nurses reporting that the ileal effluent is variable in consistency. No bleeding.  Sometimes semiformed; mostly liquid.  IVF still at 125 cc/ ht of plasmalyte.        PAST MEDICAL & SURGICAL HISTORY:  Hypothyroidism  Hydrocele  Renal calculi  Diabetes mellitus  Hypertension  CVA (cerebral vascular accident)  Chronic obstructive pulmonary disease, unspecified COPD type  History of lung surgery      ROS:  No Heartburn, regurgitation, dysphagia, odynophagia.  No dyspepsia  No abdominal pain.    No Nausea, vomiting.  No Bleeding.  No hematemesis.   No diarrhea.    No hematochesia.  No weight loss, anorexia.  No edema.      MEDICATIONS  (STANDING):  ALBUTerol/ipratropium for Nebulization. 3 milliLiter(s) Nebulizer once  aspirin  chewable 81 milliGRAM(s) Oral daily  atorvastatin 80 milliGRAM(s) Oral at bedtime  cholestyramine Powder (Sugar-Free) 4 Gram(s) Oral two times a day  clopidogrel Tablet 75 milliGRAM(s) Oral daily  diltiazem    milliGRAM(s) Oral <User Schedule>  enoxaparin Injectable 40 milliGRAM(s) SubCutaneous daily  FIRST- Mouthwash  BLM 10 milliLiter(s) Swish and Spit two times a day  fluticasone propionate/ salmeterol 250-50 MICROgram(s) Diskus 1 Dose(s) Inhalation two times a day  lactated ringers. 1000 milliLiter(s) (75 mL/Hr) IV Continuous <Continuous>  levothyroxine 50 MICROGram(s) Oral daily  loperamide Solution 2 milliGRAM(s) Oral four times a day  loratadine 10 milliGRAM(s) Oral daily  multivitamin 1 Tablet(s) Oral daily  nystatin Powder 1 Application(s) Topical two times a day  octreotide  Injectable 150 MICROGram(s) SubCutaneous every 8 hours  pantoprazole   Suspension 40 milliGRAM(s) Oral two times a day before meals  potassium acid phosphate/sodium acid phosphate tablet (K-PHOS No. 2) 1 Tablet(s) Oral four times a day with meals  tamsulosin 0.4 milliGRAM(s) Oral at bedtime  tiotropium 18 MICROgram(s) Capsule 1 Capsule(s) Inhalation daily    MEDICATIONS  (PRN):  acetaminophen  Suppository. 650 milliGRAM(s) Rectal every 6 hours PRN Moderate Pain (4 - 6)  ALBUTerol    0.083% 2.5 milliGRAM(s) Nebulizer every 4 hours PRN Shortness of Breath and/or Wheezing  ALPRAZolam 0.25 milliGRAM(s) Oral at bedtime PRN anxiety  benzonatate 100 milliGRAM(s) Oral three times a day PRN Cough  ondansetron Injectable 4 milliGRAM(s) IV Push every 8 hours PRN Nausea and/or Vomiting      Allergies    codeine (Unknown)  no veges (Unknown)  penicillin (Unknown)    Intolerances    Symbicort (Short breath)      Vital Signs Last 24 Hrs  T(C): 36.8 (05 Jan 2018 04:30), Max: 36.8 (04 Jan 2018 12:44)  T(F): 98.2 (05 Jan 2018 04:30), Max: 98.2 (04 Jan 2018 12:44)  HR: 71 (05 Jan 2018 04:30) (71 - 117)  BP: 99/60 (05 Jan 2018 04:30) (95/67 - 110/60)  BP(mean): --  RR: 18 (05 Jan 2018 04:30) (18 - 18)  SpO2: 95% (04 Jan 2018 23:21) (91% - 96%)    PHYSICAL EXAM:    GENERAL: NAD, well-groomed, well-developed  HEAD:  Atraumatic, Normocephalic  EYES: EOMI, PERRLA, conjunctiva and sclera clear  ENMT: No tonsillar erythema, exudates, or enlargement; Moist mucous membranes, Good dentition, No lesions  NECK: Supple, No JVD, Normal thyroid  CHEST/LUNG: Clear to percussion bilaterally; No rales, rhonchi, wheezing, or rubs  HEART: Regular rate and rhythm; No murmurs, rubs, or gallops  ABDOMEN: Soft, Nontender, Nondistended; Bowel sounds present.  Ileostomy intact.  Ostomy bag intact.    EXTREMITIES:  2+ Peripheral Pulses, No clubbing, cyanosis, or edema  LYMPH: No lymphadenopathy noted  SKIN: No rashes or lesions      LABS:    01-05    136  |  94<L>  |  <3.0<L>  ----------------------------<  128<H>  3.6   |  34.0<H>  |  0.56    Ca    8.0<L>      05 Jan 2018 08:07  Phos  2.5     01-05  Mg     1.8     01-05    TPro  6.1<L>  /  Alb  2.4<L>  /  TBili  0.7  /  DBili  x   /  AST  12  /  ALT  9   /  AlkPhos  84  01-05             RADIOLOGY & ADDITIONAL STUDIES:

## 2018-01-05 NOTE — PROGRESS NOTE ADULT - SUBJECTIVE AND OBJECTIVE BOX
FRANDY BAUTISTA    [id number]    67y      Male    Patient is a 67y old  Male who presents with a chief complaint of SOB (15 Dec 2017 10:17)      INTERVAL HPI/OVERNIGHT EVENTS:    decreasing output. no nausea, vomiting, dizziness. abd pain. colostomy bag did not spill anymore. seen by colostomy RN. no blood. appetite good  K normal today. on supplement  nasal congestion, postnasal drip+ no sinus pain or tenderness. no fever/chills/sore throat/earaches/runny nose        REVIEW OF SYSTEMS:    CONSTITUTIONAL: No fever/chills  Resp: chronic cough. better. no SOB  CARDIOVASCULAR: No chest pain, no palpitations.   GASTROINTESTINAL: No abdominal, No nausea, vomiting.   NEUROLOGICAL: No headaches,  loss of strength.  MISCELLANEOUS: No joint swelling or pain.       MEDICATIONS  (STANDING):    ALBUTerol/ipratropium for Nebulization. 3 milliLiter(s) Nebulizer once  aspirin  chewable 81 milliGRAM(s) Oral daily  atorvastatin 80 milliGRAM(s) Oral at bedtime  cholestyramine Powder (Sugar-Free) 4 Gram(s) Oral two times a day  clopidogrel Tablet 75 milliGRAM(s) Oral daily  diltiazem    milliGRAM(s) Oral <User Schedule>  enoxaparin Injectable 40 milliGRAM(s) SubCutaneous daily  FIRST- Mouthwash  BLM 10 milliLiter(s) Swish and Spit two times a day  fluticasone propionate/ salmeterol 250-50 MICROgram(s) Diskus 1 Dose(s) Inhalation two times a day  lactated ringers. 1000 milliLiter(s) (75 mL/Hr) IV Continuous <Continuous>  levothyroxine 50 MICROGram(s) Oral daily  loperamide Solution 2 milliGRAM(s) Oral four times a day  loratadine 10 milliGRAM(s) Oral daily  multivitamin 1 Tablet(s) Oral daily  nystatin Powder 1 Application(s) Topical two times a day  octreotide  Injectable 150 MICROGram(s) SubCutaneous every 8 hours  pantoprazole   Suspension 40 milliGRAM(s) Oral two times a day before meals  potassium acid phosphate/sodium acid phosphate tablet (K-PHOS No. 2) 1 Tablet(s) Oral four times a day with meals  tamsulosin 0.4 milliGRAM(s) Oral at bedtime  tiotropium 18 MICROgram(s) Capsule 1 Capsule(s) Inhalation daily    MEDICATIONS  (PRN):  acetaminophen  Suppository. 650 milliGRAM(s) Rectal every 6 hours PRN Moderate Pain (4 - 6)  ALBUTerol    0.083% 2.5 milliGRAM(s) Nebulizer every 4 hours PRN Shortness of Breath and/or Wheezing  ALPRAZolam 0.25 milliGRAM(s) Oral at bedtime PRN anxiety  benzonatate 100 milliGRAM(s) Oral three times a day PRN Cough  ondansetron Injectable 4 milliGRAM(s) IV Push every 8 hours PRN Nausea and/or Vomiting      PHYSICAL EXAM:    Vital Signs Last 24 Hrs  T(C): 36.4 (04 Jan 2018 05:31), Max: 36.8 (03 Jan 2018 17:04)  T(F): 97.5 (04 Jan 2018 05:31), Max: 98.2 (03 Jan 2018 17:04)  HR: 76 (04 Jan 2018 05:31) (76 - 103)  BP: 106/66 (04 Jan 2018 05:31) (92/61 - 122/61)  BP(mean): --  RR: 18 (04 Jan 2018 05:31) (18 - 18)  SpO2: 95% (04 Jan 2018 05:31) (95% - 96%)    GENERAL: Elderly male looking comfortable. sitting comfortably. able to complete multiple sentences.   HEENT: PERRL, +EOMI  NECK: soft, Supple, No JVD,   CHEST/LUNG: Decrease air entry bilaterally; no wheezing. no rales/ronchi  HEART: S1S2+, Regular rate and rhythm; No murmurs  ABDOMEN: Soft, Nontender, Bowel sounds present, s/p Ileostomy connected with drain.  green liquid with some whitish residue. no blood, thicker compared to yesterday  EXTREMITIES:  1+ Peripheral Pulses, b/l ankle edema, left more than right  SKIN: No rashes or lesions. scrotal edema and redness going down  NEURO: AAOX3, no focal deficits, no motor r sensory loss  PSYCH: normal mood. anxious    LABS:               01-05    136  |  94<L>  |  <3.0<L>  ----------------------------<  128<H>  3.6   |  34.0<H>  |  0.56    Ca    8.0<L>      05 Jan 2018 08:07  Phos  2.5     01-05  Mg     1.8     01-05    TPro  6.1<L>  /  Alb  2.4<L>  /  TBili  0.7  /  DBili  x   /  AST  12  /  ALT  9   /  AlkPhos  84  01-05            I&O's Summary    31 Dec 2017 07:01  -  01 Jan 2018 07:00  --------------------------------------------------------  IN: 2875 mL / OUT: 4800 mL / NET: -1925 mL    01 Jan 2018 07:01  -  01 Jan 2018 18:00  --------------------------------------------------------  IN: 1250 mL / OUT: 4350 mL / NET: -3100 mL        < from: CT Angio Chest w/ IV Cont (11.30.17 @ 11:11) >  IMPRESSION:     No evidence for pulmonary embolism.    Bilateral lower lobe infiltrates and lower lobe mucoid impacted airways.    < end of copied text >      < from: CT Abdomen and Pelvis w/ Oral Cont and w/ IV Cont (12.26.17 @ 16:47) >  IMPRESSION:     Status post loop ileostomy. Few dilated jejunal loops, favor ileus,   follow-up clinically for partial obstruction. Details as above.    Transverse colon containing left diaphragmatic hernia. Decreased colonic   distention, enteric contrast seen in the rectum.    The basilar pneumonia stable. Other incidental findings as above.    Large bilateral scrotal hydrocele.    < end of copied text >

## 2018-01-06 LAB
ANION GAP SERPL CALC-SCNC: 8 MMOL/L — SIGNIFICANT CHANGE UP (ref 5–17)
BUN SERPL-MCNC: 4 MG/DL — LOW (ref 8–20)
CALCIUM SERPL-MCNC: 7.6 MG/DL — LOW (ref 8.6–10.2)
CHLORIDE SERPL-SCNC: 99 MMOL/L — SIGNIFICANT CHANGE UP (ref 98–107)
CO2 SERPL-SCNC: 35 MMOL/L — HIGH (ref 22–29)
CREAT SERPL-MCNC: 0.74 MG/DL — SIGNIFICANT CHANGE UP (ref 0.5–1.3)
GLUCOSE SERPL-MCNC: 126 MG/DL — HIGH (ref 70–115)
POTASSIUM SERPL-MCNC: 3.4 MMOL/L — LOW (ref 3.5–5.3)
POTASSIUM SERPL-SCNC: 3.4 MMOL/L — LOW (ref 3.5–5.3)
SODIUM SERPL-SCNC: 142 MMOL/L — SIGNIFICANT CHANGE UP (ref 135–145)

## 2018-01-06 PROCEDURE — 99232 SBSQ HOSP IP/OBS MODERATE 35: CPT

## 2018-01-06 PROCEDURE — 99233 SBSQ HOSP IP/OBS HIGH 50: CPT

## 2018-01-06 RX ORDER — POTASSIUM CHLORIDE 20 MEQ
40 PACKET (EA) ORAL ONCE
Qty: 0 | Refills: 0 | Status: COMPLETED | OUTPATIENT
Start: 2018-01-06 | End: 2018-01-06

## 2018-01-06 RX ORDER — POTASSIUM CHLORIDE 20 MEQ
40 PACKET (EA) ORAL ONCE
Qty: 0 | Refills: 0 | Status: DISCONTINUED | OUTPATIENT
Start: 2018-01-06 | End: 2018-01-06

## 2018-01-06 RX ORDER — ALPRAZOLAM 0.25 MG
0.5 TABLET ORAL ONCE
Qty: 0 | Refills: 0 | Status: DISCONTINUED | OUTPATIENT
Start: 2018-01-06 | End: 2018-01-06

## 2018-01-06 RX ADMIN — CHOLESTYRAMINE 4 GRAM(S): 4 POWDER, FOR SUSPENSION ORAL at 23:06

## 2018-01-06 RX ADMIN — PANTOPRAZOLE SODIUM 40 MILLIGRAM(S): 20 TABLET, DELAYED RELEASE ORAL at 08:20

## 2018-01-06 RX ADMIN — NYSTATIN CREAM 1 APPLICATION(S): 100000 CREAM TOPICAL at 17:48

## 2018-01-06 RX ADMIN — Medication 2 MILLIGRAM(S): at 17:47

## 2018-01-06 RX ADMIN — Medication 2 MILLIGRAM(S): at 13:02

## 2018-01-06 RX ADMIN — Medication 0.5 MILLIGRAM(S): at 15:29

## 2018-01-06 RX ADMIN — SODIUM CHLORIDE 75 MILLILITER(S): 9 INJECTION, SOLUTION INTRAVENOUS at 08:18

## 2018-01-06 RX ADMIN — NYSTATIN CREAM 1 APPLICATION(S): 100000 CREAM TOPICAL at 06:44

## 2018-01-06 RX ADMIN — Medication 1 TABLET(S): at 23:12

## 2018-01-06 RX ADMIN — Medication 180 MILLIGRAM(S): at 17:47

## 2018-01-06 RX ADMIN — TIOTROPIUM BROMIDE 1 CAPSULE(S): 18 CAPSULE ORAL; RESPIRATORY (INHALATION) at 11:47

## 2018-01-06 RX ADMIN — Medication 0.25 MILLIGRAM(S): at 23:11

## 2018-01-06 RX ADMIN — Medication 50 MICROGRAM(S): at 06:45

## 2018-01-06 RX ADMIN — ATORVASTATIN CALCIUM 80 MILLIGRAM(S): 80 TABLET, FILM COATED ORAL at 23:11

## 2018-01-06 RX ADMIN — Medication 40 MILLIEQUIVALENT(S): at 10:40

## 2018-01-06 RX ADMIN — FLUTICASONE PROPIONATE AND SALMETEROL 1 DOSE(S): 50; 250 POWDER ORAL; RESPIRATORY (INHALATION) at 08:17

## 2018-01-06 RX ADMIN — TAMSULOSIN HYDROCHLORIDE 0.4 MILLIGRAM(S): 0.4 CAPSULE ORAL at 23:10

## 2018-01-06 RX ADMIN — CLOPIDOGREL BISULFATE 75 MILLIGRAM(S): 75 TABLET, FILM COATED ORAL at 13:02

## 2018-01-06 RX ADMIN — CHOLESTYRAMINE 4 GRAM(S): 4 POWDER, FOR SUSPENSION ORAL at 08:18

## 2018-01-06 RX ADMIN — Medication 81 MILLIGRAM(S): at 13:02

## 2018-01-06 RX ADMIN — Medication 1 SPRAY(S): at 17:48

## 2018-01-06 RX ADMIN — Medication 2 MILLIGRAM(S): at 00:24

## 2018-01-06 RX ADMIN — Medication 2 MILLIGRAM(S): at 06:44

## 2018-01-06 RX ADMIN — Medication 1 SPRAY(S): at 06:46

## 2018-01-06 RX ADMIN — OCTREOTIDE ACETATE 150 MICROGRAM(S): 200 INJECTION, SOLUTION INTRAVENOUS; SUBCUTANEOUS at 06:45

## 2018-01-06 RX ADMIN — Medication 1 TABLET(S): at 17:47

## 2018-01-06 RX ADMIN — FLUTICASONE PROPIONATE AND SALMETEROL 1 DOSE(S): 50; 250 POWDER ORAL; RESPIRATORY (INHALATION) at 23:06

## 2018-01-06 RX ADMIN — Medication 1 SPRAY(S): at 12:56

## 2018-01-06 RX ADMIN — Medication 1 TABLET(S): at 08:20

## 2018-01-06 RX ADMIN — OCTREOTIDE ACETATE 150 MICROGRAM(S): 200 INJECTION, SOLUTION INTRAVENOUS; SUBCUTANEOUS at 15:30

## 2018-01-06 RX ADMIN — Medication 1 TABLET(S): at 13:02

## 2018-01-06 RX ADMIN — PANTOPRAZOLE SODIUM 40 MILLIGRAM(S): 20 TABLET, DELAYED RELEASE ORAL at 17:47

## 2018-01-06 NOTE — CONSULT NOTE ADULT - SUBJECTIVE AND OBJECTIVE BOX
PULMONARY CONSULT NOTE      LILY FRANDYJANE-[id number]    Patient is a 67y old  Male who presents with a chief complaint of SOB (15 Dec 2017 10:17)      HISTORY OF PRESENT ILLNESS:  68 yo with ESCOPD (referred for transplant in the past)/ S/P right lung reduction in 2010 see for preoperative evaluation  S/P ileostomy.  Excessive ileostomy drainage leading to dehydration.  Considering reanastamosis  02 requiring.  Additional H/O CVA, HTN, DM, Hypothyroidism, hydrocele, kidney stone.     PFT 3/25/17: FEV1=0.87 liters (33%): Hyperinflation and Air-trapping.  Diffusion = 25% predicted    MEDICATIONS  (STANDING):  ALBUTerol/ipratropium for Nebulization. 3 milliLiter(s) Nebulizer once  aspirin  chewable 81 milliGRAM(s) Oral daily  atorvastatin 80 milliGRAM(s) Oral at bedtime  cholestyramine Powder (Sugar-Free) 4 Gram(s) Oral two times a day  clopidogrel Tablet 75 milliGRAM(s) Oral daily  diltiazem    milliGRAM(s) Oral <User Schedule>  enoxaparin Injectable 40 milliGRAM(s) SubCutaneous daily  FIRST- Mouthwash  BLM 10 milliLiter(s) Swish and Spit two times a day  fluticasone propionate/ salmeterol 250-50 MICROgram(s) Diskus 1 Dose(s) Inhalation two times a day  lactated ringers. 1000 milliLiter(s) (75 mL/Hr) IV Continuous <Continuous>  levothyroxine 50 MICROGram(s) Oral daily  loperamide Solution 2 milliGRAM(s) Oral four times a day  loratadine 10 milliGRAM(s) Oral daily  multivitamin 1 Tablet(s) Oral daily  nystatin Powder 1 Application(s) Topical two times a day  octreotide  Injectable 150 MICROGram(s) SubCutaneous every 8 hours  pantoprazole   Suspension 40 milliGRAM(s) Oral two times a day before meals  potassium acid phosphate/sodium acid phosphate tablet (K-PHOS No. 2) 1 Tablet(s) Oral four times a day with meals  sodium chloride 0.65% Nasal 1 Spray(s) Both Nostrils four times a day  tamsulosin 0.4 milliGRAM(s) Oral at bedtime  tiotropium 18 MICROgram(s) Capsule 1 Capsule(s) Inhalation daily      MEDICATIONS  (PRN):  acetaminophen  Suppository. 650 milliGRAM(s) Rectal every 6 hours PRN Moderate Pain (4 - 6)  ALBUTerol    0.083% 2.5 milliGRAM(s) Nebulizer every 4 hours PRN Shortness of Breath and/or Wheezing  ALPRAZolam 0.25 milliGRAM(s) Oral at bedtime PRN anxiety  benzonatate 100 milliGRAM(s) Oral three times a day PRN Cough  ondansetron Injectable 4 milliGRAM(s) IV Push every 8 hours PRN Nausea and/or Vomiting      Allergies    codeine (Unknown)  no veges (Unknown)  penicillin (Unknown)    Intolerances    Symbicort (Short breath)      PAST MEDICAL & SURGICAL HISTORY:  Hypothyroidism  Hydrocele  Renal calculi  Diabetes mellitus  Hypertension  CVA (cerebral vascular accident)  Chronic obstructive pulmonary disease, unspecified COPD type  History of lung surgery      FAMILY HISTORY:  No pertinent family history in first degree relatives      SOCIAL HISTORY  Smoking History:     REVIEW OF SYSTEMS:    CONSTITUTIONAL:  No fevers, chills, sweats    HEENT:  Eyes:  No diplopia or blurred vision. ENT:  No earache, sore throat or runny nose.    CARDIOVASCULAR:  No pressure, squeezing, tightness, or heaviness about the chest; no palpitations.    RESPIRATORY:  No cough, shortness of breath, PND or orthopnea. Mild SOBOE    GASTROINTESTINAL:  No abdominal pain, nausea, vomiting or diarrhea.    GENITOURINARY:  No dysuria, frequency or urgency.    NEUROLOGIC:  No paresthesias, fasciculations, seizures or weakness.    PSYCHIATRIC:  No disorder of thought or mood.    Vital Signs Last 24 Hrs  T(C): 36.8 (06 Jan 2018 16:51), Max: 36.8 (05 Jan 2018 21:40)  T(F): 98.2 (06 Jan 2018 16:51), Max: 98.2 (05 Jan 2018 21:40)  HR: 105 (06 Jan 2018 16:51) (66 - 105)  BP: 95/59 (06 Jan 2018 16:51) (95/59 - 117/65)  BP(mean): --  RR: 18 (06 Jan 2018 08:22) (18 - 18)  SpO2: 96% (06 Jan 2018 08:22) (96% - 96%)    PHYSICAL EXAMINATION:    GENERAL: The patient is a cachectic male in no apparent distress at rest.     HEENT: Head is normocephalic and atraumatic. Extraocular muscles are intact. Mucous membranes are moist.     NECK: Supple.     LUNGS: Diminished to auscultation without wheezing, rales, or rhonchi. Respirations unlabored    HEART: Regular rate and rhythm without murmur.    ABDOMEN: Soft, nontender, and nondistended.  No hepatosplenomegaly is noted.    EXTREMITIES: Without any cyanosis, clubbing, rash, lesions or edema.    NEUROLOGIC: Grossly intact.      LABS:    01-06    142  |  99  |  4.0<L>  ----------------------------<  126<H>  3.4<L>   |  35.0<H>  |  0.74    Ca    7.6<L>      06 Jan 2018 06:10  Phos  2.5     01-05  Mg     1.8     01-05    TPro  6.1<L>  /  Alb  2.4<L>  /  TBili  0.7  /  DBili  x   /  AST  12  /  ALT  9   /  AlkPhos  84  01-05        RADIOLOGY & ADDITIONAL STUDIES:    CTA Chest on 7/20/17: Diffuse emphysema with severe involvement of the lower lobes.

## 2018-01-06 NOTE — PROGRESS NOTE ADULT - SUBJECTIVE AND OBJECTIVE BOX
Patient is a 67y old  Male who presents with a chief complaint of SOB (15 Dec 2017 10:17)      HPI:  67 YOM w/PMH of COPD stage 4 s/p right lung reduction in 2010, currently on home oxygen 4L (sat 92-93%), HTN, CVA on 2014, DM, hypothyroidism, hydrocele, kidney stones . The ileostomy output was 5.2 liter yesterday. No nausea, no vomiting. Tolerating solid diet. Patient is currently on octreotide, imodium, and questran    REVIEW OF SYSTEMS:  Constitutional: No fever, weight loss or fatigue  ENMT:  No difficulty hearing, tinnitus, vertigo; No sinus or throat pain  Respiratory: No cough, wheezing, chills or hemoptysis  Cardiovascular: No chest pain, palpitations, dizziness or leg swelling  Gastrointestinal: No abdominal or epigastric pain. No nausea, vomiting or hematemesis; No diarrhea or constipation. No melena or hematochezia. Increased ostomy output  Skin: No itching, burning, rashes or lesions   Musculoskeletal: No joint pain or swelling; No muscle, back or extremity pain    PAST MEDICAL & SURGICAL HISTORY:  Hypothyroidism  Hydrocele  Renal calculi  Diabetes mellitus  Hypertension  CVA (cerebral vascular accident)  Chronic obstructive pulmonary disease, unspecified COPD type  History of lung surgery      FAMILY HISTORY:  No pertinent family history in first degree relatives      SOCIAL HISTORY:  Smoking Status: [ ] Current, [ ] Former, [ ] Never  Pack Years:  [  ] EtOH-no  [  ] IVDA-no    MEDICATIONS:  MEDICATIONS  (STANDING):  ALBUTerol/ipratropium for Nebulization. 3 milliLiter(s) Nebulizer once  aspirin  chewable 81 milliGRAM(s) Oral daily  atorvastatin 80 milliGRAM(s) Oral at bedtime  cholestyramine Powder (Sugar-Free) 4 Gram(s) Oral two times a day  clopidogrel Tablet 75 milliGRAM(s) Oral daily  diltiazem    milliGRAM(s) Oral <User Schedule>  enoxaparin Injectable 40 milliGRAM(s) SubCutaneous daily  FIRST- Mouthwash  BLM 10 milliLiter(s) Swish and Spit two times a day  fluticasone propionate/ salmeterol 250-50 MICROgram(s) Diskus 1 Dose(s) Inhalation two times a day  lactated ringers. 1000 milliLiter(s) (75 mL/Hr) IV Continuous <Continuous>  levothyroxine 50 MICROGram(s) Oral daily  loperamide Solution 2 milliGRAM(s) Oral four times a day  loratadine 10 milliGRAM(s) Oral daily  multivitamin 1 Tablet(s) Oral daily  nystatin Powder 1 Application(s) Topical two times a day  octreotide  Injectable 150 MICROGram(s) SubCutaneous every 8 hours  pantoprazole   Suspension 40 milliGRAM(s) Oral two times a day before meals  potassium acid phosphate/sodium acid phosphate tablet (K-PHOS No. 2) 1 Tablet(s) Oral four times a day with meals  sodium chloride 0.65% Nasal 1 Spray(s) Both Nostrils four times a day  tamsulosin 0.4 milliGRAM(s) Oral at bedtime  tiotropium 18 MICROgram(s) Capsule 1 Capsule(s) Inhalation daily    MEDICATIONS  (PRN):  acetaminophen  Suppository. 650 milliGRAM(s) Rectal every 6 hours PRN Moderate Pain (4 - 6)  ALBUTerol    0.083% 2.5 milliGRAM(s) Nebulizer every 4 hours PRN Shortness of Breath and/or Wheezing  ALPRAZolam 0.25 milliGRAM(s) Oral at bedtime PRN anxiety  benzonatate 100 milliGRAM(s) Oral three times a day PRN Cough  ondansetron Injectable 4 milliGRAM(s) IV Push every 8 hours PRN Nausea and/or Vomiting      Allergies    codeine (Unknown)  no veges (Unknown)  penicillin (Unknown)    Intolerances    Symbicort (Short breath)      Vital Signs Last 24 Hrs  T(C): 36.3 (06 Jan 2018 08:22), Max: 36.8 (05 Jan 2018 21:40)  T(F): 97.3 (06 Jan 2018 08:22), Max: 98.2 (05 Jan 2018 21:40)  HR: 82 (06 Jan 2018 08:22) (66 - 105)  BP: 117/65 (06 Jan 2018 08:22) (96/54 - 117/65)  BP(mean): --  RR: 18 (06 Jan 2018 08:22) (18 - 18)  SpO2: 96% (06 Jan 2018 08:22) (96% - 96%)    01-05 @ 07:01  -  01-06 @ 07:00  --------------------------------------------------------  IN: 2790 mL / OUT: 5350 mL / NET: -2560 mL          PHYSICAL EXAM:    General: Well developed; well nourished; in no acute distress  HEENT: MMM, conjunctiva and sclera clear  H- RRR  L- CTA  Gastrointestinal: Soft, non-tender non-distended; Normal bowel sounds; No rebound or guarding  Extremities: Normal range of motion, No clubbing, cyanosis or edema  Neurological: Alert and oriented x3  Skin: Warm and dry. No obvious rash      LABS:    06 Jan 2018 06:10    142    |  99     |  4.0    ----------------------------<  126    3.4     |  35.0   |  0.74     Ca    7.6        06 Jan 2018 06:10  Phos  2.5       05 Jan 2018 08:07  Mg     1.8       05 Jan 2018 08:07    TPro  6.1    /  Alb  2.4    /  TBili  0.7    /  DBili  x      /  AST  12     /  ALT  9      /  AlkPhos  84     / Amylase x      /Lipase x      05 Jan 2018 08:07              RADIOLOGY & ADDITIONAL STUDIES:     < from: CT Abdomen and Pelvis w/ Oral Cont and w/ IV Cont (12.26.17 @ 16:47) >  MPRESSION:     Status post loop ileostomy. Few dilated jejunal loops, favor ileus,   follow-up clinically for partial obstruction. Details as above.    Transverse colon containing left diaphragmatic hernia. Decreased colonic   distention, enteric contrast seen in the rectum.    The basilar pneumonia stable. Other incidental findings as above.    Large bilateral scrotal hydrocele.    < end of copied text >

## 2018-01-06 NOTE — PROGRESS NOTE ADULT - ASSESSMENT
67 year old male with PMH of COPD stage 4 s/p right lung reduction in 2010 (on home oxygen 4L (sat 92-93%), HTN, CVA on 2014, DM, hypothyroidism, hydrocele, and nephrolithiasis who is admitted for a COPD exacerbation. Patient has been weaned off ventimask to 4 liters nasal cannula and respiratory status is stable. CTA of the chest was negative for PE but showed bilateral lower lobe infiltrates with mucous plugging for which he was started on Levaquin.  Respiratory status stable and steroids are being tapered. Hospital course complicated by constipation, which had not improved despite aggressive bowel regimen, laxatives and enemas. XRAY with large stool burden. CT with diaphragmatic hernia and patient was consequently taken to the OR for loop ileostomy under local anesthesia. Pt cont, to have copious amount in ileostomy. Pt is noted to have hyponatremia due to high output in ileostomy, got IVF, hyponatremia and hypokalemia improving, ileostomy secretion are getting thicker, he is tolerating diet, ostomy care as per ostomy RN,  he is continued with cholestyramine, he has been continued with IV hydration, Ileostomy out is still high, but secretions are getting thicker, monitored, I/Os, being continued with IV fluid rate to 125ml/h and GI has increased Octreotide to 150mcgs tid,  has CT abdomen and Plevis done, results reviewed, GI add Imodium 2 mg po bid. During the earlier part of the hospitalization patient was treated for acute on chronic hypoxic respiratory failure secondary to COPD exacerbation and CAP, was treated with steroids now tapered and duo nebs, SOB resolved, he uses home oxygen (4 liters), bicarb elevated likely due to metabolic compensation for resp acidosis  continue bronchodilators and prednisone tapered off and completed Levaquin as well.  His electrolytes monitored and replaced accordingly, he was found to have right femoral artery dissection as incidental finding on CT, no intervention as per vascular, follow up with vascular clinic as out patient.     Plan:    1. High out put from Ileostomy: decreasing gradually, c/w lomotil, cholestyramine,Octreotide, Imodium, he is tolerating diet, ostomy care as per ostomy RN,     IV hydration, Ileostomy output is still high, but secretions are getting thicker, encouraged for oral hydration, monitor I/Os, PT involved, GI is following, GI recommanded Surgical consult for Ileostomy reversal and pulmonary consult for optimization for surgery, consults has been called.       2. Acute on chronic hypoxic respiratory failure secondary to COPD exacerbation, improved, he is at his baseline, on home oxygen (4 liters),   continue bronchodilators and prednisone tapered off, continue Mucomyst PRN, completed course of Levaquin, cough better with tensilon otoniel    3. CAP - completed course of Levaquin on 12/5.     4. Hypertension - stable   - continue cardizem    5. Hyperlipidemia   - continue statin    6.hyponatremia: resolved, it was due to high ileostomy output.    Hypokalemia - on kphos  Hypophosphatemia: improved  Hypomagnesemia: improved  on multiple electrolyte sol.   monitor Electrolytes    7. Scrotal edema chronic with urinary retention, no more retention, Roy if not able to urinate  - swelling and redness going down  - patient following with  as outpatient  - cont scrotal elevation    8. Right femoral artery dissection  - incidental finding on CT, no intervention as per vascular    9. ankle swelling: leg elevation. scrotal elevation. PP+    10. anxiety: xanax at hs PRN.     11. nasal congestion, PND: no sign of acute sinusitis. try saline spray QID. Nasonex if saline not helping.     9. DVT Prophylaxis - Lovenox

## 2018-01-06 NOTE — PROGRESS NOTE ADULT - PROBLEM SELECTOR PLAN 1
Continues to have increase output from ileostomy. Failing medical treatment. Please reconsult surgery for any other suggestions. Consider repair of the hernia with revison of the ostomy

## 2018-01-06 NOTE — CONSULT NOTE ADULT - ASSESSMENT
Assess    ESCOPD  Extremely high risk for any open abdomenal procedure  Protracted need for mechanical vent (possibly permanent with tracheostomy) with any open abdomenal procedure reviewed with patient and his wife  Laparoscopic repair of hernia with revision of ostomy if possible would be preferred if possible    Rec    DuoNeb and Budesonide  02  Careful consideration of options

## 2018-01-06 NOTE — PROGRESS NOTE ADULT - SUBJECTIVE AND OBJECTIVE BOX
FRANDY BAUTISTA    [id number]    67y      Male    Patient is a 67y old  Male who presents with a chief complaint of SOB (15 Dec 2017 10:17)      INTERVAL HPI/OVERNIGHT EVENTS:    Patient is not having any complains, out put is still high, he is feeling ok, has no complains like nausea, vomiting, dizziness, he is worried about his next step for the high out put.     REVIEW OF SYSTEMS:    CONSTITUTIONAL: No fever, has fatigue  RESPIRATORY: No cough, No shortness of breath  CARDIOVASCULAR: No chest pain, no palpitations.   GASTROINTESTINAL: No abdominal, No nausea, vomiting.   NEUROLOGICAL: No headaches,  loss of strength.  MISCELLANEOUS: No joint swelling or pain.       Vital Signs Last 24 Hrs  T(C): 36.5 (06 Jan 2018 21:05), Max: 36.8 (06 Jan 2018 16:51)  T(F): 97.7 (06 Jan 2018 21:05), Max: 98.2 (06 Jan 2018 16:51)  HR: 109 (06 Jan 2018 21:05) (66 - 109)  BP: 90/69 (06 Jan 2018 21:05) (90/69 - 117/65)  BP(mean): --  RR: 18 (06 Jan 2018 21:05) (18 - 18)  SpO2: 96% (06 Jan 2018 08:22) (96% - 96%)    PHYSICAL EXAM:    GENERAL: Thin built  Elderly male looking comfortable  HEENT: PERRL, +EOMI  NECK: soft, Supple, No JVD,   CHEST/LUNG: Decrease air entry bilaterally; No wheezing  HEART: S1S2+, Regular rate and rhythm; No murmurs  ABDOMEN: Soft, Nontender, Bowel sounds present, s/p Ileostomy connected with drain  EXTREMITIES:  1+ Peripheral Pulses, No edema  SKIN: No rashes or lesions  NEURO: AAOX3, no focal deficits, no motor r sensory loss  PSYCH: normal mood    LABS:    01-06    142  |  99  |  4.0<L>  ----------------------------<  126<H>  3.4<L>   |  35.0<H>  |  0.74    Ca    7.6<L>      06 Jan 2018 06:10  Phos  2.5     01-05  Mg     1.8     01-05    TPro  6.1<L>  /  Alb  2.4<L>  /  TBili  0.7  /  DBili  x   /  AST  12  /  ALT  9   /  AlkPhos  84  01-05            I&O's Summary    05 Jan 2018 07:01  -  06 Jan 2018 07:00  --------------------------------------------------------  IN: 2790 mL / OUT: 5350 mL / NET: -2560 mL    06 Jan 2018 07:01  -  06 Jan 2018 22:01  --------------------------------------------------------  IN: 1160 mL / OUT: 5850 mL / NET: -4690 mL        MEDICATIONS  (STANDING):  ALBUTerol/ipratropium for Nebulization. 3 milliLiter(s) Nebulizer once  aspirin  chewable 81 milliGRAM(s) Oral daily  atorvastatin 80 milliGRAM(s) Oral at bedtime  cholestyramine Powder (Sugar-Free) 4 Gram(s) Oral two times a day  clopidogrel Tablet 75 milliGRAM(s) Oral daily  diltiazem    milliGRAM(s) Oral <User Schedule>  enoxaparin Injectable 40 milliGRAM(s) SubCutaneous daily  FIRST- Mouthwash  BLM 10 milliLiter(s) Swish and Spit two times a day  fluticasone propionate/ salmeterol 250-50 MICROgram(s) Diskus 1 Dose(s) Inhalation two times a day  lactated ringers. 1000 milliLiter(s) (75 mL/Hr) IV Continuous <Continuous>  levothyroxine 50 MICROGram(s) Oral daily  loperamide Solution 2 milliGRAM(s) Oral four times a day  loratadine 10 milliGRAM(s) Oral daily  multivitamin 1 Tablet(s) Oral daily  nystatin Powder 1 Application(s) Topical two times a day  octreotide  Injectable 150 MICROGram(s) SubCutaneous every 8 hours  pantoprazole   Suspension 40 milliGRAM(s) Oral two times a day before meals  potassium acid phosphate/sodium acid phosphate tablet (K-PHOS No. 2) 1 Tablet(s) Oral four times a day with meals  sodium chloride 0.65% Nasal 1 Spray(s) Both Nostrils four times a day  tamsulosin 0.4 milliGRAM(s) Oral at bedtime  tiotropium 18 MICROgram(s) Capsule 1 Capsule(s) Inhalation daily    MEDICATIONS  (PRN):  acetaminophen  Suppository. 650 milliGRAM(s) Rectal every 6 hours PRN Moderate Pain (4 - 6)  ALBUTerol    0.083% 2.5 milliGRAM(s) Nebulizer every 4 hours PRN Shortness of Breath and/or Wheezing  ALPRAZolam 0.25 milliGRAM(s) Oral at bedtime PRN anxiety  benzonatate 100 milliGRAM(s) Oral three times a day PRN Cough  ondansetron Injectable 4 milliGRAM(s) IV Push every 8 hours PRN Nausea and/or Vomiting

## 2018-01-07 LAB
ANION GAP SERPL CALC-SCNC: 9 MMOL/L — SIGNIFICANT CHANGE UP (ref 5–17)
BUN SERPL-MCNC: 5 MG/DL — LOW (ref 8–20)
CALCIUM SERPL-MCNC: 8.2 MG/DL — LOW (ref 8.6–10.2)
CHLORIDE SERPL-SCNC: 99 MMOL/L — SIGNIFICANT CHANGE UP (ref 98–107)
CO2 SERPL-SCNC: 37 MMOL/L — HIGH (ref 22–29)
CREAT SERPL-MCNC: 0.64 MG/DL — SIGNIFICANT CHANGE UP (ref 0.5–1.3)
GLUCOSE SERPL-MCNC: 123 MG/DL — HIGH (ref 70–115)
HCT VFR BLD CALC: 35.4 % — LOW (ref 42–52)
HGB BLD-MCNC: 11 G/DL — LOW (ref 14–18)
INR BLD: 1.28 RATIO — HIGH (ref 0.88–1.16)
MCHC RBC-ENTMCNC: 27.8 PG — SIGNIFICANT CHANGE UP (ref 27–31)
MCHC RBC-ENTMCNC: 31.1 G/DL — LOW (ref 32–36)
MCV RBC AUTO: 89.4 FL — SIGNIFICANT CHANGE UP (ref 80–94)
PLATELET # BLD AUTO: 391 K/UL — SIGNIFICANT CHANGE UP (ref 150–400)
POTASSIUM SERPL-MCNC: 3.4 MMOL/L — LOW (ref 3.5–5.3)
POTASSIUM SERPL-SCNC: 3.4 MMOL/L — LOW (ref 3.5–5.3)
PROTHROM AB SERPL-ACNC: 14.1 SEC — HIGH (ref 9.8–12.7)
RBC # BLD: 3.96 M/UL — LOW (ref 4.6–6.2)
RBC # FLD: 16 % — HIGH (ref 11–15.6)
SODIUM SERPL-SCNC: 145 MMOL/L — SIGNIFICANT CHANGE UP (ref 135–145)
WBC # BLD: 6.4 K/UL — SIGNIFICANT CHANGE UP (ref 4.8–10.8)
WBC # FLD AUTO: 6.4 K/UL — SIGNIFICANT CHANGE UP (ref 4.8–10.8)

## 2018-01-07 PROCEDURE — 71045 X-RAY EXAM CHEST 1 VIEW: CPT | Mod: 26

## 2018-01-07 PROCEDURE — 99232 SBSQ HOSP IP/OBS MODERATE 35: CPT

## 2018-01-07 PROCEDURE — 74018 RADEX ABDOMEN 1 VIEW: CPT | Mod: 26,59

## 2018-01-07 RX ORDER — POTASSIUM CHLORIDE 20 MEQ
40 PACKET (EA) ORAL EVERY 4 HOURS
Qty: 0 | Refills: 0 | Status: COMPLETED | OUTPATIENT
Start: 2018-01-07 | End: 2018-01-07

## 2018-01-07 RX ADMIN — Medication 2 MILLIGRAM(S): at 13:20

## 2018-01-07 RX ADMIN — Medication 1 SPRAY(S): at 23:18

## 2018-01-07 RX ADMIN — Medication 2 MILLIGRAM(S): at 17:53

## 2018-01-07 RX ADMIN — SODIUM CHLORIDE 75 MILLILITER(S): 9 INJECTION, SOLUTION INTRAVENOUS at 08:41

## 2018-01-07 RX ADMIN — Medication 40 MILLIEQUIVALENT(S): at 17:54

## 2018-01-07 RX ADMIN — Medication 2 MILLIGRAM(S): at 00:49

## 2018-01-07 RX ADMIN — Medication 180 MILLIGRAM(S): at 06:28

## 2018-01-07 RX ADMIN — Medication 2 MILLIGRAM(S): at 23:28

## 2018-01-07 RX ADMIN — CHOLESTYRAMINE 4 GRAM(S): 4 POWDER, FOR SUSPENSION ORAL at 08:36

## 2018-01-07 RX ADMIN — Medication 1 SPRAY(S): at 06:30

## 2018-01-07 RX ADMIN — NYSTATIN CREAM 1 APPLICATION(S): 100000 CREAM TOPICAL at 06:29

## 2018-01-07 RX ADMIN — Medication 40 MILLIEQUIVALENT(S): at 11:53

## 2018-01-07 RX ADMIN — OCTREOTIDE ACETATE 150 MICROGRAM(S): 200 INJECTION, SOLUTION INTRAVENOUS; SUBCUTANEOUS at 14:36

## 2018-01-07 RX ADMIN — ATORVASTATIN CALCIUM 80 MILLIGRAM(S): 80 TABLET, FILM COATED ORAL at 22:31

## 2018-01-07 RX ADMIN — NYSTATIN CREAM 1 APPLICATION(S): 100000 CREAM TOPICAL at 17:57

## 2018-01-07 RX ADMIN — Medication 81 MILLIGRAM(S): at 13:20

## 2018-01-07 RX ADMIN — PANTOPRAZOLE SODIUM 40 MILLIGRAM(S): 20 TABLET, DELAYED RELEASE ORAL at 06:28

## 2018-01-07 RX ADMIN — CHOLESTYRAMINE 4 GRAM(S): 4 POWDER, FOR SUSPENSION ORAL at 22:31

## 2018-01-07 RX ADMIN — Medication 2 MILLIGRAM(S): at 06:29

## 2018-01-07 RX ADMIN — TAMSULOSIN HYDROCHLORIDE 0.4 MILLIGRAM(S): 0.4 CAPSULE ORAL at 22:31

## 2018-01-07 RX ADMIN — FLUTICASONE PROPIONATE AND SALMETEROL 1 DOSE(S): 50; 250 POWDER ORAL; RESPIRATORY (INHALATION) at 22:34

## 2018-01-07 RX ADMIN — FLUTICASONE PROPIONATE AND SALMETEROL 1 DOSE(S): 50; 250 POWDER ORAL; RESPIRATORY (INHALATION) at 08:36

## 2018-01-07 RX ADMIN — TIOTROPIUM BROMIDE 1 CAPSULE(S): 18 CAPSULE ORAL; RESPIRATORY (INHALATION) at 12:13

## 2018-01-07 RX ADMIN — CLOPIDOGREL BISULFATE 75 MILLIGRAM(S): 75 TABLET, FILM COATED ORAL at 13:20

## 2018-01-07 RX ADMIN — Medication 50 MICROGRAM(S): at 06:29

## 2018-01-07 RX ADMIN — Medication 180 MILLIGRAM(S): at 17:54

## 2018-01-07 RX ADMIN — PANTOPRAZOLE SODIUM 40 MILLIGRAM(S): 20 TABLET, DELAYED RELEASE ORAL at 17:57

## 2018-01-07 RX ADMIN — Medication 0.25 MILLIGRAM(S): at 22:31

## 2018-01-07 RX ADMIN — OCTREOTIDE ACETATE 150 MICROGRAM(S): 200 INJECTION, SOLUTION INTRAVENOUS; SUBCUTANEOUS at 23:19

## 2018-01-07 RX ADMIN — LORATADINE 10 MILLIGRAM(S): 10 TABLET ORAL at 13:20

## 2018-01-07 NOTE — PROGRESS NOTE ADULT - ASSESSMENT
67yMale S/P loop ileostomy for LBO from unknown etiology likely constipation with continued high ileostomy output currently for conservative management    -Recommend adding tincture of opium  -discussed in depth risks of further surgery, family will discuss regarding possible consequence of ventilator-dependency if undergoes general anesthesia  -Keep up with IVF replacements to near match ileostomy outputs providing total balance net neutral.  -may require colonoscopy in future if can be tolerated  -Continue diet and urge PO nutrition  -Monitor electrolytes and replace prn  -rest of care as per primary team  -surgery will continue to follow  -patient seen and examined with attending Dr. Melton 67yMale S/P loop ileostomy for LBO from unknown etiology likely constipation with continued high ileostomy output currently for conservative management    -Recommend adding tincture of opium  -discussed in depth risks of further surgery, family will discuss regarding possible consequence of ventilator-dependency if undergoes general anesthesia  -CXR/AXR to assess diaphragmatic hernia  -Keep up with IVF replacements to near match ileostomy outputs providing total balance net neutral.  -may require colonoscopy in future if can be tolerated  -Continue diet and urge PO nutrition  -Monitor electrolytes and replace prn  -rest of care as per primary team  -surgery will continue to follow  -patient seen and examined with attending Dr. Melton

## 2018-01-07 NOTE — PROGRESS NOTE ADULT - ASSESSMENT
67 year old male with PMH of COPD stage 4 s/p right lung reduction in 2010 (on home oxygen 4L (sat 92-93%), HTN, CVA on 2014, DM, hypothyroidism, hydrocele, and nephrolithiasis who is admitted for a COPD exacerbation. Patient has been weaned off ventimask to 4 liters nasal cannula and respiratory status is stable. CTA of the chest was negative for PE but showed bilateral lower lobe infiltrates with mucous plugging for which he was started on Levaquin.  Respiratory status stable and steroids are being tapered. Hospital course complicated by constipation, which had not improved despite aggressive bowel regimen, laxatives and enemas. XRAY with large stool burden. CT with diaphragmatic hernia and patient was consequently taken to the OR for loop ileostomy under local anesthesia. Pt cont, to have copious amount in ileostomy. Pt is noted to have hyponatremia due to high output in ileostomy, got IVF, hyponatremia and hypokalemia improving, ileostomy secretion are getting thicker, he is tolerating diet, ostomy care as per ostomy RN,  he is continued with cholestyramine, he has been continued with IV hydration, Ileostomy out is still high, but secretions are getting thicker, monitored, I/Os, being continued with IV fluid rate to 125ml/h and GI has increased Octreotide to 150mcgs tid,  has CT abdomen and Plevis done, results reviewed, GI add Imodium 2 mg po bid. During the earlier part of the hospitalization patient was treated for acute on chronic hypoxic respiratory failure secondary to COPD exacerbation and CAP, was treated with steroids now tapered and duo nebs, SOB resolved, he uses home oxygen (4 liters), bicarb elevated likely due to metabolic compensation for resp acidosis  continue bronchodilators and prednisone tapered off and completed Levaquin as well.  His electrolytes monitored and replaced accordingly, he was found to have right femoral artery dissection as incidental finding on CT, no intervention as per vascular, follow up with vascular clinic as out patient.     Plan:    1. High out put from Ileostomy: decreasing gradually, c/w lomotil, cholestyramine,Octreotide, Imodium, he is tolerating diet, ostomy care as per ostomy RN,     IV hydration, Ileostomy output is still high, but secretions are getting thicker, encouraged for oral hydration, monitor I/Os, PT involved, GI is following, GI recommanded Surgical consult for Ileostomy reversal and pulmonary consult for optimization for surgery, consults has been called.       2. Acute on chronic hypoxic respiratory failure secondary to COPD exacerbation, improved, he is at his baseline, on home oxygen (4 liters),   continue bronchodilators and prednisone tapered off, continue Mucomyst PRN, completed course of Levaquin, cough better with tensilon otoniel    3. CAP - completed course of Levaquin on 12/5.     4. Hypertension - stable   - continue cardizem    5. Hyperlipidemia   - continue statin    6.hyponatremia: resolved, it was due to high ileostomy output.    Hypokalemia - on kphos  Hypophosphatemia: improved  Hypomagnesemia: improved  on multiple electrolyte sol.   monitor Electrolytes    7. Scrotal edema chronic with urinary retention, no more retention, Roy if not able to urinate  - swelling and redness going down  - patient following with  as outpatient  - cont scrotal elevation    8. Right femoral artery dissection  - incidental finding on CT, no intervention as per vascular    9. ankle swelling: leg elevation. scrotal elevation. PP+    10. anxiety: xanax at hs PRN.     11. nasal congestion, PND: no sign of acute sinusitis. try saline spray QID. Nasonex if saline not helping.     9. DVT Prophylaxis - Lovenox 67 year old male with PMH of COPD stage 4 s/p right lung reduction in 2010 (on home oxygen 4L (sat 92-93%), HTN, CVA on 2014, DM, hypothyroidism, hydrocele, and nephrolithiasis who is admitted for a COPD exacerbation. Patient has been weaned off ventimask to 4 liters nasal cannula and respiratory status is stable. CTA of the chest was negative for PE but showed bilateral lower lobe infiltrates with mucous plugging for which he was started on Levaquin.  Respiratory status stable and steroids are being tapered. Hospital course complicated by constipation, which had not improved despite aggressive bowel regimen, laxatives and enemas. XRAY with large stool burden. CT with diaphragmatic hernia and patient was consequently taken to the OR for loop ileostomy under local anesthesia. Pt cont, to have copious amount in ileostomy. Pt is noted to have hyponatremia due to high output in ileostomy, got IVF, hyponatremia and hypokalemia improving, ileostomy secretion are getting thicker, he is tolerating diet, ostomy care as per ostomy RN,  he is continued with cholestyramine, he has been continued with IV hydration, Ileostomy out is still high, but secretions are getting thicker, monitored, I/Os, being continued with IV fluid rate to 125ml/h and GI has increased Octreotide to 150mcgs tid,  has CT abdomen and Plevis done, results reviewed, GI add Imodium 2 mg po bid. During the earlier part of the hospitalization patient was treated for acute on chronic hypoxic respiratory failure secondary to COPD exacerbation and CAP, was treated with steroids now tapered and duo nebs, SOB resolved, he uses home oxygen (4 liters), bicarb elevated likely due to metabolic compensation for resp acidosis  continue bronchodilators and prednisone tapered off and completed Levaquin as well.  His electrolytes monitored and replaced accordingly, he was found to have right femoral artery dissection as incidental finding on CT, no intervention as per vascular, follow up with vascular clinic as out patient.     Plan:    1. High out put from Ileostomy: decreasing gradually, c/w lomotil, cholestyramine,Octreotide, Imodium, he is tolerating diet, ostomy care as per ostomy RN,     IV hydration, Ileostomy output is still high, but secretions are getting thicker, encouraged for oral hydration, monitor I/Os, PT involved, GI is following, GI recommanded Surgical consult for Ileostomy reversal and pulmonary consult for optimization for surgery, appreciate surgical and pulmonary consults, will follow recommendations.       2. Acute on chronic hypoxic respiratory failure secondary to COPD exacerbation, improved, he is at his baseline, on home oxygen (4 liters),   continue bronchodilators and prednisone tapered off, continue Mucomyst PRN, completed course of Levaquin, cough better with tensilon otoniel    3. CAP - completed course of Levaquin on 12/5.     4. Hypertension - stable   - continue cardizem    5. Hyperlipidemia   - continue statin    6.hyponatremia: resolved, it was due to high ileostomy output.    Hypokalemia - on kphos  Hypophosphatemia: improved  Hypomagnesemia: improved  on multiple electrolyte sol.   monitor Electrolytes    7. Scrotal edema chronic with urinary retention, no more retention, Roy if not able to urinate  - swelling and redness going down  - patient following with  as outpatient  - cont scrotal elevation    8. Right femoral artery dissection  - incidental finding on CT, no intervention as per vascular       9. DVT Prophylaxis - Lovenox       10. anxiety: xanax at hs PRN.

## 2018-01-07 NOTE — PROGRESS NOTE ADULT - SUBJECTIVE AND OBJECTIVE BOX
FRANDY BAUTISTA    [id number]    67y      Male    Patient is a 67y old  Male who presents with a chief complaint of SOB (15 Dec 2017 10:17)      INTERVAL HPI/OVERNIGHT EVENTS:    Patient is not having any complains, out put is still high, he is feeling ok, has no complains like nausea, vomiting, dizziness, he is worried about his next step for the high out put.     REVIEW OF SYSTEMS:    CONSTITUTIONAL: No fever, has fatigue  RESPIRATORY: No cough, No shortness of breath  CARDIOVASCULAR: No chest pain, no palpitations.   GASTROINTESTINAL: No abdominal, No nausea, vomiting.   NEUROLOGICAL: No headaches,  loss of strength.  MISCELLANEOUS: No joint swelling or pain.        Vital Signs Last 24 Hrs  T(C): 36.2 (07 Jan 2018 11:52), Max: 36.8 (06 Jan 2018 16:51)  T(F): 97.2 (07 Jan 2018 11:52), Max: 98.2 (06 Jan 2018 16:51)  HR: 86 (07 Jan 2018 11:52) (86 - 109)  BP: 106/64 (07 Jan 2018 11:52) (90/69 - 109/71)  BP(mean): --  RR: 20 (07 Jan 2018 11:52) (18 - 20)  SpO2: 96% (07 Jan 2018 11:52) (96% - 96%)    PHYSICAL EXAM:    GENERAL: Thin built  Elderly male looking comfortable  HEENT: PERRL, +EOMI  NECK: soft, Supple, No JVD,   CHEST/LUNG: Decrease air entry bilaterally; No wheezing  HEART: S1S2+, Regular rate and rhythm; No murmurs  ABDOMEN: Soft, Nontender, Bowel sounds present, s/p Ileostomy connected with drain  EXTREMITIES:  1+ Peripheral Pulses, No edema  SKIN: No rashes or lesions  NEURO: AAOX3, no focal deficits, no motor r sensory loss  PSYCH: normal mood      LABS:                        11.0   6.4   )-----------( 391      ( 07 Jan 2018 05:46 )             35.4     01-07    145  |  99  |  5.0<L>  ----------------------------<  123<H>  3.4<L>   |  37.0<H>  |  0.64    Ca    8.2<L>      07 Jan 2018 05:46      PT/INR - ( 07 Jan 2018 05:46 )   PT: 14.1 sec;   INR: 1.28 ratio                 I&O's Summary    06 Jan 2018 07:01  -  07 Jan 2018 07:00  --------------------------------------------------------  IN: 2135 mL / OUT: 8600 mL / NET: -6465 mL        MEDICATIONS  (STANDING):  ALBUTerol/ipratropium for Nebulization. 3 milliLiter(s) Nebulizer once  aspirin  chewable 81 milliGRAM(s) Oral daily  atorvastatin 80 milliGRAM(s) Oral at bedtime  cholestyramine Powder (Sugar-Free) 4 Gram(s) Oral two times a day  clopidogrel Tablet 75 milliGRAM(s) Oral daily  diltiazem    milliGRAM(s) Oral <User Schedule>  enoxaparin Injectable 40 milliGRAM(s) SubCutaneous daily  FIRST- Mouthwash  BLM 10 milliLiter(s) Swish and Spit two times a day  fluticasone propionate/ salmeterol 250-50 MICROgram(s) Diskus 1 Dose(s) Inhalation two times a day  lactated ringers. 1000 milliLiter(s) (75 mL/Hr) IV Continuous <Continuous>  levothyroxine 50 MICROGram(s) Oral daily  loperamide Solution 2 milliGRAM(s) Oral four times a day  loratadine 10 milliGRAM(s) Oral daily  multivitamin 1 Tablet(s) Oral daily  nystatin Powder 1 Application(s) Topical two times a day  octreotide  Injectable 150 MICROGram(s) SubCutaneous every 8 hours  pantoprazole   Suspension 40 milliGRAM(s) Oral two times a day before meals  potassium chloride    Tablet ER 40 milliEquivalent(s) Oral every 4 hours  sodium chloride 0.65% Nasal 1 Spray(s) Both Nostrils four times a day  tamsulosin 0.4 milliGRAM(s) Oral at bedtime  tiotropium 18 MICROgram(s) Capsule 1 Capsule(s) Inhalation daily    MEDICATIONS  (PRN):  acetaminophen  Suppository. 650 milliGRAM(s) Rectal every 6 hours PRN Moderate Pain (4 - 6)  ALBUTerol    0.083% 2.5 milliGRAM(s) Nebulizer every 4 hours PRN Shortness of Breath and/or Wheezing  ALPRAZolam 0.25 milliGRAM(s) Oral at bedtime PRN anxiety  benzonatate 100 milliGRAM(s) Oral three times a day PRN Cough  ondansetron Injectable 4 milliGRAM(s) IV Push every 8 hours PRN Nausea and/or Vomiting FRANDY BAUTISTA    [id number]    67y      Male    Patient is a 67y old  Male who presents with a chief complaint of SOB (15 Dec 2017 10:17)      INTERVAL HPI/OVERNIGHT EVENTS:    Patient is not having any complains, out put is still high, he is feeling ok, has no complains like nausea, vomiting, dizziness, he is worried about his next step for the high out put, surgery has seen the patient.     REVIEW OF SYSTEMS:    CONSTITUTIONAL: No fever, has fatigue  RESPIRATORY: No cough, No shortness of breath  CARDIOVASCULAR: No chest pain, no palpitations.   GASTROINTESTINAL: No abdominal, No nausea, vomiting.   NEUROLOGICAL: No headaches,  loss of strength.  MISCELLANEOUS: No joint swelling or pain.        Vital Signs Last 24 Hrs  T(C): 36.2 (07 Jan 2018 11:52), Max: 36.8 (06 Jan 2018 16:51)  T(F): 97.2 (07 Jan 2018 11:52), Max: 98.2 (06 Jan 2018 16:51)  HR: 86 (07 Jan 2018 11:52) (86 - 109)  BP: 106/64 (07 Jan 2018 11:52) (90/69 - 109/71)  RR: 20 (07 Jan 2018 11:52) (18 - 20)  SpO2: 96% (07 Jan 2018 11:52) (96% - 96%)    PHYSICAL EXAM:    GENERAL: Thin built  Elderly male looking comfortable  HEENT: PERRL, +EOMI  NECK: soft, Supple, No JVD,   CHEST/LUNG: Decrease air entry bilaterally; No wheezing  HEART: S1S2+, Regular rate and rhythm; No murmurs  ABDOMEN: Soft, Nontender, Bowel sounds present, s/p Ileostomy connected with drain  EXTREMITIES:  1+ Peripheral Pulses, No edema  SKIN: No rashes or lesions  NEURO: AAOX3, no focal deficits, no motor r sensory loss  PSYCH: normal mood      LABS:                        11.0   6.4   )-----------( 391      ( 07 Jan 2018 05:46 )             35.4     01-07    145  |  99  |  5.0<L>  ----------------------------<  123<H>  3.4<L>   |  37.0<H>  |  0.64    Ca    8.2<L>      07 Jan 2018 05:46      PT/INR - ( 07 Jan 2018 05:46 )   PT: 14.1 sec;   INR: 1.28 ratio                 I&O's Summary    06 Jan 2018 07:01  -  07 Jan 2018 07:00  --------------------------------------------------------  IN: 2135 mL / OUT: 8600 mL / NET: -6465 mL        MEDICATIONS  (STANDING):  ALBUTerol/ipratropium for Nebulization. 3 milliLiter(s) Nebulizer once  aspirin  chewable 81 milliGRAM(s) Oral daily  atorvastatin 80 milliGRAM(s) Oral at bedtime  cholestyramine Powder (Sugar-Free) 4 Gram(s) Oral two times a day  clopidogrel Tablet 75 milliGRAM(s) Oral daily  diltiazem    milliGRAM(s) Oral <User Schedule>  enoxaparin Injectable 40 milliGRAM(s) SubCutaneous daily  FIRST- Mouthwash  BLM 10 milliLiter(s) Swish and Spit two times a day  fluticasone propionate/ salmeterol 250-50 MICROgram(s) Diskus 1 Dose(s) Inhalation two times a day  lactated ringers. 1000 milliLiter(s) (75 mL/Hr) IV Continuous <Continuous>  levothyroxine 50 MICROGram(s) Oral daily  loperamide Solution 2 milliGRAM(s) Oral four times a day  loratadine 10 milliGRAM(s) Oral daily  multivitamin 1 Tablet(s) Oral daily  nystatin Powder 1 Application(s) Topical two times a day  octreotide  Injectable 150 MICROGram(s) SubCutaneous every 8 hours  pantoprazole   Suspension 40 milliGRAM(s) Oral two times a day before meals  potassium chloride    Tablet ER 40 milliEquivalent(s) Oral every 4 hours  sodium chloride 0.65% Nasal 1 Spray(s) Both Nostrils four times a day  tamsulosin 0.4 milliGRAM(s) Oral at bedtime  tiotropium 18 MICROgram(s) Capsule 1 Capsule(s) Inhalation daily    MEDICATIONS  (PRN):  acetaminophen  Suppository. 650 milliGRAM(s) Rectal every 6 hours PRN Moderate Pain (4 - 6)  ALBUTerol    0.083% 2.5 milliGRAM(s) Nebulizer every 4 hours PRN Shortness of Breath and/or Wheezing  ALPRAZolam 0.25 milliGRAM(s) Oral at bedtime PRN anxiety  benzonatate 100 milliGRAM(s) Oral three times a day PRN Cough  ondansetron Injectable 4 milliGRAM(s) IV Push every 8 hours PRN Nausea and/or Vomiting

## 2018-01-07 NOTE — PROGRESS NOTE ADULT - PROBLEM SELECTOR PLAN 1
continues to have large outputs despite octreotide, questran, immodium ( lomotil was stopped as we felt atropine may cause problem in this elderly male)  - surgery  to reassess pt  for this post op issue

## 2018-01-07 NOTE — PROGRESS NOTE ADULT - SUBJECTIVE AND OBJECTIVE BOX
Patient is a 67y old  Male who presents with a chief complaint of SOB (15 Dec 2017 10:17)      HPI:  67 YOM w/PMH of COPD stage 4 s/p right lung reduction in 2010, currently on home oxygen 4L (sat 92-93%), HTN, CVA on 2014, DM, hypothyroidism, hydrocele,  S/P diverting ileostomy for transverse colon hernia. Still with large outputs from ileostomy. Yesteray he had 8600. Tolerating solid diet.  Currently on OCtreotide, immodium, questran with no improvement        REVIEW OF SYSTEMS:  Constitutional: No fever, weight loss or fatigue  ENMT:  No difficulty hearing, tinnitus, vertigo; No sinus or throat pain  Respiratory: No cough, wheezing, chills or hemoptysis  Cardiovascular: No chest pain, palpitations, dizziness or leg swelling  Gastrointestinal: No abdominal or epigastric pain. No nausea, vomiting or hematemesis; No diarrhea or constipation. No melena or hematochezia. Increased ileostomy outputs  Skin: No itching, burning, rashes or lesions   Musculoskeletal: No joint pain or swelling; No muscle, back or extremity pain    PAST MEDICAL & SURGICAL HISTORY:  Hypothyroidism  Hydrocele  Renal calculi  Diabetes mellitus  Hypertension  CVA (cerebral vascular accident)  Chronic obstructive pulmonary disease, unspecified COPD type  History of lung surgery      FAMILY HISTORY:  No pertinent family history in first degree relatives      SOCIAL HISTORY:  Smoking Status: [ ] Current, [ ] Former, [ ] Never  Pack Years:  [  ] EtOH-no  [  ] IVDA-no    MEDICATIONS:  MEDICATIONS  (STANDING):  ALBUTerol/ipratropium for Nebulization. 3 milliLiter(s) Nebulizer once  aspirin  chewable 81 milliGRAM(s) Oral daily  atorvastatin 80 milliGRAM(s) Oral at bedtime  cholestyramine Powder (Sugar-Free) 4 Gram(s) Oral two times a day  clopidogrel Tablet 75 milliGRAM(s) Oral daily  diltiazem    milliGRAM(s) Oral <User Schedule>  enoxaparin Injectable 40 milliGRAM(s) SubCutaneous daily  FIRST- Mouthwash  BLM 10 milliLiter(s) Swish and Spit two times a day  fluticasone propionate/ salmeterol 250-50 MICROgram(s) Diskus 1 Dose(s) Inhalation two times a day  lactated ringers. 1000 milliLiter(s) (75 mL/Hr) IV Continuous <Continuous>  levothyroxine 50 MICROGram(s) Oral daily  loperamide Solution 2 milliGRAM(s) Oral four times a day  loratadine 10 milliGRAM(s) Oral daily  multivitamin 1 Tablet(s) Oral daily  nystatin Powder 1 Application(s) Topical two times a day  octreotide  Injectable 150 MICROGram(s) SubCutaneous every 8 hours  pantoprazole   Suspension 40 milliGRAM(s) Oral two times a day before meals  potassium chloride    Tablet ER 40 milliEquivalent(s) Oral every 4 hours  sodium chloride 0.65% Nasal 1 Spray(s) Both Nostrils four times a day  tamsulosin 0.4 milliGRAM(s) Oral at bedtime  tiotropium 18 MICROgram(s) Capsule 1 Capsule(s) Inhalation daily    MEDICATIONS  (PRN):  acetaminophen  Suppository. 650 milliGRAM(s) Rectal every 6 hours PRN Moderate Pain (4 - 6)  ALBUTerol    0.083% 2.5 milliGRAM(s) Nebulizer every 4 hours PRN Shortness of Breath and/or Wheezing  ALPRAZolam 0.25 milliGRAM(s) Oral at bedtime PRN anxiety  benzonatate 100 milliGRAM(s) Oral three times a day PRN Cough  ondansetron Injectable 4 milliGRAM(s) IV Push every 8 hours PRN Nausea and/or Vomiting      Allergies    codeine (Unknown)  no veges (Unknown)  penicillin (Unknown)    Intolerances    Symbicort (Short breath)      Vital Signs Last 24 Hrs  T(C): 36.2 (07 Jan 2018 11:52), Max: 36.8 (06 Jan 2018 16:51)  T(F): 97.2 (07 Jan 2018 11:52), Max: 98.2 (06 Jan 2018 16:51)  HR: 86 (07 Jan 2018 11:52) (86 - 109)  BP: 106/64 (07 Jan 2018 11:52) (90/69 - 109/71)  BP(mean): --  RR: 20 (07 Jan 2018 11:52) (18 - 20)  SpO2: 96% (07 Jan 2018 11:52) (96% - 96%)    01-06 @ 07:01  -  01-07 @ 07:00  --------------------------------------------------------  IN: 2135 mL / OUT: 8600 mL / NET: -6465 mL          PHYSICAL EXAM:    General: Well developed; well nourished; in no acute distress  HEENT: MMM, conjunctiva and sclera clear  H- RRR  l - CTA  Gastrointestinal: Soft, non-tender non-distended; Normal bowel sounds; No rebound or guarding  Extremities: Normal range of motion, No clubbing, cyanosis or edema  Neurological: Alert and oriented x3  Skin: Warm and dry. No obvious rash      LABS:                        11.0   6.4   )-----------( 391      ( 07 Jan 2018 05:46 )             35.4     07 Jan 2018 05:46    145    |  99     |  5.0    ----------------------------<  123    3.4     |  37.0   |  0.64     Ca    8.2        07 Jan 2018 05:46                RADIOLOGY & ADDITIONAL STUDIES:

## 2018-01-07 NOTE — PROGRESS NOTE ADULT - SUBJECTIVE AND OBJECTIVE BOX
Acute Care Surgery /Trauma PROGRESS NOTE:     SUBJECTIVE: Pt seen and examined at bedside. No acute overnight events. Pain well controlled. Tolerating diet, no n/v. Voiding well. Passing flatus, normal BM. Denies f/c/sob/cp. Ambulatory OOB. last colonoscopy 5 years ago prior to lung surgery which was reported as normal (Dr. Long). Ostomy output 7.6L liquid stool brown    Vital Signs Last 24 Hrs  T(C): 36.2 (07 Jan 2018 11:52), Max: 36.8 (06 Jan 2018 16:51)  T(F): 97.2 (07 Jan 2018 11:52), Max: 98.2 (06 Jan 2018 16:51)  HR: 86 (07 Jan 2018 11:52) (86 - 109)  BP: 106/64 (07 Jan 2018 11:52) (90/69 - 109/71)  BP(mean): --  RR: 20 (07 Jan 2018 11:52) (18 - 20)  SpO2: 96% (07 Jan 2018 11:52) (96% - 96%)    OBJECTIVE:  NAD  NC/AT  RRR  No respiratory distress  Abd soft, nondistended, nontender, no guarding or rebound. No peritoneal signs.  Ostomy pink and viable, +gas, brown liquid in bag  No pedal edema    I&O's Detail    06 Jan 2018 07:01  -  07 Jan 2018 07:00  --------------------------------------------------------  IN:    lactated ringers.: 975 mL    Oral Fluid: 1160 mL  Total IN: 2135 mL    OUT:    Ileostomy: 8600 mL  Total OUT: 8600 mL    Total NET: -6465 mL          MEDICATIONS  (STANDING):  ALBUTerol/ipratropium for Nebulization. 3 milliLiter(s) Nebulizer once  aspirin  chewable 81 milliGRAM(s) Oral daily  atorvastatin 80 milliGRAM(s) Oral at bedtime  cholestyramine Powder (Sugar-Free) 4 Gram(s) Oral two times a day  clopidogrel Tablet 75 milliGRAM(s) Oral daily  diltiazem    milliGRAM(s) Oral <User Schedule>  enoxaparin Injectable 40 milliGRAM(s) SubCutaneous daily  FIRST- Mouthwash  BLM 10 milliLiter(s) Swish and Spit two times a day  fluticasone propionate/ salmeterol 250-50 MICROgram(s) Diskus 1 Dose(s) Inhalation two times a day  lactated ringers. 1000 milliLiter(s) (75 mL/Hr) IV Continuous <Continuous>  levothyroxine 50 MICROGram(s) Oral daily  loperamide Solution 2 milliGRAM(s) Oral four times a day  loratadine 10 milliGRAM(s) Oral daily  multivitamin 1 Tablet(s) Oral daily  nystatin Powder 1 Application(s) Topical two times a day  octreotide  Injectable 150 MICROGram(s) SubCutaneous every 8 hours  pantoprazole   Suspension 40 milliGRAM(s) Oral two times a day before meals  potassium chloride    Tablet ER 40 milliEquivalent(s) Oral every 4 hours  sodium chloride 0.65% Nasal 1 Spray(s) Both Nostrils four times a day  tamsulosin 0.4 milliGRAM(s) Oral at bedtime  tiotropium 18 MICROgram(s) Capsule 1 Capsule(s) Inhalation daily    MEDICATIONS  (PRN):  acetaminophen  Suppository. 650 milliGRAM(s) Rectal every 6 hours PRN Moderate Pain (4 - 6)  ALBUTerol    0.083% 2.5 milliGRAM(s) Nebulizer every 4 hours PRN Shortness of Breath and/or Wheezing  ALPRAZolam 0.25 milliGRAM(s) Oral at bedtime PRN anxiety  benzonatate 100 milliGRAM(s) Oral three times a day PRN Cough  ondansetron Injectable 4 milliGRAM(s) IV Push every 8 hours PRN Nausea and/or Vomiting      LABS:                        11.0   6.4   )-----------( 391      ( 07 Jan 2018 05:46 )             35.4     01-07    145  |  99  |  5.0<L>  ----------------------------<  123<H>  3.4<L>   |  37.0<H>  |  0.64    Ca    8.2<L>      07 Jan 2018 05:46      PT/INR - ( 07 Jan 2018 05:46 )   PT: 14.1 sec;   INR: 1.28 ratio                 RADIOLOGY & ADDITIONAL STUDIES:

## 2018-01-08 LAB — GASTRIN SERPL-MCNC: 83 PG/ML — SIGNIFICANT CHANGE UP

## 2018-01-08 PROCEDURE — 99233 SBSQ HOSP IP/OBS HIGH 50: CPT

## 2018-01-08 PROCEDURE — 99232 SBSQ HOSP IP/OBS MODERATE 35: CPT

## 2018-01-08 RX ADMIN — OCTREOTIDE ACETATE 150 MICROGRAM(S): 200 INJECTION, SOLUTION INTRAVENOUS; SUBCUTANEOUS at 22:48

## 2018-01-08 RX ADMIN — Medication 0.25 MILLIGRAM(S): at 23:29

## 2018-01-08 RX ADMIN — Medication 2 MILLIGRAM(S): at 18:17

## 2018-01-08 RX ADMIN — CLOPIDOGREL BISULFATE 75 MILLIGRAM(S): 75 TABLET, FILM COATED ORAL at 13:16

## 2018-01-08 RX ADMIN — Medication 81 MILLIGRAM(S): at 13:16

## 2018-01-08 RX ADMIN — NYSTATIN CREAM 1 APPLICATION(S): 100000 CREAM TOPICAL at 06:57

## 2018-01-08 RX ADMIN — CHOLESTYRAMINE 4 GRAM(S): 4 POWDER, FOR SUSPENSION ORAL at 09:51

## 2018-01-08 RX ADMIN — TIOTROPIUM BROMIDE 1 CAPSULE(S): 18 CAPSULE ORAL; RESPIRATORY (INHALATION) at 12:34

## 2018-01-08 RX ADMIN — PANTOPRAZOLE SODIUM 40 MILLIGRAM(S): 20 TABLET, DELAYED RELEASE ORAL at 06:26

## 2018-01-08 RX ADMIN — Medication 1 SPRAY(S): at 06:55

## 2018-01-08 RX ADMIN — TAMSULOSIN HYDROCHLORIDE 0.4 MILLIGRAM(S): 0.4 CAPSULE ORAL at 22:49

## 2018-01-08 RX ADMIN — FLUTICASONE PROPIONATE AND SALMETEROL 1 DOSE(S): 50; 250 POWDER ORAL; RESPIRATORY (INHALATION) at 09:51

## 2018-01-08 RX ADMIN — Medication 1 SPRAY(S): at 18:16

## 2018-01-08 RX ADMIN — CHOLESTYRAMINE 4 GRAM(S): 4 POWDER, FOR SUSPENSION ORAL at 22:49

## 2018-01-08 RX ADMIN — LORATADINE 10 MILLIGRAM(S): 10 TABLET ORAL at 13:16

## 2018-01-08 RX ADMIN — PANTOPRAZOLE SODIUM 40 MILLIGRAM(S): 20 TABLET, DELAYED RELEASE ORAL at 18:16

## 2018-01-08 RX ADMIN — Medication 50 MICROGRAM(S): at 06:26

## 2018-01-08 RX ADMIN — FLUTICASONE PROPIONATE AND SALMETEROL 1 DOSE(S): 50; 250 POWDER ORAL; RESPIRATORY (INHALATION) at 20:58

## 2018-01-08 RX ADMIN — Medication 2 MILLIGRAM(S): at 13:20

## 2018-01-08 RX ADMIN — DIPHENHYDRAMINE HYDROCHLORIDE AND LIDOCAINE HYDROCHLORIDE AND ALUMINUM HYDROXIDE AND MAGNESIUM HYDRO 10 MILLILITER(S): KIT at 06:26

## 2018-01-08 RX ADMIN — ATORVASTATIN CALCIUM 80 MILLIGRAM(S): 80 TABLET, FILM COATED ORAL at 23:17

## 2018-01-08 RX ADMIN — Medication 1 SPRAY(S): at 13:16

## 2018-01-08 RX ADMIN — Medication 1 SPRAY(S): at 23:16

## 2018-01-08 RX ADMIN — OCTREOTIDE ACETATE 150 MICROGRAM(S): 200 INJECTION, SOLUTION INTRAVENOUS; SUBCUTANEOUS at 15:07

## 2018-01-08 RX ADMIN — Medication 2 MILLIGRAM(S): at 06:57

## 2018-01-08 RX ADMIN — Medication 1 TABLET(S): at 13:16

## 2018-01-08 RX ADMIN — Medication 180 MILLIGRAM(S): at 18:16

## 2018-01-08 RX ADMIN — NYSTATIN CREAM 1 APPLICATION(S): 100000 CREAM TOPICAL at 18:16

## 2018-01-08 RX ADMIN — OCTREOTIDE ACETATE 150 MICROGRAM(S): 200 INJECTION, SOLUTION INTRAVENOUS; SUBCUTANEOUS at 06:56

## 2018-01-08 RX ADMIN — Medication 2 MILLIGRAM(S): at 23:24

## 2018-01-08 NOTE — PROGRESS NOTE ADULT - ASSESSMENT
67 year old male with PMH of COPD stage 4 s/p right lung reduction in 2010 (on home oxygen 4L (sat 92-93%), HTN, CVA on 2014, DM, hypothyroidism, hydrocele, and nephrolithiasis who is admitted for a COPD exacerbation. Patient has been weaned off ventimask to 4 liters nasal cannula and respiratory status is stable. CTA of the chest was negative for PE but showed bilateral lower lobe infiltrates with mucous plugging for which he was started on Levaquin.  Respiratory status stable and steroids are being tapered. Hospital course complicated by constipation, which had not improved despite aggressive bowel regimen, laxatives and enemas. XRAY with large stool burden. CT with diaphragmatic hernia and patient was consequently taken to the OR for loop ileostomy under local anesthesia. Pt cont, to have copious amount in ileostomy. Pt is noted to have hyponatremia due to high output in ileostomy, got IVF, hyponatremia and hypokalemia improving, ileostomy secretion are getting thicker, he is tolerating diet, ostomy care as per ostomy RN,  he is continued with cholestyramine, he has been continued with IV hydration, Ileostomy out is still high, but secretions are getting thicker, monitored, I/Os, being continued with IV fluid rate to 125ml/h and GI has increased Octreotide to 150mcgs tid,  has CT abdomen and Plevis done, results reviewed, GI add Imodium 2 mg po bid. During the earlier part of the hospitalization patient was treated for acute on chronic hypoxic respiratory failure secondary to COPD exacerbation and CAP, was treated with steroids now tapered and duo nebs, SOB resolved, he uses home oxygen (4 liters), bicarb elevated likely due to metabolic compensation for resp acidosis  continue bronchodilators and prednisone tapered off and completed Levaquin as well.  His electrolytes monitored and replaced accordingly, he was found to have right femoral artery dissection as incidental finding on CT, no intervention as per vascular, follow up with vascular clinic as out patient.     Plan:    1. High out put from Ileostomy: decreasing gradually, c/w cholestyramine,Octreotide, Imodium, he is tolerating diet, ostomy care as per ostomy RN, IV hydration, Ileostomy output is still high, but secretions are getting thicker, encouraged for oral hydration, monitor I/Os, PT involved, GI is following, GI recommanded Surgical consult for Ileostomy reversal and pulmonary consult for optimization for surgery, appreciate surgical and pulmonary consults, GI has changed his  diet to high fiber diet, fluid restriction, will monitor I/O, BMP and monitor him for dehydration.       2. Acute on chronic hypoxic respiratory failure secondary to COPD exacerbation, improved, he is at his baseline, on home oxygen (4 liters), continue bronchodilators and prednisone tapered off, continue Mucomyst PRN, completed course of Levaquin, cough better with tensilon otoniel    3. CAP - completed course of Levaquin on 12/5.     4. Hypertension - stable   - continue cardizem    5. Hyperlipidemia   - continue statin    6.hyponatremia: resolved, it was due to high ileostomy output.    Hypokalemia - on kphos  Hypophosphatemia: improved  Hypomagnesemia: improved  on multiple electrolyte sol.   monitor Electrolytes    7. Scrotal edema chronic with urinary retention, no more retention, Roy if not able to urinate  - swelling and redness going down  - patient following with  as outpatient  - cont scrotal elevation    8. Right femoral artery dissection  - incidental finding on CT, no intervention as per vascular       9. DVT Prophylaxis - Lovenox       10. anxiety: xanax at hs PRN.

## 2018-01-08 NOTE — PROGRESS NOTE ADULT - ASSESSMENT
66 y/o M s/p loop ileostomy for LBO with continued high ileostomy output despite loperamide, cholestyramine, and octreotide. Extremely high risk for surgical intervention given pulmonary status.     Plan:  -No surgical intervention at this time  -Consider high fiber diet  -Recommend water restriction, 1.5 L/day  -Surgery signing off  -Please reconsult for further questions or issues

## 2018-01-08 NOTE — PROGRESS NOTE ADULT - SUBJECTIVE AND OBJECTIVE BOX
PULMONARY PROGRESS NOTE      FRANDY BAUTISTA  MRN-[id number]    Patient is a 67y old  Male who presents with a chief complaint of SOB (15 Dec 2017 10:17)      INTERVAL HPI/OVERNIGHT EVENTS:    Patient is awake and alert  Ambulating  Breathing appears to be at baseline    MEDICATIONS  (STANDING):  ALBUTerol/ipratropium for Nebulization. 3 milliLiter(s) Nebulizer once  aspirin  chewable 81 milliGRAM(s) Oral daily  atorvastatin 80 milliGRAM(s) Oral at bedtime  cholestyramine Powder (Sugar-Free) 4 Gram(s) Oral two times a day  clopidogrel Tablet 75 milliGRAM(s) Oral daily  diltiazem    milliGRAM(s) Oral <User Schedule>  enoxaparin Injectable 40 milliGRAM(s) SubCutaneous daily  FIRST- Mouthwash  BLM 10 milliLiter(s) Swish and Spit two times a day  fluticasone propionate/ salmeterol 250-50 MICROgram(s) Diskus 1 Dose(s) Inhalation two times a day  levothyroxine 50 MICROGram(s) Oral daily  loperamide Solution 2 milliGRAM(s) Oral four times a day  loratadine 10 milliGRAM(s) Oral daily  multivitamin 1 Tablet(s) Oral daily  nystatin Powder 1 Application(s) Topical two times a day  octreotide  Injectable 150 MICROGram(s) SubCutaneous every 8 hours  pantoprazole   Suspension 40 milliGRAM(s) Oral two times a day before meals  sodium chloride 0.65% Nasal 1 Spray(s) Both Nostrils four times a day  tamsulosin 0.4 milliGRAM(s) Oral at bedtime  tiotropium 18 MICROgram(s) Capsule 1 Capsule(s) Inhalation daily      MEDICATIONS  (PRN):  acetaminophen  Suppository. 650 milliGRAM(s) Rectal every 6 hours PRN Moderate Pain (4 - 6)  ALBUTerol    0.083% 2.5 milliGRAM(s) Nebulizer every 4 hours PRN Shortness of Breath and/or Wheezing  ALPRAZolam 0.25 milliGRAM(s) Oral at bedtime PRN anxiety  benzonatate 100 milliGRAM(s) Oral three times a day PRN Cough  ondansetron Injectable 4 milliGRAM(s) IV Push every 8 hours PRN Nausea and/or Vomiting      Allergies    codeine (Unknown)  no veges (Unknown)  penicillin (Unknown)    Intolerances    Symbicort (Short breath)      PAST MEDICAL & SURGICAL HISTORY:  Hypothyroidism  Hydrocele  Renal calculi  Diabetes mellitus  Hypertension  CVA (cerebral vascular accident)  Chronic obstructive pulmonary disease, unspecified COPD type  History of lung surgery        REVIEW OF SYSTEMS:    CONSTITUTIONAL:  No distress    HEENT:  Eyes:  No diplopia or blurred vision. ENT:  No earache, sore throat or runny nose.    CARDIOVASCULAR:  No pressure, squeezing, tightness, heaviness or aching about the chest; no palpitations.    RESPIRATORY:  Stable cough, shortness of breath, no PND or orthopnea. Mild SOBOE    GASTROINTESTINAL:  No nausea, vomiting or diarrhea.    GENITOURINARY:  No dysuria, frequency or urgency.    NEUROLOGIC:  No paresthesias, fasciculations, seizures or weakness.    PSYCHIATRIC:  No disorder of thought or mood.    Vital Signs Last 24 Hrs  T(C): 36.7 (08 Jan 2018 09:49), Max: 36.9 (07 Jan 2018 22:00)  T(F): 98 (08 Jan 2018 09:49), Max: 98.4 (07 Jan 2018 22:00)  HR: 96 (08 Jan 2018 09:49) (96 - 110)  BP: 108/60 (08 Jan 2018 09:49) (100/58 - 108/60)  BP(mean): --  RR: 16 (08 Jan 2018 09:49) (16 - 18)  SpO2: 93% (08 Jan 2018 09:49) (93% - 93%)    PHYSICAL EXAMINATION:    GENERAL: The patient is awake and alert in no apparent distress.     HEENT: Head is normocephalic and atraumatic. Extraocular muscles are intact. Mucous membranes are moist.    NECK: Supple.    LUNGS: Decreased BS b/l otherwise CTA without wheezing, rales or rhonchi; respirations unlabored    HEART: Normal S1S2 without murmur.    ABDOMEN: Soft, nontender, and nondistended.      EXTREMITIES: Without any cyanosis, clubbing, rash, lesions or edema.    NEUROLOGIC: Grossly intact.    LABS:                        11.0   6.4   )-----------( 391      ( 07 Jan 2018 05:46 )             35.4     01-07    145  |  99  |  5.0<L>  ----------------------------<  123<H>  3.4<L>   |  37.0<H>  |  0.64    Ca    8.2<L>      07 Jan 2018 05:46      PT/INR - ( 07 Jan 2018 05:46 )   PT: 14.1 sec;   INR: 1.28 ratio           RADIOLOGY & ADDITIONAL STUDIES:     EXAM:  XR CHEST PORTABLE URGENT 1V                          PROCEDURE DATE:  01/07/2018          INTERPRETATION:  CLINICAL INFORMATION: Diaphragmatic hernia.    TECHNIQUE: Frontal view of the chest   COMPARISON: November 28, 2017.    FINDINGS:    LUNGS/PLEURA: Consolidation in the right lower lung. Extensive emphysema   in the left lung. Small bilateral pleural effusions.  MEDIASTINUM: Heart size cannot adequately be assessed on this projection.  OTHER: None.    IMPRESSION:     Solid lesion in the right lower lung.  Small bilateral pleural effusions.    SHAY SANDOVAL M.D., ATTENDING RADIOLOGIST  This document has been electronically signed. Jan 7 2018  4:28PM

## 2018-01-08 NOTE — PROGRESS NOTE ADULT - SUBJECTIVE AND OBJECTIVE BOX
FRANDY BAUTISTA    [id number]    67y      Male    Patient is a 67y old  Male who presents with a chief complaint of SOB (15 Dec 2017 10:17)      INTERVAL HPI/OVERNIGHT EVENTS:    Patient is not having any complains, out put is still high, he is feeling ok, has no complains like nausea, vomiting, dizziness, he is worried about his next step for the high out put, surgery has seen the patient and meds has been adjusted.     REVIEW OF SYSTEMS:    CONSTITUTIONAL: No fever, has fatigue  RESPIRATORY: No cough, No shortness of breath  CARDIOVASCULAR: No chest pain, no palpitations.   GASTROINTESTINAL: No abdominal, No nausea, vomiting.   NEUROLOGICAL: No headaches,  loss of strength.  MISCELLANEOUS: No joint swelling or pain.        Vital Signs Last 24 Hrs  T(C): 36.7 (08 Jan 2018 09:49), Max: 36.9 (07 Jan 2018 22:00)  T(F): 98 (08 Jan 2018 09:49), Max: 98.4 (07 Jan 2018 22:00)  HR: 96 (08 Jan 2018 09:49) (96 - 110)  BP: 108/60 (08 Jan 2018 09:49) (100/58 - 108/60)  RR: 16 (08 Jan 2018 09:49) (16 - 18)  SpO2: 93% (08 Jan 2018 09:49) (93% - 93%)    PHYSICAL EXAM:    GENERAL: Thin built  Elderly male looking comfortable  HEENT: PERRL, +EOMI  NECK: soft, Supple, No JVD,   CHEST/LUNG: Decrease air entry bilaterally; No wheezing  HEART: S1S2+, Regular rate and rhythm; No murmurs  ABDOMEN: Soft, Nontender, Bowel sounds present, s/p Ileostomy connected with drain  EXTREMITIES:  1+ Peripheral Pulses, No edema  SKIN: No rashes or lesions  NEURO: AAOX3, no focal deficits, no motor r sensory loss  PSYCH: normal mood      LABS:                        11.0   6.4   )-----------( 391      ( 07 Jan 2018 05:46 )             35.4     01-07    145  |  99  |  5.0<L>  ----------------------------<  123<H>  3.4<L>   |  37.0<H>  |  0.64    Ca    8.2<L>      07 Jan 2018 05:46      PT/INR - ( 07 Jan 2018 05:46 )   PT: 14.1 sec;   INR: 1.28 ratio                 I&O's Summary    07 Jan 2018 07:01  -  08 Jan 2018 07:00  --------------------------------------------------------  IN: 1160 mL / OUT: 6800 mL / NET: -5640 mL    08 Jan 2018 07:01  -  08 Jan 2018 15:58  --------------------------------------------------------  IN: 0 mL / OUT: 2750 mL / NET: -2750 mL        MEDICATIONS  (STANDING):  ALBUTerol/ipratropium for Nebulization. 3 milliLiter(s) Nebulizer once  aspirin  chewable 81 milliGRAM(s) Oral daily  atorvastatin 80 milliGRAM(s) Oral at bedtime  cholestyramine Powder (Sugar-Free) 4 Gram(s) Oral two times a day  clopidogrel Tablet 75 milliGRAM(s) Oral daily  diltiazem    milliGRAM(s) Oral <User Schedule>  enoxaparin Injectable 40 milliGRAM(s) SubCutaneous daily  FIRST- Mouthwash  BLM 10 milliLiter(s) Swish and Spit two times a day  fluticasone propionate/ salmeterol 250-50 MICROgram(s) Diskus 1 Dose(s) Inhalation two times a day  levothyroxine 50 MICROGram(s) Oral daily  loperamide Solution 2 milliGRAM(s) Oral four times a day  loratadine 10 milliGRAM(s) Oral daily  multivitamin 1 Tablet(s) Oral daily  nystatin Powder 1 Application(s) Topical two times a day  octreotide  Injectable 150 MICROGram(s) SubCutaneous every 8 hours  pantoprazole   Suspension 40 milliGRAM(s) Oral two times a day before meals  sodium chloride 0.65% Nasal 1 Spray(s) Both Nostrils four times a day  tamsulosin 0.4 milliGRAM(s) Oral at bedtime  tiotropium 18 MICROgram(s) Capsule 1 Capsule(s) Inhalation daily    MEDICATIONS  (PRN):  acetaminophen  Suppository. 650 milliGRAM(s) Rectal every 6 hours PRN Moderate Pain (4 - 6)  ALBUTerol    0.083% 2.5 milliGRAM(s) Nebulizer every 4 hours PRN Shortness of Breath and/or Wheezing  ALPRAZolam 0.25 milliGRAM(s) Oral at bedtime PRN anxiety  benzonatate 100 milliGRAM(s) Oral three times a day PRN Cough  ondansetron Injectable 4 milliGRAM(s) IV Push every 8 hours PRN Nausea and/or Vomiting

## 2018-01-08 NOTE — PROGRESS NOTE ADULT - ASSESSMENT
Persistent high ileostomy output.  After discussion with surgical team, will try a high fiber diet, and stop IVF temporarily.  Watch outputs.  Continue KCL supplementation.    Not a candidate for colonoscopy with transverse colon within the HH sac.  Monitor lytes.  Continue ensure supplements; 3 cans/ day.

## 2018-01-08 NOTE — PROGRESS NOTE ADULT - SUBJECTIVE AND OBJECTIVE BOX
Patient seen and examined.  Feels ok;  no complaints.  No fever.  Still on RL at 75 cc/ hr.  Ileostomy output was  6800 cc yesterday.  8100 day before.  Semiformed.  Surgical consults appreciated.  Already on maximal dioses of Octreotide and generous doses of Imodium.      PAST MEDICAL & SURGICAL HISTORY:  Hypothyroidism  Hydrocele  Renal calculi  Diabetes mellitus  Hypertension  CVA (cerebral vascular accident)  Chronic obstructive pulmonary disease, unspecified COPD type  History of lung surgery      ROS:  No Heartburn, regurgitation, dysphagia, odynophagia.  No dyspepsia  No abdominal pain.    No Nausea, vomiting.  No Bleeding.  No hematemesis.   No diarrhea.    No hematochesia.  No weight loss, anorexia.  No edema.      MEDICATIONS  (STANDING):  ALBUTerol/ipratropium for Nebulization. 3 milliLiter(s) Nebulizer once  aspirin  chewable 81 milliGRAM(s) Oral daily  atorvastatin 80 milliGRAM(s) Oral at bedtime  cholestyramine Powder (Sugar-Free) 4 Gram(s) Oral two times a day  clopidogrel Tablet 75 milliGRAM(s) Oral daily  diltiazem    milliGRAM(s) Oral <User Schedule>  enoxaparin Injectable 40 milliGRAM(s) SubCutaneous daily  FIRST- Mouthwash  BLM 10 milliLiter(s) Swish and Spit two times a day  fluticasone propionate/ salmeterol 250-50 MICROgram(s) Diskus 1 Dose(s) Inhalation two times a day  levothyroxine 50 MICROGram(s) Oral daily  loperamide Solution 2 milliGRAM(s) Oral four times a day  loratadine 10 milliGRAM(s) Oral daily  multivitamin 1 Tablet(s) Oral daily  nystatin Powder 1 Application(s) Topical two times a day  octreotide  Injectable 150 MICROGram(s) SubCutaneous every 8 hours  pantoprazole   Suspension 40 milliGRAM(s) Oral two times a day before meals  sodium chloride 0.65% Nasal 1 Spray(s) Both Nostrils four times a day  tamsulosin 0.4 milliGRAM(s) Oral at bedtime  tiotropium 18 MICROgram(s) Capsule 1 Capsule(s) Inhalation daily    MEDICATIONS  (PRN):  acetaminophen  Suppository. 650 milliGRAM(s) Rectal every 6 hours PRN Moderate Pain (4 - 6)  ALBUTerol    0.083% 2.5 milliGRAM(s) Nebulizer every 4 hours PRN Shortness of Breath and/or Wheezing  ALPRAZolam 0.25 milliGRAM(s) Oral at bedtime PRN anxiety  benzonatate 100 milliGRAM(s) Oral three times a day PRN Cough  ondansetron Injectable 4 milliGRAM(s) IV Push every 8 hours PRN Nausea and/or Vomiting      Allergies    codeine (Unknown)  no veges (Unknown)  penicillin (Unknown)    Intolerances    Symbicort (Short breath)      Vital Signs Last 24 Hrs  T(C): 36.9 (07 Jan 2018 22:00), Max: 36.9 (07 Jan 2018 22:00)  T(F): 98.4 (07 Jan 2018 22:00), Max: 98.4 (07 Jan 2018 22:00)  HR: 106 (07 Jan 2018 22:00) (86 - 110)  BP: 100/58 (07 Jan 2018 22:00) (100/58 - 106/64)  BP(mean): --  RR: 18 (07 Jan 2018 22:00) (18 - 20)  SpO2: 96% (07 Jan 2018 11:52) (96% - 96%)    PHYSICAL EXAM:    GENERAL: NAD, well-groomed, well-developed  HEAD:  Atraumatic, Normocephalic  EYES: EOMI, PERRLA, conjunctiva and sclera clear  ENMT: No tonsillar erythema, exudates, or enlargement; Moist mucous membranes, Good dentition, No lesions  NECK: Supple, No JVD, Normal thyroid  CHEST/LUNG: Clear to percussion bilaterally; No rales, rhonchi, wheezing, or rubs  HEART: Regular rate and rhythm; No murmurs, rubs, or gallops  ABDOMEN: Soft, Nontender, Nondistended; Bowel sounds present  EXTREMITIES:  2+ Peripheral Pulses, No clubbing, cyanosis, or edema  LYMPH: No lymphadenopathy noted  SKIN: No rashes or lesions      LABS:                        11.0   6.4   )-----------( 391      ( 07 Jan 2018 05:46 )             35.4     01-07    145  |  99  |  5.0<L>  ----------------------------<  123<H>  3.4<L>   |  37.0<H>  |  0.64    Ca    8.2<L>      07 Jan 2018 05:46      PT/INR - ( 07 Jan 2018 05:46 )   PT: 14.1 sec;   INR: 1.28 ratio                  RADIOLOGY & ADDITIONAL STUDIES:

## 2018-01-08 NOTE — ADVANCED PRACTICE NURSE CONSULT - RECOMMEDATIONS
Encouraged pt and wife continue to perform ileostomy care and contact ostomy nurse if there is any concerns with ileostomy care during the hospitalization. Please contact ostomy nurse if further follow up is needed.

## 2018-01-08 NOTE — ADVANCED PRACTICE NURSE CONSULT - ASSESSMENT
Pt is alert and oriented x3, wife and patient reported that he is doing well, able to tolerate P.O intake, still with high volume ostomy output. Pt was able to walk with physical therapy in the hallway. Pt stated that he is getting stronger. No leaking issues with current pouching system, wife stated that she has been perform the ileostomy pouching change with no issues. Peristomal skin without any breakdown as per wife. Ostomy secrete brochure gave to patient's wife as per wife's request.

## 2018-01-08 NOTE — PROGRESS NOTE ADULT - ASSESSMENT
ESCOPD  O2 dependent  Ileostomy with increased drainage  Extremely high risk for any open abdominal procedure  Possible risk for post op need for mechanical vent with possible permanent vent dependence with tracheostomy with any open abdominal procedure reviewed with patient, wife and family  Laparoscopic repair of hernia with revision of ostomy if possible would be preferred if possible  GA should be avoided if possible    Rec    DuoNeb and Budesonide  02  Careful consideration of options - reviewed as above  For change of diet and reassess response  GI/DVT prophylaxis  Ambulating with PT

## 2018-01-08 NOTE — PROGRESS NOTE ADULT - SUBJECTIVE AND OBJECTIVE BOX
INTERVAL HPI/OVERNIGHT EVENTS: No acute events overnight.    SUBJECTIVE: Continues to have ileostomy output ranging from 2-5L/day. Currently tolerating low fiber diet. Loperamide, octreotide, cholestyramine have been tried with minimal effect in decreasing ostomy output. No fevers, chills, dyspnea, chest pain. Ambulating. No abdominal pain.       MEDICATIONS  (STANDING):  ALBUTerol/ipratropium for Nebulization. 3 milliLiter(s) Nebulizer once  aspirin  chewable 81 milliGRAM(s) Oral daily  atorvastatin 80 milliGRAM(s) Oral at bedtime  cholestyramine Powder (Sugar-Free) 4 Gram(s) Oral two times a day  clopidogrel Tablet 75 milliGRAM(s) Oral daily  diltiazem    milliGRAM(s) Oral <User Schedule>  enoxaparin Injectable 40 milliGRAM(s) SubCutaneous daily  FIRST- Mouthwash  BLM 10 milliLiter(s) Swish and Spit two times a day  fluticasone propionate/ salmeterol 250-50 MICROgram(s) Diskus 1 Dose(s) Inhalation two times a day  levothyroxine 50 MICROGram(s) Oral daily  loperamide Solution 2 milliGRAM(s) Oral four times a day  loratadine 10 milliGRAM(s) Oral daily  multivitamin 1 Tablet(s) Oral daily  nystatin Powder 1 Application(s) Topical two times a day  octreotide  Injectable 150 MICROGram(s) SubCutaneous every 8 hours  pantoprazole   Suspension 40 milliGRAM(s) Oral two times a day before meals  sodium chloride 0.65% Nasal 1 Spray(s) Both Nostrils four times a day  tamsulosin 0.4 milliGRAM(s) Oral at bedtime  tiotropium 18 MICROgram(s) Capsule 1 Capsule(s) Inhalation daily    MEDICATIONS  (PRN):  acetaminophen  Suppository. 650 milliGRAM(s) Rectal every 6 hours PRN Moderate Pain (4 - 6)  ALBUTerol    0.083% 2.5 milliGRAM(s) Nebulizer every 4 hours PRN Shortness of Breath and/or Wheezing  ALPRAZolam 0.25 milliGRAM(s) Oral at bedtime PRN anxiety  benzonatate 100 milliGRAM(s) Oral three times a day PRN Cough  ondansetron Injectable 4 milliGRAM(s) IV Push every 8 hours PRN Nausea and/or Vomiting      Vital Signs Last 24 Hrs  T(C): 36.7 (08 Jan 2018 09:49), Max: 36.9 (07 Jan 2018 22:00)  T(F): 98 (08 Jan 2018 09:49), Max: 98.4 (07 Jan 2018 22:00)  HR: 96 (08 Jan 2018 09:49) (96 - 110)  BP: 108/60 (08 Jan 2018 09:49) (100/58 - 108/60)  BP(mean): --  RR: 16 (08 Jan 2018 09:49) (16 - 18)  SpO2: 93% (08 Jan 2018 09:49) (93% - 93%)    NAD, AOx3, resting comfortably in bed  No respiratory distress  Abdomen soft, nontender, nondistended. Normal bowel sounds. No guarding or rebound.  Ileostomy with healthy-appearing stoma with large amount of liquid stool.  No peripheral edema. Normal ROM.       I&O's Detail    07 Jan 2018 07:01  -  08 Jan 2018 07:00  --------------------------------------------------------  IN:    Oral Fluid: 1160 mL  Total IN: 1160 mL    OUT:    Ileostomy: 6800 mL  Total OUT: 6800 mL    Total NET: -5640 mL      08 Jan 2018 07:01  -  08 Jan 2018 15:11  --------------------------------------------------------  IN:  Total IN: 0 mL    OUT:    Ileostomy: 2500 mL    Voided: 250 mL  Total OUT: 2750 mL    Total NET: -2750 mL          LABS:                        11.0   6.4   )-----------( 391      ( 07 Jan 2018 05:46 )             35.4     01-07    145  |  99  |  5.0<L>  ----------------------------<  123<H>  3.4<L>   |  37.0<H>  |  0.64    Ca    8.2<L>      07 Jan 2018 05:46      PT/INR - ( 07 Jan 2018 05:46 )   PT: 14.1 sec;   INR: 1.28 ratio               RADIOLOGY & ADDITIONAL STUDIES:

## 2018-01-09 LAB
ANION GAP SERPL CALC-SCNC: 11 MMOL/L — SIGNIFICANT CHANGE UP (ref 5–17)
BUN SERPL-MCNC: 6 MG/DL — LOW (ref 8–20)
CALCIUM SERPL-MCNC: 8 MG/DL — LOW (ref 8.6–10.2)
CHLORIDE SERPL-SCNC: 94 MMOL/L — LOW (ref 98–107)
CO2 SERPL-SCNC: 36 MMOL/L — HIGH (ref 22–29)
CREAT SERPL-MCNC: 0.68 MG/DL — SIGNIFICANT CHANGE UP (ref 0.5–1.3)
GLUCOSE SERPL-MCNC: 121 MG/DL — HIGH (ref 70–115)
HCT VFR BLD CALC: 33.3 % — LOW (ref 42–52)
HGB BLD-MCNC: 10.3 G/DL — LOW (ref 14–18)
MCHC RBC-ENTMCNC: 27.3 PG — SIGNIFICANT CHANGE UP (ref 27–31)
MCHC RBC-ENTMCNC: 30.9 G/DL — LOW (ref 32–36)
MCV RBC AUTO: 88.3 FL — SIGNIFICANT CHANGE UP (ref 80–94)
PLATELET # BLD AUTO: 342 K/UL — SIGNIFICANT CHANGE UP (ref 150–400)
POTASSIUM SERPL-MCNC: 3.3 MMOL/L — LOW (ref 3.5–5.3)
POTASSIUM SERPL-SCNC: 3.3 MMOL/L — LOW (ref 3.5–5.3)
RBC # BLD: 3.77 M/UL — LOW (ref 4.6–6.2)
RBC # FLD: 16 % — HIGH (ref 11–15.6)
SODIUM SERPL-SCNC: 141 MMOL/L — SIGNIFICANT CHANGE UP (ref 135–145)
WBC # BLD: 6.1 K/UL — SIGNIFICANT CHANGE UP (ref 4.8–10.8)
WBC # FLD AUTO: 6.1 K/UL — SIGNIFICANT CHANGE UP (ref 4.8–10.8)

## 2018-01-09 PROCEDURE — 99232 SBSQ HOSP IP/OBS MODERATE 35: CPT

## 2018-01-09 RX ORDER — POTASSIUM CHLORIDE 20 MEQ
40 PACKET (EA) ORAL ONCE
Qty: 0 | Refills: 0 | Status: COMPLETED | OUTPATIENT
Start: 2018-01-09 | End: 2018-01-09

## 2018-01-09 RX ORDER — POTASSIUM CHLORIDE 20 MEQ
40 PACKET (EA) ORAL ONCE
Qty: 0 | Refills: 0 | Status: DISCONTINUED | OUTPATIENT
Start: 2018-01-09 | End: 2018-01-09

## 2018-01-09 RX ORDER — ALPRAZOLAM 0.25 MG
0.25 TABLET ORAL AT BEDTIME
Qty: 0 | Refills: 0 | Status: DISCONTINUED | OUTPATIENT
Start: 2018-01-09 | End: 2018-01-10

## 2018-01-09 RX ADMIN — Medication 1 SPRAY(S): at 18:39

## 2018-01-09 RX ADMIN — FLUTICASONE PROPIONATE AND SALMETEROL 1 DOSE(S): 50; 250 POWDER ORAL; RESPIRATORY (INHALATION) at 08:29

## 2018-01-09 RX ADMIN — Medication 2 MILLIGRAM(S): at 23:38

## 2018-01-09 RX ADMIN — OCTREOTIDE ACETATE 150 MICROGRAM(S): 200 INJECTION, SOLUTION INTRAVENOUS; SUBCUTANEOUS at 23:39

## 2018-01-09 RX ADMIN — Medication 1 TABLET(S): at 12:13

## 2018-01-09 RX ADMIN — CHOLESTYRAMINE 4 GRAM(S): 4 POWDER, FOR SUSPENSION ORAL at 08:30

## 2018-01-09 RX ADMIN — Medication 50 MICROGRAM(S): at 07:11

## 2018-01-09 RX ADMIN — TAMSULOSIN HYDROCHLORIDE 0.4 MILLIGRAM(S): 0.4 CAPSULE ORAL at 23:39

## 2018-01-09 RX ADMIN — CHOLESTYRAMINE 4 GRAM(S): 4 POWDER, FOR SUSPENSION ORAL at 23:37

## 2018-01-09 RX ADMIN — TIOTROPIUM BROMIDE 1 CAPSULE(S): 18 CAPSULE ORAL; RESPIRATORY (INHALATION) at 11:56

## 2018-01-09 RX ADMIN — ATORVASTATIN CALCIUM 80 MILLIGRAM(S): 80 TABLET, FILM COATED ORAL at 23:36

## 2018-01-09 RX ADMIN — Medication 1 SPRAY(S): at 07:11

## 2018-01-09 RX ADMIN — NYSTATIN CREAM 1 APPLICATION(S): 100000 CREAM TOPICAL at 18:39

## 2018-01-09 RX ADMIN — OCTREOTIDE ACETATE 150 MICROGRAM(S): 200 INJECTION, SOLUTION INTRAVENOUS; SUBCUTANEOUS at 07:09

## 2018-01-09 RX ADMIN — OCTREOTIDE ACETATE 150 MICROGRAM(S): 200 INJECTION, SOLUTION INTRAVENOUS; SUBCUTANEOUS at 18:38

## 2018-01-09 RX ADMIN — Medication 180 MILLIGRAM(S): at 18:39

## 2018-01-09 RX ADMIN — PANTOPRAZOLE SODIUM 40 MILLIGRAM(S): 20 TABLET, DELAYED RELEASE ORAL at 18:48

## 2018-01-09 RX ADMIN — Medication 2 MILLIGRAM(S): at 12:11

## 2018-01-09 RX ADMIN — Medication 2 MILLIGRAM(S): at 18:39

## 2018-01-09 RX ADMIN — Medication 81 MILLIGRAM(S): at 12:12

## 2018-01-09 RX ADMIN — LORATADINE 10 MILLIGRAM(S): 10 TABLET ORAL at 12:12

## 2018-01-09 RX ADMIN — FLUTICASONE PROPIONATE AND SALMETEROL 1 DOSE(S): 50; 250 POWDER ORAL; RESPIRATORY (INHALATION) at 21:28

## 2018-01-09 RX ADMIN — Medication 1 SPRAY(S): at 12:13

## 2018-01-09 RX ADMIN — Medication 1 SPRAY(S): at 23:37

## 2018-01-09 RX ADMIN — Medication 0.25 MILLIGRAM(S): at 21:28

## 2018-01-09 RX ADMIN — Medication 40 MILLIEQUIVALENT(S): at 23:38

## 2018-01-09 RX ADMIN — PANTOPRAZOLE SODIUM 40 MILLIGRAM(S): 20 TABLET, DELAYED RELEASE ORAL at 07:11

## 2018-01-09 RX ADMIN — Medication 2 MILLIGRAM(S): at 07:10

## 2018-01-09 RX ADMIN — CLOPIDOGREL BISULFATE 75 MILLIGRAM(S): 75 TABLET, FILM COATED ORAL at 12:12

## 2018-01-09 NOTE — PROGRESS NOTE ADULT - ASSESSMENT
67 year old male with PMH of COPD stage 4 s/p right lung reduction in 2010 (on home oxygen 4L (sat 92-93%), HTN, CVA on 2014, DM, hypothyroidism, hydrocele, and nephrolithiasis who is admitted for a COPD exacerbation. Patient has been weaned off ventimask to 4 liters nasal cannula and respiratory status is stable. CTA of the chest was negative for PE but showed bilateral lower lobe infiltrates with mucous plugging for which he was started on Levaquin.  Respiratory status stable and steroids are being tapered. Hospital course complicated by constipation, which had not improved despite aggressive bowel regimen, laxatives and enemas. XRAY with large stool burden. CT with diaphragmatic hernia and patient was consequently taken to the OR for loop ileostomy under local anesthesia. Pt cont, to have copious amount in ileostomy. Pt is noted to have hyponatremia due to high output in ileostomy, got IVF, hyponatremia and hypokalemia improving, ileostomy secretion are getting thicker, he is tolerating diet, ostomy care as per ostomy RN,  he is continued with cholestyramine, he has been continued with IV hydration, Ileostomy out is still high, but secretions are getting thicker, monitored, I/Os, being continued with IV fluid rate to 125ml/h and GI has increased Octreotide to 150mcgs tid,  has CT abdomen and Plevis done, results reviewed, GI add Imodium 2 mg po bid. During the earlier part of the hospitalization patient was treated for acute on chronic hypoxic respiratory failure secondary to COPD exacerbation and CAP, was treated with steroids now tapered and duo nebs, SOB resolved, he uses home oxygen (4 liters), bicarb elevated likely due to metabolic compensation for resp acidosis  continue bronchodilators and prednisone tapered off and completed Levaquin as well.  His electrolytes monitored and replaced accordingly, he was found to have right femoral artery dissection as incidental finding on CT, no intervention as per vascular, follow up with vascular clinic as out patient.     Plan:    1. High out put from Ileostomy: decreasing gradually, c/w cholestyramine,Octreotide, Imodium, he is tolerating diet, ostomy care as per ostomy RN, IV hydration, Ileostomy output is still high, but secretions are getting thicker, encouraged for oral hydration, monitor I/Os, PT involved, GI is following, GI recommanded Surgical consult for Ileostomy reversal and pulmonary consult for optimization for surgery, appreciate surgical and pulmonary consults, GI has changed his  diet to high fiber diet, fluid restriction, he is feeling little dehydrated, his electrolytes has been stable will monitor I/O, BMP and monitor him for dehydration.       2. Acute on chronic hypoxic respiratory failure secondary to COPD exacerbation, improved, he is at his baseline, on home oxygen (4 liters), continue bronchodilators and prednisone tapered off, continue Mucomyst PRN, completed course of Levaquin, cough better with tensilon otoniel    3. CAP - completed course of Levaquin on 12/5.     4. Hypertension - stable   - continue cardizem    5. Hyperlipidemia   - continue statin    6.hyponatremia: resolved, it was due to high ileostomy output.    Hypokalemia - on kphos  Hypophosphatemia: improved  Hypomagnesemia: improved  on multiple electrolyte sol.   monitor Electrolytes    7. Scrotal edema chronic with urinary retention, no more retention, Roy if not able to urinate  - swelling and redness going down  - patient following with  as outpatient  - cont scrotal elevation    8. Right femoral artery dissection  - incidental finding on CT, no intervention as per vascular       9. DVT Prophylaxis - Lovenox       10. anxiety: xanax at hs PRN.

## 2018-01-09 NOTE — PROGRESS NOTE ADULT - SUBJECTIVE AND OBJECTIVE BOX
FRANDY BAUTISTA    [id number]    67y      Male    Patient is a 67y old  Male who presents with a chief complaint of SOB (15 Dec 2017 10:17)      INTERVAL HPI/OVERNIGHT EVENTS:    Patient is not having any complains, out put is still high, he is feeling ok, has no complains like nausea, vomiting, dizziness, he is worried about his next step for the high out put, surgery has seen the patient and meds has been adjusted along with diet and fluid intake, he feels little bit dehydrated.     REVIEW OF SYSTEMS:    CONSTITUTIONAL: No fever, has fatigue  RESPIRATORY: No cough, No shortness of breath  CARDIOVASCULAR: No chest pain, no palpitations.   GASTROINTESTINAL: No abdominal, No nausea, vomiting.   NEUROLOGICAL: No headaches,  loss of strength.  MISCELLANEOUS: No joint swelling or pain.       Vital Signs Last 24 Hrs  T(C): 36.4 (09 Jan 2018 17:06), Max: 36.7 (09 Jan 2018 07:03)  T(F): 97.6 (09 Jan 2018 17:06), Max: 98.1 (09 Jan 2018 07:03)  HR: 123 (09 Jan 2018 17:06) (69 - 123)  BP: 110/81 (09 Jan 2018 17:06) (95/69 - 110/81)  RR: 20 (09 Jan 2018 17:06) (20 - 20)  SpO2: 96% (09 Jan 2018 11:52) (96% - 97%)    PHYSICAL EXAM:    GENERAL: Thin built  Elderly male looking comfortable  HEENT: PERRL, +EOMI  NECK: soft, Supple, No JVD,   CHEST/LUNG: Decrease air entry bilaterally; No wheezing  HEART: S1S2+, Regular rate and rhythm; No murmurs  ABDOMEN: Soft, Nontender, Bowel sounds present, s/p Ileostomy connected with drain  EXTREMITIES:  1+ Peripheral Pulses, No edema  SKIN: No rashes or lesions  NEURO: AAOX3, no focal deficits, no motor r sensory loss  PSYCH: normal mood      LABS:                        10.3   6.1   )-----------( 342      ( 09 Jan 2018 06:19 )             33.3     01-09    141  |  94<L>  |  6.0<L>  ----------------------------<  121<H>  3.3<L>   |  36.0<H>  |  0.68    Ca    8.0<L>      09 Jan 2018 06:19              I&O's Summary    08 Jan 2018 07:01  -  09 Jan 2018 07:00  --------------------------------------------------------  IN: 360 mL / OUT: 4800 mL / NET: -4440 mL    09 Jan 2018 07:01  -  09 Jan 2018 21:23  --------------------------------------------------------  IN: 1150 mL / OUT: 5050 mL / NET: -3900 mL        MEDICATIONS  (STANDING):  ALBUTerol/ipratropium for Nebulization. 3 milliLiter(s) Nebulizer once  aspirin  chewable 81 milliGRAM(s) Oral daily  atorvastatin 80 milliGRAM(s) Oral at bedtime  cholestyramine Powder (Sugar-Free) 4 Gram(s) Oral two times a day  clopidogrel Tablet 75 milliGRAM(s) Oral daily  diltiazem    milliGRAM(s) Oral <User Schedule>  enoxaparin Injectable 40 milliGRAM(s) SubCutaneous daily  FIRST- Mouthwash  BLM 10 milliLiter(s) Swish and Spit two times a day  fluticasone propionate/ salmeterol 250-50 MICROgram(s) Diskus 1 Dose(s) Inhalation two times a day  levothyroxine 50 MICROGram(s) Oral daily  loperamide Solution 2 milliGRAM(s) Oral four times a day  loratadine 10 milliGRAM(s) Oral daily  multivitamin 1 Tablet(s) Oral daily  nystatin Powder 1 Application(s) Topical two times a day  octreotide  Injectable 150 MICROGram(s) SubCutaneous every 8 hours  pantoprazole   Suspension 40 milliGRAM(s) Oral two times a day before meals  potassium chloride   Powder 40 milliEquivalent(s) Oral once  sodium chloride 0.65% Nasal 1 Spray(s) Both Nostrils four times a day  tamsulosin 0.4 milliGRAM(s) Oral at bedtime  tiotropium 18 MICROgram(s) Capsule 1 Capsule(s) Inhalation daily    MEDICATIONS  (PRN):  acetaminophen  Suppository. 650 milliGRAM(s) Rectal every 6 hours PRN Moderate Pain (4 - 6)  ALBUTerol    0.083% 2.5 milliGRAM(s) Nebulizer every 4 hours PRN Shortness of Breath and/or Wheezing  ALPRAZolam 0.25 milliGRAM(s) Oral at bedtime PRN anxiety  benzonatate 100 milliGRAM(s) Oral three times a day PRN Cough  ondansetron Injectable 4 milliGRAM(s) IV Push every 8 hours PRN Nausea and/or Vomiting

## 2018-01-09 NOTE — PROGRESS NOTE ADULT - ASSESSMENT
ESCOPD  O2 dependent  Ileostomy with increased drainage though patient reports decreased drainage since yesterday  Extremely high risk for any open abdominal procedure  Possible risk for post op need for mechanical vent with possible permanent vent dependence with tracheostomy with any open abdominal procedure reviewed with patient, wife and family  Laparoscopic repair of hernia with revision of ostomy if possible would be preferred if possible  GA should be avoided if possible    Rec    DuoNeb and Budesonide  02  Again reviewed risk with patient and wife  For change of diet and reassess response  GI/DVT prophylaxis  Ambulating with PT

## 2018-01-09 NOTE — PROGRESS NOTE ADULT - SUBJECTIVE AND OBJECTIVE BOX
INTERVAL HPI/OVERNIGHT EVENTS:FU for increased ileostomy output. Patient was placed on high fiber diet. No other complaints. Ileostomy output is still high.     MEDICATIONS  (STANDING):  ALBUTerol/ipratropium for Nebulization. 3 milliLiter(s) Nebulizer once  aspirin  chewable 81 milliGRAM(s) Oral daily  atorvastatin 80 milliGRAM(s) Oral at bedtime  cholestyramine Powder (Sugar-Free) 4 Gram(s) Oral two times a day  clopidogrel Tablet 75 milliGRAM(s) Oral daily  diltiazem    milliGRAM(s) Oral <User Schedule>  enoxaparin Injectable 40 milliGRAM(s) SubCutaneous daily  FIRST- Mouthwash  BLM 10 milliLiter(s) Swish and Spit two times a day  fluticasone propionate/ salmeterol 250-50 MICROgram(s) Diskus 1 Dose(s) Inhalation two times a day  levothyroxine 50 MICROGram(s) Oral daily  loperamide Solution 2 milliGRAM(s) Oral four times a day  loratadine 10 milliGRAM(s) Oral daily  multivitamin 1 Tablet(s) Oral daily  nystatin Powder 1 Application(s) Topical two times a day  octreotide  Injectable 150 MICROGram(s) SubCutaneous every 8 hours  pantoprazole   Suspension 40 milliGRAM(s) Oral two times a day before meals  potassium chloride   Powder 40 milliEquivalent(s) Oral once  sodium chloride 0.65% Nasal 1 Spray(s) Both Nostrils four times a day  tamsulosin 0.4 milliGRAM(s) Oral at bedtime  tiotropium 18 MICROgram(s) Capsule 1 Capsule(s) Inhalation daily    MEDICATIONS  (PRN):  acetaminophen  Suppository. 650 milliGRAM(s) Rectal every 6 hours PRN Moderate Pain (4 - 6)  ALBUTerol    0.083% 2.5 milliGRAM(s) Nebulizer every 4 hours PRN Shortness of Breath and/or Wheezing  benzonatate 100 milliGRAM(s) Oral three times a day PRN Cough  ondansetron Injectable 4 milliGRAM(s) IV Push every 8 hours PRN Nausea and/or Vomiting      Allergies    codeine (Unknown)  no veges (Unknown)  penicillin (Unknown)    Intolerances    Symbicort (Short breath)      Vital Signs Last 24 Hrs  T(C): 36.6 (09 Jan 2018 11:52), Max: 36.9 (08 Jan 2018 21:00)  T(F): 97.9 (09 Jan 2018 11:52), Max: 98.4 (08 Jan 2018 21:00)  HR: 105 (09 Jan 2018 11:52) (69 - 113)  BP: 95/69 (09 Jan 2018 11:52) (94/63 - 98/65)  BP(mean): --  RR: 20 (09 Jan 2018 11:52) (18 - 20)  SpO2: 96% (09 Jan 2018 11:52) (96% - 97%)    LABS:                        10.3   6.1   )-----------( 342      ( 09 Jan 2018 06:19 )             33.3     01-09    141  |  94<L>  |  6.0<L>  ----------------------------<  121<H>  3.3<L>   |  36.0<H>  |  0.68    Ca    8.0<L>      09 Jan 2018 06:19            RADIOLOGY & ADDITIONAL TESTS:

## 2018-01-09 NOTE — PROGRESS NOTE ADULT - ASSESSMENT
Patient with increased ileostomy output.   Continue with high fiber diet  Monitor CBC  Monitor output

## 2018-01-09 NOTE — PROGRESS NOTE ADULT - SUBJECTIVE AND OBJECTIVE BOX
PULMONARY PROGRESS NOTE      FRANDY BAUTISTA  MRN-[id number]    Patient is a 67y old  Male who presents with a chief complaint of SOB (15 Dec 2017 10:17)      INTERVAL HPI/OVERNIGHT EVENTS:    Patient is awake and alert  Mild congested cough and SOBOE but otherwise near baseline    MEDICATIONS  (STANDING):  ALBUTerol/ipratropium for Nebulization. 3 milliLiter(s) Nebulizer once  aspirin  chewable 81 milliGRAM(s) Oral daily  atorvastatin 80 milliGRAM(s) Oral at bedtime  cholestyramine Powder (Sugar-Free) 4 Gram(s) Oral two times a day  clopidogrel Tablet 75 milliGRAM(s) Oral daily  diltiazem    milliGRAM(s) Oral <User Schedule>  enoxaparin Injectable 40 milliGRAM(s) SubCutaneous daily  FIRST- Mouthwash  BLM 10 milliLiter(s) Swish and Spit two times a day  fluticasone propionate/ salmeterol 250-50 MICROgram(s) Diskus 1 Dose(s) Inhalation two times a day  levothyroxine 50 MICROGram(s) Oral daily  loperamide Solution 2 milliGRAM(s) Oral four times a day  loratadine 10 milliGRAM(s) Oral daily  multivitamin 1 Tablet(s) Oral daily  nystatin Powder 1 Application(s) Topical two times a day  octreotide  Injectable 150 MICROGram(s) SubCutaneous every 8 hours  pantoprazole   Suspension 40 milliGRAM(s) Oral two times a day before meals  potassium chloride   Powder 40 milliEquivalent(s) Oral once  sodium chloride 0.65% Nasal 1 Spray(s) Both Nostrils four times a day  tamsulosin 0.4 milliGRAM(s) Oral at bedtime  tiotropium 18 MICROgram(s) Capsule 1 Capsule(s) Inhalation daily      MEDICATIONS  (PRN):  acetaminophen  Suppository. 650 milliGRAM(s) Rectal every 6 hours PRN Moderate Pain (4 - 6)  ALBUTerol    0.083% 2.5 milliGRAM(s) Nebulizer every 4 hours PRN Shortness of Breath and/or Wheezing  benzonatate 100 milliGRAM(s) Oral three times a day PRN Cough  ondansetron Injectable 4 milliGRAM(s) IV Push every 8 hours PRN Nausea and/or Vomiting      Allergies    codeine (Unknown)  no veges (Unknown)  penicillin (Unknown)    Intolerances    Symbicort (Short breath)      PAST MEDICAL & SURGICAL HISTORY:  Hypothyroidism  Hydrocele  Renal calculi  Diabetes mellitus  Hypertension  CVA (cerebral vascular accident)  Chronic obstructive pulmonary disease, unspecified COPD type  History of lung surgery        REVIEW OF SYSTEMS:    CONSTITUTIONAL:  No distress    HEENT:  Eyes:  No diplopia or blurred vision. ENT:  No earache, sore throat or runny nose.    CARDIOVASCULAR:  No pressure, squeezing, tightness, heaviness or aching about the chest; no palpitations.    RESPIRATORY:  Mild cough, no shortness of breath, PND or orthopnea. Mild SOBOE    GASTROINTESTINAL:  No nausea, vomiting or diarrhea.    GENITOURINARY:  No dysuria, frequency or urgency.    NEUROLOGIC:  No paresthesias, fasciculations, seizures or weakness.    PSYCHIATRIC:  No disorder of thought or mood.    Vital Signs Last 24 Hrs  T(C): 36.6 (09 Jan 2018 11:52), Max: 36.9 (08 Jan 2018 21:00)  T(F): 97.9 (09 Jan 2018 11:52), Max: 98.4 (08 Jan 2018 21:00)  HR: 105 (09 Jan 2018 11:52) (69 - 113)  BP: 95/69 (09 Jan 2018 11:52) (94/63 - 98/65)  BP(mean): --  RR: 20 (09 Jan 2018 11:52) (18 - 20)  SpO2: 96% (09 Jan 2018 11:52) (96% - 97%)    PHYSICAL EXAMINATION:    GENERAL: The patient is awake and alert in no apparent distress.     HEENT: Head is normocephalic and atraumatic. Extraocular muscles are intact. Mucous membranes are moist.    NECK: Supple.    LUNGS: Decreased BS b/l otherwise clear to auscultation without wheezing, rales or rhonchi; respirations unlabored    HEART: Regular rate and rhythm without murmur.    ABDOMEN: Soft, nontender, and nondistended.      EXTREMITIES: Without any cyanosis, clubbing, rash, lesions or edema.    NEUROLOGIC: Grossly intact.    LABS:                        10.3   6.1   )-----------( 342      ( 09 Jan 2018 06:19 )             33.3     01-09    141  |  94<L>  |  6.0<L>  ----------------------------<  121<H>  3.3<L>   |  36.0<H>  |  0.68    Ca    8.0<L>      09 Jan 2018 06:19      RADIOLOGY & ADDITIONAL STUDIES:     EXAM:  XR CHEST PORTABLE URGENT 1V                          PROCEDURE DATE:  01/07/2018          INTERPRETATION:  CLINICAL INFORMATION: Diaphragmatic hernia.    TECHNIQUE: Frontal view of the chest   COMPARISON: November 28, 2017.    FINDINGS:    LUNGS/PLEURA: Consolidation in the right lower lung. Extensive emphysema   in the left lung. Small bilateral pleural effusions.  MEDIASTINUM: Heart size cannot adequately be assessed on this projection.  OTHER: None.    IMPRESSION:     Solid lesion in the right lower lung.  Small bilateral pleural effusions.      SHAY SANDOVAL M.D., ATTENDING RADIOLOGIST  This document has been electronically signed. Jan 7 2018  4:28PM

## 2018-01-10 LAB
ANION GAP SERPL CALC-SCNC: 7 MMOL/L — SIGNIFICANT CHANGE UP (ref 5–17)
BUN SERPL-MCNC: 7 MG/DL — LOW (ref 8–20)
CALCIUM SERPL-MCNC: 8.1 MG/DL — LOW (ref 8.6–10.2)
CHLORIDE SERPL-SCNC: 95 MMOL/L — LOW (ref 98–107)
CO2 SERPL-SCNC: 36 MMOL/L — HIGH (ref 22–29)
CREAT SERPL-MCNC: 0.69 MG/DL — SIGNIFICANT CHANGE UP (ref 0.5–1.3)
GLUCOSE SERPL-MCNC: 123 MG/DL — HIGH (ref 70–115)
MAGNESIUM SERPL-MCNC: 1.7 MG/DL — SIGNIFICANT CHANGE UP (ref 1.6–2.6)
PHOSPHATE SERPL-MCNC: 2.5 MG/DL — SIGNIFICANT CHANGE UP (ref 2.4–4.7)
POTASSIUM SERPL-MCNC: 3.6 MMOL/L — SIGNIFICANT CHANGE UP (ref 3.5–5.3)
POTASSIUM SERPL-SCNC: 3.6 MMOL/L — SIGNIFICANT CHANGE UP (ref 3.5–5.3)
SODIUM SERPL-SCNC: 138 MMOL/L — SIGNIFICANT CHANGE UP (ref 135–145)

## 2018-01-10 PROCEDURE — 99232 SBSQ HOSP IP/OBS MODERATE 35: CPT

## 2018-01-10 RX ORDER — ALPRAZOLAM 0.25 MG
0.25 TABLET ORAL EVERY 8 HOURS
Qty: 0 | Refills: 0 | Status: DISCONTINUED | OUTPATIENT
Start: 2018-01-10 | End: 2018-01-16

## 2018-01-10 RX ORDER — SODIUM CHLORIDE 9 MG/ML
1000 INJECTION, SOLUTION INTRAVENOUS
Qty: 0 | Refills: 0 | Status: DISCONTINUED | OUTPATIENT
Start: 2018-01-10 | End: 2018-01-11

## 2018-01-10 RX ADMIN — Medication 2 MILLIGRAM(S): at 17:48

## 2018-01-10 RX ADMIN — Medication 0.25 MILLIGRAM(S): at 22:19

## 2018-01-10 RX ADMIN — CHOLESTYRAMINE 4 GRAM(S): 4 POWDER, FOR SUSPENSION ORAL at 10:39

## 2018-01-10 RX ADMIN — OCTREOTIDE ACETATE 150 MICROGRAM(S): 200 INJECTION, SOLUTION INTRAVENOUS; SUBCUTANEOUS at 06:38

## 2018-01-10 RX ADMIN — FLUTICASONE PROPIONATE AND SALMETEROL 1 DOSE(S): 50; 250 POWDER ORAL; RESPIRATORY (INHALATION) at 20:55

## 2018-01-10 RX ADMIN — TAMSULOSIN HYDROCHLORIDE 0.4 MILLIGRAM(S): 0.4 CAPSULE ORAL at 20:54

## 2018-01-10 RX ADMIN — LORATADINE 10 MILLIGRAM(S): 10 TABLET ORAL at 13:37

## 2018-01-10 RX ADMIN — NYSTATIN CREAM 1 APPLICATION(S): 100000 CREAM TOPICAL at 10:39

## 2018-01-10 RX ADMIN — Medication 2 MILLIGRAM(S): at 23:38

## 2018-01-10 RX ADMIN — NYSTATIN CREAM 1 APPLICATION(S): 100000 CREAM TOPICAL at 17:49

## 2018-01-10 RX ADMIN — Medication 1 SPRAY(S): at 06:37

## 2018-01-10 RX ADMIN — CHOLESTYRAMINE 4 GRAM(S): 4 POWDER, FOR SUSPENSION ORAL at 20:55

## 2018-01-10 RX ADMIN — Medication 1 SPRAY(S): at 23:39

## 2018-01-10 RX ADMIN — FLUTICASONE PROPIONATE AND SALMETEROL 1 DOSE(S): 50; 250 POWDER ORAL; RESPIRATORY (INHALATION) at 10:39

## 2018-01-10 RX ADMIN — Medication 50 MICROGRAM(S): at 06:38

## 2018-01-10 RX ADMIN — Medication 1 SPRAY(S): at 13:37

## 2018-01-10 RX ADMIN — ATORVASTATIN CALCIUM 80 MILLIGRAM(S): 80 TABLET, FILM COATED ORAL at 20:54

## 2018-01-10 RX ADMIN — Medication 180 MILLIGRAM(S): at 17:49

## 2018-01-10 RX ADMIN — Medication 1 TABLET(S): at 13:37

## 2018-01-10 RX ADMIN — PANTOPRAZOLE SODIUM 40 MILLIGRAM(S): 20 TABLET, DELAYED RELEASE ORAL at 06:39

## 2018-01-10 RX ADMIN — PANTOPRAZOLE SODIUM 40 MILLIGRAM(S): 20 TABLET, DELAYED RELEASE ORAL at 17:52

## 2018-01-10 RX ADMIN — CLOPIDOGREL BISULFATE 75 MILLIGRAM(S): 75 TABLET, FILM COATED ORAL at 13:37

## 2018-01-10 RX ADMIN — OCTREOTIDE ACETATE 150 MICROGRAM(S): 200 INJECTION, SOLUTION INTRAVENOUS; SUBCUTANEOUS at 16:13

## 2018-01-10 RX ADMIN — TIOTROPIUM BROMIDE 1 CAPSULE(S): 18 CAPSULE ORAL; RESPIRATORY (INHALATION) at 11:33

## 2018-01-10 RX ADMIN — Medication 2 MILLIGRAM(S): at 13:37

## 2018-01-10 RX ADMIN — SODIUM CHLORIDE 80 MILLILITER(S): 9 INJECTION, SOLUTION INTRAVENOUS at 13:45

## 2018-01-10 RX ADMIN — Medication 1 SPRAY(S): at 17:49

## 2018-01-10 RX ADMIN — Medication 81 MILLIGRAM(S): at 13:37

## 2018-01-10 RX ADMIN — Medication 2 MILLIGRAM(S): at 06:39

## 2018-01-10 RX ADMIN — OCTREOTIDE ACETATE 150 MICROGRAM(S): 200 INJECTION, SOLUTION INTRAVENOUS; SUBCUTANEOUS at 22:17

## 2018-01-10 NOTE — PROGRESS NOTE ADULT - SUBJECTIVE AND OBJECTIVE BOX
FRANDY BAUTISTA    [id number]    67y      Male    Patient is a 67y old  Male who presents with a chief complaint of SOB (15 Dec 2017 10:17)      INTERVAL HPI/OVERNIGHT EVENTS:    Patient is not having any complains, out put is little better, he is feeling ok, looks some what dehydrated, has no complains like nausea, vomiting, dizziness, patient's meds has been adjusted along with diet and fluid intake.      REVIEW OF SYSTEMS:    CONSTITUTIONAL: No fever, has fatigue  RESPIRATORY: No cough, No shortness of breath  CARDIOVASCULAR: No chest pain, no palpitations.   GASTROINTESTINAL: No abdominal, No nausea, vomiting.   NEUROLOGICAL: No headaches,  loss of strength.  MISCELLANEOUS: No joint swelling or pain.          Vital Signs Last 24 Hrs  T(C): 36.6 (10 Francisco Javier 2018 11:02), Max: 36.8 (10 Francisco Javier 2018 05:27)  T(F): 97.8 (10 Francisco Javier 2018 11:02), Max: 98.2 (10 Francisco Javier 2018 05:27)  HR: 116 (10 Francisco Javier 2018 11:02) (74 - 123)  BP: 96/63 (10 Francisco Javier 2018 11:02) (94/57 - 110/81)  RR: 18 (10 Francisco Javier 2018 11:02) (18 - 20)  SpO2: 98% (10 Francisco Javier 2018 11:02) (98% - 99%)    PHYSICAL EXAM:    GENERAL: Thin built  Elderly male looking comfortable  HEENT: PERRL, +EOMI  NECK: soft, Supple, No JVD,   CHEST/LUNG: Decrease air entry bilaterally; No wheezing  HEART: S1S2+, Regular rate and rhythm; No murmurs  ABDOMEN: Soft, Nontender, Bowel sounds present, s/p Ileostomy connected with drain  EXTREMITIES:  1+ Peripheral Pulses, No edema  SKIN: No rashes or lesions  NEURO: AAOX3, no focal deficits, no motor r sensory loss  PSYCH: normal mood      LABS:                        10.3   6.1   )-----------( 342      ( 09 Jan 2018 06:19 )             33.3     01-10    138  |  95<L>  |  7.0<L>  ----------------------------<  123<H>  3.6   |  36.0<H>  |  0.69    Ca    8.1<L>      10 Francisco Javier 2018 06:10  Phos  2.5     01-10  Mg     1.7     01-10              I&O's Summary    09 Jan 2018 07:01  -  10 Francisco Javier 2018 07:00  --------------------------------------------------------  IN: 1510 mL / OUT: 5500 mL / NET: -3990 mL    10 Francisco Javier 2018 07:01  -  10 Francisco Javier 2018 14:17  --------------------------------------------------------  IN: 960 mL / OUT: 2350 mL / NET: -1390 mL        MEDICATIONS  (STANDING):  ALBUTerol/ipratropium for Nebulization. 3 milliLiter(s) Nebulizer once  aspirin  chewable 81 milliGRAM(s) Oral daily  atorvastatin 80 milliGRAM(s) Oral at bedtime  cholestyramine Powder (Sugar-Free) 4 Gram(s) Oral two times a day  clopidogrel Tablet 75 milliGRAM(s) Oral daily  diltiazem    milliGRAM(s) Oral <User Schedule>  enoxaparin Injectable 40 milliGRAM(s) SubCutaneous daily  FIRST- Mouthwash  BLM 10 milliLiter(s) Swish and Spit two times a day  fluticasone propionate/ salmeterol 250-50 MICROgram(s) Diskus 1 Dose(s) Inhalation two times a day  lactated ringers. 1000 milliLiter(s) (80 mL/Hr) IV Continuous <Continuous>  levothyroxine 50 MICROGram(s) Oral daily  loperamide Solution 2 milliGRAM(s) Oral four times a day  loratadine 10 milliGRAM(s) Oral daily  multivitamin 1 Tablet(s) Oral daily  nystatin Powder 1 Application(s) Topical two times a day  octreotide  Injectable 150 MICROGram(s) SubCutaneous every 8 hours  pantoprazole   Suspension 40 milliGRAM(s) Oral two times a day before meals  sodium chloride 0.65% Nasal 1 Spray(s) Both Nostrils four times a day  tamsulosin 0.4 milliGRAM(s) Oral at bedtime  tiotropium 18 MICROgram(s) Capsule 1 Capsule(s) Inhalation daily    MEDICATIONS  (PRN):  acetaminophen  Suppository. 650 milliGRAM(s) Rectal every 6 hours PRN Moderate Pain (4 - 6)  ALBUTerol    0.083% 2.5 milliGRAM(s) Nebulizer every 4 hours PRN Shortness of Breath and/or Wheezing  ALPRAZolam 0.25 milliGRAM(s) Oral at bedtime PRN anxiety  benzonatate 100 milliGRAM(s) Oral three times a day PRN Cough  ondansetron Injectable 4 milliGRAM(s) IV Push every 8 hours PRN Nausea and/or Vomiting

## 2018-01-10 NOTE — CHART NOTE - NSCHARTNOTEFT_GEN_A_CORE
Source: Patient [x ]  Family [ ]   other [ ]    Current Diet: Dash, high fiber with ensure clears TID    Patient reports [ ] nausea  [ ] vomiting [ ] diarrhea [ ] constipation  [ ]chewing problems [ ] swallowing issues  [ ] other: ileostomy    PO intake:  < 50% [ ]   50-75%  [ ]   %  [x ]  other :    Source for PO intake [x ] Patient [ ] family [ ] chart [ ] staff [ ] other    Enteral /Parenteral Nutrition:     Current Weight:     % Weight Change     Pertinent Medications: MEDICATIONS  (STANDING):  ALBUTerol/ipratropium for Nebulization. 3 milliLiter(s) Nebulizer once  aspirin  chewable 81 milliGRAM(s) Oral daily  atorvastatin 80 milliGRAM(s) Oral at bedtime  cholestyramine Powder (Sugar-Free) 4 Gram(s) Oral two times a day  clopidogrel Tablet 75 milliGRAM(s) Oral daily  diltiazem    milliGRAM(s) Oral <User Schedule>  enoxaparin Injectable 40 milliGRAM(s) SubCutaneous daily  FIRST- Mouthwash  BLM 10 milliLiter(s) Swish and Spit two times a day  fluticasone propionate/ salmeterol 250-50 MICROgram(s) Diskus 1 Dose(s) Inhalation two times a day  levothyroxine 50 MICROGram(s) Oral daily  loperamide Solution 2 milliGRAM(s) Oral four times a day  loratadine 10 milliGRAM(s) Oral daily  multivitamin 1 Tablet(s) Oral daily  nystatin Powder 1 Application(s) Topical two times a day  octreotide  Injectable 150 MICROGram(s) SubCutaneous every 8 hours  pantoprazole   Suspension 40 milliGRAM(s) Oral two times a day before meals  sodium chloride 0.65% Nasal 1 Spray(s) Both Nostrils four times a day  tamsulosin 0.4 milliGRAM(s) Oral at bedtime  tiotropium 18 MICROgram(s) Capsule 1 Capsule(s) Inhalation daily    MEDICATIONS  (PRN):  acetaminophen  Suppository. 650 milliGRAM(s) Rectal every 6 hours PRN Moderate Pain (4 - 6)  ALBUTerol    0.083% 2.5 milliGRAM(s) Nebulizer every 4 hours PRN Shortness of Breath and/or Wheezing  ALPRAZolam 0.25 milliGRAM(s) Oral at bedtime PRN anxiety  benzonatate 100 milliGRAM(s) Oral three times a day PRN Cough  ondansetron Injectable 4 milliGRAM(s) IV Push every 8 hours PRN Nausea and/or Vomiting    Pertinent Labs: CBC Full  -  ( 09 Jan 2018 06:19 )  WBC Count : 6.1 K/uL  Hemoglobin : 10.3 g/dL  Hematocrit : 33.3 %  Platelet Count - Automated : 342 K/uL  Mean Cell Volume : 88.3 fl  Mean Cell Hemoglobin : 27.3 pg  Mean Cell Hemoglobin Concentration : 30.9 g/dL  Auto Neutrophil # : x  Auto Lymphocyte # : x  Auto Monocyte # : x  Auto Eosinophil # : x  Auto Basophil # : x  Auto Neutrophil % : x  Auto Lymphocyte % : x  Auto Monocyte % : x  Auto Eosinophil % : x  Auto Basophil % : x    01-10 Na138 mmol/L Glu 123 mg/dL<H> K+ 3.6 mmol/L Cr  0.69 mg/dL BUN 7.0 mg/dL<L> Phos 2.5 mg/dL Alb n/a   PAB n/a             Skin:     Nutrition focused physical exam conducted - found signs of malnutrition [ ]absent [x ]present    Subcutaneous fat loss: [ ] Orbital fat pads region, [ ]Buccal fat region, [ ]Triceps region,  [ ]Ribs region    Muscle wasting: [x ]Temples region, [ ]Clavicle region, [ ]Shoulder region, [ ]Scapula region, [ ]Interosseous region,  [ ]thigh region, [ ]Calf region    Estimated Needs:   [x ] no change since previous assessment  [ ] recalculated:     Current Nutrition Diagnosis:   Pt remains at nutrition risk secondary to increased nutrient needs related to increased physiologic demand with healing as evidenced by pt s/p diverting ileostomy and mild muscle wasting noted at temporal region. Pt continues to eat well ~75%-100%. Pt was having high ileostomy output. Ensure Enlive changed to ensure clears and pt reports better tolerance, less output. IV fluids also d/c'd as well.       Recommendations: Continue high fiber diet with ensure clears supplement TID. Opium tincture recommended by trauma to slow output and ordered by MD. Availability questionable. Will f/u    Monitoring and Evaluation:   [x ] PO intake x[ ] Tolerance to diet prescription [X] Weights  [X] Follow up per protocol [X] Labs:

## 2018-01-10 NOTE — PROGRESS NOTE ADULT - ASSESSMENT
Assess    ESCOPD  O2 dependent  Ileostomy with increased drainage though patient reports decreased drainage of late  Extremely high risk for any open abdominal procedure  Possible risk for post op need for mechanical vent with possible permanent vent dependence with tracheostomy with any open abdominal procedure reviewed with patient, wife and family  Laparoscopic repair of hernia with revision of ostomy if possible would be preferred if possible  GA should be avoided if possible    Rec    DuoNeb and Budesonide  02  Again reviewed risk with patient and wife  For change of diet and reassess response as able  GI/DVT prophylaxis  Ambulating with PT  OP FU

## 2018-01-10 NOTE — PROGRESS NOTE ADULT - SUBJECTIVE AND OBJECTIVE BOX
INTERVAL HPI/OVERNIGHT EVENTS:Patient is feeling better. On high fiber diet. Still has substantial output from ileostomy.     MEDICATIONS  (STANDING):  ALBUTerol/ipratropium for Nebulization. 3 milliLiter(s) Nebulizer once  aspirin  chewable 81 milliGRAM(s) Oral daily  atorvastatin 80 milliGRAM(s) Oral at bedtime  cholestyramine Powder (Sugar-Free) 4 Gram(s) Oral two times a day  clopidogrel Tablet 75 milliGRAM(s) Oral daily  diltiazem    milliGRAM(s) Oral <User Schedule>  enoxaparin Injectable 40 milliGRAM(s) SubCutaneous daily  FIRST- Mouthwash  BLM 10 milliLiter(s) Swish and Spit two times a day  fluticasone propionate/ salmeterol 250-50 MICROgram(s) Diskus 1 Dose(s) Inhalation two times a day  levothyroxine 50 MICROGram(s) Oral daily  loperamide Solution 2 milliGRAM(s) Oral four times a day  loratadine 10 milliGRAM(s) Oral daily  multivitamin 1 Tablet(s) Oral daily  nystatin Powder 1 Application(s) Topical two times a day  octreotide  Injectable 150 MICROGram(s) SubCutaneous every 8 hours  pantoprazole   Suspension 40 milliGRAM(s) Oral two times a day before meals  sodium chloride 0.65% Nasal 1 Spray(s) Both Nostrils four times a day  tamsulosin 0.4 milliGRAM(s) Oral at bedtime  tiotropium 18 MICROgram(s) Capsule 1 Capsule(s) Inhalation daily    MEDICATIONS  (PRN):  acetaminophen  Suppository. 650 milliGRAM(s) Rectal every 6 hours PRN Moderate Pain (4 - 6)  ALBUTerol    0.083% 2.5 milliGRAM(s) Nebulizer every 4 hours PRN Shortness of Breath and/or Wheezing  ALPRAZolam 0.25 milliGRAM(s) Oral at bedtime PRN anxiety  benzonatate 100 milliGRAM(s) Oral three times a day PRN Cough  ondansetron Injectable 4 milliGRAM(s) IV Push every 8 hours PRN Nausea and/or Vomiting      Allergies    codeine (Unknown)  no veges (Unknown)  penicillin (Unknown)    Intolerances    Symbicort (Short breath)      Vital Signs Last 24 Hrs  T(C): 36.8 (10 Francisco Javier 2018 05:27), Max: 36.8 (10 Francisco Javier 2018 05:27)  T(F): 98.2 (10 Francisco Javier 2018 05:27), Max: 98.2 (10 Francisco Javier 2018 05:27)  HR: 74 (10 Francisco Javier 2018 05:27) (74 - 123)  BP: 94/57 (10 Francisco Javier 2018 05:27) (94/57 - 110/81)  BP(mean): --  RR: 18 (10 Francisco Javier 2018 05:27) (18 - 20)  SpO2: 98% (10 Francisco Javier 2018 05:27) (96% - 99%)    LABS:                        10.3   6.1   )-----------( 342      ( 09 Jan 2018 06:19 )             33.3     01-10    138  |  95<L>  |  7.0<L>  ----------------------------<  123<H>  3.6   |  36.0<H>  |  0.69    Ca    8.1<L>      10 Francisco Javier 2018 06:10  Phos  2.5     01-10  Mg     1.7     01-10            RADIOLOGY & ADDITIONAL TESTS:

## 2018-01-10 NOTE — PROGRESS NOTE ADULT - ASSESSMENT
67 year old male with PMH of COPD stage 4 s/p right lung reduction in 2010 (on home oxygen 4L (sat 92-93%), HTN, CVA on 2014, DM, hypothyroidism, hydrocele, and nephrolithiasis who is admitted for a COPD exacerbation. Patient has been weaned off ventimask to 4 liters nasal cannula and respiratory status is stable. CTA of the chest was negative for PE but showed bilateral lower lobe infiltrates with mucous plugging for which he was started on Levaquin.  Respiratory status stable and steroids are being tapered. Hospital course complicated by constipation, which had not improved despite aggressive bowel regimen, laxatives and enemas. XRAY with large stool burden. CT with diaphragmatic hernia and patient was consequently taken to the OR for loop ileostomy under local anesthesia. Pt cont, to have copious amount in ileostomy. Pt is noted to have hyponatremia due to high output in ileostomy, got IVF, hyponatremia and hypokalemia improving, ileostomy secretion are getting thicker, he is tolerating diet, ostomy care as per ostomy RN,  he is continued with cholestyramine, he has been continued with IV hydration, Ileostomy out is still high, but secretions are getting thicker, monitored, I/Os, being continued with IV fluid rate to 125ml/h and GI has increased Octreotide to 150mcgs tid,  has CT abdomen and Plevis done, results reviewed, GI add Imodium 2 mg po bid. During the earlier part of the hospitalization patient was treated for acute on chronic hypoxic respiratory failure secondary to COPD exacerbation and CAP, was treated with steroids now tapered and duo nebs, SOB resolved, he uses home oxygen (4 liters), bicarb elevated likely due to metabolic compensation for resp acidosis  continue bronchodilators and prednisone tapered off and completed Levaquin as well.  His electrolytes monitored and replaced accordingly, he was found to have right femoral artery dissection as incidental finding on CT, no intervention as per vascular, follow up with vascular clinic as out patient.     Plan:    1. High out put from Ileostomy: decreasing gradually, c/w cholestyramine,Octreotide, Imodium, he is tolerating diet, ostomy care as per ostomy RN, IV hydration, Ileostomy output is still high, but secretions are getting thicker, encouraged for oral hydration, monitor I/Os, PT involved, GI is following, GI recommanded Surgical consult for Ileostomy reversal and pulmonary consult for optimization for surgery, appreciate surgical and pulmonary consults, GI has changed his  diet to high fiber diet, fluid restriction, he is feeling little dehydrated, his electrolytes has been stable will monitor I/O, BMP, will start him on IV fluids, GI seen the patient and are planing on starting patient on tincture of opium.       2. Acute on chronic hypoxic respiratory failure secondary to COPD exacerbation, improved, he is at his baseline, on home oxygen (4 liters), continue bronchodilators and prednisone tapered off, continue Mucomyst PRN, completed course of Levaquin, cough better with tensilon otoniel    3. CAP - completed course of Levaquin on 12/5.     4. Hypertension - stable   - continue cardizem    5. Hyperlipidemia   - continue statin    6.hyponatremia: resolved, it was due to high ileostomy output.    Hypokalemia - on kphos  Hypophosphatemia: improved  Hypomagnesemia: improved  on multiple electrolyte sol.   monitor Electrolytes    7. Scrotal edema chronic with urinary retention, no more retention, Roy if not able to urinate  - swelling and redness going down  - patient following with  as outpatient  - cont scrotal elevation    8. Right femoral artery dissection  - incidental finding on CT, no intervention as per vascular       9. DVT Prophylaxis - Lovenox       10. anxiety: xanax at hs PRN. 67 year old male with PMH of COPD stage 4 s/p right lung reduction in 2010 (on home oxygen 4L (sat 92-93%), HTN, CVA on 2014, DM, hypothyroidism, hydrocele, and nephrolithiasis who is admitted for a COPD exacerbation. Patient has been weaned off ventimask to 4 liters nasal cannula and respiratory status is stable. CTA of the chest was negative for PE but showed bilateral lower lobe infiltrates with mucous plugging for which he was started on Levaquin.  Respiratory status stable and steroids are being tapered. Hospital course complicated by constipation, which had not improved despite aggressive bowel regimen, laxatives and enemas. XRAY with large stool burden. CT with diaphragmatic hernia and patient was consequently taken to the OR for loop ileostomy under local anesthesia. Pt cont, to have copious amount in ileostomy. Pt is noted to have hyponatremia due to high output in ileostomy, got IVF, hyponatremia and hypokalemia improving, ileostomy secretion are getting thicker, he is tolerating diet, ostomy care as per ostomy RN,  he is continued with cholestyramine, he has been continued with IV hydration, Ileostomy out is still high, but secretions are getting thicker, monitored, I/Os, being continued with IV fluid rate to 125ml/h and GI has increased Octreotide to 150mcgs tid,  has CT abdomen and Plevis done, results reviewed, GI add Imodium 2 mg po bid. During the earlier part of the hospitalization patient was treated for acute on chronic hypoxic respiratory failure secondary to COPD exacerbation and CAP, was treated with steroids now tapered and duo nebs, SOB resolved, he uses home oxygen (4 liters), bicarb elevated likely due to metabolic compensation for resp acidosis  continue bronchodilators and prednisone tapered off and completed Levaquin as well.  His electrolytes monitored and replaced accordingly, he was found to have right femoral artery dissection as incidental finding on CT, no intervention as per vascular, follow up with vascular clinic as out patient.     Plan:    1. High out put from Ileostomy: decreasing gradually, c/w cholestyramine,Octreotide, Imodium, he is tolerating diet, ostomy care as per ostomy RN, IV hydration, Ileostomy output is still high, but secretions are getting thicker, encouraged for oral hydration, monitor I/Os, PT involved, GI is following, GI recommanded Surgical consult for Ileostomy reversal and pulmonary consult for optimization for surgery, appreciate surgical and pulmonary consults, GI has changed his  diet to high fiber diet, fluid restriction, he is feeling little dehydrated, his electrolytes has been stable will monitor I/O, BMP, will start him on IV fluids, GI seen the patient and are planing on starting patient on tincture of opium, NPO midnight and CT abdomen with oral contrast in am.       2. Acute on chronic hypoxic respiratory failure secondary to COPD exacerbation, improved, he is at his baseline, on home oxygen (4 liters), continue bronchodilators and prednisone tapered off, continue Mucomyst PRN, completed course of Levaquin, cough better with tensilon otoniel    3. CAP - completed course of Levaquin on 12/5.     4. Hypertension - stable   - continue cardizem    5. Hyperlipidemia   - continue statin    6.hyponatremia: resolved, it was due to high ileostomy output.    Hypokalemia - on kphos  Hypophosphatemia: improved  Hypomagnesemia: improved  on multiple electrolyte sol.   monitor Electrolytes    7. Scrotal edema chronic with urinary retention, no more retention, Roy if not able to urinate  - swelling and redness going down  - patient following with  as outpatient  - cont scrotal elevation    8. Right femoral artery dissection  - incidental finding on CT, no intervention as per vascular       9. DVT Prophylaxis - Lovenox       10. anxiety: xanax at hs PRN.

## 2018-01-10 NOTE — PROGRESS NOTE ADULT - SUBJECTIVE AND OBJECTIVE BOX
PULMONARY PROGRESS NOTE      FRANDY BAUTISTA  MRN-[id number]    Patient is a 67y old  Male who presents with a chief complaint of SOB (15 Dec 2017 10:17)  ESCOPD  Ileostomy with significant drainage    INTERVAL HPI/OVERNIGHT EVENTS:    Patient is awake and alert  Mild congested cough and SOBOE but otherwise near baseline    MEDICATIONS  (STANDING):  ALBUTerol/ipratropium for Nebulization. 3 milliLiter(s) Nebulizer once  aspirin  chewable 81 milliGRAM(s) Oral daily  atorvastatin 80 milliGRAM(s) Oral at bedtime  cholestyramine Powder (Sugar-Free) 4 Gram(s) Oral two times a day  clopidogrel Tablet 75 milliGRAM(s) Oral daily  diltiazem    milliGRAM(s) Oral <User Schedule>  enoxaparin Injectable 40 milliGRAM(s) SubCutaneous daily  FIRST- Mouthwash  BLM 10 milliLiter(s) Swish and Spit two times a day  fluticasone propionate/ salmeterol 250-50 MICROgram(s) Diskus 1 Dose(s) Inhalation two times a day  levothyroxine 50 MICROGram(s) Oral daily  loperamide Solution 2 milliGRAM(s) Oral four times a day  loratadine 10 milliGRAM(s) Oral daily  multivitamin 1 Tablet(s) Oral daily  nystatin Powder 1 Application(s) Topical two times a day  octreotide  Injectable 150 MICROGram(s) SubCutaneous every 8 hours  pantoprazole   Suspension 40 milliGRAM(s) Oral two times a day before meals  potassium chloride   Powder 40 milliEquivalent(s) Oral once  sodium chloride 0.65% Nasal 1 Spray(s) Both Nostrils four times a day  tamsulosin 0.4 milliGRAM(s) Oral at bedtime  tiotropium 18 MICROgram(s) Capsule 1 Capsule(s) Inhalation daily      MEDICATIONS  (PRN):  acetaminophen  Suppository. 650 milliGRAM(s) Rectal every 6 hours PRN Moderate Pain (4 - 6)  ALBUTerol    0.083% 2.5 milliGRAM(s) Nebulizer every 4 hours PRN Shortness of Breath and/or Wheezing  benzonatate 100 milliGRAM(s) Oral three times a day PRN Cough  ondansetron Injectable 4 milliGRAM(s) IV Push every 8 hours PRN Nausea and/or Vomiting      Allergies    codeine (Unknown)  no veges (Unknown)  penicillin (Unknown)    Intolerances    Symbicort (Short breath)      PAST MEDICAL & SURGICAL HISTORY:  Hypothyroidism  Hydrocele  Renal calculi  Diabetes mellitus  Hypertension  CVA (cerebral vascular accident)  Chronic obstructive pulmonary disease, unspecified COPD type  History of lung surgery        REVIEW OF SYSTEMS:    CONSTITUTIONAL:  No distress    HEENT:  Eyes:  No diplopia or blurred vision. ENT:  No earache, sore throat or runny nose.    CARDIOVASCULAR:  No pressure, squeezing, tightness, heaviness or aching about the chest; no palpitations.    RESPIRATORY:  Mild cough, no shortness of breath, PND or orthopnea. Mild SOBOE    GASTROINTESTINAL:  No nausea, vomiting or diarrhea.    GENITOURINARY:  No dysuria, frequency or urgency.    NEUROLOGIC:  No paresthesias, fasciculations, seizures or weakness.    PSYCHIATRIC:  No disorder of thought or mood.    Vital Signs Last 24 Hrs  T(C): 36.6 (09 Jan 2018 11:52), Max: 36.9 (08 Jan 2018 21:00)  T(F): 97.9 (09 Jan 2018 11:52), Max: 98.4 (08 Jan 2018 21:00)  HR: 105 (09 Jan 2018 11:52) (69 - 113)  BP: 95/69 (09 Jan 2018 11:52) (94/63 - 98/65)  BP(mean): --  RR: 20 (09 Jan 2018 11:52) (18 - 20)  SpO2: 96% (09 Jan 2018 11:52) (96% - 97%)    PHYSICAL EXAMINATION:    GENERAL: The patient is awake and alert in no apparent distress.     HEENT: Head is normocephalic and atraumatic. Extraocular muscles are intact. Mucous membranes are moist.    NECK: Supple.    LUNGS: Decreased BS b/l otherwise clear to auscultation without wheezing, rales or rhonchi; respirations unlabored    HEART: Regular rate and rhythm without murmur.    ABDOMEN: Soft, nontender, and nondistended.      EXTREMITIES: Without any cyanosis, clubbing, rash, lesions or edema.    NEUROLOGIC: Grossly intact.    LABS:                        10.3   6.1   )-----------( 342      ( 09 Jan 2018 06:19 )             33.3     01-09    141  |  94<L>  |  6.0<L>  ----------------------------<  121<H>  3.3<L>   |  36.0<H>  |  0.68    Ca    8.0<L>      09 Jan 2018 06:19      RADIOLOGY & ADDITIONAL STUDIES:     EXAM:  XR CHEST PORTABLE URGENT 1V                          PROCEDURE DATE:  01/07/2018          INTERPRETATION:  CLINICAL INFORMATION: Diaphragmatic hernia.    TECHNIQUE: Frontal view of the chest   COMPARISON: November 28, 2017.    FINDINGS:    LUNGS/PLEURA: Consolidation in the right lower lung. Extensive emphysema   in the left lung. Small bilateral pleural effusions.  MEDIASTINUM: Heart size cannot adequately be assessed on this projection.  OTHER: None.    IMPRESSION:     Solid lesion in the right lower lung.  Small bilateral pleural effusions.      SHAY SANDOVAL M.D., ATTENDING RADIOLOGIST  This document has been electronically signed. Jan 7 2018  4:28PM

## 2018-01-10 NOTE — PROGRESS NOTE ADULT - ASSESSMENT
Patient with increased ileostomy output. On management with loperamide, octerotide    1. will discuss CT abdomen with the radiologist  2. Will add tincture of opium after discussing with the pharmacy.

## 2018-01-11 LAB
ANION GAP SERPL CALC-SCNC: 10 MMOL/L — SIGNIFICANT CHANGE UP (ref 5–17)
BUN SERPL-MCNC: 7 MG/DL — LOW (ref 8–20)
CALCIUM SERPL-MCNC: 7.8 MG/DL — LOW (ref 8.6–10.2)
CHLORIDE SERPL-SCNC: 94 MMOL/L — LOW (ref 98–107)
CO2 SERPL-SCNC: 34 MMOL/L — HIGH (ref 22–29)
CREAT SERPL-MCNC: 0.6 MG/DL — SIGNIFICANT CHANGE UP (ref 0.5–1.3)
GLUCOSE SERPL-MCNC: 134 MG/DL — HIGH (ref 70–115)
POTASSIUM SERPL-MCNC: 3.1 MMOL/L — LOW (ref 3.5–5.3)
POTASSIUM SERPL-SCNC: 3.1 MMOL/L — LOW (ref 3.5–5.3)
SODIUM SERPL-SCNC: 138 MMOL/L — SIGNIFICANT CHANGE UP (ref 135–145)

## 2018-01-11 PROCEDURE — 99233 SBSQ HOSP IP/OBS HIGH 50: CPT

## 2018-01-11 PROCEDURE — 74176 CT ABD & PELVIS W/O CONTRAST: CPT | Mod: 26

## 2018-01-11 PROCEDURE — 99232 SBSQ HOSP IP/OBS MODERATE 35: CPT

## 2018-01-11 RX ORDER — SODIUM CHLORIDE 9 MG/ML
1000 INJECTION, SOLUTION INTRAVENOUS
Qty: 0 | Refills: 0 | Status: DISCONTINUED | OUTPATIENT
Start: 2018-01-11 | End: 2018-01-16

## 2018-01-11 RX ORDER — POTASSIUM CHLORIDE 20 MEQ
10 PACKET (EA) ORAL ONCE
Qty: 0 | Refills: 0 | Status: COMPLETED | OUTPATIENT
Start: 2018-01-11 | End: 2018-01-11

## 2018-01-11 RX ADMIN — SODIUM CHLORIDE 80 MILLILITER(S): 9 INJECTION, SOLUTION INTRAVENOUS at 14:36

## 2018-01-11 RX ADMIN — PANTOPRAZOLE SODIUM 40 MILLIGRAM(S): 20 TABLET, DELAYED RELEASE ORAL at 05:20

## 2018-01-11 RX ADMIN — SODIUM CHLORIDE 80 MILLILITER(S): 9 INJECTION, SOLUTION INTRAVENOUS at 02:22

## 2018-01-11 RX ADMIN — Medication 2 MILLIGRAM(S): at 17:11

## 2018-01-11 RX ADMIN — OCTREOTIDE ACETATE 150 MICROGRAM(S): 200 INJECTION, SOLUTION INTRAVENOUS; SUBCUTANEOUS at 14:35

## 2018-01-11 RX ADMIN — CLOPIDOGREL BISULFATE 75 MILLIGRAM(S): 75 TABLET, FILM COATED ORAL at 13:55

## 2018-01-11 RX ADMIN — ATORVASTATIN CALCIUM 80 MILLIGRAM(S): 80 TABLET, FILM COATED ORAL at 21:26

## 2018-01-11 RX ADMIN — Medication 1 SPRAY(S): at 13:56

## 2018-01-11 RX ADMIN — Medication 100 MILLIEQUIVALENT(S): at 10:15

## 2018-01-11 RX ADMIN — OCTREOTIDE ACETATE 150 MICROGRAM(S): 200 INJECTION, SOLUTION INTRAVENOUS; SUBCUTANEOUS at 22:08

## 2018-01-11 RX ADMIN — Medication 50 MICROGRAM(S): at 05:20

## 2018-01-11 RX ADMIN — Medication 1 TABLET(S): at 13:56

## 2018-01-11 RX ADMIN — Medication 0.25 MILLIGRAM(S): at 22:07

## 2018-01-11 RX ADMIN — TIOTROPIUM BROMIDE 1 CAPSULE(S): 18 CAPSULE ORAL; RESPIRATORY (INHALATION) at 12:38

## 2018-01-11 RX ADMIN — Medication 2 MILLIGRAM(S): at 05:20

## 2018-01-11 RX ADMIN — Medication 2 MILLIGRAM(S): at 22:10

## 2018-01-11 RX ADMIN — NYSTATIN CREAM 1 APPLICATION(S): 100000 CREAM TOPICAL at 17:12

## 2018-01-11 RX ADMIN — SODIUM CHLORIDE 80 MILLILITER(S): 9 INJECTION, SOLUTION INTRAVENOUS at 08:51

## 2018-01-11 RX ADMIN — LORATADINE 10 MILLIGRAM(S): 10 TABLET ORAL at 13:55

## 2018-01-11 RX ADMIN — NYSTATIN CREAM 1 APPLICATION(S): 100000 CREAM TOPICAL at 05:20

## 2018-01-11 RX ADMIN — PANTOPRAZOLE SODIUM 40 MILLIGRAM(S): 20 TABLET, DELAYED RELEASE ORAL at 17:11

## 2018-01-11 RX ADMIN — FLUTICASONE PROPIONATE AND SALMETEROL 1 DOSE(S): 50; 250 POWDER ORAL; RESPIRATORY (INHALATION) at 21:25

## 2018-01-11 RX ADMIN — FLUTICASONE PROPIONATE AND SALMETEROL 1 DOSE(S): 50; 250 POWDER ORAL; RESPIRATORY (INHALATION) at 08:50

## 2018-01-11 RX ADMIN — Medication 81 MILLIGRAM(S): at 13:55

## 2018-01-11 RX ADMIN — Medication 1 SPRAY(S): at 17:12

## 2018-01-11 RX ADMIN — SODIUM CHLORIDE 80 MILLILITER(S): 9 INJECTION, SOLUTION INTRAVENOUS at 18:10

## 2018-01-11 RX ADMIN — TAMSULOSIN HYDROCHLORIDE 0.4 MILLIGRAM(S): 0.4 CAPSULE ORAL at 21:26

## 2018-01-11 RX ADMIN — Medication 1 SPRAY(S): at 05:22

## 2018-01-11 RX ADMIN — Medication 1 SPRAY(S): at 22:08

## 2018-01-11 RX ADMIN — Medication 2 MILLIGRAM(S): at 13:55

## 2018-01-11 RX ADMIN — OCTREOTIDE ACETATE 150 MICROGRAM(S): 200 INJECTION, SOLUTION INTRAVENOUS; SUBCUTANEOUS at 05:21

## 2018-01-11 RX ADMIN — CHOLESTYRAMINE 4 GRAM(S): 4 POWDER, FOR SUSPENSION ORAL at 21:26

## 2018-01-11 NOTE — CONSULT NOTE ADULT - ASSESSMENT
A/P: Pt known to surgical service and discussed this evening, and CT reviewed, with Dr. Melton    -Obtain Upper GI Series tomorrow to delineate the length of the small bowel  before ostomy    - Aggressively replete ostomy output with isotonic IVF; He is net negative by several liters daily, as per nursing flowsheet, and this will eventually lead to DAMI if not kept up with.    -Aggressively replete electrolytes, including potassium    ACS surgery team will follow    -Taco Baxter MD PGY3

## 2018-01-11 NOTE — PROGRESS NOTE ADULT - SUBJECTIVE AND OBJECTIVE BOX
FRANDY BAUTISTA    [id number]    67y      Male    Patient is a 67y old  Male who presents with a chief complaint of SOB (15 Dec 2017 10:17)      INTERVAL HPI/OVERNIGHT EVENTS:    Patient is not having any complains, he is feeling ok,  has no complains like nausea, vomiting, dizziness, patient's meds has been adjusted along with diet and fluid intake.      REVIEW OF SYSTEMS:    CONSTITUTIONAL: No fever, has fatigue  RESPIRATORY: No cough, No shortness of breath  CARDIOVASCULAR: No chest pain, no palpitations.   GASTROINTESTINAL: No abdominal, No nausea, vomiting.   NEUROLOGICAL: No headaches,  loss of strength.  MISCELLANEOUS: No joint swelling or pain.       Vital Signs Last 24 Hrs  T(C): 36.3 (11 Jan 2018 21:31), Max: 37.2 (11 Jan 2018 15:54)  T(F): 97.3 (11 Jan 2018 21:31), Max: 98.9 (11 Jan 2018 15:54)  HR: 101 (11 Jan 2018 21:31) (66 - 101)  BP: 99/56 (11 Jan 2018 21:31) (88/59 - 99/56)  RR: 19 (11 Jan 2018 21:31) (18 - 19)  SpO2: 99% (11 Jan 2018 21:31) (98% - 99%)    PHYSICAL EXAM:    GENERAL: Thin built  Elderly male looking comfortable  HEENT: PERRL, +EOMI  NECK: soft, Supple, No JVD,   CHEST/LUNG: Decrease air entry bilaterally; No wheezing  HEART: S1S2+, Regular rate and rhythm; No murmurs  ABDOMEN: Soft, Nontender, Bowel sounds present, s/p Ileostomy connected with drain  EXTREMITIES:  1+ Peripheral Pulses, No edema  SKIN: No rashes or lesions  NEURO: AAOX3, no focal deficits, no motor r sensory loss  PSYCH: normal mood      LABS:    01-11    138  |  94<L>  |  7.0<L>  ----------------------------<  134<H>  3.1<L>   |  34.0<H>  |  0.60    Ca    7.8<L>      11 Jan 2018 06:42  Phos  2.5     01-10  Mg     1.7     01-10              I&O's Summary    10 Francisco Javier 2018 07:01  -  11 Jan 2018 07:00  --------------------------------------------------------  IN: 1360 mL / OUT: 5900 mL / NET: -4540 mL    11 Jan 2018 07:01  -  11 Jan 2018 21:57  --------------------------------------------------------  IN: 960 mL / OUT: 1900 mL / NET: -940 mL        MEDICATIONS  (STANDING):  ALBUTerol/ipratropium for Nebulization. 3 milliLiter(s) Nebulizer once  aspirin  chewable 81 milliGRAM(s) Oral daily  atorvastatin 80 milliGRAM(s) Oral at bedtime  cholestyramine Powder (Sugar-Free) 4 Gram(s) Oral two times a day  clopidogrel Tablet 75 milliGRAM(s) Oral daily  diltiazem    milliGRAM(s) Oral <User Schedule>  enoxaparin Injectable 40 milliGRAM(s) SubCutaneous daily  FIRST- Mouthwash  BLM 10 milliLiter(s) Swish and Spit two times a day  fluticasone propionate/ salmeterol 250-50 MICROgram(s) Diskus 1 Dose(s) Inhalation two times a day  lactated ringers 1000 milliLiter(s) (80 mL/Hr) IV Continuous <Continuous>  levothyroxine 50 MICROGram(s) Oral daily  loperamide Solution 2 milliGRAM(s) Oral four times a day  loratadine 10 milliGRAM(s) Oral daily  multivitamin 1 Tablet(s) Oral daily  nystatin Powder 1 Application(s) Topical two times a day  octreotide  Injectable 150 MICROGram(s) SubCutaneous every 8 hours  pantoprazole   Suspension 40 milliGRAM(s) Oral two times a day before meals  sodium chloride 0.65% Nasal 1 Spray(s) Both Nostrils four times a day  tamsulosin 0.4 milliGRAM(s) Oral at bedtime  tiotropium 18 MICROgram(s) Capsule 1 Capsule(s) Inhalation daily    MEDICATIONS  (PRN):  acetaminophen  Suppository. 650 milliGRAM(s) Rectal every 6 hours PRN Moderate Pain (4 - 6)  ALBUTerol    0.083% 2.5 milliGRAM(s) Nebulizer every 4 hours PRN Shortness of Breath and/or Wheezing  ALPRAZolam 0.25 milliGRAM(s) Oral every 8 hours PRN anxiety  benzonatate 100 milliGRAM(s) Oral three times a day PRN Cough  ondansetron Injectable 4 milliGRAM(s) IV Push every 8 hours PRN Nausea and/or Vomiting    < from: CT Abdomen and Pelvis w/ Oral Cont (01.11.18 @ 13:29) >  Proximal jejunum emptying into a loop jejunostomyin the right upper   quadrant as described. No contrast seen within the distal small bowel.  Large bilateral scrotal hydroceles.    < end of copied text >

## 2018-01-11 NOTE — CONSULT NOTE ADULT - SUBJECTIVE AND OBJECTIVE BOX
68 yo M well known to our service, s/p Loop ileostomy under local anesthesia by Dr. Aguero on Dec 7th. Local was the only option due to poor pulmonary status, not a candidate for anesthesia or sedation.  Since then, he remains having high ostomy output, despite loperamide and octreotide, including as high as 5.9 L /24H    CT scan with PO contrast was performed earlier today, showing "Proximal jejunum emptying into a loop jejunostomy in the right upper quadrant"

## 2018-01-11 NOTE — PROGRESS NOTE ADULT - ASSESSMENT
Unchanged status;  will proceed with planned CT A/P with oral contrast to assess location of the ostomy.  Request placed.  No change in diet.  No change in medications.

## 2018-01-11 NOTE — PROGRESS NOTE ADULT - SUBJECTIVE AND OBJECTIVE BOX
Patient seen and examined;  Feels well.  No change in status.  IV at 80 cc/ he of RL.  Dry orally.  Ileostomy output:  5900 cc/ last 24 hrs.      PAST MEDICAL & SURGICAL HISTORY:  Hypothyroidism  Hydrocele  Renal calculi  Diabetes mellitus  Hypertension  CVA (cerebral vascular accident)  Chronic obstructive pulmonary disease, unspecified COPD type  History of lung surgery      ROS:  No Heartburn, regurgitation, dysphagia, odynophagia.  No dyspepsia  No abdominal pain.    No Nausea, vomiting.  No Bleeding.  No hematemesis.   No diarrhea.    No hematochesia.  No weight loss, anorexia.  No edema.      MEDICATIONS  (STANDING):  ALBUTerol/ipratropium for Nebulization. 3 milliLiter(s) Nebulizer once  aspirin  chewable 81 milliGRAM(s) Oral daily  atorvastatin 80 milliGRAM(s) Oral at bedtime  cholestyramine Powder (Sugar-Free) 4 Gram(s) Oral two times a day  clopidogrel Tablet 75 milliGRAM(s) Oral daily  diltiazem    milliGRAM(s) Oral <User Schedule>  enoxaparin Injectable 40 milliGRAM(s) SubCutaneous daily  FIRST- Mouthwash  BLM 10 milliLiter(s) Swish and Spit two times a day  fluticasone propionate/ salmeterol 250-50 MICROgram(s) Diskus 1 Dose(s) Inhalation two times a day  lactated ringers. 1000 milliLiter(s) (80 mL/Hr) IV Continuous <Continuous>  levothyroxine 50 MICROGram(s) Oral daily  loperamide Solution 2 milliGRAM(s) Oral four times a day  loratadine 10 milliGRAM(s) Oral daily  multivitamin 1 Tablet(s) Oral daily  nystatin Powder 1 Application(s) Topical two times a day  octreotide  Injectable 150 MICROGram(s) SubCutaneous every 8 hours  pantoprazole   Suspension 40 milliGRAM(s) Oral two times a day before meals  sodium chloride 0.65% Nasal 1 Spray(s) Both Nostrils four times a day  tamsulosin 0.4 milliGRAM(s) Oral at bedtime  tiotropium 18 MICROgram(s) Capsule 1 Capsule(s) Inhalation daily    MEDICATIONS  (PRN):  acetaminophen  Suppository. 650 milliGRAM(s) Rectal every 6 hours PRN Moderate Pain (4 - 6)  ALBUTerol    0.083% 2.5 milliGRAM(s) Nebulizer every 4 hours PRN Shortness of Breath and/or Wheezing  ALPRAZolam 0.25 milliGRAM(s) Oral every 8 hours PRN anxiety  benzonatate 100 milliGRAM(s) Oral three times a day PRN Cough  ondansetron Injectable 4 milliGRAM(s) IV Push every 8 hours PRN Nausea and/or Vomiting      Allergies    codeine (Unknown)  no veges (Unknown)  penicillin (Unknown)    Intolerances    Symbicort (Short breath)      Vital Signs Last 24 Hrs  T(C): 37.1 (11 Jan 2018 05:18), Max: 37.1 (10 Francisco Javier 2018 20:20)  T(F): 98.7 (11 Jan 2018 05:18), Max: 98.7 (10 Francisco Javier 2018 20:20)  HR: 66 (11 Jan 2018 05:18) (66 - 134)  BP: 92/54 (11 Jan 2018 05:18) (92/54 - 105/74)  BP(mean): --  RR: 19 (10 Francisco Javier 2018 20:20) (18 - 19)  SpO2: 99% (10 Francisco Javier 2018 20:20) (98% - 99%)    PHYSICAL EXAM:    GENERAL: NAD, well-groomed, well-developed  HEAD:  Atraumatic, Normocephalic  EYES: EOMI, PERRLA, conjunctiva and sclera clear  ENMT: No tonsillar erythema, exudates, or enlargement; Moist mucous membranes, Good dentition, No lesions  NECK: Supple, No JVD, Normal thyroid  CHEST/LUNG: Clear to percussion bilaterally; No rales, rhonchi, wheezing, or rubs  HEART: Regular rate and rhythm; No murmurs, rubs, or gallops  ABDOMEN: Soft, Nontender, Nondistended; Bowel sounds present; Scaphoid.  Intact ileostomy  EXTREMITIES:  2+ Peripheral Pulses, No clubbing, cyanosis, or edema  LYMPH: No lymphadenopathy noted  SKIN: No rashes or lesions      LABS:    01-11    138  |  94<L>  |  7.0<L>  ----------------------------<  134<H>  3.1<L>   |  34.0<H>  |  0.60    Ca    7.8<L>      11 Jan 2018 06:42  Phos  2.5     01-10  Mg     1.7     01-10               RADIOLOGY & ADDITIONAL STUDIES:

## 2018-01-11 NOTE — PROGRESS NOTE ADULT - ASSESSMENT
67 year old male with PMH of COPD stage 4 s/p right lung reduction in 2010 (on home oxygen 4L (sat 92-93%), HTN, CVA on 2014, DM, hypothyroidism, hydrocele, and nephrolithiasis who is admitted for a COPD exacerbation. Patient has been weaned off ventimask to 4 liters nasal cannula and respiratory status is stable. CTA of the chest was negative for PE but showed bilateral lower lobe infiltrates with mucous plugging for which he was started on Levaquin.  Respiratory status stable and steroids are being tapered. Hospital course complicated by constipation, which had not improved despite aggressive bowel regimen, laxatives and enemas. XRAY with large stool burden. CT with diaphragmatic hernia and patient was consequently taken to the OR for loop ileostomy under local anesthesia. Pt cont, to have copious amount in ileostomy. Pt is noted to have hyponatremia due to high output in ileostomy, got IVF, hyponatremia and hypokalemia improving, ileostomy secretion are getting thicker, he is tolerating diet, ostomy care as per ostomy RN,  he is continued with cholestyramine, he has been continued with IV hydration, Ileostomy out is still high, but secretions are getting thicker, monitored, I/Os, being continued with IV fluid rate to 125ml/h and GI has increased Octreotide to 150mcgs tid,  has CT abdomen and Plevis done, results reviewed, GI add Imodium 2 mg po bid. During the earlier part of the hospitalization patient was treated for acute on chronic hypoxic respiratory failure secondary to COPD exacerbation and CAP, was treated with steroids now tapered and duo nebs, SOB resolved, he uses home oxygen (4 liters), bicarb elevated likely due to metabolic compensation for resp acidosis  continue bronchodilators and prednisone tapered off and completed Levaquin as well.  His electrolytes monitored and replaced accordingly, he was found to have right femoral artery dissection as incidental finding on CT, no intervention as per vascular, follow up with vascular clinic as out patient.     Plan:    1. High out put from Ileostomy: Patient has been continued on cholestyramine, Octreotide, Imodium, he is tolerating diet, ostomy care as per ostomy RN, IV hydration, Ileostomy output is still high, but secretions are thicker, encouraged for oral hydration, monitor I/Os, GI is following, GI recommended Surgical consult for Ileostomy reversal and pulmonary consult for optimization for surgery, appreciate surgical and pulmonary consults, GI has changed his  diet to high fiber diet, fluid restriction, he is feeling little dehydrated, his electrolytes has been stable will monitor I/O, BMP, will continued with IV fluids, GI recommended CT abdomen and pelvis with oral contrast, done today showed Proximal jejunum emptying into a loop jejunostomy the right upper   quadrant as described. No contrast seen within the distal small bowel, Large bilateral scrotal hydroceles, talked with surgical team and updated them about the results, they are going to see the patient and will further decide about the plan of care.       2. Acute on chronic hypoxic respiratory failure secondary to COPD exacerbation, improved, he is at his baseline, on home oxygen (4 liters), continue bronchodilators and prednisone tapered off, continue Mucomyst PRN, completed course of Levaquin, cough better with tensilon otoniel    3. CAP - completed course of Levaquin on 12/5.     4. Hypertension - stable   - continue cardizem    5. Hyperlipidemia   - continue statin    6.hyponatremia: resolved, it was due to high ileostomy output.    Hypokalemia - being replaced  Hypophosphatemia: improved  Hypomagnesemia: improved  on multiple electrolyte sol.   monitor Electrolytes    7. Scrotal edema chronic with urinary retention, no more retention, Roy if not able to urinate  - swelling and redness going down  - patient following with  as outpatient  - cont scrotal elevation    8. Right femoral artery dissection  - incidental finding on CT, no intervention as per vascular       9. DVT Prophylaxis - Lovenox       10. anxiety: xanax at hs PRN.

## 2018-01-11 NOTE — CHART NOTE - NSCHARTNOTEFT_GEN_A_CORE
Source: Patient [x ]  Family [x ]   other [ ]    Current Diet: high fiber with ensure clears    Patient reports [ ] nausea  [ ] vomiting [ ] diarrhea [ ] constipation  [ ]chewing problems [ ] swallowing issues  [ ] other: iliostomy    PO intake:  < 50% [ ]   50-75%  [ ]   %  [x]  other :    Source for PO intake [x ] Patient [ ] family [ ] chart [ ] staff [ ] other    Enteral /Parenteral Nutrition:     Current Weight:     % Weight Change     Pertinent Medications: MEDICATIONS  (STANDING):  ALBUTerol/ipratropium for Nebulization. 3 milliLiter(s) Nebulizer once  aspirin  chewable 81 milliGRAM(s) Oral daily  atorvastatin 80 milliGRAM(s) Oral at bedtime  cholestyramine Powder (Sugar-Free) 4 Gram(s) Oral two times a day  clopidogrel Tablet 75 milliGRAM(s) Oral daily  diltiazem    milliGRAM(s) Oral <User Schedule>  enoxaparin Injectable 40 milliGRAM(s) SubCutaneous daily  FIRST- Mouthwash  BLM 10 milliLiter(s) Swish and Spit two times a day  fluticasone propionate/ salmeterol 250-50 MICROgram(s) Diskus 1 Dose(s) Inhalation two times a day  lactated ringers 1000 milliLiter(s) (80 mL/Hr) IV Continuous <Continuous>  levothyroxine 50 MICROGram(s) Oral daily  loperamide Solution 2 milliGRAM(s) Oral four times a day  loratadine 10 milliGRAM(s) Oral daily  multivitamin 1 Tablet(s) Oral daily  nystatin Powder 1 Application(s) Topical two times a day  octreotide  Injectable 150 MICROGram(s) SubCutaneous every 8 hours  pantoprazole   Suspension 40 milliGRAM(s) Oral two times a day before meals  sodium chloride 0.65% Nasal 1 Spray(s) Both Nostrils four times a day  tamsulosin 0.4 milliGRAM(s) Oral at bedtime  tiotropium 18 MICROgram(s) Capsule 1 Capsule(s) Inhalation daily    MEDICATIONS  (PRN):  acetaminophen  Suppository. 650 milliGRAM(s) Rectal every 6 hours PRN Moderate Pain (4 - 6)  ALBUTerol    0.083% 2.5 milliGRAM(s) Nebulizer every 4 hours PRN Shortness of Breath and/or Wheezing  ALPRAZolam 0.25 milliGRAM(s) Oral every 8 hours PRN anxiety  benzonatate 100 milliGRAM(s) Oral three times a day PRN Cough  ondansetron Injectable 4 milliGRAM(s) IV Push every 8 hours PRN Nausea and/or Vomiting    Pertinent Labs: 01-11 Na138 mmol/L Glu 134 mg/dL<H> K+ 3.1 mmol/L<L> Cr  0.60 mg/dL BUN 7.0 mg/dL<L> Phos n/a   Alb n/a   PAB n/a               Skin:     Nutrition focused physical exam conducted - found signs of malnutrition [ ]absent [ ]present    Subcutaneous fat loss: [ ] Orbital fat pads region, [ ]Buccal fat region, [ ]Triceps region,  [ ]Ribs region    Muscle wasting: [ ]Temples region, [ ]Clavicle region, [ ]Shoulder region, [ ]Scapula region, [ ]Interosseous region,  [ ]thigh region, [ ]Calf region    Estimated Needs:   [ ] no change since previous assessment  [ ] recalculated:     Current Nutrition Diagnosis:  Pt meets criteria for severe chronic malnutrition related to absorption issues,  in setting of ileostomy as evidenced by high ostomy output, 36# weight loss since admission 11/29, severe fat/ muscle depletion in thoracic region, thighs, temples, clavicles. Pt has been drinking Ensure TID and it has been increasing output immediately.  Ensure changed to Ensure clears. Pt taking  well. Output is still high. Going today for procedure to look at ileostomy site to see if its in the right area. Surgery possible.     Recommendations: Continue Ensure clears, Discussed TPN with family. GI also discussed with family.     Monitoring and Evaluation:   [x ] PO intake [x ] Tolerance to diet prescription [X] Weights  [X] Follow up per protocol [X] Labs:

## 2018-01-11 NOTE — CHART NOTE - NSCHARTNOTEFT_GEN_A_CORE
Upon Nutritional Assessment by the Registered Dietitian your patient was determined to meet criteria / has evidence of the following diagnosis/diagnoses:          [ ]  Mild Protein Calorie Malnutrition        [ ]  Moderate Protein Calorie Malnutrition        [x ] Severe Protein Calorie Malnutrition        [ ] Unspecified Protein Calorie Malnutrition        [ ] Underweight / BMI <19        [ ] Morbid Obesity / BMI > 40      Findings as based on:  •  Comprehensive nutrition assessment and consultation  •  Calorie counts (nutrient intake analysis)  •  Food acceptance and intake status from observations by staff  •  Follow up  •  Patient education  •  Intervention secondary to interdisciplinary rounds  •   concerns      Treatment:    The following diet has been recommended:  Continue with Ensure clears TID, Pt needs TPN      PROVIDER Section:     By signing this assessment you are acknowledging and agree with the diagnosis/diagnoses assigned by the Registered Dietitian    Comments:

## 2018-01-12 LAB
ANION GAP SERPL CALC-SCNC: 7 MMOL/L — SIGNIFICANT CHANGE UP (ref 5–17)
BUN SERPL-MCNC: 7 MG/DL — LOW (ref 8–20)
CALCIUM SERPL-MCNC: 8.3 MG/DL — LOW (ref 8.6–10.2)
CHLORIDE SERPL-SCNC: 95 MMOL/L — LOW (ref 98–107)
CO2 SERPL-SCNC: 34 MMOL/L — HIGH (ref 22–29)
CREAT SERPL-MCNC: 0.62 MG/DL — SIGNIFICANT CHANGE UP (ref 0.5–1.3)
GLUCOSE SERPL-MCNC: 124 MG/DL — HIGH (ref 70–115)
HCT VFR BLD CALC: 34 % — LOW (ref 42–52)
HGB BLD-MCNC: 10.5 G/DL — LOW (ref 14–18)
INR BLD: 1.24 RATIO — HIGH (ref 0.88–1.16)
MCHC RBC-ENTMCNC: 27.3 PG — SIGNIFICANT CHANGE UP (ref 27–31)
MCHC RBC-ENTMCNC: 30.9 G/DL — LOW (ref 32–36)
MCV RBC AUTO: 88.3 FL — SIGNIFICANT CHANGE UP (ref 80–94)
PLATELET # BLD AUTO: 363 K/UL — SIGNIFICANT CHANGE UP (ref 150–400)
POTASSIUM SERPL-MCNC: 4 MMOL/L — SIGNIFICANT CHANGE UP (ref 3.5–5.3)
POTASSIUM SERPL-SCNC: 4 MMOL/L — SIGNIFICANT CHANGE UP (ref 3.5–5.3)
PROTHROM AB SERPL-ACNC: 13.7 SEC — HIGH (ref 9.8–12.7)
RBC # BLD: 3.85 M/UL — LOW (ref 4.6–6.2)
RBC # FLD: 16.1 % — HIGH (ref 11–15.6)
SODIUM SERPL-SCNC: 136 MMOL/L — SIGNIFICANT CHANGE UP (ref 135–145)
WBC # BLD: 6.1 K/UL — SIGNIFICANT CHANGE UP (ref 4.8–10.8)
WBC # FLD AUTO: 6.1 K/UL — SIGNIFICANT CHANGE UP (ref 4.8–10.8)

## 2018-01-12 PROCEDURE — 74250 X-RAY XM SM INT 1CNTRST STD: CPT | Mod: 26

## 2018-01-12 PROCEDURE — 99232 SBSQ HOSP IP/OBS MODERATE 35: CPT

## 2018-01-12 RX ADMIN — LORATADINE 10 MILLIGRAM(S): 10 TABLET ORAL at 13:07

## 2018-01-12 RX ADMIN — Medication 81 MILLIGRAM(S): at 13:03

## 2018-01-12 RX ADMIN — Medication 0.25 MILLIGRAM(S): at 08:38

## 2018-01-12 RX ADMIN — CHOLESTYRAMINE 4 GRAM(S): 4 POWDER, FOR SUSPENSION ORAL at 23:08

## 2018-01-12 RX ADMIN — Medication 2 MILLIGRAM(S): at 17:45

## 2018-01-12 RX ADMIN — Medication 1 SPRAY(S): at 13:04

## 2018-01-12 RX ADMIN — OCTREOTIDE ACETATE 150 MICROGRAM(S): 200 INJECTION, SOLUTION INTRAVENOUS; SUBCUTANEOUS at 23:09

## 2018-01-12 RX ADMIN — Medication 2 MILLIGRAM(S): at 05:44

## 2018-01-12 RX ADMIN — OCTREOTIDE ACETATE 150 MICROGRAM(S): 200 INJECTION, SOLUTION INTRAVENOUS; SUBCUTANEOUS at 05:47

## 2018-01-12 RX ADMIN — SODIUM CHLORIDE 80 MILLILITER(S): 9 INJECTION, SOLUTION INTRAVENOUS at 23:05

## 2018-01-12 RX ADMIN — Medication 1 SPRAY(S): at 05:47

## 2018-01-12 RX ADMIN — FLUTICASONE PROPIONATE AND SALMETEROL 1 DOSE(S): 50; 250 POWDER ORAL; RESPIRATORY (INHALATION) at 08:39

## 2018-01-12 RX ADMIN — DIPHENHYDRAMINE HYDROCHLORIDE AND LIDOCAINE HYDROCHLORIDE AND ALUMINUM HYDROXIDE AND MAGNESIUM HYDRO 10 MILLILITER(S): KIT at 05:46

## 2018-01-12 RX ADMIN — PANTOPRAZOLE SODIUM 40 MILLIGRAM(S): 20 TABLET, DELAYED RELEASE ORAL at 17:45

## 2018-01-12 RX ADMIN — Medication 1 SPRAY(S): at 17:46

## 2018-01-12 RX ADMIN — DIPHENHYDRAMINE HYDROCHLORIDE AND LIDOCAINE HYDROCHLORIDE AND ALUMINUM HYDROXIDE AND MAGNESIUM HYDRO 10 MILLILITER(S): KIT at 17:44

## 2018-01-12 RX ADMIN — OCTREOTIDE ACETATE 150 MICROGRAM(S): 200 INJECTION, SOLUTION INTRAVENOUS; SUBCUTANEOUS at 13:06

## 2018-01-12 RX ADMIN — ATORVASTATIN CALCIUM 80 MILLIGRAM(S): 80 TABLET, FILM COATED ORAL at 23:07

## 2018-01-12 RX ADMIN — TAMSULOSIN HYDROCHLORIDE 0.4 MILLIGRAM(S): 0.4 CAPSULE ORAL at 23:07

## 2018-01-12 RX ADMIN — FLUTICASONE PROPIONATE AND SALMETEROL 1 DOSE(S): 50; 250 POWDER ORAL; RESPIRATORY (INHALATION) at 20:59

## 2018-01-12 RX ADMIN — Medication 2 MILLIGRAM(S): at 13:05

## 2018-01-12 RX ADMIN — Medication 0.25 MILLIGRAM(S): at 23:15

## 2018-01-12 RX ADMIN — Medication 50 MICROGRAM(S): at 05:46

## 2018-01-12 RX ADMIN — NYSTATIN CREAM 1 APPLICATION(S): 100000 CREAM TOPICAL at 17:46

## 2018-01-12 RX ADMIN — TIOTROPIUM BROMIDE 1 CAPSULE(S): 18 CAPSULE ORAL; RESPIRATORY (INHALATION) at 09:42

## 2018-01-12 RX ADMIN — CHOLESTYRAMINE 4 GRAM(S): 4 POWDER, FOR SUSPENSION ORAL at 08:38

## 2018-01-12 RX ADMIN — Medication 1 TABLET(S): at 13:03

## 2018-01-12 RX ADMIN — NYSTATIN CREAM 1 APPLICATION(S): 100000 CREAM TOPICAL at 05:45

## 2018-01-12 RX ADMIN — CLOPIDOGREL BISULFATE 75 MILLIGRAM(S): 75 TABLET, FILM COATED ORAL at 13:04

## 2018-01-12 RX ADMIN — SODIUM CHLORIDE 80 MILLILITER(S): 9 INJECTION, SOLUTION INTRAVENOUS at 05:48

## 2018-01-12 RX ADMIN — PANTOPRAZOLE SODIUM 40 MILLIGRAM(S): 20 TABLET, DELAYED RELEASE ORAL at 05:56

## 2018-01-12 NOTE — PROGRESS NOTE ADULT - SUBJECTIVE AND OBJECTIVE BOX
INTERVAL HPI/OVERNIGHT EVENTS: No acute events overnight.    SUBJECTIVE: Continues to have high output through ostomy, about 5 L yesterday. Tolerating diet without n/v. No fevers, chills, chest pain, dyspnea. Ambulating.      MEDICATIONS  (STANDING):  ALBUTerol/ipratropium for Nebulization. 3 milliLiter(s) Nebulizer once  aspirin  chewable 81 milliGRAM(s) Oral daily  atorvastatin 80 milliGRAM(s) Oral at bedtime  cholestyramine Powder (Sugar-Free) 4 Gram(s) Oral two times a day  clopidogrel Tablet 75 milliGRAM(s) Oral daily  diltiazem    milliGRAM(s) Oral <User Schedule>  enoxaparin Injectable 40 milliGRAM(s) SubCutaneous daily  FIRST- Mouthwash  BLM 10 milliLiter(s) Swish and Spit two times a day  fluticasone propionate/ salmeterol 250-50 MICROgram(s) Diskus 1 Dose(s) Inhalation two times a day  lactated ringers 1000 milliLiter(s) (80 mL/Hr) IV Continuous <Continuous>  levothyroxine 50 MICROGram(s) Oral daily  loperamide Solution 2 milliGRAM(s) Oral four times a day  loratadine 10 milliGRAM(s) Oral daily  multivitamin 1 Tablet(s) Oral daily  nystatin Powder 1 Application(s) Topical two times a day  octreotide  Injectable 150 MICROGram(s) SubCutaneous every 8 hours  pantoprazole   Suspension 40 milliGRAM(s) Oral two times a day before meals  sodium chloride 0.65% Nasal 1 Spray(s) Both Nostrils four times a day  tamsulosin 0.4 milliGRAM(s) Oral at bedtime  tiotropium 18 MICROgram(s) Capsule 1 Capsule(s) Inhalation daily    MEDICATIONS  (PRN):  acetaminophen  Suppository. 650 milliGRAM(s) Rectal every 6 hours PRN Moderate Pain (4 - 6)  ALBUTerol    0.083% 2.5 milliGRAM(s) Nebulizer every 4 hours PRN Shortness of Breath and/or Wheezing  ALPRAZolam 0.25 milliGRAM(s) Oral every 8 hours PRN anxiety  benzonatate 100 milliGRAM(s) Oral three times a day PRN Cough  ondansetron Injectable 4 milliGRAM(s) IV Push every 8 hours PRN Nausea and/or Vomiting      Vital Signs Last 24 Hrs  T(C): 36.6 (12 Jan 2018 05:25), Max: 37.2 (11 Jan 2018 15:54)  T(F): 97.9 (12 Jan 2018 05:25), Max: 98.9 (11 Jan 2018 15:54)  HR: 71 (12 Jan 2018 05:25) (67 - 101)  BP: 93/56 (12 Jan 2018 05:25) (88/59 - 99/56)  BP(mean): --  RR: 19 (12 Jan 2018 05:25) (18 - 19)  SpO2: 98% (12 Jan 2018 05:25) (98% - 99%)    NAD, AOx3, resting comfortably in bed  No respiratory distress  Abdomen soft, nontender, nondistended. Ostomy appears healthy with flatus and liquid stool in ostomy bag  No peripheral edema. Normal ROM.       I&O's Detail    11 Jan 2018 07:01  -  12 Jan 2018 07:00  --------------------------------------------------------  IN:    lactated ringers: 800 mL    lactated ringers.: 160 mL  Total IN: 960 mL    OUT:    Ileostomy: 2300 mL    Voided: 400 mL  Total OUT: 2700 mL    Total NET: -1740 mL          LABS:                        10.5   6.1   )-----------( 363      ( 12 Jan 2018 06:43 )             34.0     01-12    136  |  95<L>  |  7.0<L>  ----------------------------<  124<H>  4.0   |  34.0<H>  |  0.62    Ca    8.3<L>      12 Jan 2018 06:43      PT/INR - ( 12 Jan 2018 06:43 )   PT: 13.7 sec;   INR: 1.24 ratio               RADIOLOGY & ADDITIONAL STUDIES:    repeat CT abdomen/pelvis 1/11:  IMPRESSION:     Proximal jejunum emptying into a loop jejunostomyin the right upper   quadrant as described. No contrast seen within the distal small bowel.

## 2018-01-12 NOTE — PROGRESS NOTE ADULT - ASSESSMENT
67 year old male with PMH of COPD stage 4 s/p right lung reduction in 2010 (on home oxygen 4L (sat 92-93%), HTN, CVA on 2014, DM, hypothyroidism, hydrocele, and nephrolithiasis who is admitted for a COPD exacerbation. Patient has been weaned off ventimask to 4 liters nasal cannula and respiratory status is stable. CTA of the chest was negative for PE but showed bilateral lower lobe infiltrates with mucous plugging for which he was started on Levaquin.  Respiratory status stable and steroids are being tapered. Hospital course complicated by constipation, which had not improved despite aggressive bowel regimen, laxatives and enemas. XRAY with large stool burden. CT with diaphragmatic hernia and patient was consequently taken to the OR for loop ileostomy under local anesthesia. Pt cont, to have copious amount in ileostomy. Pt is noted to have hyponatremia due to high output in ileostomy, got IVF, hyponatremia and hypokalemia improving, ileostomy secretion are getting thicker, he is tolerating diet, ostomy care as per ostomy RN,  he is continued with cholestyramine, he has been continued with IV hydration, Ileostomy out is still high, but secretions are getting thicker, monitored, I/Os, being continued with IV fluid rate to 125ml/h and GI has increased Octreotide to 150mcgs tid,  has CT abdomen and Plevis done, results reviewed, GI add Imodium 2 mg po bid and continued with octreotide along, GI recommended Surgical consult for Ileostomy reversal and pulmonary consult for optimization for surgery, appreciate surgical and pulmonary consults, GI recommended to get  CT abdomen and pelvis with oral contrast, done showed Proximal jejunum emptying into a loop jejunostomy the right upper quadrant as described. No contrast seen within the distal small bowel, Large bilateral scrotal hydroceles, talked with surgical team, they did the upper GI series confirmed Jejunostomy, surgical team is going to perform surgery, surgical team is further deciding about the type of procedure, surgical team recommended increase octeriotide to 300mcgs three time a day, also holding his plavix.   During the earlier part of the hospitalization patient was treated for acute on chronic hypoxic respiratory failure secondary to COPD exacerbation and CAP, was treated with steroids now tapered and duo nebs, SOB resolved, he uses home oxygen (4 liters), bicarb elevated likely due to metabolic compensation for resp acidosis  continue bronchodilators and prednisone tapered off and completed Levaquin as well.  His electrolytes monitored and replaced accordingly, he was found to have right femoral artery dissection as incidental finding on CT, no intervention as per vascular, follow up with vascular clinic as out patient.     Plan:    1. High out put from Ileostomy: Patient has been continued on cholestyramine, Octreotide, Imodium, he is tolerating diet, ostomy care as per ostomy RN, IV hydration, Ileostomy output is still high, but secretions are thicker, encouraged for oral hydration, monitor I/Os, GI is following, GI recommended Surgical consult for Ileostomy reversal and pulmonary consult for optimization for surgery, appreciate surgical and pulmonary consults, GI has changed his  diet to high fiber diet, fluid restriction, he is feeling little dehydrated, his electrolytes has been stable will monitor I/O, BMP, will continued with IV fluids, GI recommended CT abdomen and pelvis with oral contrast, done today showed Proximal jejunum emptying into a loop jejunostomy the right upper   quadrant as described. No contrast seen within the distal small bowel, Large bilateral scrotal hydroceles, talked with surgical team surgical team, they did the upper GI series confirmed Jejunostomy, surgical team is going to perform surgery, surgical team is further deciding about the type of procedure, surgical team recommended increase octeriotide to 300mcgs three time a day, also holding his plavix, they will do procedure coming week, patient is taking plaix for Hx of TIA, will hold it for now, will continue with aspirin 81mg.        2. Acute on chronic hypoxic respiratory failure secondary to COPD exacerbation, improved, he is at his baseline, on home oxygen (4 liters), continue bronchodilators and prednisone tapered off, continue Mucomyst PRN, completed course of Levaquin, cough better with tensilon otoniel    3. CAP - completed course of Levaquin on 12/5.     4. Hypertension - stable   - continue cardizem    5. Hyperlipidemia   - continue statin    6.hyponatremia: resolved, it was due to high ileostomy output.    Hypokalemia - being replaced  Hypophosphatemia: improved  Hypomagnesemia: improved  on multiple electrolyte sol.   monitor Electrolytes    7. Scrotal edema chronic with urinary retention, no more retention, Roy if not able to urinate  - swelling and redness going down  - patient following with  as outpatient  - cont scrotal elevation    8. Right femoral artery dissection  - incidental finding on CT, no intervention as per vascular       9. DVT Prophylaxis - Lovenox       10. anxiety: xanax at hs PRN. 67 year old male with PMH of COPD stage 4 s/p right lung reduction in 2010 (on home oxygen 4L (sat 92-93%), HTN, CVA on 2014, DM, hypothyroidism, hydrocele, and nephrolithiasis who is admitted for a COPD exacerbation. Patient has been weaned off ventimask to 4 liters nasal cannula and respiratory status is stable. CTA of the chest was negative for PE but showed bilateral lower lobe infiltrates with mucous plugging for which he was started on Levaquin.  Respiratory status stable and steroids are being tapered. Hospital course complicated by constipation, which had not improved despite aggressive bowel regimen, laxatives and enemas. XRAY with large stool burden. CT with diaphragmatic hernia and patient was consequently taken to the OR for loop ileostomy under local anesthesia. Pt cont, to have copious amount in ileostomy. Pt is noted to have hyponatremia due to high output in ileostomy, got IVF, hyponatremia and hypokalemia improving, ileostomy secretion are getting thicker, he is tolerating diet, ostomy care as per ostomy RN,  he is continued with cholestyramine, he has been continued with IV hydration, Ileostomy out is still high, but secretions are getting thicker, monitored, I/Os, being continued with IV fluid rate to 125ml/h and GI has increased Octreotide to 150mcgs tid,  has CT abdomen and Plevis done, results reviewed, GI add Imodium 2 mg po bid and continued with octreotide along, GI recommended Surgical consult for Ileostomy reversal and pulmonary consult for optimization for surgery, appreciate surgical and pulmonary consults, GI recommended to get  CT abdomen and pelvis with oral contrast, done showed Proximal jejunum emptying into a loop jejunostomy the right upper quadrant as described. No contrast seen within the distal small bowel, Large bilateral scrotal hydroceles, talked with surgical team, they did the upper GI series confirmed Jejunostomy, surgical team is going to perform surgery, surgical team is further deciding about the type of procedure, surgical team recommended increase octeriotide to 300mcgs three time a day, also holding his plavix.   During the earlier part of the hospitalization patient was treated for acute on chronic hypoxic respiratory failure secondary to COPD exacerbation and CAP, was treated with steroids now tapered and duo nebs, SOB resolved, he uses home oxygen (4 liters), bicarb elevated likely due to metabolic compensation for resp acidosis  continue bronchodilators and prednisone tapered off and completed Levaquin as well.  His electrolytes monitored and replaced accordingly, he was found to have right femoral artery dissection as incidental finding on CT, no intervention as per vascular, follow up with vascular clinic as out patient.     Plan:    1. High out put from Ileostomy: Patient has been continued on cholestyramine, Octreotide, Imodium, he is tolerating diet, ostomy care as per ostomy RN, IV hydration, Ileostomy output is still high, but secretions are thicker, encouraged for oral hydration, monitor I/Os, GI is following, GI recommended Surgical consult for Ileostomy reversal and pulmonary consult for optimization for surgery, appreciate surgical and pulmonary consults, GI has changed his  diet to high fiber diet, fluid restriction, he is feeling little dehydrated, his electrolytes has been stable will monitor I/O, BMP, will continued with IV fluids, GI recommended CT abdomen and pelvis with oral contrast, done today showed Proximal jejunum emptying into a loop jejunostomy the right upper   quadrant as described. No contrast seen within the distal small bowel, Large bilateral scrotal hydroceles, talked with surgical team surgical team, they did the upper GI series confirmed Jejunostomy, surgical team is going to perform surgery, surgical team is further deciding about the type of procedure, surgical team recommended increase octeriotide to 300mcgs three time a day, also holding his plavix, they will do procedure coming week, patient is taking plaix for Hx of TIA, will hold it for now, will continue with aspirin 81mg, will resume plavix after the surgery.        2. Acute on chronic hypoxic respiratory failure secondary to COPD exacerbation, improved, he is at his baseline, on home oxygen (4 liters), continue bronchodilators and prednisone tapered off, continue Mucomyst PRN, completed course of Levaquin, cough better with tensilon otoniel    3. CAP - completed course of Levaquin on 12/5.     4. Hypertension - stable   - continue cardizem    5. Hyperlipidemia   - continue statin    6.hyponatremia: resolved, it was due to high ileostomy output.    Hypokalemia - being replaced  Hypophosphatemia: improved  Hypomagnesemia: improved  on multiple electrolyte sol.   monitor Electrolytes    7. Scrotal edema chronic with urinary retention, no more retention, Roy if not able to urinate  - swelling and redness going down  - patient following with  as outpatient  - cont scrotal elevation    8. Right femoral artery dissection  - incidental finding on CT, no intervention as per vascular       9. DVT Prophylaxis - Lovenox       10. anxiety: xanax at hs PRN.

## 2018-01-12 NOTE — PROGRESS NOTE ADULT - SUBJECTIVE AND OBJECTIVE BOX
FRANDY BAUTISTA    [id number]    67y      Male    Patient is a 67y old  Male who presents with a chief complaint of SOB (15 Dec 2017 10:17)      INTERVAL HPI/OVERNIGHT EVENTS:    Patient is not having any complains, he is feeling ok,  has no complains like nausea, vomiting, dizziness, he went to get upper GI series and was found to have Jejunostomy,  patient's meds has been adjusted along with diet and fluid intake.      REVIEW OF SYSTEMS:    CONSTITUTIONAL: No fever, has fatigue  RESPIRATORY: No cough, No shortness of breath  CARDIOVASCULAR: No chest pain, no palpitations.   GASTROINTESTINAL: No abdominal, No nausea, vomiting.   NEUROLOGICAL: No headaches,  loss of strength.  MISCELLANEOUS: No joint swelling or pain.        Vital Signs Last 24 Hrs  T(C): 36.5 (12 Jan 2018 20:57), Max: 36.7 (12 Jan 2018 17:59)  T(F): 97.7 (12 Jan 2018 20:57), Max: 98 (12 Jan 2018 17:59)  HR: 101 (12 Jan 2018 20:57) (71 - 101)  BP: 102/75 (12 Jan 2018 20:57) (93/56 - 104/66)  RR: 18 (12 Jan 2018 20:57) (17 - 19)  SpO2: 97% (12 Jan 2018 17:59) (97% - 98%)    PHYSICAL EXAM:    GENERAL: Thin built  Elderly male looking comfortable  HEENT: PERRL, +EOMI  NECK: soft, Supple, No JVD,   CHEST/LUNG: Decrease air entry bilaterally; No wheezing  HEART: S1S2+, Regular rate and rhythm; No murmurs  ABDOMEN: Soft, Nontender, Bowel sounds present, s/p Ileostomy connected with drain  EXTREMITIES:  1+ Peripheral Pulses, No edema  SKIN: No rashes or lesions  NEURO: AAOX3, no focal deficits, no motor r sensory loss  PSYCH: normal mood      LABS:                        10.5   6.1   )-----------( 363      ( 12 Jan 2018 06:43 )             34.0     01-12    136  |  95<L>  |  7.0<L>  ----------------------------<  124<H>  4.0   |  34.0<H>  |  0.62    Ca    8.3<L>      12 Jan 2018 06:43      PT/INR - ( 12 Jan 2018 06:43 )   PT: 13.7 sec;   INR: 1.24 ratio                 I&O's Summary    11 Jan 2018 07:01  -  12 Jan 2018 07:00  --------------------------------------------------------  IN: 960 mL / OUT: 2700 mL / NET: -1740 mL    12 Jan 2018 07:01  -  12 Jan 2018 23:23  --------------------------------------------------------  IN: 0 mL / OUT: 3550 mL / NET: -3550 mL        MEDICATIONS  (STANDING):  ALBUTerol/ipratropium for Nebulization. 3 milliLiter(s) Nebulizer once  aspirin  chewable 81 milliGRAM(s) Oral daily  atorvastatin 80 milliGRAM(s) Oral at bedtime  cholestyramine Powder (Sugar-Free) 4 Gram(s) Oral two times a day  clopidogrel Tablet 75 milliGRAM(s) Oral daily  diltiazem    milliGRAM(s) Oral <User Schedule>  enoxaparin Injectable 40 milliGRAM(s) SubCutaneous daily  FIRST- Mouthwash  BLM 10 milliLiter(s) Swish and Spit two times a day  fluticasone propionate/ salmeterol 250-50 MICROgram(s) Diskus 1 Dose(s) Inhalation two times a day  lactated ringers 1000 milliLiter(s) (80 mL/Hr) IV Continuous <Continuous>  levothyroxine 50 MICROGram(s) Oral daily  loperamide Solution 2 milliGRAM(s) Oral four times a day  loratadine 10 milliGRAM(s) Oral daily  multivitamin 1 Tablet(s) Oral daily  nystatin Powder 1 Application(s) Topical two times a day  octreotide  Injectable 150 MICROGram(s) SubCutaneous every 8 hours  pantoprazole   Suspension 40 milliGRAM(s) Oral two times a day before meals  sodium chloride 0.65% Nasal 1 Spray(s) Both Nostrils four times a day  tamsulosin 0.4 milliGRAM(s) Oral at bedtime  tiotropium 18 MICROgram(s) Capsule 1 Capsule(s) Inhalation daily    MEDICATIONS  (PRN):  acetaminophen  Suppository. 650 milliGRAM(s) Rectal every 6 hours PRN Moderate Pain (4 - 6)  ALBUTerol    0.083% 2.5 milliGRAM(s) Nebulizer every 4 hours PRN Shortness of Breath and/or Wheezing  ALPRAZolam 0.25 milliGRAM(s) Oral every 8 hours PRN anxiety  benzonatate 100 milliGRAM(s) Oral three times a day PRN Cough  ondansetron Injectable 4 milliGRAM(s) IV Push every 8 hours PRN Nausea and/or Vomiting

## 2018-01-12 NOTE — PROGRESS NOTE ADULT - PROBLEM SELECTOR PLAN 1
due to very proximal placement of small bowel loop. He will need revision. Continue present therapy. Nothing further to offer from GI standpoint. Will see prn your request.

## 2018-01-12 NOTE — PROGRESS NOTE ADULT - SUBJECTIVE AND OBJECTIVE BOX
Pt seen and examined f/u high ileal output  This morning he feels well with no chest pain, SOB or abdominal pain. He is tolerating the diet without problems. The cat scasn showed that the bowel loop ileostomy is at the level of the proximal jejunum which explains why his output is so high and not responding to any therapeutic measures. Surgical note appreciated and they have ordered further contrast study of small bowel.    REVIEW OF SYSTEMS:    CONSTITUTIONAL: No fever, weight loss, or fatigue  EYES: No eye pain, visual disturbances, or discharge  ENMT:  No difficulty hearing, tinnitus, vertigo; No sinus or throat pain  RESPIRATORY: No cough, wheezing, chills or hemoptysis; No shortness of breath  CARDIOVASCULAR: No chest pain, palpitations, dizziness, or leg swelling  GASTROINTESTINAL: as above    MEDICATIONS:  MEDICATIONS  (STANDING):  ALBUTerol/ipratropium for Nebulization. 3 milliLiter(s) Nebulizer once  aspirin  chewable 81 milliGRAM(s) Oral daily  atorvastatin 80 milliGRAM(s) Oral at bedtime  cholestyramine Powder (Sugar-Free) 4 Gram(s) Oral two times a day  clopidogrel Tablet 75 milliGRAM(s) Oral daily  diltiazem    milliGRAM(s) Oral <User Schedule>  enoxaparin Injectable 40 milliGRAM(s) SubCutaneous daily  FIRST- Mouthwash  BLM 10 milliLiter(s) Swish and Spit two times a day  fluticasone propionate/ salmeterol 250-50 MICROgram(s) Diskus 1 Dose(s) Inhalation two times a day  lactated ringers 1000 milliLiter(s) (80 mL/Hr) IV Continuous <Continuous>  levothyroxine 50 MICROGram(s) Oral daily  loperamide Solution 2 milliGRAM(s) Oral four times a day  loratadine 10 milliGRAM(s) Oral daily  multivitamin 1 Tablet(s) Oral daily  nystatin Powder 1 Application(s) Topical two times a day  octreotide  Injectable 150 MICROGram(s) SubCutaneous every 8 hours  pantoprazole   Suspension 40 milliGRAM(s) Oral two times a day before meals  sodium chloride 0.65% Nasal 1 Spray(s) Both Nostrils four times a day  tamsulosin 0.4 milliGRAM(s) Oral at bedtime  tiotropium 18 MICROgram(s) Capsule 1 Capsule(s) Inhalation daily    MEDICATIONS  (PRN):  acetaminophen  Suppository. 650 milliGRAM(s) Rectal every 6 hours PRN Moderate Pain (4 - 6)  ALBUTerol    0.083% 2.5 milliGRAM(s) Nebulizer every 4 hours PRN Shortness of Breath and/or Wheezing  ALPRAZolam 0.25 milliGRAM(s) Oral every 8 hours PRN anxiety  benzonatate 100 milliGRAM(s) Oral three times a day PRN Cough  ondansetron Injectable 4 milliGRAM(s) IV Push every 8 hours PRN Nausea and/or Vomiting      Allergies    codeine (Unknown)  no veges (Unknown)  penicillin (Unknown)    Intolerances    Symbicort (Short breath)      Vital Signs Last 24 Hrs  T(C): 36.6 (12 Jan 2018 05:25), Max: 37.2 (11 Jan 2018 15:54)  T(F): 97.9 (12 Jan 2018 05:25), Max: 98.9 (11 Jan 2018 15:54)  HR: 71 (12 Jan 2018 05:25) (67 - 101)  BP: 93/56 (12 Jan 2018 05:25) (88/59 - 99/56)  BP(mean): --  RR: 19 (12 Jan 2018 05:25) (18 - 19)  SpO2: 98% (12 Jan 2018 05:25) (98% - 99%)    01-11 @ 07:01  -  01-12 @ 07:00  --------------------------------------------------------  IN: 960 mL / OUT: 2700 mL / NET: -1740 mL        PHYSICAL EXAM:    General: Well developed; well nourished; in no acute distress  HEENT: MMM, conjunctiva and sclera clear  Gastrointestinal:Abdomen: Soft non-tender non-distended; Normal bowel sounds; No hepatosplenomegaly. Ostomy present without change  Extremities: no cyanosis, clubbing or edema.  Skin: Warm and dry. No obvious rash    LABS:      CBC Full  -  ( 12 Jan 2018 06:43 )  WBC Count : 6.1 K/uL  Hemoglobin : 10.5 g/dL  Hematocrit : 34.0 %  Platelet Count - Automated : 363 K/uL  Mean Cell Volume : 88.3 fl  Mean Cell Hemoglobin : 27.3 pg  Mean Cell Hemoglobin Concentration : 30.9 g/dL  Auto Neutrophil # : x  Auto Lymphocyte # : x  Auto Monocyte # : x  Auto Eosinophil # : x  Auto Basophil # : x  Auto Neutrophil % : x  Auto Lymphocyte % : x  Auto Monocyte % : x  Auto Eosinophil % : x  Auto Basophil % : x    01-12    136  |  95<L>  |  7.0<L>  ----------------------------<  124<H>  4.0   |  34.0<H>  |  0.62    Ca    8.3<L>      12 Jan 2018 06:43      PT/INR - ( 12 Jan 2018 06:43 )   PT: 13.7 sec;   INR: 1.24 ratio                           RADIOLOGY & ADDITIONAL STUDIES (The following images were personally reviewed):  < from: CT Abdomen and Pelvis w/ Oral Cont (01.11.18 @ 13:29) >  NTERPRETATION:  Non contrast CT of the abdomen and pelvis     COMPARISON: A prior CT scan dated 12/26/2017.    CLINICAL HISTORY: Increased output from arit lower quadrant loop   ileostomy site..    Technique: contiguous axial images were obtained with 5.0 mm slice   thickness without intravenous contrast administration, which limits the   visualization of the intra-abdominal structures. Oral contrast   administered.  Coronal and sagittal reformats were also submitted for interpretation.      FINDINGS:   Contrast administered during study showing no hiatal hernia. The stomach   and duodenal sweep films of. However the proximal jejunum is pulledto   the right side and empties into the ostomy bag. Ingested contrast entered   the ostomy bag and no contrast seen within the distal small bowel. Prior   ingested contrastseen within the colon..   No free intra-abdominal fluid or air.     Bibasilarairspace consolidations noted.  The liver parenchyma, gallbladder, spleen, atrophic pancreas, kidneys,   adrenal glands, bladder, enlarged prostate with central calcifications   are otherwise grossly within normal limits.  There are large bilateral scrotal hydroceles.  IMPRESSION:     Proximal jejunum emptying into a loop jejunostomyin the right upper   quadrant as described. No contrast seen within the distal small bowel.  Large bilateral scrotal hydroceles.                ISABEL VASQUEZ M.D.,ATTENDING RADIOLOGIST  This document has been electronically signed. Jan 11 2018  2:36PM                < end of copied text >

## 2018-01-12 NOTE — PROGRESS NOTE ADULT - ASSESSMENT
68 y/o M s/p loop ileostomy under local anesthesia on 12/7 due to poor pulmonary status, not a good candidate for general anesthesia or sedation. Continues to have high ostomy output despite loperamide, octreotide, high fiber diet. CT a/p yesterday showing "proximal jejunum emptying into a loop jejunostomy in the right upper quadrant."    Plan:  -Upper GI series to delineate length of small bowel proximal to ostomy  -Recommend aggressive repletion of fluids with isotonic IV fluids to keep up with ostomy output  -Recommend aggressive repletion of electrolytes  -ACS will continue to follow

## 2018-01-13 LAB
ANION GAP SERPL CALC-SCNC: 9 MMOL/L — SIGNIFICANT CHANGE UP (ref 5–17)
BUN SERPL-MCNC: 6 MG/DL — LOW (ref 8–20)
CALCIUM SERPL-MCNC: 8.6 MG/DL — SIGNIFICANT CHANGE UP (ref 8.6–10.2)
CHLORIDE SERPL-SCNC: 100 MMOL/L — SIGNIFICANT CHANGE UP (ref 98–107)
CO2 SERPL-SCNC: 33 MMOL/L — HIGH (ref 22–29)
CREAT SERPL-MCNC: 0.6 MG/DL — SIGNIFICANT CHANGE UP (ref 0.5–1.3)
GLUCOSE SERPL-MCNC: 174 MG/DL — HIGH (ref 70–115)
MAGNESIUM SERPL-MCNC: 1.7 MG/DL — SIGNIFICANT CHANGE UP (ref 1.6–2.6)
PHOSPHATE SERPL-MCNC: 1.9 MG/DL — LOW (ref 2.4–4.7)
POTASSIUM SERPL-MCNC: 4.4 MMOL/L — SIGNIFICANT CHANGE UP (ref 3.5–5.3)
POTASSIUM SERPL-SCNC: 4.4 MMOL/L — SIGNIFICANT CHANGE UP (ref 3.5–5.3)
SODIUM SERPL-SCNC: 142 MMOL/L — SIGNIFICANT CHANGE UP (ref 135–145)

## 2018-01-13 PROCEDURE — 99233 SBSQ HOSP IP/OBS HIGH 50: CPT

## 2018-01-13 RX ORDER — OCTREOTIDE ACETATE 200 UG/ML
300 INJECTION, SOLUTION INTRAVENOUS; SUBCUTANEOUS THREE TIMES A DAY
Qty: 0 | Refills: 0 | Status: DISCONTINUED | OUTPATIENT
Start: 2018-01-13 | End: 2018-01-16

## 2018-01-13 RX ORDER — SODIUM,POTASSIUM PHOSPHATES 278-250MG
1 POWDER IN PACKET (EA) ORAL
Qty: 0 | Refills: 0 | Status: DISCONTINUED | OUTPATIENT
Start: 2018-01-13 | End: 2018-01-13

## 2018-01-13 RX ADMIN — Medication 2 MILLIGRAM(S): at 17:54

## 2018-01-13 RX ADMIN — CHOLESTYRAMINE 4 GRAM(S): 4 POWDER, FOR SUSPENSION ORAL at 21:20

## 2018-01-13 RX ADMIN — Medication 1 SPRAY(S): at 17:54

## 2018-01-13 RX ADMIN — Medication 2 MILLIGRAM(S): at 06:08

## 2018-01-13 RX ADMIN — LORATADINE 10 MILLIGRAM(S): 10 TABLET ORAL at 11:19

## 2018-01-13 RX ADMIN — Medication 2 MILLIGRAM(S): at 11:18

## 2018-01-13 RX ADMIN — OCTREOTIDE ACETATE 300 MICROGRAM(S): 200 INJECTION, SOLUTION INTRAVENOUS; SUBCUTANEOUS at 13:51

## 2018-01-13 RX ADMIN — Medication 180 MILLIGRAM(S): at 17:54

## 2018-01-13 RX ADMIN — DIPHENHYDRAMINE HYDROCHLORIDE AND LIDOCAINE HYDROCHLORIDE AND ALUMINUM HYDROXIDE AND MAGNESIUM HYDRO 10 MILLILITER(S): KIT at 06:07

## 2018-01-13 RX ADMIN — TAMSULOSIN HYDROCHLORIDE 0.4 MILLIGRAM(S): 0.4 CAPSULE ORAL at 21:21

## 2018-01-13 RX ADMIN — NYSTATIN CREAM 1 APPLICATION(S): 100000 CREAM TOPICAL at 17:54

## 2018-01-13 RX ADMIN — Medication 0.25 MILLIGRAM(S): at 21:33

## 2018-01-13 RX ADMIN — NYSTATIN CREAM 1 APPLICATION(S): 100000 CREAM TOPICAL at 06:07

## 2018-01-13 RX ADMIN — TIOTROPIUM BROMIDE 1 CAPSULE(S): 18 CAPSULE ORAL; RESPIRATORY (INHALATION) at 12:04

## 2018-01-13 RX ADMIN — PANTOPRAZOLE SODIUM 40 MILLIGRAM(S): 20 TABLET, DELAYED RELEASE ORAL at 06:08

## 2018-01-13 RX ADMIN — Medication 180 MILLIGRAM(S): at 06:08

## 2018-01-13 RX ADMIN — Medication 81 MILLIGRAM(S): at 11:18

## 2018-01-13 RX ADMIN — Medication 1 SPRAY(S): at 11:19

## 2018-01-13 RX ADMIN — Medication 50 MICROGRAM(S): at 06:08

## 2018-01-13 RX ADMIN — FLUTICASONE PROPIONATE AND SALMETEROL 1 DOSE(S): 50; 250 POWDER ORAL; RESPIRATORY (INHALATION) at 08:57

## 2018-01-13 RX ADMIN — Medication 1 SPRAY(S): at 06:10

## 2018-01-13 RX ADMIN — ATORVASTATIN CALCIUM 80 MILLIGRAM(S): 80 TABLET, FILM COATED ORAL at 21:20

## 2018-01-13 RX ADMIN — FLUTICASONE PROPIONATE AND SALMETEROL 1 DOSE(S): 50; 250 POWDER ORAL; RESPIRATORY (INHALATION) at 21:20

## 2018-01-13 RX ADMIN — Medication 1 TABLET(S): at 11:20

## 2018-01-13 RX ADMIN — Medication 1 SPRAY(S): at 00:50

## 2018-01-13 RX ADMIN — OCTREOTIDE ACETATE 300 MICROGRAM(S): 200 INJECTION, SOLUTION INTRAVENOUS; SUBCUTANEOUS at 21:34

## 2018-01-13 RX ADMIN — CHOLESTYRAMINE 4 GRAM(S): 4 POWDER, FOR SUSPENSION ORAL at 08:57

## 2018-01-13 RX ADMIN — PANTOPRAZOLE SODIUM 40 MILLIGRAM(S): 20 TABLET, DELAYED RELEASE ORAL at 17:54

## 2018-01-13 RX ADMIN — Medication 2 MILLIGRAM(S): at 00:50

## 2018-01-13 RX ADMIN — OCTREOTIDE ACETATE 300 MICROGRAM(S): 200 INJECTION, SOLUTION INTRAVENOUS; SUBCUTANEOUS at 06:09

## 2018-01-13 RX ADMIN — SODIUM CHLORIDE 80 MILLILITER(S): 9 INJECTION, SOLUTION INTRAVENOUS at 13:51

## 2018-01-13 RX ADMIN — Medication 0.25 MILLIGRAM(S): at 11:18

## 2018-01-13 NOTE — PROGRESS NOTE ADULT - ASSESSMENT
I. High output jejunostomy  Surgery planned revision for 1/16/18 Tuesday.  Will keep hydrated and replete electrolyte.  Surgery requested TPN until proceure.     2. Acute on chronic hypoxic respiratory failure secondary to COPD exacerbation, improved, he is at his baseline, on home oxygen (4 liters), continue bronchodilators and prednisone tapered off, continue Mucomyst PRN, completed course of Levaquin    3. CAP - completed course of Levaquin on 12/5.     4. Hypertension - stable   - continue cardizem    5. Hyperlipidemia   - continue statin    6.hyponatremia: resolved, it was due to high ileostomy output.    Hypokalemia - being replaced  Hypophosphatemia: improved  Hypomagnesemia: improved  on multiple electrolyte sol.   monitor Electrolytes    7. Scrotal edema chronic with urinary retention, no more retention, Roy if not able to urinate  - swelling and redness going down  - patient following with  as outpatient  - cont scrotal elevation    8. Right femoral artery dissection  - incidental finding on CT, no intervention as per vascular     9. anxiety: xanax at hs PRN.

## 2018-01-13 NOTE — PROGRESS NOTE ADULT - SUBJECTIVE AND OBJECTIVE BOX
CC: Jejunostomy tube. Weight loss because food intake going virtually through. Surgery planning revision to tube and requested patient be placed on TPN until procedure planned for Tuesday 1/16/18  HPI:  67 YOM w/PMH of COPD stage 4 s/p right lung reduction in 2010, currently on home oxygen 4L (sat 92-93%), HTN, CVA on 2014, DM, hypothyroidism, hydrocele, kidney stones who came because he noted that his O2 sat was 76% today. He mentions more difficult to talk in full sencentces compared to his baseline. He also has a chronic productive cough which has not changed in frequency or color. He mentions that his oxygen machine broke 4 days ago and he got a new one since, but is not sure if its working properly. He denies SOB, increased cough, chest pain, hemoptysis nausea, weakness. Pt has to sleep in a sit position with 2 pillows since he gets SOB if he lyes flat, condition that he attributes to his postnasal drip (29 Nov 2017 02:05)    REVIEW OF SYSTEMS:    Patient denied fever, chills, abdominal pain, nausea, vomiting, cough, shortness of breath, chest pain or palpitations    Vital Signs Last 24 Hrs  T(C): 36.7 (13 Jan 2018 11:16), Max: 36.7 (12 Jan 2018 17:59)  T(F): 98 (13 Jan 2018 11:16), Max: 98 (12 Jan 2018 17:59)  HR: 97 (13 Jan 2018 11:16) (75 - 101)  BP: 94/58 (13 Jan 2018 11:16) (94/58 - 104/66)  BP(mean): --  RR: 18 (13 Jan 2018 11:16) (17 - 18)  SpO2: 97% (13 Jan 2018 11:16) (97% - 97%)I&O's Summary    12 Jan 2018 07:01  -  13 Jan 2018 07:00  --------------------------------------------------------  IN: 400 mL / OUT: 5350 mL / NET: -4950 mL    13 Jan 2018 07:01  -  13 Jan 2018 15:26  --------------------------------------------------------  IN: 850 mL / OUT: 2300 mL / NET: -1450 mL      PHYSICAL EXAM:  GENERAL: cachexia   HEENT: PERRL, +EOMI, anicteric, no [place]  NECK: Supple, No JVD   CHEST/LUNG: CTA bilaterally; Normal effort  HEART: S1S2 Normal intensity, no murmurs, gallops or rubs noted  ABDOMEN: Soft, BS Normoactive, NT, ND, no HSM noted  EXTREMITIES:  2+ radial and DP pulses noted, no clubbing, cyanosis, or edema noted. Limited mobility   SKIN: No rashes or lesions noted  NEURO: A&Ox3, no focal deficits noted, CN II-XII intact  PSYCH: Depressed mood and affect; insight/judgement appropriate  LABS:                        10.5   6.1   )-----------( 363      ( 12 Jan 2018 06:43 )             34.0     01-13    142  |  100  |  6.0<L>  ----------------------------<  174<H>  4.4   |  33.0<H>  |  0.60    Ca    8.6      13 Jan 2018 11:23  Phos  1.9     01-13  Mg     1.7     01-13      PT/INR - ( 12 Jan 2018 06:43 )   PT: 13.7 sec;   INR: 1.24 ratio             RADIOLOGY & ADDITIONAL TESTS:    MEDICATIONS:  MEDICATIONS  (STANDING):  ALBUTerol/ipratropium for Nebulization. 3 milliLiter(s) Nebulizer once  aspirin  chewable 81 milliGRAM(s) Oral daily  atorvastatin 80 milliGRAM(s) Oral at bedtime  cholestyramine Powder (Sugar-Free) 4 Gram(s) Oral two times a day  diltiazem    milliGRAM(s) Oral <User Schedule>  enoxaparin Injectable 40 milliGRAM(s) SubCutaneous daily  FIRST- Mouthwash  BLM 10 milliLiter(s) Swish and Spit two times a day  fluticasone propionate/ salmeterol 250-50 MICROgram(s) Diskus 1 Dose(s) Inhalation two times a day  lactated ringers 1000 milliLiter(s) (80 mL/Hr) IV Continuous <Continuous>  levothyroxine 50 MICROGram(s) Oral daily  loperamide Solution 2 milliGRAM(s) Oral four times a day  loratadine 10 milliGRAM(s) Oral daily  multivitamin 1 Tablet(s) Oral daily  nystatin Powder 1 Application(s) Topical two times a day  octreotide  Injectable 300 MICROGram(s) SubCutaneous three times a day  pantoprazole   Suspension 40 milliGRAM(s) Oral two times a day before meals  sodium chloride 0.65% Nasal 1 Spray(s) Both Nostrils four times a day  tamsulosin 0.4 milliGRAM(s) Oral at bedtime  tiotropium 18 MICROgram(s) Capsule 1 Capsule(s) Inhalation daily    MEDICATIONS  (PRN):  acetaminophen  Suppository. 650 milliGRAM(s) Rectal every 6 hours PRN Moderate Pain (4 - 6)  ALBUTerol    0.083% 2.5 milliGRAM(s) Nebulizer every 4 hours PRN Shortness of Breath and/or Wheezing  ALPRAZolam 0.25 milliGRAM(s) Oral every 8 hours PRN anxiety  benzonatate 100 milliGRAM(s) Oral three times a day PRN Cough  ondansetron Injectable 4 milliGRAM(s) IV Push every 8 hours PRN Nausea and/or Vomiting

## 2018-01-13 NOTE — PROGRESS NOTE ADULT - SUBJECTIVE AND OBJECTIVE BOX
SUBJECTIVE/24 hour events:  Patient is a 67yMale with a long complicated hospital stay , we a performed loop ileostomy on 12/7 for colonic obstruction now with jejunum emptying into the ostomy. We are still discussing if and when we are going to go for operative repair. Patient continues to have high output over 2 liters in 24 hours,     Vital Signs Last 24 Hrs  T(C): 36.7 (13 Jan 2018 11:16), Max: 36.7 (12 Jan 2018 17:59)  T(F): 98 (13 Jan 2018 11:16), Max: 98 (12 Jan 2018 17:59)  HR: 97 (13 Jan 2018 11:16) (75 - 101)  BP: 94/58 (13 Jan 2018 11:16) (94/58 - 104/66)  BP(mean): --  RR: 18 (13 Jan 2018 11:16) (17 - 18)  SpO2: 97% (13 Jan 2018 11:16) (97% - 97%)  Drug Dosing Weight  Height (cm): 175.26 (15 Jul 2016 17:23)  Weight (kg): 65.3 (28 Nov 2017 21:35)  BMI (kg/m2): 21.3 (28 Nov 2017 21:35)  BSA (m2): 1.8 (28 Nov 2017 21:35)  I&O's Detail    12 Jan 2018 07:01  -  13 Jan 2018 07:00  --------------------------------------------------------  IN:    lactated ringers: 400 mL  Total IN: 400 mL    OUT:    Ileostomy: 4950 mL    Voided: 400 mL  Total OUT: 5350 mL    Total NET: -4950 mL      13 Jan 2018 07:01  -  13 Jan 2018 11:33  --------------------------------------------------------  IN:    lactated ringers: 400 mL    Oral Fluid: 450 mL  Total IN: 850 mL    OUT:    Ileostomy: 2300 mL  Total OUT: 2300 mL    Total NET: -1450 mL        Allergies    codeine (Unknown)  no veges (Unknown)  penicillin (Unknown)    Intolerances    DO NOT SEND ORANGES (Unknown)  Symbicort (Short breath)                            10.5   6.1   )-----------( 363      ( 12 Jan 2018 06:43 )             34.0   01-12    136  |  95<L>  |  7.0<L>  ----------------------------<  124<H>  4.0   |  34.0<H>  |  0.62    Ca    8.3<L>      12 Jan 2018 06:43    PT/INR - ( 12 Jan 2018 06:43 )   PT: 13.7 sec;   INR: 1.24 ratio             ROS:    PHYSICAL EXAM:  Constitutional: in good spirits , no complaints     Respiratory: in no respiratory distress, no conversational dyspnea    Cardiovascular: Vs stable    Gastrointestinal: abdomen soft, stoma pink, viable     Neurological: GCS 15, A&Ox3    Skin: warm, dry and no rashes           MEDICATIONS  (STANDING):  ALBUTerol/ipratropium for Nebulization. 3 milliLiter(s) Nebulizer once  aspirin  chewable 81 milliGRAM(s) Oral daily  atorvastatin 80 milliGRAM(s) Oral at bedtime  cholestyramine Powder (Sugar-Free) 4 Gram(s) Oral two times a day  diltiazem    milliGRAM(s) Oral <User Schedule>  enoxaparin Injectable 40 milliGRAM(s) SubCutaneous daily  FIRST- Mouthwash  BLM 10 milliLiter(s) Swish and Spit two times a day  fluticasone propionate/ salmeterol 250-50 MICROgram(s) Diskus 1 Dose(s) Inhalation two times a day  lactated ringers 1000 milliLiter(s) (80 mL/Hr) IV Continuous <Continuous>  levothyroxine 50 MICROGram(s) Oral daily  loperamide Solution 2 milliGRAM(s) Oral four times a day  loratadine 10 milliGRAM(s) Oral daily  multivitamin 1 Tablet(s) Oral daily  nystatin Powder 1 Application(s) Topical two times a day  octreotide  Injectable 300 MICROGram(s) SubCutaneous three times a day  pantoprazole   Suspension 40 milliGRAM(s) Oral two times a day before meals  sodium chloride 0.65% Nasal 1 Spray(s) Both Nostrils four times a day  tamsulosin 0.4 milliGRAM(s) Oral at bedtime  tiotropium 18 MICROgram(s) Capsule 1 Capsule(s) Inhalation daily    MEDICATIONS  (PRN):  acetaminophen  Suppository. 650 milliGRAM(s) Rectal every 6 hours PRN Moderate Pain (4 - 6)  ALBUTerol    0.083% 2.5 milliGRAM(s) Nebulizer every 4 hours PRN Shortness of Breath and/or Wheezing  ALPRAZolam 0.25 milliGRAM(s) Oral every 8 hours PRN anxiety  benzonatate 100 milliGRAM(s) Oral three times a day PRN Cough  ondansetron Injectable 4 milliGRAM(s) IV Push every 8 hours PRN Nausea and/or Vomiting      RADIOLOGY STUDIES:    CULTURES:        with a

## 2018-01-13 NOTE — CHART NOTE - NSCHARTNOTEFT_GEN_A_CORE
Source: Patient [x ]  Family [x ]   other [ ]    Current Diet: high fiber with ensure clears TID with ileostomy    Patient reports [ ] nausea  [ ] vomiting [ ] diarrhea [ ] constipation  [ ]chewing problems [ ] swallowing issues  [ ] other: continued high output in ileostomy    PO intake:  < 50% [ ]   50-75%  [ ]   %  [x ]  other :    Source for PO intake [x ] Patient [ ] family [ ] chart [ ] staff [ ] other    Enteral /Parenteral Nutrition:     Current Weight: 104# as per wife  % Weight Change 27% weight loss    Pertinent Medications: MEDICATIONS  (STANDING):  ALBUTerol/ipratropium for Nebulization. 3 milliLiter(s) Nebulizer once  aspirin  chewable 81 milliGRAM(s) Oral daily  atorvastatin 80 milliGRAM(s) Oral at bedtime  cholestyramine Powder (Sugar-Free) 4 Gram(s) Oral two times a day  diltiazem    milliGRAM(s) Oral <User Schedule>  enoxaparin Injectable 40 milliGRAM(s) SubCutaneous daily  FIRST- Mouthwash  BLM 10 milliLiter(s) Swish and Spit two times a day  fluticasone propionate/ salmeterol 250-50 MICROgram(s) Diskus 1 Dose(s) Inhalation two times a day  lactated ringers 1000 milliLiter(s) (80 mL/Hr) IV Continuous <Continuous>  levothyroxine 50 MICROGram(s) Oral daily  loperamide Solution 2 milliGRAM(s) Oral four times a day  loratadine 10 milliGRAM(s) Oral daily  multivitamin 1 Tablet(s) Oral daily  nystatin Powder 1 Application(s) Topical two times a day  octreotide  Injectable 300 MICROGram(s) SubCutaneous three times a day  pantoprazole   Suspension 40 milliGRAM(s) Oral two times a day before meals  sodium chloride 0.65% Nasal 1 Spray(s) Both Nostrils four times a day  tamsulosin 0.4 milliGRAM(s) Oral at bedtime  tiotropium 18 MICROgram(s) Capsule 1 Capsule(s) Inhalation daily    MEDICATIONS  (PRN):  acetaminophen  Suppository. 650 milliGRAM(s) Rectal every 6 hours PRN Moderate Pain (4 - 6)  ALBUTerol    0.083% 2.5 milliGRAM(s) Nebulizer every 4 hours PRN Shortness of Breath and/or Wheezing  ALPRAZolam 0.25 milliGRAM(s) Oral every 8 hours PRN anxiety  benzonatate 100 milliGRAM(s) Oral three times a day PRN Cough  ondansetron Injectable 4 milliGRAM(s) IV Push every 8 hours PRN Nausea and/or Vomiting    Pertinent Labs: CBC Full  -  ( 12 Jan 2018 06:43 )  WBC Count : 6.1 K/uL  Hemoglobin : 10.5 g/dL  Hematocrit : 34.0 %  Platelet Count - Automated : 363 K/uL  Mean Cell Volume : 88.3 fl  Mean Cell Hemoglobin : 27.3 pg  Mean Cell Hemoglobin Concentration : 30.9 g/dL  Auto Neutrophil # : x  Auto Lymphocyte # : x  Auto Monocyte # : x  Auto Eosinophil # : x  Auto Basophil # : x  Auto Neutrophil % : x  Auto Lymphocyte % : x  Auto Monocyte % : x  Auto Eosinophil % : x  Auto Basophil % : x      01-12 Na136 mmol/L Glu 124 mg/dL<H> K+ 4.0 mmol/L Cr  0.62 mg/dL BUN 7.0 mg/dL<L> Phos n/a   Alb n/a   PAB n/a           Skin:     Nutrition focused physical exam conducted - found signs of malnutrition [ ]absent [x ]present    Subcutaneous fat loss: [ ] Orbital fat pads region, [ ]Buccal fat region, [x ]Triceps region,  [ x]Ribs region    Muscle wasting: [x ]Temples region, [x ]Clavicle region, [ ]Shoulder region, [ ]Scapula region, [ ]Interosseous region,  [x ]thigh region, [ ]Calf region    Estimated Needs:   [ ] no change since previous assessment  [x ] recalculated: 1418-1654Kcal based on 104#    Current Nutrition Diagnosis:  Pt meets criteria for severe chronic malnutrition related to absorption issues,  in setting of ileostomy as evidenced by high ostomy output, 36# weight loss since admission 11/29, severe fat/ muscle depletion in thoracic region, thighs, temples, clavicles. Pt has been drinking Ensure TID and it has been increasing output immediately.  Ensure changed to Ensure clears. Pt taking  well with good appetite. Output is still high.  Surgery possible.       Recommendations: Continue Ensure clears, Rec TPN as per GI or surgery    Monitoring and Evaluation:   [x ] PO intake [ x] Tolerance to diet prescription [X] Weights  [X] Follow up per protocol [X] Labs:

## 2018-01-13 NOTE — PROGRESS NOTE ADULT - PROBLEM SELECTOR PLAN 1
patient doing well, continue to monitor for dehydration, and electrolyte abnormalities, will discuss with attending any plans for operative plan

## 2018-01-14 DIAGNOSIS — Z01.810 ENCOUNTER FOR PREPROCEDURAL CARDIOVASCULAR EXAMINATION: ICD-10-CM

## 2018-01-14 LAB
ANION GAP SERPL CALC-SCNC: 11 MMOL/L — SIGNIFICANT CHANGE UP (ref 5–17)
BUN SERPL-MCNC: 6 MG/DL — LOW (ref 8–20)
CALCIUM SERPL-MCNC: 8.2 MG/DL — LOW (ref 8.6–10.2)
CHLORIDE SERPL-SCNC: 99 MMOL/L — SIGNIFICANT CHANGE UP (ref 98–107)
CO2 SERPL-SCNC: 30 MMOL/L — HIGH (ref 22–29)
CREAT SERPL-MCNC: 0.57 MG/DL — SIGNIFICANT CHANGE UP (ref 0.5–1.3)
GLUCOSE SERPL-MCNC: 148 MG/DL — HIGH (ref 70–115)
HCT VFR BLD CALC: 36 % — LOW (ref 42–52)
HGB BLD-MCNC: 11.1 G/DL — LOW (ref 14–18)
MAGNESIUM SERPL-MCNC: 1.6 MG/DL — SIGNIFICANT CHANGE UP (ref 1.6–2.6)
MCHC RBC-ENTMCNC: 27.6 PG — SIGNIFICANT CHANGE UP (ref 27–31)
MCHC RBC-ENTMCNC: 30.8 G/DL — LOW (ref 32–36)
MCV RBC AUTO: 89.6 FL — SIGNIFICANT CHANGE UP (ref 80–94)
PHOSPHATE SERPL-MCNC: 2 MG/DL — LOW (ref 2.4–4.7)
PLATELET # BLD AUTO: 361 K/UL — SIGNIFICANT CHANGE UP (ref 150–400)
POTASSIUM SERPL-MCNC: 4.7 MMOL/L — SIGNIFICANT CHANGE UP (ref 3.5–5.3)
POTASSIUM SERPL-SCNC: 4.7 MMOL/L — SIGNIFICANT CHANGE UP (ref 3.5–5.3)
RBC # BLD: 4.02 M/UL — LOW (ref 4.6–6.2)
RBC # FLD: 16.1 % — HIGH (ref 11–15.6)
SODIUM SERPL-SCNC: 140 MMOL/L — SIGNIFICANT CHANGE UP (ref 135–145)
WBC # BLD: 6.3 K/UL — SIGNIFICANT CHANGE UP (ref 4.8–10.8)
WBC # FLD AUTO: 6.3 K/UL — SIGNIFICANT CHANGE UP (ref 4.8–10.8)

## 2018-01-14 PROCEDURE — 99233 SBSQ HOSP IP/OBS HIGH 50: CPT

## 2018-01-14 PROCEDURE — 99222 1ST HOSP IP/OBS MODERATE 55: CPT

## 2018-01-14 PROCEDURE — 99232 SBSQ HOSP IP/OBS MODERATE 35: CPT

## 2018-01-14 RX ADMIN — Medication 2 MILLIGRAM(S): at 11:16

## 2018-01-14 RX ADMIN — Medication 2 MILLIGRAM(S): at 18:33

## 2018-01-14 RX ADMIN — Medication 81 MILLIGRAM(S): at 11:17

## 2018-01-14 RX ADMIN — PANTOPRAZOLE SODIUM 40 MILLIGRAM(S): 20 TABLET, DELAYED RELEASE ORAL at 18:32

## 2018-01-14 RX ADMIN — OCTREOTIDE ACETATE 300 MICROGRAM(S): 200 INJECTION, SOLUTION INTRAVENOUS; SUBCUTANEOUS at 13:19

## 2018-01-14 RX ADMIN — Medication 2 MILLIGRAM(S): at 00:50

## 2018-01-14 RX ADMIN — Medication 1 SPRAY(S): at 06:16

## 2018-01-14 RX ADMIN — Medication 0.25 MILLIGRAM(S): at 22:42

## 2018-01-14 RX ADMIN — LORATADINE 10 MILLIGRAM(S): 10 TABLET ORAL at 11:17

## 2018-01-14 RX ADMIN — OCTREOTIDE ACETATE 300 MICROGRAM(S): 200 INJECTION, SOLUTION INTRAVENOUS; SUBCUTANEOUS at 06:16

## 2018-01-14 RX ADMIN — Medication 1 TABLET(S): at 11:16

## 2018-01-14 RX ADMIN — Medication 2 MILLIGRAM(S): at 06:18

## 2018-01-14 RX ADMIN — FLUTICASONE PROPIONATE AND SALMETEROL 1 DOSE(S): 50; 250 POWDER ORAL; RESPIRATORY (INHALATION) at 20:17

## 2018-01-14 RX ADMIN — NYSTATIN CREAM 1 APPLICATION(S): 100000 CREAM TOPICAL at 18:32

## 2018-01-14 RX ADMIN — CHOLESTYRAMINE 4 GRAM(S): 4 POWDER, FOR SUSPENSION ORAL at 20:17

## 2018-01-14 RX ADMIN — SODIUM CHLORIDE 80 MILLILITER(S): 9 INJECTION, SOLUTION INTRAVENOUS at 01:03

## 2018-01-14 RX ADMIN — ATORVASTATIN CALCIUM 80 MILLIGRAM(S): 80 TABLET, FILM COATED ORAL at 22:43

## 2018-01-14 RX ADMIN — OCTREOTIDE ACETATE 300 MICROGRAM(S): 200 INJECTION, SOLUTION INTRAVENOUS; SUBCUTANEOUS at 22:43

## 2018-01-14 RX ADMIN — NYSTATIN CREAM 1 APPLICATION(S): 100000 CREAM TOPICAL at 06:17

## 2018-01-14 RX ADMIN — Medication 1 SPRAY(S): at 18:32

## 2018-01-14 RX ADMIN — Medication 50 MICROGRAM(S): at 06:17

## 2018-01-14 RX ADMIN — Medication 1 SPRAY(S): at 11:16

## 2018-01-14 RX ADMIN — Medication 1 SPRAY(S): at 00:44

## 2018-01-14 RX ADMIN — CHOLESTYRAMINE 4 GRAM(S): 4 POWDER, FOR SUSPENSION ORAL at 08:40

## 2018-01-14 RX ADMIN — Medication 0.25 MILLIGRAM(S): at 11:16

## 2018-01-14 RX ADMIN — TIOTROPIUM BROMIDE 1 CAPSULE(S): 18 CAPSULE ORAL; RESPIRATORY (INHALATION) at 09:20

## 2018-01-14 RX ADMIN — Medication 2 MILLIGRAM(S): at 23:06

## 2018-01-14 RX ADMIN — FLUTICASONE PROPIONATE AND SALMETEROL 1 DOSE(S): 50; 250 POWDER ORAL; RESPIRATORY (INHALATION) at 08:39

## 2018-01-14 RX ADMIN — PANTOPRAZOLE SODIUM 40 MILLIGRAM(S): 20 TABLET, DELAYED RELEASE ORAL at 06:17

## 2018-01-14 RX ADMIN — TAMSULOSIN HYDROCHLORIDE 0.4 MILLIGRAM(S): 0.4 CAPSULE ORAL at 22:43

## 2018-01-14 RX ADMIN — Medication 1 SPRAY(S): at 23:06

## 2018-01-14 NOTE — PROGRESS NOTE ADULT - ASSESSMENT
67M with stg 4 COPD with loop jejunostomy with fluid and electrolyte imbalance now doing well, continue to monitor for dehydration, and electrolyte abnormalities.  -recommend TPN  -please obtain cardiology risk stratification  -please obtain pulmonary clearance  -possible revision of ostomy this coming week 1/16 tentatively  -replete lytes prn (including Mg, Phos)  -hold plavix

## 2018-01-14 NOTE — CONSULT NOTE ADULT - SUBJECTIVE AND OBJECTIVE BOX
[facility]                                                       [facility] [place]                                                        Office: [address] [address]                                                       Telephone: [phone]. Fax:[phone]      CC: abd pain.     HPI: Patient is a  67y Male with history of COPD, pulm hypertension planned for reversal of ostomy. Has been followed by cardiology. TTE with normal LV function in 9/2017, severe pulmonary hypertension. Is improving in his nutritional status.   Limited functionally due to pulmonary status.      PAST MEDICAL & SURGICAL HISTORY:  Hypothyroidism  Hydrocele  Renal calculi  Diabetes mellitus  Hypertension  CVA (cerebral vascular accident)  Chronic obstructive pulmonary disease, unspecified COPD type  History of lung surgery    FAMILY HISTORY:  No pertinent family history in first degree relatives    SOCIAL HISTORY: no EtOH, drugs or tobacco    MEDICATIONS  (STANDING):  ALBUTerol/ipratropium for Nebulization. 3 milliLiter(s) Nebulizer once  aspirin  chewable 81 milliGRAM(s) Oral daily  atorvastatin 80 milliGRAM(s) Oral at bedtime  cholestyramine Powder (Sugar-Free) 4 Gram(s) Oral two times a day  diltiazem    milliGRAM(s) Oral daily  enoxaparin Injectable 40 milliGRAM(s) SubCutaneous daily  FIRST- Mouthwash  BLM 10 milliLiter(s) Swish and Spit two times a day  fluticasone propionate/ salmeterol 250-50 MICROgram(s) Diskus 1 Dose(s) Inhalation two times a day  lactated ringers 1000 milliLiter(s) (80 mL/Hr) IV Continuous <Continuous>  levothyroxine 50 MICROGram(s) Oral daily  loperamide Solution 2 milliGRAM(s) Oral four times a day  loratadine 10 milliGRAM(s) Oral daily  multivitamin 1 Tablet(s) Oral daily  nystatin Powder 1 Application(s) Topical two times a day  octreotide  Injectable 300 MICROGram(s) SubCutaneous three times a day  pantoprazole   Suspension 40 milliGRAM(s) Oral two times a day before meals  sodium chloride 0.65% Nasal 1 Spray(s) Both Nostrils four times a day  tamsulosin 0.4 milliGRAM(s) Oral at bedtime  tiotropium 18 MICROgram(s) Capsule 1 Capsule(s) Inhalation daily    ROS: All others negative    PHYSICAL EXAM:  Vital Signs Last 24 Hrs  T(C): 36.4 (14 Jan 2018 17:14), Max: 36.4 (14 Jan 2018 11:24)  T(F): 97.6 (14 Jan 2018 17:14), Max: 97.6 (14 Jan 2018 11:24)  HR: 114 (14 Jan 2018 17:14) (77 - 114)  BP: 95/68 (14 Jan 2018 17:14) (92/56 - 108/67)  BP(mean): --  RR: 18 (14 Jan 2018 11:24) (18 - 18)  SpO2: 97% (14 Jan 2018 11:24) (97% - 99%)  I&O's Summary    13 Jan 2018 07:01  -  14 Jan 2018 07:00  --------------------------------------------------------  IN: 3660 mL / OUT: 6700 mL / NET: -3040 mL    14 Jan 2018 07:01  -  14 Jan 2018 18:26  --------------------------------------------------------  IN: 850 mL / OUT: 2100 mL / NET: -1250 mL      Appearance: Normal	  HEENT:   Normal oral mucosa, PERRL, EOMI	  Lymphatic: No lymphadenopathy  Cardiovascular: Normal S1 S2, No JVD, No murmurs, No edema  Respiratory: Lungs clear to auscultation	  Psychiatry: A & O x 3, Mood & affect appropriate  Gastrointestinal:  ostomy in place.   Skin: No rashes, No ecchymoses, No cyanosis  Neurologic: Non-focal  Extremities: Normal range of motion, No clubbing, cyanosis or edema  Vascular: Peripheral pulses palpable 2+ bilaterally    ECG: Sinus tachycardia.   LABS:                        11.1   6.3   )-----------( 361      ( 14 Jan 2018 08:15 )             36.0     01-14    140  |  99  |  6.0<L>  ----------------------------<  148<H>  4.7   |  30.0<H>  |  0.57    Ca    8.2<L>      14 Jan 2018 08:15  Phos  2.0     01-14  Mg     1.6     01-14            RADIOLOGY & ADDITIONAL STUDIES:

## 2018-01-14 NOTE — PROGRESS NOTE ADULT - ASSESSMENT
I. 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  Will keep hydrated and replete electrolyte.    Will obtain cardiology and pulm consult prior to surgery .      2. Acute on chronic hypoxic respiratory failure secondary to COPD exacerbation, improved, he is at his baseline, on home oxygen (4 liters), continue bronchodilators and prednisone tapered off, continue Mucomyst PRN, completed course of Levaquin    3. CAP - completed course of Levaquin on 12/5.     4. Hypertension - stable   - continue cardizem XR  at 120mg daily , reduced dose.     5.Electrolyte abnormalities  : resolved, due to high ileostomy output.    Hypokalemia - being replaced  Hypophosphatemia: improved  Hypomagnesemia: improved  on multiple electrolyte sol.   monitor Electrolytes    7. Scrotal edema chronic with urinary retention, no more retention, Roy if not able to urinate  - swelling and redness going down  - patient following with  as outpatient  - cont scrotal elevation    8. Right femoral artery dissection  - incidental finding on CT, no intervention as per vascular     9. anxiety: xanax at hs PRN.

## 2018-01-14 NOTE — PROGRESS NOTE ADULT - SUBJECTIVE AND OBJECTIVE BOX
Pt seen and examined f/u prior LBO with proximal jejunal loop cuasing short gut.  I was called last nite by hospitalist at the request of the traum surgeons to start TPN. The patientfeels good with no complaints. Surgery is scheduled for day after tomorrow to correct the scituation. He does not have a central line.     REVIEW OF SYSTEMS:    CONSTITUTIONAL: No fever, weight loss, or fatigue  EYES: No eye pain, visual disturbances, or discharge  ENMT:  No difficulty hearing, tinnitus, vertigo; No sinus or throat pain  RESPIRATORY: No cough, wheezing, chills or hemoptysis; No shortness of breath  CARDIOVASCULAR: No chest pain, palpitations, dizziness, or leg swelling  GASTROINTESTINAL: No abdominal or epigastric pain. No nausea, vomiting, or hematemesis; No diarrhea or constipation. No melena or hematochezia.    MEDICATIONS:  MEDICATIONS  (STANDING):  ALBUTerol/ipratropium for Nebulization. 3 milliLiter(s) Nebulizer once  aspirin  chewable 81 milliGRAM(s) Oral daily  atorvastatin 80 milliGRAM(s) Oral at bedtime  cholestyramine Powder (Sugar-Free) 4 Gram(s) Oral two times a day  diltiazem    milliGRAM(s) Oral daily  enoxaparin Injectable 40 milliGRAM(s) SubCutaneous daily  FIRST- Mouthwash  BLM 10 milliLiter(s) Swish and Spit two times a day  fluticasone propionate/ salmeterol 250-50 MICROgram(s) Diskus 1 Dose(s) Inhalation two times a day  lactated ringers 1000 milliLiter(s) (80 mL/Hr) IV Continuous <Continuous>  levothyroxine 50 MICROGram(s) Oral daily  loperamide Solution 2 milliGRAM(s) Oral four times a day  loratadine 10 milliGRAM(s) Oral daily  multivitamin 1 Tablet(s) Oral daily  nystatin Powder 1 Application(s) Topical two times a day  octreotide  Injectable 300 MICROGram(s) SubCutaneous three times a day  pantoprazole   Suspension 40 milliGRAM(s) Oral two times a day before meals  sodium chloride 0.65% Nasal 1 Spray(s) Both Nostrils four times a day  tamsulosin 0.4 milliGRAM(s) Oral at bedtime  tiotropium 18 MICROgram(s) Capsule 1 Capsule(s) Inhalation daily    MEDICATIONS  (PRN):  acetaminophen  Suppository. 650 milliGRAM(s) Rectal every 6 hours PRN Moderate Pain (4 - 6)  ALBUTerol    0.083% 2.5 milliGRAM(s) Nebulizer every 4 hours PRN Shortness of Breath and/or Wheezing  ALPRAZolam 0.25 milliGRAM(s) Oral every 8 hours PRN anxiety  benzonatate 100 milliGRAM(s) Oral three times a day PRN Cough  ondansetron Injectable 4 milliGRAM(s) IV Push every 8 hours PRN Nausea and/or Vomiting      Allergies    codeine (Unknown)  no veges (Unknown)  penicillin (Unknown)    Intolerances    DO NOT SEND ORANGES (Unknown)  Symbicort (Short breath)      Vital Signs Last 24 Hrs  T(C): 36.3 (14 Jan 2018 06:07), Max: 36.7 (13 Jan 2018 11:16)  T(F): 97.3 (14 Jan 2018 06:07), Max: 98 (13 Jan 2018 11:16)  HR: 77 (14 Jan 2018 06:07) (77 - 115)  BP: 92/56 (14 Jan 2018 06:07) (92/56 - 113/81)  BP(mean): --  RR: 18 (14 Jan 2018 06:07) (18 - 18)  SpO2: 99% (14 Jan 2018 06:07) (97% - 99%)    01-13 @ 07:01  -  01-14 @ 07:00  --------------------------------------------------------  IN: 3660 mL / OUT: 6700 mL / NET: -3040 mL        PHYSICAL EXAM:    General: Well developed; well nourished; in no acute distress  HEENT: MMM, conjunctiva and sclera clear  Gastrointestinal:Abdomen: Soft non-tender non-distended; Normal bowel sounds; No hepatosplenomegaly. ostomy present  Extremities: no cyanosis, clubbing or edema.  Skin: Warm and dry. No obvious rash    LABS:      CBC Full  -  ( 14 Jan 2018 08:15 )  WBC Count : 6.3 K/uL  Hemoglobin : 11.1 g/dL  Hematocrit : 36.0 %  Platelet Count - Automated : 361 K/uL  Mean Cell Volume : 89.6 fl  Mean Cell Hemoglobin : 27.6 pg  Mean Cell Hemoglobin Concentration : 30.8 g/dL  Auto Neutrophil # : x  Auto Lymphocyte # : x  Auto Monocyte # : x  Auto Eosinophil # : x  Auto Basophil # : x  Auto Neutrophil % : x  Auto Lymphocyte % : x  Auto Monocyte % : x  Auto Eosinophil % : x  Auto Basophil % : x    01-14    140  |  99  |  6.0<L>  ----------------------------<  148<H>  4.7   |  30.0<H>  |  0.57    Ca    8.2<L>      14 Jan 2018 08:15  Phos  2.0     01-14  Mg     1.6     01-14                        RADIOLOGY & ADDITIONAL STUDIES (The following images were personally reviewed):

## 2018-01-14 NOTE — CONSULT NOTE ADULT - CONSULT REQUESTED BY NAME
Abdiel
Dr Benito
Dr Mendoza
Dr Mendoza
Dr. Benito
Dr. Montesinos
ER
Hospitalist
yang dugan md
Dr. Begum

## 2018-01-14 NOTE — PROGRESS NOTE ADULT - SUBJECTIVE AND OBJECTIVE BOX
CC: Failure to thrive from enteric losses. Jejunostomy tube.  Hiatal hernia . Planned for jejunostomy tube revision.    HPI:  67 YOM w/PMH of COPD stage 4 s/p right lung reduction in 2010, currently on home oxygen 4L (sat 92-93%), HTN, CVA on 2014, DM, hypothyroidism, hydrocele, kidney stones who came because he noted that his O2 sat was 76% today. He mentions more difficult to talk in full sencentces compared to his baseline. He also has a chronic productive cough which has not changed in frequency or color. He mentions that his oxygen machine broke 4 days ago and he got a new one since, but is not sure if its working properly. He denies SOB, increased cough, chest pain, hemoptysis nausea, weakness. Pt has to sleep in a sit position with 2 pillows since he gets SOB if he lyes flat, condition that he attributes to his postnasal drip (29 Nov 2017 02:05)    REVIEW OF SYSTEMS:    Patient denied fever, chills, abdominal pain, nausea, vomiting, cough, shortness of breath, chest pain or palpitations    Vital Signs Last 24 Hrs  T(C): 36.4 (14 Jan 2018 11:24), Max: 36.4 (14 Jan 2018 11:24)  T(F): 97.6 (14 Jan 2018 11:24), Max: 97.6 (14 Jan 2018 11:24)  HR: 82 (14 Jan 2018 11:24) (77 - 115)  BP: 108/67 (14 Jan 2018 11:24) (92/56 - 113/81)  BP(mean): --  RR: 18 (14 Jan 2018 11:24) (18 - 18)  SpO2: 97% (14 Jan 2018 11:24) (97% - 99%)I&O's Summary    13 Jan 2018 07:01  -  14 Jan 2018 07:00  --------------------------------------------------------  IN: 3660 mL / OUT: 6700 mL / NET: -3040 mL    14 Jan 2018 07:01  -  14 Jan 2018 15:29  --------------------------------------------------------  IN: 850 mL / OUT: 2100 mL / NET: -1250 mL      PHYSICAL EXAM:  GENERAL: Cachexia  HEENT: PERRL, +EOMI, anicteric, no [place]  NECK: Supple, No JVD   CHEST/LUNG: CTA bilaterally; Normal effort  HEART: S1S2 Normal intensity, no murmurs, gallops or rubs noted  ABDOMEN: Soft, BS Normoactive, right jejunostomy with outflow tube to bag.    EXTREMITIES:  2+ radial and DP pulses noted, no clubbing, cyanosis, or edema noted. Limited mobility   SKIN: No rashes or lesions noted  NEURO: A&Ox3, no focal deficits noted, CN II-XII intact  PSYCH: Anxious depressed mood and affect; insight/judgement appropriate  LABS:                        11.1   6.3   )-----------( 361      ( 14 Jan 2018 08:15 )             36.0     01-14    140  |  99  |  6.0<L>  ----------------------------<  148<H>  4.7   |  30.0<H>  |  0.57    Ca    8.2<L>      14 Jan 2018 08:15  Phos  2.0     01-14  Mg     1.6     01-14          RADIOLOGY & ADDITIONAL TESTS:    MEDICATIONS:  MEDICATIONS  (STANDING):  ALBUTerol/ipratropium for Nebulization. 3 milliLiter(s) Nebulizer once  aspirin  chewable 81 milliGRAM(s) Oral daily  atorvastatin 80 milliGRAM(s) Oral at bedtime  cholestyramine Powder (Sugar-Free) 4 Gram(s) Oral two times a day  diltiazem    milliGRAM(s) Oral daily  enoxaparin Injectable 40 milliGRAM(s) SubCutaneous daily  FIRST- Mouthwash  BLM 10 milliLiter(s) Swish and Spit two times a day  fluticasone propionate/ salmeterol 250-50 MICROgram(s) Diskus 1 Dose(s) Inhalation two times a day  lactated ringers 1000 milliLiter(s) (80 mL/Hr) IV Continuous <Continuous>  levothyroxine 50 MICROGram(s) Oral daily  loperamide Solution 2 milliGRAM(s) Oral four times a day  loratadine 10 milliGRAM(s) Oral daily  multivitamin 1 Tablet(s) Oral daily  nystatin Powder 1 Application(s) Topical two times a day  octreotide  Injectable 300 MICROGram(s) SubCutaneous three times a day  pantoprazole   Suspension 40 milliGRAM(s) Oral two times a day before meals  sodium chloride 0.65% Nasal 1 Spray(s) Both Nostrils four times a day  tamsulosin 0.4 milliGRAM(s) Oral at bedtime  tiotropium 18 MICROgram(s) Capsule 1 Capsule(s) Inhalation daily    MEDICATIONS  (PRN):  acetaminophen  Suppository. 650 milliGRAM(s) Rectal every 6 hours PRN Moderate Pain (4 - 6)  ALBUTerol    0.083% 2.5 milliGRAM(s) Nebulizer every 4 hours PRN Shortness of Breath and/or Wheezing  ALPRAZolam 0.25 milliGRAM(s) Oral every 8 hours PRN anxiety  benzonatate 100 milliGRAM(s) Oral three times a day PRN Cough  ondansetron Injectable 4 milliGRAM(s) IV Push every 8 hours PRN Nausea and/or Vomiting

## 2018-01-14 NOTE — PROGRESS NOTE ADULT - PROBLEM SELECTOR PLAN 1
due to placement of ostomy in proximal jejunum. There is place for starting TPN day prior to surgery in this patient who may be able to rapidly resume a diet post op. Several days of TPN adds nothing to his care or hospital course or prognosis. Once again will see prn your request. If there is a prolonged post op ileus TPN could be reconsidered.

## 2018-01-14 NOTE — PROGRESS NOTE ADULT - SUBJECTIVE AND OBJECTIVE BOX
Acute Care Surgery /Trauma PROGRESS NOTE:     SUBJECTIVE: Pt seen and examined at bedside. No acute overnight events. Pain well controlled. Tolerating diet, no n/v. Voiding well. Passing flatus, normal BM. Denies f/c/sob/cp. OOB to chair    Vital Signs Last 24 Hrs  T(C): 36.3 (14 Jan 2018 06:07), Max: 36.7 (13 Jan 2018 11:16)  T(F): 97.3 (14 Jan 2018 06:07), Max: 98 (13 Jan 2018 11:16)  HR: 77 (14 Jan 2018 06:07) (77 - 115)  BP: 92/56 (14 Jan 2018 06:07) (92/56 - 113/81)  BP(mean): --  RR: 18 (14 Jan 2018 06:07) (18 - 18)  SpO2: 99% (14 Jan 2018 06:07) (97% - 99%)    OBJECTIVE:  NAD  NC/AT  RRR  No respiratory distress  Abd soft, nondistended, nontender, no guarding or rebound. No peritoneal signs. ostomy pink and healthy, liquid stool in bag  No pedal edema    I&O's Detail    13 Jan 2018 07:01  -  14 Jan 2018 07:00  --------------------------------------------------------  IN:    lactated ringers: 1920 mL    Oral Fluid: 1740 mL  Total IN: 3660 mL    OUT:    Ileostomy: 6700 mL  Total OUT: 6700 mL    Total NET: -3040 mL          MEDICATIONS  (STANDING):  ALBUTerol/ipratropium for Nebulization. 3 milliLiter(s) Nebulizer once  aspirin  chewable 81 milliGRAM(s) Oral daily  atorvastatin 80 milliGRAM(s) Oral at bedtime  cholestyramine Powder (Sugar-Free) 4 Gram(s) Oral two times a day  diltiazem    milliGRAM(s) Oral <User Schedule>  enoxaparin Injectable 40 milliGRAM(s) SubCutaneous daily  FIRST- Mouthwash  BLM 10 milliLiter(s) Swish and Spit two times a day  fluticasone propionate/ salmeterol 250-50 MICROgram(s) Diskus 1 Dose(s) Inhalation two times a day  lactated ringers 1000 milliLiter(s) (80 mL/Hr) IV Continuous <Continuous>  levothyroxine 50 MICROGram(s) Oral daily  loperamide Solution 2 milliGRAM(s) Oral four times a day  loratadine 10 milliGRAM(s) Oral daily  multivitamin 1 Tablet(s) Oral daily  nystatin Powder 1 Application(s) Topical two times a day  octreotide  Injectable 300 MICROGram(s) SubCutaneous three times a day  pantoprazole   Suspension 40 milliGRAM(s) Oral two times a day before meals  sodium chloride 0.65% Nasal 1 Spray(s) Both Nostrils four times a day  tamsulosin 0.4 milliGRAM(s) Oral at bedtime  tiotropium 18 MICROgram(s) Capsule 1 Capsule(s) Inhalation daily    MEDICATIONS  (PRN):  acetaminophen  Suppository. 650 milliGRAM(s) Rectal every 6 hours PRN Moderate Pain (4 - 6)  ALBUTerol    0.083% 2.5 milliGRAM(s) Nebulizer every 4 hours PRN Shortness of Breath and/or Wheezing  ALPRAZolam 0.25 milliGRAM(s) Oral every 8 hours PRN anxiety  benzonatate 100 milliGRAM(s) Oral three times a day PRN Cough  ondansetron Injectable 4 milliGRAM(s) IV Push every 8 hours PRN Nausea and/or Vomiting      LABS:                        11.1   6.3   )-----------( 361      ( 14 Jan 2018 08:15 )             36.0     01-14    140  |  99  |  6.0<L>  ----------------------------<  148<H>  4.7   |  30.0<H>  |  0.57    Ca    8.2<L>      14 Jan 2018 08:15  Phos  2.0     01-14  Mg     1.6     01-14              RADIOLOGY & ADDITIONAL STUDIES:

## 2018-01-15 LAB
ANION GAP SERPL CALC-SCNC: 9 MMOL/L — SIGNIFICANT CHANGE UP (ref 5–17)
BUN SERPL-MCNC: 5 MG/DL — LOW (ref 8–20)
CALCIUM SERPL-MCNC: 8.6 MG/DL — SIGNIFICANT CHANGE UP (ref 8.6–10.2)
CHLORIDE SERPL-SCNC: 99 MMOL/L — SIGNIFICANT CHANGE UP (ref 98–107)
CO2 SERPL-SCNC: 30 MMOL/L — HIGH (ref 22–29)
CREAT SERPL-MCNC: 0.67 MG/DL — SIGNIFICANT CHANGE UP (ref 0.5–1.3)
GLUCOSE SERPL-MCNC: 129 MG/DL — HIGH (ref 70–115)
HCT VFR BLD CALC: 38.7 % — LOW (ref 42–52)
HGB BLD-MCNC: 12 G/DL — LOW (ref 14–18)
MCHC RBC-ENTMCNC: 27.7 PG — SIGNIFICANT CHANGE UP (ref 27–31)
MCHC RBC-ENTMCNC: 31 G/DL — LOW (ref 32–36)
MCV RBC AUTO: 89.4 FL — SIGNIFICANT CHANGE UP (ref 80–94)
PLATELET # BLD AUTO: 382 K/UL — SIGNIFICANT CHANGE UP (ref 150–400)
POTASSIUM SERPL-MCNC: 4.9 MMOL/L — SIGNIFICANT CHANGE UP (ref 3.5–5.3)
POTASSIUM SERPL-SCNC: 4.9 MMOL/L — SIGNIFICANT CHANGE UP (ref 3.5–5.3)
RBC # BLD: 4.33 M/UL — LOW (ref 4.6–6.2)
RBC # FLD: 16.1 % — HIGH (ref 11–15.6)
SODIUM SERPL-SCNC: 138 MMOL/L — SIGNIFICANT CHANGE UP (ref 135–145)
WBC # BLD: 9.9 K/UL — SIGNIFICANT CHANGE UP (ref 4.8–10.8)
WBC # FLD AUTO: 9.9 K/UL — SIGNIFICANT CHANGE UP (ref 4.8–10.8)

## 2018-01-15 PROCEDURE — 71045 X-RAY EXAM CHEST 1 VIEW: CPT | Mod: 26

## 2018-01-15 PROCEDURE — 99232 SBSQ HOSP IP/OBS MODERATE 35: CPT

## 2018-01-15 PROCEDURE — 99233 SBSQ HOSP IP/OBS HIGH 50: CPT

## 2018-01-15 RX ADMIN — FLUTICASONE PROPIONATE AND SALMETEROL 1 DOSE(S): 50; 250 POWDER ORAL; RESPIRATORY (INHALATION) at 21:50

## 2018-01-15 RX ADMIN — SODIUM CHLORIDE 80 MILLILITER(S): 9 INJECTION, SOLUTION INTRAVENOUS at 01:53

## 2018-01-15 RX ADMIN — Medication 1 SPRAY(S): at 23:28

## 2018-01-15 RX ADMIN — Medication 1 SPRAY(S): at 16:46

## 2018-01-15 RX ADMIN — Medication 1 TABLET(S): at 12:55

## 2018-01-15 RX ADMIN — Medication 1 SPRAY(S): at 12:53

## 2018-01-15 RX ADMIN — LORATADINE 10 MILLIGRAM(S): 10 TABLET ORAL at 12:55

## 2018-01-15 RX ADMIN — Medication 0.25 MILLIGRAM(S): at 21:51

## 2018-01-15 RX ADMIN — FLUTICASONE PROPIONATE AND SALMETEROL 1 DOSE(S): 50; 250 POWDER ORAL; RESPIRATORY (INHALATION) at 08:51

## 2018-01-15 RX ADMIN — Medication 1 SPRAY(S): at 06:00

## 2018-01-15 RX ADMIN — Medication 2 MILLIGRAM(S): at 16:46

## 2018-01-15 RX ADMIN — CHOLESTYRAMINE 4 GRAM(S): 4 POWDER, FOR SUSPENSION ORAL at 08:51

## 2018-01-15 RX ADMIN — Medication 0.25 MILLIGRAM(S): at 12:53

## 2018-01-15 RX ADMIN — Medication 2 MILLIGRAM(S): at 12:55

## 2018-01-15 RX ADMIN — Medication 2 MILLIGRAM(S): at 06:02

## 2018-01-15 RX ADMIN — CHOLESTYRAMINE 4 GRAM(S): 4 POWDER, FOR SUSPENSION ORAL at 21:51

## 2018-01-15 RX ADMIN — OCTREOTIDE ACETATE 300 MICROGRAM(S): 200 INJECTION, SOLUTION INTRAVENOUS; SUBCUTANEOUS at 06:01

## 2018-01-15 RX ADMIN — Medication 81 MILLIGRAM(S): at 12:55

## 2018-01-15 RX ADMIN — NYSTATIN CREAM 1 APPLICATION(S): 100000 CREAM TOPICAL at 06:01

## 2018-01-15 RX ADMIN — PANTOPRAZOLE SODIUM 40 MILLIGRAM(S): 20 TABLET, DELAYED RELEASE ORAL at 06:01

## 2018-01-15 RX ADMIN — TAMSULOSIN HYDROCHLORIDE 0.4 MILLIGRAM(S): 0.4 CAPSULE ORAL at 21:51

## 2018-01-15 RX ADMIN — Medication 2 MILLIGRAM(S): at 23:46

## 2018-01-15 RX ADMIN — OCTREOTIDE ACETATE 300 MICROGRAM(S): 200 INJECTION, SOLUTION INTRAVENOUS; SUBCUTANEOUS at 13:22

## 2018-01-15 RX ADMIN — Medication 50 MICROGRAM(S): at 06:01

## 2018-01-15 RX ADMIN — TIOTROPIUM BROMIDE 1 CAPSULE(S): 18 CAPSULE ORAL; RESPIRATORY (INHALATION) at 12:39

## 2018-01-15 RX ADMIN — SODIUM CHLORIDE 80 MILLILITER(S): 9 INJECTION, SOLUTION INTRAVENOUS at 16:42

## 2018-01-15 RX ADMIN — PANTOPRAZOLE SODIUM 40 MILLIGRAM(S): 20 TABLET, DELAYED RELEASE ORAL at 16:42

## 2018-01-15 RX ADMIN — ATORVASTATIN CALCIUM 80 MILLIGRAM(S): 80 TABLET, FILM COATED ORAL at 21:50

## 2018-01-15 NOTE — PROGRESS NOTE ADULT - SUBJECTIVE AND OBJECTIVE BOX
Patient seen and examined;  chart reviewed.  Possible surgery tomorrow.  Awaiting clearances.  No change in clinical status.  Weight: 53 kg.     PAST MEDICAL & SURGICAL HISTORY:  Hypothyroidism  Hydrocele  Renal calculi  Diabetes mellitus  Hypertension  CVA (cerebral vascular accident)  Chronic obstructive pulmonary disease, unspecified COPD type  History of lung surgery      ROS:  No Heartburn, regurgitation, dysphagia, odynophagia.  No dyspepsia  No abdominal pain.    No Nausea, vomiting.  No Bleeding.  No hematemesis.   No diarrhea.    No hematochesia.  No weight loss, anorexia.  No edema.      MEDICATIONS  (STANDING):  ALBUTerol/ipratropium for Nebulization. 3 milliLiter(s) Nebulizer once  aspirin  chewable 81 milliGRAM(s) Oral daily  atorvastatin 80 milliGRAM(s) Oral at bedtime  cholestyramine Powder (Sugar-Free) 4 Gram(s) Oral two times a day  diltiazem    milliGRAM(s) Oral daily  enoxaparin Injectable 40 milliGRAM(s) SubCutaneous daily  FIRST- Mouthwash  BLM 10 milliLiter(s) Swish and Spit two times a day  fluticasone propionate/ salmeterol 250-50 MICROgram(s) Diskus 1 Dose(s) Inhalation two times a day  lactated ringers 1000 milliLiter(s) (80 mL/Hr) IV Continuous <Continuous>  levothyroxine 50 MICROGram(s) Oral daily  loperamide Solution 2 milliGRAM(s) Oral four times a day  loratadine 10 milliGRAM(s) Oral daily  multivitamin 1 Tablet(s) Oral daily  nystatin Powder 1 Application(s) Topical two times a day  octreotide  Injectable 300 MICROGram(s) SubCutaneous three times a day  pantoprazole   Suspension 40 milliGRAM(s) Oral two times a day before meals  sodium chloride 0.65% Nasal 1 Spray(s) Both Nostrils four times a day  tamsulosin 0.4 milliGRAM(s) Oral at bedtime  tiotropium 18 MICROgram(s) Capsule 1 Capsule(s) Inhalation daily    MEDICATIONS  (PRN):  acetaminophen  Suppository. 650 milliGRAM(s) Rectal every 6 hours PRN Moderate Pain (4 - 6)  ALBUTerol    0.083% 2.5 milliGRAM(s) Nebulizer every 4 hours PRN Shortness of Breath and/or Wheezing  ALPRAZolam 0.25 milliGRAM(s) Oral every 8 hours PRN anxiety  benzonatate 100 milliGRAM(s) Oral three times a day PRN Cough  ondansetron Injectable 4 milliGRAM(s) IV Push every 8 hours PRN Nausea and/or Vomiting      Allergies    codeine (Unknown)  no veges (Unknown)  penicillin (Unknown)    Intolerances    DO NOT SEND ORANGES (Unknown)  Symbicort (Short breath)      Vital Signs Last 24 Hrs    Weight 53 KG.   Jejunostomy output:  5350 cc./ 24 hr.    T(C): 36.7 (15 Francisco Javier 2018 05:50), Max: 36.7 (15 Francisco Javier 2018 05:50)  T(F): 98.1 (15 Francisco Javier 2018 05:50), Max: 98.1 (15 Francisco Javier 2018 05:50)  HR: 69 (15 Francisco Javier 2018 05:50) (69 - 114)  BP: 97/64 (15 Francisco Javier 2018 05:50) (95/68 - 108/67)  BP(mean): --  RR: 18 (15 Francisco Javier 2018 05:50) (18 - 18)  SpO2: 99% (14 Jan 2018 20:16) (97% - 99%)    PHYSICAL EXAM:    GENERAL: NAD, well-groomed, well-developed  HEAD:  Atraumatic, Normocephalic  EYES: EOMI, PERRLA, conjunctiva and sclera clear  ENMT: No tonsillar erythema, exudates, or enlargement; Moist mucous membranes, Good dentition, No lesions  NECK: Supple, No JVD, Normal thyroid  CHEST/LUNG: Clear to percussion bilaterally; No rales, rhonchi, wheezing, or rubs  HEART: Regular rate and rhythm; No murmurs, rubs, or gallops  ABDOMEN: Soft, Nontender, Nondistended; Bowel sounds present  EXTREMITIES:  2+ Peripheral Pulses, No clubbing, cyanosis, or edema  LYMPH: No lymphadenopathy noted  SKIN: No rashes or lesions      LABS:                        12.0   9.9   )-----------( 382      ( 15 Francisco Javier 2018 07:20 )             38.7     01-15    138  |  99  |  5.0<L>  ----------------------------<  129<H>  4.9   |  30.0<H>  |  0.67    Ca    8.6      15 Francisco Javier 2018 07:20  Phos  2.0     01-14  Mg     1.6     01-14               RADIOLOGY & ADDITIONAL STUDIES:

## 2018-01-15 NOTE — PROGRESS NOTE ADULT - SUBJECTIVE AND OBJECTIVE BOX
PULMONARY PROGRESS NOTE      LILY [id number]    Patient is a 67y old  Male who presents with a chief complaint of SOB (15 Dec 2017 10:17)      INTERVAL HPI/OVERNIGHT EVENTS:  seen pre-op revision of ileostomy  on 4lpm N[employer]  no c/o increase cough, wheeze or SOB  producing white to tan sputum    MEDICATIONS  (STANDING):  ALBUTerol/ipratropium for Nebulization. 3 milliLiter(s) Nebulizer once  aspirin  chewable 81 milliGRAM(s) Oral daily  atorvastatin 80 milliGRAM(s) Oral at bedtime  cholestyramine Powder (Sugar-Free) 4 Gram(s) Oral two times a day  diltiazem    milliGRAM(s) Oral daily  enoxaparin Injectable 40 milliGRAM(s) SubCutaneous daily  FIRST- Mouthwash  BLM 10 milliLiter(s) Swish and Spit two times a day  fluticasone propionate/ salmeterol 250-50 MICROgram(s) Diskus 1 Dose(s) Inhalation two times a day  lactated ringers 1000 milliLiter(s) (80 mL/Hr) IV Continuous <Continuous>  levothyroxine 50 MICROGram(s) Oral daily  loperamide Solution 2 milliGRAM(s) Oral four times a day  loratadine 10 milliGRAM(s) Oral daily  multivitamin 1 Tablet(s) Oral daily  nystatin Powder 1 Application(s) Topical two times a day  octreotide  Injectable 300 MICROGram(s) SubCutaneous three times a day  pantoprazole   Suspension 40 milliGRAM(s) Oral two times a day before meals  sodium chloride 0.65% Nasal 1 Spray(s) Both Nostrils four times a day  tamsulosin 0.4 milliGRAM(s) Oral at bedtime  tiotropium 18 MICROgram(s) Capsule 1 Capsule(s) Inhalation daily      MEDICATIONS  (PRN):  acetaminophen  Suppository. 650 milliGRAM(s) Rectal every 6 hours PRN Moderate Pain (4 - 6)  ALBUTerol    0.083% 2.5 milliGRAM(s) Nebulizer every 4 hours PRN Shortness of Breath and/or Wheezing  ALPRAZolam 0.25 milliGRAM(s) Oral every 8 hours PRN anxiety  benzonatate 100 milliGRAM(s) Oral three times a day PRN Cough  ondansetron Injectable 4 milliGRAM(s) IV Push every 8 hours PRN Nausea and/or Vomiting      Allergies    codeine (Unknown)  no veges (Unknown)  penicillin (Unknown)    Intolerances    DO NOT SEND ORANGES (Unknown)  Symbicort (Short breath)      PAST MEDICAL & SURGICAL HISTORY:  Hypothyroidism  Hydrocele  Renal calculi  Diabetes mellitus  Hypertension  CVA (cerebral vascular accident)  Chronic obstructive pulmonary disease, unspecified COPD type  History of lung surgery      SOCIAL HISTORY  Smoking History:       REVIEW OF SYSTEMS:    CONSTITUTIONAL:  No distress    HEENT:  Eyes:  No diplopia or blurred vision. ENT:  No earache, sore throat or runny nose.    CARDIOVASCULAR:  No pressure, squeezing, tightness, heaviness or aching about the chest; no palpitations.    RESPIRATORY:  No cough, shortness of breath, PND or orthopnea. Mild SOBOE    GASTROINTESTINAL:  No nausea, vomiting or diarrhea.    GENITOURINARY:  No dysuria, frequency or urgency.    NEUROLOGIC:  No paresthesias, fasciculations, seizures or weakness.    Extremities: No cyanosis, clubbing or edema    PSYCHIATRIC:  No disorder of thought or mood.    Vital Signs Last 24 Hrs  T(C): 36.7 (15 Francisco Javier 2018 05:50), Max: 36.7 (15 Francisco Javier 2018 05:50)  T(F): 98.1 (15 Francisco Javier 2018 05:50), Max: 98.1 (15 Francisco Javier 2018 05:50)  HR: 69 (15 Francisco Javier 2018 05:50) (69 - 114)  BP: 97/64 (15 Francisco Javier 2018 05:50) (95/68 - 108/67)  BP(mean): --  RR: 18 (15 Francisco Javier 2018 05:50) (18 - 18)  SpO2: 99% (14 Jan 2018 20:16) (97% - 99%)    PHYSICAL EXAMINATION:    GENERAL: The patient is awake and alert in no apparent distress.     HEENT: Head is normocephalic and atraumatic. Extraocular muscles are intact. Mucous membranes are moist.    NECK: Supple.    LUNGS: BS diminished but clear to auscultation without wheezing, rales or rhonchi; respirations unlabored    HEART: Regular rate and rhythm without murmur.    ABDOMEN: Soft, nontender, and nondistended.      EXTREMITIES: Without any cyanosis, clubbing, rash, lesions or edema.    NEUROLOGIC: Grossly intact.    LABS:                        12.0   9.9   )-----------( 382      ( 15 Francisco Javier 2018 07:20 )             38.7     01-15    138  |  99  |  5.0<L>  ----------------------------<  129<H>  4.9   |  30.0<H>  |  0.67    Ca    8.6      15 Francisco Javier 2018 07:20  Phos  2.0     01-14  Mg     1.6     01-14                          MICROBIOLOGY:    RADIOLOGY & ADDITIONAL STUDIES:  films reviewed

## 2018-01-15 NOTE — PROGRESS NOTE ADULT - SUBJECTIVE AND OBJECTIVE BOX
CC: Proximal jejunostomy with short gut symptoms.   .   HPI:  67 YOM w/PMH of COPD stage 4 s/p right lung reduction in 2010, currently on home oxygen 4L (sat 92-93%), HTN, CVA on 2014, DM, hypothyroidism, hydrocele, kidney stones who came because he noted that his O2 sat was 76% today. He mentions more difficult to talk in full sentences compared to his baseline. He also has a chronic productive cough which has not changed in frequency or color. He mentions that his oxygen machine broke 4 days ago and he got a new one since, but is not sure if its working properly. He denies SOB, increased cough, chest pain, hemoptysis nausea, weakness. Pt has to sleep in a sit position with 2 pillows since he gets SOB if he lyes flat, condition that he attributes to his postnasal drip (29 Nov 2017 02:05)    REVIEW OF SYSTEMS:    Patient denied fever, chills, abdominal pain, nausea, vomiting, cough, shortness of breath, chest pain or palpitations    Vital Signs Last 24 Hrs  T(C): 36.7 (15 Francisco Javier 2018 11:15), Max: 36.7 (15 Francisco Javier 2018 05:50)  T(F): 98 (15 Francisco Javier 2018 11:15), Max: 98.1 (15 Francisco Javier 2018 05:50)  HR: 80 (15 Francisco Javier 2018 11:15) (69 - 114)  BP: 130/91 (15 Francisco Javier 2018 11:15) (95/68 - 130/91)  BP(mean): --  RR: 18 (15 Francisco Javier 2018 11:15) (18 - 18)  SpO2: 98% (15 Francisco Javier 2018 11:15) (98% - 99%)I&O's Summary    14 Jan 2018 07:01  -  15 Francisco Javier 2018 07:00  --------------------------------------------------------  IN: 3130 mL / OUT: 6050 mL / NET: -2920 mL    15 Francisco Javier 2018 07:01  -  15 Francisco Javier 2018 14:35  --------------------------------------------------------  IN: 0 mL / OUT: 400 mL / NET: -400 mL      PHYSICAL EXAM:  GENERAL: Cachexia   HEENT: PERRL, +EOMI, anicteric, no [place]  NECK: Supple, No JVD   CHEST/LUNG: CTA bilaterally; Normal effort  HEART: S1S2 Normal intensity, no murmurs, gallops or rubs noted  ABDOMEN: Soft, BS Normoactive, NT, ND, no HSM noted  EXTREMITIES:  2+ radial and DP pulses noted, no clubbing, cyanosis, or edema noted. Limitation of mobility .   SKIN: No rashes or lesions noted  NEURO: A&Ox3, no focal deficits noted, CN II-XII intact  PSYCH: Depressed mood and affect; insight/judgement appropriate  LABS:                        12.0   9.9   )-----------( 382      ( 15 Francisco Javier 2018 07:20 )             38.7     01-15    138  |  99  |  5.0<L>  ----------------------------<  129<H>  4.9   |  30.0<H>  |  0.67    Ca    8.6      15 Francisco Javier 2018 07:20  Phos  2.0     01-14  Mg     1.6     01-14          RADIOLOGY & ADDITIONAL TESTS:    MEDICATIONS:  MEDICATIONS  (STANDING):  ALBUTerol/ipratropium for Nebulization. 3 milliLiter(s) Nebulizer once  aspirin  chewable 81 milliGRAM(s) Oral daily  atorvastatin 80 milliGRAM(s) Oral at bedtime  cholestyramine Powder (Sugar-Free) 4 Gram(s) Oral two times a day  diltiazem    milliGRAM(s) Oral daily  enoxaparin Injectable 40 milliGRAM(s) SubCutaneous daily  FIRST- Mouthwash  BLM 10 milliLiter(s) Swish and Spit two times a day  fluticasone propionate/ salmeterol 250-50 MICROgram(s) Diskus 1 Dose(s) Inhalation two times a day  lactated ringers 1000 milliLiter(s) (80 mL/Hr) IV Continuous <Continuous>  levothyroxine 50 MICROGram(s) Oral daily  loperamide Solution 2 milliGRAM(s) Oral four times a day  loratadine 10 milliGRAM(s) Oral daily  multivitamin 1 Tablet(s) Oral daily  nystatin Powder 1 Application(s) Topical two times a day  octreotide  Injectable 300 MICROGram(s) SubCutaneous three times a day  pantoprazole   Suspension 40 milliGRAM(s) Oral two times a day before meals  sodium chloride 0.65% Nasal 1 Spray(s) Both Nostrils four times a day  tamsulosin 0.4 milliGRAM(s) Oral at bedtime  tiotropium 18 MICROgram(s) Capsule 1 Capsule(s) Inhalation daily    MEDICATIONS  (PRN):  acetaminophen  Suppository. 650 milliGRAM(s) Rectal every 6 hours PRN Moderate Pain (4 - 6)  ALBUTerol    0.083% 2.5 milliGRAM(s) Nebulizer every 4 hours PRN Shortness of Breath and/or Wheezing  ALPRAZolam 0.25 milliGRAM(s) Oral every 8 hours PRN anxiety  benzonatate 100 milliGRAM(s) Oral three times a day PRN Cough  ondansetron Injectable 4 milliGRAM(s) IV Push every 8 hours PRN Nausea and/or Vomiting

## 2018-01-15 NOTE — PROGRESS NOTE ADULT - ASSESSMENT
66 y/o F with stage 4 COPD, h/o lung CA with loop jejunostomy with high output (4.1L in 24 hours). Electrolytes are within normal limits. Hemodynamically stable, afebrile.    Plan:  -Plan for operative revision of ostomy this week  -Patient optimized and cleared from cardiology perspective  -Awaiting pulmonary evaluation and optimization  -Recommend octreotide, TPN  -Hold plavix

## 2018-01-15 NOTE — PROGRESS NOTE ADULT - ASSESSMENT
Stage 4 copd at baseline   Pt at increased risk for mechanical ventilation with abdominal surgery  Discussed with pt and wife and they understand the risk and will proceed with surg      Plan:   will f/u post op

## 2018-01-15 NOTE — CHART NOTE - NSCHARTNOTEFT_GEN_A_CORE
PRE OP NOTE    FRANDY BAUTISTA  [id number]  [facility] 5TWR 5212 01  01-15-18 @ 13:53    Preoperative Dx: Colonic obstruction     Procedure: Loop ileostomy creation under local versus general anesthesia      Clearance: Seen by cardiology and pulmonary service   Cardiac: No cardiac contraindications to planned procedures per 1/14 note   Pulmonary: Patient at increased risk for mechanical ventilation with abdominal surgery per 1/15 note     Vitals:   T(C): 36.7 (01-15-18 @ 11:15), Max: 36.7 (01-15-18 @ 05:50)  HR: 80 (01-15-18 @ 11:15) (69 - 114)  BP: 130/91 (01-15-18 @ 11:15) (95/68 - 130/91)  RR: 18 (01-15-18 @ 11:15) (18 - 18)  SpO2: 98% (01-15-18 @ 11:15) (98% - 99%)    Preop Tests - Labs: CBC, BMP, Mag, Phos, Coags, Type and Screen ordered for midnight      Preop Tests - Imaging:   EKG obtained 12/23 showed sinus tachycardia - reviewed by cardiology 1/14   CXR obtained 11/28 showed bullous emphysema with right upper lobe and lower lobe segmental airspace disease suggestive of pneumonia    Blood:   -Active type and screen   -No Blood on Hold - not indicated     Orders:   1. NPO after midnight with IVFs to be made available then   2. ABx on call to OR  -Pt will receive dose of ABx prior to skin incision   3. Anticoagulation  -Hold AM Lovenox prior to surgery  4. Home meds   -Non diabetic not on any oral hypoglycemic(s) agents(s)   -On cardizem for BP control PRE OP NOTE    FRANDY BAUTISTA  [id number]  [facility] 5TWR 5212 01  01-15-18 @ 13:53    Preoperative Dx: Colonic obstruction     Procedure: Loop ileostomy creation under local versus general anesthesia (ACS attendings to decide)     Clearance: Seen by cardiology and pulmonary service     Cardiac: No cardiac contraindications to planned procedures per 1/14 note   Pulmonary: Patient at increased risk for mechanical ventilation with abdominal surgery per 1/15 note     Vitals:   T(C): 36.7 (01-15-18 @ 11:15), Max: 36.7 (01-15-18 @ 05:50)  HR: 80 (01-15-18 @ 11:15) (69 - 114)  BP: 130/91 (01-15-18 @ 11:15) (95/68 - 130/91)  RR: 18 (01-15-18 @ 11:15) (18 - 18)  SpO2: 98% (01-15-18 @ 11:15) (98% - 99%)    Preop Tests - Labs: CBC, BMP, Mag, Phos, Coags, Type and Screen ordered for midnight      Preop Tests - Imaging:   EKG obtained 12/23 showed sinus tachycardia - reviewed by cardiology 1/14   CXR obtained 11/28 showed bullous emphysema with right upper lobe and lower lobe segmental airspace disease suggestive of pneumonia; repeat CXR ordered     Blood:   -Active type and screen   -No Blood on Hold - not indicated     Orders:   1. NPO after midnight with IVFs to be made available then   2. ABx on call to OR  -Pt will receive dose of ABx prior to skin incision   3. Anticoagulation  -Hold AM Lovenox prior to surgery  4. Home meds   -Non diabetic not on any oral hypoglycemic(s) agents(s)   -On cardizem for BP control PRE OP NOTE    FRANDY BAUTISTA  [id number]  [facility] 5TWR 5212 01  01-15-18 @ 13:53    Preoperative Dx: Colonic obstruction     Procedure: Loop ileostomy creation under local versus general anesthesia (ACS attendings to decide)     Clearance: Seen by cardiology and pulmonary service     Cardiac: No cardiac contraindications to planned procedures per 1/14 note   Pulmonary: Patient at increased risk for mechanical ventilation with abdominal surgery per 1/15 note     Vitals:   T(C): 36.7 (01-15-18 @ 11:15), Max: 36.7 (01-15-18 @ 05:50)  HR: 80 (01-15-18 @ 11:15) (69 - 114)  BP: 130/91 (01-15-18 @ 11:15) (95/68 - 130/91)  RR: 18 (01-15-18 @ 11:15) (18 - 18)  SpO2: 98% (01-15-18 @ 11:15) (98% - 99%)    Preop Tests - Labs: CBC, BMP, Mag, Phos, Coags, Type and Screen ordered for midnight      Preop Tests - Imaging:   EKG obtained 12/23 showed sinus tachycardia - reviewed by cardiology 1/14   CXR obtained 11/28 showed bullous emphysema with right upper lobe and lower lobe segmental airspace disease suggestive of pneumonia; repeat CXR ordered     Blood:   -Active type and screen   -No Blood on Hold - not indicated     Orders:   1. NPO after midnight with IVFs to be made available then   2. ABx on call to OR  -Pt will receive dose of ABx prior to skin incision   3. DVT chemoprophylaxis  4. Home meds   -Non diabetic not on any oral hypoglycemic(s) agents(s)   -On cardizem for BP control PRE OP NOTE    FRANDY BAUTISTA  [id number]  [facility] 5TWR 5212 01  01-15-18 @ 13:53    Preoperative Dx: Revision jejunostomy to ileostomy (initially done for colonic obstruction)     Procedure: Loop ileostomy creation under local versus general anesthesia (ACS attendings to decide)     Clearance: Seen by cardiology and pulmonary service     Cardiac: No cardiac contraindications to planned procedures per 1/14 note   Pulmonary: Patient at increased risk for mechanical ventilation with abdominal surgery per 1/15 note     Vitals:   T(C): 36.7 (01-15-18 @ 11:15), Max: 36.7 (01-15-18 @ 05:50)  HR: 80 (01-15-18 @ 11:15) (69 - 114)  BP: 130/91 (01-15-18 @ 11:15) (95/68 - 130/91)  RR: 18 (01-15-18 @ 11:15) (18 - 18)  SpO2: 98% (01-15-18 @ 11:15) (98% - 99%)    Preop Tests - Labs: CBC, BMP, Mag, Phos, Coags, Type and Screen ordered for midnight      Preop Tests - Imaging:   EKG obtained 12/23 showed sinus tachycardia - reviewed by cardiology 1/14   CXR obtained 11/28 showed bullous emphysema with right upper lobe and lower lobe segmental airspace disease suggestive of pneumonia; repeat CXR ordered     Blood:   -Active type and screen   -No Blood on Hold - not indicated     Orders:   1. NPO after midnight with IVFs to be made available then   2. ABx on call to OR  -Pt will receive dose of ABx prior to skin incision   3. DVT chemoprophylaxis  4. Home meds   -Non diabetic not on any oral hypoglycemic(s) agents(s)   -On cardizem for BP control PRE OP NOTE    FRANDY BAUTISTA  [id number]  [facility] 5TWR 5212 01  01-15-18 @ 13:53    Preoperative Dx: Revision jejunostomy to loop ileostomy (initially done for colonic obstruction)     Procedure: Loop ileostomy creation under local versus general anesthesia (ACS attendings to decide)     Clearance: Seen by cardiology and pulmonary service     Cardiac: No cardiac contraindications to planned procedures per 1/14 note   Pulmonary: Patient at increased risk for mechanical ventilation with abdominal surgery per 1/15 note     Vitals:   T(C): 36.7 (01-15-18 @ 11:15), Max: 36.7 (01-15-18 @ 05:50)  HR: 80 (01-15-18 @ 11:15) (69 - 114)  BP: 130/91 (01-15-18 @ 11:15) (95/68 - 130/91)  RR: 18 (01-15-18 @ 11:15) (18 - 18)  SpO2: 98% (01-15-18 @ 11:15) (98% - 99%)    Preop Tests - Labs: CBC, BMP, Mag, Phos, Coags, Type and Screen ordered for midnight      Preop Tests - Imaging:   EKG obtained 12/23 showed sinus tachycardia - reviewed by cardiology 1/14   CXR obtained 11/28 showed bullous emphysema with right upper lobe and lower lobe segmental airspace disease suggestive of pneumonia; repeat CXR ordered     Blood:   -Active type and screen   -No Blood on Hold - not indicated     Orders:   1. NPO after midnight with IVFs to be made available then   2. ABx on call to OR  -Pt will receive dose of ABx prior to skin incision   3. DVT chemoprophylaxis  4. Home meds   -Non diabetic not on any oral hypoglycemic(s) agents(s)   -On cardizem for BP control

## 2018-01-15 NOTE — PROGRESS NOTE ADULT - SUBJECTIVE AND OBJECTIVE BOX
INTERVAL HPI/OVERNIGHT EVENTS: No acute events overnight.     SUBJECTIVE: Ostomy output 4.1L overnight. No fevers, chills, chest pain, dyspnea. Tolerating diet without nausea or vomiting. No pain.       MEDICATIONS  (STANDING):  ALBUTerol/ipratropium for Nebulization. 3 milliLiter(s) Nebulizer once  aspirin  chewable 81 milliGRAM(s) Oral daily  atorvastatin 80 milliGRAM(s) Oral at bedtime  cholestyramine Powder (Sugar-Free) 4 Gram(s) Oral two times a day  diltiazem    milliGRAM(s) Oral daily  enoxaparin Injectable 40 milliGRAM(s) SubCutaneous daily  FIRST- Mouthwash  BLM 10 milliLiter(s) Swish and Spit two times a day  fluticasone propionate/ salmeterol 250-50 MICROgram(s) Diskus 1 Dose(s) Inhalation two times a day  lactated ringers 1000 milliLiter(s) (80 mL/Hr) IV Continuous <Continuous>  levothyroxine 50 MICROGram(s) Oral daily  loperamide Solution 2 milliGRAM(s) Oral four times a day  loratadine 10 milliGRAM(s) Oral daily  multivitamin 1 Tablet(s) Oral daily  nystatin Powder 1 Application(s) Topical two times a day  octreotide  Injectable 300 MICROGram(s) SubCutaneous three times a day  pantoprazole   Suspension 40 milliGRAM(s) Oral two times a day before meals  sodium chloride 0.65% Nasal 1 Spray(s) Both Nostrils four times a day  tamsulosin 0.4 milliGRAM(s) Oral at bedtime  tiotropium 18 MICROgram(s) Capsule 1 Capsule(s) Inhalation daily    MEDICATIONS  (PRN):  acetaminophen  Suppository. 650 milliGRAM(s) Rectal every 6 hours PRN Moderate Pain (4 - 6)  ALBUTerol    0.083% 2.5 milliGRAM(s) Nebulizer every 4 hours PRN Shortness of Breath and/or Wheezing  ALPRAZolam 0.25 milliGRAM(s) Oral every 8 hours PRN anxiety  benzonatate 100 milliGRAM(s) Oral three times a day PRN Cough  ondansetron Injectable 4 milliGRAM(s) IV Push every 8 hours PRN Nausea and/or Vomiting      Vital Signs Last 24 Hrs  T(C): 36.7 (15 Francisco Javier 2018 05:50), Max: 36.7 (15 Francisco Javier 2018 05:50)  T(F): 98.1 (15 Francisco Javier 2018 05:50), Max: 98.1 (15 Francisco Javier 2018 05:50)  HR: 69 (15 Francisco Javier 2018 05:50) (69 - 114)  BP: 97/64 (15 Francisco Javier 2018 05:50) (95/68 - 108/67)  BP(mean): --  RR: 18 (15 Francisco Javier 2018 05:50) (18 - 18)  SpO2: 99% (14 Jan 2018 20:16) (97% - 99%)    NAD, AOx3, resting comfortably in bed  No respiratory distress  Abdomen soft, nontender, nondistended. Normal bowel sounds. No guarding or rebound tenderness.  Ostomy function appropriately.  No peripheral edema. Normal ROM.     I&O's Detail    14 Jan 2018 07:01  -  15 Francisco Javier 2018 07:00  --------------------------------------------------------  IN:    lactated ringers: 1680 mL    Oral Fluid: 1450 mL  Total IN: 3130 mL    OUT:    Ileostomy: 6050 mL  Total OUT: 6050 mL    Total NET: -2920 mL          LABS:                        12.0   9.9   )-----------( 382      ( 15 Francisco Javier 2018 07:20 )             38.7     01-15    138  |  99  |  5.0<L>  ----------------------------<  129<H>  4.9   |  30.0<H>  |  0.67    Ca    8.6      15 Francisco Javier 2018 07:20  Phos  2.0     01-14  Mg     1.6     01-14

## 2018-01-15 NOTE — PROGRESS NOTE ADULT - ASSESSMENT
Proximal jejunostomy.  COPD.  Eating orally OK with ensure supplements.  No current need for TPN.    Awaiting surgery.  Hopefully to also include correction of colon malposition within the HH sac.  Will follow expectantly.

## 2018-01-16 ENCOUNTER — RESULT REVIEW (OUTPATIENT)
Age: 68
End: 2018-01-16

## 2018-01-16 LAB
ANION GAP SERPL CALC-SCNC: 10 MMOL/L — SIGNIFICANT CHANGE UP (ref 5–17)
ANION GAP SERPL CALC-SCNC: 5 MMOL/L — SIGNIFICANT CHANGE UP (ref 5–17)
ANION GAP SERPL CALC-SCNC: 6 MMOL/L — SIGNIFICANT CHANGE UP (ref 5–17)
APTT BLD: 32 SEC — SIGNIFICANT CHANGE UP (ref 27.5–37.4)
BASE EXCESS BLDA CALC-SCNC: 2.1 MMOL/L — HIGH (ref -2–2)
BASOPHILS # BLD AUTO: 0 K/UL — SIGNIFICANT CHANGE UP (ref 0–0.2)
BASOPHILS NFR BLD AUTO: 0.1 % — SIGNIFICANT CHANGE UP (ref 0–2)
BASOPHILS NFR BLD AUTO: 0.4 % — SIGNIFICANT CHANGE UP (ref 0–2)
BASOPHILS NFR BLD AUTO: 0.4 % — SIGNIFICANT CHANGE UP (ref 0–2)
BLD GP AB SCN SERPL QL: SIGNIFICANT CHANGE UP
BLOOD GAS COMMENTS ARTERIAL: SIGNIFICANT CHANGE UP
BUN SERPL-MCNC: 6 MG/DL — LOW (ref 8–20)
BUN SERPL-MCNC: 6 MG/DL — LOW (ref 8–20)
BUN SERPL-MCNC: 8 MG/DL — SIGNIFICANT CHANGE UP (ref 8–20)
CALCIUM SERPL-MCNC: 8.1 MG/DL — LOW (ref 8.6–10.2)
CALCIUM SERPL-MCNC: 9 MG/DL — SIGNIFICANT CHANGE UP (ref 8.6–10.2)
CALCIUM SERPL-MCNC: 9.3 MG/DL — SIGNIFICANT CHANGE UP (ref 8.6–10.2)
CHLORIDE SERPL-SCNC: 100 MMOL/L — SIGNIFICANT CHANGE UP (ref 98–107)
CHLORIDE SERPL-SCNC: 102 MMOL/L — SIGNIFICANT CHANGE UP (ref 98–107)
CHLORIDE SERPL-SCNC: 102 MMOL/L — SIGNIFICANT CHANGE UP (ref 98–107)
CO2 SERPL-SCNC: 28 MMOL/L — SIGNIFICANT CHANGE UP (ref 22–29)
CO2 SERPL-SCNC: 28 MMOL/L — SIGNIFICANT CHANGE UP (ref 22–29)
CO2 SERPL-SCNC: 30 MMOL/L — HIGH (ref 22–29)
CREAT SERPL-MCNC: 0.58 MG/DL — SIGNIFICANT CHANGE UP (ref 0.5–1.3)
CREAT SERPL-MCNC: 0.65 MG/DL — SIGNIFICANT CHANGE UP (ref 0.5–1.3)
CREAT SERPL-MCNC: 0.68 MG/DL — SIGNIFICANT CHANGE UP (ref 0.5–1.3)
EOSINOPHIL # BLD AUTO: 0 K/UL — SIGNIFICANT CHANGE UP (ref 0–0.5)
EOSINOPHIL # BLD AUTO: 0.1 K/UL — SIGNIFICANT CHANGE UP (ref 0–0.5)
EOSINOPHIL # BLD AUTO: 0.1 K/UL — SIGNIFICANT CHANGE UP (ref 0–0.5)
EOSINOPHIL NFR BLD AUTO: 0 % — SIGNIFICANT CHANGE UP (ref 0–5)
EOSINOPHIL NFR BLD AUTO: 0.7 % — SIGNIFICANT CHANGE UP (ref 0–5)
EOSINOPHIL NFR BLD AUTO: 1.3 % — SIGNIFICANT CHANGE UP (ref 0–5)
GAS PNL BLDA: SIGNIFICANT CHANGE UP
GLUCOSE BLDC GLUCOMTR-MCNC: 164 MG/DL — HIGH (ref 70–99)
GLUCOSE SERPL-MCNC: 130 MG/DL — HIGH (ref 70–115)
GLUCOSE SERPL-MCNC: 168 MG/DL — HIGH (ref 70–115)
GLUCOSE SERPL-MCNC: 187 MG/DL — HIGH (ref 70–115)
HCO3 BLDA-SCNC: 26 MMOL/L — SIGNIFICANT CHANGE UP (ref 20–26)
HCT VFR BLD CALC: 35 % — LOW (ref 42–52)
HCT VFR BLD CALC: 35.9 % — LOW (ref 42–52)
HCT VFR BLD CALC: 39.8 % — LOW (ref 42–52)
HGB BLD-MCNC: 10.9 G/DL — LOW (ref 14–18)
HGB BLD-MCNC: 11.2 G/DL — LOW (ref 14–18)
HGB BLD-MCNC: 12.3 G/DL — LOW (ref 14–18)
HOROWITZ INDEX BLDA+IHG-RTO: 40 — SIGNIFICANT CHANGE UP
INR BLD: 1.35 RATIO — HIGH (ref 0.88–1.16)
LYMPHOCYTES # BLD AUTO: 0.5 K/UL — LOW (ref 1–4.8)
LYMPHOCYTES # BLD AUTO: 1.2 K/UL — SIGNIFICANT CHANGE UP (ref 1–4.8)
LYMPHOCYTES # BLD AUTO: 1.8 K/UL — SIGNIFICANT CHANGE UP (ref 1–4.8)
LYMPHOCYTES # BLD AUTO: 14.1 % — LOW (ref 20–55)
LYMPHOCYTES # BLD AUTO: 21.5 % — SIGNIFICANT CHANGE UP (ref 20–55)
LYMPHOCYTES # BLD AUTO: 3.2 % — LOW (ref 20–55)
MAGNESIUM SERPL-MCNC: 1.3 MG/DL — LOW (ref 1.8–2.6)
MAGNESIUM SERPL-MCNC: 1.4 MG/DL — LOW (ref 1.6–2.6)
MAGNESIUM SERPL-MCNC: 1.5 MG/DL — LOW (ref 1.6–2.6)
MCHC RBC-ENTMCNC: 27.6 PG — SIGNIFICANT CHANGE UP (ref 27–31)
MCHC RBC-ENTMCNC: 27.7 PG — SIGNIFICANT CHANGE UP (ref 27–31)
MCHC RBC-ENTMCNC: 27.8 PG — SIGNIFICANT CHANGE UP (ref 27–31)
MCHC RBC-ENTMCNC: 30.9 G/DL — LOW (ref 32–36)
MCHC RBC-ENTMCNC: 31.1 G/DL — LOW (ref 32–36)
MCHC RBC-ENTMCNC: 31.2 G/DL — LOW (ref 32–36)
MCV RBC AUTO: 88.8 FL — SIGNIFICANT CHANGE UP (ref 80–94)
MCV RBC AUTO: 89.1 FL — SIGNIFICANT CHANGE UP (ref 80–94)
MCV RBC AUTO: 89.2 FL — SIGNIFICANT CHANGE UP (ref 80–94)
MONOCYTES # BLD AUTO: 0.5 K/UL — SIGNIFICANT CHANGE UP (ref 0–0.8)
MONOCYTES # BLD AUTO: 0.6 K/UL — SIGNIFICANT CHANGE UP (ref 0–0.8)
MONOCYTES # BLD AUTO: 0.8 K/UL — SIGNIFICANT CHANGE UP (ref 0–0.8)
MONOCYTES NFR BLD AUTO: 3.5 % — SIGNIFICANT CHANGE UP (ref 3–10)
MONOCYTES NFR BLD AUTO: 7.7 % — SIGNIFICANT CHANGE UP (ref 3–10)
MONOCYTES NFR BLD AUTO: 9.5 % — SIGNIFICANT CHANGE UP (ref 3–10)
NEUTROPHILS # BLD AUTO: 13.4 K/UL — HIGH (ref 1.8–8)
NEUTROPHILS # BLD AUTO: 5.7 K/UL — SIGNIFICANT CHANGE UP (ref 1.8–8)
NEUTROPHILS # BLD AUTO: 6.5 K/UL — SIGNIFICANT CHANGE UP (ref 1.8–8)
NEUTROPHILS NFR BLD AUTO: 67.2 % — SIGNIFICANT CHANGE UP (ref 37–73)
NEUTROPHILS NFR BLD AUTO: 76.9 % — HIGH (ref 37–73)
NEUTROPHILS NFR BLD AUTO: 92.9 % — HIGH (ref 37–73)
PCO2 BLDA: 45 MMHG — SIGNIFICANT CHANGE UP (ref 35–45)
PH BLDA: 7.39 — SIGNIFICANT CHANGE UP (ref 7.35–7.45)
PHOSPHATE SERPL-MCNC: 2 MG/DL — LOW (ref 2.4–4.7)
PHOSPHATE SERPL-MCNC: 3.2 MG/DL — SIGNIFICANT CHANGE UP (ref 2.4–4.7)
PHOSPHATE SERPL-MCNC: 4.1 MG/DL — SIGNIFICANT CHANGE UP (ref 2.4–4.7)
PLATELET # BLD AUTO: 313 K/UL — SIGNIFICANT CHANGE UP (ref 150–400)
PLATELET # BLD AUTO: 348 K/UL — SIGNIFICANT CHANGE UP (ref 150–400)
PLATELET # BLD AUTO: 373 K/UL — SIGNIFICANT CHANGE UP (ref 150–400)
PO2 BLDA: 92 MMHG — SIGNIFICANT CHANGE UP (ref 83–108)
POTASSIUM SERPL-MCNC: 4.4 MMOL/L — SIGNIFICANT CHANGE UP (ref 3.5–5.3)
POTASSIUM SERPL-MCNC: 4.5 MMOL/L — SIGNIFICANT CHANGE UP (ref 3.5–5.3)
POTASSIUM SERPL-MCNC: 5.3 MMOL/L — SIGNIFICANT CHANGE UP (ref 3.5–5.3)
POTASSIUM SERPL-SCNC: 4.4 MMOL/L — SIGNIFICANT CHANGE UP (ref 3.5–5.3)
POTASSIUM SERPL-SCNC: 4.5 MMOL/L — SIGNIFICANT CHANGE UP (ref 3.5–5.3)
POTASSIUM SERPL-SCNC: 5.3 MMOL/L — SIGNIFICANT CHANGE UP (ref 3.5–5.3)
PROTHROM AB SERPL-ACNC: 14.9 SEC — HIGH (ref 9.8–12.7)
RBC # BLD: 3.94 M/UL — LOW (ref 4.6–6.2)
RBC # BLD: 4.03 M/UL — LOW (ref 4.6–6.2)
RBC # BLD: 4.46 M/UL — LOW (ref 4.6–6.2)
RBC # FLD: 16.2 % — HIGH (ref 11–15.6)
RBC # FLD: 16.3 % — HIGH (ref 11–15.6)
RBC # FLD: 16.3 % — HIGH (ref 11–15.6)
SAO2 % BLDA: 98 % — SIGNIFICANT CHANGE UP (ref 95–99)
SODIUM SERPL-SCNC: 135 MMOL/L — SIGNIFICANT CHANGE UP (ref 135–145)
SODIUM SERPL-SCNC: 136 MMOL/L — SIGNIFICANT CHANGE UP (ref 135–145)
SODIUM SERPL-SCNC: 140 MMOL/L — SIGNIFICANT CHANGE UP (ref 135–145)
TYPE + AB SCN PNL BLD: SIGNIFICANT CHANGE UP
WBC # BLD: 14.5 K/UL — HIGH (ref 4.8–10.8)
WBC # BLD: 8.5 K/UL — SIGNIFICANT CHANGE UP (ref 4.8–10.8)
WBC # BLD: 8.5 K/UL — SIGNIFICANT CHANGE UP (ref 4.8–10.8)
WBC # FLD AUTO: 14.5 K/UL — HIGH (ref 4.8–10.8)
WBC # FLD AUTO: 8.5 K/UL — SIGNIFICANT CHANGE UP (ref 4.8–10.8)
WBC # FLD AUTO: 8.5 K/UL — SIGNIFICANT CHANGE UP (ref 4.8–10.8)

## 2018-01-16 PROCEDURE — 71045 X-RAY EXAM CHEST 1 VIEW: CPT | Mod: 26

## 2018-01-16 PROCEDURE — 44310 ILEOSTOMY/JEJUNOSTOMY: CPT

## 2018-01-16 PROCEDURE — 99233 SBSQ HOSP IP/OBS HIGH 50: CPT

## 2018-01-16 PROCEDURE — 88305 TISSUE EXAM BY PATHOLOGIST: CPT | Mod: 26

## 2018-01-16 PROCEDURE — 99233 SBSQ HOSP IP/OBS HIGH 50: CPT | Mod: 57,GC

## 2018-01-16 PROCEDURE — 93010 ELECTROCARDIOGRAM REPORT: CPT

## 2018-01-16 RX ORDER — DEXTROSE 50 % IN WATER 50 %
1 SYRINGE (ML) INTRAVENOUS ONCE
Qty: 0 | Refills: 0 | Status: DISCONTINUED | OUTPATIENT
Start: 2018-01-16 | End: 2018-01-18

## 2018-01-16 RX ORDER — ONDANSETRON 8 MG/1
4 TABLET, FILM COATED ORAL ONCE
Qty: 0 | Refills: 0 | Status: DISCONTINUED | OUTPATIENT
Start: 2018-01-16 | End: 2018-01-16

## 2018-01-16 RX ORDER — GLUCAGON INJECTION, SOLUTION 0.5 MG/.1ML
1 INJECTION, SOLUTION SUBCUTANEOUS ONCE
Qty: 0 | Refills: 0 | Status: DISCONTINUED | OUTPATIENT
Start: 2018-01-16 | End: 2018-01-18

## 2018-01-16 RX ORDER — DIPHENHYDRAMINE HYDROCHLORIDE AND LIDOCAINE HYDROCHLORIDE AND ALUMINUM HYDROXIDE AND MAGNESIUM HYDRO
10 KIT
Qty: 0 | Refills: 0 | Status: DISCONTINUED | OUTPATIENT
Start: 2018-01-16 | End: 2018-02-15

## 2018-01-16 RX ORDER — SODIUM CHLORIDE 0.65 %
1 AEROSOL, SPRAY (ML) NASAL
Qty: 0 | Refills: 0 | Status: DISCONTINUED | OUTPATIENT
Start: 2018-01-16 | End: 2018-02-15

## 2018-01-16 RX ORDER — SODIUM CHLORIDE 9 MG/ML
1000 INJECTION, SOLUTION INTRAVENOUS
Qty: 0 | Refills: 0 | Status: DISCONTINUED | OUTPATIENT
Start: 2018-01-16 | End: 2018-01-16

## 2018-01-16 RX ORDER — FENTANYL CITRATE 50 UG/ML
25 INJECTION INTRAVENOUS
Qty: 0 | Refills: 0 | Status: DISCONTINUED | OUTPATIENT
Start: 2018-01-16 | End: 2018-01-16

## 2018-01-16 RX ORDER — ASPIRIN/CALCIUM CARB/MAGNESIUM 324 MG
81 TABLET ORAL DAILY
Qty: 0 | Refills: 0 | Status: DISCONTINUED | OUTPATIENT
Start: 2018-01-16 | End: 2018-02-06

## 2018-01-16 RX ORDER — DEXTROSE 50 % IN WATER 50 %
12.5 SYRINGE (ML) INTRAVENOUS ONCE
Qty: 0 | Refills: 0 | Status: DISCONTINUED | OUTPATIENT
Start: 2018-01-16 | End: 2018-02-15

## 2018-01-16 RX ORDER — DEXTROSE 50 % IN WATER 50 %
25 SYRINGE (ML) INTRAVENOUS ONCE
Qty: 0 | Refills: 0 | Status: DISCONTINUED | OUTPATIENT
Start: 2018-01-16 | End: 2018-02-15

## 2018-01-16 RX ORDER — IPRATROPIUM/ALBUTEROL SULFATE 18-103MCG
3 AEROSOL WITH ADAPTER (GRAM) INHALATION ONCE
Qty: 0 | Refills: 0 | Status: DISCONTINUED | OUTPATIENT
Start: 2018-01-16 | End: 2018-01-16

## 2018-01-16 RX ORDER — ALPRAZOLAM 0.25 MG
0.25 TABLET ORAL EVERY 8 HOURS
Qty: 0 | Refills: 0 | Status: DISCONTINUED | OUTPATIENT
Start: 2018-01-16 | End: 2018-01-21

## 2018-01-16 RX ORDER — TAMSULOSIN HYDROCHLORIDE 0.4 MG/1
0.4 CAPSULE ORAL AT BEDTIME
Qty: 0 | Refills: 0 | Status: DISCONTINUED | OUTPATIENT
Start: 2018-01-16 | End: 2018-02-15

## 2018-01-16 RX ORDER — ENOXAPARIN SODIUM 100 MG/ML
40 INJECTION SUBCUTANEOUS DAILY
Qty: 0 | Refills: 0 | Status: DISCONTINUED | OUTPATIENT
Start: 2018-01-16 | End: 2018-02-15

## 2018-01-16 RX ORDER — MAGNESIUM SULFATE 500 MG/ML
2 VIAL (ML) INJECTION
Qty: 0 | Refills: 0 | Status: COMPLETED | OUTPATIENT
Start: 2018-01-16 | End: 2018-01-16

## 2018-01-16 RX ORDER — CIPROFLOXACIN LACTATE 400MG/40ML
400 VIAL (ML) INTRAVENOUS ONCE
Qty: 0 | Refills: 0 | Status: DISCONTINUED | OUTPATIENT
Start: 2018-01-16 | End: 2018-01-16

## 2018-01-16 RX ORDER — DEXTROSE 50 % IN WATER 50 %
25 SYRINGE (ML) INTRAVENOUS ONCE
Qty: 0 | Refills: 0 | Status: DISCONTINUED | OUTPATIENT
Start: 2018-01-16 | End: 2018-01-18

## 2018-01-16 RX ORDER — PHENYLEPHRINE HYDROCHLORIDE 10 MG/ML
0.5 INJECTION INTRAVENOUS
Qty: 80 | Refills: 0 | Status: DISCONTINUED | OUTPATIENT
Start: 2018-01-16 | End: 2018-01-17

## 2018-01-16 RX ORDER — ACETAMINOPHEN 500 MG
650 TABLET ORAL EVERY 6 HOURS
Qty: 0 | Refills: 0 | Status: DISCONTINUED | OUTPATIENT
Start: 2018-01-16 | End: 2018-01-16

## 2018-01-16 RX ORDER — SODIUM CHLORIDE 9 MG/ML
1000 INJECTION, SOLUTION INTRAVENOUS
Qty: 0 | Refills: 0 | Status: DISCONTINUED | OUTPATIENT
Start: 2018-01-16 | End: 2018-02-15

## 2018-01-16 RX ORDER — OCTREOTIDE ACETATE 200 UG/ML
300 INJECTION, SOLUTION INTRAVENOUS; SUBCUTANEOUS THREE TIMES A DAY
Qty: 0 | Refills: 0 | Status: DISCONTINUED | OUTPATIENT
Start: 2018-01-16 | End: 2018-01-16

## 2018-01-16 RX ORDER — TIOTROPIUM BROMIDE 18 UG/1
1 CAPSULE ORAL; RESPIRATORY (INHALATION) DAILY
Qty: 0 | Refills: 0 | Status: DISCONTINUED | OUTPATIENT
Start: 2018-01-16 | End: 2018-02-15

## 2018-01-16 RX ORDER — SODIUM CHLORIDE 9 MG/ML
500 INJECTION, SOLUTION INTRAVENOUS ONCE
Qty: 0 | Refills: 0 | Status: COMPLETED | OUTPATIENT
Start: 2018-01-16 | End: 2018-01-17

## 2018-01-16 RX ORDER — PANTOPRAZOLE SODIUM 20 MG/1
40 TABLET, DELAYED RELEASE ORAL
Qty: 0 | Refills: 0 | Status: DISCONTINUED | OUTPATIENT
Start: 2018-01-16 | End: 2018-01-18

## 2018-01-16 RX ORDER — INSULIN LISPRO 100/ML
VIAL (ML) SUBCUTANEOUS
Qty: 0 | Refills: 0 | Status: DISCONTINUED | OUTPATIENT
Start: 2018-01-16 | End: 2018-01-18

## 2018-01-16 RX ORDER — SODIUM CHLORIDE 9 MG/ML
500 INJECTION, SOLUTION INTRAVENOUS ONCE
Qty: 0 | Refills: 0 | Status: COMPLETED | OUTPATIENT
Start: 2018-01-16 | End: 2018-01-16

## 2018-01-16 RX ORDER — CHOLESTYRAMINE 4 G/9G
4 POWDER, FOR SUSPENSION ORAL
Qty: 0 | Refills: 0 | Status: DISCONTINUED | OUTPATIENT
Start: 2018-01-16 | End: 2018-01-17

## 2018-01-16 RX ORDER — MAGNESIUM SULFATE 500 MG/ML
2 VIAL (ML) INJECTION ONCE
Qty: 0 | Refills: 0 | Status: COMPLETED | OUTPATIENT
Start: 2018-01-16 | End: 2018-01-16

## 2018-01-16 RX ORDER — PHENYLEPHRINE HYDROCHLORIDE 10 MG/ML
0.6 INJECTION INTRAVENOUS
Qty: 160 | Refills: 0 | Status: DISCONTINUED | OUTPATIENT
Start: 2018-01-16 | End: 2018-01-16

## 2018-01-16 RX ORDER — LOPERAMIDE HCL 2 MG
2 TABLET ORAL
Qty: 0 | Refills: 0 | Status: DISCONTINUED | OUTPATIENT
Start: 2018-01-16 | End: 2018-01-17

## 2018-01-16 RX ADMIN — NYSTATIN CREAM 1 APPLICATION(S): 100000 CREAM TOPICAL at 06:24

## 2018-01-16 RX ADMIN — SODIUM CHLORIDE 6000 MILLILITER(S): 9 INJECTION, SOLUTION INTRAVENOUS at 21:15

## 2018-01-16 RX ADMIN — SODIUM CHLORIDE 80 MILLILITER(S): 9 INJECTION, SOLUTION INTRAVENOUS at 06:17

## 2018-01-16 RX ADMIN — Medication 1 SPRAY(S): at 18:19

## 2018-01-16 RX ADMIN — Medication 50 GRAM(S): at 23:16

## 2018-01-16 NOTE — PROGRESS NOTE ADULT - SUBJECTIVE AND OBJECTIVE BOX
Acute Care Surgery /Trauma PROGRESS NOTE:     SUBJECTIVE: Pt seen and examined at bedside. No acute overnight events. Pain well controlled. NPO at midnight, no n/v. Voiding well. Ostomy +gas, output 5.6L. Denies f/c/sob/cp. OOB.    Vital Signs Last 24 Hrs  T(C): 37.1 (16 Jan 2018 07:44), Max: 37.1 (16 Jan 2018 07:44)  T(F): 98.8 (16 Jan 2018 07:44), Max: 98.8 (16 Jan 2018 07:44)  HR: 80 (16 Jan 2018 06:21) (77 - 118)  BP: 96/60 (16 Jan 2018 06:21) (95/65 - 130/91)  BP(mean): --  RR: 16 (16 Jan 2018 07:44) (16 - 19)  SpO2: 100% (16 Jan 2018 07:44) (98% - 100%)    OBJECTIVE:  NAD, cachectic  NC/AT, 4L NC  RRR  No respiratory distress  Abd soft, nondistended, nontender, no guarding or rebound. No peritoneal signs. ostomy pink and viable, liquid stool  No pedal edema    I&O's Detail    15 Francisco Javier 2018 07:01  -  16 Jan 2018 07:00  --------------------------------------------------------  IN:    lactated ringers: 1520 mL    Oral Fluid: 200 mL  Total IN: 1720 mL    OUT:    Ileostomy: 6000 mL  Total OUT: 6000 mL    Total NET: -4280 mL          MEDICATIONS  (STANDING):  ALBUTerol/ipratropium for Nebulization. 3 milliLiter(s) Nebulizer once  aspirin  chewable 81 milliGRAM(s) Oral daily  atorvastatin 80 milliGRAM(s) Oral at bedtime  cholestyramine Powder (Sugar-Free) 4 Gram(s) Oral two times a day  diltiazem    milliGRAM(s) Oral daily  enoxaparin Injectable 40 milliGRAM(s) SubCutaneous daily  FIRST- Mouthwash  BLM 10 milliLiter(s) Swish and Spit two times a day  fluticasone propionate/ salmeterol 250-50 MICROgram(s) Diskus 1 Dose(s) Inhalation two times a day  lactated ringers 1000 milliLiter(s) (80 mL/Hr) IV Continuous <Continuous>  levothyroxine 50 MICROGram(s) Oral daily  loperamide Solution 2 milliGRAM(s) Oral four times a day  loratadine 10 milliGRAM(s) Oral daily  magnesium sulfate  IVPB 2 Gram(s) IV Intermittent every 2 hours  multivitamin 1 Tablet(s) Oral daily  nystatin Powder 1 Application(s) Topical two times a day  octreotide  Injectable 300 MICROGram(s) SubCutaneous three times a day  pantoprazole   Suspension 40 milliGRAM(s) Oral two times a day before meals  sodium chloride 0.65% Nasal 1 Spray(s) Both Nostrils four times a day  sodium phosphate IVPB 15 milliMole(s) IV Intermittent once  tamsulosin 0.4 milliGRAM(s) Oral at bedtime  tiotropium 18 MICROgram(s) Capsule 1 Capsule(s) Inhalation daily    MEDICATIONS  (PRN):  acetaminophen  Suppository. 650 milliGRAM(s) Rectal every 6 hours PRN Moderate Pain (4 - 6)  ALBUTerol    0.083% 2.5 milliGRAM(s) Nebulizer every 4 hours PRN Shortness of Breath and/or Wheezing  ALPRAZolam 0.25 milliGRAM(s) Oral every 8 hours PRN anxiety  benzonatate 100 milliGRAM(s) Oral three times a day PRN Cough  ondansetron Injectable 4 milliGRAM(s) IV Push every 8 hours PRN Nausea and/or Vomiting      LABS:                        10.9   8.5   )-----------( 313      ( 16 Jan 2018 04:26 )             35.0     01-16    136  |  100  |  6.0<L>  ----------------------------<  130<H>  4.5   |  30.0<H>  |  0.65    Ca    8.1<L>      16 Jan 2018 04:26  Phos  2.0     01-16  Mg     1.4     01-16      PT/INR - ( 16 Jan 2018 04:26 )   PT: 14.9 sec;   INR: 1.35 ratio         PTT - ( 16 Jan 2018 04:26 )  PTT:32.0 sec        RADIOLOGY & ADDITIONAL STUDIES:

## 2018-01-16 NOTE — PROGRESS NOTE ADULT - SUBJECTIVE AND OBJECTIVE BOX
POST OP CHECK NOTE    SUBJECTIVE: Pt went to OR earlier today for a loop jejunostomy reversal, conversion to loop ileostomy  Pt was extubated in PACU, but required pressors and was transferred to SICU for continued care.      MEDICATIONS  (STANDING):  aspirin  chewable 81 milliGRAM(s) Oral daily  cholestyramine Powder (Sugar-Free) 4 Gram(s) Oral two times a day  enoxaparin Injectable 40 milliGRAM(s) SubCutaneous daily  FIRST- Mouthwash  BLM 10 milliLiter(s) Swish and Spit two times a day  insulin lispro (HumaLOG) corrective regimen sliding scale   SubCutaneous three times a day before meals  loperamide Solution 2 milliGRAM(s) Oral four times a day  multiple electrolytes Injection Type 1 Bolus 500 milliLiter(s) IV Bolus once  multivitamin 1 Tablet(s) Oral daily  pantoprazole   Suspension 40 milliGRAM(s) Oral two times a day before meals  phenylephrine    Infusion 0.5 MICROgram(s)/kG/Min (12.244 mL/Hr) IV Continuous <Continuous>  sodium chloride 0.65% Nasal 1 Spray(s) Both Nostrils four times a day  tamsulosin 0.4 milliGRAM(s) Oral at bedtime  tiotropium 18 MICROgram(s) Capsule 1 Capsule(s) Inhalation daily    MEDICATIONS  (PRN):  ALPRAZolam 0.25 milliGRAM(s) Oral every 8 hours PRN anxiety  benzonatate 100 milliGRAM(s) Oral three times a day PRN Cough  dextrose Gel 1 Dose(s) Oral once PRN Blood Glucose LESS THAN 70 milliGRAM(s)/deciliter  glucagon  Injectable 1 milliGRAM(s) IntraMuscular once PRN Glucose LESS THAN 70 milligrams/deciliter      Vital Signs Last 24 Hrs  T(C): 36.3 (17 Jan 2018 00:15), Max: 37.1 (16 Jan 2018 07:44)  T(F): 97.4 (17 Jan 2018 00:15), Max: 98.8 (16 Jan 2018 07:44)  HR: 75 (17 Jan 2018 00:00) (0 - 121)  BP: 102/63 (17 Jan 2018 00:00) (71/52 - 135/83)  BP(mean): 78 (17 Jan 2018 00:00) (56 - 93)  RR: 16 (17 Jan 2018 00:00) (0 - 30)  SpO2: 90% (17 Jan 2018 00:00) (90% - 100%)    PE  Gen: Cachectic, rousible, responds appropriately Appears comfortable  Pulm: CTAb, RRR  Abd: Soft, nondistended, appropriately tender. Loop ileostom y has red rubber catheter bridge, and mucosa appears pink and well perfused  : Roy in place, clear yellow urine  Ext: WWP  Neuro: GCS 15, CNs grossly intact      I&O's Detail    15 Francisco Javier 2018 07:01  -  16 Jan 2018 07:00  --------------------------------------------------------  IN:    lactated ringers: 1520 mL    Oral Fluid: 200 mL  Total IN: 1720 mL    OUT:    Ileostomy: 6000 mL  Total OUT: 6000 mL    Total NET: -4280 mL      16 Jan 2018 07:01  -  17 Jan 2018 00:36  --------------------------------------------------------  IN:    lactated ringers: 400 mL    Lactated Ringers IV Bolus: 500 mL    phenylephrine   Infusion: 48.8 mL    Solution: 50 mL  Total IN: 998.8 mL    OUT:    Indwelling Catheter - Urethral: 535 mL    Voided: 600 mL  Total OUT: 1135 mL    Total NET: -136.2 mL          LABS:                        12.3   14.5  )-----------( 373      ( 16 Jan 2018 19:05 )             39.8     01-16    140  |  102  |  8.0  ----------------------------<  187<H>  5.3   |  28.0  |  0.68    Ca    9.0      16 Jan 2018 19:05  Phos  4.1     01-16  Mg     1.3     01-16      PT/INR - ( 16 Jan 2018 04:26 )   PT: 14.9 sec;   INR: 1.35 ratio         PTT - ( 16 Jan 2018 04:26 )  PTT:32.0 sec

## 2018-01-16 NOTE — PROGRESS NOTE ADULT - ASSESSMENT
68 yo M with hx pulm resection, loop ostomy with persistently high output determined to be loop jejunostomy, now POD0 reversal loop jejunostomy and   conversion to loop ileostomy, extubated in PACU but persistent requirement of pressors in immediate post op period    -Neuro: Off sedation  -Pulm: on supplemental O2, Satting high 90's. Nebs ordered  -Cards: continuous monitoring. Continue attempting to wean off neosynephrine  -GI: NPO, NGT to cont low wall suction. Replete ostomy output 1:1  -FEN/GI: NPO, Replete lytes PRN  - Roy in place for monitoring strict ins/outs in critically ill patient  -Heme: H/H stable post-op.   -DVT ppx: SCDs, Lovenox  -Misc: SICU level of care; dispo to floor when pressors are no longer required and acute periop condition has stabilized

## 2018-01-16 NOTE — BRIEF OPERATIVE NOTE - PRE-OP DX
Colonic obstruction  12/07/2017    Active  Gus Valdes
Altered bowel elimination due to intestinal ostomy  01/16/2018    Active  Mariano Corley  Colonic obstruction  12/07/2017    Active  Gus Valdes

## 2018-01-16 NOTE — BRIEF OPERATIVE NOTE - OPERATION/FINDINGS
Jejunostomy revised, creation of a Loop Ileostomy Jejunostomy revised- resected <10cm, stapled anastomosis, creation of a Loop Ileostomy

## 2018-01-16 NOTE — CHART NOTE - NSCHARTNOTEFT_GEN_A_CORE
Source: Patient [x ]  Family [ ]   other [ ]    Current Diet: NPO for ileostomy revision. Usual diet is high fiber with ensure clears TID    Patient reports [ ] nausea  [ ] vomiting [ ] diarrhea [ ] constipation  [ ]chewing problems [ ] swallowing issues  [ ] other:     PO intake:  < 50% [ ]   50-75%  [ ]   %  [x ]  other :    Source for PO intake [x ] Patient [ ] family [ ] chart [ ] staff [ ] other    Enteral /Parenteral Nutrition:     Current Weight:     % Weight Change     Pertinent Medications: MEDICATIONS  (STANDING):  ALBUTerol/ipratropium for Nebulization. 3 milliLiter(s) Nebulizer once  aspirin  chewable 81 milliGRAM(s) Oral daily  atorvastatin 80 milliGRAM(s) Oral at bedtime  cholestyramine Powder (Sugar-Free) 4 Gram(s) Oral two times a day  diltiazem    milliGRAM(s) Oral daily  enoxaparin Injectable 40 milliGRAM(s) SubCutaneous daily  FIRST- Mouthwash  BLM 10 milliLiter(s) Swish and Spit two times a day  fluticasone propionate/ salmeterol 250-50 MICROgram(s) Diskus 1 Dose(s) Inhalation two times a day  lactated ringers 1000 milliLiter(s) (80 mL/Hr) IV Continuous <Continuous>  levothyroxine 50 MICROGram(s) Oral daily  loperamide Solution 2 milliGRAM(s) Oral four times a day  loratadine 10 milliGRAM(s) Oral daily  magnesium sulfate  IVPB 2 Gram(s) IV Intermittent every 2 hours  multivitamin 1 Tablet(s) Oral daily  nystatin Powder 1 Application(s) Topical two times a day  octreotide  Injectable 300 MICROGram(s) SubCutaneous three times a day  pantoprazole   Suspension 40 milliGRAM(s) Oral two times a day before meals  sodium chloride 0.65% Nasal 1 Spray(s) Both Nostrils four times a day  sodium phosphate IVPB 15 milliMole(s) IV Intermittent once  tamsulosin 0.4 milliGRAM(s) Oral at bedtime  tiotropium 18 MICROgram(s) Capsule 1 Capsule(s) Inhalation daily    MEDICATIONS  (PRN):  acetaminophen  Suppository. 650 milliGRAM(s) Rectal every 6 hours PRN Moderate Pain (4 - 6)  ALBUTerol    0.083% 2.5 milliGRAM(s) Nebulizer every 4 hours PRN Shortness of Breath and/or Wheezing  ALPRAZolam 0.25 milliGRAM(s) Oral every 8 hours PRN anxiety  benzonatate 100 milliGRAM(s) Oral three times a day PRN Cough  ondansetron Injectable 4 milliGRAM(s) IV Push every 8 hours PRN Nausea and/or Vomiting    Pertinent Labs: CBC Full  -  ( 16 Jan 2018 04:26 )  WBC Count : 8.5 K/uL  Hemoglobin : 10.9 g/dL  Hematocrit : 35.0 %  Platelet Count - Automated : 313 K/uL  Mean Cell Volume : 88.8 fl  Mean Cell Hemoglobin : 27.7 pg  Mean Cell Hemoglobin Concentration : 31.1 g/dL  Auto Neutrophil # : 5.7 K/uL  Auto Lymphocyte # : 1.8 K/uL  Auto Monocyte # : 0.8 K/uL  Auto Eosinophil # : 0.1 K/uL  Auto Basophil # : 0.0 K/uL  Auto Neutrophil % : 67.2 %  Auto Lymphocyte % : 21.5 %  Auto Monocyte % : 9.5 %  Auto Eosinophil % : 1.3 %  Auto Basophil % : 0.4 %          Skin:     Nutrition focused physical exam conducted - found signs of malnutrition [ ]absent [x ]present    Subcutaneous fat loss: [ ] Orbital fat pads region, [ ]Buccal fat region, [ ]Triceps region,  [x ]Ribs region    Muscle wasting: [x ]Temples region, [x ]Clavicle region, [ ]Shoulder region, [ ]Scapula region, [ ]Interosseous region,  [ x]thigh region, [ ]Calf region    Estimated Needs:   [ ] no change since previous assessment  [ ] recalculated:     Current Nutrition Diagnosis:  Pt meets criteria for severe chronic malnutrition related to absorption issues,  in setting of ileostomy as evidenced by high ostomy output, 36# weight loss since admission 11/29, severe fat/ muscle depletion in thoracic region, thighs, temples, clavicles. Pt has been drinking Ensure TID and it has been increasing output immediately.  Ensure changed to Ensure clears. Pt taking  well with good appetite. Output is still high.  NPO for ostomy revision today.     Recommendations: Advance diet as per surgery. TPN as needed    Monitoring and Evaluation:   [x ] PO intake [x ] Tolerance to diet prescription [X] Weights  [X] Follow up per protocol [X] Labs:

## 2018-01-16 NOTE — PROGRESS NOTE ADULT - ASSESSMENT
66 y/o F with stage 4 COPD, h/o lung CA with loop jejunostomy with high output (>5L in 24 hours). Electrolytes are within normal limits. Hemodynamically stable, afebrile.    Plan:  -Plan for operative revision of ostomy today 1/16  -Patient optimized and cleared from cardiology perspective  -Risk stratified by pulmonary  -Recommend octreotide, TPN  -Hold plavix  -Repletions 1/2:1 for ostomy output  -Replete electrolyte PRN

## 2018-01-16 NOTE — PROGRESS NOTE ADULT - SUBJECTIVE AND OBJECTIVE BOX
CC: Jejunostomy revision with creation of loop ileostomy to manage intestinal hurry of short-gut. COPD and hypoxemia.   HPI:  67 YOM w/PMH of COPD stage 4 s/p right lung reduction in 2010, currently on home oxygen 4L (sat 92-93%), HTN, CVA on 2014, DM, hypothyroidism, hydrocele, kidney stones who came because he noted that his O2 sat was 76% today. He mentions more difficult to talk in full sentences compared to his baseline. He also has a chronic productive cough which has not changed in frequency or color. He mentions that his oxygen machine broke 4 days ago and he got a new one since, but is not sure if its working properly. He denies SOB, increased cough, chest pain, hemoptysis nausea, weakness. Pt has to sleep in a sit position with 2 pillows since he gets SOB if he lyes flat, condition that he attributes to his postnasal drip (29 Nov 2017 02:05)    REVIEW OF SYSTEMS:    Patient denied fever, chills, abdominal pain, nausea, vomiting, cough, shortness of breath, chest pain or palpitations    Vital Signs Last 24 Hrs  T(C): 37.1 (16 Jan 2018 20:00), Max: 37.1 (16 Jan 2018 07:44)  T(F): 98.7 (16 Jan 2018 20:00), Max: 98.8 (16 Jan 2018 07:44)  HR: 71 (16 Jan 2018 22:15) (0 - 121)  BP: 101/62 (16 Jan 2018 22:15) (71/52 - 135/83)  BP(mean): 77 (16 Jan 2018 22:15) (56 - 93)  RR: 15 (16 Jan 2018 22:15) (0 - 30)  SpO2: 90% (16 Jan 2018 22:15) (89% - 100%)I&O's Summary    15 Francisco Javier 2018 07:01  -  16 Jan 2018 07:00  --------------------------------------------------------  IN: 1720 mL / OUT: 6000 mL / NET: -4280 mL    16 Jan 2018 07:01  -  16 Jan 2018 22:20  --------------------------------------------------------  IN: 948.8 mL / OUT: 935 mL / NET: 13.8 mL      PHYSICAL EXAM:  GENERAL: Cachexia   HEENT: PERRL, +EOMI, anicteric, no [place]  NECK: Supple, No JVD   CHEST/LUNG: bilateral rales   HEART: S1S2 Normal intensity,   ABDOMEN: Soft, BS Hypoactive,  ND  EXTREMITIES:  No cyanosis, or edema noted. Limitation of movement.    SKIN: No rashes or lesions noted  NEURO: A&Ox3, no focal deficits noted, CN II-XII intact  PSYCH: Depressed mood and affect; insight/judgement appropriate  LABS:                        12.3   14.5  )-----------( 373      ( 16 Jan 2018 19:05 )             39.8     01-16    140  |  102  |  8.0  ----------------------------<  187<H>  5.3   |  28.0  |  0.68    Ca    9.0      16 Jan 2018 19:05  Phos  4.1     01-16  Mg     1.3     01-16      PT/INR - ( 16 Jan 2018 04:26 )   PT: 14.9 sec;   INR: 1.35 ratio         PTT - ( 16 Jan 2018 04:26 )  PTT:32.0 sec    RADIOLOGY & ADDITIONAL TESTS:    MEDICATIONS:  MEDICATIONS  (STANDING):  aspirin  chewable 81 milliGRAM(s) Oral daily  cholestyramine Powder (Sugar-Free) 4 Gram(s) Oral two times a day  dextrose 5%. 1000 milliLiter(s) (50 mL/Hr) IV Continuous <Continuous>  dextrose 50% Injectable 12.5 Gram(s) IV Push once  dextrose 50% Injectable 25 Gram(s) IV Push once  dextrose 50% Injectable 25 Gram(s) IV Push once  enoxaparin Injectable 40 milliGRAM(s) SubCutaneous daily  FIRST- Mouthwash  BLM 10 milliLiter(s) Swish and Spit two times a day  insulin lispro (HumaLOG) corrective regimen sliding scale   SubCutaneous three times a day before meals  loperamide Solution 2 milliGRAM(s) Oral four times a day  multivitamin 1 Tablet(s) Oral daily  pantoprazole   Suspension 40 milliGRAM(s) Oral two times a day before meals  phenylephrine    Infusion 0.5 MICROgram(s)/kG/Min (12.244 mL/Hr) IV Continuous <Continuous>  sodium chloride 0.65% Nasal 1 Spray(s) Both Nostrils four times a day  tamsulosin 0.4 milliGRAM(s) Oral at bedtime  tiotropium 18 MICROgram(s) Capsule 1 Capsule(s) Inhalation daily    MEDICATIONS  (PRN):  ALPRAZolam 0.25 milliGRAM(s) Oral every 8 hours PRN anxiety  benzonatate 100 milliGRAM(s) Oral three times a day PRN Cough  dextrose Gel 1 Dose(s) Oral once PRN Blood Glucose LESS THAN 70 milliGRAM(s)/deciliter  glucagon  Injectable 1 milliGRAM(s) IntraMuscular once PRN Glucose LESS THAN 70 milligrams/deciliter

## 2018-01-16 NOTE — PRE-OP CHECKLIST - PATIENT'S PERSONAL PROPERTY GIVEN TO
on unit on unit/Patient brought glasses to OR holding area, glasses given to wife prior to entering the OR.

## 2018-01-16 NOTE — PROGRESS NOTE ADULT - ASSESSMENT
I. 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    Continue post surgical orders.   Replete electrolyte magnesium     2. Acute on chronic hypoxic respiratory failure secondary to COPD exacerbation-   Normally on home oxygen (4 liters), continue nebulized bronchodilators    3. CAP - completed course of Levaquin on 12/5.     4. Hypertension - stable   - continue cardizem XR  at 120mg daily.     5.Electrolyte abnormalities  :    Replete electrolytes magnesium with 2g mag sulfate iv    monitor Electrolytes    7. Urinary retention,   no further retention, Roy in place  - Scrotal swelling and redness resolving  - patient following with  as outpatient    8. Right femoral artery dissection  - incidental finding on CT, no intervention as per vascular     9. anxiety: xanax at hs PRN.

## 2018-01-16 NOTE — BRIEF OPERATIVE NOTE - PROCEDURE
<<-----Click on this checkbox to enter Procedure Jejunostomy  01/16/2018  Reversal/Revision of Jejunostomy  Active  SOLOMON  Ileostomy  12/07/2017    Active  Gus Valdes

## 2018-01-17 ENCOUNTER — TRANSCRIPTION ENCOUNTER (OUTPATIENT)
Age: 68
End: 2018-01-17

## 2018-01-17 LAB
ANION GAP SERPL CALC-SCNC: 11 MMOL/L — SIGNIFICANT CHANGE UP (ref 5–17)
ANION GAP SERPL CALC-SCNC: 16 MMOL/L — SIGNIFICANT CHANGE UP (ref 5–17)
BASOPHILS # BLD AUTO: 0 K/UL — SIGNIFICANT CHANGE UP (ref 0–0.2)
BASOPHILS NFR BLD AUTO: 0.1 % — SIGNIFICANT CHANGE UP (ref 0–2)
BUN SERPL-MCNC: 14 MG/DL — SIGNIFICANT CHANGE UP (ref 8–20)
BUN SERPL-MCNC: 9 MG/DL — SIGNIFICANT CHANGE UP (ref 8–20)
CALCIUM SERPL-MCNC: 8.1 MG/DL — LOW (ref 8.6–10.2)
CALCIUM SERPL-MCNC: 8.8 MG/DL — SIGNIFICANT CHANGE UP (ref 8.6–10.2)
CHLORIDE SERPL-SCNC: 96 MMOL/L — LOW (ref 98–107)
CHLORIDE SERPL-SCNC: 96 MMOL/L — LOW (ref 98–107)
CHLORIDE SERPL-SCNC: 98 MMOL/L — SIGNIFICANT CHANGE UP (ref 98–107)
CHLORIDE UR-SCNC: 71 MMOL/L — SIGNIFICANT CHANGE UP
CO2 SERPL-SCNC: 26 MMOL/L — SIGNIFICANT CHANGE UP (ref 22–29)
CO2 SERPL-SCNC: 28 MMOL/L — SIGNIFICANT CHANGE UP (ref 22–29)
CREAT ?TM UR-MCNC: 127 MG/DL — SIGNIFICANT CHANGE UP
CREAT SERPL-MCNC: 0.75 MG/DL — SIGNIFICANT CHANGE UP (ref 0.5–1.3)
CREAT SERPL-MCNC: 0.78 MG/DL — SIGNIFICANT CHANGE UP (ref 0.5–1.3)
EOSINOPHIL # BLD AUTO: 0 K/UL — SIGNIFICANT CHANGE UP (ref 0–0.5)
EOSINOPHIL NFR BLD AUTO: 0 % — SIGNIFICANT CHANGE UP (ref 0–5)
GLUCOSE BLDC GLUCOMTR-MCNC: 104 MG/DL — HIGH (ref 70–99)
GLUCOSE BLDC GLUCOMTR-MCNC: 106 MG/DL — HIGH (ref 70–99)
GLUCOSE BLDC GLUCOMTR-MCNC: 109 MG/DL — HIGH (ref 70–99)
GLUCOSE BLDC GLUCOMTR-MCNC: 125 MG/DL — HIGH (ref 70–99)
GLUCOSE SERPL-MCNC: 115 MG/DL — SIGNIFICANT CHANGE UP (ref 70–115)
GLUCOSE SERPL-MCNC: 136 MG/DL — HIGH (ref 70–115)
HCT VFR BLD CALC: 40.9 % — LOW (ref 42–52)
HGB BLD-MCNC: 13.1 G/DL — LOW (ref 14–18)
INR BLD: 1.18 RATIO — HIGH (ref 0.88–1.16)
LYMPHOCYTES # BLD AUTO: 1.2 K/UL — SIGNIFICANT CHANGE UP (ref 1–4.8)
LYMPHOCYTES # BLD AUTO: 9.3 % — LOW (ref 20–55)
MAGNESIUM SERPL-MCNC: 2 MG/DL — SIGNIFICANT CHANGE UP (ref 1.6–2.6)
MCHC RBC-ENTMCNC: 28.2 PG — SIGNIFICANT CHANGE UP (ref 27–31)
MCHC RBC-ENTMCNC: 32 G/DL — SIGNIFICANT CHANGE UP (ref 32–36)
MCV RBC AUTO: 88.1 FL — SIGNIFICANT CHANGE UP (ref 80–94)
MONOCYTES # BLD AUTO: 0.8 K/UL — SIGNIFICANT CHANGE UP (ref 0–0.8)
MONOCYTES NFR BLD AUTO: 5.8 % — SIGNIFICANT CHANGE UP (ref 3–10)
NEUTROPHILS # BLD AUTO: 11.1 K/UL — HIGH (ref 1.8–8)
NEUTROPHILS NFR BLD AUTO: 84.6 % — HIGH (ref 37–73)
PHOSPHATE SERPL-MCNC: 4.4 MG/DL — SIGNIFICANT CHANGE UP (ref 2.4–4.7)
PLATELET # BLD AUTO: 422 K/UL — HIGH (ref 150–400)
POTASSIUM SERPL-MCNC: 4.3 MMOL/L — SIGNIFICANT CHANGE UP (ref 3.5–5.3)
POTASSIUM SERPL-MCNC: 5.1 MMOL/L — SIGNIFICANT CHANGE UP (ref 3.5–5.3)
POTASSIUM SERPL-SCNC: 4.3 MMOL/L — SIGNIFICANT CHANGE UP (ref 3.5–5.3)
POTASSIUM SERPL-SCNC: 5.1 MMOL/L — SIGNIFICANT CHANGE UP (ref 3.5–5.3)
PROTHROM AB SERPL-ACNC: 13 SEC — HIGH (ref 9.8–12.7)
RBC # BLD: 4.64 M/UL — SIGNIFICANT CHANGE UP (ref 4.6–6.2)
RBC # FLD: 16.3 % — HIGH (ref 11–15.6)
SODIUM SERPL-SCNC: 137 MMOL/L — SIGNIFICANT CHANGE UP (ref 135–145)
SODIUM SERPL-SCNC: 138 MMOL/L — SIGNIFICANT CHANGE UP (ref 135–145)
SODIUM UR-SCNC: 76 MMOL/L — SIGNIFICANT CHANGE UP
WBC # BLD: 13.1 K/UL — HIGH (ref 4.8–10.8)
WBC # FLD AUTO: 13.1 K/UL — HIGH (ref 4.8–10.8)

## 2018-01-17 PROCEDURE — 71045 X-RAY EXAM CHEST 1 VIEW: CPT | Mod: 26

## 2018-01-17 RX ORDER — ACETAMINOPHEN 500 MG
1000 TABLET ORAL ONCE
Qty: 0 | Refills: 0 | Status: COMPLETED | OUTPATIENT
Start: 2018-01-17 | End: 2018-01-17

## 2018-01-17 RX ORDER — FLUTICASONE PROPIONATE AND SALMETEROL 50; 250 UG/1; UG/1
1 POWDER ORAL; RESPIRATORY (INHALATION)
Qty: 0 | Refills: 0 | Status: DISCONTINUED | OUTPATIENT
Start: 2018-01-17 | End: 2018-02-15

## 2018-01-17 RX ORDER — SODIUM CHLORIDE 9 MG/ML
250 INJECTION, SOLUTION INTRAVENOUS ONCE
Qty: 0 | Refills: 0 | Status: COMPLETED | OUTPATIENT
Start: 2018-01-17 | End: 2018-01-17

## 2018-01-17 RX ORDER — SODIUM CHLORIDE 9 MG/ML
500 INJECTION, SOLUTION INTRAVENOUS ONCE
Qty: 0 | Refills: 0 | Status: COMPLETED | OUTPATIENT
Start: 2018-01-17 | End: 2018-01-17

## 2018-01-17 RX ORDER — KETOROLAC TROMETHAMINE 30 MG/ML
15 SYRINGE (ML) INJECTION ONCE
Qty: 0 | Refills: 0 | Status: DISCONTINUED | OUTPATIENT
Start: 2018-01-17 | End: 2018-01-17

## 2018-01-17 RX ORDER — FENTANYL CITRATE 50 UG/ML
25 INJECTION INTRAVENOUS ONCE
Qty: 0 | Refills: 0 | Status: DISCONTINUED | OUTPATIENT
Start: 2018-01-17 | End: 2018-01-17

## 2018-01-17 RX ORDER — SODIUM CHLORIDE 9 MG/ML
250 INJECTION, SOLUTION INTRAVENOUS ONCE
Qty: 0 | Refills: 0 | Status: COMPLETED | OUTPATIENT
Start: 2018-01-17 | End: 2018-01-18

## 2018-01-17 RX ORDER — SODIUM CHLORIDE 9 MG/ML
1000 INJECTION, SOLUTION INTRAVENOUS
Qty: 0 | Refills: 0 | Status: DISCONTINUED | OUTPATIENT
Start: 2018-01-17 | End: 2018-01-19

## 2018-01-17 RX ORDER — BENZOCAINE AND MENTHOL 5; 1 G/100ML; G/100ML
1 LIQUID ORAL EVERY 4 HOURS
Qty: 0 | Refills: 0 | Status: DISCONTINUED | OUTPATIENT
Start: 2018-01-17 | End: 2018-02-15

## 2018-01-17 RX ORDER — OXYCODONE HYDROCHLORIDE 5 MG/1
5 TABLET ORAL EVERY 6 HOURS
Qty: 0 | Refills: 0 | Status: DISCONTINUED | OUTPATIENT
Start: 2018-01-17 | End: 2018-01-18

## 2018-01-17 RX ADMIN — Medication 15 MILLIGRAM(S): at 14:00

## 2018-01-17 RX ADMIN — OXYCODONE HYDROCHLORIDE 5 MILLIGRAM(S): 5 TABLET ORAL at 22:57

## 2018-01-17 RX ADMIN — SODIUM CHLORIDE 500 MILLILITER(S): 9 INJECTION, SOLUTION INTRAVENOUS at 18:45

## 2018-01-17 RX ADMIN — SODIUM CHLORIDE 500 MILLILITER(S): 9 INJECTION, SOLUTION INTRAVENOUS at 08:30

## 2018-01-17 RX ADMIN — FENTANYL CITRATE 25 MICROGRAM(S): 50 INJECTION INTRAVENOUS at 12:43

## 2018-01-17 RX ADMIN — PANTOPRAZOLE SODIUM 40 MILLIGRAM(S): 20 TABLET, DELAYED RELEASE ORAL at 16:22

## 2018-01-17 RX ADMIN — FLUTICASONE PROPIONATE AND SALMETEROL 1 DOSE(S): 50; 250 POWDER ORAL; RESPIRATORY (INHALATION) at 21:00

## 2018-01-17 RX ADMIN — TIOTROPIUM BROMIDE 1 CAPSULE(S): 18 CAPSULE ORAL; RESPIRATORY (INHALATION) at 13:23

## 2018-01-17 RX ADMIN — Medication 15 MILLIGRAM(S): at 14:15

## 2018-01-17 RX ADMIN — Medication 1 SPRAY(S): at 06:12

## 2018-01-17 RX ADMIN — PANTOPRAZOLE SODIUM 40 MILLIGRAM(S): 20 TABLET, DELAYED RELEASE ORAL at 06:12

## 2018-01-17 RX ADMIN — Medication 400 MILLIGRAM(S): at 11:15

## 2018-01-17 RX ADMIN — OXYCODONE HYDROCHLORIDE 5 MILLIGRAM(S): 5 TABLET ORAL at 23:56

## 2018-01-17 RX ADMIN — DIPHENHYDRAMINE HYDROCHLORIDE AND LIDOCAINE HYDROCHLORIDE AND ALUMINUM HYDROXIDE AND MAGNESIUM HYDRO 10 MILLILITER(S): KIT at 06:12

## 2018-01-17 RX ADMIN — SODIUM CHLORIDE 500 MILLILITER(S): 9 INJECTION, SOLUTION INTRAVENOUS at 12:44

## 2018-01-17 RX ADMIN — FENTANYL CITRATE 25 MICROGRAM(S): 50 INJECTION INTRAVENOUS at 13:00

## 2018-01-17 RX ADMIN — TAMSULOSIN HYDROCHLORIDE 0.4 MILLIGRAM(S): 0.4 CAPSULE ORAL at 21:00

## 2018-01-17 RX ADMIN — OXYCODONE HYDROCHLORIDE 5 MILLIGRAM(S): 5 TABLET ORAL at 17:22

## 2018-01-17 RX ADMIN — Medication 1 SPRAY(S): at 21:01

## 2018-01-17 RX ADMIN — Medication 1 TABLET(S): at 12:11

## 2018-01-17 RX ADMIN — Medication 0.25 MILLIGRAM(S): at 22:57

## 2018-01-17 RX ADMIN — Medication 1 SPRAY(S): at 18:50

## 2018-01-17 RX ADMIN — SODIUM CHLORIDE 50 MILLILITER(S): 9 INJECTION, SOLUTION INTRAVENOUS at 13:38

## 2018-01-17 RX ADMIN — OXYCODONE HYDROCHLORIDE 5 MILLIGRAM(S): 5 TABLET ORAL at 16:22

## 2018-01-17 RX ADMIN — DIPHENHYDRAMINE HYDROCHLORIDE AND LIDOCAINE HYDROCHLORIDE AND ALUMINUM HYDROXIDE AND MAGNESIUM HYDRO 10 MILLILITER(S): KIT at 18:50

## 2018-01-17 RX ADMIN — SODIUM CHLORIDE 6000 MILLILITER(S): 9 INJECTION, SOLUTION INTRAVENOUS at 00:30

## 2018-01-17 RX ADMIN — Medication 1 SPRAY(S): at 12:11

## 2018-01-17 RX ADMIN — Medication 0.25 MILLIGRAM(S): at 09:22

## 2018-01-17 RX ADMIN — Medication 1000 MILLIGRAM(S): at 11:30

## 2018-01-17 RX ADMIN — Medication 81 MILLIGRAM(S): at 12:11

## 2018-01-17 NOTE — PROGRESS NOTE ADULT - SUBJECTIVE AND OBJECTIVE BOX
Acute Care Surgery / Trauma Surgery / SICU  [facility] [place]  ===============================  Interval/Overnight Events: Received 1.5L fluid bolus for marginal UOP and on going pressor requirement. Now off pressors. Pain controlled. Minimal NGT output.    VITAL SIGNS:  T(C): 36.3 (18 @ 08:00), Max: 37.1 (18 @ 20:00)  HR: 95 (18 @ 10:00) (0 - 121)  BP: 115/71 (18 @ 10:00) (71/52 - 135/83)  ABP: --  ABP(mean): --  RR: 30 (18 @ 10:00) (0 - 30)  SpO2: 91% (18 @ 10:00) (89% - 100%)  CVP(mm Hg): --    NEUROLOGY:   Neurologic Medications:  ALPRAZolam 0.25 milliGRAM(s) Oral every 8 hours PRN    Exam:  CAM ICU: neg  [  x ] Adequacy of sedation and pain control has been assessed and adjusted  Comments:    RESPIRATORY:  Respiratory Medications:  benzonatate 100 milliGRAM(s) Oral three times a day PRN  fluticasone propionate/ salmeterol 250-50 MICROgram(s) Diskus 1 Dose(s) Inhalation two times a day  tiotropium 18 MICROgram(s) Capsule 1 Capsule(s) Inhalation daily    Exam:   Mechanical Ventilation:   ABG - ( 2018 13:18 )  pH: 7.39  /  pCO2: 45    /  pO2: 92    / HCO3: 26    / Base Excess: 2.1   /  SaO2: 98    / Lactate: x          CARDIOVASCULAR  Cardiovascular Medications:  tamsulosin 0.4 milliGRAM(s) Oral at bedtime    Exam: S1S2      Metabolic/FLUIDS/ELECTROLYTES/NUTRITION:  Gastrointestinal Medications:  dextrose 5%. 1000 milliLiter(s) IV Continuous <Continuous>  loperamide Solution 2 milliGRAM(s) Oral four times a day  multiple electrolytes Injection Type 1 1000 milliLiter(s) IV Continuous <Continuous>  multiple electrolytes Injection Type 1 Bolus 250 milliLiter(s) IV Bolus once  multivitamin 1 Tablet(s) Oral daily  pantoprazole   Suspension 40 milliGRAM(s) Oral two times a day before meals    I&O's Detail  I&O's Detail    2018 07:01  -  2018 07:00  --------------------------------------------------------  IN:    lactated ringers: 400 mL    Lactated Ringers IV Bolus: 1000 mL    phenylephrine   Infusion: 90.2 mL    Solution: 50 mL  Total IN: 1540.2 mL    OUT:    Ileostomy: 70 mL    Indwelling Catheter - Urethral: 1020 mL    Voided: 600 mL  Total OUT: 1690 mL    Total NET: -149.8 mL      2018 07:  -  2018 12:06  --------------------------------------------------------  IN:    multiple electrolytes Injection Type 1: 150 mL    Solution: 500 mL  Total IN: 650 mL    OUT:    Indwelling Catheter - Urethral: 55 mL  Total OUT: 55 mL    Total NET: 595 mL        Daily Weight in k.3 (2018 06:00)      138  |  96<L>  |  9.0  ----------------------------<  136<H>  5.1   |  26.0  |  0.75    Ca    8.8      2018 05:54  Phos  4.4       Mg     2.0             Diet: NPO      Endocrine:  CAPILLARY BLOOD GLUCOSE      POCT Blood Glucose.: 125 mg/dL (2018 08:11)  POCT Blood Glucose.: 164 mg/dL (2018 18:21)      HEMATOLOGIC:  Hematologic/Oncologic Medications:  aspirin  chewable 81 milliGRAM(s) Oral daily  enoxaparin Injectable 40 milliGRAM(s) SubCutaneous daily    [x ] DVT Prophylaxis: lovenox                                              13.1                  Neurophils% (auto):   84.6   ( @ 05:54):    13.1 )-----------(422          Lymphocytes% (auto):  9.3                                           40.9                   Eosinphils% (auto):   0.0      Manual%: Neutrophils x    ; Lymphocytes x    ; Eosinophils x    ; Bands%: x    ; Blasts x          (  @ 05:54 )   PT: 13.0 sec;   INR: 1.18 ratio  aPTT: x          Comments:    INFECTIOUS DISEASE:    Antimicrobials/Immunologic Medications:    RECENT CULTURES:          ASSESSMENT/PLAN:  67yMale    Bowel obstruction  Ostomy revision  Severe protein calorie malnutrition  COPD      Neuro: Pain control adequate.    CV: Continue ASA. Maintain MAPs>65. Pressors currently now off. Will continue to monitor closely    Pulm: 4L NC. Will attempt to wean. Continue COPD medications.    GI/Nutrition: Remove NGT. Await return of bowel function. Abdomen is soft, minimally tender, nondistended, ileostomy pink with minimal output. Discontinue immodium.     /Renal: UOP marginal - will continue to monitor. Calculate FeNa.    ENDO: Maintain Euglycemia.     HEME: No indication for transfusion.    ID: no abx.    Lines/Tubes:    Skin: intact    Proph: lovenox    Dispo: continue ICU care until UOP remains adequate and no further pressor requirement.    CRITICAL CARE TIME SPENT: 32min

## 2018-01-17 NOTE — PROGRESS NOTE ADULT - SUBJECTIVE AND OBJECTIVE BOX
pt pod 1 sp revision of jejunostomy under GETA. Tolerated well. Denies any A.C.  pt with no n/v @ this time. using pain meds as ordered/needed with some pain relief. vss. spont vent. no issues with anesthesia at this time. pt resting comfortably @ this time.

## 2018-01-18 LAB
ANION GAP SERPL CALC-SCNC: 10 MMOL/L — SIGNIFICANT CHANGE UP (ref 5–17)
ANION GAP SERPL CALC-SCNC: 13 MMOL/L — SIGNIFICANT CHANGE UP (ref 5–17)
BASOPHILS # BLD AUTO: 0 K/UL — SIGNIFICANT CHANGE UP (ref 0–0.2)
BASOPHILS NFR BLD AUTO: 0.2 % — SIGNIFICANT CHANGE UP (ref 0–2)
BUN SERPL-MCNC: 11 MG/DL — SIGNIFICANT CHANGE UP (ref 8–20)
BUN SERPL-MCNC: 14 MG/DL — SIGNIFICANT CHANGE UP (ref 8–20)
CALCIUM SERPL-MCNC: 7.6 MG/DL — LOW (ref 8.6–10.2)
CALCIUM SERPL-MCNC: 7.9 MG/DL — LOW (ref 8.6–10.2)
CHLORIDE SERPL-SCNC: 100 MMOL/L — SIGNIFICANT CHANGE UP (ref 98–107)
CHLORIDE SERPL-SCNC: 99 MMOL/L — SIGNIFICANT CHANGE UP (ref 98–107)
CO2 SERPL-SCNC: 27 MMOL/L — SIGNIFICANT CHANGE UP (ref 22–29)
CO2 SERPL-SCNC: 29 MMOL/L — SIGNIFICANT CHANGE UP (ref 22–29)
CREAT SERPL-MCNC: 0.51 MG/DL — SIGNIFICANT CHANGE UP (ref 0.5–1.3)
CREAT SERPL-MCNC: 0.64 MG/DL — SIGNIFICANT CHANGE UP (ref 0.5–1.3)
EOSINOPHIL # BLD AUTO: 0 K/UL — SIGNIFICANT CHANGE UP (ref 0–0.5)
EOSINOPHIL NFR BLD AUTO: 0.1 % — SIGNIFICANT CHANGE UP (ref 0–5)
GLUCOSE BLDC GLUCOMTR-MCNC: 122 MG/DL — HIGH (ref 70–99)
GLUCOSE SERPL-MCNC: 100 MG/DL — SIGNIFICANT CHANGE UP (ref 70–115)
GLUCOSE SERPL-MCNC: 153 MG/DL — HIGH (ref 70–115)
HCT VFR BLD CALC: 35 % — LOW (ref 42–52)
HGB BLD-MCNC: 11.3 G/DL — LOW (ref 14–18)
LYMPHOCYTES # BLD AUTO: 1.3 K/UL — SIGNIFICANT CHANGE UP (ref 1–4.8)
LYMPHOCYTES # BLD AUTO: 15.7 % — LOW (ref 20–55)
MAGNESIUM SERPL-MCNC: 1.9 MG/DL — SIGNIFICANT CHANGE UP (ref 1.6–2.6)
MCHC RBC-ENTMCNC: 28.2 PG — SIGNIFICANT CHANGE UP (ref 27–31)
MCHC RBC-ENTMCNC: 32.3 G/DL — SIGNIFICANT CHANGE UP (ref 32–36)
MCV RBC AUTO: 87.3 FL — SIGNIFICANT CHANGE UP (ref 80–94)
MONOCYTES # BLD AUTO: 0.6 K/UL — SIGNIFICANT CHANGE UP (ref 0–0.8)
MONOCYTES NFR BLD AUTO: 7.2 % — SIGNIFICANT CHANGE UP (ref 3–10)
NEUTROPHILS # BLD AUTO: 6.3 K/UL — SIGNIFICANT CHANGE UP (ref 1.8–8)
NEUTROPHILS NFR BLD AUTO: 76.6 % — HIGH (ref 37–73)
PHOSPHATE SERPL-MCNC: 2.9 MG/DL — SIGNIFICANT CHANGE UP (ref 2.4–4.7)
PLATELET # BLD AUTO: 307 K/UL — SIGNIFICANT CHANGE UP (ref 150–400)
POTASSIUM SERPL-MCNC: 4 MMOL/L — SIGNIFICANT CHANGE UP (ref 3.5–5.3)
POTASSIUM SERPL-MCNC: 4.4 MMOL/L — SIGNIFICANT CHANGE UP (ref 3.5–5.3)
POTASSIUM SERPL-SCNC: 4 MMOL/L — SIGNIFICANT CHANGE UP (ref 3.5–5.3)
POTASSIUM SERPL-SCNC: 4.4 MMOL/L — SIGNIFICANT CHANGE UP (ref 3.5–5.3)
RBC # BLD: 4.01 M/UL — LOW (ref 4.6–6.2)
RBC # FLD: 16.4 % — HIGH (ref 11–15.6)
SODIUM SERPL-SCNC: 139 MMOL/L — SIGNIFICANT CHANGE UP (ref 135–145)
SODIUM SERPL-SCNC: 139 MMOL/L — SIGNIFICANT CHANGE UP (ref 135–145)
SURGICAL PATHOLOGY FINAL REPORT - CH: SIGNIFICANT CHANGE UP
T4 AB SER-ACNC: 5.6 UG/DL — SIGNIFICANT CHANGE UP (ref 4.5–12)
TSH SERPL-MCNC: 8.83 UIU/ML — HIGH (ref 0.27–4.2)
WBC # BLD: 8.2 K/UL — SIGNIFICANT CHANGE UP (ref 4.8–10.8)
WBC # FLD AUTO: 8.2 K/UL — SIGNIFICANT CHANGE UP (ref 4.8–10.8)

## 2018-01-18 PROCEDURE — 99232 SBSQ HOSP IP/OBS MODERATE 35: CPT | Mod: GC

## 2018-01-18 PROCEDURE — 99232 SBSQ HOSP IP/OBS MODERATE 35: CPT

## 2018-01-18 PROCEDURE — 93010 ELECTROCARDIOGRAM REPORT: CPT

## 2018-01-18 RX ORDER — SODIUM CHLORIDE 9 MG/ML
500 INJECTION, SOLUTION INTRAVENOUS ONCE
Qty: 0 | Refills: 0 | Status: COMPLETED | OUTPATIENT
Start: 2018-01-18 | End: 2018-01-18

## 2018-01-18 RX ORDER — SODIUM CHLORIDE 9 MG/ML
250 INJECTION, SOLUTION INTRAVENOUS ONCE
Qty: 0 | Refills: 0 | Status: COMPLETED | OUTPATIENT
Start: 2018-01-18 | End: 2018-01-18

## 2018-01-18 RX ORDER — MAGNESIUM SULFATE 500 MG/ML
2 VIAL (ML) INJECTION ONCE
Qty: 0 | Refills: 0 | Status: COMPLETED | OUTPATIENT
Start: 2018-01-18 | End: 2018-01-18

## 2018-01-18 RX ORDER — SODIUM CHLORIDE 9 MG/ML
5001 INJECTION, SOLUTION INTRAVENOUS ONCE
Qty: 0 | Refills: 0 | Status: DISCONTINUED | OUTPATIENT
Start: 2018-01-18 | End: 2018-01-18

## 2018-01-18 RX ORDER — METOPROLOL TARTRATE 50 MG
5 TABLET ORAL ONCE
Qty: 0 | Refills: 0 | Status: COMPLETED | OUTPATIENT
Start: 2018-01-18 | End: 2018-01-18

## 2018-01-18 RX ORDER — PANTOPRAZOLE SODIUM 20 MG/1
40 TABLET, DELAYED RELEASE ORAL
Qty: 0 | Refills: 0 | Status: DISCONTINUED | OUTPATIENT
Start: 2018-01-18 | End: 2018-01-19

## 2018-01-18 RX ORDER — ACETAMINOPHEN 500 MG
1000 TABLET ORAL ONCE
Qty: 0 | Refills: 0 | Status: COMPLETED | OUTPATIENT
Start: 2018-01-18 | End: 2018-01-19

## 2018-01-18 RX ADMIN — Medication 0.25 MILLIGRAM(S): at 23:00

## 2018-01-18 RX ADMIN — Medication 1 SPRAY(S): at 11:27

## 2018-01-18 RX ADMIN — Medication 1 SPRAY(S): at 17:12

## 2018-01-18 RX ADMIN — DIPHENHYDRAMINE HYDROCHLORIDE AND LIDOCAINE HYDROCHLORIDE AND ALUMINUM HYDROXIDE AND MAGNESIUM HYDRO 10 MILLILITER(S): KIT at 17:11

## 2018-01-18 RX ADMIN — SODIUM CHLORIDE 500 MILLILITER(S): 9 INJECTION, SOLUTION INTRAVENOUS at 16:20

## 2018-01-18 RX ADMIN — TAMSULOSIN HYDROCHLORIDE 0.4 MILLIGRAM(S): 0.4 CAPSULE ORAL at 21:07

## 2018-01-18 RX ADMIN — PANTOPRAZOLE SODIUM 40 MILLIGRAM(S): 20 TABLET, DELAYED RELEASE ORAL at 06:33

## 2018-01-18 RX ADMIN — FLUTICASONE PROPIONATE AND SALMETEROL 1 DOSE(S): 50; 250 POWDER ORAL; RESPIRATORY (INHALATION) at 08:16

## 2018-01-18 RX ADMIN — SODIUM CHLORIDE 500 MILLILITER(S): 9 INJECTION, SOLUTION INTRAVENOUS at 00:16

## 2018-01-18 RX ADMIN — Medication 1 SPRAY(S): at 06:27

## 2018-01-18 RX ADMIN — SODIUM CHLORIDE 75 MILLILITER(S): 9 INJECTION, SOLUTION INTRAVENOUS at 17:12

## 2018-01-18 RX ADMIN — Medication 1 SPRAY(S): at 21:08

## 2018-01-18 RX ADMIN — SODIUM CHLORIDE 500 MILLILITER(S): 9 INJECTION, SOLUTION INTRAVENOUS at 11:21

## 2018-01-18 RX ADMIN — FLUTICASONE PROPIONATE AND SALMETEROL 1 DOSE(S): 50; 250 POWDER ORAL; RESPIRATORY (INHALATION) at 21:07

## 2018-01-18 RX ADMIN — TIOTROPIUM BROMIDE 1 CAPSULE(S): 18 CAPSULE ORAL; RESPIRATORY (INHALATION) at 12:31

## 2018-01-18 RX ADMIN — Medication 1 TABLET(S): at 11:27

## 2018-01-18 RX ADMIN — PANTOPRAZOLE SODIUM 40 MILLIGRAM(S): 20 TABLET, DELAYED RELEASE ORAL at 17:12

## 2018-01-18 RX ADMIN — Medication 81 MILLIGRAM(S): at 11:27

## 2018-01-18 RX ADMIN — OXYCODONE HYDROCHLORIDE 5 MILLIGRAM(S): 5 TABLET ORAL at 08:08

## 2018-01-18 RX ADMIN — Medication 5 MILLIGRAM(S): at 15:00

## 2018-01-18 RX ADMIN — SODIUM CHLORIDE 50 MILLILITER(S): 9 INJECTION, SOLUTION INTRAVENOUS at 00:17

## 2018-01-18 RX ADMIN — Medication 0.25 MILLIGRAM(S): at 14:15

## 2018-01-18 RX ADMIN — Medication 50 GRAM(S): at 08:15

## 2018-01-18 RX ADMIN — DIPHENHYDRAMINE HYDROCHLORIDE AND LIDOCAINE HYDROCHLORIDE AND ALUMINUM HYDROXIDE AND MAGNESIUM HYDRO 10 MILLILITER(S): KIT at 06:27

## 2018-01-18 RX ADMIN — OXYCODONE HYDROCHLORIDE 5 MILLIGRAM(S): 5 TABLET ORAL at 06:33

## 2018-01-18 RX ADMIN — SODIUM CHLORIDE 75 MILLILITER(S): 9 INJECTION, SOLUTION INTRAVENOUS at 21:07

## 2018-01-18 NOTE — CHART NOTE - NSCHARTNOTEFT_GEN_A_CORE
Ha Castro,    When Mr. Adler's diet gets advanced he probably needs to follow low residue diet initially along with his consistent CHO diet.  Thanks!!    Kala Ivey RD, CDN

## 2018-01-18 NOTE — PROGRESS NOTE ADULT - SUBJECTIVE AND OBJECTIVE BOX
Acute Care Surgery / Trauma Surgery / SICU  [facility] [place]  ===============================  Interval/Overnight Events:    VITAL SIGNS:  T(C): 36.9 (18 @ 12:00), Max: 36.9 (18 @ 12:00)  HR: 122 (18 @ 12:00) (79 - 127)  BP:  (18 @ 12:00) ( - /)  ABP: --  ABP(mean): --  RR: 18 (18 @ 12:00) (10 - 27)  SpO2: 98% (18 @ 12:32) (78% - 100%)  CVP(mm Hg): --    NEUROLOGY:  Neurologic Medications:  ALPRAZolam 0.25 milliGRAM(s) Oral every 8 hours PRN  oxyCODONE    IR 5 milliGRAM(s) Oral every 6 hours PRN    Exam:  CAM ICU:  [  x ] Adequacy of sedation and pain control has been assessed and adjusted  Comments:    RESPIRATORY:  Respiratory Medications:  benzonatate 100 milliGRAM(s) Oral three times a day PRN  fluticasone propionate/ salmeterol 250-50 MICROgram(s) Diskus 1 Dose(s) Inhalation two times a day  tiotropium 18 MICROgram(s) Capsule 1 Capsule(s) Inhalation daily    Exam:   Mechanical Ventilation:   ABG - ( 2018 13:18 )  pH: 7.39  /  pCO2: 45    /  pO2: 92    / HCO3: 26    / Base Excess: 2.1   /  SaO2: 98    / Lactate: x          [ x ] Extubation Readiness Assessed    CARDIOVASCULAR  Cardiovascular Medications:  tamsulosin 0.4 milliGRAM(s) Oral at bedtime    Exam:      Metabolic/FLUIDS/ELECTROLYTES/NUTRITION:  Gastrointestinal Medications:  dextrose 5%. 1000 milliLiter(s) IV Continuous <Continuous>  multiple electrolytes Injection Type 1 1000 milliLiter(s) IV Continuous <Continuous>  multivitamin 1 Tablet(s) Oral daily  pantoprazole    Tablet 40 milliGRAM(s) Oral two times a day before meals    I&O's Detail  I&O's Detail    2018 07:01  -  2018 07:00  --------------------------------------------------------  IN:    Lactated Ringers IV Bolus: 250 mL    multiple electrolytes Injection Type 1: 1150 mL    Solution: 500 mL    Solution: 100 mL    Solution: 500 mL  Total IN: 2500 mL    OUT:    Ileostomy: 200 mL    Indwelling Catheter - Urethral: 380 mL  Total OUT: 580 mL    Total NET: 1920 mL      2018 07:01  -  2018 12:48  --------------------------------------------------------  IN:    Lactated Ringers IV Bolus: 150 mL    Solution: 100 mL  Total IN: 250 mL    OUT:    Indwelling Catheter - Urethral: 10 mL  Total OUT: 10 mL    Total NET: 240 mL        Daily Weight in k.2 (2018 05:00)      139  |  99  |  14.0  ----------------------------<  100  4.0   |  27.0  |  0.64    Ca    7.9<L>      2018 05:35  Phos  2.9       Mg     1.9             Diet:      Endocrine:  CAPILLARY BLOOD GLUCOSE      POCT Blood Glucose.: 122 mg/dL (2018 08:18)  POCT Blood Glucose.: 104 mg/dL (2018 21:05)  POCT Blood Glucose.: 106 mg/dL (2018 16:27)      HEMATOLOGIC:  Hematologic/Oncologic Medications:  aspirin  chewable 81 milliGRAM(s) Oral daily  enoxaparin Injectable 40 milliGRAM(s) SubCutaneous daily    [ ] DVT Prophylaxis:                                              11.3                  Neurophils% (auto):   76.6   ( @ 05:35):    8.2  )-----------(307          Lymphocytes% (auto):  15.7                                          35.0                   Eosinphils% (auto):   0.1      Manual%: Neutrophils x    ; Lymphocytes x    ; Eosinophils x    ; Bands%: x    ; Blasts x              Comments:    INFECTIOUS DISEASE:    Antimicrobials/Immunologic Medications:    RECENT CULTURES:          ASSESSMENT/PLAN:  67yMale  s/p jejunostomy take down and ileostomy placement  COPD  diaphragmatic hernia  severe protein calorie malnutrition  tachycardia  oliguria    Neuro: Target Rass 0, and provide adequate analgesic and anxiolysis.    CV: tachycardia - restart cardizem at lower dose. Continue to monitor. Maintain MAPs> 65    Pulm: on NC, encourage IS    GI/Nutrition: allow clears today, ileostomy with some output.      /Renal: UOP marginal - increase maintenance IVF and bolus 250cc this morning.     ENDO: Maintain Euglycemia.    HEME: no indication for transfusion    ID: no mary    Skin: intact    Proph: lovenox    Dispo: ICU

## 2018-01-18 NOTE — CHART NOTE - NSCHARTNOTEFT_GEN_A_CORE
Source: Patient [x ]  Family [x ]   other [ ]    Current Diet: clears, consistent CHO with Glucerna shake TID, started diet today. Juice only taken  s/p 1/16 Jejunostomy revision with creation of loop ileostomy in terminal ileum to manage intestinal hurry of short gut.    Patient reports [ ] nausea  [ ] vomiting [ ] diarrhea [ ] constipation  [ ]chewing problems [ ] swallowing issues  [ ] other:     PO intake:  < 50% [ ]   50-75%  [ ]   %  [ ]  other :  usually good before revision    Source for PO intake [x ] Patient [ x] family [ ] chart [ ] staff [ ] other    Enteral /Parenteral Nutrition:     Current Weight: 112# 1/18, 115.3# 1/17..  admission weight 143#    % Weight Change-  21% since admission    Pertinent Medications: MEDICATIONS  (STANDING):  aspirin  chewable 81 milliGRAM(s) Oral daily  dextrose 5%. 1000 milliLiter(s) (50 mL/Hr) IV Continuous <Continuous>  dextrose 50% Injectable 12.5 Gram(s) IV Push once  dextrose 50% Injectable 25 Gram(s) IV Push once  enoxaparin Injectable 40 milliGRAM(s) SubCutaneous daily  FIRST- Mouthwash  BLM 10 milliLiter(s) Swish and Spit two times a day  fluticasone propionate/ salmeterol 250-50 MICROgram(s) Diskus 1 Dose(s) Inhalation two times a day  multiple electrolytes Injection Type 1 1000 milliLiter(s) (50 mL/Hr) IV Continuous <Continuous>  multivitamin 1 Tablet(s) Oral daily  pantoprazole    Tablet 40 milliGRAM(s) Oral two times a day before meals  sodium chloride 0.65% Nasal 1 Spray(s) Both Nostrils four times a day  tamsulosin 0.4 milliGRAM(s) Oral at bedtime  tiotropium 18 MICROgram(s) Capsule 1 Capsule(s) Inhalation daily    MEDICATIONS  (PRN):  ALPRAZolam 0.25 milliGRAM(s) Oral every 8 hours PRN anxiety  benzocaine 15 mG/menthol 3.6 mG Lozenge 1 Lozenge Oral every 4 hours PRN Sore Throat  benzonatate 100 milliGRAM(s) Oral three times a day PRN Cough  oxyCODONE    IR 5 milliGRAM(s) Oral every 6 hours PRN Moderate to severe pain    Pertinent Labs: CBC Full  -  ( 18 Jan 2018 05:35 )  WBC Count : 8.2 K/uL  Hemoglobin : 11.3 g/dL  Hematocrit : 35.0 %  Platelet Count - Automated : 307 K/uL  Mean Cell Volume : 87.3 fl  Mean Cell Hemoglobin : 28.2 pg  Mean Cell Hemoglobin Concentration : 32.3 g/dL  Auto Neutrophil # : 6.3 K/uL  Auto Lymphocyte # : 1.3 K/uL  Auto Monocyte # : 0.6 K/uL  Auto Eosinophil # : 0.0 K/uL  Auto Basophil # : 0.0 K/uL  Auto Neutrophil % : 76.6 %  Auto Lymphocyte % : 15.7 %  Auto Monocyte % : 7.2 %  Auto Eosinophil % : 0.1 %  Auto Basophil % : 0.2 %      01-18 Na139 mmol/L Glu 100 mg/dL K+ 4.0 mmol/L Cr  0.64 mg/dL BUN 14.0 mg/dL Phos 2.9 mg/dL Alb n/a   PAB n/a           Skin:     Nutrition focused physical exam conducted - found signs of malnutrition [ ]absent [x ]present    Subcutaneous fat loss: [x ] Orbital fat pads region, [ ]Buccal fat region, [ ]Triceps region,  [x ]Ribs region    Muscle wasting: [x ]Temples region, [x ]Clavicle region, [ ]Shoulder region, [ ]Scapula region, [ ]Interosseous region,  [x ]thigh region, [ ]Calf region    Estimated Needs:   [] no change since previous assessment  [x ] recalculated:   1272- 1500kcal (25-30kcal/kg) 50-60 gr protein  (1-1.2g/kg)    Current Nutrition Diagnosis:  Current Nutrition Diagnosis:  Pt meets criteria for severe chronic malnutrition related to absorption issues, in setting of ileostomy as evidenced by high ostomy output, 36# weight loss since admission 11/29, severe fat/ muscle depletion in thoracic region, thighs, temples, clavicles. Pt with s/p x 2 days ileostomy revision now on clears starting today.   Took small amounts of juice. Monitor output.       Recommendations: Monitor as diet is advanced. Rec low residue diet. Provide nutritional supplement TID.    Monitoring and Evaluation:   [x ] PO intake [x ] Tolerance to diet prescription [X] Weights  [X] Follow up per protocol [X] Labs:

## 2018-01-18 NOTE — PROGRESS NOTE ADULT - ASSESSMENT
1. Tachycardia, related to overall medical/surgical condition, IVF, cardizem  2. Change to long acting cardizem in AM.  3. No acute cardiac issues at this time.

## 2018-01-18 NOTE — PROGRESS NOTE ADULT - SUBJECTIVE AND OBJECTIVE BOX
CHIEF COMPLAINT:Patient is a 67y old  Male who presents with a chief complaint rapid heart rate.  Hx of ILD, on home o2. Stroke in the past  Pt is s/p s/p jejunostomy take down and ileostomy placement  He was tachycardic today. Cardizem started. Tele with sinus tach.  Also IVF given   Feels better.   Tolerating po liquid diet.     Allergies  codeine (Unknown)  no veges (Unknown)  penicillin (Unknown)    MEDICATIONS:  tamsulosin 0.4 milliGRAM(s) Oral at bedtime  benzonatate 100 milliGRAM(s) Oral three times a day PRN  fluticasone propionate/ salmeterol 250-50 MICROgram(s) Diskus 1 Dose(s) Inhalation two times a day  tiotropium 18 MICROgram(s) Capsule 1 Capsule(s) Inhalation daily  acetaminophen  IVPB. 1000 milliGRAM(s) IV Intermittent once PRN  ALPRAZolam 0.25 milliGRAM(s) Oral every 8 hours PRN  oxyCODONE    IR 5 milliGRAM(s) Oral every 6 hours PRN  pantoprazole    Tablet 40 milliGRAM(s) Oral two times a day before meals  dextrose 50% Injectable 12.5 Gram(s) IV Push once  dextrose 50% Injectable 25 Gram(s) IV Push once  aspirin  chewable 81 milliGRAM(s) Oral daily  benzocaine 15 mG/menthol 3.6 mG Lozenge 1 Lozenge Oral every 4 hours PRN  dextrose 5%. 1000 milliLiter(s) IV Continuous <Continuous>  enoxaparin Injectable 40 milliGRAM(s) SubCutaneous daily  FIRST- Mouthwash  BLM 10 milliLiter(s) Swish and Spit two times a day  multiple electrolytes Injection Type 1 1000 milliLiter(s) IV Continuous <Continuous>  multivitamin 1 Tablet(s) Oral daily  sodium chloride 0.65% Nasal 1 Spray(s) Both Nostrils four times a day    PHYSICAL EXAM:  T(C): 36.8 (18 @ 20:00), Max: 36.9 (18 @ 12:00)  HR: 97 (18 @ 21:00) (79 - 127)  BP: 111/61 (18 @ 21:00) (11/ - 116/79)  RR: 24 (18 @ 21:00) (10 - 24)  SpO2: 99% (18 @ 21:00) (78% - 100%)  2018 07:  -  2018 07:00  --------------------------------------------------------  IN: 2500 mL / OUT: 580 mL / NET: 1920 mL    2018 07:  -  2018 22:53  --------------------------------------------------------  IN: 1360 mL / OUT: 425 mL / NET: 935 mL  Daily     Daily Weight in k.2 (2018 05:00)  Appearance: sitting in a chair 	  HEENT:   NC/AT  Eye: Pink Conjunctiva  Lungs: CTA B/L  CVS: RRR, Normal S1 and S2, No Edema  Pulses: Normal distal pulses.  Neuro: A&O x3  Ileostomey pink, air in the bag.     LABS:	 	                         11.3   8.2   )-----------( 307      ( 2018 05:35 )             35.0         139  |  100  |  11.0  ----------------------------<  153<H>  4.4   |  29.0  |  0.51    Ca    7.6<L>      2018 20:36  Phos  2.9       Mg     1.9       TSH: Thyroid Stimulating Hormone, Serum: 8.83 uIU/mL ( @ 05:35)    ASSESSMENT/PLAN:

## 2018-01-19 DIAGNOSIS — E11.9 TYPE 2 DIABETES MELLITUS WITHOUT COMPLICATIONS: ICD-10-CM

## 2018-01-19 DIAGNOSIS — J84.9 INTERSTITIAL PULMONARY DISEASE, UNSPECIFIED: ICD-10-CM

## 2018-01-19 DIAGNOSIS — I10 ESSENTIAL (PRIMARY) HYPERTENSION: ICD-10-CM

## 2018-01-19 DIAGNOSIS — R00.0 TACHYCARDIA, UNSPECIFIED: ICD-10-CM

## 2018-01-19 DIAGNOSIS — R35.0 FREQUENCY OF MICTURITION: ICD-10-CM

## 2018-01-19 DIAGNOSIS — K56.609 UNSPECIFIED INTESTINAL OBSTRUCTION, UNSPECIFIED AS TO PARTIAL VERSUS COMPLETE OBSTRUCTION: ICD-10-CM

## 2018-01-19 LAB
ANION GAP SERPL CALC-SCNC: 11 MMOL/L — SIGNIFICANT CHANGE UP (ref 5–17)
BASOPHILS # BLD AUTO: 0 K/UL — SIGNIFICANT CHANGE UP (ref 0–0.2)
BASOPHILS NFR BLD AUTO: 0.1 % — SIGNIFICANT CHANGE UP (ref 0–2)
BUN SERPL-MCNC: 8 MG/DL — SIGNIFICANT CHANGE UP (ref 8–20)
CALCIUM SERPL-MCNC: 7.5 MG/DL — LOW (ref 8.6–10.2)
CHLORIDE SERPL-SCNC: 100 MMOL/L — SIGNIFICANT CHANGE UP (ref 98–107)
CO2 SERPL-SCNC: 27 MMOL/L — SIGNIFICANT CHANGE UP (ref 22–29)
CREAT SERPL-MCNC: 0.45 MG/DL — LOW (ref 0.5–1.3)
EOSINOPHIL # BLD AUTO: 0 K/UL — SIGNIFICANT CHANGE UP (ref 0–0.5)
EOSINOPHIL NFR BLD AUTO: 0.6 % — SIGNIFICANT CHANGE UP (ref 0–5)
GLUCOSE SERPL-MCNC: 129 MG/DL — HIGH (ref 70–115)
HCT VFR BLD CALC: 32.2 % — LOW (ref 42–52)
HGB BLD-MCNC: 10.4 G/DL — LOW (ref 14–18)
LYMPHOCYTES # BLD AUTO: 1 K/UL — SIGNIFICANT CHANGE UP (ref 1–4.8)
LYMPHOCYTES # BLD AUTO: 13 % — LOW (ref 20–55)
MAGNESIUM SERPL-MCNC: 1.8 MG/DL — SIGNIFICANT CHANGE UP (ref 1.6–2.6)
MCHC RBC-ENTMCNC: 28 PG — SIGNIFICANT CHANGE UP (ref 27–31)
MCHC RBC-ENTMCNC: 32.3 G/DL — SIGNIFICANT CHANGE UP (ref 32–36)
MCV RBC AUTO: 86.8 FL — SIGNIFICANT CHANGE UP (ref 80–94)
MONOCYTES # BLD AUTO: 0.6 K/UL — SIGNIFICANT CHANGE UP (ref 0–0.8)
MONOCYTES NFR BLD AUTO: 8.1 % — SIGNIFICANT CHANGE UP (ref 3–10)
NEUTROPHILS # BLD AUTO: 6.3 K/UL — SIGNIFICANT CHANGE UP (ref 1.8–8)
NEUTROPHILS NFR BLD AUTO: 78.1 % — HIGH (ref 37–73)
PHOSPHATE SERPL-MCNC: 1.9 MG/DL — LOW (ref 2.4–4.7)
PLATELET # BLD AUTO: 322 K/UL — SIGNIFICANT CHANGE UP (ref 150–400)
POTASSIUM SERPL-MCNC: 3.6 MMOL/L — SIGNIFICANT CHANGE UP (ref 3.5–5.3)
POTASSIUM SERPL-SCNC: 3.6 MMOL/L — SIGNIFICANT CHANGE UP (ref 3.5–5.3)
RBC # BLD: 3.71 M/UL — LOW (ref 4.6–6.2)
RBC # FLD: 16.5 % — HIGH (ref 11–15.6)
SODIUM SERPL-SCNC: 138 MMOL/L — SIGNIFICANT CHANGE UP (ref 135–145)
WBC # BLD: 8 K/UL — SIGNIFICANT CHANGE UP (ref 4.8–10.8)
WBC # FLD AUTO: 8 K/UL — SIGNIFICANT CHANGE UP (ref 4.8–10.8)

## 2018-01-19 PROCEDURE — 99291 CRITICAL CARE FIRST HOUR: CPT | Mod: 24

## 2018-01-19 PROCEDURE — 99233 SBSQ HOSP IP/OBS HIGH 50: CPT

## 2018-01-19 PROCEDURE — 99232 SBSQ HOSP IP/OBS MODERATE 35: CPT

## 2018-01-19 RX ORDER — ONDANSETRON 8 MG/1
4 TABLET, FILM COATED ORAL EVERY 4 HOURS
Qty: 0 | Refills: 0 | Status: DISCONTINUED | OUTPATIENT
Start: 2018-01-19 | End: 2018-02-15

## 2018-01-19 RX ORDER — POTASSIUM PHOSPHATE, MONOBASIC POTASSIUM PHOSPHATE, DIBASIC 236; 224 MG/ML; MG/ML
30 INJECTION, SOLUTION INTRAVENOUS ONCE
Qty: 0 | Refills: 0 | Status: DISCONTINUED | OUTPATIENT
Start: 2018-01-19 | End: 2018-01-19

## 2018-01-19 RX ORDER — ONDANSETRON 8 MG/1
4 TABLET, FILM COATED ORAL DAILY
Qty: 0 | Refills: 0 | Status: COMPLETED | OUTPATIENT
Start: 2018-01-19 | End: 2018-01-19

## 2018-01-19 RX ORDER — POTASSIUM PHOSPHATE, MONOBASIC POTASSIUM PHOSPHATE, DIBASIC 236; 224 MG/ML; MG/ML
15 INJECTION, SOLUTION INTRAVENOUS
Qty: 0 | Refills: 0 | Status: COMPLETED | OUTPATIENT
Start: 2018-01-19 | End: 2018-01-19

## 2018-01-19 RX ORDER — CALCIUM GLUCONATE 100 MG/ML
1 VIAL (ML) INTRAVENOUS ONCE
Qty: 0 | Refills: 0 | Status: COMPLETED | OUTPATIENT
Start: 2018-01-19 | End: 2018-01-19

## 2018-01-19 RX ORDER — SODIUM CHLORIDE 9 MG/ML
1000 INJECTION, SOLUTION INTRAVENOUS
Qty: 0 | Refills: 0 | Status: DISCONTINUED | OUTPATIENT
Start: 2018-01-19 | End: 2018-01-24

## 2018-01-19 RX ORDER — PANTOPRAZOLE SODIUM 20 MG/1
40 TABLET, DELAYED RELEASE ORAL
Qty: 0 | Refills: 0 | Status: DISCONTINUED | OUTPATIENT
Start: 2018-01-19 | End: 2018-01-21

## 2018-01-19 RX ORDER — KETOROLAC TROMETHAMINE 30 MG/ML
15 SYRINGE (ML) INJECTION EVERY 8 HOURS
Qty: 0 | Refills: 0 | Status: DISCONTINUED | OUTPATIENT
Start: 2018-01-19 | End: 2018-01-22

## 2018-01-19 RX ORDER — MAGNESIUM SULFATE 500 MG/ML
2 VIAL (ML) INJECTION ONCE
Qty: 0 | Refills: 0 | Status: COMPLETED | OUTPATIENT
Start: 2018-01-19 | End: 2018-01-19

## 2018-01-19 RX ADMIN — Medication 1 TABLET(S): at 12:14

## 2018-01-19 RX ADMIN — FLUTICASONE PROPIONATE AND SALMETEROL 1 DOSE(S): 50; 250 POWDER ORAL; RESPIRATORY (INHALATION) at 20:15

## 2018-01-19 RX ADMIN — TAMSULOSIN HYDROCHLORIDE 0.4 MILLIGRAM(S): 0.4 CAPSULE ORAL at 21:32

## 2018-01-19 RX ADMIN — FLUTICASONE PROPIONATE AND SALMETEROL 1 DOSE(S): 50; 250 POWDER ORAL; RESPIRATORY (INHALATION) at 08:30

## 2018-01-19 RX ADMIN — Medication 1 SPRAY(S): at 17:40

## 2018-01-19 RX ADMIN — Medication 1 SPRAY(S): at 04:59

## 2018-01-19 RX ADMIN — Medication 1000 MILLIGRAM(S): at 15:00

## 2018-01-19 RX ADMIN — Medication 50 GRAM(S): at 08:41

## 2018-01-19 RX ADMIN — PANTOPRAZOLE SODIUM 40 MILLIGRAM(S): 20 TABLET, DELAYED RELEASE ORAL at 04:59

## 2018-01-19 RX ADMIN — TIOTROPIUM BROMIDE 1 CAPSULE(S): 18 CAPSULE ORAL; RESPIRATORY (INHALATION) at 13:25

## 2018-01-19 RX ADMIN — SODIUM CHLORIDE 75 MILLILITER(S): 9 INJECTION, SOLUTION INTRAVENOUS at 17:40

## 2018-01-19 RX ADMIN — Medication 400 MILLIGRAM(S): at 14:33

## 2018-01-19 RX ADMIN — Medication 200 GRAM(S): at 18:30

## 2018-01-19 RX ADMIN — ONDANSETRON 4 MILLIGRAM(S): 8 TABLET, FILM COATED ORAL at 14:34

## 2018-01-19 RX ADMIN — PANTOPRAZOLE SODIUM 40 MILLIGRAM(S): 20 TABLET, DELAYED RELEASE ORAL at 17:37

## 2018-01-19 RX ADMIN — Medication 1 SPRAY(S): at 12:13

## 2018-01-19 RX ADMIN — POTASSIUM PHOSPHATE, MONOBASIC POTASSIUM PHOSPHATE, DIBASIC 62.5 MILLIMOLE(S): 236; 224 INJECTION, SOLUTION INTRAVENOUS at 13:10

## 2018-01-19 RX ADMIN — Medication 0.25 MILLIGRAM(S): at 20:36

## 2018-01-19 RX ADMIN — Medication 0.25 MILLIGRAM(S): at 08:45

## 2018-01-19 RX ADMIN — Medication 81 MILLIGRAM(S): at 12:14

## 2018-01-19 RX ADMIN — POTASSIUM PHOSPHATE, MONOBASIC POTASSIUM PHOSPHATE, DIBASIC 62.5 MILLIMOLE(S): 236; 224 INJECTION, SOLUTION INTRAVENOUS at 08:41

## 2018-01-19 RX ADMIN — DIPHENHYDRAMINE HYDROCHLORIDE AND LIDOCAINE HYDROCHLORIDE AND ALUMINUM HYDROXIDE AND MAGNESIUM HYDRO 10 MILLILITER(S): KIT at 17:40

## 2018-01-19 RX ADMIN — Medication 1 SPRAY(S): at 23:28

## 2018-01-19 NOTE — PROGRESS NOTE ADULT - SUBJECTIVE AND OBJECTIVE BOX
ACCEPTANCE NOTE-    HEALTH ISSUES - PROBLEM Dx:    Pt with PMH of lung reduction for ? multiple blebs?; ILD stage 4 currently on home oxygen 4L (sat 92-93%), HTN, CVA on 2014, DM, hypothyroidism, hydrocele, kidney stones initially admitted for ILD exacerbation. course complicated by SBO and had bedside ileostomy which actually turned out to be a jejunostomy, that was reversed and an ileostomy done. is downgraded from SICU today.    INTERVAL HPI/ OVERNIGHT EVENTS:  per RN pt has not been gettign Po cardizem due to low BP and remains tachycardic for weeks now.   pt otherwise comfortable no pain, folwy was removed in SICU this morning, hasn't voided since, bladder scan shows 25o ml  but no discomfort  on NC O2    Vital Signs Last 24 Hrs  T(C): 36.4 (19 Jan 2018 11:21), Max: 37.1 (19 Jan 2018 08:00)  T(F): 97.5 (19 Jan 2018 11:21), Max: 98.8 (19 Jan 2018 08:00)  HR: 113 (19 Jan 2018 11:21) (73 - 150)  BP: 106/74 (19 Jan 2018 11:21) (99/63 - 118/73)  BP(mean): 76 (19 Jan 2018 10:00) (74 - 91)  RR: 20 (19 Jan 2018 11:21) (12 - 28)  SpO2: 93% (19 Jan 2018 11:21) (93% - 100%)    PHYSICAL EXAM-  GENERAL: cachetic well-groomed, well-developed, on NC O2  HEAD:  Atraumatic, Normocephalic  EYES: EOMI, PERRLA, conjunctiva and sclera clear  ENMT: Moist mucous membranes, Good dentition, No lesions  NECK: Supple, No JVD, Normal thyroid  NERVOUS SYSTEM:  Alert & Oriented X 3, Motor Strength 5/5 B/L upper and lower extremities;   CHEST/LUNG: Clear to auscultae bilaterally; No rales, rhonchi, wheezing, or rubs  HEART: Regular rate and rhythm; tachycardia; No murmurs, rubs, or gallops  ABDOMEN: Soft, Nontender, Nondistended; Bowel sounds present, ileostomy with output  EXTREMITIES:  2+ Peripheral Pulses, No clubbing, cyanosis, or edema  LYMPH: No lymphadenopathy noted  SKIN: No rashes or lesions    LABS:                        10.4   8.0   )-----------( 322      ( 19 Jan 2018 05:13 )             32.2     01-19    138  |  100  |  8.0  ----------------------------<  129<H>  3.6   |  27.0  |  0.45<L>    Ca    7.5<L>      19 Jan 2018 05:13  Phos  1.9     01-19  Mg     1.8     01-19      Assessment and Plan

## 2018-01-19 NOTE — PROGRESS NOTE ADULT - SUBJECTIVE AND OBJECTIVE BOX
PULMONARY PROGRESS NOTE      FRANDY BAUTISTA  MRN-[id number]    Patient is a 67y old  Male who presents with a chief complaint of SOB (15 Dec 2017 10:17)      INTERVAL HPI/OVERNIGHT EVENTS:    Patient awake and alert  No new respiratory complaints  Doing well post-op    MEDICATIONS  (STANDING):  aspirin  chewable 81 milliGRAM(s) Oral daily  dextrose 5%. 1000 milliLiter(s) (50 mL/Hr) IV Continuous <Continuous>  dextrose 50% Injectable 12.5 Gram(s) IV Push once  dextrose 50% Injectable 25 Gram(s) IV Push once  diltiazem    milliGRAM(s) Oral daily  enoxaparin Injectable 40 milliGRAM(s) SubCutaneous daily  FIRST- Mouthwash  BLM 10 milliLiter(s) Swish and Spit two times a day  fluticasone propionate/ salmeterol 250-50 MICROgram(s) Diskus 1 Dose(s) Inhalation two times a day  multivitamin 1 Tablet(s) Oral daily  pantoprazole   Suspension 40 milliGRAM(s) Oral two times a day before meals  sodium chloride 0.65% Nasal 1 Spray(s) Both Nostrils four times a day  tamsulosin 0.4 milliGRAM(s) Oral at bedtime  tiotropium 18 MICROgram(s) Capsule 1 Capsule(s) Inhalation daily      MEDICATIONS  (PRN):  ALPRAZolam 0.25 milliGRAM(s) Oral every 8 hours PRN anxiety  benzocaine 15 mG/menthol 3.6 mG Lozenge 1 Lozenge Oral every 4 hours PRN Sore Throat  benzonatate 100 milliGRAM(s) Oral three times a day PRN Cough  oxyCODONE    IR 5 milliGRAM(s) Oral every 6 hours PRN Moderate to severe pain      Allergies    codeine (Unknown)  no veges (Unknown)  penicillin (Unknown)    Intolerances    DO NOT SEND ORANGES (Unknown)  Symbicort (Short breath)      PAST MEDICAL & SURGICAL HISTORY:  Hypothyroidism  Hydrocele  Renal calculi  Diabetes mellitus  Hypertension  CVA (cerebral vascular accident)  Chronic obstructive pulmonary disease, unspecified COPD type  History of lung surgery        REVIEW OF SYSTEMS:    CONSTITUTIONAL:  No distress    HEENT:  Eyes:  No diplopia or blurred vision. ENT:  No earache, sore throat or runny nose.    CARDIOVASCULAR:  No pressure, squeezing, tightness, heaviness or aching about the chest; no palpitations.    RESPIRATORY:  No cough, shortness of breath, PND or orthopnea. Mild SOBOE    GASTROINTESTINAL:  Mild nausea, no vomitting or diarrhea.    GENITOURINARY:  No dysuria, frequency or urgency.    NEUROLOGIC:  No paresthesias, fasciculations, seizures or weakness.    PSYCHIATRIC:  No disorder of thought or mood.    Vital Signs Last 24 Hrs  T(C): 36.4 (19 Jan 2018 11:21), Max: 37.1 (19 Jan 2018 08:00)  T(F): 97.5 (19 Jan 2018 11:21), Max: 98.8 (19 Jan 2018 08:00)  HR: 113 (19 Jan 2018 11:21) (73 - 150)  BP: 106/74 (19 Jan 2018 11:21) (99/63 - 118/73)  BP(mean): 76 (19 Jan 2018 10:00) (74 - 91)  RR: 20 (19 Jan 2018 11:21) (12 - 28)  SpO2: 93% (19 Jan 2018 11:21) (93% - 100%)    PHYSICAL EXAMINATION:    GENERAL: The patient is awake and alert in no apparent distress.     HEENT: Head is normocephalic and atraumatic. Extraocular muscles are intact. Mucous membranes are moist.    NECK: Supple.    LUNGS: Clear to auscultation without wheezing, rales or rhonchi; respirations unlabored    HEART: Regular rate and rhythm without murmur.    ABDOMEN: Soft, nontender, and nondistended.      EXTREMITIES: Without any cyanosis, clubbing, rash, lesions or edema.    NEUROLOGIC: Grossly intact.    LABS:                        10.4   8.0   )-----------( 322      ( 19 Jan 2018 05:13 )             32.2     01-19    138  |  100  |  8.0  ----------------------------<  129<H>  3.6   |  27.0  |  0.45<L>    Ca    7.5<L>      19 Jan 2018 05:13  Phos  1.9     01-19  Mg     1.8     01-19      MICROBIOLOGY:    Culture - Sputum . (11.30.17 @ 08:52)    Gram Stain:   Greater than 10 Squamous epithelial cells per low power field.    Specimen Source: .Sputum Sputum    Culture Results:   Specimen unacceptable for culture. Please recollect and resubmit a new  specimen for culture, if necessary.  .  TYPE: (C=Critical, N=Notification, A=Abnormal) N  TESTS:  _ C spt  DATE/TIME CALLED: _ 11/30/2017 09:51:20  CALLED TO: Cait Yang RN  READ BACK (2 Patient Identifiers)(Y/N): _ Y  READ BACK VALUES (Y/N): _ Y  CALLED BY: Cait Borden        RADIOLOGY & ADDITIONAL STUDIES:     EXAM:  XR CHEST PORTABLE URGENT 1V                          PROCEDURE DATE:  01/17/2018          INTERPRETATION:  Portable chest radiograph        CLINICAL INFORMATION:   Chest pain    TECHNIQUE:  Portable  AP view of the chest was obtained.    COMPARISON: 1/16/2018 available for review.    FINDINGS: There is a pneumoperitoneum. Postoperative pneumoperitoneum   noted..  The lungs  show severe emphysematous lung disease in the left hemithorax   better described on the CT scan dated 11/29/2017. Bibasilar M chronic   linear opacities noted.    There is mild cardiomegaly trachea midline. The heart and mediastinum are   within normal limits.         Visualized osseous structures are intact.        IMPRESSION:   There are emphysematous lung disease of.  Postoperative pneumoperitoneum..       ISABEL VASQUEZ M.D., ATTENDING RADIOLOGIST  This document has been electronically signed. Jan 17 2018  3:24PM

## 2018-01-19 NOTE — PHYSICAL THERAPY INITIAL EVALUATION ADULT - RANGE OF MOTION EXAMINATION, REHAB EVAL
bilateral lower extremity ROM was WFL (within functional limits)/bilateral upper extremity ROM was WFL (within functional limits)
bilateral lower extremity ROM was WFL (within functional limits)

## 2018-01-19 NOTE — PHYSICAL THERAPY INITIAL EVALUATION ADULT - GENERAL OBSERVATIONS, REHAB EVAL
pt received oob in recliner :+iv; +nasal O2; +monitor; wife present.
Pt received supine, +IV, nurse present

## 2018-01-19 NOTE — PROGRESS NOTE ADULT - ASSESSMENT
Severe COPD  Chronic hypoxia  Emphysema  Post-op pneumoperitoneum    Rec:    BD therapy  O2  Ambulate/PT  GI prophylaxis - PPI  DVT prophylaxis - Lovenox  Follow CXR to resolution  Advance diet as tolerates

## 2018-01-19 NOTE — PHYSICAL THERAPY INITIAL EVALUATION ADULT - PERTINENT HX OF CURRENT PROBLEM, REHAB EVAL
Pt presents with COPD exacerbation, hospital course complicated by constipation,s/p loop ileostomy 12/7/17, now with reversal and revision of jejunostomy 1/16/18
Pt presents with COPD exacerbation, hospital course complicated by constipation, now s/p loop ileostomy

## 2018-01-19 NOTE — PROGRESS NOTE ADULT - PROBLEM SELECTOR PLAN 6
antonette ws d/feliz today. he is without discomfort. no h/o BPH. monitor for output with IVF. if no output then will need whitman again with w/u to r/o UTI

## 2018-01-19 NOTE — PROGRESS NOTE ADULT - ASSESSMENT
initially admitted with ILD exacerbation, developed SBO got jejunostomyadn  later take down of jejunostomy with ileostomy. now tachycardic soft BP, whitman d/feliz today and await voiding, cachectic

## 2018-01-19 NOTE — PHYSICAL THERAPY INITIAL EVALUATION ADULT - LIVES WITH, PROFILE
In private home, 5 steps to enter with handrails, stairs to bedroom with stairlift/spouse
spouse/In private home, 5 steps to enter with handrails, stairs to bedroom with stairlift

## 2018-01-19 NOTE — PHYSICAL THERAPY INITIAL EVALUATION ADULT - PASSIVE RANGE OF MOTION EXAMINATION, REHAB EVAL
bilateral lower extremity Passive ROM was WFL (within functional limits)
bilateral lower extremity Passive ROM was WFL (within functional limits)/bilateral upper extremity Passive ROM was WFL (within functional limits)

## 2018-01-19 NOTE — PHYSICAL THERAPY INITIAL EVALUATION ADULT - PLANNED THERAPY INTERVENTIONS, PT EVAL
strengthening/transfer training/balance training/gait training
balance training/bed mobility training/strengthening/gait training/transfer training

## 2018-01-19 NOTE — PHYSICAL THERAPY INITIAL EVALUATION ADULT - DIAGNOSIS, PT EVAL
Pt with decreased functional mobility secondary to LE weakness, decrease balance and decreased endurance.
Pt with decreased functional mobility secondary to LE weakness, decrease balance and decreased endurance.

## 2018-01-19 NOTE — PHYSICAL THERAPY INITIAL EVALUATION ADULT - MANUAL MUSCLE TESTING RESULTS, REHAB EVAL
3+/5 t/o/grossly assessed due to
bilateral UEs WFL; bilateral hip flex 3+/5, knee ext 4/5, ankle df 4/5/grossly assessed due to

## 2018-01-19 NOTE — PROGRESS NOTE ADULT - PROBLEM SELECTOR PLAN 4
now with soft BP. when he left ICU today IVF was stopped. on the floors with soft BP noted and IVF resumed. for his build 90s is stable BP, but he is with tachycardia also. so IVF restarted

## 2018-01-19 NOTE — PROGRESS NOTE ADULT - PROBLEM SELECTOR PLAN 5
upto 120s with soft BP and cannot give cardizem. IVF restarted. when BP improved can resume cardizem but short acting and in small doses.

## 2018-01-19 NOTE — PHYSICAL THERAPY INITIAL EVALUATION ADULT - ASR EQUIP NEEDS DISCH PT EVAL
Pt owns RW/rolling walker (5 inch wheels)
Pt owns RW/raised toilet seat/shower chair/rolling walker (5 inch wheels)

## 2018-01-19 NOTE — PHYSICAL THERAPY INITIAL EVALUATION ADULT - ACTIVE RANGE OF MOTION EXAMINATION, REHAB EVAL
bilateral  lower extremity Active ROM was WFL (within functional limits)
bilateral upper extremity Active ROM was WFL (within functional limits)/bilateral  lower extremity Active ROM was WFL (within functional limits)

## 2018-01-19 NOTE — PHYSICAL THERAPY INITIAL EVALUATION ADULT - ADDITIONAL COMMENTS
Pt reports independent PTA, +O2, +. Wife reports pt owns RW, however, did not need it. there are 5 steps to enter (+rail), then stair glide to second floor.
Pt reports independent PTA, +O2, +. Wife reports pt owns RW, however, did not need it

## 2018-01-19 NOTE — PROGRESS NOTE ADULT - SUBJECTIVE AND OBJECTIVE BOX
ICU Vital Signs Last 24 Hrs  T(C): 36.7 (19 Jan 2018 16:34), Max: 37.1 (19 Jan 2018 08:00)  T(F): 98 (19 Jan 2018 16:34), Max: 98.8 (19 Jan 2018 08:00)  HR: 112 (19 Jan 2018 17:43) (73 - 150)  BP: 112/72 (19 Jan 2018 17:43) (96/64 - 118/73)  BP(mean): 76 (19 Jan 2018 10:00) (76 - 91)  ABP: --  ABP(mean): --  RR: 19 (19 Jan 2018 16:34) (12 - 28)  SpO2: 94% (19 Jan 2018 16:34) (93% - 100%)      I&O's Detail    18 Jan 2018 07:01  -  19 Jan 2018 07:00  --------------------------------------------------------  IN:    Lactated Ringers IV Bolus: 750 mL    multiple electrolytes Injection Type 1multiple electrolytes Injection Type 1: 650 mL    Oral Fluid: 360 mL    Solution: 100 mL  Total IN: 1860 mL    OUT:    Ileostomy: 825 mL    Indwelling Catheter - Urethral: 475 mL  Total OUT: 1300 mL    Total NET: 560 mL      19 Jan 2018 07:01  -  19 Jan 2018 20:59  --------------------------------------------------------  IN:    multiple electrolytes Injection Type 1: 225 mL    multiple electrolytes Injection Type 1multiple electrolytes Injection Type 1: 50 mL    Oral Fluid: 220 mL    Solution: 124 mL    Solution: 50 mL  Total IN: 669 mL    OUT:    Ileostomy: 250 mL    Indwelling Catheter - Urethral: 40 mL  Total OUT: 290 mL    Total NET: 379 mL                MEDICATIONS  (STANDING):  aspirin  chewable 81 milliGRAM(s) Oral daily  dextrose 5%. 1000 milliLiter(s) (50 mL/Hr) IV Continuous <Continuous>  dextrose 50% Injectable 12.5 Gram(s) IV Push once  dextrose 50% Injectable 25 Gram(s) IV Push once  diltiazem    Tablet 30 milliGRAM(s) Oral every 6 hours  enoxaparin Injectable 40 milliGRAM(s) SubCutaneous daily  FIRST- Mouthwash  BLM 10 milliLiter(s) Swish and Spit two times a day  fluticasone propionate/ salmeterol 250-50 MICROgram(s) Diskus 1 Dose(s) Inhalation two times a day  multiple electrolytes Injection Type 1 1000 milliLiter(s) (75 mL/Hr) IV Continuous <Continuous>  multivitamin 1 Tablet(s) Oral daily  pantoprazole   Suspension 40 milliGRAM(s) Oral two times a day before meals  sodium chloride 0.65% Nasal 1 Spray(s) Both Nostrils four times a day  tamsulosin 0.4 milliGRAM(s) Oral at bedtime  tiotropium 18 MICROgram(s) Capsule 1 Capsule(s) Inhalation daily    MEDICATIONS  (PRN):  ALPRAZolam 0.25 milliGRAM(s) Oral every 8 hours PRN anxiety  benzocaine 15 mG/menthol 3.6 mG Lozenge 1 Lozenge Oral every 4 hours PRN Sore Throat  benzonatate 100 milliGRAM(s) Oral three times a day PRN Cough  ketorolac   Injectable 15 milliGRAM(s) IV Push every 8 hours PRN Severe Pain (7 - 10)  ondansetron Injectable 4 milliGRAM(s) IV Push every 4 hours PRN Nausea and/or Vomiting  oxyCODONE    IR 5 milliGRAM(s) Oral every 6 hours PRN Moderate to severe pain      NUTRITION/IVF:     CENTRAL LINE:  LOCATION:   DATE INSERTED:  CVP:  SCVO2:    [place]:   DATE INSERTED:    A-LINE:    LOCATION:   DATE INSERTED:   SVV:  CO/CI:     CHEST TUBE:  LOCATION:  DATE INSERTED: OUTPUT/24 HRS:  SUCTION/WATER SEAL:     NG/OG TUBE:  DATE INSERTED:  OUTPUT/24 HRS:    MISC:     PHYSICAL EXAM:    Gen:    Eyes:    Neurological:    ENMT:    Neck:    Pulmonary:    Cardiovascular:    Gastrointestinal: Stoma with air and liquid stool. advance diet to regular    Genitourinary:    Back:    Extremities:    Skin:    Musculoskeletal:          LABS:  CBC Full  -  ( 19 Jan 2018 05:13 )  WBC Count : 8.0 K/uL  Hemoglobin : 10.4 g/dL  Hematocrit : 32.2 %  Platelet Count - Automated : 322 K/uL  Mean Cell Volume : 86.8 fl  Mean Cell Hemoglobin : 28.0 pg  Mean Cell Hemoglobin Concentration : 32.3 g/dL  Auto Neutrophil # : 6.3 K/uL  Auto Lymphocyte # : 1.0 K/uL  Auto Monocyte # : 0.6 K/uL  Auto Eosinophil # : 0.0 K/uL  Auto Basophil # : 0.0 K/uL  Auto Neutrophil % : 78.1 %  Auto Lymphocyte % : 13.0 %  Auto Monocyte % : 8.1 %  Auto Eosinophil % : 0.6 %  Auto Basophil % : 0.1 %    01-19    138  |  100  |  8.0  ----------------------------<  129<H>  3.6   |  27.0  |  0.45<L>    Ca    7.5<L>      19 Jan 2018 05:13  Phos  1.9     01-19  Mg     1.8     01-19          RECENT CULTURES:            CAPILLARY BLOOD GLUCOSE      RADIOLOGY & ADDITIONAL STUDIES:    ASSESSMENT/PLAN:  67yMale presenting with:    Neuro:    HEENT:    CV:    Pulm:    GI/Nutrition: advance diet, will transfer    /Renal:    ID:    Lines/Tubes:    Endo:    Skin:    Proph:    Dispo: transfer to floor.      CRITICAL CARE TIME SPENT: ICU Vital Signs Last 24 Hrs  T(C): 36.7 (19 Jan 2018 16:34), Max: 37.1 (19 Jan 2018 08:00)  T(F): 98 (19 Jan 2018 16:34), Max: 98.8 (19 Jan 2018 08:00)  HR: 112 (19 Jan 2018 17:43) (73 - 150)  BP: 112/72 (19 Jan 2018 17:43) (96/64 - 118/73)  BP(mean): 76 (19 Jan 2018 10:00) (76 - 91)  ABP: --  ABP(mean): --  RR: 19 (19 Jan 2018 16:34) (12 - 28)  SpO2: 94% (19 Jan 2018 16:34) (93% - 100%)      I&O's Detail    18 Jan 2018 07:01  -  19 Jan 2018 07:00  --------------------------------------------------------  IN:    Lactated Ringers IV Bolus: 750 mL    multiple electrolytes Injection Type 1multiple electrolytes Injection Type 1: 650 mL    Oral Fluid: 360 mL    Solution: 100 mL  Total IN: 1860 mL    OUT:    Ileostomy: 825 mL    Indwelling Catheter - Urethral: 475 mL  Total OUT: 1300 mL    Total NET: 560 mL      19 Jan 2018 07:01  -  19 Jan 2018 20:59  --------------------------------------------------------  IN:    multiple electrolytes Injection Type 1: 225 mL    multiple electrolytes Injection Type 1multiple electrolytes Injection Type 1: 50 mL    Oral Fluid: 220 mL    Solution: 124 mL    Solution: 50 mL  Total IN: 669 mL    OUT:    Ileostomy: 250 mL    Indwelling Catheter - Urethral: 40 mL  Total OUT: 290 mL    Total NET: 379 mL                MEDICATIONS  (STANDING):  aspirin  chewable 81 milliGRAM(s) Oral daily  dextrose 5%. 1000 milliLiter(s) (50 mL/Hr) IV Continuous <Continuous>  dextrose 50% Injectable 12.5 Gram(s) IV Push once  dextrose 50% Injectable 25 Gram(s) IV Push once  diltiazem    Tablet 30 milliGRAM(s) Oral every 6 hours  enoxaparin Injectable 40 milliGRAM(s) SubCutaneous daily  FIRST- Mouthwash  BLM 10 milliLiter(s) Swish and Spit two times a day  fluticasone propionate/ salmeterol 250-50 MICROgram(s) Diskus 1 Dose(s) Inhalation two times a day  multiple electrolytes Injection Type 1 1000 milliLiter(s) (75 mL/Hr) IV Continuous <Continuous>  multivitamin 1 Tablet(s) Oral daily  pantoprazole   Suspension 40 milliGRAM(s) Oral two times a day before meals  sodium chloride 0.65% Nasal 1 Spray(s) Both Nostrils four times a day  tamsulosin 0.4 milliGRAM(s) Oral at bedtime  tiotropium 18 MICROgram(s) Capsule 1 Capsule(s) Inhalation daily    MEDICATIONS  (PRN):  ALPRAZolam 0.25 milliGRAM(s) Oral every 8 hours PRN anxiety  benzocaine 15 mG/menthol 3.6 mG Lozenge 1 Lozenge Oral every 4 hours PRN Sore Throat  benzonatate 100 milliGRAM(s) Oral three times a day PRN Cough  ketorolac   Injectable 15 milliGRAM(s) IV Push every 8 hours PRN Severe Pain (7 - 10)  ondansetron Injectable 4 milliGRAM(s) IV Push every 4 hours PRN Nausea and/or Vomiting  oxyCODONE    IR 5 milliGRAM(s) Oral every 6 hours PRN Moderate to severe pain        PHYSICAL EXAM:    Gen: NAD    Eyes: MADELINE    Neurological: non focal, GALARZA    ENMT: anicteric sclerae    Neck: NO JVD    Pulmonary: coarse with scattered wheezzing    Cardiovascular: RRR    Gastrointestinal: Stoma with air and liquid stool. advance diet to regular    Genitourinary: clear urine in whitman bag    Back: no issues    Extremities: symmetrical no edema    Skin: intact    Musculoskeletal: no deformities          LABS:  CBC Full  -  ( 19 Jan 2018 05:13 )  WBC Count : 8.0 K/uL  Hemoglobin : 10.4 g/dL  Hematocrit : 32.2 %  Platelet Count - Automated : 322 K/uL  Mean Cell Volume : 86.8 fl  Mean Cell Hemoglobin : 28.0 pg  Mean Cell Hemoglobin Concentration : 32.3 g/dL  Auto Neutrophil # : 6.3 K/uL  Auto Lymphocyte # : 1.0 K/uL  Auto Monocyte # : 0.6 K/uL  Auto Eosinophil # : 0.0 K/uL  Auto Basophil # : 0.0 K/uL  Auto Neutrophil % : 78.1 %  Auto Lymphocyte % : 13.0 %  Auto Monocyte % : 8.1 %  Auto Eosinophil % : 0.6 %  Auto Basophil % : 0.1 %    01-19    138  |  100  |  8.0  ----------------------------<  129<H>  3.6   |  27.0  |  0.45<L>    Ca    7.5<L>      19 Jan 2018 05:13  Phos  1.9     01-19  Mg     1.8     01-19          RECENT CULTURES:            CAPILLARY BLOOD GLUCOSE      RADIOLOGY & ADDITIONAL STUDIES:    ASSESSMENT/PLAN:  67yMale presenting with: LBO, ileostomy that required revision.     Neuro: analgesia adequate    HEENT: no issues    CV: cont current meds    Pulm: appreciate Pulmonary inputs    GI/Nutrition: advance diet, will transfer to floor    /Renal: no issues    ID: no issues    Lines/Tubes: no issues    Endo: no issues    Skin: no issues    Proph: lovenox    Dispo: transfer to floor.      CRITICAL CARE TIME SPENT: 36 minutes

## 2018-01-19 NOTE — CHART NOTE - NSCHARTNOTEFT_GEN_A_CORE
Patient stable for transfer to the floors: hemodynamically well, no respiratory distress, tolerating clear liquid diet. Patient has been signed out to Dr. Adams who has agreed to assume care of the patient. Surgical service will continue to follow the patient while on the floors.

## 2018-01-19 NOTE — PHYSICAL THERAPY INITIAL EVALUATION ADULT - CRITERIA FOR SKILLED THERAPEUTIC INTERVENTIONS
therapy frequency/anticipated discharge recommendation/impairments found/functional limitations in following categories/risk reduction/prevention
anticipated equipment needs at discharge/anticipated discharge recommendation/rehab potential/functional limitations in following categories/impairments found/risk reduction/prevention/therapy frequency

## 2018-01-19 NOTE — CHART NOTE - NSCHARTNOTEFT_GEN_A_CORE
As per pt request, Rec Ensure clears TID, d/c ensure Enlive.    Monitor po intake, labs, tolerance, ileostomy output

## 2018-01-20 LAB
ANION GAP SERPL CALC-SCNC: 13 MMOL/L — SIGNIFICANT CHANGE UP (ref 5–17)
BASOPHILS # BLD AUTO: 0 K/UL — SIGNIFICANT CHANGE UP (ref 0–0.2)
BASOPHILS NFR BLD AUTO: 0.1 % — SIGNIFICANT CHANGE UP (ref 0–2)
BUN SERPL-MCNC: 7 MG/DL — LOW (ref 8–20)
CALCIUM SERPL-MCNC: 7.9 MG/DL — LOW (ref 8.6–10.2)
CHLORIDE SERPL-SCNC: 101 MMOL/L — SIGNIFICANT CHANGE UP (ref 98–107)
CO2 SERPL-SCNC: 27 MMOL/L — SIGNIFICANT CHANGE UP (ref 22–29)
CREAT SERPL-MCNC: 0.43 MG/DL — LOW (ref 0.5–1.3)
EOSINOPHIL # BLD AUTO: 0 K/UL — SIGNIFICANT CHANGE UP (ref 0–0.5)
EOSINOPHIL NFR BLD AUTO: 0.5 % — SIGNIFICANT CHANGE UP (ref 0–5)
GLUCOSE SERPL-MCNC: 139 MG/DL — HIGH (ref 70–115)
HCT VFR BLD CALC: 33.5 % — LOW (ref 42–52)
HGB BLD-MCNC: 10.6 G/DL — LOW (ref 14–18)
LYMPHOCYTES # BLD AUTO: 0.7 K/UL — LOW (ref 1–4.8)
LYMPHOCYTES # BLD AUTO: 9 % — LOW (ref 20–55)
MAGNESIUM SERPL-MCNC: 1.8 MG/DL — SIGNIFICANT CHANGE UP (ref 1.6–2.6)
MCHC RBC-ENTMCNC: 27.3 PG — SIGNIFICANT CHANGE UP (ref 27–31)
MCHC RBC-ENTMCNC: 31.6 G/DL — LOW (ref 32–36)
MCV RBC AUTO: 86.3 FL — SIGNIFICANT CHANGE UP (ref 80–94)
MONOCYTES # BLD AUTO: 0.5 K/UL — SIGNIFICANT CHANGE UP (ref 0–0.8)
MONOCYTES NFR BLD AUTO: 6.3 % — SIGNIFICANT CHANGE UP (ref 3–10)
NEUTROPHILS # BLD AUTO: 6.7 K/UL — SIGNIFICANT CHANGE UP (ref 1.8–8)
NEUTROPHILS NFR BLD AUTO: 84 % — HIGH (ref 37–73)
PHOSPHATE SERPL-MCNC: 2.6 MG/DL — SIGNIFICANT CHANGE UP (ref 2.4–4.7)
PLATELET # BLD AUTO: 315 K/UL — SIGNIFICANT CHANGE UP (ref 150–400)
POTASSIUM SERPL-MCNC: 4 MMOL/L — SIGNIFICANT CHANGE UP (ref 3.5–5.3)
POTASSIUM SERPL-SCNC: 4 MMOL/L — SIGNIFICANT CHANGE UP (ref 3.5–5.3)
RBC # BLD: 3.88 M/UL — LOW (ref 4.6–6.2)
RBC # FLD: 16.7 % — HIGH (ref 11–15.6)
SODIUM SERPL-SCNC: 141 MMOL/L — SIGNIFICANT CHANGE UP (ref 135–145)
WBC # BLD: 8 K/UL — SIGNIFICANT CHANGE UP (ref 4.8–10.8)
WBC # FLD AUTO: 8 K/UL — SIGNIFICANT CHANGE UP (ref 4.8–10.8)

## 2018-01-20 PROCEDURE — 99233 SBSQ HOSP IP/OBS HIGH 50: CPT

## 2018-01-20 RX ADMIN — SODIUM CHLORIDE 75 MILLILITER(S): 9 INJECTION, SOLUTION INTRAVENOUS at 06:08

## 2018-01-20 RX ADMIN — FLUTICASONE PROPIONATE AND SALMETEROL 1 DOSE(S): 50; 250 POWDER ORAL; RESPIRATORY (INHALATION) at 20:27

## 2018-01-20 RX ADMIN — Medication 1 SPRAY(S): at 11:37

## 2018-01-20 RX ADMIN — PANTOPRAZOLE SODIUM 40 MILLIGRAM(S): 20 TABLET, DELAYED RELEASE ORAL at 06:04

## 2018-01-20 RX ADMIN — Medication 1 TABLET(S): at 11:37

## 2018-01-20 RX ADMIN — Medication 1 SPRAY(S): at 06:04

## 2018-01-20 RX ADMIN — SODIUM CHLORIDE 75 MILLILITER(S): 9 INJECTION, SOLUTION INTRAVENOUS at 17:53

## 2018-01-20 RX ADMIN — Medication 1 SPRAY(S): at 23:01

## 2018-01-20 RX ADMIN — DIPHENHYDRAMINE HYDROCHLORIDE AND LIDOCAINE HYDROCHLORIDE AND ALUMINUM HYDROXIDE AND MAGNESIUM HYDRO 10 MILLILITER(S): KIT at 17:42

## 2018-01-20 RX ADMIN — Medication 1 SPRAY(S): at 17:43

## 2018-01-20 RX ADMIN — ONDANSETRON 4 MILLIGRAM(S): 8 TABLET, FILM COATED ORAL at 20:26

## 2018-01-20 RX ADMIN — PANTOPRAZOLE SODIUM 40 MILLIGRAM(S): 20 TABLET, DELAYED RELEASE ORAL at 17:42

## 2018-01-20 RX ADMIN — TAMSULOSIN HYDROCHLORIDE 0.4 MILLIGRAM(S): 0.4 CAPSULE ORAL at 22:10

## 2018-01-20 RX ADMIN — DIPHENHYDRAMINE HYDROCHLORIDE AND LIDOCAINE HYDROCHLORIDE AND ALUMINUM HYDROXIDE AND MAGNESIUM HYDRO 10 MILLILITER(S): KIT at 06:04

## 2018-01-20 RX ADMIN — Medication 0.25 MILLIGRAM(S): at 22:10

## 2018-01-20 RX ADMIN — Medication 0.25 MILLIGRAM(S): at 11:37

## 2018-01-20 RX ADMIN — FLUTICASONE PROPIONATE AND SALMETEROL 1 DOSE(S): 50; 250 POWDER ORAL; RESPIRATORY (INHALATION) at 09:23

## 2018-01-20 RX ADMIN — Medication 81 MILLIGRAM(S): at 11:37

## 2018-01-20 RX ADMIN — TIOTROPIUM BROMIDE 1 CAPSULE(S): 18 CAPSULE ORAL; RESPIRATORY (INHALATION) at 11:39

## 2018-01-20 NOTE — PROGRESS NOTE ADULT - ASSESSMENT
ILD exacerbation, developed SBO got jejunostomy  later take down of jejunostomy with ileostomy. now tachycardic soft BP, whitman d/feliz today and await voiding, cachectic

## 2018-01-20 NOTE — PROGRESS NOTE ADULT - SUBJECTIVE AND OBJECTIVE BOX
FRANDY BAUTISTA Patient is a 67y old  Male who presents with a chief complaint of SOB (15 Dec 2017 10:17)     HPI:  67 YOM w/PMH of COPD stage 4 s/p right lung reduction in 2010, currently on home oxygen 4L (sat 92-93%), HTN, CVA on 2014, DM, hypothyroidism, hydrocele, kidney stones who came because he noted that his O2 sat was 76% today. He mentions more difficult to talk in full sencentces compared to his baseline. He also has a chronic productive cough which has not changed in frequency or color. He mentions that his oxygen machine broke 4 days ago and he got a new one since, but is not sure if its working properly. He denies SOB, increased cough, chest pain, hemoptysis nausea, weakness. Pt has to sleep in a sit position with 2 pillows since he gets SOB if he lyes flat, condition that he attributes to his postnasal drip (29 Nov 2017 02:05)    The patient was seen and evaluated   The patient is in no acute distress.        I&O's Summary    19 Jan 2018 07:01  -  20 Jan 2018 07:00  --------------------------------------------------------  IN: 1569 mL / OUT: 1170 mL / NET: 399 mL    20 Jan 2018 07:01  -  20 Jan 2018 16:16  --------------------------------------------------------  IN: 0 mL / OUT: 850 mL / NET: -850 mL      Allergies    codeine (Unknown)  no veges (Unknown)  penicillin (Unknown)    Intolerances    DO NOT SEND ORANGES (Unknown)  Symbicort (Short breath)    HEALTH ISSUES - PROBLEM Dx:  Urine frequency: Urine frequency  Tachycardia: Tachycardia  Essential hypertension: Essential hypertension  Type 2 diabetes mellitus without complication, without long-term current use of insulin: Type 2 diabetes mellitus without complication, without long-term current use of insulin  Small bowel obstruction: Small bowel obstruction  ILD (interstitial lung disease): ILD (interstitial lung disease)  Pre-operative cardiovascular examination: Pre-operative cardiovascular examination  Diarrhea, unspecified type: Diarrhea, unspecified type  Ileostomy dysfunction: Ileostomy dysfunction  Leukocytosis, unspecified type: Leukocytosis, unspecified type  Constipation, unspecified constipation type: Constipation, unspecified constipation type  Encounter for palliative care: Encounter for palliative care  Constipation: Constipation  Hypoxemia: Hypoxemia  Pneumonia: Pneumonia  Mediastinal adenopathy: Mediastinal adenopathy  Lung mass: Lung mass  History of CVA (cerebrovascular accident): History of CVA (cerebrovascular accident)  Prophylactic measure: Prophylactic measure  Hypothyroidism: Hypothyroidism  Renal calculi: Renal calculi  Diabetes mellitus: Diabetes mellitus  Hypertension: Hypertension  COPD exacerbation: COPD exacerbation        PAST MEDICAL & SURGICAL HISTORY:  Hypothyroidism  Hydrocele  Renal calculi  Diabetes mellitus  Hypertension  CVA (cerebral vascular accident)  Chronic obstructive pulmonary disease, unspecified COPD type  History of lung surgery          Vital Signs Last 24 Hrs  T(C): 36.4 (20 Jan 2018 08:47), Max: 36.7 (19 Jan 2018 16:34)  T(F): 97.6 (20 Jan 2018 08:47), Max: 98.1 (20 Jan 2018 05:10)  HR: 114 (20 Jan 2018 08:47) (92 - 114)  BP: 113/70 (20 Jan 2018 08:47) (96/64 - 131/79)  BP(mean): --  RR: 18 (20 Jan 2018 08:47) (18 - 20)  SpO2: 97% (20 Jan 2018 08:00) (94% - 98%)T(C): 36.4 (01-20-18 @ 08:47), Max: 36.7 (01-19-18 @ 16:34)  HR: 114 (01-20-18 @ 08:47) (92 - 114)  BP: 113/70 (01-20-18 @ 08:47) (96/64 - 131/79)  RR: 18 (01-20-18 @ 08:47) (18 - 20)  SpO2: 97% (01-20-18 @ 08:00) (94% - 98%)  Wt(kg): --    PHYSICAL EXAM:    GENERAL: NAD, cachectic  HEAD:  Atraumatic, Normocephalic  EYES: EOMI conjunctiva and sclera clear  ENMT:  Moist mucous membranes,  No lesions  NECK: Supple,   NERVOUS SYSTEM:  Alert & Oriented X3,  Moves upper and lower extremities; CNS-II-XII  CHEST/LUNG: Clear to auscultation bilaterally; No rales, rhonchi, wheezing,   HEART: Regular rate and rhythm; No murmurs,   ABDOMEN: Soft, Nontender, Nondistended; Bowel sounds present  EXTREMITIES:  Peripheral Pulses, No  cyanosis, or edema  SKIN: No rashes or lesions  psychiatry- mood and affect approprite, Insight and judgement intact     ALPRAZolam 0.25 milliGRAM(s) Oral every 8 hours PRN  aspirin  chewable 81 milliGRAM(s) Oral daily  benzocaine 15 mG/menthol 3.6 mG Lozenge 1 Lozenge Oral every 4 hours PRN  benzonatate 100 milliGRAM(s) Oral three times a day PRN  dextrose 5%. 1000 milliLiter(s) IV Continuous <Continuous>  dextrose 50% Injectable 12.5 Gram(s) IV Push once  dextrose 50% Injectable 25 Gram(s) IV Push once  diltiazem    Tablet 30 milliGRAM(s) Oral every 6 hours  enoxaparin Injectable 40 milliGRAM(s) SubCutaneous daily  FIRST- Mouthwash  BLM 10 milliLiter(s) Swish and Spit two times a day  fluticasone propionate/ salmeterol 250-50 MICROgram(s) Diskus 1 Dose(s) Inhalation two times a day  ketorolac   Injectable 15 milliGRAM(s) IV Push every 8 hours PRN  multiple electrolytes Injection Type 1 1000 milliLiter(s) IV Continuous <Continuous>  multivitamin 1 Tablet(s) Oral daily  ondansetron Injectable 4 milliGRAM(s) IV Push every 4 hours PRN  oxyCODONE    IR 5 milliGRAM(s) Oral every 6 hours PRN  pantoprazole   Suspension 40 milliGRAM(s) Oral two times a day before meals  sodium chloride 0.65% Nasal 1 Spray(s) Both Nostrils four times a day  tamsulosin 0.4 milliGRAM(s) Oral at bedtime  tiotropium 18 MICROgram(s) Capsule 1 Capsule(s) Inhalation daily      LABS:                          10.6   8.0   )-----------( 315      ( 20 Jan 2018 06:25 )             33.5     01-20    141  |  101  |  7.0<L>  ----------------------------<  139<H>  4.0   |  27.0  |  0.43<L>    Ca    7.9<L>      20 Jan 2018 06:25  Phos  2.6     01-20  Mg     1.8     01-20                CAPILLARY BLOOD GLUCOSE          RADIOLOGY & ADDITIONAL TESTS:      Consultant notes reviewed    Case discussed with consultant/provider/ family /patient

## 2018-01-20 NOTE — PROGRESS NOTE ADULT - SUBJECTIVE AND OBJECTIVE BOX
INTERVAL HPI/OVERNIGHT EVENTS: No acute events overnight. Tolerating diet. Ostomy functioning well.    STATUS POST:  Ileostomy revision    POST OPERATIVE DAY #: 4    SUBJECTIVE:  Flatus: [ x] YES [ ] NO             Bowel Movement: [x ] YES [ ] NO  Pain (0-10):            Pain Control Adequate: [x ] YES [ ] NO  Nausea: [ ] YES [x ] NO            Vomiting: [ ] YES x[ ] NO  Diarrhea: [ ] YES [x] NO         Constipation: [ ] YES [x ] NO     Chest Pain: [ ] YES [ x] NO    SOB:  [ ] YES [x ] NO    MEDICATIONS  (STANDING):  aspirin  chewable 81 milliGRAM(s) Oral daily  dextrose 5%. 1000 milliLiter(s) (50 mL/Hr) IV Continuous <Continuous>  dextrose 50% Injectable 12.5 Gram(s) IV Push once  dextrose 50% Injectable 25 Gram(s) IV Push once  diltiazem    Tablet 30 milliGRAM(s) Oral every 6 hours  enoxaparin Injectable 40 milliGRAM(s) SubCutaneous daily  FIRST- Mouthwash  BLM 10 milliLiter(s) Swish and Spit two times a day  fluticasone propionate/ salmeterol 250-50 MICROgram(s) Diskus 1 Dose(s) Inhalation two times a day  multiple electrolytes Injection Type 1 1000 milliLiter(s) (75 mL/Hr) IV Continuous <Continuous>  multivitamin 1 Tablet(s) Oral daily  pantoprazole   Suspension 40 milliGRAM(s) Oral two times a day before meals  sodium chloride 0.65% Nasal 1 Spray(s) Both Nostrils four times a day  tamsulosin 0.4 milliGRAM(s) Oral at bedtime  tiotropium 18 MICROgram(s) Capsule 1 Capsule(s) Inhalation daily    MEDICATIONS  (PRN):  ALPRAZolam 0.25 milliGRAM(s) Oral every 8 hours PRN anxiety  benzocaine 15 mG/menthol 3.6 mG Lozenge 1 Lozenge Oral every 4 hours PRN Sore Throat  benzonatate 100 milliGRAM(s) Oral three times a day PRN Cough  ketorolac   Injectable 15 milliGRAM(s) IV Push every 8 hours PRN Severe Pain (7 - 10)  ondansetron Injectable 4 milliGRAM(s) IV Push every 4 hours PRN Nausea and/or Vomiting  oxyCODONE    IR 5 milliGRAM(s) Oral every 6 hours PRN Moderate to severe pain      Vital Signs Last 24 Hrs  T(C): 36.4 (20 Jan 2018 08:47), Max: 36.7 (19 Jan 2018 16:34)  T(F): 97.6 (20 Jan 2018 08:47), Max: 98.1 (20 Jan 2018 05:10)  HR: 114 (20 Jan 2018 08:47) (92 - 114)  BP: 113/70 (20 Jan 2018 08:47) (96/64 - 131/79)  BP(mean): --  RR: 18 (20 Jan 2018 08:47) (18 - 20)  SpO2: 97% (20 Jan 2018 08:00) (93% - 98%)    PHYSICAL EXAM:      Constitutional: NAD    Eyes:     ENMT:    Neck:    Breasts:    Back:    Respiratory: CTA B/L    Cardiovascular: +S1S2    Gastrointestinal: Soft, NT, ND, ostomy- pink with stool and bilious output    Genitourinary:    Rectal:    Extremities:    Vascular:    Neurological: AAOx 3    Skin:    Lymph Nodes:    Musculoskeletal:    Psychiatric:        I&O's Detail    19 Jan 2018 07:01  -  20 Jan 2018 07:00  --------------------------------------------------------  IN:    multiple electrolytes Injection Type 1: 1125 mL    multiple electrolytes Injection Type 1multiple electrolytes Injection Type 1: 50 mL    Oral Fluid: 220 mL    Solution: 124 mL    Solution: 50 mL  Total IN: 1569 mL    OUT:    Ileostomy: 600 mL    Indwelling Catheter - Urethral: 40 mL    Voided: 530 mL  Total OUT: 1170 mL    Total NET: 399 mL      20 Jan 2018 07:01  -  20 Jan 2018 10:20  --------------------------------------------------------  IN:  Total IN: 0 mL    OUT:    Ileostomy: 600 mL  Total OUT: 600 mL    Total NET: -600 mL          LABS:                        10.6   8.0   )-----------( 315      ( 20 Jan 2018 06:25 )             33.5     01-20    141  |  101  |  7.0<L>  ----------------------------<  139<H>  4.0   |  27.0  |  0.43<L>    Ca    7.9<L>      20 Jan 2018 06:25  Phos  2.6     01-20  Mg     1.8     01-20            RADIOLOGY & ADDITIONAL STUDIES:

## 2018-01-21 PROCEDURE — 99233 SBSQ HOSP IP/OBS HIGH 50: CPT

## 2018-01-21 PROCEDURE — 99232 SBSQ HOSP IP/OBS MODERATE 35: CPT

## 2018-01-21 PROCEDURE — 71045 X-RAY EXAM CHEST 1 VIEW: CPT | Mod: 26

## 2018-01-21 PROCEDURE — 74019 RADEX ABDOMEN 2 VIEWS: CPT | Mod: 26

## 2018-01-21 PROCEDURE — 74018 RADEX ABDOMEN 1 VIEW: CPT | Mod: 26,59

## 2018-01-21 RX ORDER — PANTOPRAZOLE SODIUM 20 MG/1
40 TABLET, DELAYED RELEASE ORAL
Qty: 0 | Refills: 0 | Status: DISCONTINUED | OUTPATIENT
Start: 2018-01-21 | End: 2018-01-24

## 2018-01-21 RX ADMIN — Medication 1 SPRAY(S): at 05:24

## 2018-01-21 RX ADMIN — SODIUM CHLORIDE 75 MILLILITER(S): 9 INJECTION, SOLUTION INTRAVENOUS at 20:13

## 2018-01-21 RX ADMIN — DIPHENHYDRAMINE HYDROCHLORIDE AND LIDOCAINE HYDROCHLORIDE AND ALUMINUM HYDROXIDE AND MAGNESIUM HYDRO 10 MILLILITER(S): KIT at 05:23

## 2018-01-21 RX ADMIN — FLUTICASONE PROPIONATE AND SALMETEROL 1 DOSE(S): 50; 250 POWDER ORAL; RESPIRATORY (INHALATION) at 22:30

## 2018-01-21 RX ADMIN — FLUTICASONE PROPIONATE AND SALMETEROL 1 DOSE(S): 50; 250 POWDER ORAL; RESPIRATORY (INHALATION) at 09:53

## 2018-01-21 RX ADMIN — PANTOPRAZOLE SODIUM 40 MILLIGRAM(S): 20 TABLET, DELAYED RELEASE ORAL at 18:44

## 2018-01-21 RX ADMIN — Medication 0.5 MILLIGRAM(S): at 13:42

## 2018-01-21 RX ADMIN — TIOTROPIUM BROMIDE 1 CAPSULE(S): 18 CAPSULE ORAL; RESPIRATORY (INHALATION) at 11:24

## 2018-01-21 NOTE — PROGRESS NOTE ADULT - ASSESSMENT
ILD exacerbation, developed SBO got jejunostomy  later take down of jejunostomy with ileostomy. now tachycardic soft BP, whitman and  voiding, cachectic  SBO vs severe ileus- cont with NGT discussed with wife and surgery - cont NPO and NGT- change Cardizem nd protonic to IV

## 2018-01-21 NOTE — PROVIDER CONTACT NOTE (OTHER) - ACTION/TREATMENT ORDERED:
Dr. zimmerman and team and Dr. Faria aware keep pt npo all meds have to changed to IV  as per sx dr. faria aware surgery will be inserting ng tube.
md aware respiratory called
md parada
Will re-assess with surgery.

## 2018-01-21 NOTE — PROGRESS NOTE ADULT - ASSESSMENT
66 yo M s/p revision of ostomy to loop ileostomy POD5, recovering well    -Ileostomy output is 1200/24 hours.   -Primary team will manage IVF replacements (we recommend 1cc lactated ringers per 1cc output from ileostomy, QShift, given as bolus)  -Primary team will manage electrolyte repletions  -Tolerating regular diet.  -PO meds OK  -Supportive care    Patient seen by resident team this AM and discussed with Dr. Melton

## 2018-01-21 NOTE — PROGRESS NOTE ADULT - ASSESSMENT
1. Sinus tach, reactive. Changed to IV cardizem  2. IVF  3. NGT and surgical follow up  4.  Hx of CVA on asa

## 2018-01-21 NOTE — PROGRESS NOTE ADULT - SUBJECTIVE AND OBJECTIVE BOX
FRANDY BAUTISTA Patient is a 67y old  Male who presents with a chief complaint of SOB (15 Dec 2017 10:17)     HPI:  67 YOM w/PMH of COPD stage 4 s/p right lung reduction in 2010, currently on home oxygen 4L (sat 92-93%), HTN, CVA on 2014, DM, hypothyroidism, hydrocele, kidney stones who came because he noted that his O2 sat was 76% today. He mentions more difficult to talk in full sencentces compared to his baseline. He also has a chronic productive cough which has not changed in frequency or color. He mentions that his oxygen machine broke 4 days ago and he got a new one since, but is not sure if its working properly. He denies SOB, increased cough, chest pain, hemoptysis nausea, weakness. Pt has to sleep in a sit position with 2 pillows since he gets SOB if he lyes flat, condition that he attributes to his postnasal drip (29 Nov 2017 02:05)    The patient was seen and evaluated severe Ileus , SBO  The patient is in no acute distress.  Denied any fever chest pain, palpitations, shortness of breath,  fever, dysuria, cough, edema   Complains of abdominal discomfort     I&O's Summary    20 Jan 2018 07:01  -  21 Jan 2018 07:00  --------------------------------------------------------  IN: 1075 mL / OUT: 1900 mL / NET: -825 mL    21 Jan 2018 07:01  -  21 Jan 2018 15:07  --------------------------------------------------------  IN: 0 mL / OUT: 1200 mL / NET: -1200 mL      Allergies    codeine (Unknown)  no veges (Unknown)  penicillin (Unknown)    Intolerances    DO NOT SEND ORANGES (Unknown)  Symbicort (Short breath)    HEALTH ISSUES - PROBLEM Dx:  Urine frequency: Urine frequency  Tachycardia: Tachycardia  Essential hypertension: Essential hypertension  Type 2 diabetes mellitus without complication, without long-term current use of insulin: Type 2 diabetes mellitus without complication, without long-term current use of insulin  Small bowel obstruction: Small bowel obstruction  ILD (interstitial lung disease): ILD (interstitial lung disease)  Pre-operative cardiovascular examination: Pre-operative cardiovascular examination  Diarrhea, unspecified type: Diarrhea, unspecified type  Ileostomy dysfunction: Ileostomy dysfunction  Leukocytosis, unspecified type: Leukocytosis, unspecified type  Constipation, unspecified constipation type: Constipation, unspecified constipation type  Encounter for palliative care: Encounter for palliative care  Constipation: Constipation  Hypoxemia: Hypoxemia  Pneumonia: Pneumonia  Mediastinal adenopathy: Mediastinal adenopathy  Lung mass: Lung mass  History of CVA (cerebrovascular accident): History of CVA (cerebrovascular accident)  Prophylactic measure: Prophylactic measure  Hypothyroidism: Hypothyroidism  Renal calculi: Renal calculi  Diabetes mellitus: Diabetes mellitus  Hypertension: Hypertension  COPD exacerbation: COPD exacerbation        PAST MEDICAL & SURGICAL HISTORY:  Hypothyroidism  Hydrocele  Renal calculi  Diabetes mellitus  Hypertension  CVA (cerebral vascular accident)  Chronic obstructive pulmonary disease, unspecified COPD type  History of lung surgery          Vital Signs Last 24 Hrs  T(C): 36.6 (21 Jan 2018 11:19), Max: 36.7 (20 Jan 2018 16:31)  T(F): 97.9 (21 Jan 2018 11:19), Max: 98.1 (21 Jan 2018 04:59)  HR: 130 (21 Jan 2018 13:55) (94 - 130)  BP: 132/82 (21 Jan 2018 13:55) (103/71 - 144/82)  BP(mean): --  RR: 16 (21 Jan 2018 11:19) (16 - 20)  SpO2: 95% (21 Jan 2018 11:19) (94% - 97%)T(C): 36.6 (01-21-18 @ 11:19), Max: 36.7 (01-20-18 @ 16:31)  HR: 130 (01-21-18 @ 13:55) (94 - 130)  BP: 132/82 (01-21-18 @ 13:55) (103/71 - 144/82)  RR: 16 (01-21-18 @ 11:19) (16 - 20)  SpO2: 95% (01-21-18 @ 11:19) (94% - 97%)  Wt(kg): --    PHYSICAL EXAM:    GENERAL: NAD, cachectic elderly ill appearing with NGT draining dark fluid in NGT  HEAD:  Atraumatic, Normocephalic  EYES: EOMI, conjunctiva and sclera clear  ENMT:  Moist mucous membranes,  No lesions  NERVOUS SYSTEM:  Alert & Oriented X3,  Moves upper and lower extremities; CNS-II-XII  CHEST/LUNG: Clear to auscultation bilaterally; No rales, rhonchi, wheezing,   HEART: Regular rate and rhythm; No murmurs,   ABDOMEN: Soft, Nontender, Nondistended; Bowel sounds present  EXTREMITIES:  Peripheral Pulses, No  cyanosis, or edema  SKIN: No rashes or lesions  psychiatry- mood and affect appropriate Insight and judgement intact     aspirin  chewable 81 milliGRAM(s) Oral daily  benzocaine 15 mG/menthol 3.6 mG Lozenge 1 Lozenge Oral every 4 hours PRN  benzonatate 100 milliGRAM(s) Oral three times a day PRN  dextrose 5%. 1000 milliLiter(s) IV Continuous <Continuous>  dextrose 50% Injectable 12.5 Gram(s) IV Push once  dextrose 50% Injectable 25 Gram(s) IV Push once  diltiazem Injectable 10 milliGRAM(s) IV Push every 8 hours  enoxaparin Injectable 40 milliGRAM(s) SubCutaneous daily  FIRST- Mouthwash  BLM 10 milliLiter(s) Swish and Spit two times a day  fluticasone propionate/ salmeterol 250-50 MICROgram(s) Diskus 1 Dose(s) Inhalation two times a day  ketorolac   Injectable 15 milliGRAM(s) IV Push every 8 hours PRN  LORazepam   Injectable 0.5 milliGRAM(s) IV Push two times a day PRN  multiple electrolytes Injection Type 1 1000 milliLiter(s) IV Continuous <Continuous>  multivitamin 1 Tablet(s) Oral daily  ondansetron Injectable 4 milliGRAM(s) IV Push every 4 hours PRN  oxyCODONE    IR 5 milliGRAM(s) Oral every 6 hours PRN  pantoprazole  Injectable 40 milliGRAM(s) IV Push two times a day  sodium chloride 0.65% Nasal 1 Spray(s) Both Nostrils four times a day  tamsulosin 0.4 milliGRAM(s) Oral at bedtime  tiotropium 18 MICROgram(s) Capsule 1 Capsule(s) Inhalation daily      LABS:                          10.6   8.0   )-----------( 315      ( 20 Jan 2018 06:25 )             33.5     01-20    141  |  101  |  7.0<L>  ----------------------------<  139<H>  4.0   |  27.0  |  0.43<L>    Ca    7.9<L>      20 Jan 2018 06:25  Phos  2.6     01-20  Mg     1.8     01-20                CAPILLARY BLOOD GLUCOSE          RADIOLOGY & ADDITIONAL TESTS:      Consultant notes reviewed    Case discussed with consultant/provider/ family /patient

## 2018-01-21 NOTE — PROGRESS NOTE ADULT - SUBJECTIVE AND OBJECTIVE BOX
CHIEF COMPLAINT:  Patient is a 67y old  Male who presents with a chief complaint of SOB. (15 Dec 2017 10:17)  Pt has difficulty after taking Cardizem last night.  This AM with ileus.  NGT was placed.     Allergies  codeine (Unknown)  no veges (Unknown)  penicillin (Unknown)    MEDICATIONS:  diltiazem Injectable 10 milliGRAM(s) IV Push every 8 hours  tamsulosin 0.4 milliGRAM(s) Oral at bedtime  benzonatate 100 milliGRAM(s) Oral three times a day PRN  fluticasone propionate/ salmeterol 250-50 MICROgram(s) Diskus 1 Dose(s) Inhalation two times a day  tiotropium 18 MICROgram(s) Capsule 1 Capsule(s) Inhalation daily  ketorolac   Injectable 15 milliGRAM(s) IV Push every 8 hours PRN  LORazepam   Injectable 0.5 milliGRAM(s) IV Push two times a day PRN  ondansetron Injectable 4 milliGRAM(s) IV Push every 4 hours PRN  oxyCODONE    IR 5 milliGRAM(s) Oral every 6 hours PRN  pantoprazole  Injectable 40 milliGRAM(s) IV Push two times a day  dextrose 50% Injectable 12.5 Gram(s) IV Push once  dextrose 50% Injectable 25 Gram(s) IV Push once  aspirin  chewable 81 milliGRAM(s) Oral daily  benzocaine 15 mG/menthol 3.6 mG Lozenge 1 Lozenge Oral every 4 hours PRN  dextrose 5%. 1000 milliLiter(s) IV Continuous <Continuous>  enoxaparin Injectable 40 milliGRAM(s) SubCutaneous daily  FIRST- Mouthwash  BLM 10 milliLiter(s) Swish and Spit two times a day  multiple electrolytes Injection Type 1 1000 milliLiter(s) IV Continuous <Continuous>  multivitamin 1 Tablet(s) Oral daily  sodium chloride 0.65% Nasal 1 Spray(s) Both Nostrils four times a day    PHYSICAL EXAM:  T(C): 36.6 (01-21-18 @ 11:19), Max: 36.7 (01-20-18 @ 16:31)  HR: 111 (01-21-18 @ 11:19) (94 - 111)  BP: 135/87 (01-21-18 @ 11:19) (103/71 - 144/82)  RR: 16 (01-21-18 @ 11:19) (16 - 20)  SpO2: 95% (01-21-18 @ 11:19) (94% - 97%)  Wt(kg): --    I&O's Summary    20 Jan 2018 07:01  -  21 Jan 2018 07:00  --------------------------------------------------------  IN: 1075 mL / OUT: 1900 mL / NET: -825 mL    21 Jan 2018 07:01  -  21 Jan 2018 13:44  --------------------------------------------------------  IN: 0 mL / OUT: 1000 mL / NET: -1000 mL    Appearance: Normal	  HEENT:   NC/AT  Eye: Pink Conjunctiva  Lungs: diffuse crackles   CVS: RRR, Normal S1 and S2, No Edema  Pulses: Normal distal pulses.  Neuro: A&O x3    TELEMETRY: Sinus tach.	      LABS:	 	    CARDIAC MARKERS:                      10.6   8.0   )-----------( 315      ( 20 Jan 2018 06:25 )             33.5     01-20    141  |  101  |  7.0<L>  ----------------------------<  139<H>  4.0   |  27.0  |  0.43<L>    Ca    7.9<L>      20 Jan 2018 06:25  Phos  2.6     01-20  Mg     1.8     01-20      ASSESSMENT/PLAN:

## 2018-01-21 NOTE — PROGRESS NOTE ADULT - SUBJECTIVE AND OBJECTIVE BOX
INTERVAL HPI/OVERNIGHT EVENTS:    SUBJECTIVE: No acute events overnight, no complaints  Ileostomy putting out brown stool and mucosa appears pink and healthy    MEDICATIONS  (STANDING):  aspirin  chewable 81 milliGRAM(s) Oral daily  dextrose 5%. 1000 milliLiter(s) (50 mL/Hr) IV Continuous <Continuous>  dextrose 50% Injectable 12.5 Gram(s) IV Push once  dextrose 50% Injectable 25 Gram(s) IV Push once  diltiazem    Tablet 30 milliGRAM(s) Oral every 6 hours  enoxaparin Injectable 40 milliGRAM(s) SubCutaneous daily  FIRST- Mouthwash  BLM 10 milliLiter(s) Swish and Spit two times a day  fluticasone propionate/ salmeterol 250-50 MICROgram(s) Diskus 1 Dose(s) Inhalation two times a day  multiple electrolytes Injection Type 1 1000 milliLiter(s) (75 mL/Hr) IV Continuous <Continuous>  multivitamin 1 Tablet(s) Oral daily  pantoprazole   Suspension 40 milliGRAM(s) Oral two times a day before meals  sodium chloride 0.65% Nasal 1 Spray(s) Both Nostrils four times a day  tamsulosin 0.4 milliGRAM(s) Oral at bedtime  tiotropium 18 MICROgram(s) Capsule 1 Capsule(s) Inhalation daily    MEDICATIONS  (PRN):  ALPRAZolam 0.25 milliGRAM(s) Oral every 8 hours PRN anxiety  benzocaine 15 mG/menthol 3.6 mG Lozenge 1 Lozenge Oral every 4 hours PRN Sore Throat  benzonatate 100 milliGRAM(s) Oral three times a day PRN Cough  ketorolac   Injectable 15 milliGRAM(s) IV Push every 8 hours PRN Severe Pain (7 - 10)  ondansetron Injectable 4 milliGRAM(s) IV Push every 4 hours PRN Nausea and/or Vomiting  oxyCODONE    IR 5 milliGRAM(s) Oral every 6 hours PRN Moderate to severe pain      Vital Signs Last 24 Hrs  T(C): 36.7 (21 Jan 2018 04:59), Max: 36.7 (20 Jan 2018 16:31)  T(F): 98.1 (21 Jan 2018 04:59), Max: 98.1 (21 Jan 2018 04:59)  HR: 100 (21 Jan 2018 04:59) (94 - 100)  BP: 144/82 (21 Jan 2018 04:59) (103/71 - 144/82)  BP(mean): --  RR: 19 (21 Jan 2018 04:59) (19 - 20)  SpO2: 97% (21 Jan 2018 04:59) (94% - 97%)    PE  Constitutional: NAD, AAO3  Respiratory: CTA B/L  Cardiovascular: +S1S2  Gastrointestinal: Soft, NT, ND, ostomy- pink with stool and bilious output  Extremities: WWP    I&O's Detail    20 Jan 2018 07:01  -  21 Jan 2018 07:00  --------------------------------------------------------  IN:    multiple electrolytes Injection Type 1: 675 mL    Oral Fluid: 400 mL  Total IN: 1075 mL    OUT:    Ileostomy: 1200 mL    Voided: 700 mL  Total OUT: 1900 mL    Total NET: -825 mL      21 Jan 2018 07:01  -  21 Jan 2018 10:22  --------------------------------------------------------  IN:  Total IN: 0 mL    OUT:    Ileostomy:  Total OUT: 1200/24 hrs        LABS:                        10.6   8.0   )-----------( 315      ( 20 Jan 2018 06:25 )             33.5     01-20    141  |  101  |  7.0<L>  ----------------------------<  139<H>  4.0   |  27.0  |  0.43<L>    Ca    7.9<L>      20 Jan 2018 06:25  Phos  2.6     01-20  Mg     1.8     01-20            RADIOLOGY & ADDITIONAL STUDIES:

## 2018-01-22 LAB
ALBUMIN SERPL ELPH-MCNC: 2.3 G/DL — LOW (ref 3.3–5.2)
ALP SERPL-CCNC: 87 U/L — SIGNIFICANT CHANGE UP (ref 40–120)
ALT FLD-CCNC: 10 U/L — SIGNIFICANT CHANGE UP
ANION GAP SERPL CALC-SCNC: 16 MMOL/L — SIGNIFICANT CHANGE UP (ref 5–17)
AST SERPL-CCNC: 12 U/L — SIGNIFICANT CHANGE UP
BASOPHILS # BLD AUTO: 0 K/UL — SIGNIFICANT CHANGE UP (ref 0–0.2)
BASOPHILS NFR BLD AUTO: 0.2 % — SIGNIFICANT CHANGE UP (ref 0–2)
BILIRUB SERPL-MCNC: 0.7 MG/DL — SIGNIFICANT CHANGE UP (ref 0.4–2)
BUN SERPL-MCNC: 8 MG/DL — SIGNIFICANT CHANGE UP (ref 8–20)
CALCIUM SERPL-MCNC: 7.9 MG/DL — LOW (ref 8.6–10.2)
CHLORIDE SERPL-SCNC: 97 MMOL/L — LOW (ref 98–107)
CO2 SERPL-SCNC: 26 MMOL/L — SIGNIFICANT CHANGE UP (ref 22–29)
CREAT SERPL-MCNC: 0.47 MG/DL — LOW (ref 0.5–1.3)
EOSINOPHIL # BLD AUTO: 0.1 K/UL — SIGNIFICANT CHANGE UP (ref 0–0.5)
EOSINOPHIL NFR BLD AUTO: 0.6 % — SIGNIFICANT CHANGE UP (ref 0–5)
GLUCOSE SERPL-MCNC: 97 MG/DL — SIGNIFICANT CHANGE UP (ref 70–115)
HCT VFR BLD CALC: 34.7 % — LOW (ref 42–52)
HGB BLD-MCNC: 10.9 G/DL — LOW (ref 14–18)
INR BLD: 1.09 RATIO — SIGNIFICANT CHANGE UP (ref 0.88–1.16)
LACTATE SERPL-SCNC: 1.2 MMOL/L — SIGNIFICANT CHANGE UP (ref 0.5–2)
LYMPHOCYTES # BLD AUTO: 1 K/UL — SIGNIFICANT CHANGE UP (ref 1–4.8)
LYMPHOCYTES # BLD AUTO: 10.5 % — LOW (ref 20–55)
MAGNESIUM SERPL-MCNC: 1.7 MG/DL — SIGNIFICANT CHANGE UP (ref 1.6–2.6)
MCHC RBC-ENTMCNC: 27.5 PG — SIGNIFICANT CHANGE UP (ref 27–31)
MCHC RBC-ENTMCNC: 31.4 G/DL — LOW (ref 32–36)
MCV RBC AUTO: 87.4 FL — SIGNIFICANT CHANGE UP (ref 80–94)
MONOCYTES # BLD AUTO: 0.8 K/UL — SIGNIFICANT CHANGE UP (ref 0–0.8)
MONOCYTES NFR BLD AUTO: 8 % — SIGNIFICANT CHANGE UP (ref 3–10)
NEUTROPHILS # BLD AUTO: 8.1 K/UL — HIGH (ref 1.8–8)
NEUTROPHILS NFR BLD AUTO: 80.6 % — HIGH (ref 37–73)
PHOSPHATE SERPL-MCNC: 2.4 MG/DL — SIGNIFICANT CHANGE UP (ref 2.4–4.7)
PLATELET # BLD AUTO: 350 K/UL — SIGNIFICANT CHANGE UP (ref 150–400)
POTASSIUM SERPL-MCNC: 3.9 MMOL/L — SIGNIFICANT CHANGE UP (ref 3.5–5.3)
POTASSIUM SERPL-SCNC: 3.9 MMOL/L — SIGNIFICANT CHANGE UP (ref 3.5–5.3)
PREALB SERPL-MCNC: 10 MG/DL — LOW (ref 18–38)
PROT SERPL-MCNC: 5.7 G/DL — LOW (ref 6.6–8.7)
PROTHROM AB SERPL-ACNC: 12 SEC — SIGNIFICANT CHANGE UP (ref 9.8–12.7)
RBC # BLD: 3.97 M/UL — LOW (ref 4.6–6.2)
RBC # FLD: 17.1 % — HIGH (ref 11–15.6)
SODIUM SERPL-SCNC: 139 MMOL/L — SIGNIFICANT CHANGE UP (ref 135–145)
WBC # BLD: 10 K/UL — SIGNIFICANT CHANGE UP (ref 4.8–10.8)
WBC # FLD AUTO: 10 K/UL — SIGNIFICANT CHANGE UP (ref 4.8–10.8)

## 2018-01-22 PROCEDURE — 74177 CT ABD & PELVIS W/CONTRAST: CPT | Mod: 26

## 2018-01-22 PROCEDURE — 99233 SBSQ HOSP IP/OBS HIGH 50: CPT

## 2018-01-22 PROCEDURE — 99232 SBSQ HOSP IP/OBS MODERATE 35: CPT

## 2018-01-22 RX ORDER — BENZOCAINE AND MENTHOL 5; 1 G/100ML; G/100ML
1 LIQUID ORAL ONCE
Qty: 0 | Refills: 0 | Status: COMPLETED | OUTPATIENT
Start: 2018-01-22 | End: 2018-01-23

## 2018-01-22 RX ORDER — SODIUM CHLORIDE 9 MG/ML
250 INJECTION, SOLUTION INTRAVENOUS ONCE
Qty: 0 | Refills: 0 | Status: COMPLETED | OUTPATIENT
Start: 2018-01-22 | End: 2018-01-22

## 2018-01-22 RX ADMIN — Medication 1 SPRAY(S): at 23:11

## 2018-01-22 RX ADMIN — PANTOPRAZOLE SODIUM 40 MILLIGRAM(S): 20 TABLET, DELAYED RELEASE ORAL at 06:25

## 2018-01-22 RX ADMIN — TIOTROPIUM BROMIDE 1 CAPSULE(S): 18 CAPSULE ORAL; RESPIRATORY (INHALATION) at 08:45

## 2018-01-22 RX ADMIN — SODIUM CHLORIDE 250 MILLILITER(S): 9 INJECTION, SOLUTION INTRAVENOUS at 20:08

## 2018-01-22 RX ADMIN — ENOXAPARIN SODIUM 40 MILLIGRAM(S): 100 INJECTION SUBCUTANEOUS at 12:06

## 2018-01-22 RX ADMIN — SODIUM CHLORIDE 75 MILLILITER(S): 9 INJECTION, SOLUTION INTRAVENOUS at 06:27

## 2018-01-22 RX ADMIN — Medication 1 SPRAY(S): at 12:02

## 2018-01-22 RX ADMIN — PANTOPRAZOLE SODIUM 40 MILLIGRAM(S): 20 TABLET, DELAYED RELEASE ORAL at 17:34

## 2018-01-22 RX ADMIN — Medication 1 SPRAY(S): at 16:59

## 2018-01-22 NOTE — PROGRESS NOTE ADULT - SUBJECTIVE AND OBJECTIVE BOX
FRANDY BAUTISTA    [id number]    67y      Male    Patient is a 67y old  Male who presents with a chief complaint of SOB (15 Dec 2017 10:17)      INTERVAL HPI/OVERNIGHT EVENTS:    REVIEW OF SYSTEMS:    CONSTITUTIONAL: No fever, has fatigue  RESPIRATORY: No cough, little shortness of breath  CARDIOVASCULAR: No chest pain, no palpitations.   GASTROINTESTINAL: No abdominal, No nausea, vomiting, has NG tube in  NEUROLOGICAL: No headaches,  loss of strength.  MISCELLANEOUS: No joint swelling or pain.        Vital Signs Last 24 Hrs  T(C): 36.2 (22 Jan 2018 12:03), Max: 36.7 (21 Jan 2018 16:28)  T(F): 97.2 (22 Jan 2018 12:03), Max: 98 (21 Jan 2018 16:28)  HR: 105 (22 Jan 2018 12:03) (98 - 126)  BP: 106/72 (22 Jan 2018 12:03) (101/68 - 115/73)  BP(mean): --  RR: 18 (22 Jan 2018 12:03) (16 - 18)  SpO2: 96% (22 Jan 2018 08:45) (96% - 97%)    PHYSICAL EXAM:    GENERAL: Thin built  Elderly male looking comfortable  HEENT: NG tube in  NECK: soft, Supple, No JVD,   CHEST/LUNG: Decrease air entry bilaterally; No wheezing  HEART: S1S2+, Regular rate and rhythm; No murmurs  ABDOMEN: Soft, Nontender, Bowel sounds present, s/p Ileostomy with stool in the bag  EXTREMITIES:  1+ Peripheral Pulses, No edema  SKIN: No rashes or lesions  NEURO: AAOX3, no focal deficits, no motor r sensory loss  PSYCH: normal mood      LABS:                        10.9   10.0  )-----------( 350      ( 22 Jan 2018 12:48 )             34.7     01-22    139  |  97<L>  |  8.0  ----------------------------<  97  3.9   |  26.0  |  0.47<L>    Ca    7.9<L>      22 Jan 2018 12:48  Phos  2.4     01-22  Mg     1.7     01-22    TPro  5.7<L>  /  Alb  2.3<L>  /  TBili  0.7  /  DBili  x   /  AST  12  /  ALT  10  /  AlkPhos  87  01-22            I&O's Summary    21 Jan 2018 07:01  -  22 Jan 2018 07:00  --------------------------------------------------------  IN: 750 mL / OUT: 2975 mL / NET: -2225 mL        MEDICATIONS  (STANDING):  aspirin  chewable 81 milliGRAM(s) Oral daily  dextrose 5%. 1000 milliLiter(s) (50 mL/Hr) IV Continuous <Continuous>  dextrose 50% Injectable 12.5 Gram(s) IV Push once  dextrose 50% Injectable 25 Gram(s) IV Push once  diltiazem Injectable 10 milliGRAM(s) IV Push every 8 hours  enoxaparin Injectable 40 milliGRAM(s) SubCutaneous daily  FIRST- Mouthwash  BLM 10 milliLiter(s) Swish and Spit two times a day  fluticasone propionate/ salmeterol 250-50 MICROgram(s) Diskus 1 Dose(s) Inhalation two times a day  multiple electrolytes Injection Type 1 1000 milliLiter(s) (75 mL/Hr) IV Continuous <Continuous>  multivitamin 1 Tablet(s) Oral daily  pantoprazole  Injectable 40 milliGRAM(s) IV Push two times a day  sodium chloride 0.65% Nasal 1 Spray(s) Both Nostrils four times a day  tamsulosin 0.4 milliGRAM(s) Oral at bedtime  tiotropium 18 MICROgram(s) Capsule 1 Capsule(s) Inhalation daily    MEDICATIONS  (PRN):  benzocaine 15 mG/menthol 3.6 mG Lozenge 1 Lozenge Oral every 4 hours PRN Sore Throat  benzonatate 100 milliGRAM(s) Oral three times a day PRN Cough  LORazepam   Injectable 0.5 milliGRAM(s) IV Push two times a day PRN Anxiety  ondansetron Injectable 4 milliGRAM(s) IV Push every 4 hours PRN Nausea and/or Vomiting  oxyCODONE    IR 5 milliGRAM(s) Oral every 6 hours PRN Moderate to severe pain FRANDY BAUTISTA    [id number]    67y      Male    Patient is a 67y old  Male who presents with a chief complaint of SOB (15 Dec 2017 10:17)      INTERVAL HPI/OVERNIGHT EVENTS:    Patient is feeling little better, has not much of nausea, NG secretions are less, he has no abdominal pain, denies fever, chills, chest pain.     REVIEW OF SYSTEMS:    CONSTITUTIONAL: No fever, has fatigue  RESPIRATORY: No cough, little shortness of breath  CARDIOVASCULAR: No chest pain, no palpitations.   GASTROINTESTINAL: No abdominal, No nausea, vomiting, has NG tube in  NEUROLOGICAL: No headaches,  loss of strength.  MISCELLANEOUS: No joint swelling or pain.        Vital Signs Last 24 Hrs  T(C): 36.2 (22 Jan 2018 12:03), Max: 36.7 (21 Jan 2018 16:28)  T(F): 97.2 (22 Jan 2018 12:03), Max: 98 (21 Jan 2018 16:28)  HR: 105 (22 Jan 2018 12:03) (98 - 126)  BP: 106/72 (22 Jan 2018 12:03) (101/68 - 115/73)  RR: 18 (22 Jan 2018 12:03) (16 - 18)  SpO2: 96% (22 Jan 2018 08:45) (96% - 97%)    PHYSICAL EXAM:    GENERAL: Thin built  Elderly male looking comfortable  HEENT: NG tube in place connected with suction.   NECK: soft, Supple, No JVD,   CHEST/LUNG: Decrease air entry bilaterally; No wheezing  HEART: S1S2+, Regular rate and rhythm; No murmurs  ABDOMEN: Soft, Nontender, Bowel sounds present, s/p Ileostomy with stool in the bag  EXTREMITIES:  1+ Peripheral Pulses, No edema  SKIN: No rashes or lesions  NEURO: AAOX3, no focal deficits, no motor r sensory loss  PSYCH: normal mood      LABS:                        10.9   10.0  )-----------( 350      ( 22 Jan 2018 12:48 )             34.7     01-22    139  |  97<L>  |  8.0  ----------------------------<  97  3.9   |  26.0  |  0.47<L>    Ca    7.9<L>      22 Jan 2018 12:48  Phos  2.4     01-22  Mg     1.7     01-22    TPro  5.7<L>  /  Alb  2.3<L>  /  TBili  0.7  /  DBili  x   /  AST  12  /  ALT  10  /  AlkPhos  87  01-22            I&O's Summary    21 Jan 2018 07:01  -  22 Jan 2018 07:00  --------------------------------------------------------  IN: 750 mL / OUT: 2975 mL / NET: -2225 mL        MEDICATIONS  (STANDING):  aspirin  chewable 81 milliGRAM(s) Oral daily  dextrose 5%. 1000 milliLiter(s) (50 mL/Hr) IV Continuous <Continuous>  dextrose 50% Injectable 12.5 Gram(s) IV Push once  dextrose 50% Injectable 25 Gram(s) IV Push once  diltiazem Injectable 10 milliGRAM(s) IV Push every 8 hours  enoxaparin Injectable 40 milliGRAM(s) SubCutaneous daily  FIRST- Mouthwash  BLM 10 milliLiter(s) Swish and Spit two times a day  fluticasone propionate/ salmeterol 250-50 MICROgram(s) Diskus 1 Dose(s) Inhalation two times a day  multiple electrolytes Injection Type 1 1000 milliLiter(s) (75 mL/Hr) IV Continuous <Continuous>  multivitamin 1 Tablet(s) Oral daily  pantoprazole  Injectable 40 milliGRAM(s) IV Push two times a day  sodium chloride 0.65% Nasal 1 Spray(s) Both Nostrils four times a day  tamsulosin 0.4 milliGRAM(s) Oral at bedtime  tiotropium 18 MICROgram(s) Capsule 1 Capsule(s) Inhalation daily    MEDICATIONS  (PRN):  benzocaine 15 mG/menthol 3.6 mG Lozenge 1 Lozenge Oral every 4 hours PRN Sore Throat  benzonatate 100 milliGRAM(s) Oral three times a day PRN Cough  LORazepam   Injectable 0.5 milliGRAM(s) IV Push two times a day PRN Anxiety  ondansetron Injectable 4 milliGRAM(s) IV Push every 4 hours PRN Nausea and/or Vomiting  oxyCODONE    IR 5 milliGRAM(s) Oral every 6 hours PRN Moderate to severe pain

## 2018-01-22 NOTE — PROGRESS NOTE ADULT - SUBJECTIVE AND OBJECTIVE BOX
HPI/OVERNIGHT EVENTS: Patient seen and examined at bedside this AM. Was distended yesterday and vomiting requiring NG tube placement. Tolerated procedure well and placement confirmed by imaging. Abdominal distention and nausea immediately improved. Currently NPO. Otherwise denies fever, chills, chest pain, SOB, dizziness, abd pain or any other concerning symptoms    Vital Signs Last 24 Hrs  T(C): 36.6 (22 Jan 2018 05:00), Max: 36.7 (21 Jan 2018 16:28)  T(F): 97.8 (22 Jan 2018 05:00), Max: 98 (21 Jan 2018 16:28)  HR: 98 (22 Jan 2018 05:00) (98 - 130)  BP: 101/68 (22 Jan 2018 05:00) (101/68 - 135/87)  BP(mean): --  RR: 16 (22 Jan 2018 05:00) (16 - 16)  SpO2: 97% (22 Jan 2018 05:00) (90% - 97%)    I&O's Detail    21 Jan 2018 07:01  -  22 Jan 2018 07:00  --------------------------------------------------------  IN:    multiple electrolytes Injection Type 1: 750 mL  Total IN: 750 mL    OUT:    Ileostomy: 925 mL    Nasoenteral Tube: 150 mL    Other: 1050 mL    Voided: 850 mL  Total OUT: 2975 mL    Total NET: -2225 mL    Constitutional: Patient resting comfortably in bed in no acute distress   HEENT: EOMI/PERRL b/l  Neck: No JVD, full ROM w/o pain  Respiratory: CTAB with unlabored respirations, no accessory muscle use or conversational dyspnea   Cardiovascular: Regular rate and rhythm with no arrythmias or murmurs  Gastrointestinal: Functional ostomy present with stool and gas present; Abdomen soft, non-tender, minimally distended with no rebound tenderness or guarding   Rectal: Not indicated   Neurological: GCS 15 ([id number]) with no gross sensory, motor or coordination deficits   Psychiatric: Normal mood and affect   Musculoskeletal: No joint pain, swelling or deformity with no limitation of movement    LABS:    MEDICATIONS  (STANDING):  aspirin  chewable 81 milliGRAM(s) Oral daily  dextrose 5%. 1000 milliLiter(s) (50 mL/Hr) IV Continuous <Continuous>  dextrose 50% Injectable 12.5 Gram(s) IV Push once  dextrose 50% Injectable 25 Gram(s) IV Push once  diltiazem Injectable 10 milliGRAM(s) IV Push every 8 hours  enoxaparin Injectable 40 milliGRAM(s) SubCutaneous daily  FIRST- Mouthwash  BLM 10 milliLiter(s) Swish and Spit two times a day  fluticasone propionate/ salmeterol 250-50 MICROgram(s) Diskus 1 Dose(s) Inhalation two times a day  multiple electrolytes Injection Type 1 1000 milliLiter(s) (75 mL/Hr) IV Continuous <Continuous>  multivitamin 1 Tablet(s) Oral daily  pantoprazole  Injectable 40 milliGRAM(s) IV Push two times a day  sodium chloride 0.65% Nasal 1 Spray(s) Both Nostrils four times a day  tamsulosin 0.4 milliGRAM(s) Oral at bedtime  tiotropium 18 MICROgram(s) Capsule 1 Capsule(s) Inhalation daily    MEDICATIONS  (PRN):  benzocaine 15 mG/menthol 3.6 mG Lozenge 1 Lozenge Oral every 4 hours PRN Sore Throat  benzonatate 100 milliGRAM(s) Oral three times a day PRN Cough  ketorolac   Injectable 15 milliGRAM(s) IV Push every 8 hours PRN Severe Pain (7 - 10)  LORazepam   Injectable 0.5 milliGRAM(s) IV Push two times a day PRN Anxiety  ondansetron Injectable 4 milliGRAM(s) IV Push every 4 hours PRN Nausea and/or Vomiting  oxyCODONE    IR 5 milliGRAM(s) Oral every 6 hours PRN Moderate to severe pain      MICRO:   Cultures     STUDIES:   CT A/P with PO and IV contrast

## 2018-01-22 NOTE — PROGRESS NOTE ADULT - SUBJECTIVE AND OBJECTIVE BOX
CHIEF COMPLAINT:Patient is a 67y old  Male who is seen with sinus tach.  Pt with ileus, on NGT, about 600 ml all together since insertion. 50 cc today.  No cp, sob at baseline.     Allergies  codeine (Unknown)  no veges (Unknown)  penicillin (Unknown)  	  MEDICATIONS:  diltiazem Injectable 10 milliGRAM(s) IV Push every 8 hours  tamsulosin 0.4 milliGRAM(s) Oral at bedtime  benzonatate 100 milliGRAM(s) Oral three times a day PRN  fluticasone propionate/ salmeterol 250-50 MICROgram(s) Diskus 1 Dose(s) Inhalation two times a day  tiotropium 18 MICROgram(s) Capsule 1 Capsule(s) Inhalation daily  LORazepam   Injectable 0.5 milliGRAM(s) IV Push two times a day PRN  ondansetron Injectable 4 milliGRAM(s) IV Push every 4 hours PRN  oxyCODONE    IR 5 milliGRAM(s) Oral every 6 hours PRN  pantoprazole  Injectable 40 milliGRAM(s) IV Push two times a day  dextrose 50% Injectable 12.5 Gram(s) IV Push once  dextrose 50% Injectable 25 Gram(s) IV Push once  aspirin  chewable 81 milliGRAM(s) Oral daily  benzocaine 15 mG/menthol 3.6 mG Lozenge 1 Lozenge Oral every 4 hours PRN  dextrose 5%. 1000 milliLiter(s) IV Continuous <Continuous>  enoxaparin Injectable 40 milliGRAM(s) SubCutaneous daily  FIRST- Mouthwash  BLM 10 milliLiter(s) Swish and Spit two times a day  multiple electrolytes Injection Type 1 1000 milliLiter(s) IV Continuous <Continuous>  multivitamin 1 Tablet(s) Oral daily  sodium chloride 0.65% Nasal 1 Spray(s) Both Nostrils four times a day    PHYSICAL EXAM:  T(C): 36.6 (01-22-18 @ 05:00), Max: 36.7 (01-21-18 @ 16:28)  HR: 98 (01-22-18 @ 05:00) (98 - 130)  BP: 101/68 (01-22-18 @ 05:00) (101/68 - 132/82)  RR: 16 (01-22-18 @ 05:00) (16 - 16)  SpO2: 97% (01-22-18 @ 05:00) (90% - 97%)  I&O's Summary    21 Jan 2018 07:01  -  22 Jan 2018 07:00  --------------------------------------------------------  IN: 750 mL / OUT: 2975 mL / NET: -2225 mL  Appearance: Comfortable	  HEENT:   NC/AT  Eye: Pink Conjunctiva  Lungs: CTA B/L  CVS: RRR, Normal S1 and S2, No Edema  Pulses: Normal distal pulses.  Neuro: A&O x3    TELEMETRY: sinus tach	      ASSESSMENT/PLAN:

## 2018-01-22 NOTE — PROGRESS NOTE ADULT - ASSESSMENT
67 year old male with PMH of COPD stage 4 s/p right lung reduction in 2010 (on home oxygen 4L (sat 92-93%), HTN, CVA on 2014, DM, hypothyroidism, hydrocele, and nephrolithiasis who is admitted for a COPD exacerbation. Patient has been weaned off ventimask to 4 liters nasal cannula and respiratory status is stable. CTA of the chest was negative for PE but showed bilateral lower lobe infiltrates with mucous plugging for which he was started on Levaquin.  Respiratory status stable and steroids are being tapered. Hospital course complicated by constipation, which had not improved despite aggressive bowel regimen, laxatives and enemas. XRAY with large stool burden. CT with diaphragmatic hernia and patient was consequently taken to the OR for loop ileostomy under local anesthesia. Pt cont, to have copious amount in ileostomy. Pt is noted to have hyponatremia due to high output in ileostomy, got IVF, hyponatremia and hypokalemia improving, ileostomy secretion are getting thicker, he is tolerating diet, ostomy care as per ostomy RN,  he is continued with cholestyramine, he has been continued with IV hydration, Ileostomy out is still high, but secretions are getting thicker, monitored, I/Os, being continued with IV fluid rate to 125ml/h and GI has increased Octreotide to 150mcgs tid,  has CT abdomen and Plevis done, results reviewed, GI add Imodium 2 mg po bid and continued with octreotide along, GI recommended Surgical consult for Ileostomy reversal and pulmonary consult for optimization for surgery, appreciate surgical and pulmonary consults, GI recommended to get  CT abdomen and pelvis with oral contrast, done showed Proximal jejunum emptying into a loop jejunostomy the right upper quadrant as described. No contrast seen within the distal small bowel, Large bilateral scrotal hydroceles, talked with surgical team, they did the upper GI series confirmed Jejunostomy, patient is s/p jejunostomy closure and s/p Ileostomy was eating and drinking later on developed, nausea and vomiting, NG was put in and XRays were done showed penumoperitonium, CT abdomen is done result pending, surgery is closely following the case, cont with NG suction, cont NPO and NGT- change Cardizem IVP nd protonic to IV         During the earlier part of the hospitalization patient was treated for acute on chronic hypoxic respiratory failure secondary to COPD exacerbation and CAP, was treated with steroids now tapered and duo nebs, SOB resolved, he uses home oxygen (4 liters), bicarb elevated likely due to metabolic compensation for resp acidosis  continue bronchodilators and prednisone tapered off and completed Levaquin as well.  His electrolytes monitored and replaced accordingly, he was found to have right femoral artery dissection as incidental finding on CT, no intervention as per vascular, follow up with vascular clinic as out patient.     Plan:    1. High out put from Ileostomy: Patient has been continued on cholestyramine, Octreotide, Imodium, he is tolerating diet, ostomy care as per ostomy RN, IV hydration, Ileostomy output is still high, but secretions are thicker, encouraged for oral hydration, monitor I/Os, GI is following, GI recommended Surgical consult for Ileostomy reversal and pulmonary consult for optimization for surgery, appreciate surgical and pulmonary consults, GI has changed his  diet to high fiber diet, fluid restriction, he is feeling little dehydrated, his electrolytes has been stable will monitor I/O, BMP, will continued with IV fluids, GI recommended CT abdomen and pelvis with oral contrast, done today showed Proximal jejunum emptying into a loop jejunostomy the right upper   quadrant as described. No contrast seen within the distal small bowel, Large bilateral scrotal hydroceles, talked with surgical team surgical team, they did the upper GI series confirmed Jejunostomy, patient is s/p jejunostomy closure and s/p Ileostomy was eating and drinking later on developed, nausea and vomiting, NG was put in and XRays were done showed penumoperitonium, CT abdomen is done result pending, surgery is closely following the case, cont with NG suction, cont NPO and NGT- change Cardizem IVP nd protonic to IV       2. Acute on chronic hypoxic respiratory failure secondary to COPD exacerbation, improved, he is at his baseline, on home oxygen (4 liters), continue bronchodilators and prednisone tapered off, continue Mucomyst PRN, completed course of Levaquin, cough better with tensilon otoniel    3. CAP - completed course of Levaquin on 12/5.     4. Hypertension - stable   - continue cardizem    5. Hyperlipidemia   - continue statin    6.hyponatremia: resolved, it was due to high ileostomy output.    Hypokalemia - being replaced  Hypophosphatemia: improved  Hypomagnesemia: improved  on multiple electrolyte sol.   monitor Electrolytes    7. Scrotal edema chronic with urinary retention, no more retention, Roy if not able to urinate  - swelling and redness going down  - patient following with  as outpatient  - cont scrotal elevation    8. Right femoral artery dissection  - incidental finding on CT, no intervention as per vascular       9. DVT Prophylaxis - Lovenox       10. anxiety: xanax at hs PRN. 67 year old male with PMH of COPD stage 4 s/p right lung reduction in 2010 (on home oxygen 4L (sat 92-93%), HTN, CVA on 2014, DM, hypothyroidism, hydrocele, and nephrolithiasis who is admitted for a COPD exacerbation. Patient has been weaned off ventimask to 4 liters nasal cannula and respiratory status is stable. CTA of the chest was negative for PE but showed bilateral lower lobe infiltrates with mucous plugging for which he was started on Levaquin.  Respiratory status stable and steroids are being tapered. Hospital course complicated by constipation, which had not improved despite aggressive bowel regimen, laxatives and enemas. XRAY with large stool burden. CT with diaphragmatic hernia and patient was consequently taken to the OR for loop ileostomy under local anesthesia. Pt cont, to have copious amount in ileostomy. Pt is noted to have hyponatremia due to high output in ileostomy, got IVF, hyponatremia and hypokalemia improving, ileostomy secretion are getting thicker, he is tolerating diet, ostomy care as per ostomy RN,  he is continued with cholestyramine, he has been continued with IV hydration, Ileostomy out is still high, but secretions are getting thicker, monitored, I/Os, being continued with IV fluid rate to 125ml/h and GI has increased Octreotide to 150mcgs tid,  has CT abdomen and Plevis done, results reviewed, GI add Imodium 2 mg po bid and continued with octreotide along, GI recommended Surgical consult for Ileostomy reversal and pulmonary consult for optimization for surgery, appreciate surgical and pulmonary consults, GI recommended to get  CT abdomen and pelvis with oral contrast, done showed Proximal jejunum emptying into a loop jejunostomy the right upper quadrant as described. No contrast seen within the distal small bowel, Large bilateral scrotal hydroceles, talked with surgical team, they did the upper GI series confirmed Jejunostomy, patient is s/p jejunostomy closure and s/p Ileostomy was eating and drinking later on developed, nausea and vomiting, NG was put in and XRays were done showed penumoperitonium, CT abdomen is done result pending, surgery is closely following the case, cont with NG suction, cont NPO and NGT- change Cardizem IVP nd protonic to IV         During the earlier part of the hospitalization patient was treated for acute on chronic hypoxic respiratory failure secondary to COPD exacerbation and CAP, was treated with steroids now tapered and duo nebs, SOB resolved, he uses home oxygen (4 liters), bicarb elevated likely due to metabolic compensation for resp acidosis  continue bronchodilators and prednisone tapered off and completed Levaquin as well.  His electrolytes monitored and replaced accordingly, he was found to have right femoral artery dissection as incidental finding on CT, no intervention as per vascular, follow up with vascular clinic as out patient.     Plan:    1. High out put from Ileostomy: Patient has been continued on cholestyramine, Octreotide, Imodium, he is tolerating diet, ostomy care as per ostomy RN, IV hydration, Ileostomy output is still high, but secretions are thicker, encouraged for oral hydration, monitor I/Os, GI is following, GI recommended Surgical consult for Ileostomy reversal and pulmonary consult for optimization for surgery, appreciate surgical and pulmonary consults, GI has changed his  diet to high fiber diet, fluid restriction, he is feeling little dehydrated, his electrolytes has been stable will monitor I/O, BMP, will continued with IV fluids, GI recommended CT abdomen and pelvis with oral contrast, done today showed Proximal jejunum emptying into a loop jejunostomy the right upper   quadrant as described. No contrast seen within the distal small bowel, Large bilateral scrotal hydroceles, talked with surgical team surgical team, they did the upper GI series confirmed Jejunostomy, patient is s/p jejunostomy closure and s/p Ileostomy was eating and drinking later on developed, nausea and vomiting, NG was put in and XRays were done showed penumoperitonium, CT abdomen is done result pending, surgery is closely following the case, cont with NG suction, cont NPO and NGT- change Cardizem IVP nd protonic to IV.        2. Acute on chronic hypoxic respiratory failure secondary to COPD exacerbation, improved, he is at his baseline, on home oxygen (4 liters), continue bronchodilators and prednisone tapered off, continue Mucomyst PRN, completed course of Levaquin, cough better with tensilon otoniel, his looks stable from respiratory stand point.     3. CAP - completed course of Levaquin on 12/5.     4. Hypertension - stable   - continue cardizem    5. Hyperlipidemia   - continue statin    6.hyponatremia: resolved, it was due to high ileostomy output.   Hypokalemia - being replaced  Hypophosphatemia: improved  Hypomagnesemia: improved  on multiple electrolyte sol.   monitor Electrolytes    7. Scrotal edema chronic with urinary retention, no more retention, Roy if not able to urinate  - no redness going down  - patient following with  as outpatient  - cont scrotal elevation    8. Right femoral artery dissection  - incidental finding on CT, no intervention as per vascular       9. DVT Prophylaxis - Lovenox       10. anxiety: xanax at hs PRN.

## 2018-01-22 NOTE — PROGRESS NOTE ADULT - ASSESSMENT
1. Ileus, on NGT, there is also stool in the bag  2. CT abdomen and pelvis as per surgery  3. IVF, cont with cardizem IV  4. Can change to po cardizem  5. Plan for PPN/ TPN.

## 2018-01-22 NOTE — PROGRESS NOTE ADULT - ASSESSMENT
67 year old POD#6 s/p revision of ostomy to loop ileostomy now with clinical suspicion of ileus. Discuss with primary team regarding obtaining CT A/P with PO and IV contrast today. Medical mgmt per primary team.

## 2018-01-23 LAB
ALBUMIN SERPL ELPH-MCNC: 2.2 G/DL — LOW (ref 3.3–5.2)
ALP SERPL-CCNC: 81 U/L — SIGNIFICANT CHANGE UP (ref 40–120)
ALT FLD-CCNC: 8 U/L — SIGNIFICANT CHANGE UP
ANION GAP SERPL CALC-SCNC: 19 MMOL/L — HIGH (ref 5–17)
AST SERPL-CCNC: 11 U/L — SIGNIFICANT CHANGE UP
BILIRUB SERPL-MCNC: 0.7 MG/DL — SIGNIFICANT CHANGE UP (ref 0.4–2)
BUN SERPL-MCNC: 7 MG/DL — LOW (ref 8–20)
CALCIUM SERPL-MCNC: 7.6 MG/DL — LOW (ref 8.6–10.2)
CHLORIDE SERPL-SCNC: 94 MMOL/L — LOW (ref 98–107)
CO2 SERPL-SCNC: 23 MMOL/L — SIGNIFICANT CHANGE UP (ref 22–29)
CREAT SERPL-MCNC: 0.45 MG/DL — LOW (ref 0.5–1.3)
GLUCOSE SERPL-MCNC: 77 MG/DL — SIGNIFICANT CHANGE UP (ref 70–115)
HCT VFR BLD CALC: 35.3 % — LOW (ref 42–52)
HGB BLD-MCNC: 11.1 G/DL — LOW (ref 14–18)
LACTATE SERPL-SCNC: 1.1 MMOL/L — SIGNIFICANT CHANGE UP (ref 0.5–2)
MCHC RBC-ENTMCNC: 27.5 PG — SIGNIFICANT CHANGE UP (ref 27–31)
MCHC RBC-ENTMCNC: 31.4 G/DL — LOW (ref 32–36)
MCV RBC AUTO: 87.6 FL — SIGNIFICANT CHANGE UP (ref 80–94)
PLATELET # BLD AUTO: 352 K/UL — SIGNIFICANT CHANGE UP (ref 150–400)
POTASSIUM SERPL-MCNC: 3.2 MMOL/L — LOW (ref 3.5–5.3)
POTASSIUM SERPL-SCNC: 3.2 MMOL/L — LOW (ref 3.5–5.3)
PROT SERPL-MCNC: 5.6 G/DL — LOW (ref 6.6–8.7)
RBC # BLD: 4.03 M/UL — LOW (ref 4.6–6.2)
RBC # FLD: 17.4 % — HIGH (ref 11–15.6)
SODIUM SERPL-SCNC: 136 MMOL/L — SIGNIFICANT CHANGE UP (ref 135–145)
WBC # BLD: 12.3 K/UL — HIGH (ref 4.8–10.8)
WBC # FLD AUTO: 12.3 K/UL — HIGH (ref 4.8–10.8)

## 2018-01-23 PROCEDURE — 71045 X-RAY EXAM CHEST 1 VIEW: CPT | Mod: 26

## 2018-01-23 PROCEDURE — 99233 SBSQ HOSP IP/OBS HIGH 50: CPT

## 2018-01-23 PROCEDURE — 99232 SBSQ HOSP IP/OBS MODERATE 35: CPT

## 2018-01-23 PROCEDURE — 76937 US GUIDE VASCULAR ACCESS: CPT | Mod: 26

## 2018-01-23 PROCEDURE — 36569 INSJ PICC 5 YR+ W/O IMAGING: CPT

## 2018-01-23 RX ORDER — POTASSIUM CHLORIDE 20 MEQ
40 PACKET (EA) ORAL ONCE
Qty: 0 | Refills: 0 | Status: COMPLETED | OUTPATIENT
Start: 2018-01-23 | End: 2018-01-23

## 2018-01-23 RX ORDER — ELECTROLYTE SOLUTION,INJ
1 VIAL (ML) INTRAVENOUS
Qty: 0 | Refills: 0 | Status: DISCONTINUED | OUTPATIENT
Start: 2018-01-23 | End: 2018-01-24

## 2018-01-23 RX ORDER — POTASSIUM CHLORIDE 20 MEQ
10 PACKET (EA) ORAL
Qty: 0 | Refills: 0 | Status: COMPLETED | OUTPATIENT
Start: 2018-01-23 | End: 2018-01-23

## 2018-01-23 RX ORDER — POTASSIUM CHLORIDE 20 MEQ
40 PACKET (EA) ORAL ONCE
Qty: 0 | Refills: 0 | Status: DISCONTINUED | OUTPATIENT
Start: 2018-01-23 | End: 2018-01-23

## 2018-01-23 RX ADMIN — Medication 100 MILLIEQUIVALENT(S): at 14:34

## 2018-01-23 RX ADMIN — Medication 1 EACH: at 21:35

## 2018-01-23 RX ADMIN — Medication 40 MILLIEQUIVALENT(S): at 12:50

## 2018-01-23 RX ADMIN — Medication 1 SPRAY(S): at 16:43

## 2018-01-23 RX ADMIN — Medication 81 MILLIGRAM(S): at 12:49

## 2018-01-23 RX ADMIN — TAMSULOSIN HYDROCHLORIDE 0.4 MILLIGRAM(S): 0.4 CAPSULE ORAL at 21:34

## 2018-01-23 RX ADMIN — Medication 1 TABLET(S): at 12:50

## 2018-01-23 RX ADMIN — FLUTICASONE PROPIONATE AND SALMETEROL 1 DOSE(S): 50; 250 POWDER ORAL; RESPIRATORY (INHALATION) at 21:30

## 2018-01-23 RX ADMIN — BENZOCAINE AND MENTHOL 1 LOZENGE: 5; 1 LIQUID ORAL at 12:50

## 2018-01-23 RX ADMIN — Medication 0.5 MILLIGRAM(S): at 01:14

## 2018-01-23 RX ADMIN — Medication 100 MILLIEQUIVALENT(S): at 14:35

## 2018-01-23 RX ADMIN — PANTOPRAZOLE SODIUM 40 MILLIGRAM(S): 20 TABLET, DELAYED RELEASE ORAL at 18:04

## 2018-01-23 RX ADMIN — PANTOPRAZOLE SODIUM 40 MILLIGRAM(S): 20 TABLET, DELAYED RELEASE ORAL at 06:23

## 2018-01-23 RX ADMIN — FLUTICASONE PROPIONATE AND SALMETEROL 1 DOSE(S): 50; 250 POWDER ORAL; RESPIRATORY (INHALATION) at 09:53

## 2018-01-23 RX ADMIN — TIOTROPIUM BROMIDE 1 CAPSULE(S): 18 CAPSULE ORAL; RESPIRATORY (INHALATION) at 08:48

## 2018-01-23 RX ADMIN — Medication 1 SPRAY(S): at 12:07

## 2018-01-23 RX ADMIN — SODIUM CHLORIDE 75 MILLILITER(S): 9 INJECTION, SOLUTION INTRAVENOUS at 21:34

## 2018-01-23 RX ADMIN — Medication 0.5 MILLIGRAM(S): at 11:14

## 2018-01-23 RX ADMIN — Medication 100 MILLIEQUIVALENT(S): at 12:50

## 2018-01-23 NOTE — CHART NOTE - NSCHARTNOTEFT_GEN_A_CORE
MD, Please see pending diet order verification. Rec ileostomy, consistent CHO diet with Ensure clears TID.    Thank YOU          Kala Ivey RD, CDN

## 2018-01-23 NOTE — PROCEDURE NOTE - PROCEDURE
<<-----Click on this checkbox to enter Procedure PICC central line placement  01/23/2018    Active  HILDA

## 2018-01-23 NOTE — PROGRESS NOTE ADULT - ASSESSMENT
66 yo M s/p revision of ostomy to loop ileostomy POD7, with decreasing ileostomy output, and postoperative ileus now resolving    -decreasing ileostomy output   -ostomy output replacements 1:1  -replete electrolytes prn  -may clamp tube today  -food for comfort only  -PO meds OK  -Supportive care  -TPN for nutritional support  -PICC for TPN  -dvt ppx  -strict I/Os  -serial abdominal exams  -PT/PMNR  -SW

## 2018-01-23 NOTE — PROCEDURE NOTE - NSPROCDETAILS_GEN_ALL_CORE
location identified, draped/prepped, sterile technique used/supine position/sterile dressing applied/ultrasound guidance

## 2018-01-23 NOTE — PROGRESS NOTE ADULT - ASSESSMENT
67 year old male with PMH of COPD stage 4 s/p right lung reduction in 2010 (on home oxygen 4L (sat 92-93%), HTN, CVA on 2014, DM, hypothyroidism, hydrocele, and nephrolithiasis who is admitted for a COPD exacerbation. Patient has been weaned off ventimask to 4 liters nasal cannula and respiratory status is stable. CTA of the chest was negative for PE but showed bilateral lower lobe infiltrates with mucous plugging for which he was started on Levaquin.  Respiratory status stable and steroids are being tapered. Hospital course complicated by constipation, which had not improved despite aggressive bowel regimen, laxatives and enemas. XRAY with large stool burden. CT with diaphragmatic hernia and patient was consequently taken to the OR for loop ileostomy under local anesthesia. Pt cont, to have copious amount in ileostomy. Pt is noted to have hyponatremia due to high output in ileostomy, got IVF, hyponatremia and hypokalemia improving, ileostomy secretion are getting thicker, he is tolerating diet, ostomy care as per ostomy RN,  he is continued with cholestyramine, he has been continued with IV hydration, Ileostomy out is still high, but secretions are getting thicker, monitored, I/Os, being continued with IV fluid rate to 125ml/h and GI has increased Octreotide to 150mcgs tid,  has CT abdomen and Plevis done, results reviewed, GI add Imodium 2 mg po bid and continued with octreotide along, GI recommended Surgical consult for Ileostomy reversal and pulmonary consult for optimization for surgery, appreciate surgical and pulmonary consults, GI recommended to get  CT abdomen and pelvis with oral contrast, done showed Proximal jejunum emptying into a loop jejunostomy the right upper quadrant as described. No contrast seen within the distal small bowel, Large bilateral scrotal hydroceles, talked with surgical team, they did the upper GI series confirmed Jejunostomy, patient is s/p jejunostomy closure and s/p Ileostomy was eating and drinking later on developed, nausea and vomiting, NG was put in and XRays were done showed penumoperitonium, CT abdomen is done result pending, surgery is closely following the case, cont with NG suction, cont NPO and NGT- change Cardizem IVP nd protonic to IV   During the earlier part of the hospitalization patient was treated for acute on chronic hypoxic respiratory failure secondary to COPD exacerbation and CAP, was treated with steroids now tapered and duo nebs, SOB resolved, he uses home oxygen (4 liters), bicarb elevated likely due to metabolic compensation for resp acidosis  continue bronchodilators and prednisone tapered off and completed Levaquin as well.  His electrolytes monitored and replaced accordingly, he was found to have right femoral artery dissection as incidental finding on CT, no intervention as per vascular, follow up with vascular clinic as out patient.     Plan:    1. High out put from Ileostomy: Patient has been continued on cholestyramine, Octreotide, Imodium, he is tolerating diet, ostomy care as per ostomy RN, IV hydration, Ileostomy output is still high, but secretions are thicker, encouraged for oral hydration, monitor I/Os, GI is following, GI recommended Surgical consult for Ileostomy reversal and pulmonary consult for optimization for surgery, appreciate surgical and pulmonary consults, GI has changed his  diet to high fiber diet, fluid restriction, he is feeling little dehydrated, his electrolytes has been stable will monitor I/O, BMP, will continued with IV fluids, GI recommended CT abdomen and pelvis with oral contrast, done today showed Proximal jejunum emptying into a loop jejunostomy the right upper quadrant as described. No contrast seen within the distal small bowel, Large bilateral scrotal hydroceles, talked with surgical team surgical team, they did the upper GI series confirmed Jejunostomy, patient is s/p jejunostomy closure and s/p Ileostomy was eating and drinking later on developed, nausea and vomiting, NG was put in and XRays were done showed penumoperitonium, CT abdomen done showed same pneumo peritoneum, surgery is follwoing closely, as per surgery team, patient is most likely having refeeding syndrome, NG secretions are less, NG was  taken out, will be started on oral feeding along with some TPN, change Cardizem po once started eating, GI was called for the TPN.        2. Acute on chronic hypoxic respiratory failure secondary to COPD exacerbation, improved, he is at his baseline, on home oxygen (4 liters), continue bronchodilators and prednisone tapered off, continue Mucomyst PRN, completed course of Levaquin, cough better with tensilon otoniel, his looks stable from respiratory stand point.     3. CAP - completed course of Levaquin on 12/5.     4. Hypertension - stable   - continue cardizem    5. Hyperlipidemia   - continue statin    6.hyponatremia: resolved, it was due to high ileostomy output.   Hypokalemia - being replaced  Hypophosphatemia: improved  Hypomagnesemia: improved  on multiple electrolyte sol.   monitor Electrolytes    7. Scrotal edema chronic with urinary retention, no more retention, Roy if not able to urinate  - no redness going down  - patient following with  as outpatient  - cont scrotal elevation    8. Right femoral artery dissection  - incidental finding on CT, no intervention as per vascular       9. DVT Prophylaxis - Lovenox       10. anxiety: xanax at hs PRN.

## 2018-01-23 NOTE — PROGRESS NOTE ADULT - ASSESSMENT
Prolonged post op ileus;  Ileostomy function returning.  Still NPO c NGT.    Will order TPN at 1 liter today.  No lipids today yet. Needs PICC line.  Or central line.

## 2018-01-23 NOTE — CHART NOTE - NSCHARTNOTEFT_GEN_A_CORE
Source: Patient [x ]  Family [ ]   other [ ]    Current Diet: NPO, Pt to get PIcc line placed today for TPN. Diet to be advanced as per surgery team as per discussion with Dr. Reece. See recommendations:    Patient reports [ ] nausea  [ ] vomiting [ ] diarrhea [ ] constipation  [ ]chewing problems [ ] swallowing issues  [ ] other:   Had vomiting over weekend. Pt made NPO, NG tube placed to suction. NG tube discontinued today. Diet to be advanced. TPN is to start after Picc placed.    PO intake:  < 50% [x ]   50-75%  [ ]   %  [ ]  other : x 4 days    Source for PO intake [x ] Patient [x ] family [ ] chart [ ] staff [ ] other    Enteral /Parenteral Nutrition: TPN is to start once Picc line inserted. Will evaluate.    Current Weight: 112# 1/18, 115.3# 1/17..  admission weight 143#        % Weight Change : 21% weight change since admission    Pertinent Medications: MEDICATIONS  (STANDING):  aspirin  chewable 81 milliGRAM(s) Oral daily  benzocaine 15 mG/menthol 3.6 mG Lozenge 1 Lozenge Oral once  dextrose 5%. 1000 milliLiter(s) (50 mL/Hr) IV Continuous <Continuous>  dextrose 50% Injectable 12.5 Gram(s) IV Push once  dextrose 50% Injectable 25 Gram(s) IV Push once  diltiazem Injectable 10 milliGRAM(s) IV Push every 8 hours  enoxaparin Injectable 40 milliGRAM(s) SubCutaneous daily  FIRST- Mouthwash  BLM 10 milliLiter(s) Swish and Spit two times a day  fluticasone propionate/ salmeterol 250-50 MICROgram(s) Diskus 1 Dose(s) Inhalation two times a day  multiple electrolytes Injection Type 1 1000 milliLiter(s) (75 mL/Hr) IV Continuous <Continuous>  multivitamin 1 Tablet(s) Oral daily  pantoprazole  Injectable 40 milliGRAM(s) IV Push two times a day  Parenteral Nutrition - Adult 1 Each (42 mL/Hr) TPN Continuous <Continuous>  potassium chloride    Tablet ER 40 milliEquivalent(s) Oral once  potassium chloride  10 mEq/100 mL IVPB 10 milliEquivalent(s) IV Intermittent every 1 hour  sodium chloride 0.65% Nasal 1 Spray(s) Both Nostrils four times a day  tamsulosin 0.4 milliGRAM(s) Oral at bedtime  tiotropium 18 MICROgram(s) Capsule 1 Capsule(s) Inhalation daily    MEDICATIONS  (PRN):  benzocaine 15 mG/menthol 3.6 mG Lozenge 1 Lozenge Oral every 4 hours PRN Sore Throat  benzonatate 100 milliGRAM(s) Oral three times a day PRN Cough  LORazepam   Injectable 0.5 milliGRAM(s) IV Push two times a day PRN Anxiety  ondansetron Injectable 4 milliGRAM(s) IV Push every 4 hours PRN Nausea and/or Vomiting  oxyCODONE    IR 5 milliGRAM(s) Oral every 6 hours PRN Moderate to severe pain    Pertinent Labs: CBC Full  -  ( 23 Jan 2018 09:19 )  WBC Count : 12.3 K/uL  Hemoglobin : 11.1 g/dL  Hematocrit : 35.3 %  Platelet Count - Automated : 352 K/uL  Mean Cell Volume : 87.6 fl  Mean Cell Hemoglobin : 27.5 pg  Mean Cell Hemoglobin Concentration : 31.4 g/dL  Auto Neutrophil # : x  Auto Lymphocyte # : x  Auto Monocyte # : x  Auto Eosinophil # : x  Auto Basophil # : x  Auto Neutrophil % : x  Auto Lymphocyte % : x  Auto Monocyte % : x  Auto Eosinophil % : x  Auto Basophil % : x      01-23 Na136 mmol/L Glu 77 mg/dL K+ 3.2 mmol/L<L> Cr  0.45 mg/dL<L> BUN 7.0 mg/dL<L> Phos n/a   Alb 2.2 g/dL<L> PAB n/a           Skin:     Nutrition focused physical exam conducted - found signs of malnutrition [ ]absent [x ]present    Subcutaneous fat loss: [x ] Orbital fat pads region, [ ]Buccal fat region, [ x]Triceps region,  [ x]Ribs region    Muscle wasting: [x ]Temples region, [x ]Clavicle region, [x ]Shoulder region, [x ]Scapula region, [ ]Interosseous region,  [ ]thigh region, x[ ]Calf region    Estimated Needs:   [ ] no change since previous assessment  [x ] recalculated: 1272- 1500kcal (25-30kcal/kg) 50-60 gr protein  (1-1.2g/kg)    Current Nutrition Diagnosis: Pt meets criteria for severe chronic malnutrition related to absorption issues, in setting of ileostomy as evidenced by high ostomy output, 36# weight loss since admission 11/29, severe fat/ muscle depletion in thoracic region, thighs, temples, clavicles. Pt with s/p ileostomy revision. Pt had vomiting over weekend. Pt made NPO with NG tube. Now NG tube discontinued. Diet to start today. Picc line to be placed today. TPN to be started. Monitor ileostomy output. Monitor TPN. Monitor po intake, tolerance.      Recommendations: Advance diet to ileostomy, consistent CHO diet with Ensure clears TID. Food preferences obtained.     Monitoring and Evaluation:   [x ] PO intake [x ] Tolerance to diet prescription [X] Weights  [X] Follow up per protocol [X] Labs:

## 2018-01-23 NOTE — PROGRESS NOTE ADULT - SUBJECTIVE AND OBJECTIVE BOX
FRANDY BAUTISTA    [id number]    67y      Male    Patient is a 67y old  Male who presents with a chief complaint of SOB (15 Dec 2017 10:17)      INTERVAL HPI/OVERNIGHT EVENTS:    Patient is feeling little better, has not much of nausea, NG is discontinued, he has no abdominal pain, denies fever, chills, chest pain.     REVIEW OF SYSTEMS:    CONSTITUTIONAL: No fever, has fatigue  RESPIRATORY: No cough, little shortness of breath  CARDIOVASCULAR: No chest pain, no palpitations.   GASTROINTESTINAL: No abdominal, No nausea, vomiting, has NG tube in  NEUROLOGICAL: No headaches,  loss of strength.  MISCELLANEOUS: No joint swelling or pain.         Vital Signs Last 24 Hrs  T(C): 36.4 (23 Jan 2018 10:55), Max: 36.6 (22 Jan 2018 23:01)  T(F): 97.6 (23 Jan 2018 10:55), Max: 97.8 (22 Jan 2018 23:01)  HR: 125 (23 Jan 2018 14:40) (92 - 132)  BP: 101/66 (23 Jan 2018 14:40) (101/66 - 120/85)  RR: 18 (23 Jan 2018 10:55) (18 - 19)  SpO2: 98% (23 Jan 2018 08:50) (96% - 98%)    PHYSICAL EXAM:    GENERAL: Thin built  Elderly male looking comfortable  HEENT: MADELINE  NECK: soft, Supple, No JVD,   CHEST/LUNG: Decrease air entry bilaterally; No wheezing  HEART: S1S2+, Regular rate and rhythm; No murmurs  ABDOMEN: Soft, Nontender, Bowel sounds present, s/p Ileostomy with stool in the bag  EXTREMITIES:  1+ Peripheral Pulses, No edema  SKIN: No rashes or lesions  NEURO: AAOX3, no focal deficits, no motor r sensory loss  PSYCH: normal mood      LABS:                        11.1   12.3  )-----------( 352      ( 23 Jan 2018 09:19 )             35.3     01-23    136  |  94<L>  |  7.0<L>  ----------------------------<  77  3.2<L>   |  23.0  |  0.45<L>    Ca    7.6<L>      23 Jan 2018 09:18  Phos  2.4     01-22  Mg     1.7     01-22    TPro  5.6<L>  /  Alb  2.2<L>  /  TBili  0.7  /  DBili  x   /  AST  11  /  ALT  8   /  AlkPhos  81  01-23    PT/INR - ( 22 Jan 2018 17:31 )   PT: 12.0 sec;   INR: 1.09 ratio                 I&O's Summary    22 Jan 2018 07:01  -  23 Jan 2018 07:00  --------------------------------------------------------  IN: 0 mL / OUT: 1500 mL / NET: -1500 mL    23 Jan 2018 07:01  -  23 Jan 2018 15:14  --------------------------------------------------------  IN: 900 mL / OUT: 0 mL / NET: 900 mL        MEDICATIONS  (STANDING):  aspirin  chewable 81 milliGRAM(s) Oral daily  dextrose 5%. 1000 milliLiter(s) (50 mL/Hr) IV Continuous <Continuous>  dextrose 50% Injectable 12.5 Gram(s) IV Push once  dextrose 50% Injectable 25 Gram(s) IV Push once  diltiazem Injectable 10 milliGRAM(s) IV Push every 8 hours  enoxaparin Injectable 40 milliGRAM(s) SubCutaneous daily  FIRST- Mouthwash  BLM 10 milliLiter(s) Swish and Spit two times a day  fluticasone propionate/ salmeterol 250-50 MICROgram(s) Diskus 1 Dose(s) Inhalation two times a day  multiple electrolytes Injection Type 1 1000 milliLiter(s) (75 mL/Hr) IV Continuous <Continuous>  multivitamin 1 Tablet(s) Oral daily  pantoprazole  Injectable 40 milliGRAM(s) IV Push two times a day  Parenteral Nutrition - Adult 1 Each (42 mL/Hr) TPN Continuous <Continuous>  sodium chloride 0.65% Nasal 1 Spray(s) Both Nostrils four times a day  tamsulosin 0.4 milliGRAM(s) Oral at bedtime  tiotropium 18 MICROgram(s) Capsule 1 Capsule(s) Inhalation daily    MEDICATIONS  (PRN):  benzocaine 15 mG/menthol 3.6 mG Lozenge 1 Lozenge Oral every 4 hours PRN Sore Throat  benzonatate 100 milliGRAM(s) Oral three times a day PRN Cough  LORazepam   Injectable 0.5 milliGRAM(s) IV Push two times a day PRN Anxiety  ondansetron Injectable 4 milliGRAM(s) IV Push every 4 hours PRN Nausea and/or Vomiting  oxyCODONE    IR 5 milliGRAM(s) Oral every 6 hours PRN Moderate to severe pain

## 2018-01-23 NOTE — PROGRESS NOTE ADULT - SUBJECTIVE AND OBJECTIVE BOX
Acute Care Surgery /Trauma PROGRESS NOTE:     SUBJECTIVE: Pt seen and examined at bedside. No acute overnight events. Pain well controlled. Throat discomfort with tube. NGT to LWS, no n/v, brown ngt output resolved after dc toradol. Voiding well. Passing flatus, no BM. Denies f/c/sob/cp. OOB to chair    Vital Signs Last 24 Hrs  T(C): 36.2 (23 Jan 2018 04:47), Max: 36.6 (22 Jan 2018 23:01)  T(F): 97.1 (23 Jan 2018 04:47), Max: 97.8 (22 Jan 2018 23:01)  HR: 92 (23 Jan 2018 08:50) (92 - 132)  BP: 111/71 (23 Jan 2018 04:47) (106/72 - 120/85)  BP(mean): --  RR: 18 (23 Jan 2018 04:47) (18 - 19)  SpO2: 98% (23 Jan 2018 08:50) (96% - 98%)    OBJECTIVE:  NAD  NC/AT  RRR  No respiratory distress  Abd soft, nondistended, nontender, no guarding or rebound. No peritoneal signs. ostomy pink and viable, increasingly solidified brown stool  No pedal edema    I&O's Detail    22 Jan 2018 07:01  -  23 Jan 2018 07:00  --------------------------------------------------------  IN:  Total IN: 0 mL    OUT:    Ileostomy: 450 mL    Nasoenteral Tube: 150 mL    Voided: 900 mL  Total OUT: 1500 mL    Total NET: -1500 mL          MEDICATIONS  (STANDING):  aspirin  chewable 81 milliGRAM(s) Oral daily  benzocaine 15 mG/menthol 3.6 mG Lozenge 1 Lozenge Oral once  dextrose 5%. 1000 milliLiter(s) (50 mL/Hr) IV Continuous <Continuous>  dextrose 50% Injectable 12.5 Gram(s) IV Push once  dextrose 50% Injectable 25 Gram(s) IV Push once  diltiazem Injectable 10 milliGRAM(s) IV Push every 8 hours  enoxaparin Injectable 40 milliGRAM(s) SubCutaneous daily  FIRST- Mouthwash  BLM 10 milliLiter(s) Swish and Spit two times a day  fluticasone propionate/ salmeterol 250-50 MICROgram(s) Diskus 1 Dose(s) Inhalation two times a day  multiple electrolytes Injection Type 1 1000 milliLiter(s) (75 mL/Hr) IV Continuous <Continuous>  multivitamin 1 Tablet(s) Oral daily  pantoprazole  Injectable 40 milliGRAM(s) IV Push two times a day  Parenteral Nutrition - Adult 1 Each (42 mL/Hr) TPN Continuous <Continuous>  sodium chloride 0.65% Nasal 1 Spray(s) Both Nostrils four times a day  tamsulosin 0.4 milliGRAM(s) Oral at bedtime  tiotropium 18 MICROgram(s) Capsule 1 Capsule(s) Inhalation daily    MEDICATIONS  (PRN):  benzocaine 15 mG/menthol 3.6 mG Lozenge 1 Lozenge Oral every 4 hours PRN Sore Throat  benzonatate 100 milliGRAM(s) Oral three times a day PRN Cough  LORazepam   Injectable 0.5 milliGRAM(s) IV Push two times a day PRN Anxiety  ondansetron Injectable 4 milliGRAM(s) IV Push every 4 hours PRN Nausea and/or Vomiting  oxyCODONE    IR 5 milliGRAM(s) Oral every 6 hours PRN Moderate to severe pain      LABS:                        11.1   12.3  )-----------( 352      ( 23 Jan 2018 09:19 )             35.3     01-23    136  |  94<L>  |  7.0<L>  ----------------------------<  77  3.2<L>   |  23.0  |  0.45<L>    Ca    7.6<L>      23 Jan 2018 09:18  Phos  2.4     01-22  Mg     1.7     01-22    TPro  5.6<L>  /  Alb  2.2<L>  /  TBili  0.7  /  DBili  x   /  AST  11  /  ALT  8   /  AlkPhos  81  01-23    PT/INR - ( 22 Jan 2018 17:31 )   PT: 12.0 sec;   INR: 1.09 ratio                 RADIOLOGY & ADDITIONAL STUDIES:

## 2018-01-23 NOTE — PROGRESS NOTE ADULT - SUBJECTIVE AND OBJECTIVE BOX
Patient seen and examined.  Post op x 5 days for ileostomy and takedown of jejunostomy.  NGT still in place:  Bilious drainage.    TPN requested. for nutritional support.  PICC line placement later today.      PAST MEDICAL & SURGICAL HISTORY:  Hypothyroidism  Hydrocele  Renal calculi  Diabetes mellitus  Hypertension  CVA (cerebral vascular accident)  Chronic obstructive pulmonary disease, unspecified COPD type  History of lung surgery      ROS:  No Heartburn, regurgitation, dysphagia, odynophagia.  No dyspepsia  No abdominal pain.    No Nausea, vomiting.  No Bleeding.  No hematemesis.   No diarrhea.    No hematochesia.  No weight loss, anorexia.  No edema.      MEDICATIONS  (STANDING):  aspirin  chewable 81 milliGRAM(s) Oral daily  benzocaine 15 mG/menthol 3.6 mG Lozenge 1 Lozenge Oral once  dextrose 5%. 1000 milliLiter(s) (50 mL/Hr) IV Continuous <Continuous>  dextrose 50% Injectable 12.5 Gram(s) IV Push once  dextrose 50% Injectable 25 Gram(s) IV Push once  diltiazem Injectable 10 milliGRAM(s) IV Push every 8 hours  enoxaparin Injectable 40 milliGRAM(s) SubCutaneous daily  FIRST- Mouthwash  BLM 10 milliLiter(s) Swish and Spit two times a day  fluticasone propionate/ salmeterol 250-50 MICROgram(s) Diskus 1 Dose(s) Inhalation two times a day  multiple electrolytes Injection Type 1 1000 milliLiter(s) (75 mL/Hr) IV Continuous <Continuous>  multivitamin 1 Tablet(s) Oral daily  pantoprazole  Injectable 40 milliGRAM(s) IV Push two times a day  Parenteral Nutrition - Adult 1 Each (42 mL/Hr) TPN Continuous <Continuous>  sodium chloride 0.65% Nasal 1 Spray(s) Both Nostrils four times a day  tamsulosin 0.4 milliGRAM(s) Oral at bedtime  tiotropium 18 MICROgram(s) Capsule 1 Capsule(s) Inhalation daily    MEDICATIONS  (PRN):  benzocaine 15 mG/menthol 3.6 mG Lozenge 1 Lozenge Oral every 4 hours PRN Sore Throat  benzonatate 100 milliGRAM(s) Oral three times a day PRN Cough  LORazepam   Injectable 0.5 milliGRAM(s) IV Push two times a day PRN Anxiety  ondansetron Injectable 4 milliGRAM(s) IV Push every 4 hours PRN Nausea and/or Vomiting  oxyCODONE    IR 5 milliGRAM(s) Oral every 6 hours PRN Moderate to severe pain      Allergies    codeine (Unknown)  no veges (Unknown)  penicillin (Unknown)    Intolerances    DO NOT SEND ORANGES (Unknown)  Symbicort (Short breath)      Vital Signs Last 24 Hrs  T(C): 36.2 (23 Jan 2018 04:47), Max: 36.6 (22 Jan 2018 23:01)  T(F): 97.1 (23 Jan 2018 04:47), Max: 97.8 (22 Jan 2018 23:01)  HR: 92 (23 Jan 2018 08:50) (92 - 132)  BP: 111/71 (23 Jan 2018 04:47) (106/72 - 120/85)  BP(mean): --  RR: 18 (23 Jan 2018 04:47) (18 - 19)  SpO2: 98% (23 Jan 2018 08:50) (96% - 98%)    PHYSICAL EXAM:    GENERAL: NAD, well-groomed, well-developed  HEAD:  Atraumatic, Normocephalic  EYES: EOMI, PERRLA, conjunctiva and sclera clear  ENMT: No tonsillar erythema, exudates, or enlargement; Moist mucous membranes, Good dentition, No lesions  NECK: Supple, No JVD, Normal thyroid  CHEST/LUNG: Clear to percussion bilaterally; No rales, rhonchi, wheezing, or rubs  HEART: Regular rate and rhythm; No murmurs, rubs, or gallops  ABDOMEN: Soft, Nontender, Nondistended; Bowel sounds present;  soft;  No lesions.  flat.  No distention.  Ileostomy site intact.  brown liquidy drainage.   EXTREMITIES:  2+ Peripheral Pulses, No clubbing, cyanosis, or edema  LYMPH: No lymphadenopathy noted  SKIN: No rashes or lesions      LABS:                        11.1   12.3  )-----------( 352      ( 23 Jan 2018 09:19 )             35.3     01-23    136  |  94<L>  |  7.0<L>  ----------------------------<  77  3.2<L>   |  23.0  |  0.45<L>    Ca    7.6<L>      23 Jan 2018 09:18  Phos  2.4     01-22  Mg     1.7     01-22    TPro  5.6<L>  /  Alb  2.2<L>  /  TBili  0.7  /  DBili  x   /  AST  11  /  ALT  8   /  AlkPhos  81  01-23    PT/INR - ( 22 Jan 2018 17:31 )   PT: 12.0 sec;   INR: 1.09 ratio                  RADIOLOGY & ADDITIONAL STUDIES:

## 2018-01-24 DIAGNOSIS — K56.7 ILEUS, UNSPECIFIED: ICD-10-CM

## 2018-01-24 LAB
ALBUMIN SERPL ELPH-MCNC: 2.1 G/DL — LOW (ref 3.3–5.2)
ALP SERPL-CCNC: 72 U/L — SIGNIFICANT CHANGE UP (ref 40–120)
ALT FLD-CCNC: 8 U/L — SIGNIFICANT CHANGE UP
ANION GAP SERPL CALC-SCNC: 9 MMOL/L — SIGNIFICANT CHANGE UP (ref 5–17)
AST SERPL-CCNC: 10 U/L — SIGNIFICANT CHANGE UP
BASOPHILS # BLD AUTO: 0 K/UL — SIGNIFICANT CHANGE UP (ref 0–0.2)
BASOPHILS NFR BLD AUTO: 0.2 % — SIGNIFICANT CHANGE UP (ref 0–2)
BILIRUB SERPL-MCNC: 0.4 MG/DL — SIGNIFICANT CHANGE UP (ref 0.4–2)
BUN SERPL-MCNC: 7 MG/DL — LOW (ref 8–20)
CALCIUM SERPL-MCNC: 7.5 MG/DL — LOW (ref 8.6–10.2)
CHLORIDE SERPL-SCNC: 98 MMOL/L — SIGNIFICANT CHANGE UP (ref 98–107)
CO2 SERPL-SCNC: 28 MMOL/L — SIGNIFICANT CHANGE UP (ref 22–29)
CREAT SERPL-MCNC: 0.45 MG/DL — LOW (ref 0.5–1.3)
EOSINOPHIL # BLD AUTO: 0.1 K/UL — SIGNIFICANT CHANGE UP (ref 0–0.5)
EOSINOPHIL NFR BLD AUTO: 0.8 % — SIGNIFICANT CHANGE UP (ref 0–5)
GLUCOSE SERPL-MCNC: 205 MG/DL — HIGH (ref 70–115)
HCT VFR BLD CALC: 31.6 % — LOW (ref 42–52)
HGB BLD-MCNC: 10 G/DL — LOW (ref 14–18)
INR BLD: 1.09 RATIO — SIGNIFICANT CHANGE UP (ref 0.88–1.16)
LYMPHOCYTES # BLD AUTO: 1 K/UL — SIGNIFICANT CHANGE UP (ref 1–4.8)
LYMPHOCYTES # BLD AUTO: 10.3 % — LOW (ref 20–55)
MAGNESIUM SERPL-MCNC: 1.7 MG/DL — SIGNIFICANT CHANGE UP (ref 1.6–2.6)
MCHC RBC-ENTMCNC: 27.4 PG — SIGNIFICANT CHANGE UP (ref 27–31)
MCHC RBC-ENTMCNC: 31.6 G/DL — LOW (ref 32–36)
MCV RBC AUTO: 86.6 FL — SIGNIFICANT CHANGE UP (ref 80–94)
MONOCYTES # BLD AUTO: 0.6 K/UL — SIGNIFICANT CHANGE UP (ref 0–0.8)
MONOCYTES NFR BLD AUTO: 5.8 % — SIGNIFICANT CHANGE UP (ref 3–10)
NEUTROPHILS # BLD AUTO: 7.8 K/UL — SIGNIFICANT CHANGE UP (ref 1.8–8)
NEUTROPHILS NFR BLD AUTO: 82.5 % — HIGH (ref 37–73)
PHOSPHATE SERPL-MCNC: 2.1 MG/DL — LOW (ref 2.4–4.7)
PLATELET # BLD AUTO: 280 K/UL — SIGNIFICANT CHANGE UP (ref 150–400)
POTASSIUM SERPL-MCNC: 3.9 MMOL/L — SIGNIFICANT CHANGE UP (ref 3.5–5.3)
POTASSIUM SERPL-SCNC: 3.9 MMOL/L — SIGNIFICANT CHANGE UP (ref 3.5–5.3)
PROT SERPL-MCNC: 5 G/DL — LOW (ref 6.6–8.7)
PROTHROM AB SERPL-ACNC: 12 SEC — SIGNIFICANT CHANGE UP (ref 9.8–12.7)
RBC # BLD: 3.65 M/UL — LOW (ref 4.6–6.2)
RBC # FLD: 17.3 % — HIGH (ref 11–15.6)
SODIUM SERPL-SCNC: 135 MMOL/L — SIGNIFICANT CHANGE UP (ref 135–145)
WBC # BLD: 9.4 K/UL — SIGNIFICANT CHANGE UP (ref 4.8–10.8)
WBC # FLD AUTO: 9.4 K/UL — SIGNIFICANT CHANGE UP (ref 4.8–10.8)

## 2018-01-24 PROCEDURE — 99233 SBSQ HOSP IP/OBS HIGH 50: CPT

## 2018-01-24 PROCEDURE — 99232 SBSQ HOSP IP/OBS MODERATE 35: CPT

## 2018-01-24 RX ORDER — DILTIAZEM HCL 120 MG
240 CAPSULE, EXT RELEASE 24 HR ORAL DAILY
Qty: 0 | Refills: 0 | Status: DISCONTINUED | OUTPATIENT
Start: 2018-01-24 | End: 2018-01-24

## 2018-01-24 RX ORDER — MAGNESIUM SULFATE 500 MG/ML
2 VIAL (ML) INJECTION ONCE
Qty: 0 | Refills: 0 | Status: COMPLETED | OUTPATIENT
Start: 2018-01-24 | End: 2018-01-24

## 2018-01-24 RX ORDER — DILTIAZEM HCL 120 MG
300 CAPSULE, EXT RELEASE 24 HR ORAL DAILY
Qty: 0 | Refills: 0 | Status: DISCONTINUED | OUTPATIENT
Start: 2018-01-24 | End: 2018-01-31

## 2018-01-24 RX ORDER — PANTOPRAZOLE SODIUM 20 MG/1
40 TABLET, DELAYED RELEASE ORAL
Qty: 0 | Refills: 0 | Status: DISCONTINUED | OUTPATIENT
Start: 2018-01-24 | End: 2018-02-15

## 2018-01-24 RX ORDER — SODIUM CHLORIDE 9 MG/ML
1000 INJECTION, SOLUTION INTRAVENOUS
Qty: 0 | Refills: 0 | Status: DISCONTINUED | OUTPATIENT
Start: 2018-01-24 | End: 2018-01-28

## 2018-01-24 RX ORDER — ELECTROLYTE SOLUTION,INJ
1 VIAL (ML) INTRAVENOUS
Qty: 0 | Refills: 0 | Status: DISCONTINUED | OUTPATIENT
Start: 2018-01-24 | End: 2018-01-25

## 2018-01-24 RX ORDER — SODIUM,POTASSIUM PHOSPHATES 278-250MG
1 POWDER IN PACKET (EA) ORAL
Qty: 0 | Refills: 0 | Status: COMPLETED | OUTPATIENT
Start: 2018-01-24 | End: 2018-01-27

## 2018-01-24 RX ORDER — POTASSIUM PHOSPHATE, MONOBASIC POTASSIUM PHOSPHATE, DIBASIC 236; 224 MG/ML; MG/ML
15 INJECTION, SOLUTION INTRAVENOUS ONCE
Qty: 0 | Refills: 0 | Status: COMPLETED | OUTPATIENT
Start: 2018-01-24 | End: 2018-01-24

## 2018-01-24 RX ORDER — I.V. FAT EMULSION 20 G/100ML
0.77 EMULSION INTRAVENOUS
Qty: 50 | Refills: 0 | Status: DISCONTINUED | OUTPATIENT
Start: 2018-01-24 | End: 2018-01-24

## 2018-01-24 RX ADMIN — Medication 1 TABLET(S): at 16:56

## 2018-01-24 RX ADMIN — Medication 1 TABLET(S): at 13:22

## 2018-01-24 RX ADMIN — FLUTICASONE PROPIONATE AND SALMETEROL 1 DOSE(S): 50; 250 POWDER ORAL; RESPIRATORY (INHALATION) at 20:43

## 2018-01-24 RX ADMIN — Medication 1 EACH: at 23:01

## 2018-01-24 RX ADMIN — DIPHENHYDRAMINE HYDROCHLORIDE AND LIDOCAINE HYDROCHLORIDE AND ALUMINUM HYDROXIDE AND MAGNESIUM HYDRO 10 MILLILITER(S): KIT at 06:04

## 2018-01-24 RX ADMIN — PANTOPRAZOLE SODIUM 40 MILLIGRAM(S): 20 TABLET, DELAYED RELEASE ORAL at 16:56

## 2018-01-24 RX ADMIN — Medication 240 MILLIGRAM(S): at 13:21

## 2018-01-24 RX ADMIN — TIOTROPIUM BROMIDE 1 CAPSULE(S): 18 CAPSULE ORAL; RESPIRATORY (INHALATION) at 09:30

## 2018-01-24 RX ADMIN — POTASSIUM PHOSPHATE, MONOBASIC POTASSIUM PHOSPHATE, DIBASIC 62.5 MILLIMOLE(S): 236; 224 INJECTION, SOLUTION INTRAVENOUS at 16:02

## 2018-01-24 RX ADMIN — Medication 1 SPRAY(S): at 13:21

## 2018-01-24 RX ADMIN — Medication 0.5 MILLIGRAM(S): at 23:08

## 2018-01-24 RX ADMIN — Medication 1 SPRAY(S): at 00:03

## 2018-01-24 RX ADMIN — DIPHENHYDRAMINE HYDROCHLORIDE AND LIDOCAINE HYDROCHLORIDE AND ALUMINUM HYDROXIDE AND MAGNESIUM HYDRO 10 MILLILITER(S): KIT at 18:36

## 2018-01-24 RX ADMIN — Medication 50 GRAM(S): at 16:02

## 2018-01-24 RX ADMIN — Medication 81 MILLIGRAM(S): at 13:21

## 2018-01-24 RX ADMIN — FLUTICASONE PROPIONATE AND SALMETEROL 1 DOSE(S): 50; 250 POWDER ORAL; RESPIRATORY (INHALATION) at 09:08

## 2018-01-24 RX ADMIN — Medication 1 TABLET(S): at 23:01

## 2018-01-24 RX ADMIN — SODIUM CHLORIDE 75 MILLILITER(S): 9 INJECTION, SOLUTION INTRAVENOUS at 09:09

## 2018-01-24 RX ADMIN — Medication 1 TABLET(S): at 13:21

## 2018-01-24 RX ADMIN — Medication 1 EACH: at 09:09

## 2018-01-24 RX ADMIN — Medication 1 SPRAY(S): at 06:01

## 2018-01-24 RX ADMIN — TAMSULOSIN HYDROCHLORIDE 0.4 MILLIGRAM(S): 0.4 CAPSULE ORAL at 23:04

## 2018-01-24 RX ADMIN — Medication 62.5 MILLIMOLE(S): at 18:37

## 2018-01-24 RX ADMIN — Medication 0.5 MILLIGRAM(S): at 00:03

## 2018-01-24 RX ADMIN — Medication 1 SPRAY(S): at 18:36

## 2018-01-24 RX ADMIN — PANTOPRAZOLE SODIUM 40 MILLIGRAM(S): 20 TABLET, DELAYED RELEASE ORAL at 06:03

## 2018-01-24 NOTE — PROGRESS NOTE ADULT - ASSESSMENT
1. S/P bowel obstruction, ileostomy  2. sinus tach reactive. appears dry on exam  3. change to po cardizem  mg bid  4. DVT prophylaxis  Please call if needed.   Thank you 1. S/P bowel obstruction, ileostomy  2. sinus tach reactive. appears dry on exam  3. change to po cardizem  mg daily  4. DVT prophylaxis  Please call if needed.   Thank you

## 2018-01-24 NOTE — PROGRESS NOTE ADULT - ASSESSMENT
67 year old male with PMH of COPD stage 4 s/p right lung reduction in 2010 (on home oxygen 4L (sat 92-93%), HTN, CVA on 2014, DM, hypothyroidism, hydrocele, and nephrolithiasis who is admitted for a COPD exacerbation. Patient has been weaned off ventimask to 4 liters nasal cannula and respiratory status is stable. CTA of the chest was negative for PE but showed bilateral lower lobe infiltrates with mucous plugging for which he was started on Levaquin.  Respiratory status stable and steroids are being tapered. Hospital course complicated by constipation, which had not improved despite aggressive bowel regimen, laxatives and enemas. XRAY with large stool burden. CT with diaphragmatic hernia and patient was consequently taken to the OR for loop ileostomy under local anesthesia. Pt cont, to have copious amount in ileostomy. Pt is noted to have hyponatremia due to high output in ileostomy, got IVF, hyponatremia and hypokalemia improving, ileostomy secretion are getting thicker, he is tolerating diet, ostomy care as per ostomy RN,  he is continued with cholestyramine, he has been continued with IV hydration, Ileostomy out is still high, but secretions are getting thicker, monitored, I/Os, being continued with IV fluid rate to 125ml/h and GI has increased Octreotide to 150mcgs tid,  has CT abdomen and Plevis done, results reviewed, GI add Imodium 2 mg po bid and continued with octreotide along, GI recommended Surgical consult for Ileostomy reversal and pulmonary consult for optimization for surgery, appreciate surgical and pulmonary consults, GI recommended to get  CT abdomen and pelvis with oral contrast, done showed Proximal jejunum emptying into a loop jejunostomy the right upper quadrant as described. No contrast seen within the distal small bowel, Large bilateral scrotal hydroceles, talked with surgical team, they did the upper GI series confirmed Jejunostomy, patient is s/p jejunostomy closure and s/p Ileostomy was eating and drinking later on developed, nausea and vomiting, NG was put in and XRays were done showed penumoperitonium, CT abdomen is done result pending, surgery is closely following the case, cont with NG suction, cont NPO and NGT- change Cardizem IVP nd protonic to IV.   During the earlier part of the hospitalization patient was treated for acute on chronic hypoxic respiratory failure secondary to COPD exacerbation and CAP, was treated with steroids now tapered and duo nebs, SOB resolved, he uses home oxygen (4 liters), bicarb elevated likely due to metabolic compensation for resp acidosis  continue bronchodilators and prednisone tapered off and completed Levaquin as well.  His electrolytes monitored and replaced accordingly, he was found to have right femoral artery dissection as incidental finding on CT, no intervention as per vascular, follow up with vascular clinic as out patient.     Plan:    1. High out put from Ileostomy: Patient has been continued on cholestyramine, Octreotide, Imodium, he is tolerating diet, ostomy care as per ostomy RN, IV hydration, Ileostomy output is still high, but secretions are thicker, encouraged for oral hydration, monitor I/Os, GI is following, GI recommended Surgical consult for Ileostomy reversal and pulmonary consult for optimization for surgery, appreciate surgical and pulmonary consults, GI has changed his  diet to high fiber diet, fluid restriction, he is feeling little dehydrated, his electrolytes has been stable will monitor I/O, BMP, will continued with IV fluids, GI recommended CT abdomen and pelvis with oral contrast, done today showed Proximal jejunum emptying into a loop jejunostomy the right upper quadrant as described. No contrast seen within the distal small bowel, Large bilateral scrotal hydroceles, talked with surgical team surgical team, they did the upper GI series confirmed Jejunostomy, patient is s/p jejunostomy closure and s/p Ileostomy was eating and drinking later on developed, nausea and vomiting, NG was put in and XRays were done showed penumoperitonium, CT abdomen done showed same pneumo peritoneum, surgery is follwoing closely, as per surgery team, patient is most likely having refeeding syndrome, NG secretions are less, NG was  taken out, started on oral feeding able to tolerate well, he is also getting TPN, change Cardizem po as well as protonix, patient Ileostomy is working well, has no abdominal pain.       2. Acute on chronic hypoxic respiratory failure secondary to COPD exacerbation, improved, he is at his baseline, on home oxygen (4 liters), continue bronchodilators and prednisone tapered off, continue Mucomyst PRN, completed course of Levaquin, cough better with tensilon otoniel, his looks stable from respiratory stand point.     3. CAP - completed course of Levaquin on 12/5.     4. Hypertension - stable   - continue cardizem    5. Hyperlipidemia   - continue statin    6.hyponatremia: resolved, it was due to high ileostomy output.   Hypokalemia - being replaced  Hypophosphatemia: improved  Hypomagnesemia: improved  on multiple electrolyte sol.   monitor Electrolytes    7. Scrotal edema chronic with urinary retention, no more retention, Roy if not able to urinate  - no redness going down  - patient following with  as outpatient  - cont scrotal elevation    8. Right femoral artery dissection  - incidental finding on CT, no intervention as per vascular       9. DVT Prophylaxis - Lovenox       10. anxiety: xanax at hs PRN.

## 2018-01-24 NOTE — PROGRESS NOTE ADULT - SUBJECTIVE AND OBJECTIVE BOX
CHIEF COMPLAINT:  Patient is a 67y old  Male who presents with a chief complaint of SOB, iliostomy, sinue tach  Hx of ILD, COPD  On O2.  tolerating po, full diet  on TPN as well.     Allergies:  codeine (Unknown)  no veges (Unknown)  penicillin (Unknown)    MEDICATIONS:  diltiazem Injectable 10 milliGRAM(s) IV Push every 8 hours  tamsulosin 0.4 milliGRAM(s) Oral at bedtime  benzonatate 100 milliGRAM(s) Oral three times a day PRN  fluticasone propionate/ salmeterol 250-50 MICROgram(s) Diskus 1 Dose(s) Inhalation two times a day  tiotropium 18 MICROgram(s) Capsule 1 Capsule(s) Inhalation daily  LORazepam   Injectable 0.5 milliGRAM(s) IV Push two times a day PRN  ondansetron Injectable 4 milliGRAM(s) IV Push every 4 hours PRN  oxyCODONE    IR 5 milliGRAM(s) Oral every 6 hours PRN  pantoprazole  Injectable 40 milliGRAM(s) IV Push two times a day  dextrose 50% Injectable 12.5 Gram(s) IV Push once  dextrose 50% Injectable 25 Gram(s) IV Push once  aspirin  chewable 81 milliGRAM(s) Oral daily  benzocaine 15 mG/menthol 3.6 mG Lozenge 1 Lozenge Oral every 4 hours PRN  dextrose 5%. 1000 milliLiter(s) IV Continuous <Continuous>  enoxaparin Injectable 40 milliGRAM(s) SubCutaneous daily  FIRST- Mouthwash  BLM 10 milliLiter(s) Swish and Spit two times a day  multiple electrolytes Injection Type 1 1000 milliLiter(s) IV Continuous <Continuous>  multivitamin 1 Tablet(s) Oral daily  Parenteral Nutrition - Adult 1 Each TPN Continuous <Continuous>  sodium chloride 0.65% Nasal 1 Spray(s) Both Nostrils four times a day    PHYSICAL EXAM:  T(C): 36.4 (01-24-18 @ 06:00), Max: 36.8 (01-23-18 @ 23:00)  HR: 91 (01-24-18 @ 06:00) (85 - 125)  BP: 108/70 (01-24-18 @ 06:00) (98/60 - 108/73)  RR: 18 (01-24-18 @ 06:00) (18 - 18)  SpO2: 98% (01-24-18 @ 06:00) (98% - 98%)  23 Jan 2018 07:01  -  24 Jan 2018 07:00  --------------------------------------------------------  IN: 1755 mL / OUT: 301 mL / NET: 1454 mL    Appearance: Normal	  Eye: Pink Conjunctiva  Lungs: CTA B/L  CVS: RRR, Normal S1 and S2, No Edema, tachy  Neuro: A&O x3    TELEMETRY: sinus tach  	    LABS:	 	    CARDIAC MARKERS:                            10.0   9.4   )-----------( 280      ( 24 Jan 2018 05:57 )             31.6     01-24    135  |  98  |  7.0<L>  ----------------------------<  205<H>  3.9   |  28.0  |  0.45<L>    Ca    7.5<L>      24 Jan 2018 05:57  Phos  2.1     01-24  Mg     1.7     01-24    TPro  5.0<L>  /  Alb  2.1<L>  /  TBili  0.4  /  DBili  x   /  AST  10  /  ALT  8   /  AlkPhos  72  01-24    proBNP:   Lipid Profile:   HgA1c:   TSH:     ASSESSMENT/PLAN:

## 2018-01-24 NOTE — PROGRESS NOTE ADULT - SUBJECTIVE AND OBJECTIVE BOX
Acute Care Surgery /Trauma PROGRESS NOTE:     SUBJECTIVE: Pt seen and examined at bedside. PICC placed, started on TPN Yesterday and ileostomy diet. No acute overnight events. Pain well controlled. Ostomy output more solid. Denies n/v. Voiding well. Passing flatus. Denies f/c/sob/cp.     Vital Signs Last 24 Hrs  T(C): 36.6 (24 Jan 2018 17:00), Max: 36.8 (23 Jan 2018 23:00)  T(F): 97.8 (24 Jan 2018 17:00), Max: 98.2 (23 Jan 2018 23:00)  HR: 135 (24 Jan 2018 17:00) (85 - 135)  BP: 130/76 (24 Jan 2018 17:00) (101/63 - 130/76)  BP(mean): --  RR: 16 (24 Jan 2018 10:12) (16 - 18)  SpO2: 93% (24 Jan 2018 17:00) (93% - 98%)    OBJECTIVE:  NAD  NC/AT  RRR  No respiratory distress  Abd soft, nondistended, nontender, no guarding or rebound. No peritoneal signs. Ostomy pink and healthy with liquidish stool  No pedal edema    I&O's Detail    23 Jan 2018 07:01  -  24 Jan 2018 07:00  --------------------------------------------------------  IN:    multiple electrolytes Injection Type 1multiple electrolytes Injection Type 1: 1125 mL    Oral Fluid: 120 mL    Solution: 510 mL  Total IN: 1755 mL    OUT:    Ileostomy: 1 mL    Voided: 300 mL  Total OUT: 301 mL    Total NET: 1454 mL      24 Jan 2018 07:01  -  24 Jan 2018 19:26  --------------------------------------------------------  IN:    Oral Fluid: 720 mL  Total IN: 720 mL    OUT:    Voided: 400 mL  Total OUT: 400 mL    Total NET: 320 mL          MEDICATIONS  (STANDING):  aspirin  chewable 81 milliGRAM(s) Oral daily  dextrose 5%. 1000 milliLiter(s) (50 mL/Hr) IV Continuous <Continuous>  dextrose 50% Injectable 12.5 Gram(s) IV Push once  dextrose 50% Injectable 25 Gram(s) IV Push once  diltiazem    milliGRAM(s) Oral daily  enoxaparin Injectable 40 milliGRAM(s) SubCutaneous daily  FIRST- Mouthwash  BLM 10 milliLiter(s) Swish and Spit two times a day  fluticasone propionate/ salmeterol 250-50 MICROgram(s) Diskus 1 Dose(s) Inhalation two times a day  multiple electrolytes Injection Type 1 1000 milliLiter(s) (75 mL/Hr) IV Continuous <Continuous>  multivitamin 1 Tablet(s) Oral daily  pantoprazole    Tablet 40 milliGRAM(s) Oral two times a day before meals  Parenteral Nutrition - Adult 1 Each (83 mL/Hr) TPN Continuous <Continuous>  Parenteral Nutrition - Adult 1 Each (42 mL/Hr) TPN Continuous <Continuous>  potassium acid phosphate/sodium acid phosphate tablet (K-PHOS No. 2) 1 Tablet(s) Oral four times a day with meals  sodium chloride 0.65% Nasal 1 Spray(s) Both Nostrils four times a day  tamsulosin 0.4 milliGRAM(s) Oral at bedtime  tiotropium 18 MICROgram(s) Capsule 1 Capsule(s) Inhalation daily    MEDICATIONS  (PRN):  benzocaine 15 mG/menthol 3.6 mG Lozenge 1 Lozenge Oral every 4 hours PRN Sore Throat  benzonatate 100 milliGRAM(s) Oral three times a day PRN Cough  LORazepam   Injectable 0.5 milliGRAM(s) IV Push two times a day PRN Anxiety  ondansetron Injectable 4 milliGRAM(s) IV Push every 4 hours PRN Nausea and/or Vomiting  oxyCODONE    IR 5 milliGRAM(s) Oral every 6 hours PRN Moderate to severe pain      LABS:                        10.0   9.4   )-----------( 280      ( 24 Jan 2018 05:57 )             31.6     01-24    135  |  98  |  7.0<L>  ----------------------------<  205<H>  3.9   |  28.0  |  0.45<L>    Ca    7.5<L>      24 Jan 2018 05:57  Phos  2.1     01-24  Mg     1.7     01-24    TPro  5.0<L>  /  Alb  2.1<L>  /  TBili  0.4  /  DBili  x   /  AST  10  /  ALT  8   /  AlkPhos  72  01-24    PT/INR - ( 24 Jan 2018 05:57 )   PT: 12.0 sec;   INR: 1.09 ratio                 RADIOLOGY & ADDITIONAL STUDIES:

## 2018-01-24 NOTE — PROGRESS NOTE ADULT - ASSESSMENT
66 yo M s/p revision of ostomy to loop ileostomy POD7, with decreasing ileostomy output, and postoperative ileus now resolving, on TPN    -decreasing ileostomy output   -consult ostomy RN  -replete electrolytes prn  -ileostomy diet per nutritionist for comfort only  -PO meds OK  -Supportive care  -TPN for nutritional support  -dvt ppx  -strict I/Os  -serial abdominal exams  -PT/PMNR  -SW

## 2018-01-24 NOTE — PROGRESS NOTE ADULT - PROBLEM SELECTOR PLAN 1
appears to be resolving. If tolertes solids, then D/C TPN tomorrow  check lytes in am. TPN reordered

## 2018-01-24 NOTE — PROGRESS NOTE ADULT - SUBJECTIVE AND OBJECTIVE BOX
Patient is a 67y old  Male who presents with a chief complaint of SOB (15 Dec 2017 10:17)      HPI:  67 YOM w/PMH of COPD stage 4 s/p right lung reduction in 2010, - S/P ileostomy ( had large hernia - transverse colon in chest). Minimal output from ileostomy.+ post op ileus. NGT removed yesterday. Today had 2 bannnas , 1/2 sandwhich and feels bloated. Small amount of gas from ileostomy.     REVIEW OF SYSTEMS:  Constitutional: No fever, weight loss or fatigue  ENMT:  No difficulty hearing, tinnitus, vertigo; No sinus or throat pain  Respiratory: No cough, wheezing, chills or hemoptysis  Cardiovascular: No chest pain, palpitations, dizziness or leg swelling  Gastrointestinal: No abdominal or epigastric pain. No nausea, vomiting or hematemesis; No diarrhea or constipation. No melena or hematochezia.+ bloating  Skin: No itching, burning, rashes or lesions   Musculoskeletal: No joint pain or swelling; No muscle, back or extremity pain    PAST MEDICAL & SURGICAL HISTORY:  Hypothyroidism  Hydrocele  Renal calculi  Diabetes mellitus  Hypertension  CVA (cerebral vascular accident)  Chronic obstructive pulmonary disease, unspecified COPD type  History of lung surgery      FAMILY HISTORY:  No pertinent family history in first degree relatives      SOCIAL HISTORY:  Smoking Status: [ ] Current, [ X] Former, [ ] Never  Pack Years:  [  ] EtOH-no  [  ] IVDA    MEDICATIONS:  MEDICATIONS  (STANDING):  aspirin  chewable 81 milliGRAM(s) Oral daily  dextrose 5%. 1000 milliLiter(s) (50 mL/Hr) IV Continuous <Continuous>  dextrose 50% Injectable 12.5 Gram(s) IV Push once  dextrose 50% Injectable 25 Gram(s) IV Push once  diltiazem    milliGRAM(s) Oral daily  enoxaparin Injectable 40 milliGRAM(s) SubCutaneous daily  fat emulsion (Plant Based) 20% Infusion 0.77 Gm/kG/Day (31.25 mL/Hr) IV Continuous <Continuous>  FIRST- Mouthwash  BLM 10 milliLiter(s) Swish and Spit two times a day  fluticasone propionate/ salmeterol 250-50 MICROgram(s) Diskus 1 Dose(s) Inhalation two times a day  magnesium sulfate  IVPB 2 Gram(s) IV Intermittent once  multiple electrolytes Injection Type 1 1000 milliLiter(s) (75 mL/Hr) IV Continuous <Continuous>  multivitamin 1 Tablet(s) Oral daily  pantoprazole    Tablet 40 milliGRAM(s) Oral two times a day before meals  Parenteral Nutrition - Adult 1 Each (83 mL/Hr) TPN Continuous <Continuous>  Parenteral Nutrition - Adult 1 Each (42 mL/Hr) TPN Continuous <Continuous>  potassium acid phosphate/sodium acid phosphate tablet (K-PHOS No. 2) 1 Tablet(s) Oral four times a day with meals  potassium phosphate IVPB 15 milliMole(s) IV Intermittent once  sodium chloride 0.65% Nasal 1 Spray(s) Both Nostrils four times a day  sodium phosphate IVPB 15 milliMole(s) IV Intermittent once  tamsulosin 0.4 milliGRAM(s) Oral at bedtime  tiotropium 18 MICROgram(s) Capsule 1 Capsule(s) Inhalation daily    MEDICATIONS  (PRN):  benzocaine 15 mG/menthol 3.6 mG Lozenge 1 Lozenge Oral every 4 hours PRN Sore Throat  benzonatate 100 milliGRAM(s) Oral three times a day PRN Cough  LORazepam   Injectable 0.5 milliGRAM(s) IV Push two times a day PRN Anxiety  ondansetron Injectable 4 milliGRAM(s) IV Push every 4 hours PRN Nausea and/or Vomiting  oxyCODONE    IR 5 milliGRAM(s) Oral every 6 hours PRN Moderate to severe pain      Allergies    codeine (Unknown)  no veges (Unknown)  penicillin (Unknown)    Intolerances    DO NOT SEND ORANGES (Unknown)  Symbicort (Short breath)      Vital Signs Last 24 Hrs  T(C): 36.4 (24 Jan 2018 06:00), Max: 36.8 (23 Jan 2018 23:00)  T(F): 97.6 (24 Jan 2018 06:00), Max: 98.2 (23 Jan 2018 23:00)  HR: 104 (24 Jan 2018 10:12) (85 - 125)  BP: 104/58 (24 Jan 2018 10:12) (98/60 - 108/70)  BP(mean): --  RR: 16 (24 Jan 2018 10:12) (16 - 18)  SpO2: 94% (24 Jan 2018 10:12) (94% - 98%)    01-23 @ 07:01  -  01-24 @ 07:00  --------------------------------------------------------  IN: 1755 mL / OUT: 301 mL / NET: 1454 mL          PHYSICAL EXAM:    General: Well developed; well nourished; in no acute distress  HEENT: MMM, conjunctiva and sclera clear  H- RRR  L- CTA  Gastrointestinal: Soft, non-tender non-distended; Normal bowel sounds; No rebound or guarding. Minimal liquid stool from the ileostomy  Extremities: Normal range of motion, No clubbing, cyanosis or edema  Neurological: Alert and oriented x3  Skin: Warm and dry. No obvious rash      LABS:                        10.0   9.4   )-----------( 280      ( 24 Jan 2018 05:57 )             31.6     24 Jan 2018 05:57    135    |  98     |  7.0    ----------------------------<  205    3.9     |  28.0   |  0.45     Ca    7.5        24 Jan 2018 05:57  Phos  2.1       24 Jan 2018 05:57  Mg     1.7       24 Jan 2018 05:57    TPro  5.0    /  Alb  2.1    /  TBili  0.4    /  DBili  x      /  AST  10     /  ALT  8      /  AlkPhos  72     / Amylase x      /Lipase x      24 Jan 2018 05:57              RADIOLOGY & ADDITIONAL STUDIES:     < from: Xray Kidney Ureter Bladder (01.21.18 @ 12:32) >  IMPRESSION:  Possible pneumoperitoneum. CT is recommended.      < end of copied text >  < from: CT Abdomen and Pelvis w/ Oral Cont and w/ IV Cont (01.22.18 @ 15:27) >  IMPRESSION:    Right abdominal loop ileostomy noted, a new finding since 1/11/2018.   Status post jejunal repair since the prior study.    Thickened, edematous small bowel loop in the left hemiabdomen, either   distal jejunum or proximal ileum. Similar appearing thickened wall   involving the distal ileum. These findings are new since 1/11/2018. These   findings indicate enteritis in these 2 locations, new since 1/11/2018.   The possibility of ischemia is raised.    Pneumoperitoneum and free intraperitoneal fluid identified. It is   uncertain if this represents postoperative change or secondary to   perforation.. No perforating site is discovered on this exam and no   evidence of extravasation of oral contrast into the peritoneal cavity.    These critical values were discussed by Dr. Van Santillan with Dr. Blancas on 1/22/2018 4:00 PM with read back..        < end of copied text >

## 2018-01-24 NOTE — PROGRESS NOTE ADULT - SUBJECTIVE AND OBJECTIVE BOX
FRANDY BAUTISTA    [id number]    67y      Male    Patient is a 67y old  Male who presents with a chief complaint of SOB (15 Dec 2017 10:17)      INTERVAL HPI/OVERNIGHT EVENTS:    Patient is feeling much better, no more nausea, eating well and tolerating well, he has no abdominal pain, denies fever, chills, chest pain.     REVIEW OF SYSTEMS:    CONSTITUTIONAL: No fever, has fatigue  RESPIRATORY: No cough, little shortness of breath  CARDIOVASCULAR: No chest pain, no palpitations.   GASTROINTESTINAL: No abdominal, No nausea, vomiting  NEUROLOGICAL: No headaches,  loss of strength.  MISCELLANEOUS: No joint swelling or pain.         Vital Signs Last 24 Hrs  T(C): 36.4 (24 Jan 2018 06:00), Max: 36.8 (23 Jan 2018 23:00)  T(F): 97.6 (24 Jan 2018 06:00), Max: 98.2 (23 Jan 2018 23:00)  HR: 104 (24 Jan 2018 10:12) (85 - 125)  BP: 104/58 (24 Jan 2018 10:12) (98/60 - 108/70)  RR: 16 (24 Jan 2018 10:12) (16 - 18)  SpO2: 94% (24 Jan 2018 10:12) (94% - 98%)    PHYSICAL EXAM:    GENERAL: Thin built  Elderly male looking comfortable  HEENT: MADELINE  NECK: soft, Supple, No JVD,   CHEST/LUNG: Decrease air entry bilaterally; No wheezing  HEART: S1S2+, Regular rate and rhythm; No murmurs  ABDOMEN: Soft, Nontender, Bowel sounds present, s/p Ileostomy with stool in the bag  EXTREMITIES:  1+ Peripheral Pulses, No edema  SKIN: No rashes or lesions  NEURO: AAOX3, no focal deficits, no motor r sensory loss  PSYCH: normal mood      LABS:                        10.0   9.4   )-----------( 280      ( 24 Jan 2018 05:57 )             31.6     01-24    135  |  98  |  7.0<L>  ----------------------------<  205<H>  3.9   |  28.0  |  0.45<L>    Ca    7.5<L>      24 Jan 2018 05:57  Phos  2.1     01-24  Mg     1.7     01-24    TPro  5.0<L>  /  Alb  2.1<L>  /  TBili  0.4  /  DBili  x   /  AST  10  /  ALT  8   /  AlkPhos  72  01-24    PT/INR - ( 24 Jan 2018 05:57 )   PT: 12.0 sec;   INR: 1.09 ratio                 I&O's Summary    23 Jan 2018 07:01  -  24 Jan 2018 07:00  --------------------------------------------------------  IN: 1755 mL / OUT: 301 mL / NET: 1454 mL        MEDICATIONS  (STANDING):  aspirin  chewable 81 milliGRAM(s) Oral daily  dextrose 5%. 1000 milliLiter(s) (50 mL/Hr) IV Continuous <Continuous>  dextrose 50% Injectable 12.5 Gram(s) IV Push once  dextrose 50% Injectable 25 Gram(s) IV Push once  diltiazem    milliGRAM(s) Oral daily  enoxaparin Injectable 40 milliGRAM(s) SubCutaneous daily  fat emulsion (Plant Based) 20% Infusion 0.77 Gm/kG/Day (31.25 mL/Hr) IV Continuous <Continuous>  FIRST- Mouthwash  BLM 10 milliLiter(s) Swish and Spit two times a day  fluticasone propionate/ salmeterol 250-50 MICROgram(s) Diskus 1 Dose(s) Inhalation two times a day  multiple electrolytes Injection Type 1 1000 milliLiter(s) (75 mL/Hr) IV Continuous <Continuous>  multivitamin 1 Tablet(s) Oral daily  pantoprazole  Injectable 40 milliGRAM(s) IV Push two times a day  Parenteral Nutrition - Adult 1 Each (83 mL/Hr) TPN Continuous <Continuous>  Parenteral Nutrition - Adult 1 Each (42 mL/Hr) TPN Continuous <Continuous>  potassium acid phosphate/sodium acid phosphate tablet (K-PHOS No. 2) 1 Tablet(s) Oral four times a day with meals  sodium chloride 0.65% Nasal 1 Spray(s) Both Nostrils four times a day  tamsulosin 0.4 milliGRAM(s) Oral at bedtime  tiotropium 18 MICROgram(s) Capsule 1 Capsule(s) Inhalation daily    MEDICATIONS  (PRN):  benzocaine 15 mG/menthol 3.6 mG Lozenge 1 Lozenge Oral every 4 hours PRN Sore Throat  benzonatate 100 milliGRAM(s) Oral three times a day PRN Cough  LORazepam   Injectable 0.5 milliGRAM(s) IV Push two times a day PRN Anxiety  ondansetron Injectable 4 milliGRAM(s) IV Push every 4 hours PRN Nausea and/or Vomiting  oxyCODONE    IR 5 milliGRAM(s) Oral every 6 hours PRN Moderate to severe pain

## 2018-01-25 LAB
ANION GAP SERPL CALC-SCNC: 9 MMOL/L — SIGNIFICANT CHANGE UP (ref 5–17)
BASOPHILS # BLD AUTO: 0 K/UL — SIGNIFICANT CHANGE UP (ref 0–0.2)
BASOPHILS NFR BLD AUTO: 0.1 % — SIGNIFICANT CHANGE UP (ref 0–2)
BUN SERPL-MCNC: 6 MG/DL — LOW (ref 8–20)
CA-I BLD-SCNC: 1.09 MMOL/L — LOW (ref 1.12–1.3)
CALCIUM SERPL-MCNC: 7.2 MG/DL — LOW (ref 8.6–10.2)
CHLORIDE SERPL-SCNC: 101 MMOL/L — SIGNIFICANT CHANGE UP (ref 98–107)
CO2 SERPL-SCNC: 26 MMOL/L — SIGNIFICANT CHANGE UP (ref 22–29)
CREAT SERPL-MCNC: 0.37 MG/DL — LOW (ref 0.5–1.3)
EOSINOPHIL # BLD AUTO: 0.1 K/UL — SIGNIFICANT CHANGE UP (ref 0–0.5)
EOSINOPHIL NFR BLD AUTO: 1.1 % — SIGNIFICANT CHANGE UP (ref 0–5)
GLUCOSE SERPL-MCNC: 143 MG/DL — HIGH (ref 70–115)
HCT VFR BLD CALC: 31.5 % — LOW (ref 42–52)
HGB BLD-MCNC: 10.1 G/DL — LOW (ref 14–18)
LYMPHOCYTES # BLD AUTO: 0.9 K/UL — LOW (ref 1–4.8)
LYMPHOCYTES # BLD AUTO: 9.8 % — LOW (ref 20–55)
MAGNESIUM SERPL-MCNC: 1.9 MG/DL — SIGNIFICANT CHANGE UP (ref 1.8–2.6)
MCHC RBC-ENTMCNC: 28.1 PG — SIGNIFICANT CHANGE UP (ref 27–31)
MCHC RBC-ENTMCNC: 32.1 G/DL — SIGNIFICANT CHANGE UP (ref 32–36)
MCV RBC AUTO: 87.5 FL — SIGNIFICANT CHANGE UP (ref 80–94)
MONOCYTES # BLD AUTO: 0.6 K/UL — SIGNIFICANT CHANGE UP (ref 0–0.8)
MONOCYTES NFR BLD AUTO: 5.8 % — SIGNIFICANT CHANGE UP (ref 3–10)
NEUTROPHILS # BLD AUTO: 7.8 K/UL — SIGNIFICANT CHANGE UP (ref 1.8–8)
NEUTROPHILS NFR BLD AUTO: 82.8 % — HIGH (ref 37–73)
PHOSPHATE SERPL-MCNC: 2.5 MG/DL — SIGNIFICANT CHANGE UP (ref 2.4–4.7)
PLATELET # BLD AUTO: 298 K/UL — SIGNIFICANT CHANGE UP (ref 150–400)
POTASSIUM SERPL-MCNC: 4.5 MMOL/L — SIGNIFICANT CHANGE UP (ref 3.5–5.3)
POTASSIUM SERPL-SCNC: 4.5 MMOL/L — SIGNIFICANT CHANGE UP (ref 3.5–5.3)
RBC # BLD: 3.6 M/UL — LOW (ref 4.6–6.2)
RBC # FLD: 17.6 % — HIGH (ref 11–15.6)
SODIUM SERPL-SCNC: 136 MMOL/L — SIGNIFICANT CHANGE UP (ref 135–145)
WBC # BLD: 9.4 K/UL — SIGNIFICANT CHANGE UP (ref 4.8–10.8)
WBC # FLD AUTO: 9.4 K/UL — SIGNIFICANT CHANGE UP (ref 4.8–10.8)

## 2018-01-25 PROCEDURE — 99232 SBSQ HOSP IP/OBS MODERATE 35: CPT

## 2018-01-25 PROCEDURE — 93010 ELECTROCARDIOGRAM REPORT: CPT

## 2018-01-25 RX ORDER — ALPRAZOLAM 0.25 MG
0.25 TABLET ORAL ONCE
Qty: 0 | Refills: 0 | Status: DISCONTINUED | OUTPATIENT
Start: 2018-01-25 | End: 2018-01-25

## 2018-01-25 RX ADMIN — Medication 0.25 MILLIGRAM(S): at 22:31

## 2018-01-25 RX ADMIN — Medication 1 EACH: at 17:59

## 2018-01-25 RX ADMIN — PANTOPRAZOLE SODIUM 40 MILLIGRAM(S): 20 TABLET, DELAYED RELEASE ORAL at 06:32

## 2018-01-25 RX ADMIN — Medication 81 MILLIGRAM(S): at 13:46

## 2018-01-25 RX ADMIN — Medication 1 SPRAY(S): at 13:46

## 2018-01-25 RX ADMIN — Medication 300 MILLIGRAM(S): at 06:32

## 2018-01-25 RX ADMIN — DIPHENHYDRAMINE HYDROCHLORIDE AND LIDOCAINE HYDROCHLORIDE AND ALUMINUM HYDROXIDE AND MAGNESIUM HYDRO 10 MILLILITER(S): KIT at 17:57

## 2018-01-25 RX ADMIN — FLUTICASONE PROPIONATE AND SALMETEROL 1 DOSE(S): 50; 250 POWDER ORAL; RESPIRATORY (INHALATION) at 21:04

## 2018-01-25 RX ADMIN — Medication 1 TABLET(S): at 17:58

## 2018-01-25 RX ADMIN — Medication 1 TABLET(S): at 13:48

## 2018-01-25 RX ADMIN — PANTOPRAZOLE SODIUM 40 MILLIGRAM(S): 20 TABLET, DELAYED RELEASE ORAL at 17:59

## 2018-01-25 RX ADMIN — Medication 1 TABLET(S): at 09:03

## 2018-01-25 RX ADMIN — FLUTICASONE PROPIONATE AND SALMETEROL 1 DOSE(S): 50; 250 POWDER ORAL; RESPIRATORY (INHALATION) at 09:03

## 2018-01-25 RX ADMIN — Medication 0.5 MILLIGRAM(S): at 13:46

## 2018-01-25 RX ADMIN — ONDANSETRON 4 MILLIGRAM(S): 8 TABLET, FILM COATED ORAL at 22:49

## 2018-01-25 RX ADMIN — Medication 1 SPRAY(S): at 06:33

## 2018-01-25 RX ADMIN — TAMSULOSIN HYDROCHLORIDE 0.4 MILLIGRAM(S): 0.4 CAPSULE ORAL at 22:31

## 2018-01-25 RX ADMIN — TIOTROPIUM BROMIDE 1 CAPSULE(S): 18 CAPSULE ORAL; RESPIRATORY (INHALATION) at 13:08

## 2018-01-25 RX ADMIN — Medication 1 TABLET(S): at 22:31

## 2018-01-25 RX ADMIN — Medication 1 TABLET(S): at 13:46

## 2018-01-25 RX ADMIN — Medication 1 SPRAY(S): at 17:58

## 2018-01-25 NOTE — PROGRESS NOTE ADULT - ASSESSMENT
66 yo M s/p revision of ostomy to loop ileostomy POD8, and postoperative ileus now resolved, on TPN      -replete electrolytes prn  -ileostomy diet per nutritionist for comfort only  - cont po meds  -Supportive care  -TPN for nutritional support  -dvt ppx  -strict I/Os  -serial abdominal exams  -PT/PMNR

## 2018-01-25 NOTE — PROGRESS NOTE ADULT - ASSESSMENT
67 year old male with PMH of COPD stage 4 s/p right lung reduction in 2010 (on home oxygen 4L (sat 92-93%), HTN, CVA on 2014, DM, hypothyroidism, hydrocele, and nephrolithiasis who is admitted for a COPD exacerbation. Patient has been weaned off ventimask to 4 liters nasal cannula and respiratory status is stable. CTA of the chest was negative for PE but showed bilateral lower lobe infiltrates with mucous plugging for which he was started on Levaquin.  Respiratory status stable and steroids are being tapered. Hospital course complicated by constipation, which had not improved despite aggressive bowel regimen, laxatives and enemas. XRAY with large stool burden. CT with diaphragmatic hernia and patient was consequently taken to the OR for loop ileostomy under local anesthesia. Pt cont, to have copious amount in ileostomy. Pt is noted to have hyponatremia due to high output in ileostomy, got IVF, hyponatremia and hypokalemia improving, ileostomy secretion are getting thicker, he is tolerating diet, ostomy care as per ostomy RN,  he is continued with cholestyramine, he has been continued with IV hydration, Ileostomy out is still high, but secretions are getting thicker, monitored, I/Os, being continued with IV fluid rate to 125ml/h and GI has increased Octreotide to 150mcgs tid,  has CT abdomen and Plevis done, results reviewed, GI add Imodium 2 mg po bid and continued with octreotide along, GI recommended Surgical consult for Ileostomy reversal and pulmonary consult for optimization for surgery, appreciate surgical and pulmonary consults, GI recommended to get  CT abdomen and pelvis with oral contrast, done showed Proximal jejunum emptying into a loop jejunostomy the right upper quadrant as described. No contrast seen within the distal small bowel, Large bilateral scrotal hydroceles, talked with surgical team, they did the upper GI series confirmed Jejunostomy, patient is s/p jejunostomy closure and s/p Ileostomy was eating and drinking later on developed, nausea and vomiting, NG was put in and XRays were done showed penumoperitonium, CT abdomen is done result pending, surgery is closely following the case, cont with NG suction, cont NPO and NGT- change Cardizem IVP nd protonic to IV.   During the earlier part of the hospitalization patient was treated for acute on chronic hypoxic respiratory failure secondary to COPD exacerbation and CAP, was treated with steroids now tapered and duo nebs, SOB resolved, he uses home oxygen (4 liters), bicarb elevated likely due to metabolic compensation for resp acidosis  continue bronchodilators and prednisone tapered off and completed Levaquin as well.  His electrolytes monitored and replaced accordingly, he was found to have right femoral artery dissection as incidental finding on CT, no intervention as per vascular, follow up with vascular clinic as out patient.     Plan:    1. High out put from Ileostomy: Patient has been continued on cholestyramine, Octreotide, Imodium, he is tolerating diet, ostomy care as per ostomy RN, IV hydration, Ileostomy output is still high, but secretions are thicker, encouraged for oral hydration, monitor I/Os, GI is following, GI recommended Surgical consult for Ileostomy reversal and pulmonary consult for optimization for surgery, appreciate surgical and pulmonary consults, GI has changed his  diet to high fiber diet, fluid restriction, he is feeling little dehydrated, his electrolytes has been stable will monitor I/O, BMP, will continued with IV fluids, GI recommended CT abdomen and pelvis with oral contrast, done today showed Proximal jejunum emptying into a loop jejunostomy the right upper quadrant as described. No contrast seen within the distal small bowel, Large bilateral scrotal hydroceles, talked with surgical team surgical team, they did the upper GI series confirmed Jejunostomy, patient is s/p jejunostomy closure and s/p Ileostomy was eating and drinking later on developed, nausea and vomiting, NG was put in and XRays were done showed penumoperitonium, CT abdomen done showed same pneumo peritoneum, surgery is follwoing closely, as per surgery team, patient is most likely having refeeding syndrome, NG secretions are less, NG was  taken out, started on oral feeding able to tolerate well, he is also getting TPN, change Cardizem po as well as protonix, patient Ileostomy is working well, has no abdominal pain, tolerating diet well.       2. Acute on chronic hypoxic respiratory failure secondary to COPD exacerbation, improved, he is at his baseline, on home oxygen (4 liters), continue bronchodilators and prednisone tapered off, continue Mucomyst PRN, completed course of Levaquin, cough better with tensilon otoniel, his looks stable from respiratory stand point.     3. CAP - completed course of Levaquin on 12/5.     4. Hypertension - stable   - continue cardizem    5. Hyperlipidemia   - continue statin    6.hyponatremia: resolved, it was due to high ileostomy output.   Hypokalemia - being replaced  Hypophosphatemia: improved  Hypomagnesemia: improved  on multiple electrolyte sol.   monitor Electrolytes    7. Scrotal edema chronic with urinary retention, no more retention, Roy if not able to urinate  - no redness going down  - patient following with  as outpatient  - cont scrotal elevation    8. Right femoral artery dissection  - incidental finding on CT, no intervention as per vascular       9. DVT Prophylaxis - Lovenox       10. anxiety: xanax at hs PRN.    11. Afib: On Cardizem not on anticoagulation, will continue monitoring HR, if high will adjust meds.     12. Hx of TIA: patient of was plavix, will resume tomorrow, he is on aspirin at this point of view.

## 2018-01-25 NOTE — CHART NOTE - NSCHARTNOTEFT_GEN_A_CORE
Source: Patient [x ]  Family [ ]   other [ ]    Current Diet:   Consistent CHO, ileostomy with Ensure clears TID    Patient reports [ ] nausea  [ ] vomiting [ ] diarrhea [ ] constipation  [ ]chewing problems [ ] swallowing issues  [ ] other:     PO intake:  < 50% [ ]   50-75%  [ ]   %  [x ]  other :    Source for PO intake [x ] Patient [ ] family [ ] chart [ ] staff [ ] other    Enteral /Parenteral Nutrition:  [facility] 85g protein, 2200kcal plus lipids    Current Weight: bed scale weight 140# (10# taken off for pump) question accuracy since last weight was 112.8# on 1/18.    % Weight Change     Pertinent Medications: MEDICATIONS  (STANDING):  aspirin  chewable 81 milliGRAM(s) Oral daily  dextrose 5%. 1000 milliLiter(s) (50 mL/Hr) IV Continuous <Continuous>  dextrose 50% Injectable 12.5 Gram(s) IV Push once  dextrose 50% Injectable 25 Gram(s) IV Push once  diltiazem    milliGRAM(s) Oral daily  enoxaparin Injectable 40 milliGRAM(s) SubCutaneous daily  FIRST- Mouthwash  BLM 10 milliLiter(s) Swish and Spit two times a day  fluticasone propionate/ salmeterol 250-50 MICROgram(s) Diskus 1 Dose(s) Inhalation two times a day  multiple electrolytes Injection Type 1 1000 milliLiter(s) (75 mL/Hr) IV Continuous <Continuous>  multivitamin 1 Tablet(s) Oral daily  pantoprazole    Tablet 40 milliGRAM(s) Oral two times a day before meals  Parenteral Nutrition - Adult 1 Each (83 mL/Hr) TPN Continuous <Continuous>  potassium acid phosphate/sodium acid phosphate tablet (K-PHOS No. 2) 1 Tablet(s) Oral four times a day with meals  sodium chloride 0.65% Nasal 1 Spray(s) Both Nostrils four times a day  tamsulosin 0.4 milliGRAM(s) Oral at bedtime  tiotropium 18 MICROgram(s) Capsule 1 Capsule(s) Inhalation daily    MEDICATIONS  (PRN):  benzocaine 15 mG/menthol 3.6 mG Lozenge 1 Lozenge Oral every 4 hours PRN Sore Throat  benzonatate 100 milliGRAM(s) Oral three times a day PRN Cough  LORazepam   Injectable 0.5 milliGRAM(s) IV Push two times a day PRN Anxiety  ondansetron Injectable 4 milliGRAM(s) IV Push every 4 hours PRN Nausea and/or Vomiting    Pertinent Labs: CBC Full  -  ( 25 Jan 2018 06:49 )  WBC Count : 9.4 K/uL  Hemoglobin : 10.1 g/dL  Hematocrit : 31.5 %  Platelet Count - Automated : 298 K/uL  Mean Cell Volume : 87.5 fl  Mean Cell Hemoglobin : 28.1 pg  Mean Cell Hemoglobin Concentration : 32.1 g/dL  Auto Neutrophil # : 7.8 K/uL  Auto Lymphocyte # : 0.9 K/uL  Auto Monocyte # : 0.6 K/uL  Auto Eosinophil # : 0.1 K/uL  Auto Basophil # : 0.0 K/uL  Auto Neutrophil % : 82.8 %  Auto Lymphocyte % : 9.8 %  Auto Monocyte % : 5.8 %  Auto Eosinophil % : 1.1 %  Auto Basophil % : 0.1 %      01-25 Na136 mmol/L Glu 143 mg/dL<H> K+ 4.5 mmol/L Cr  0.37 mg/dL<L> BUN 6.0 mg/dL<L> Phos 2.5 mg/dL Alb n/a   PAB n/a           Skin:     Nutrition focused physical exam conducted - found signs of malnutrition [ ]absent [x ]present    Subcutaneous fat loss: [x ] Orbital fat pads region, [ ]Buccal fat region, [ ]Triceps region,  [x ]Ribs region    Muscle wasting: [x ]Temples region, [x ]Clavicle region, [x ]Shoulder region, [x ]Scapula region, [ ]Interosseous region,  [x ]thigh region, [ ]Calf region    Estimated Needs:   [x ] no change since previous follow up  [ ] recalculated:     Current Nutrition Diagnosis:  Pt meets criteria for severe chronic malnutrition related to absorption issues, in setting of ileostomy as evidenced by high ostomy output, 36# weight loss since admission 11/29, severe fat/ muscle depletion in thoracic region, thighs, temples, clavicles. Pt with s/p ileostomy revision. Diet restarted and pt tolerating well ~75% as per pt.   TPN started and running at [facility]. Pt meets/exceeds nutritional needs with po and TPN.  Monitor ileostomy output. Monitor TPN. Monitor po intake, tolerance.      Recommendations: Continue diet as ordered. TPN is scheduled for 5 days as per Dr. Blancas. Menus provided to wife to be filled out. Food preferences discussed and will provide.     Monitoring and Evaluation:   [x ] PO intake [x ] Tolerance to diet prescription [X] Weights  [X] Follow up per protocol [X] Labs:  x

## 2018-01-25 NOTE — PROGRESS NOTE ADULT - SUBJECTIVE AND OBJECTIVE BOX
FRANDY BAUTISTA    [id number]    67y      Male    Patient is a 67y old  Male who presents with a chief complaint of SOB (15 Dec 2017 10:17)      INTERVAL HPI/OVERNIGHT EVENTS:    Patient is feeling much better, no more nausea, eating well and tolerating well, he has been on TPN, he has no abdominal pain, denies fever, chills, chest pain.     REVIEW OF SYSTEMS:    CONSTITUTIONAL: No fever, has fatigue  RESPIRATORY: No cough, little shortness of breath  CARDIOVASCULAR: No chest pain, no palpitations.   GASTROINTESTINAL: No abdominal, No nausea, vomiting  NEUROLOGICAL: No headaches,  loss of strength.  MISCELLANEOUS: No joint swelling or pain.         Vital Signs Last 24 Hrs  T(C): 36.3 (25 Jan 2018 20:49), Max: 36.9 (25 Jan 2018 14:27)  T(F): 97.3 (25 Jan 2018 20:49), Max: 98.4 (25 Jan 2018 14:27)  HR: 97 (25 Jan 2018 20:49) (97 - 104)  BP: 122/72 (25 Jan 2018 20:49) (92/60 - 122/72)  RR: 18 (25 Jan 2018 20:49) (16 - 18)  SpO2: 98% (25 Jan 2018 20:49) (96% - 98%)    PHYSICAL EXAM:    GENERAL: Thin built  Elderly male looking comfortable  HEENT: MADELINE  NECK: soft, Supple, No JVD,   CHEST/LUNG: Decrease air entry bilaterally; No wheezing  HEART: S1S2+, Regular rate and rhythm; No murmurs  ABDOMEN: Soft, Nontender, Bowel sounds present, s/p Ileostomy with stool in the bag  EXTREMITIES:  1+ Peripheral Pulses, No edema  SKIN: No rashes or lesions  NEURO: AAOX3, no focal deficits, no motor r sensory loss  PSYCH: normal mood      LABS:                        10.1   9.4   )-----------( 298      ( 25 Jan 2018 06:49 )             31.5     01-25    136  |  101  |  6.0<L>  ----------------------------<  143<H>  4.5   |  26.0  |  0.37<L>    Ca    7.2<L>      25 Jan 2018 06:49  Phos  2.5     01-25  Mg     1.9     01-25    TPro  5.0<L>  /  Alb  2.1<L>  /  TBili  0.4  /  DBili  x   /  AST  10  /  ALT  8   /  AlkPhos  72  01-24    PT/INR - ( 24 Jan 2018 05:57 )   PT: 12.0 sec;   INR: 1.09 ratio                 I&O's Summary    24 Jan 2018 07:01  -  25 Jan 2018 07:00  --------------------------------------------------------  IN: 1971 mL / OUT: 2025 mL / NET: -54 mL    25 Jan 2018 07:01  -  25 Jan 2018 21:25  --------------------------------------------------------  IN: 0 mL / OUT: 575 mL / NET: -575 mL        MEDICATIONS  (STANDING):  aspirin  chewable 81 milliGRAM(s) Oral daily  dextrose 5%. 1000 milliLiter(s) (50 mL/Hr) IV Continuous <Continuous>  dextrose 50% Injectable 12.5 Gram(s) IV Push once  dextrose 50% Injectable 25 Gram(s) IV Push once  diltiazem    milliGRAM(s) Oral daily  enoxaparin Injectable 40 milliGRAM(s) SubCutaneous daily  FIRST- Mouthwash  BLM 10 milliLiter(s) Swish and Spit two times a day  fluticasone propionate/ salmeterol 250-50 MICROgram(s) Diskus 1 Dose(s) Inhalation two times a day  multiple electrolytes Injection Type 1 1000 milliLiter(s) (75 mL/Hr) IV Continuous <Continuous>  multivitamin 1 Tablet(s) Oral daily  pantoprazole    Tablet 40 milliGRAM(s) Oral two times a day before meals  potassium acid phosphate/sodium acid phosphate tablet (K-PHOS No. 2) 1 Tablet(s) Oral four times a day with meals  sodium chloride 0.65% Nasal 1 Spray(s) Both Nostrils four times a day  tamsulosin 0.4 milliGRAM(s) Oral at bedtime  tiotropium 18 MICROgram(s) Capsule 1 Capsule(s) Inhalation daily    MEDICATIONS  (PRN):  benzocaine 15 mG/menthol 3.6 mG Lozenge 1 Lozenge Oral every 4 hours PRN Sore Throat  benzonatate 100 milliGRAM(s) Oral three times a day PRN Cough  LORazepam   Injectable 0.5 milliGRAM(s) IV Push two times a day PRN Anxiety  ondansetron Injectable 4 milliGRAM(s) IV Push every 4 hours PRN Nausea and/or Vomiting

## 2018-01-25 NOTE — ADVANCED PRACTICE NURSE CONSULT - ASSESSMENT
Pt is alert and oriented x3, wife Sandra at the bedside, pt is known to ostomy nurse from previous visits. Pt s/p revision of a loop jejunostomy, and creation of a loop ileostomy as per operative note on 1/16. Pt and wife stated that he is doing better now, able to tolerate P.O intake, pt also on TPN now for nutritional support. Assessed pt's new ileostomy, pouching was leaking at the time of the assessment, old pouching system removed, stoma red, moist and protuberant, stoma measured 1 3/8 inch, red rubber catheter noted under the stoma, skin erythema noted from 7 to 9 o'clock on peristomal skin possible due to pouch leaking, peristomal skin intact, liquid and pasty stool noted in the old pouching system. Today, ostomy nurse assessed the stoma and peristomal skin with pt and wife, pt was provided with handhold mirror for ostomy pouching learning. Pt was not able to look at the stoma, pt reported that he wanted his wife and nursing staff to empty the pouching for him. Educated the patient about importance of ostomy self care, pt showed minimal interest in learning the ileostomy care. Wife was able to participate in the ileostomy pouching change with minimal help from the ostomy nurse. Pt stated that he was not comfortable in looking at the stoma and stool in the pouching system, ostomy nurse applied opaque two piece pouching system to pt's stoma to encourage pt's participation in the ostomy care, pt and wife were educated to empty and drain the pouching, return demonstrated observed from pt and wife, pt and wife showed no difficulties in emptying and draining the pouching. Extra pouching supplies left at bedside for the pt. Emotional support provided. Questions answered to pt and wife's satisfaction. Trauma team Dr Vann called to make aware pt's ostomy and pouching status.

## 2018-01-25 NOTE — PROGRESS NOTE ADULT - SUBJECTIVE AND OBJECTIVE BOX
INTERVAL HPI/OVERNIGHT EVENTS:FU for TPN. Patient has started eating and had soft stools in the ostomy bag. As per wife, he is eating but not enough amount to gain any weight.     MEDICATIONS  (STANDING):  aspirin  chewable 81 milliGRAM(s) Oral daily  dextrose 5%. 1000 milliLiter(s) (50 mL/Hr) IV Continuous <Continuous>  dextrose 50% Injectable 12.5 Gram(s) IV Push once  dextrose 50% Injectable 25 Gram(s) IV Push once  diltiazem    milliGRAM(s) Oral daily  enoxaparin Injectable 40 milliGRAM(s) SubCutaneous daily  FIRST- Mouthwash  BLM 10 milliLiter(s) Swish and Spit two times a day  fluticasone propionate/ salmeterol 250-50 MICROgram(s) Diskus 1 Dose(s) Inhalation two times a day  multiple electrolytes Injection Type 1 1000 milliLiter(s) (75 mL/Hr) IV Continuous <Continuous>  multivitamin 1 Tablet(s) Oral daily  pantoprazole    Tablet 40 milliGRAM(s) Oral two times a day before meals  potassium acid phosphate/sodium acid phosphate tablet (K-PHOS No. 2) 1 Tablet(s) Oral four times a day with meals  sodium chloride 0.65% Nasal 1 Spray(s) Both Nostrils four times a day  tamsulosin 0.4 milliGRAM(s) Oral at bedtime  tiotropium 18 MICROgram(s) Capsule 1 Capsule(s) Inhalation daily    MEDICATIONS  (PRN):  benzocaine 15 mG/menthol 3.6 mG Lozenge 1 Lozenge Oral every 4 hours PRN Sore Throat  benzonatate 100 milliGRAM(s) Oral three times a day PRN Cough  LORazepam   Injectable 0.5 milliGRAM(s) IV Push two times a day PRN Anxiety  ondansetron Injectable 4 milliGRAM(s) IV Push every 4 hours PRN Nausea and/or Vomiting      Allergies    codeine (Unknown)  no veges (Unknown)  penicillin (Unknown)    Intolerances    DO NOT SEND ORANGES (Unknown)  Symbicort (Short breath)      Vital Signs Last 24 Hrs  T(C): 36.5 (25 Jan 2018 21:49), Max: 36.9 (25 Jan 2018 14:27)  T(F): 97.7 (25 Jan 2018 21:49), Max: 98.4 (25 Jan 2018 14:27)  HR: 102 (25 Jan 2018 21:49) (97 - 104)  BP: 116/67 (25 Jan 2018 21:49) (92/60 - 122/72)  BP(mean): --  RR: 16 (25 Jan 2018 21:49) (16 - 18)  SpO2: 97% (25 Jan 2018 21:49) (96% - 98%)    LABS:                        10.1   9.4   )-----------( 298      ( 25 Jan 2018 06:49 )             31.5     01-25    136  |  101  |  6.0<L>  ----------------------------<  143<H>  4.5   |  26.0  |  0.37<L>    Ca    7.2<L>      25 Jan 2018 06:49  Phos  2.5     01-25  Mg     1.9     01-25    TPro  5.0<L>  /  Alb  2.1<L>  /  TBili  0.4  /  DBili  x   /  AST  10  /  ALT  8   /  AlkPhos  72  01-24    PT/INR - ( 24 Jan 2018 05:57 )   PT: 12.0 sec;   INR: 1.09 ratio               RADIOLOGY & ADDITIONAL TESTS:

## 2018-01-25 NOTE — PROGRESS NOTE ADULT - ASSESSMENT
Patient with complicated medical stay after COPD exaceberation with constipation with transverse colon in hernia and then loop ileostomy after revision of previous jejunostomy    1. Will order TPN tomorrow. Once continues to eat, will encourage eating by mouth only  2. GI will follow up

## 2018-01-25 NOTE — PROGRESS NOTE ADULT - SUBJECTIVE AND OBJECTIVE BOX
INTERVAL HPI/OVERNIGHT EVENTS:  Pt seen and examined at bedside. PICC placed, started on TPN Yesterday and ileostomy diet. No acute overnight events. Pain well controlled. Ostomy output more solid. Denies n/v. Voiding well. Passing flatus. Denies f/c/sob/cp.         MEDICATIONS  (STANDING):  aspirin  chewable 81 milliGRAM(s) Oral daily  dextrose 5%. 1000 milliLiter(s) (50 mL/Hr) IV Continuous <Continuous>  dextrose 50% Injectable 12.5 Gram(s) IV Push once  dextrose 50% Injectable 25 Gram(s) IV Push once  diltiazem    milliGRAM(s) Oral daily  enoxaparin Injectable 40 milliGRAM(s) SubCutaneous daily  FIRST- Mouthwash  BLM 10 milliLiter(s) Swish and Spit two times a day  fluticasone propionate/ salmeterol 250-50 MICROgram(s) Diskus 1 Dose(s) Inhalation two times a day  multiple electrolytes Injection Type 1 1000 milliLiter(s) (75 mL/Hr) IV Continuous <Continuous>  multivitamin 1 Tablet(s) Oral daily  pantoprazole    Tablet 40 milliGRAM(s) Oral two times a day before meals  Parenteral Nutrition - Adult 1 Each (83 mL/Hr) TPN Continuous <Continuous>  potassium acid phosphate/sodium acid phosphate tablet (K-PHOS No. 2) 1 Tablet(s) Oral four times a day with meals  sodium chloride 0.65% Nasal 1 Spray(s) Both Nostrils four times a day  tamsulosin 0.4 milliGRAM(s) Oral at bedtime  tiotropium 18 MICROgram(s) Capsule 1 Capsule(s) Inhalation daily    MEDICATIONS  (PRN):  benzocaine 15 mG/menthol 3.6 mG Lozenge 1 Lozenge Oral every 4 hours PRN Sore Throat  benzonatate 100 milliGRAM(s) Oral three times a day PRN Cough  LORazepam   Injectable 0.5 milliGRAM(s) IV Push two times a day PRN Anxiety  ondansetron Injectable 4 milliGRAM(s) IV Push every 4 hours PRN Nausea and/or Vomiting      Vital Signs Last 24 Hrs  T(C): 36.4 (25 Jan 2018 05:58), Max: 36.6 (24 Jan 2018 17:00)  T(F): 97.5 (25 Jan 2018 05:58), Max: 97.8 (24 Jan 2018 17:00)  HR: 98 (25 Jan 2018 05:58) (98 - 135)  BP: 99/65 (25 Jan 2018 05:58) (99/65 - 130/76)  BP(mean): --  RR: 16 (25 Jan 2018 05:58) (16 - 16)  SpO2: 96% (25 Jan 2018 05:58) (93% - 96%)    PE  NAD  NC/AT  RRR  No respiratory distress  Abd soft, nondistended, nontender, no guarding or rebound. No peritoneal signs. Ostomy pink and healthy with liquidish stool  No pedal edema        I&O's Detail    24 Jan 2018 07:01  -  25 Jan 2018 07:00  --------------------------------------------------------  IN:    Oral Fluid: 720 mL    TPN (Total Parenteral Nutrition): 1251 mL  Total IN: 1971 mL    OUT:    Ileostomy: 750 mL    Voided: 1275 mL  Total OUT: 2025 mL    Total NET: -54 mL      25 Jan 2018 07:01  -  25 Jan 2018 13:45  --------------------------------------------------------  IN:  Total IN: 0 mL    OUT:    Voided: 575 mL  Total OUT: 575 mL    Total NET: -575 mL          LABS:                        10.1   9.4   )-----------( 298      ( 25 Jan 2018 06:49 )             31.5     01-25    136  |  101  |  6.0<L>  ----------------------------<  143<H>  4.5   |  26.0  |  0.37<L>    Ca    7.2<L>      25 Jan 2018 06:49  Phos  2.5     01-25  Mg     1.9     01-25    TPro  5.0<L>  /  Alb  2.1<L>  /  TBili  0.4  /  DBili  x   /  AST  10  /  ALT  8   /  AlkPhos  72  01-24    PT/INR - ( 24 Jan 2018 05:57 )   PT: 12.0 sec;   INR: 1.09 ratio               RADIOLOGY & ADDITIONAL STUDIES: INTERVAL HPI/OVERNIGHT EVENTS:  Pt seen and examined at bedside. currently on TPN. No acute overnight events. Pain well controlled. Ostomy output more solid. tolerating  diet.         MEDICATIONS  (STANDING):  aspirin  chewable 81 milliGRAM(s) Oral daily  dextrose 5%. 1000 milliLiter(s) (50 mL/Hr) IV Continuous <Continuous>  dextrose 50% Injectable 12.5 Gram(s) IV Push once  dextrose 50% Injectable 25 Gram(s) IV Push once  diltiazem    milliGRAM(s) Oral daily  enoxaparin Injectable 40 milliGRAM(s) SubCutaneous daily  FIRST- Mouthwash  BLM 10 milliLiter(s) Swish and Spit two times a day  fluticasone propionate/ salmeterol 250-50 MICROgram(s) Diskus 1 Dose(s) Inhalation two times a day  multiple electrolytes Injection Type 1 1000 milliLiter(s) (75 mL/Hr) IV Continuous <Continuous>  multivitamin 1 Tablet(s) Oral daily  pantoprazole    Tablet 40 milliGRAM(s) Oral two times a day before meals  Parenteral Nutrition - Adult 1 Each (83 mL/Hr) TPN Continuous <Continuous>  potassium acid phosphate/sodium acid phosphate tablet (K-PHOS No. 2) 1 Tablet(s) Oral four times a day with meals  sodium chloride 0.65% Nasal 1 Spray(s) Both Nostrils four times a day  tamsulosin 0.4 milliGRAM(s) Oral at bedtime  tiotropium 18 MICROgram(s) Capsule 1 Capsule(s) Inhalation daily    MEDICATIONS  (PRN):  benzocaine 15 mG/menthol 3.6 mG Lozenge 1 Lozenge Oral every 4 hours PRN Sore Throat  benzonatate 100 milliGRAM(s) Oral three times a day PRN Cough  LORazepam   Injectable 0.5 milliGRAM(s) IV Push two times a day PRN Anxiety  ondansetron Injectable 4 milliGRAM(s) IV Push every 4 hours PRN Nausea and/or Vomiting      Vital Signs Last 24 Hrs  T(C): 36.4 (25 Jan 2018 05:58), Max: 36.6 (24 Jan 2018 17:00)  T(F): 97.5 (25 Jan 2018 05:58), Max: 97.8 (24 Jan 2018 17:00)  HR: 98 (25 Jan 2018 05:58) (98 - 135)  BP: 99/65 (25 Jan 2018 05:58) (99/65 - 130/76)  BP(mean): --  RR: 16 (25 Jan 2018 05:58) (16 - 16)  SpO2: 96% (25 Jan 2018 05:58) (93% - 96%)    PE  NAD  NC/AT  RRR  No respiratory distress  Abd soft, nondistended, nontender, no guarding or rebound. No peritoneal signs. Ostomy pink and healthy with liquidish stool  No pedal edema          I&O's Detail    24 Jan 2018 07:01  -  25 Jan 2018 07:00  --------------------------------------------------------  IN:    Oral Fluid: 720 mL    TPN (Total Parenteral Nutrition): 1251 mL  Total IN: 1971 mL    OUT:    Ileostomy: 750 mL    Voided: 1275 mL  Total OUT: 2025 mL    Total NET: -54 mL      25 Jan 2018 07:01  -  25 Jan 2018 13:45  --------------------------------------------------------  IN:  Total IN: 0 mL    OUT:    Voided: 575 mL  Total OUT: 575 mL    Total NET: -575 mL          LABS:                        10.1   9.4   )-----------( 298      ( 25 Jan 2018 06:49 )             31.5     01-25    136  |  101  |  6.0<L>  ----------------------------<  143<H>  4.5   |  26.0  |  0.37<L>    Ca    7.2<L>      25 Jan 2018 06:49  Phos  2.5     01-25  Mg     1.9     01-25    TPro  5.0<L>  /  Alb  2.1<L>  /  TBili  0.4  /  DBili  x   /  AST  10  /  ALT  8   /  AlkPhos  72  01-24    PT/INR - ( 24 Jan 2018 05:57 )   PT: 12.0 sec;   INR: 1.09 ratio               RADIOLOGY & ADDITIONAL STUDIES:

## 2018-01-26 LAB
ANION GAP SERPL CALC-SCNC: 9 MMOL/L — SIGNIFICANT CHANGE UP (ref 5–17)
BASOPHILS # BLD AUTO: 0 K/UL — SIGNIFICANT CHANGE UP (ref 0–0.2)
BASOPHILS NFR BLD AUTO: 0.1 % — SIGNIFICANT CHANGE UP (ref 0–2)
BUN SERPL-MCNC: 7 MG/DL — LOW (ref 8–20)
CALCIUM SERPL-MCNC: 8.1 MG/DL — LOW (ref 8.6–10.2)
CHLORIDE SERPL-SCNC: 103 MMOL/L — SIGNIFICANT CHANGE UP (ref 98–107)
CO2 SERPL-SCNC: 30 MMOL/L — HIGH (ref 22–29)
CREAT SERPL-MCNC: 0.39 MG/DL — LOW (ref 0.5–1.3)
EOSINOPHIL # BLD AUTO: 0.1 K/UL — SIGNIFICANT CHANGE UP (ref 0–0.5)
EOSINOPHIL NFR BLD AUTO: 0.7 % — SIGNIFICANT CHANGE UP (ref 0–5)
GLUCOSE SERPL-MCNC: 156 MG/DL — HIGH (ref 70–115)
HCT VFR BLD CALC: 31.6 % — LOW (ref 42–52)
HGB BLD-MCNC: 10 G/DL — LOW (ref 14–18)
LYMPHOCYTES # BLD AUTO: 0.7 K/UL — LOW (ref 1–4.8)
LYMPHOCYTES # BLD AUTO: 7 % — LOW (ref 20–55)
MAGNESIUM SERPL-MCNC: 1.8 MG/DL — SIGNIFICANT CHANGE UP (ref 1.6–2.6)
MCHC RBC-ENTMCNC: 28.2 PG — SIGNIFICANT CHANGE UP (ref 27–31)
MCHC RBC-ENTMCNC: 31.6 G/DL — LOW (ref 32–36)
MCV RBC AUTO: 89 FL — SIGNIFICANT CHANGE UP (ref 80–94)
MONOCYTES # BLD AUTO: 0.5 K/UL — SIGNIFICANT CHANGE UP (ref 0–0.8)
MONOCYTES NFR BLD AUTO: 4.7 % — SIGNIFICANT CHANGE UP (ref 3–10)
NEUTROPHILS # BLD AUTO: 9.2 K/UL — HIGH (ref 1.8–8)
NEUTROPHILS NFR BLD AUTO: 87.2 % — HIGH (ref 37–73)
PHOSPHATE SERPL-MCNC: 1.8 MG/DL — LOW (ref 2.4–4.7)
PLATELET # BLD AUTO: 319 K/UL — SIGNIFICANT CHANGE UP (ref 150–400)
POTASSIUM SERPL-MCNC: 4.4 MMOL/L — SIGNIFICANT CHANGE UP (ref 3.5–5.3)
POTASSIUM SERPL-SCNC: 4.4 MMOL/L — SIGNIFICANT CHANGE UP (ref 3.5–5.3)
RBC # BLD: 3.55 M/UL — LOW (ref 4.6–6.2)
RBC # FLD: 18.1 % — HIGH (ref 11–15.6)
SODIUM SERPL-SCNC: 142 MMOL/L — SIGNIFICANT CHANGE UP (ref 135–145)
WBC # BLD: 10.5 K/UL — SIGNIFICANT CHANGE UP (ref 4.8–10.8)
WBC # FLD AUTO: 10.5 K/UL — SIGNIFICANT CHANGE UP (ref 4.8–10.8)

## 2018-01-26 PROCEDURE — 99232 SBSQ HOSP IP/OBS MODERATE 35: CPT

## 2018-01-26 RX ORDER — I.V. FAT EMULSION 20 G/100ML
0.77 EMULSION INTRAVENOUS
Qty: 50 | Refills: 0 | Status: DISCONTINUED | OUTPATIENT
Start: 2018-01-26 | End: 2018-01-26

## 2018-01-26 RX ORDER — ELECTROLYTE SOLUTION,INJ
1 VIAL (ML) INTRAVENOUS
Qty: 0 | Refills: 0 | Status: DISCONTINUED | OUTPATIENT
Start: 2018-01-26 | End: 2018-01-27

## 2018-01-26 RX ORDER — MAGNESIUM SULFATE 500 MG/ML
2 VIAL (ML) INJECTION ONCE
Qty: 0 | Refills: 0 | Status: COMPLETED | OUTPATIENT
Start: 2018-01-26 | End: 2018-01-26

## 2018-01-26 RX ORDER — SODIUM CHLORIDE 9 MG/ML
1000 INJECTION, SOLUTION INTRAVENOUS
Qty: 0 | Refills: 0 | Status: DISCONTINUED | OUTPATIENT
Start: 2018-01-26 | End: 2018-01-28

## 2018-01-26 RX ORDER — ALPRAZOLAM 0.25 MG
0.25 TABLET ORAL EVERY 6 HOURS
Qty: 0 | Refills: 0 | Status: DISCONTINUED | OUTPATIENT
Start: 2018-01-26 | End: 2018-02-02

## 2018-01-26 RX ADMIN — Medication 1 SPRAY(S): at 22:09

## 2018-01-26 RX ADMIN — Medication 1 TABLET(S): at 09:49

## 2018-01-26 RX ADMIN — Medication 300 MILLIGRAM(S): at 06:21

## 2018-01-26 RX ADMIN — Medication 81 MILLIGRAM(S): at 15:23

## 2018-01-26 RX ADMIN — Medication 1 SPRAY(S): at 17:53

## 2018-01-26 RX ADMIN — Medication 1 SPRAY(S): at 15:24

## 2018-01-26 RX ADMIN — Medication 1 TABLET(S): at 15:24

## 2018-01-26 RX ADMIN — PANTOPRAZOLE SODIUM 40 MILLIGRAM(S): 20 TABLET, DELAYED RELEASE ORAL at 06:21

## 2018-01-26 RX ADMIN — PANTOPRAZOLE SODIUM 40 MILLIGRAM(S): 20 TABLET, DELAYED RELEASE ORAL at 17:53

## 2018-01-26 RX ADMIN — Medication 0.25 MILLIGRAM(S): at 22:09

## 2018-01-26 RX ADMIN — Medication 83.33 MILLIMOLE(S): at 17:52

## 2018-01-26 RX ADMIN — Medication 1 TABLET(S): at 22:09

## 2018-01-26 RX ADMIN — FLUTICASONE PROPIONATE AND SALMETEROL 1 DOSE(S): 50; 250 POWDER ORAL; RESPIRATORY (INHALATION) at 09:50

## 2018-01-26 RX ADMIN — TAMSULOSIN HYDROCHLORIDE 0.4 MILLIGRAM(S): 0.4 CAPSULE ORAL at 22:09

## 2018-01-26 RX ADMIN — ONDANSETRON 4 MILLIGRAM(S): 8 TABLET, FILM COATED ORAL at 04:29

## 2018-01-26 RX ADMIN — Medication 100 GRAM(S): at 17:52

## 2018-01-26 RX ADMIN — FLUTICASONE PROPIONATE AND SALMETEROL 1 DOSE(S): 50; 250 POWDER ORAL; RESPIRATORY (INHALATION) at 22:08

## 2018-01-26 RX ADMIN — TIOTROPIUM BROMIDE 1 CAPSULE(S): 18 CAPSULE ORAL; RESPIRATORY (INHALATION) at 12:11

## 2018-01-26 RX ADMIN — Medication 1 TABLET(S): at 17:53

## 2018-01-26 RX ADMIN — DIPHENHYDRAMINE HYDROCHLORIDE AND LIDOCAINE HYDROCHLORIDE AND ALUMINUM HYDROXIDE AND MAGNESIUM HYDRO 10 MILLILITER(S): KIT at 17:53

## 2018-01-26 RX ADMIN — I.V. FAT EMULSION 31.43 GM/KG/DAY: 20 EMULSION INTRAVENOUS at 23:24

## 2018-01-26 NOTE — PROGRESS NOTE ADULT - SUBJECTIVE AND OBJECTIVE BOX
INTERVAL HPI/OVERNIGHT EVENTS:  had an episode of emesis overnight. no complaints of abdominal pain. still nauseous in am. ostomy functional.        MEDICATIONS  (STANDING):  aspirin  chewable 81 milliGRAM(s) Oral daily  dextrose 5%. 1000 milliLiter(s) (50 mL/Hr) IV Continuous <Continuous>  dextrose 50% Injectable 12.5 Gram(s) IV Push once  dextrose 50% Injectable 25 Gram(s) IV Push once  diltiazem    milliGRAM(s) Oral daily  enoxaparin Injectable 40 milliGRAM(s) SubCutaneous daily  fat emulsion (Plant Based) 20% Infusion 0.77 Gm/kG/Day (31.425 mL/Hr) IV Continuous <Continuous>  FIRST- Mouthwash  BLM 10 milliLiter(s) Swish and Spit two times a day  fluticasone propionate/ salmeterol 250-50 MICROgram(s) Diskus 1 Dose(s) Inhalation two times a day  multiple electrolytes Injection Type 1 1000 milliLiter(s) (75 mL/Hr) IV Continuous <Continuous>  multiple electrolytes Injection Type 1 1000 milliLiter(s) (75 mL/Hr) IV Continuous <Continuous>  multivitamin 1 Tablet(s) Oral daily  pantoprazole    Tablet 40 milliGRAM(s) Oral two times a day before meals  Parenteral Nutrition - Adult 1 Each (83 mL/Hr) TPN Continuous <Continuous>  potassium acid phosphate/sodium acid phosphate tablet (K-PHOS No. 2) 1 Tablet(s) Oral four times a day with meals  sodium chloride 0.65% Nasal 1 Spray(s) Both Nostrils four times a day  tamsulosin 0.4 milliGRAM(s) Oral at bedtime  tiotropium 18 MICROgram(s) Capsule 1 Capsule(s) Inhalation daily    MEDICATIONS  (PRN):  benzocaine 15 mG/menthol 3.6 mG Lozenge 1 Lozenge Oral every 4 hours PRN Sore Throat  benzonatate 100 milliGRAM(s) Oral three times a day PRN Cough  ondansetron Injectable 4 milliGRAM(s) IV Push every 4 hours PRN Nausea and/or Vomiting      Vital Signs Last 24 Hrs  T(C): 36.5 (26 Jan 2018 09:55), Max: 36.9 (25 Jan 2018 14:27)  T(F): 97.7 (26 Jan 2018 09:55), Max: 98.4 (25 Jan 2018 14:27)  HR: 105 (26 Jan 2018 09:55) (97 - 109)  BP: 118/72 (26 Jan 2018 09:55) (92/60 - 123/71)  BP(mean): --  RR: 16 (26 Jan 2018 09:55) (16 - 18)  SpO2: 96% (26 Jan 2018 09:55) (95% - 98%)    PE  NAD  NC/AT  RRR  No respiratory distress  Abd soft, nondistended, nontender, no guarding or rebound. No peritoneal signs. Ostomy pink and healthy with semi-solid stool  No pedal edema      I&O's Detail    25 Jan 2018 07:01  -  26 Jan 2018 07:00  --------------------------------------------------------  IN:    TPN (Total Parenteral Nutrition): 415 mL  Total IN: 415 mL    OUT:    Ileostomy: 125 mL    Voided: 1275 mL  Total OUT: 1400 mL    Total NET: -985 mL      26 Jan 2018 07:01  -  26 Jan 2018 13:28  --------------------------------------------------------  IN:  Total IN: 0 mL    OUT:    Voided: 150 mL  Total OUT: 150 mL    Total NET: -150 mL          LABS:                        10.0   10.5  )-----------( 319      ( 26 Jan 2018 08:15 )             31.6     01-26    142  |  103  |  7.0<L>  ----------------------------<  156<H>  4.4   |  30.0<H>  |  0.39<L>    Ca    8.1<L>      26 Jan 2018 08:15  Phos  1.8     01-26  Mg     1.8     01-26            RADIOLOGY & ADDITIONAL STUDIES:

## 2018-01-26 NOTE — PROGRESS NOTE ADULT - ASSESSMENT
66 yo M s/p revision of ostomy to loop ileostomy POD8, and postoperative ileus now resolved, on TPN      -replete electrolytes prn  -return back to clear liquid diet for now  -cont po meds  -Supportive care  -TPN for nutritional support  -dvt ppx  -strict I/Os  -serial abdominal exams  -PT/PMNR

## 2018-01-26 NOTE — PROGRESS NOTE ADULT - ASSESSMENT
Patient with colonic obstruction now s/p revision of the jejunostomy to ileostomy. Had some ileus. Now better    will reorder TPN today

## 2018-01-26 NOTE — ADVANCED PRACTICE NURSE CONSULT - ASSESSMENT
Pt is resting in bed, wife at the bedside, wife stated that pt vomited multiple times last night, he was tired today due to the stress last night. Wife stated that pt still did not perform the ostomy care by himself, he still not able to accept the fact that he has ileostomy. Pt is currently switched to transparent pouch since medical and surgical team needs to closely monitor pt's ostomy output. Wife stated that pt likes opaque pouching since he would not like to look at the stoma and pouching output. Instructed wife to use the opaque pouching once pt is discharged to home. No leaking pouching system reported.

## 2018-01-26 NOTE — ADVANCED PRACTICE NURSE CONSULT - RECOMMEDATIONS
Instructed wife to continue encourage pt to participate in the ileostomy care and contact ostomy nurse if needed.

## 2018-01-26 NOTE — PROGRESS NOTE ADULT - SUBJECTIVE AND OBJECTIVE BOX
INTERVAL HPI/OVERNIGHT EVENTS:    MEDICATIONS  (STANDING):  aspirin  chewable 81 milliGRAM(s) Oral daily  dextrose 5%. 1000 milliLiter(s) (50 mL/Hr) IV Continuous <Continuous>  dextrose 50% Injectable 12.5 Gram(s) IV Push once  dextrose 50% Injectable 25 Gram(s) IV Push once  diltiazem    milliGRAM(s) Oral daily  enoxaparin Injectable 40 milliGRAM(s) SubCutaneous daily  FIRST- Mouthwash  BLM 10 milliLiter(s) Swish and Spit two times a day  fluticasone propionate/ salmeterol 250-50 MICROgram(s) Diskus 1 Dose(s) Inhalation two times a day  multiple electrolytes Injection Type 1 1000 milliLiter(s) (75 mL/Hr) IV Continuous <Continuous>  multiple electrolytes Injection Type 1 1000 milliLiter(s) (75 mL/Hr) IV Continuous <Continuous>  multivitamin 1 Tablet(s) Oral daily  pantoprazole    Tablet 40 milliGRAM(s) Oral two times a day before meals  potassium acid phosphate/sodium acid phosphate tablet (K-PHOS No. 2) 1 Tablet(s) Oral four times a day with meals  sodium chloride 0.65% Nasal 1 Spray(s) Both Nostrils four times a day  tamsulosin 0.4 milliGRAM(s) Oral at bedtime  tiotropium 18 MICROgram(s) Capsule 1 Capsule(s) Inhalation daily    MEDICATIONS  (PRN):  benzocaine 15 mG/menthol 3.6 mG Lozenge 1 Lozenge Oral every 4 hours PRN Sore Throat  benzonatate 100 milliGRAM(s) Oral three times a day PRN Cough  ondansetron Injectable 4 milliGRAM(s) IV Push every 4 hours PRN Nausea and/or Vomiting      Allergies    codeine (Unknown)  no veges (Unknown)  penicillin (Unknown)    Intolerances    DO NOT SEND ORANGES (Unknown)  Symbicort (Short breath)      Vital Signs Last 24 Hrs  T(C): 36.2 (26 Jan 2018 06:18), Max: 36.9 (25 Jan 2018 14:27)  T(F): 97.2 (26 Jan 2018 06:18), Max: 98.4 (25 Jan 2018 14:27)  HR: 109 (26 Jan 2018 06:18) (97 - 109)  BP: 122/78 (26 Jan 2018 06:18) (92/60 - 123/71)  BP(mean): --  RR: 16 (26 Jan 2018 06:18) (16 - 18)  SpO2: 97% (26 Jan 2018 06:18) (95% - 98%)    LABS:                        10.1   9.4   )-----------( 298      ( 25 Jan 2018 06:49 )             31.5     01-25    136  |  101  |  6.0<L>  ----------------------------<  143<H>  4.5   |  26.0  |  0.37<L>    Ca    7.2<L>      25 Jan 2018 06:49  Phos  2.5     01-25  Mg     1.9     01-25            RADIOLOGY & ADDITIONAL TESTS: INTERVAL HPI/OVERNIGHT EVENTS:Patient complained of vomiting. Felt he overate. Passing stools in the ileostomy bag.     MEDICATIONS  (STANDING):  aspirin  chewable 81 milliGRAM(s) Oral daily  dextrose 5%. 1000 milliLiter(s) (50 mL/Hr) IV Continuous <Continuous>  dextrose 50% Injectable 12.5 Gram(s) IV Push once  dextrose 50% Injectable 25 Gram(s) IV Push once  diltiazem    milliGRAM(s) Oral daily  enoxaparin Injectable 40 milliGRAM(s) SubCutaneous daily  FIRST- Mouthwash  BLM 10 milliLiter(s) Swish and Spit two times a day  fluticasone propionate/ salmeterol 250-50 MICROgram(s) Diskus 1 Dose(s) Inhalation two times a day  multiple electrolytes Injection Type 1 1000 milliLiter(s) (75 mL/Hr) IV Continuous <Continuous>  multiple electrolytes Injection Type 1 1000 milliLiter(s) (75 mL/Hr) IV Continuous <Continuous>  multivitamin 1 Tablet(s) Oral daily  pantoprazole    Tablet 40 milliGRAM(s) Oral two times a day before meals  potassium acid phosphate/sodium acid phosphate tablet (K-PHOS No. 2) 1 Tablet(s) Oral four times a day with meals  sodium chloride 0.65% Nasal 1 Spray(s) Both Nostrils four times a day  tamsulosin 0.4 milliGRAM(s) Oral at bedtime  tiotropium 18 MICROgram(s) Capsule 1 Capsule(s) Inhalation daily    MEDICATIONS  (PRN):  benzocaine 15 mG/menthol 3.6 mG Lozenge 1 Lozenge Oral every 4 hours PRN Sore Throat  benzonatate 100 milliGRAM(s) Oral three times a day PRN Cough  ondansetron Injectable 4 milliGRAM(s) IV Push every 4 hours PRN Nausea and/or Vomiting      Allergies    codeine (Unknown)  no veges (Unknown)  penicillin (Unknown)    Intolerances    DO NOT SEND ORANGES (Unknown)  Symbicort (Short breath)      Vital Signs Last 24 Hrs  T(C): 36.2 (26 Jan 2018 06:18), Max: 36.9 (25 Jan 2018 14:27)  T(F): 97.2 (26 Jan 2018 06:18), Max: 98.4 (25 Jan 2018 14:27)  HR: 109 (26 Jan 2018 06:18) (97 - 109)  BP: 122/78 (26 Jan 2018 06:18) (92/60 - 123/71)  BP(mean): --  RR: 16 (26 Jan 2018 06:18) (16 - 18)  SpO2: 97% (26 Jan 2018 06:18) (95% - 98%)    LABS:                        10.1   9.4   )-----------( 298      ( 25 Jan 2018 06:49 )             31.5     01-25    136  |  101  |  6.0<L>  ----------------------------<  143<H>  4.5   |  26.0  |  0.37<L>    Ca    7.2<L>      25 Jan 2018 06:49  Phos  2.5     01-25  Mg     1.9     01-25            RADIOLOGY & ADDITIONAL TESTS:

## 2018-01-26 NOTE — CHART NOTE - NSCHARTNOTEFT_GEN_A_CORE
Pt with vomiting over night, made NPO. TPN to continue, ileostomy working well. Pt c/o eating too much yesterday. Team ordered clears to start today. Continue to monitor po intake, tolerance. Encouraged small meals. Continue to fu.

## 2018-01-26 NOTE — PROGRESS NOTE ADULT - SUBJECTIVE AND OBJECTIVE BOX
FRANDY BAUTISTA    [id number]    67y      Male    Patient is a 67y old  Male who presents with a chief complaint of SOB (15 Dec 2017 10:17)      INTERVAL HPI/OVERNIGHT EVENTS:    Patient is feeling much better, no more nausea, eating well and tolerating well, he has been on TPN, he has no abdominal pain, denies fever, chills, chest pain.     REVIEW OF SYSTEMS:    CONSTITUTIONAL: No fever, has fatigue  RESPIRATORY: No cough, little shortness of breath  CARDIOVASCULAR: No chest pain, no palpitations.   GASTROINTESTINAL: No abdominal, No nausea, vomiting  NEUROLOGICAL: No headaches,  loss of strength.  MISCELLANEOUS: No joint swelling or pain.          Vital Signs Last 24 Hrs  T(C): 36.5 (26 Jan 2018 09:55), Max: 36.5 (25 Jan 2018 21:49)  T(F): 97.7 (26 Jan 2018 09:55), Max: 97.7 (25 Jan 2018 21:49)  HR: 105 (26 Jan 2018 09:55) (97 - 109)  BP: 118/72 (26 Jan 2018 09:55) (92/60 - 123/71)  BP(mean): --  RR: 16 (26 Jan 2018 09:55) (16 - 18)  SpO2: 96% (26 Jan 2018 09:55) (95% - 98%)    PHYSICAL EXAM:    GENERAL: Thin built  Elderly male looking comfortable  HEENT: MADELINE  NECK: soft, Supple, No JVD,   CHEST/LUNG: Decrease air entry bilaterally; No wheezing  HEART: S1S2+, Regular rate and rhythm; No murmurs  ABDOMEN: Soft, Nontender, Bowel sounds present, s/p Ileostomy with stool in the bag  EXTREMITIES:  1+ Peripheral Pulses, No edema  SKIN: No rashes or lesions  NEURO: AAOX3, no focal deficits, no motor r sensory loss  PSYCH: normal mood      LABS:                        10.0   10.5  )-----------( 319      ( 26 Jan 2018 08:15 )             31.6     01-26    142  |  103  |  7.0<L>  ----------------------------<  156<H>  4.4   |  30.0<H>  |  0.39<L>    Ca    8.1<L>      26 Jan 2018 08:15  Phos  1.8     01-26  Mg     1.8     01-26              I&O's Summary    25 Jan 2018 07:01 - 26 Jan 2018 07:00  --------------------------------------------------------  IN: 415 mL / OUT: 1400 mL / NET: -985 mL    26 Jan 2018 07:01  -  26 Jan 2018 15:58  --------------------------------------------------------  IN: 0 mL / OUT: 350 mL / NET: -350 mL        MEDICATIONS  (STANDING):  aspirin  chewable 81 milliGRAM(s) Oral daily  dextrose 5%. 1000 milliLiter(s) (50 mL/Hr) IV Continuous <Continuous>  dextrose 50% Injectable 12.5 Gram(s) IV Push once  dextrose 50% Injectable 25 Gram(s) IV Push once  diltiazem    milliGRAM(s) Oral daily  enoxaparin Injectable 40 milliGRAM(s) SubCutaneous daily  fat emulsion (Plant Based) 20% Infusion 0.77 Gm/kG/Day (31.425 mL/Hr) IV Continuous <Continuous>  FIRST- Mouthwash  BLM 10 milliLiter(s) Swish and Spit two times a day  fluticasone propionate/ salmeterol 250-50 MICROgram(s) Diskus 1 Dose(s) Inhalation two times a day  multiple electrolytes Injection Type 1 1000 milliLiter(s) (75 mL/Hr) IV Continuous <Continuous>  multiple electrolytes Injection Type 1 1000 milliLiter(s) (75 mL/Hr) IV Continuous <Continuous>  multivitamin 1 Tablet(s) Oral daily  pantoprazole    Tablet 40 milliGRAM(s) Oral two times a day before meals  Parenteral Nutrition - Adult 1 Each (83 mL/Hr) TPN Continuous <Continuous>  potassium acid phosphate/sodium acid phosphate tablet (K-PHOS No. 2) 1 Tablet(s) Oral four times a day with meals  sodium chloride 0.65% Nasal 1 Spray(s) Both Nostrils four times a day  tamsulosin 0.4 milliGRAM(s) Oral at bedtime  tiotropium 18 MICROgram(s) Capsule 1 Capsule(s) Inhalation daily    MEDICATIONS  (PRN):  benzocaine 15 mG/menthol 3.6 mG Lozenge 1 Lozenge Oral every 4 hours PRN Sore Throat  benzonatate 100 milliGRAM(s) Oral three times a day PRN Cough  ondansetron Injectable 4 milliGRAM(s) IV Push every 4 hours PRN Nausea and/or Vomiting FRANDY BAUTITSA    [id number]    67y      Male    Patient is a 67y old  Male who presents with a chief complaint of SOB (15 Dec 2017 10:17)      INTERVAL HPI/OVERNIGHT EVENTS:    Patient has rough night last night, he eat alot yesterday and has vomitings at the night time, feeling better today, still have some nausea,  no more vomiting, he has been on TPN, he has no abdominal pain, denies fever, chills, chest pain.     REVIEW OF SYSTEMS:    CONSTITUTIONAL: No fever, has fatigue  RESPIRATORY: No cough, no shortness of breath  CARDIOVASCULAR: No chest pain, no palpitations.   GASTROINTESTINAL: No abdominal, No nausea, vomiting  NEUROLOGICAL: No headaches,  loss of strength.  MISCELLANEOUS: No joint swelling or pain.          Vital Signs Last 24 Hrs  T(C): 36.5 (26 Jan 2018 09:55), Max: 36.5 (25 Jan 2018 21:49)  T(F): 97.7 (26 Jan 2018 09:55), Max: 97.7 (25 Jan 2018 21:49)  HR: 105 (26 Jan 2018 09:55) (97 - 109)  BP: 118/72 (26 Jan 2018 09:55) (92/60 - 123/71)  RR: 16 (26 Jan 2018 09:55) (16 - 18)  SpO2: 96% (26 Jan 2018 09:55) (95% - 98%)    PHYSICAL EXAM:    GENERAL: Thin built  Elderly male looking comfortable  HEENT: MADELINE  NECK: soft, Supple, No JVD,   CHEST/LUNG: Decrease air entry bilaterally; No wheezing  HEART: S1S2+, Regular rate and rhythm; No murmurs  ABDOMEN: Soft, Nontender, Bowel sounds present, s/p Ileostomy with stool in the bag  EXTREMITIES:  1+ Peripheral Pulses, No edema  SKIN: No rashes or lesions  NEURO: AAOX3, no focal deficits, no motor r sensory loss  PSYCH: normal mood      LABS:                        10.0   10.5  )-----------( 319      ( 26 Jan 2018 08:15 )             31.6     01-26    142  |  103  |  7.0<L>  ----------------------------<  156<H>  4.4   |  30.0<H>  |  0.39<L>    Ca    8.1<L>      26 Jan 2018 08:15  Phos  1.8     01-26  Mg     1.8     01-26              I&O's Summary    25 Jan 2018 07:01  -  26 Jan 2018 07:00  --------------------------------------------------------  IN: 415 mL / OUT: 1400 mL / NET: -985 mL    26 Jan 2018 07:01  -  26 Jan 2018 15:58  --------------------------------------------------------  IN: 0 mL / OUT: 350 mL / NET: -350 mL        MEDICATIONS  (STANDING):  aspirin  chewable 81 milliGRAM(s) Oral daily  dextrose 5%. 1000 milliLiter(s) (50 mL/Hr) IV Continuous <Continuous>  dextrose 50% Injectable 12.5 Gram(s) IV Push once  dextrose 50% Injectable 25 Gram(s) IV Push once  diltiazem    milliGRAM(s) Oral daily  enoxaparin Injectable 40 milliGRAM(s) SubCutaneous daily  fat emulsion (Plant Based) 20% Infusion 0.77 Gm/kG/Day (31.425 mL/Hr) IV Continuous <Continuous>  FIRST- Mouthwash  BLM 10 milliLiter(s) Swish and Spit two times a day  fluticasone propionate/ salmeterol 250-50 MICROgram(s) Diskus 1 Dose(s) Inhalation two times a day  multiple electrolytes Injection Type 1 1000 milliLiter(s) (75 mL/Hr) IV Continuous <Continuous>  multiple electrolytes Injection Type 1 1000 milliLiter(s) (75 mL/Hr) IV Continuous <Continuous>  multivitamin 1 Tablet(s) Oral daily  pantoprazole    Tablet 40 milliGRAM(s) Oral two times a day before meals  Parenteral Nutrition - Adult 1 Each (83 mL/Hr) TPN Continuous <Continuous>  potassium acid phosphate/sodium acid phosphate tablet (K-PHOS No. 2) 1 Tablet(s) Oral four times a day with meals  sodium chloride 0.65% Nasal 1 Spray(s) Both Nostrils four times a day  tamsulosin 0.4 milliGRAM(s) Oral at bedtime  tiotropium 18 MICROgram(s) Capsule 1 Capsule(s) Inhalation daily    MEDICATIONS  (PRN):  benzocaine 15 mG/menthol 3.6 mG Lozenge 1 Lozenge Oral every 4 hours PRN Sore Throat  benzonatate 100 milliGRAM(s) Oral three times a day PRN Cough  ondansetron Injectable 4 milliGRAM(s) IV Push every 4 hours PRN Nausea and/or Vomiting

## 2018-01-26 NOTE — PROGRESS NOTE ADULT - ASSESSMENT
67 year old male with PMH of COPD stage 4 s/p right lung reduction in 2010 (on home oxygen 4L (sat 92-93%), HTN, CVA on 2014, DM, hypothyroidism, hydrocele, and nephrolithiasis who is admitted for a COPD exacerbation. Patient has been weaned off ventimask to 4 liters nasal cannula and respiratory status is stable. CTA of the chest was negative for PE but showed bilateral lower lobe infiltrates with mucous plugging for which he was started on Levaquin.  Respiratory status stable and steroids are being tapered. Hospital course complicated by constipation, which had not improved despite aggressive bowel regimen, laxatives and enemas. XRAY with large stool burden. CT with diaphragmatic hernia and patient was consequently taken to the OR for loop ileostomy under local anesthesia. Pt cont, to have copious amount in ileostomy. Pt is noted to have hyponatremia due to high output in ileostomy, got IVF, hyponatremia and hypokalemia improving, ileostomy secretion are getting thicker, he is tolerating diet, ostomy care as per ostomy RN,  he is continued with cholestyramine, he has been continued with IV hydration, Ileostomy out is still high, but secretions are getting thicker, monitored, I/Os, being continued with IV fluid rate to 125ml/h and GI has increased Octreotide to 150mcgs tid,  has CT abdomen and Plevis done, results reviewed, GI add Imodium 2 mg po bid and continued with octreotide along, GI recommended Surgical consult for Ileostomy reversal and pulmonary consult for optimization for surgery, appreciate surgical and pulmonary consults, GI recommended to get  CT abdomen and pelvis with oral contrast, done showed Proximal jejunum emptying into a loop jejunostomy the right upper quadrant as described. No contrast seen within the distal small bowel, Large bilateral scrotal hydroceles, talked with surgical team, they did the upper GI series confirmed Jejunostomy, patient is s/p jejunostomy closure and s/p Ileostomy was eating and drinking later on developed, nausea and vomiting, NG was put in and XRays were done showed penumoperitonium, CT abdomen is done result pending, surgery is closely following the case, cont with NG suction, cont NPO and NGT- change Cardizem IVP nd protonic to IV.   During the earlier part of the hospitalization patient was treated for acute on chronic hypoxic respiratory failure secondary to COPD exacerbation and CAP, was treated with steroids now tapered and duo nebs, SOB resolved, he uses home oxygen (4 liters), bicarb elevated likely due to metabolic compensation for resp acidosis  continue bronchodilators and prednisone tapered off and completed Levaquin as well.  His electrolytes monitored and replaced accordingly, he was found to have right femoral artery dissection as incidental finding on CT, no intervention as per vascular, follow up with vascular clinic as out patient.     Plan:    1. High out put from Ileostomy: Patient has been continued on cholestyramine, Octreotide, Imodium, he is tolerating diet, ostomy care as per ostomy RN, IV hydration, Ileostomy output is still high, but secretions are thicker, encouraged for oral hydration, monitor I/Os, GI is following, GI recommended Surgical consult for Ileostomy reversal and pulmonary consult for optimization for surgery, appreciate surgical and pulmonary consults, GI has changed his  diet to high fiber diet, fluid restriction, he is feeling little dehydrated, his electrolytes has been stable will monitor I/O, BMP, will continued with IV fluids, GI recommended CT abdomen and pelvis with oral contrast, done today showed Proximal jejunum emptying into a loop jejunostomy the right upper quadrant as described. No contrast seen within the distal small bowel, Large bilateral scrotal hydroceles, talked with surgical team surgical team, they did the upper GI series confirmed Jejunostomy, patient is s/p jejunostomy closure and s/p Ileostomy was eating and drinking later on developed, nausea and vomiting, NG was put in and XRays were done showed penumoperitonium, CT abdomen done showed same pneumo peritoneum, surgery is follwoing closely, as per surgery team, patient is most likely having refeeding syndrome, NG secretions are less, NG was  taken out, started on oral feeding able to tolerate well, he is also getting TPN, change Cardizem po as well as protonix, patient Ileostomy is working well, has no abdominal pain, tolerating diet well.       2. Acute on chronic hypoxic respiratory failure secondary to COPD exacerbation, improved, he is at his baseline, on home oxygen (4 liters), continue bronchodilators and prednisone tapered off, continue Mucomyst PRN, completed course of Levaquin, cough better with tensilon otoniel, his looks stable from respiratory stand point.     3. CAP - completed course of Levaquin on 12/5.     4. Hypertension - stable   - continue cardizem    5. Hyperlipidemia   - continue statin    6.hyponatremia: resolved, it was due to high ileostomy output.   Hypokalemia - being replaced  Hypophosphatemia: improved  Hypomagnesemia: improved  on multiple electrolyte sol.   monitor Electrolytes    7. Scrotal edema chronic with urinary retention, no more retention, Roy if not able to urinate  - no redness going down  - patient following with  as outpatient  - cont scrotal elevation    8. Right femoral artery dissection  - incidental finding on CT, no intervention as per vascular       9. DVT Prophylaxis - Lovenox       10. anxiety: xanax at hs PRN.    11. Afib: On Cardizem not on anticoagulation, will continue monitoring HR, if high will adjust meds.     12. Hx of TIA: patient of was plavix, will resume tomorrow, he is on aspirin at this point of view. 67 year old male with PMH of COPD stage 4 s/p right lung reduction in 2010 (on home oxygen 4L (sat 92-93%), HTN, CVA on 2014, DM, hypothyroidism, hydrocele, and nephrolithiasis who is admitted for a COPD exacerbation. Patient has been weaned off ventimask to 4 liters nasal cannula and respiratory status is stable. CTA of the chest was negative for PE but showed bilateral lower lobe infiltrates with mucous plugging for which he was started on Levaquin.  Respiratory status stable and steroids are being tapered. Hospital course complicated by constipation, which had not improved despite aggressive bowel regimen, laxatives and enemas. XRAY with large stool burden. CT with diaphragmatic hernia and patient was consequently taken to the OR for loop ileostomy under local anesthesia. Pt cont, to have copious amount in ileostomy. Pt is noted to have hyponatremia due to high output in ileostomy, got IVF, hyponatremia and hypokalemia improving, ileostomy secretion are getting thicker, he is tolerating diet, ostomy care as per ostomy RN,  he is continued with cholestyramine, he has been continued with IV hydration, Ileostomy out is still high, but secretions are getting thicker, monitored, I/Os, being continued with IV fluid rate to 125ml/h and GI has increased Octreotide to 150mcgs tid,  has CT abdomen and Plevis done, results reviewed, GI add Imodium 2 mg po bid and continued with octreotide along, GI recommended Surgical consult for Ileostomy reversal and pulmonary consult for optimization for surgery, appreciate surgical and pulmonary consults, GI recommended to get  CT abdomen and pelvis with oral contrast, done showed Proximal jejunum emptying into a loop jejunostomy the right upper quadrant as described. No contrast seen within the distal small bowel, Large bilateral scrotal hydroceles, talked with surgical team, they did the upper GI series confirmed Jejunostomy, patient is s/p jejunostomy closure and s/p Ileostomy was eating and drinking later on developed, nausea and vomiting, NG was put in and XRays were done showed penumoperitonium, CT abdomen done showed same pneumo peritoneum, surgery is follwing closely, as per surgery team, patient is most likely having refeeding syndrome, NG secretions are less, NG was  taken out, started on oral feeding able to tolerate well, he has been started  getting TPN, change Cardizem po as well as protonix, patient Ileostomy is working well, has no abdominal pain, tolerating diet well.   During the earlier part of the hospitalization patient was treated for acute on chronic hypoxic respiratory failure secondary to COPD exacerbation and CAP, was treated with steroids now tapered and duo nebs, SOB resolved, he uses home oxygen (4 liters), bicarb elevated likely due to metabolic compensation for resp acidosis  continue bronchodilators and prednisone tapered off and completed Levaquin as well.  His electrolytes monitored and replaced accordingly, he was found to have right femoral artery dissection as incidental finding on CT, no intervention as per vascular, follow up with vascular clinic as out patient.     Plan:    1. High out put from Ileostomy: Patient has been continued on cholestyramine, Octreotide, Imodium, he is tolerating diet, ostomy care as per ostomy RN, IV hydration, Ileostomy output is still high, but secretions are thicker, encouraged for oral hydration, monitor I/Os, GI is following, GI recommended Surgical consult for Ileostomy reversal and pulmonary consult for optimization for surgery, appreciate surgical and pulmonary consults, GI has changed his  diet to high fiber diet, fluid restriction, he is feeling little dehydrated, his electrolytes has been stable will monitor I/O, BMP, will continued with IV fluids, GI recommended CT abdomen and pelvis with oral contrast, done today showed Proximal jejunum emptying into a loop jejunostomy the right upper quadrant as described. No contrast seen within the distal small bowel, Large bilateral scrotal hydroceles, talked with surgical team surgical team, they did the upper GI series confirmed Jejunostomy, patient is s/p jejunostomy closure and s/p Ileostomy was eating and drinking later on developed, nausea and vomiting, NG was put in and XRays were done showed penumoperitonium, CT abdomen done showed same pneumo peritoneum, surgery is follwoing closely, as per surgery team, patient is most likely having refeeding syndrome, NG secretions are less, NG was  taken out, started on oral feeding able to tolerate well, he is also getting TPN, change Cardizem po as well as protonix, patient Ileostomy is working well, has no abdominal pain, he had nausea and vomiting last night, now resolved, he ate alot yesterday, he is on clear liquis now, will advance slowly.       2. Acute on chronic hypoxic respiratory failure secondary to COPD exacerbation, improved, he is at his baseline, on home oxygen (4 liters), continue bronchodilators and prednisone tapered off, continue Mucomyst PRN, completed course of Levaquin, cough better with tensilon otoniel, his looks stable from respiratory stand point.     3. CAP - completed course of Levaquin on 12/5.     4. Hypertension - stable   - continue cardizem    5. Hyperlipidemia   - continue statin    6.hyponatremia: resolved, it was due to high ileostomy output.   Hypokalemia - being replaced  Hypophosphatemia: improved  Hypomagnesemia: improved  on multiple electrolyte sol.   monitor Electrolytes    7. Scrotal edema chronic with urinary retention, no more retention, Roy if not able to urinate  - no redness going down  - patient following with  as outpatient  - cont scrotal elevation    8. Right femoral artery dissection  - incidental finding on CT, no intervention as per vascular       9. DVT Prophylaxis - Lovenox       10. anxiety: xanax at hs PRN.    11. Afib: On Cardizem not on anticoagulation, will continue monitoring HR, if high will adjust meds.     12. Hx of TIA: patient of was plavix, he is on aspirin at this point of view, need to resume Plavix before discharge. 67 year old male with PMH of COPD stage 4 s/p right lung reduction in 2010 (on home oxygen 4L (sat 92-93%), HTN, CVA on 2014, DM, hypothyroidism, hydrocele, and nephrolithiasis who is admitted for a COPD exacerbation. Patient has been weaned off ventimask to 4 liters nasal cannula and respiratory status is stable. CTA of the chest was negative for PE but showed bilateral lower lobe infiltrates with mucous plugging for which he was started on Levaquin.  Respiratory status stable and steroids are being tapered. Hospital course complicated by constipation, which had not improved despite aggressive bowel regimen, laxatives and enemas. XRAY with large stool burden. CT with diaphragmatic hernia and patient was consequently taken to the OR for loop ileostomy under local anesthesia. Pt cont, to have copious amount in ileostomy. Pt is noted to have hyponatremia due to high output in ileostomy, got IVF, hyponatremia and hypokalemia improving, ileostomy secretion are getting thicker, he is tolerating diet, ostomy care as per ostomy RN,  he is continued with cholestyramine, he has been continued with IV hydration, Ileostomy out is still high, but secretions are getting thicker, monitored, I/Os, being continued with IV fluid rate to 125ml/h and GI has increased Octreotide to 150mcgs tid,  has CT abdomen and Plevis done, results reviewed, GI add Imodium 2 mg po bid and continued with octreotide along, GI recommended Surgical consult for Ileostomy reversal and pulmonary consult for optimization for surgery, appreciate surgical and pulmonary consults, GI recommended to get  CT abdomen and pelvis with oral contrast, done showed Proximal jejunum emptying into a loop jejunostomy the right upper quadrant as described. No contrast seen within the distal small bowel, Large bilateral scrotal hydroceles, talked with surgical team, they did the upper GI series confirmed Jejunostomy, patient is s/p jejunostomy closure and s/p Ileostomy was eating and drinking later on developed, nausea and vomiting, NG was put in and XRays were done showed penumoperitonium, CT abdomen done showed same pneumo peritoneum, surgery is follwing closely, as per surgery team, patient is most likely having refeeding syndrome, NG secretions are less, NG was  taken out, started on oral feeding able to tolerate well, he has been started  getting TPN, change Cardizem po as well as protonix, patient Ileostomy is working well, has no abdominal pain, tolerating diet well.   During the earlier part of the hospitalization patient was treated for acute on chronic hypoxic respiratory failure secondary to COPD exacerbation and CAP, was treated with steroids now tapered and duo nebs, SOB resolved, he uses home oxygen (4 liters), bicarb elevated likely due to metabolic compensation for resp acidosis  continue bronchodilators and prednisone tapered off and completed Levaquin as well.  His electrolytes monitored and replaced accordingly, he was found to have right femoral artery dissection as incidental finding on CT, no intervention as per vascular, follow up with vascular clinic as out patient.     Plan:    1. High out put from Ileostomy: Patient has been continued on cholestyramine, Octreotide, Imodium, he is tolerating diet, ostomy care as per ostomy RN, IV hydration, Ileostomy output is still high, but secretions are thicker, encouraged for oral hydration, monitor I/Os, GI is following, GI recommended Surgical consult for Ileostomy reversal and pulmonary consult for optimization for surgery, appreciate surgical and pulmonary consults, GI has changed his  diet to high fiber diet, fluid restriction, he is feeling little dehydrated, his electrolytes has been stable will monitor I/O, BMP, will continued with IV fluids, GI recommended CT abdomen and pelvis with oral contrast, done today showed Proximal jejunum emptying into a loop jejunostomy the right upper quadrant as described. No contrast seen within the distal small bowel, Large bilateral scrotal hydroceles, talked with surgical team surgical team, they did the upper GI series confirmed Jejunostomy, patient is s/p jejunostomy closure and s/p Ileostomy was eating and drinking later on developed, nausea and vomiting, NG was put in and XRays were done showed penumoperitonium, CT abdomen done showed same pneumo peritoneum, surgery is follwoing closely, as per surgery team, patient is most likely having refeeding syndrome, NG secretions are less, NG was  taken out, started on oral feeding able to tolerate well, he is also getting TPN, change Cardizem po as well as protonix, patient Ileostomy is working well, has no abdominal pain, he had nausea and vomiting last night, now resolved, he ate alot yesterday, he is on clear liquis now, will advance slowly.       2. Acute on chronic hypoxic respiratory failure secondary to COPD exacerbation, improved, he is at his baseline, on home oxygen (4 liters), continue bronchodilators and prednisone tapered off, continue Mucomyst PRN, completed course of Levaquin, cough better with tensilon otoniel, his looks stable from respiratory stand point.     3. CAP - completed course of Levaquin on 12/5.     4. Hypertension - stable   - continue cardizem    5. Hyperlipidemia   - continue statin    6.hyponatremia: resolved, it was due to high ileostomy output.   Hypokalemia - being replaced  Hypophosphatemia: improved  Hypomagnesemia: improved  on multiple electrolyte sol.   monitor Electrolytes    7. Scrotal edema chronic with urinary retention, no more retention, Roy if not able to urinate  - no redness going down  - patient following with  as outpatient  - cont scrotal elevation    8. Right femoral artery dissection  - incidental finding on CT, no intervention as per vascular       9. DVT Prophylaxis - Lovenox       10. anxiety: xanax at hs PRN.    11. Afib: On Cardizem not on anticoagulation, will continue monitoring HR, if high will adjust meds.     12. Hx of TIA: patient of was plavix, he is on aspirin at this point of view, need to resume Plavix before discharge.     Dispo: Patient and his wife do not wanted to go for the TOM, working with PT once ready might be going to home with home PT, PT team was requested to work with patient twice a day.

## 2018-01-27 LAB
ANION GAP SERPL CALC-SCNC: 6 MMOL/L — SIGNIFICANT CHANGE UP (ref 5–17)
ANISOCYTOSIS BLD QL: SLIGHT — SIGNIFICANT CHANGE UP
ANISOCYTOSIS BLD QL: SLIGHT — SIGNIFICANT CHANGE UP
BASOPHILS # BLD AUTO: 0 K/UL — SIGNIFICANT CHANGE UP (ref 0–0.2)
BASOPHILS # BLD AUTO: 0 K/UL — SIGNIFICANT CHANGE UP (ref 0–0.2)
BASOPHILS NFR BLD AUTO: 0.3 % — SIGNIFICANT CHANGE UP (ref 0–2)
BASOPHILS NFR BLD AUTO: 0.5 % — SIGNIFICANT CHANGE UP (ref 0–2)
BLD GP AB SCN SERPL QL: SIGNIFICANT CHANGE UP
BUN SERPL-MCNC: 15 MG/DL — SIGNIFICANT CHANGE UP (ref 8–20)
BURR CELLS BLD QL SMEAR: PRESENT — SIGNIFICANT CHANGE UP
BURR CELLS BLD QL SMEAR: PRESENT — SIGNIFICANT CHANGE UP
CALCIUM SERPL-MCNC: 7.4 MG/DL — LOW (ref 8.6–10.2)
CHLORIDE SERPL-SCNC: 105 MMOL/L — SIGNIFICANT CHANGE UP (ref 98–107)
CO2 SERPL-SCNC: 25 MMOL/L — SIGNIFICANT CHANGE UP (ref 22–29)
CREAT SERPL-MCNC: 0.36 MG/DL — LOW (ref 0.5–1.3)
EOSINOPHIL # BLD AUTO: 0.2 K/UL — SIGNIFICANT CHANGE UP (ref 0–0.5)
EOSINOPHIL # BLD AUTO: 0.3 K/UL — SIGNIFICANT CHANGE UP (ref 0–0.5)
EOSINOPHIL NFR BLD AUTO: 1.6 % — SIGNIFICANT CHANGE UP (ref 0–5)
EOSINOPHIL NFR BLD AUTO: 3.2 % — SIGNIFICANT CHANGE UP (ref 0–5)
GLUCOSE SERPL-MCNC: 197 MG/DL — HIGH (ref 70–115)
HCT VFR BLD CALC: 24.2 % — LOW (ref 42–52)
HCT VFR BLD CALC: 24.7 % — LOW (ref 42–52)
HGB BLD-MCNC: 7.6 G/DL — LOW (ref 14–18)
HGB BLD-MCNC: 7.7 G/DL — LOW (ref 14–18)
LYMPHOCYTES # BLD AUTO: 0.8 K/UL — LOW (ref 1–4.8)
LYMPHOCYTES # BLD AUTO: 1 K/UL — SIGNIFICANT CHANGE UP (ref 1–4.8)
LYMPHOCYTES # BLD AUTO: 8.8 % — LOW (ref 20–55)
LYMPHOCYTES # BLD AUTO: 9.3 % — LOW (ref 20–55)
MACROCYTES BLD QL: SLIGHT — SIGNIFICANT CHANGE UP
MAGNESIUM SERPL-MCNC: 1.7 MG/DL — SIGNIFICANT CHANGE UP (ref 1.6–2.6)
MCHC RBC-ENTMCNC: 27.9 PG — SIGNIFICANT CHANGE UP (ref 27–31)
MCHC RBC-ENTMCNC: 28.5 PG — SIGNIFICANT CHANGE UP (ref 27–31)
MCHC RBC-ENTMCNC: 31.2 G/DL — LOW (ref 32–36)
MCHC RBC-ENTMCNC: 31.4 G/DL — LOW (ref 32–36)
MCV RBC AUTO: 89.5 FL — SIGNIFICANT CHANGE UP (ref 80–94)
MCV RBC AUTO: 90.6 FL — SIGNIFICANT CHANGE UP (ref 80–94)
MICROCYTES BLD QL: SLIGHT — SIGNIFICANT CHANGE UP
MICROCYTES BLD QL: SLIGHT — SIGNIFICANT CHANGE UP
MONOCYTES # BLD AUTO: 0.5 K/UL — SIGNIFICANT CHANGE UP (ref 0–0.8)
MONOCYTES # BLD AUTO: 0.7 K/UL — SIGNIFICANT CHANGE UP (ref 0–0.8)
MONOCYTES NFR BLD AUTO: 5.8 % — SIGNIFICANT CHANGE UP (ref 3–10)
MONOCYTES NFR BLD AUTO: 6.1 % — SIGNIFICANT CHANGE UP (ref 3–10)
NEUTROPHILS # BLD AUTO: 6.5 K/UL — SIGNIFICANT CHANGE UP (ref 1.8–8)
NEUTROPHILS # BLD AUTO: 9.8 K/UL — HIGH (ref 1.8–8)
NEUTROPHILS NFR BLD AUTO: 80.2 % — HIGH (ref 37–73)
NEUTROPHILS NFR BLD AUTO: 82.9 % — HIGH (ref 37–73)
OVALOCYTES BLD QL SMEAR: SLIGHT — SIGNIFICANT CHANGE UP
PHOSPHATE SERPL-MCNC: 2.1 MG/DL — LOW (ref 2.4–4.7)
PLAT MORPH BLD: NORMAL — SIGNIFICANT CHANGE UP
PLAT MORPH BLD: NORMAL — SIGNIFICANT CHANGE UP
PLATELET # BLD AUTO: 246 K/UL — SIGNIFICANT CHANGE UP (ref 150–400)
PLATELET # BLD AUTO: 272 K/UL — SIGNIFICANT CHANGE UP (ref 150–400)
POIKILOCYTOSIS BLD QL AUTO: SLIGHT — SIGNIFICANT CHANGE UP
POIKILOCYTOSIS BLD QL AUTO: SLIGHT — SIGNIFICANT CHANGE UP
POTASSIUM SERPL-MCNC: 4.9 MMOL/L — SIGNIFICANT CHANGE UP (ref 3.5–5.3)
POTASSIUM SERPL-SCNC: 4.9 MMOL/L — SIGNIFICANT CHANGE UP (ref 3.5–5.3)
RBC # BLD: 2.67 M/UL — LOW (ref 4.6–6.2)
RBC # BLD: 2.76 M/UL — LOW (ref 4.6–6.2)
RBC # FLD: 18.4 % — HIGH (ref 11–15.6)
RBC # FLD: 18.5 % — HIGH (ref 11–15.6)
RBC BLD AUTO: ABNORMAL
RBC BLD AUTO: PRESENT — SIGNIFICANT CHANGE UP
SODIUM SERPL-SCNC: 136 MMOL/L — SIGNIFICANT CHANGE UP (ref 135–145)
SPHEROCYTES BLD QL SMEAR: SLIGHT — SIGNIFICANT CHANGE UP
TYPE + AB SCN PNL BLD: SIGNIFICANT CHANGE UP
WBC # BLD: 11.9 K/UL — HIGH (ref 4.8–10.8)
WBC # BLD: 8.1 K/UL — SIGNIFICANT CHANGE UP (ref 4.8–10.8)
WBC # FLD AUTO: 11.9 K/UL — HIGH (ref 4.8–10.8)
WBC # FLD AUTO: 8.1 K/UL — SIGNIFICANT CHANGE UP (ref 4.8–10.8)

## 2018-01-27 PROCEDURE — 99232 SBSQ HOSP IP/OBS MODERATE 35: CPT

## 2018-01-27 RX ORDER — ELECTROLYTE SOLUTION,INJ
1 VIAL (ML) INTRAVENOUS
Qty: 0 | Refills: 0 | Status: DISCONTINUED | OUTPATIENT
Start: 2018-01-27 | End: 2018-01-27

## 2018-01-27 RX ADMIN — FLUTICASONE PROPIONATE AND SALMETEROL 1 DOSE(S): 50; 250 POWDER ORAL; RESPIRATORY (INHALATION) at 22:37

## 2018-01-27 RX ADMIN — TIOTROPIUM BROMIDE 1 CAPSULE(S): 18 CAPSULE ORAL; RESPIRATORY (INHALATION) at 12:34

## 2018-01-27 RX ADMIN — FLUTICASONE PROPIONATE AND SALMETEROL 1 DOSE(S): 50; 250 POWDER ORAL; RESPIRATORY (INHALATION) at 08:26

## 2018-01-27 RX ADMIN — Medication 1 SPRAY(S): at 06:22

## 2018-01-27 RX ADMIN — Medication 1 TABLET(S): at 12:59

## 2018-01-27 RX ADMIN — PANTOPRAZOLE SODIUM 40 MILLIGRAM(S): 20 TABLET, DELAYED RELEASE ORAL at 18:13

## 2018-01-27 RX ADMIN — Medication 81 MILLIGRAM(S): at 12:58

## 2018-01-27 RX ADMIN — Medication 83.33 MILLIMOLE(S): at 18:14

## 2018-01-27 RX ADMIN — SODIUM CHLORIDE 75 MILLILITER(S): 9 INJECTION, SOLUTION INTRAVENOUS at 13:00

## 2018-01-27 RX ADMIN — Medication 1 SPRAY(S): at 12:57

## 2018-01-27 RX ADMIN — DIPHENHYDRAMINE HYDROCHLORIDE AND LIDOCAINE HYDROCHLORIDE AND ALUMINUM HYDROXIDE AND MAGNESIUM HYDRO 10 MILLILITER(S): KIT at 18:14

## 2018-01-27 RX ADMIN — Medication 1 SPRAY(S): at 18:15

## 2018-01-27 RX ADMIN — PANTOPRAZOLE SODIUM 40 MILLIGRAM(S): 20 TABLET, DELAYED RELEASE ORAL at 06:23

## 2018-01-27 RX ADMIN — DIPHENHYDRAMINE HYDROCHLORIDE AND LIDOCAINE HYDROCHLORIDE AND ALUMINUM HYDROXIDE AND MAGNESIUM HYDRO 10 MILLILITER(S): KIT at 06:23

## 2018-01-27 RX ADMIN — TAMSULOSIN HYDROCHLORIDE 0.4 MILLIGRAM(S): 0.4 CAPSULE ORAL at 22:37

## 2018-01-27 RX ADMIN — Medication 1 TABLET(S): at 08:34

## 2018-01-27 RX ADMIN — Medication 0.25 MILLIGRAM(S): at 22:37

## 2018-01-27 RX ADMIN — Medication 1 EACH: at 13:00

## 2018-01-27 RX ADMIN — Medication 1 SPRAY(S): at 22:38

## 2018-01-27 RX ADMIN — Medication 1 EACH: at 22:42

## 2018-01-27 NOTE — PROGRESS NOTE ADULT - ASSESSMENT
Patient with drop in hct today with no obvious signs of bleeding, TPN due to malnutrition, ileostomy status for colonic obstruction    1.  Increase PPI to bid  2. Reordered TPN  3. Monitor Hct

## 2018-01-27 NOTE — PROGRESS NOTE ADULT - SUBJECTIVE AND OBJECTIVE BOX
INTERVAL HPI/OVERNIGHT EVENTS:    MEDICATIONS  (STANDING):  aspirin  chewable 81 milliGRAM(s) Oral daily  dextrose 5%. 1000 milliLiter(s) (50 mL/Hr) IV Continuous <Continuous>  dextrose 50% Injectable 12.5 Gram(s) IV Push once  dextrose 50% Injectable 25 Gram(s) IV Push once  diltiazem    milliGRAM(s) Oral daily  enoxaparin Injectable 40 milliGRAM(s) SubCutaneous daily  FIRST- Mouthwash  BLM 10 milliLiter(s) Swish and Spit two times a day  fluticasone propionate/ salmeterol 250-50 MICROgram(s) Diskus 1 Dose(s) Inhalation two times a day  multiple electrolytes Injection Type 1 1000 milliLiter(s) (75 mL/Hr) IV Continuous <Continuous>  multiple electrolytes Injection Type 1 1000 milliLiter(s) (75 mL/Hr) IV Continuous <Continuous>  multivitamin 1 Tablet(s) Oral daily  pantoprazole    Tablet 40 milliGRAM(s) Oral two times a day before meals  Parenteral Nutrition - Adult 1 Each (83 mL/Hr) TPN Continuous <Continuous>  Parenteral Nutrition - Adult 1 Each (83 mL/Hr) TPN Continuous <Continuous>  sodium chloride 0.65% Nasal 1 Spray(s) Both Nostrils four times a day  tamsulosin 0.4 milliGRAM(s) Oral at bedtime  tiotropium 18 MICROgram(s) Capsule 1 Capsule(s) Inhalation daily    MEDICATIONS  (PRN):  ALPRAZolam 0.25 milliGRAM(s) Oral every 6 hours PRN anxiety  benzocaine 15 mG/menthol 3.6 mG Lozenge 1 Lozenge Oral every 4 hours PRN Sore Throat  benzonatate 100 milliGRAM(s) Oral three times a day PRN Cough  ondansetron Injectable 4 milliGRAM(s) IV Push every 4 hours PRN Nausea and/or Vomiting      Allergies    codeine (Unknown)  no veges (Unknown)  penicillin (Unknown)    Intolerances    DO NOT SEND ORANGES (Unknown)  Symbicort (Short breath)      Vital Signs Last 24 Hrs  T(C): 36.9 (27 Jan 2018 11:01), Max: 36.9 (27 Jan 2018 11:01)  T(F): 98.5 (27 Jan 2018 11:01), Max: 98.5 (27 Jan 2018 11:01)  HR: 94 (27 Jan 2018 11:01) (89 - 103)  BP: 104/52 (27 Jan 2018 11:01) (90/55 - 104/52)  BP(mean): --  RR: 18 (27 Jan 2018 11:01) (18 - 20)  SpO2: 94% (27 Jan 2018 11:01) (94% - 99%)    LABS:                        7.7    11.9  )-----------( 272      ( 27 Jan 2018 13:17 )             24.7     01-27    136  |  105  |  15.0  ----------------------------<  197<H>  4.9   |  25.0  |  0.36<L>    Ca    7.4<L>      27 Jan 2018 08:13  Phos  2.1     01-27  Mg     1.7     01-27            RADIOLOGY & ADDITIONAL TESTS: INTERVAL HPI/OVERNIGHT EVENTS: Patient for TPN in setting of ileostomy. Unclear drop in hct. Complains of bilateral upper extremity swelling. Solid stools in the ileostomy bag Otherwise no abdominal pain, vomiting.    MEDICATIONS  (STANDING):  aspirin  chewable 81 milliGRAM(s) Oral daily  dextrose 5%. 1000 milliLiter(s) (50 mL/Hr) IV Continuous <Continuous>  dextrose 50% Injectable 12.5 Gram(s) IV Push once  dextrose 50% Injectable 25 Gram(s) IV Push once  diltiazem    milliGRAM(s) Oral daily  enoxaparin Injectable 40 milliGRAM(s) SubCutaneous daily  FIRST- Mouthwash  BLM 10 milliLiter(s) Swish and Spit two times a day  fluticasone propionate/ salmeterol 250-50 MICROgram(s) Diskus 1 Dose(s) Inhalation two times a day  multiple electrolytes Injection Type 1 1000 milliLiter(s) (75 mL/Hr) IV Continuous <Continuous>  multiple electrolytes Injection Type 1 1000 milliLiter(s) (75 mL/Hr) IV Continuous <Continuous>  multivitamin 1 Tablet(s) Oral daily  pantoprazole    Tablet 40 milliGRAM(s) Oral two times a day before meals  Parenteral Nutrition - Adult 1 Each (83 mL/Hr) TPN Continuous <Continuous>  Parenteral Nutrition - Adult 1 Each (83 mL/Hr) TPN Continuous <Continuous>  sodium chloride 0.65% Nasal 1 Spray(s) Both Nostrils four times a day  tamsulosin 0.4 milliGRAM(s) Oral at bedtime  tiotropium 18 MICROgram(s) Capsule 1 Capsule(s) Inhalation daily    MEDICATIONS  (PRN):  ALPRAZolam 0.25 milliGRAM(s) Oral every 6 hours PRN anxiety  benzocaine 15 mG/menthol 3.6 mG Lozenge 1 Lozenge Oral every 4 hours PRN Sore Throat  benzonatate 100 milliGRAM(s) Oral three times a day PRN Cough  ondansetron Injectable 4 milliGRAM(s) IV Push every 4 hours PRN Nausea and/or Vomiting      Allergies    codeine (Unknown)  no veges (Unknown)  penicillin (Unknown)    Intolerances    DO NOT SEND ORANGES (Unknown)  Symbicort (Short breath)      Vital Signs Last 24 Hrs  T(C): 36.9 (27 Jan 2018 11:01), Max: 36.9 (27 Jan 2018 11:01)  T(F): 98.5 (27 Jan 2018 11:01), Max: 98.5 (27 Jan 2018 11:01)  HR: 94 (27 Jan 2018 11:01) (89 - 103)  BP: 104/52 (27 Jan 2018 11:01) (90/55 - 104/52)  BP(mean): --  RR: 18 (27 Jan 2018 11:01) (18 - 20)  SpO2: 94% (27 Jan 2018 11:01) (94% - 99%)    LABS:                        7.7    11.9  )-----------( 272      ( 27 Jan 2018 13:17 )             24.7     01-27    136  |  105  |  15.0  ----------------------------<  197<H>  4.9   |  25.0  |  0.36<L>    Ca    7.4<L>      27 Jan 2018 08:13  Phos  2.1     01-27  Mg     1.7     01-27            RADIOLOGY & ADDITIONAL TESTS:

## 2018-01-28 LAB
HCT VFR BLD CALC: 25.3 % — LOW (ref 42–52)
HGB BLD-MCNC: 8.1 G/DL — LOW (ref 14–18)
MCHC RBC-ENTMCNC: 27.9 PG — SIGNIFICANT CHANGE UP (ref 27–31)
MCHC RBC-ENTMCNC: 32 G/DL — SIGNIFICANT CHANGE UP (ref 32–36)
MCV RBC AUTO: 87.2 FL — SIGNIFICANT CHANGE UP (ref 80–94)
PLATELET # BLD AUTO: 237 K/UL — SIGNIFICANT CHANGE UP (ref 150–400)
RBC # BLD: 2.9 M/UL — LOW (ref 4.6–6.2)
RBC # FLD: 18.7 % — HIGH (ref 11–15.6)
WBC # BLD: 10.2 K/UL — SIGNIFICANT CHANGE UP (ref 4.8–10.8)
WBC # FLD AUTO: 10.2 K/UL — SIGNIFICANT CHANGE UP (ref 4.8–10.8)

## 2018-01-28 PROCEDURE — 99232 SBSQ HOSP IP/OBS MODERATE 35: CPT

## 2018-01-28 PROCEDURE — 99233 SBSQ HOSP IP/OBS HIGH 50: CPT

## 2018-01-28 RX ORDER — SODIUM,POTASSIUM PHOSPHATES 278-250MG
1 POWDER IN PACKET (EA) ORAL ONCE
Qty: 0 | Refills: 0 | Status: COMPLETED | OUTPATIENT
Start: 2018-01-28 | End: 2018-01-28

## 2018-01-28 RX ORDER — I.V. FAT EMULSION 20 G/100ML
0.77 EMULSION INTRAVENOUS
Qty: 50 | Refills: 0 | Status: DISCONTINUED | OUTPATIENT
Start: 2018-01-28 | End: 2018-01-28

## 2018-01-28 RX ORDER — POTASSIUM PHOSPHATE, MONOBASIC 500 MG/1
1000 TABLET, SOLUBLE ORAL ONCE
Qty: 0 | Refills: 0 | Status: DISCONTINUED | OUTPATIENT
Start: 2018-01-28 | End: 2018-01-28

## 2018-01-28 RX ORDER — SODIUM,POTASSIUM PHOSPHATES 278-250MG
1 POWDER IN PACKET (EA) ORAL ONCE
Qty: 0 | Refills: 0 | Status: DISCONTINUED | OUTPATIENT
Start: 2018-01-28 | End: 2018-01-28

## 2018-01-28 RX ORDER — ELECTROLYTE SOLUTION,INJ
1 VIAL (ML) INTRAVENOUS
Qty: 0 | Refills: 0 | Status: DISCONTINUED | OUTPATIENT
Start: 2018-01-28 | End: 2018-01-28

## 2018-01-28 RX ADMIN — Medication 1 EACH: at 22:57

## 2018-01-28 RX ADMIN — Medication 1 SPRAY(S): at 14:37

## 2018-01-28 RX ADMIN — FLUTICASONE PROPIONATE AND SALMETEROL 1 DOSE(S): 50; 250 POWDER ORAL; RESPIRATORY (INHALATION) at 21:19

## 2018-01-28 RX ADMIN — Medication 1 SPRAY(S): at 22:46

## 2018-01-28 RX ADMIN — TAMSULOSIN HYDROCHLORIDE 0.4 MILLIGRAM(S): 0.4 CAPSULE ORAL at 21:20

## 2018-01-28 RX ADMIN — PANTOPRAZOLE SODIUM 40 MILLIGRAM(S): 20 TABLET, DELAYED RELEASE ORAL at 05:50

## 2018-01-28 RX ADMIN — Medication 81 MILLIGRAM(S): at 14:35

## 2018-01-28 RX ADMIN — Medication 1 TABLET(S): at 14:36

## 2018-01-28 RX ADMIN — Medication 1 SPRAY(S): at 17:18

## 2018-01-28 RX ADMIN — I.V. FAT EMULSION 31.43 GM/KG/DAY: 20 EMULSION INTRAVENOUS at 21:19

## 2018-01-28 RX ADMIN — TIOTROPIUM BROMIDE 1 CAPSULE(S): 18 CAPSULE ORAL; RESPIRATORY (INHALATION) at 13:38

## 2018-01-28 RX ADMIN — Medication 0.25 MILLIGRAM(S): at 05:49

## 2018-01-28 RX ADMIN — Medication 1 SPRAY(S): at 05:49

## 2018-01-28 RX ADMIN — PANTOPRAZOLE SODIUM 40 MILLIGRAM(S): 20 TABLET, DELAYED RELEASE ORAL at 17:17

## 2018-01-28 RX ADMIN — Medication 0.25 MILLIGRAM(S): at 21:20

## 2018-01-28 RX ADMIN — DIPHENHYDRAMINE HYDROCHLORIDE AND LIDOCAINE HYDROCHLORIDE AND ALUMINUM HYDROXIDE AND MAGNESIUM HYDRO 10 MILLILITER(S): KIT at 17:17

## 2018-01-28 RX ADMIN — Medication 1 TABLET(S): at 22:55

## 2018-01-28 RX ADMIN — DIPHENHYDRAMINE HYDROCHLORIDE AND LIDOCAINE HYDROCHLORIDE AND ALUMINUM HYDROXIDE AND MAGNESIUM HYDRO 10 MILLILITER(S): KIT at 05:50

## 2018-01-28 RX ADMIN — SODIUM CHLORIDE 75 MILLILITER(S): 9 INJECTION, SOLUTION INTRAVENOUS at 05:49

## 2018-01-28 RX ADMIN — Medication 300 MILLIGRAM(S): at 05:50

## 2018-01-28 NOTE — PROGRESS NOTE ADULT - ASSESSMENT
Patient with colonic obstruction, severe COPD , ileostomy , malnutrition    1. Will reorder TPN  2. Follow up CBC  3. Advance diet as per surgical team

## 2018-01-28 NOTE — PROGRESS NOTE ADULT - SUBJECTIVE AND OBJECTIVE BOX
INTERVAL HPI/OVERNIGHT EVENTS: FU for TPN. Hct had trended down. Reordered. Ostomy bag shows brown stools. No nausea or vomiting. Still complaining of arm swelling.     MEDICATIONS  (STANDING):  aspirin  chewable 81 milliGRAM(s) Oral daily  dextrose 5%. 1000 milliLiter(s) (50 mL/Hr) IV Continuous <Continuous>  dextrose 50% Injectable 12.5 Gram(s) IV Push once  dextrose 50% Injectable 25 Gram(s) IV Push once  diltiazem    milliGRAM(s) Oral daily  enoxaparin Injectable 40 milliGRAM(s) SubCutaneous daily  FIRST- Mouthwash  BLM 10 milliLiter(s) Swish and Spit two times a day  fluticasone propionate/ salmeterol 250-50 MICROgram(s) Diskus 1 Dose(s) Inhalation two times a day  multiple electrolytes Injection Type 1 1000 milliLiter(s) (75 mL/Hr) IV Continuous <Continuous>  multiple electrolytes Injection Type 1 1000 milliLiter(s) (75 mL/Hr) IV Continuous <Continuous>  multivitamin 1 Tablet(s) Oral daily  pantoprazole    Tablet 40 milliGRAM(s) Oral two times a day before meals  Parenteral Nutrition - Adult 1 Each (83 mL/Hr) TPN Continuous <Continuous>  sodium chloride 0.65% Nasal 1 Spray(s) Both Nostrils four times a day  tamsulosin 0.4 milliGRAM(s) Oral at bedtime  tiotropium 18 MICROgram(s) Capsule 1 Capsule(s) Inhalation daily    MEDICATIONS  (PRN):  ALPRAZolam 0.25 milliGRAM(s) Oral every 6 hours PRN anxiety  benzocaine 15 mG/menthol 3.6 mG Lozenge 1 Lozenge Oral every 4 hours PRN Sore Throat  benzonatate 100 milliGRAM(s) Oral three times a day PRN Cough  ondansetron Injectable 4 milliGRAM(s) IV Push every 4 hours PRN Nausea and/or Vomiting      Allergies    codeine (Unknown)  no veges (Unknown)  penicillin (Unknown)    Intolerances    DO NOT SEND ORANGES (Unknown)  Symbicort (Short breath)      Vital Signs Last 24 Hrs  T(C): 36.8 (28 Jan 2018 04:47), Max: 37.1 (27 Jan 2018 21:33)  T(F): 98.2 (28 Jan 2018 04:47), Max: 98.7 (27 Jan 2018 21:33)  HR: 128 (28 Jan 2018 04:47) (94 - 128)  BP: 99/61 (28 Jan 2018 04:47) (99/61 - 109/63)  BP(mean): --  RR: 19 (28 Jan 2018 04:47) (18 - 19)  SpO2: 92% (27 Jan 2018 16:59) (92% - 94%)    LABS:                        7.7    11.9  )-----------( 272      ( 27 Jan 2018 13:17 )             24.7     01-27    136  |  105  |  15.0  ----------------------------<  197<H>  4.9   |  25.0  |  0.36<L>    Ca    7.4<L>      27 Jan 2018 08:13  Phos  2.1     01-27  Mg     1.7     01-27            RADIOLOGY & ADDITIONAL TESTS:

## 2018-01-28 NOTE — PROGRESS NOTE ADULT - SUBJECTIVE AND OBJECTIVE BOX
CC: Ileostomy for colonic obstruction.  On TPN and oral feeds.  Ileostomy output more like stool.    HPI:  67 YOM w/PMH of COPD stage 4 s/p right lung reduction in 2010, currently on home oxygen 4L (sat 92-93%), HTN, CVA on 2014, DM, hypothyroidism, hydrocele, kidney stones who came because he noted that his O2 sat was 76% today. He mentions more difficult to talk in full sencentces compared to his baseline. He also has a chronic productive cough which has not changed in frequency or color. He mentions that his oxygen machine broke 4 days ago and he got a new one since, but is not sure if its working properly. He denies SOB, increased cough, chest pain, hemoptysis nausea, weakness. Pt has to sleep in a sit position with 2 pillows since he gets SOB if he lyes flat, condition that he attributes to his postnasal drip (29 Nov 2017 02:05)    REVIEW OF SYSTEMS:    Patient denied fever, chills, abdominal pain, nausea, vomiting, cough, shortness of breath, chest pain or palpitations    Vital Signs Last 24 Hrs  T(C): 36.7 (28 Jan 2018 17:08), Max: 37.1 (27 Jan 2018 21:33)  T(F): 98 (28 Jan 2018 17:08), Max: 98.7 (27 Jan 2018 21:33)  HR: 108 (28 Jan 2018 17:08) (104 - 128)  BP: 110/68 (28 Jan 2018 17:08) (99/61 - 110/68)  BP(mean): --  RR: 20 (28 Jan 2018 10:12) (18 - 20)  SpO2: --I&O's Summary    27 Jan 2018 07:01  -  28 Jan 2018 07:00  --------------------------------------------------------  IN: 2079.3 mL / OUT: 2250 mL / NET: -170.7 mL      PHYSICAL EXAM:  GENERAL: Cachexia   HEENT: PERRL, +EOMI, anicteric, no [place]  NECK: Supple, No JVD   CHEST/LUNG:  bilateral rales t  HEART: S1S2 Normal intensity, no murmurs, gallops or rubs noted  ABDOMEN: Soft, BS Normoactive, NT, ND. Ileostomy with bag in place.   EXTREMITIES:  2+ radial and DP pulses noted, no clubbing, cyanosis, or edema noted. Limited mobility   SKIN: No rashes or lesions noted  NEURO: A&Ox3, no focal deficits noted, CN II-XII intact  PSYCH: Depressed mood and affect; insight/judgement appropriate  LABS:                        8.1    10.2  )-----------( 237      ( 28 Jan 2018 10:19 )             25.3     01-27    136  |  105  |  15.0  ----------------------------<  197<H>  4.9   |  25.0  |  0.36<L>    Ca    7.4<L>      27 Jan 2018 08:13  Phos  2.1     01-27  Mg     1.7     01-27          RADIOLOGY & ADDITIONAL TESTS:    MEDICATIONS:  MEDICATIONS  (STANDING):  aspirin  chewable 81 milliGRAM(s) Oral daily  dextrose 5%. 1000 milliLiter(s) (50 mL/Hr) IV Continuous <Continuous>  dextrose 50% Injectable 12.5 Gram(s) IV Push once  dextrose 50% Injectable 25 Gram(s) IV Push once  diltiazem    milliGRAM(s) Oral daily  enoxaparin Injectable 40 milliGRAM(s) SubCutaneous daily  fat emulsion (Plant Based) 20% Infusion 0.77 Gm/kG/Day (31.425 mL/Hr) IV Continuous <Continuous>  FIRST- Mouthwash  BLM 10 milliLiter(s) Swish and Spit two times a day  fluticasone propionate/ salmeterol 250-50 MICROgram(s) Diskus 1 Dose(s) Inhalation two times a day  multivitamin 1 Tablet(s) Oral daily  pantoprazole    Tablet 40 milliGRAM(s) Oral two times a day before meals  Parenteral Nutrition - Adult 1 Each (83 mL/Hr) TPN Continuous <Continuous>  Parenteral Nutrition - Adult 1 Each (83 mL/Hr) TPN Continuous <Continuous>  potassium phosphate / sodium phosphate powder 1 Packet(s) Oral once  sodium chloride 0.65% Nasal 1 Spray(s) Both Nostrils four times a day  tamsulosin 0.4 milliGRAM(s) Oral at bedtime  tiotropium 18 MICROgram(s) Capsule 1 Capsule(s) Inhalation daily    MEDICATIONS  (PRN):  ALPRAZolam 0.25 milliGRAM(s) Oral every 6 hours PRN anxiety  benzocaine 15 mG/menthol 3.6 mG Lozenge 1 Lozenge Oral every 4 hours PRN Sore Throat  benzonatate 100 milliGRAM(s) Oral three times a day PRN Cough  ondansetron Injectable 4 milliGRAM(s) IV Push every 4 hours PRN Nausea and/or Vomiting

## 2018-01-28 NOTE — PROGRESS NOTE ADULT - ASSESSMENT
ASSESSMENT/PLAN:  67yMale s/p revision of ostomy to loop ileostomy and postoperative ileus  Neuro: Pain control as prescribed   Cardio: Monitor Vital signs; Continue home meds  Pulm: Incentive spirometry and deep breathing  GI: Diet: advanced to CC, cont TPN; Monitor bowel function; Bowel regimen   MS: Encourage OOB and ambulation  DVT ppx: Lovenox, ASA  Endo: Continue ISS, Monitor blood glucose   IVF d/c'ed

## 2018-01-28 NOTE — PROGRESS NOTE ADULT - ASSESSMENT
Plan:    1. S/P ileostomy for colonic obstruction -  GI following on TPN, started oral feed. Possible ng refeeding syndrome,  Ileostomy functioning properly . Advancing diet slowly.       2. Acute on chronic hypoxic respiratory failure secondary to COPD exacerbation, lung reduction in the past for hemothorax right.  improved, he is at his baseline, rs), continue bronchodilators symbicort.     3. CAP - completed course of Levaquin on 12/5.     4. Hypertension - stable   - continue cardizem    5. Hyperlipidemia   - continue statin    6.Electrolyte abnormality- Due to short gut syndrome.  Replete as becomes necessary.      7. Scrotal edema chronic with urinary retention, no more retention, Roy if not able to urinate  - no redness going down  - patient following with  as outpatient      8. Right femoral artery dissection  - incidental finding on CT, no intervention as per vascular     9. anxiety: xanax at hs PRN.    10. Afib: On Cardizem not on anticoagulation, will continue monitoring HR, if high will adjust meds.     11. Hx of TIA: patient of was plavix, he is on aspirin at this point of view, need to resume Plavix before discharge.     Dispo: Patient and his wife do not wanted to go for the TOM, working with PT once ready might be going to home with home PT, PT team was requested to work with patient twice a day.

## 2018-01-28 NOTE — PROGRESS NOTE ADULT - SUBJECTIVE AND OBJECTIVE BOX
INTERVAL HPI/OVERNIGHT EVENTS/SUBJECTIVE:  Pt seen and examined at the bedside resting comfortably, Hgb remains stable and pt is asymptomatic.  Pt still on TPN tolerating CLD.  Denies CP, SOB, fever, chills.    ICU Vital Signs Last 24 Hrs  T(C): 36.7 (28 Jan 2018 17:08), Max: 37.1 (27 Jan 2018 21:33)  T(F): 98 (28 Jan 2018 17:08), Max: 98.7 (27 Jan 2018 21:33)  HR: 108 (28 Jan 2018 17:08) (104 - 128)  BP: 110/68 (28 Jan 2018 17:08) (99/61 - 110/68)  RR: 20 (28 Jan 2018 10:12) (18 - 20)      I&O's Detail    27 Jan 2018 07:01  -  28 Jan 2018 07:00  --------------------------------------------------------  IN:    multiple electrolytes Injection Type 1multiple electrolytes Injection Type 1: 750 mL    Solution: 333.3 mL    TPN (Total Parenteral Nutrition): 996 mL  Total IN: 2079.3 mL    OUT:    Ileostomy: 150 mL    Voided: 2100 mL  Total OUT: 2250 mL    Total NET: -170.7 mL      MEDICATIONS  (STANDING):  aspirin  chewable 81 milliGRAM(s) Oral daily  dextrose 5%. 1000 milliLiter(s) (50 mL/Hr) IV Continuous <Continuous>  dextrose 50% Injectable 12.5 Gram(s) IV Push once  dextrose 50% Injectable 25 Gram(s) IV Push once  diltiazem    milliGRAM(s) Oral daily  enoxaparin Injectable 40 milliGRAM(s) SubCutaneous daily  fat emulsion (Plant Based) 20% Infusion 0.77 Gm/kG/Day (31.425 mL/Hr) IV Continuous <Continuous>  FIRST- Mouthwash  BLM 10 milliLiter(s) Swish and Spit two times a day  fluticasone propionate/ salmeterol 250-50 MICROgram(s) Diskus 1 Dose(s) Inhalation two times a day  multivitamin 1 Tablet(s) Oral daily  pantoprazole    Tablet 40 milliGRAM(s) Oral two times a day before meals  Parenteral Nutrition - Adult 1 Each (83 mL/Hr) TPN Continuous <Continuous>  Parenteral Nutrition - Adult 1 Each (83 mL/Hr) TPN Continuous <Continuous>  potassium phosphate / sodium phosphate powder 1 Packet(s) Oral once  sodium chloride 0.65% Nasal 1 Spray(s) Both Nostrils four times a day  tamsulosin 0.4 milliGRAM(s) Oral at bedtime  tiotropium 18 MICROgram(s) Capsule 1 Capsule(s) Inhalation daily    MEDICATIONS  (PRN):  ALPRAZolam 0.25 milliGRAM(s) Oral every 6 hours PRN anxiety  benzocaine 15 mG/menthol 3.6 mG Lozenge 1 Lozenge Oral every 4 hours PRN Sore Throat  benzonatate 100 milliGRAM(s) Oral three times a day PRN Cough  ondansetron Injectable 4 milliGRAM(s) IV Push every 4 hours PRN Nausea and/or Vomiting      PHYSICAL EXAM:    Constitutional: NAD, resting comfortably, WNWD  Eyes: PERRLA, EOM intact   Head: NCAT  Neck: trachea midline, FROM  Respiratory: CTA b/l, no WRR  Cardiovascular: S1S2, RRR  Gastrointestinal: abd. soft, NT/ND, +BSx4, ostomy with brown stool in bag  Extremities: LE edema b/l 2+, elbow edema b/l as well   Neurological: AOx3, sensation grossly intact  Skin: warm, dry, intact       LABS:  CBC Full  -  ( 28 Jan 2018 10:19 )  WBC Count : 10.2 K/uL  Hemoglobin : 8.1 g/dL  Hematocrit : 25.3 %  Platelet Count - Automated : 237 K/uL  Mean Cell Volume : 87.2 fl  Mean Cell Hemoglobin : 27.9 pg  Mean Cell Hemoglobin Concentration : 32.0 g/dL    01-27    136  |  105  |  15.0  ----------------------------<  197<H>  4.9   |  25.0  |  0.36<L>    Ca    7.4<L>      27 Jan 2018 08:13  Phos  2.1     01-27  Mg     1.7     01-27

## 2018-01-29 LAB
ALBUMIN SERPL ELPH-MCNC: 2.4 G/DL — LOW (ref 3.3–5.2)
ALP SERPL-CCNC: 131 U/L — HIGH (ref 40–120)
ALT FLD-CCNC: 235 U/L — HIGH
ANION GAP SERPL CALC-SCNC: 9 MMOL/L — SIGNIFICANT CHANGE UP (ref 5–17)
AST SERPL-CCNC: 161 U/L — HIGH
BILIRUB DIRECT SERPL-MCNC: 0.1 MG/DL — SIGNIFICANT CHANGE UP (ref 0–0.3)
BILIRUB INDIRECT FLD-MCNC: 0.1 MG/DL — LOW (ref 0.2–1)
BILIRUB SERPL-MCNC: 0.2 MG/DL — LOW (ref 0.4–2)
BUN SERPL-MCNC: 9 MG/DL — SIGNIFICANT CHANGE UP (ref 8–20)
CALCIUM SERPL-MCNC: 7.8 MG/DL — LOW (ref 8.6–10.2)
CHLORIDE SERPL-SCNC: 100 MMOL/L — SIGNIFICANT CHANGE UP (ref 98–107)
CO2 SERPL-SCNC: 25 MMOL/L — SIGNIFICANT CHANGE UP (ref 22–29)
CREAT SERPL-MCNC: 0.33 MG/DL — LOW (ref 0.5–1.3)
GLUCOSE SERPL-MCNC: 142 MG/DL — HIGH (ref 70–115)
MAGNESIUM SERPL-MCNC: 1.7 MG/DL — SIGNIFICANT CHANGE UP (ref 1.6–2.6)
PHOSPHATE SERPL-MCNC: 1.6 MG/DL — LOW (ref 2.4–4.7)
POTASSIUM SERPL-MCNC: 4.4 MMOL/L — SIGNIFICANT CHANGE UP (ref 3.5–5.3)
POTASSIUM SERPL-SCNC: 4.4 MMOL/L — SIGNIFICANT CHANGE UP (ref 3.5–5.3)
PROT SERPL-MCNC: 5.6 G/DL — LOW (ref 6.6–8.7)
SODIUM SERPL-SCNC: 134 MMOL/L — LOW (ref 135–145)

## 2018-01-29 PROCEDURE — 99233 SBSQ HOSP IP/OBS HIGH 50: CPT

## 2018-01-29 PROCEDURE — 99223 1ST HOSP IP/OBS HIGH 75: CPT

## 2018-01-29 RX ORDER — ELECTROLYTE SOLUTION,INJ
1 VIAL (ML) INTRAVENOUS
Qty: 0 | Refills: 0 | Status: DISCONTINUED | OUTPATIENT
Start: 2018-01-29 | End: 2018-01-30

## 2018-01-29 RX ORDER — I.V. FAT EMULSION 20 G/100ML
0.77 EMULSION INTRAVENOUS
Qty: 50 | Refills: 0 | Status: DISCONTINUED | OUTPATIENT
Start: 2018-01-29 | End: 2018-01-29

## 2018-01-29 RX ADMIN — Medication 81 MILLIGRAM(S): at 12:46

## 2018-01-29 RX ADMIN — TIOTROPIUM BROMIDE 1 CAPSULE(S): 18 CAPSULE ORAL; RESPIRATORY (INHALATION) at 12:19

## 2018-01-29 RX ADMIN — FLUTICASONE PROPIONATE AND SALMETEROL 1 DOSE(S): 50; 250 POWDER ORAL; RESPIRATORY (INHALATION) at 20:28

## 2018-01-29 RX ADMIN — DIPHENHYDRAMINE HYDROCHLORIDE AND LIDOCAINE HYDROCHLORIDE AND ALUMINUM HYDROXIDE AND MAGNESIUM HYDRO 10 MILLILITER(S): KIT at 17:45

## 2018-01-29 RX ADMIN — PANTOPRAZOLE SODIUM 40 MILLIGRAM(S): 20 TABLET, DELAYED RELEASE ORAL at 05:05

## 2018-01-29 RX ADMIN — TAMSULOSIN HYDROCHLORIDE 0.4 MILLIGRAM(S): 0.4 CAPSULE ORAL at 21:51

## 2018-01-29 RX ADMIN — Medication 1 TABLET(S): at 12:46

## 2018-01-29 RX ADMIN — Medication 1 SPRAY(S): at 17:45

## 2018-01-29 RX ADMIN — Medication 1 SPRAY(S): at 12:46

## 2018-01-29 RX ADMIN — Medication 1 SPRAY(S): at 05:06

## 2018-01-29 RX ADMIN — Medication 1 SPRAY(S): at 23:55

## 2018-01-29 RX ADMIN — Medication 0.25 MILLIGRAM(S): at 21:51

## 2018-01-29 RX ADMIN — Medication 300 MILLIGRAM(S): at 05:05

## 2018-01-29 RX ADMIN — DIPHENHYDRAMINE HYDROCHLORIDE AND LIDOCAINE HYDROCHLORIDE AND ALUMINUM HYDROXIDE AND MAGNESIUM HYDRO 10 MILLILITER(S): KIT at 05:06

## 2018-01-29 RX ADMIN — FLUTICASONE PROPIONATE AND SALMETEROL 1 DOSE(S): 50; 250 POWDER ORAL; RESPIRATORY (INHALATION) at 12:46

## 2018-01-29 RX ADMIN — I.V. FAT EMULSION 31.43 GM/KG/DAY: 20 EMULSION INTRAVENOUS at 23:55

## 2018-01-29 RX ADMIN — PANTOPRAZOLE SODIUM 40 MILLIGRAM(S): 20 TABLET, DELAYED RELEASE ORAL at 17:45

## 2018-01-29 NOTE — PROGRESS NOTE ADULT - ASSESSMENT
67yMale s/p revision of ostomy to loop ileostomy and postoperative ileus now resolved  -ileostomy diet  -replete lytes PRN  -no more CBCs-H&H stable and asymptomatic with no etiology  -pain control  -dvt ppx  -taper TPN  -monitor ostomy output  -dispo: patient and wife refuse acute rehab and want home

## 2018-01-29 NOTE — CONSULT NOTE ADULT - SUBJECTIVE AND OBJECTIVE BOX
67M was admitted on 11/29 with SOB. Patient has stage 4 COPD s/p right lung reduction in 2010, currently on home oxygen 4L. Course complicated by by constipation s/p loop ileostomy 12/7 and then reversal and revision of jejunostomy 1/16. Course also complicated by CAP s/p Levaquin and scrotal  edema with retention  which has now resolved, right femoral artery dissection incidentally with no plan for inpatient management. 	  Patient has been advanced with diet, still on TPN and advancing slowly.        REVIEW OF SYSTEMS  Constitutional - No fever, No weight loss, No fatigue  HEENT - No eye pain, No visual disturbances, No difficulty hearing, No tinnitus, No vertigo, No neck pain  Respiratory - No cough, No wheezing, No shortness of breath  Cardiovascular - No chest pain, No palpitations  Gastrointestinal - No abdominal pain, No nausea, No vomiting, No diarrhea, No constipation  Genitourinary - No dysuria, No frequency, No hematuria, No incontinence  Neurological - No headaches, No memory loss, No loss of strength, No numbness, No tremors  Skin - No itching, No rashes, No lesions   Endocrine - No temperature intolerance  Musculoskeletal - No joint pain, No joint swelling, No muscle pain  Psychiatric - No depression, No anxiety    VITALS  T(C): 36.6 (01-29-18 @ 11:32), Max: 36.7 (01-28-18 @ 17:08)  HR: 98 (01-29-18 @ 11:32) (92 - 108)  BP: 127/67 (01-29-18 @ 11:32) (107/65 - 127/67)  RR: 22 (01-29-18 @ 11:32) (18 - 22)  SpO2: 95% (01-29-18 @ 11:32) (92% - 96%)  Wt(kg): --    PAST MEDICAL & SURGICAL HISTORY  Hypothyroidism  Hydrocele  Renal calculi  Diabetes mellitus  Hypertension  CVA (cerebral vascular accident)  Chronic obstructive pulmonary disease, unspecified COPD type  History of lung surgery      SOCIAL HISTORY  Smoking - Denied  EtOH - Denied   Drugs - Denied    FUNCTIONAL HISTORY  Lives with souse, 5 [facility]  Independent, , has RW, but did not need it    CURRENT FUNCTIONAL STATUS  1/29  Bed Mobility  Bed Mobility Training Rehab Potential: good, to achieve stated therapy goals  Bed Mobility Training Scooting: supervsion    Sit-Stand Transfer Training  Sit-to-Stand Transfer Training Rehab Potential: good, to achieve stated therapy goals  Transfer Training Sit-to-Stand Transfer: minimum assist (75% patient effort);  1 person assist;  rolling walker  Transfer Training Stand-to-Sit Transfer: minimum assist (75% patient effort);  1 person assist;  rolling walker  Sit-to-Stand Transfer Training Transfer Safety Analysis: decreased weight-shifting ability;  decreased strength;  impaired balance    Gait Training  Gait Training Rehab Potential: good, to achieve stated therapy goals  Gait Training: minimum assist (75% patient effort);  1 person assist;  rolling walker;  verbal cues;  5 feet      FAMILY HISTORY   No pertinent family history in first degree relatives      RECENT LABS/IMAGING  CBC Full  -  ( 28 Jan 2018 10:19 )  WBC Count : 10.2 K/uL  Hemoglobin : 8.1 g/dL  Hematocrit : 25.3 %  Platelet Count - Automated : 237 K/uL  Mean Cell Volume : 87.2 fl  Mean Cell Hemoglobin : 27.9 pg  Mean Cell Hemoglobin Concentration : 32.0 g/dL  Auto Neutrophil # : x  Auto Lymphocyte # : x  Auto Monocyte # : x  Auto Eosinophil # : x  Auto Basophil # : x  Auto Neutrophil % : x  Auto Lymphocyte % : x  Auto Monocyte % : x  Auto Eosinophil % : x  Auto Basophil % : x    01-29    134<L>  |  100  |  9.0  ----------------------------<  142<H>  4.4   |  25.0  |  0.33<L>    Ca    7.8<L>      29 Jan 2018 05:58  Phos  1.6     01-29  Mg     1.7     01-29    TPro  5.6<L>  /  Alb  2.4<L>  /  TBili  0.2<L>  /  DBili  0.1  /  AST  161<H>  /  ALT  235<H>  /  AlkPhos  131<H>  01-29        ALLERGIES  codeine (Unknown)  DO NOT SEND ORANGES (Unknown)  no veges (Unknown)  penicillin (Unknown)  Symbicort (Short breath)      MEDICATIONS   ALPRAZolam 0.25 milliGRAM(s) Oral every 6 hours PRN  aspirin  chewable 81 milliGRAM(s) Oral daily  benzocaine 15 mG/menthol 3.6 mG Lozenge 1 Lozenge Oral every 4 hours PRN  benzonatate 100 milliGRAM(s) Oral three times a day PRN  dextrose 5%. 1000 milliLiter(s) IV Continuous <Continuous>  dextrose 50% Injectable 12.5 Gram(s) IV Push once  dextrose 50% Injectable 25 Gram(s) IV Push once  diltiazem    milliGRAM(s) Oral daily  enoxaparin Injectable 40 milliGRAM(s) SubCutaneous daily  fat emulsion (Plant Based) 20% Infusion 0.77 Gm/kG/Day IV Continuous <Continuous>  FIRST- Mouthwash  BLM 10 milliLiter(s) Swish and Spit two times a day  fluticasone propionate/ salmeterol 250-50 MICROgram(s) Diskus 1 Dose(s) Inhalation two times a day  multivitamin 1 Tablet(s) Oral daily  ondansetron Injectable 4 milliGRAM(s) IV Push every 4 hours PRN  pantoprazole    Tablet 40 milliGRAM(s) Oral two times a day before meals  Parenteral Nutrition - Adult 1 Each TPN Continuous <Continuous>  Parenteral Nutrition - Adult 1 Each TPN Continuous <Continuous>  sodium chloride 0.65% Nasal 1 Spray(s) Both Nostrils four times a day  tamsulosin 0.4 milliGRAM(s) Oral at bedtime  tiotropium 18 MICROgram(s) Capsule 1 Capsule(s) Inhalation daily 67M was admitted on 11/29 with SOB. Patient has stage 4 COPD s/p right lung reduction in 2010, currently on home oxygen 4L. Course complicated by by constipation s/p loop ileostomy 12/7 and then reversal and revision of jejunostomy 1/16. Course also complicated by CAP s/p Levaquin and scrotal  edema with retention  which has now resolved, right femoral artery dissection incidentally with no plan for inpatient management. 	    Patient has been advanced with diet, still on TPN and advancing slowly.  Patient ate his breakfast and lunch. Worked with PT this AM and at this time.   Tolerating about 5 feet, until he becomes fatigued. Limited by poles.    REVIEW OF SYSTEMS  Constitutional - No fever, +weight loss, +fatigue  HEENT - No eye pain, No visual disturbances, No difficulty hearing, No tinnitus, No vertigo, No neck pain  Respiratory - +cough, No wheezing, +shortness of breath  Cardiovascular - +chest pain, No palpitations  Gastrointestinal - No abdominal pain, +nausea, +vomiting, +diarrhea, No constipation  Genitourinary - No dysuria, No frequency, No hematuria, No incontinence  Neurological - No headaches, No memory loss, +loss of strength, No numbness, No tremors  Skin - No itching, No rashes, No lesions   Endocrine - No temperature intolerance  Musculoskeletal - No joint pain, No joint swelling, No muscle pain  Psychiatric - +depression, No anxiety    VITALS  T(C): 36.6 (01-29-18 @ 11:32), Max: 36.7 (01-28-18 @ 17:08)  HR: 98 (01-29-18 @ 11:32) (92 - 108)  BP: 127/67 (01-29-18 @ 11:32) (107/65 - 127/67)  RR: 22 (01-29-18 @ 11:32) (18 - 22)  SpO2: 95% (01-29-18 @ 11:32) (92% - 96%)  Wt(kg): --    PAST MEDICAL & SURGICAL HISTORY  Hypothyroidism  Hydrocele  Renal calculi  Diabetes mellitus  Hypertension  CVA (cerebral vascular accident)  Chronic obstructive pulmonary disease, unspecified COPD type  History of lung surgery    SOCIAL HISTORY  Smoking - Denied  EtOH - Denied   Drugs - Denied    FUNCTIONAL HISTORY  Lives with souse, 5 [facility]  Independent, , has RW, but did not need it    CURRENT FUNCTIONAL STATUS  1/29  Bed Mobility  Bed Mobility Training Rehab Potential: good, to achieve stated therapy goals  Bed Mobility Training Scooting: supervsion    Sit-Stand Transfer Training  Sit-to-Stand Transfer Training Rehab Potential: good, to achieve stated therapy goals  Transfer Training Sit-to-Stand Transfer: minimum assist (75% patient effort);  1 person assist;  rolling walker  Transfer Training Stand-to-Sit Transfer: minimum assist (75% patient effort);  1 person assist;  rolling walker  Sit-to-Stand Transfer Training Transfer Safety Analysis: decreased weight-shifting ability;  decreased strength;  impaired balance    Gait Training  Gait Training Rehab Potential: good, to achieve stated therapy goals  Gait Training: minimum assist (75% patient effort);  1 person assist;  rolling walker;  verbal cues;  5 feet      FAMILY HISTORY   No pertinent family history in first degree relatives    RECENT LABS/IMAGING  CBC Full  -  ( 28 Jan 2018 10:19 )  WBC Count : 10.2 K/uL  Hemoglobin : 8.1 g/dL  Hematocrit : 25.3 %  Platelet Count - Automated : 237 K/uL  Mean Cell Volume : 87.2 fl  Mean Cell Hemoglobin : 27.9 pg  Mean Cell Hemoglobin Concentration : 32.0 g/dL  Auto Neutrophil # : x  Auto Lymphocyte # : x  Auto Monocyte # : x  Auto Eosinophil # : x  Auto Basophil # : x  Auto Neutrophil % : x  Auto Lymphocyte % : x  Auto Monocyte % : x  Auto Eosinophil % : x  Auto Basophil % : x    01-29    134<L>  |  100  |  9.0  ----------------------------<  142<H>  4.4   |  25.0  |  0.33<L>    Ca    7.8<L>      29 Jan 2018 05:58  Phos  1.6     01-29  Mg     1.7     01-29    TPro  5.6<L>  /  Alb  2.4<L>  /  TBili  0.2<L>  /  DBili  0.1  /  AST  161<H>  /  ALT  235<H>  /  AlkPhos  131<H>  01-29        ALLERGIES  codeine (Unknown)  DO NOT SEND ORANGES (Unknown)  no veges (Unknown)  penicillin (Unknown)  Symbicort (Short breath)      MEDICATIONS   ALPRAZolam 0.25 milliGRAM(s) Oral every 6 hours PRN  aspirin  chewable 81 milliGRAM(s) Oral daily  benzocaine 15 mG/menthol 3.6 mG Lozenge 1 Lozenge Oral every 4 hours PRN  benzonatate 100 milliGRAM(s) Oral three times a day PRN  dextrose 5%. 1000 milliLiter(s) IV Continuous <Continuous>  dextrose 50% Injectable 12.5 Gram(s) IV Push once  dextrose 50% Injectable 25 Gram(s) IV Push once  diltiazem    milliGRAM(s) Oral daily  enoxaparin Injectable 40 milliGRAM(s) SubCutaneous daily  fat emulsion (Plant Based) 20% Infusion 0.77 Gm/kG/Day IV Continuous <Continuous>  FIRST- Mouthwash  BLM 10 milliLiter(s) Swish and Spit two times a day  fluticasone propionate/ salmeterol 250-50 MICROgram(s) Diskus 1 Dose(s) Inhalation two times a day  multivitamin 1 Tablet(s) Oral daily  ondansetron Injectable 4 milliGRAM(s) IV Push every 4 hours PRN  pantoprazole    Tablet 40 milliGRAM(s) Oral two times a day before meals  Parenteral Nutrition - Adult 1 Each TPN Continuous <Continuous>  Parenteral Nutrition - Adult 1 Each TPN Continuous <Continuous>  sodium chloride 0.65% Nasal 1 Spray(s) Both Nostrils four times a day  tamsulosin 0.4 milliGRAM(s) Oral at bedtime  tiotropium 18 MICROgram(s) Capsule 1 Capsule(s) Inhalation daily      ----------------------------------------------------------------------------------------  PHYSICAL EXAM  Constitutional - NAD, Comfortable  HEENT - NCAT, EOMI  Neck - Supple, No limited ROM  Chest - Tachypnea  Cardiovascular - S1S2   Abdomen - Soft   Extremities - No C/C, No calf tenderness, BLE foot/ankle edema  Neurologic Exam -                    Cognitive - Awake, Alert, AAO to self, place, date, year, situation     Communication - Fluent, No dysarthria     Cranial Nerves - CN 2-12 intact     Motor - No focal deficits     Sensory - Intact to LT  Psychiatric - Mood frustrated, depressed  ----------------------------------------------------------------------------------------  ASSESSMENT/PLAN  67y Male with functional deficits after COPD exacerbation, complicated by SBO requiring multiple surgeries.  Diet - TPN  DVT PPX - SCDs  Rehab - Patient currently on TPN which precludes discharge. Patient refusing any dispo except home. Wife understands difference with acute rehab. Pending progression of diet and TPN, may achieve DC goals for home with HOME CARE. Will continue to follow if unable to as patient can be a candidate for ACUTE. Continue PT BID.     Continue bedside therapy as well as OOB throughout the day with mobilization throughout the day with staff to maintain cardiopulmonary function and prevention of secondary complications related to debility.

## 2018-01-29 NOTE — PROGRESS NOTE ADULT - ASSESSMENT
Post op malnutrition.  TPN in place at 2.5 liters per day.  K Phos supplemented in TPN.  Lipids ordered for today.

## 2018-01-29 NOTE — PROGRESS NOTE ADULT - SUBJECTIVE AND OBJECTIVE BOX
Patient seen and examined;  Tolerates TPN.  Denies SOB.  Modest oral intake.  No edema.  Ileostomy output:  1775 cc/ 24 hrs.  No fever.  No hyperglycemia.      PAST MEDICAL & SURGICAL HISTORY:  Hypothyroidism  Hydrocele  Renal calculi  Diabetes mellitus  Hypertension  CVA (cerebral vascular accident)  Chronic obstructive pulmonary disease, unspecified COPD type  History of lung surgery      ROS:  No Heartburn, regurgitation, dysphagia, odynophagia.  No dyspepsia  No abdominal pain.    No Nausea, vomiting.  No Bleeding.  No hematemesis.   No diarrhea.    No hematochesia.  No weight loss, anorexia.  No edema.      MEDICATIONS  (STANDING):  aspirin  chewable 81 milliGRAM(s) Oral daily  dextrose 5%. 1000 milliLiter(s) (50 mL/Hr) IV Continuous <Continuous>  dextrose 50% Injectable 12.5 Gram(s) IV Push once  dextrose 50% Injectable 25 Gram(s) IV Push once  diltiazem    milliGRAM(s) Oral daily  enoxaparin Injectable 40 milliGRAM(s) SubCutaneous daily  fat emulsion (Plant Based) 20% Infusion 0.77 Gm/kG/Day (31.425 mL/Hr) IV Continuous <Continuous>  FIRST- Mouthwash  BLM 10 milliLiter(s) Swish and Spit two times a day  fluticasone propionate/ salmeterol 250-50 MICROgram(s) Diskus 1 Dose(s) Inhalation two times a day  multivitamin 1 Tablet(s) Oral daily  pantoprazole    Tablet 40 milliGRAM(s) Oral two times a day before meals  Parenteral Nutrition - Adult 1 Each (83 mL/Hr) TPN Continuous <Continuous>  Parenteral Nutrition - Adult 1 Each (83 mL/Hr) TPN Continuous <Continuous>  sodium chloride 0.65% Nasal 1 Spray(s) Both Nostrils four times a day  tamsulosin 0.4 milliGRAM(s) Oral at bedtime  tiotropium 18 MICROgram(s) Capsule 1 Capsule(s) Inhalation daily    MEDICATIONS  (PRN):  ALPRAZolam 0.25 milliGRAM(s) Oral every 6 hours PRN anxiety  benzocaine 15 mG/menthol 3.6 mG Lozenge 1 Lozenge Oral every 4 hours PRN Sore Throat  benzonatate 100 milliGRAM(s) Oral three times a day PRN Cough  ondansetron Injectable 4 milliGRAM(s) IV Push every 4 hours PRN Nausea and/or Vomiting      Allergies    codeine (Unknown)  no veges (Unknown)  penicillin (Unknown)    Intolerances    DO NOT SEND ORANGES (Unknown)  Symbicort (Short breath)      Vital Signs Last 24 Hrs  T(C): 36.6 (29 Jan 2018 11:32), Max: 36.7 (28 Jan 2018 23:00)  T(F): 97.9 (29 Jan 2018 11:32), Max: 98.1 (28 Jan 2018 23:00)  HR: 98 (29 Jan 2018 11:32) (92 - 102)  BP: 127/67 (29 Jan 2018 11:32) (107/65 - 127/67)  BP(mean): --  RR: 22 (29 Jan 2018 11:32) (18 - 22)  SpO2: 95% (29 Jan 2018 11:32) (92% - 96%)    PHYSICAL EXAM:    GENERAL: NAD, well-groomed, well-developed  HEAD:  Atraumatic, Normocephalic  EYES: EOMI, PERRLA, conjunctiva and sclera clear  ENMT: No tonsillar erythema, exudates, or enlargement; Moist mucous membranes, Good dentition, No lesions  NECK: Supple, No JVD, Normal thyroid  CHEST/LUNG: Clear to percussion bilaterally; No rales, rhonchi, wheezing, or rubs  HEART: Regular rate and rhythm; No murmurs, rubs, or gallops  ABDOMEN: Soft, Nontender, Nondistended; Bowel sounds present  EXTREMITIES:  2+ Peripheral Pulses, No clubbing, cyanosis, or edema  LYMPH: No lymphadenopathy noted  SKIN: No rashes or lesions      LABS:                        8.1    10.2  )-----------( 237      ( 28 Jan 2018 10:19 )             25.3     01-29    134<L>  |  100  |  9.0  ----------------------------<  142<H>  4.4   |  25.0  |  0.33<L>    Ca    7.8<L>      29 Jan 2018 05:58  Phos  1.6     01-29  Mg     1.7     01-29    TPro  5.6<L>  /  Alb  2.4<L>  /  TBili  0.2<L>  /  DBili  0.1  /  AST  161<H>  /  ALT  235<H>  /  AlkPhos  131<H>  01-29             RADIOLOGY & ADDITIONAL STUDIES:

## 2018-01-29 NOTE — PROGRESS NOTE ADULT - SUBJECTIVE AND OBJECTIVE BOX
Acute Care Surgery /Trauma PROGRESS NOTE:     SUBJECTIVE: Pt seen and examined at bedside. No acute overnight events. Pain well controlled. Tolerating diet, no n/v. Voiding well. Denies f/c/sob/cp. OOB to chair, working on ambulation    Vital Signs Last 24 Hrs  T(C): 36.5 (29 Jan 2018 05:00), Max: 36.7 (28 Jan 2018 17:08)  T(F): 97.7 (29 Jan 2018 05:00), Max: 98.1 (28 Jan 2018 23:00)  HR: 92 (29 Jan 2018 05:00) (92 - 108)  BP: 107/65 (29 Jan 2018 05:00) (107/65 - 114/66)  BP(mean): --  RR: 18 (29 Jan 2018 05:00) (18 - 18)  SpO2: 92% (29 Jan 2018 05:00) (92% - 96%)    OBJECTIVE:  NAD  NC/AT  RRR  No respiratory distress  Abd soft, nondistended, nontender, no guarding or rebound. No peritoneal signs.  ostomy pink and healthy, semisolid brown stool +gas in bag  No pedal edema    I&O's Detail    28 Jan 2018 07:01  -  29 Jan 2018 07:00  --------------------------------------------------------  IN:    fat emulsion (Plant Based) 20% Infusion: 256 mL    TPN (Total Parenteral Nutrition): 1826 mL  Total IN: 2082 mL    OUT:    Ileostomy: 450 mL    Voided: 1875 mL  Total OUT: 2325 mL    Total NET: -243 mL      29 Jan 2018 07:01  -  29 Jan 2018 11:19  --------------------------------------------------------  IN:  Total IN: 0 mL    OUT:    Voided: 375 mL  Total OUT: 375 mL    Total NET: -375 mL          MEDICATIONS  (STANDING):  aspirin  chewable 81 milliGRAM(s) Oral daily  dextrose 5%. 1000 milliLiter(s) (50 mL/Hr) IV Continuous <Continuous>  dextrose 50% Injectable 12.5 Gram(s) IV Push once  dextrose 50% Injectable 25 Gram(s) IV Push once  diltiazem    milliGRAM(s) Oral daily  enoxaparin Injectable 40 milliGRAM(s) SubCutaneous daily  fat emulsion (Plant Based) 20% Infusion 0.77 Gm/kG/Day (31.425 mL/Hr) IV Continuous <Continuous>  FIRST- Mouthwash  BLM 10 milliLiter(s) Swish and Spit two times a day  fluticasone propionate/ salmeterol 250-50 MICROgram(s) Diskus 1 Dose(s) Inhalation two times a day  multivitamin 1 Tablet(s) Oral daily  pantoprazole    Tablet 40 milliGRAM(s) Oral two times a day before meals  Parenteral Nutrition - Adult 1 Each (83 mL/Hr) TPN Continuous <Continuous>  Parenteral Nutrition - Adult 1 Each (83 mL/Hr) TPN Continuous <Continuous>  sodium chloride 0.65% Nasal 1 Spray(s) Both Nostrils four times a day  tamsulosin 0.4 milliGRAM(s) Oral at bedtime  tiotropium 18 MICROgram(s) Capsule 1 Capsule(s) Inhalation daily    MEDICATIONS  (PRN):  ALPRAZolam 0.25 milliGRAM(s) Oral every 6 hours PRN anxiety  benzocaine 15 mG/menthol 3.6 mG Lozenge 1 Lozenge Oral every 4 hours PRN Sore Throat  benzonatate 100 milliGRAM(s) Oral three times a day PRN Cough  ondansetron Injectable 4 milliGRAM(s) IV Push every 4 hours PRN Nausea and/or Vomiting      LABS:                        8.1    10.2  )-----------( 237      ( 28 Jan 2018 10:19 )             25.3     01-29    134<L>  |  100  |  9.0  ----------------------------<  142<H>  4.4   |  25.0  |  0.33<L>    Ca    7.8<L>      29 Jan 2018 05:58  Phos  1.6     01-29  Mg     1.7     01-29    TPro  5.6<L>  /  Alb  2.4<L>  /  TBili  0.2<L>  /  DBili  0.1  /  AST  161<H>  /  ALT  235<H>  /  AlkPhos  131<H>  01-29            RADIOLOGY & ADDITIONAL STUDIES:

## 2018-01-30 DIAGNOSIS — N43.3 HYDROCELE, UNSPECIFIED: ICD-10-CM

## 2018-01-30 LAB
ALBUMIN SERPL ELPH-MCNC: 2.4 G/DL — LOW (ref 3.3–5.2)
ALP SERPL-CCNC: 130 U/L — HIGH (ref 40–120)
ALT FLD-CCNC: 154 U/L — HIGH
ANION GAP SERPL CALC-SCNC: 11 MMOL/L — SIGNIFICANT CHANGE UP (ref 5–17)
AST SERPL-CCNC: 62 U/L — HIGH
BILIRUB SERPL-MCNC: 0.3 MG/DL — LOW (ref 0.4–2)
BUN SERPL-MCNC: 9 MG/DL — SIGNIFICANT CHANGE UP (ref 8–20)
CALCIUM SERPL-MCNC: 8 MG/DL — LOW (ref 8.6–10.2)
CHLORIDE SERPL-SCNC: 102 MMOL/L — SIGNIFICANT CHANGE UP (ref 98–107)
CO2 SERPL-SCNC: 24 MMOL/L — SIGNIFICANT CHANGE UP (ref 22–29)
CREAT SERPL-MCNC: 0.31 MG/DL — LOW (ref 0.5–1.3)
GLUCOSE SERPL-MCNC: 160 MG/DL — HIGH (ref 70–115)
MAGNESIUM SERPL-MCNC: 1.7 MG/DL — SIGNIFICANT CHANGE UP (ref 1.6–2.6)
PHOSPHATE SERPL-MCNC: 2.4 MG/DL — SIGNIFICANT CHANGE UP (ref 2.4–4.7)
POTASSIUM SERPL-MCNC: 4.7 MMOL/L — SIGNIFICANT CHANGE UP (ref 3.5–5.3)
POTASSIUM SERPL-SCNC: 4.7 MMOL/L — SIGNIFICANT CHANGE UP (ref 3.5–5.3)
PROT SERPL-MCNC: 5.7 G/DL — LOW (ref 6.6–8.7)
SODIUM SERPL-SCNC: 137 MMOL/L — SIGNIFICANT CHANGE UP (ref 135–145)

## 2018-01-30 PROCEDURE — 99232 SBSQ HOSP IP/OBS MODERATE 35: CPT

## 2018-01-30 RX ORDER — ELECTROLYTE SOLUTION,INJ
1 VIAL (ML) INTRAVENOUS
Qty: 0 | Refills: 0 | Status: DISCONTINUED | OUTPATIENT
Start: 2018-01-30 | End: 2018-01-31

## 2018-01-30 RX ADMIN — Medication 81 MILLIGRAM(S): at 12:42

## 2018-01-30 RX ADMIN — FLUTICASONE PROPIONATE AND SALMETEROL 1 DOSE(S): 50; 250 POWDER ORAL; RESPIRATORY (INHALATION) at 20:14

## 2018-01-30 RX ADMIN — PANTOPRAZOLE SODIUM 40 MILLIGRAM(S): 20 TABLET, DELAYED RELEASE ORAL at 16:25

## 2018-01-30 RX ADMIN — Medication 1 TABLET(S): at 12:42

## 2018-01-30 RX ADMIN — FLUTICASONE PROPIONATE AND SALMETEROL 1 DOSE(S): 50; 250 POWDER ORAL; RESPIRATORY (INHALATION) at 12:42

## 2018-01-30 RX ADMIN — TAMSULOSIN HYDROCHLORIDE 0.4 MILLIGRAM(S): 0.4 CAPSULE ORAL at 21:04

## 2018-01-30 RX ADMIN — Medication 1 SPRAY(S): at 16:21

## 2018-01-30 RX ADMIN — TIOTROPIUM BROMIDE 1 CAPSULE(S): 18 CAPSULE ORAL; RESPIRATORY (INHALATION) at 12:21

## 2018-01-30 RX ADMIN — Medication 1 EACH: at 00:54

## 2018-01-30 RX ADMIN — Medication 300 MILLIGRAM(S): at 06:12

## 2018-01-30 RX ADMIN — PANTOPRAZOLE SODIUM 40 MILLIGRAM(S): 20 TABLET, DELAYED RELEASE ORAL at 06:12

## 2018-01-30 RX ADMIN — Medication 0.25 MILLIGRAM(S): at 12:42

## 2018-01-30 RX ADMIN — Medication 0.25 MILLIGRAM(S): at 21:04

## 2018-01-30 NOTE — CONSULT NOTE ADULT - PROBLEM SELECTOR RECOMMENDATION 9
Patient has had a normal ef recently by TTe. No significant abnormalities on EKG. No cardiac contraindications to planned procedures. Continue current meds.
Likely due to Steroids  On steroids since admission  taper steroids per pulmonary and medicine  If leukocytosis is persistent check stool for C diff.
Would treat hydroceles conservatively for now since they are not significantly bothersome. Should intervention be requested (electively as out pt) would formally perform a scrotal exploration and excision under regional anesthesia .
Plan as per pulmonary

## 2018-01-30 NOTE — CONSULT NOTE ADULT - ASSESSMENT
Bilateral hydroceles R>L  Pt has severe COPD. Pt has seen a urologist who reportedly recommended possible drainage. I do not recommend drainage since recurrence is 100 % in 1-2 weeks.

## 2018-01-30 NOTE — PROGRESS NOTE ADULT - SUBJECTIVE AND OBJECTIVE BOX
INTERVAL HPI/OVERNIGHT EVENTS: No acute events overnight.    SUBJECTIVE: Continues to receive TPN. Tonight will be final TPN infusion. Tolerating "ileostomy diet" without n/v/d. No fevers, chills, chest pain, dyspnea. Patient and wife deciding about the possibility of acute inpatient rehab after hospitalization.       MEDICATIONS  (STANDING):  aspirin  chewable 81 milliGRAM(s) Oral daily  dextrose 5%. 1000 milliLiter(s) (50 mL/Hr) IV Continuous <Continuous>  dextrose 50% Injectable 12.5 Gram(s) IV Push once  dextrose 50% Injectable 25 Gram(s) IV Push once  diltiazem    milliGRAM(s) Oral daily  enoxaparin Injectable 40 milliGRAM(s) SubCutaneous daily  FIRST- Mouthwash  BLM 10 milliLiter(s) Swish and Spit two times a day  fluticasone propionate/ salmeterol 250-50 MICROgram(s) Diskus 1 Dose(s) Inhalation two times a day  multivitamin 1 Tablet(s) Oral daily  pantoprazole    Tablet 40 milliGRAM(s) Oral two times a day before meals  Parenteral Nutrition - Adult 1 Each (83 mL/Hr) TPN Continuous <Continuous>  Parenteral Nutrition - Adult 1 Each (83 mL/Hr) TPN Continuous <Continuous>  sodium chloride 0.65% Nasal 1 Spray(s) Both Nostrils four times a day  tamsulosin 0.4 milliGRAM(s) Oral at bedtime  tiotropium 18 MICROgram(s) Capsule 1 Capsule(s) Inhalation daily    MEDICATIONS  (PRN):  ALPRAZolam 0.25 milliGRAM(s) Oral every 6 hours PRN anxiety  benzocaine 15 mG/menthol 3.6 mG Lozenge 1 Lozenge Oral every 4 hours PRN Sore Throat  benzonatate 100 milliGRAM(s) Oral three times a day PRN Cough  ondansetron Injectable 4 milliGRAM(s) IV Push every 4 hours PRN Nausea and/or Vomiting      Vital Signs Last 24 Hrs  T(C): 36.3 (30 Jan 2018 09:14), Max: 36.8 (29 Jan 2018 17:03)  T(F): 97.4 (30 Jan 2018 09:14), Max: 98.2 (29 Jan 2018 17:03)  HR: 100 (30 Jan 2018 09:14) (98 - 115)  BP: 124/76 (30 Jan 2018 09:14) (117/75 - 127/67)  BP(mean): --  RR: 18 (30 Jan 2018 06:07) (18 - 22)  SpO2: 100% (30 Jan 2018 06:07) (95% - 100%)    NAD, AOx3, resting comfortably in bed  No respiratory distress  Abdomen soft, nontender, nondistended. Normal bowel sounds. No guarding or rebound.  Ileostomy functioning appropriately with formed stool in bag. Stoma pink and healthy appearing  No peripheral edema. Normal ROM.    I&O's Detail    29 Jan 2018 07:01  -  30 Jan 2018 07:00  --------------------------------------------------------  IN:    fat emulsion (Plant Based) 20% Infusion: 282.8 mL    Oral Fluid: 720 mL    TPN (Total Parenteral Nutrition): 1992 mL  Total IN: 2994.8 mL    OUT:    Ileostomy: 250 mL    Voided: 2880 mL  Total OUT: 3130 mL    Total NET: -135.2 mL      30 Jan 2018 07:01  -  30 Jan 2018 09:59  --------------------------------------------------------  IN:    Oral Fluid: 360 mL  Total IN: 360 mL    OUT:    Voided: 450 mL  Total OUT: 450 mL    Total NET: -90 mL          LABS:                        8.1    10.2  )-----------( 237      ( 28 Jan 2018 10:19 )             25.3     01-30    137  |  102  |  9.0  ----------------------------<  160<H>  4.7   |  24.0  |  0.31<L>    Ca    8.0<L>      30 Jan 2018 06:27  Phos  2.4     01-30  Mg     1.7     01-30    TPro  5.7<L>  /  Alb  2.4<L>  /  TBili  0.3<L>  /  DBili  x   /  AST  62<H>  /  ALT  154<H>  /  AlkPhos  130<H>  01-30

## 2018-01-30 NOTE — PROGRESS NOTE ADULT - ASSESSMENT
68 y/o M s/p revision of ileostomy with postoperative course complicated by ileus and rapid intestinal transit. Patient tolerating TPN (day 6 of 7), and diet PO. Afebrile, hemodynamically stable.     Plan:  -Continue ileostomy diet  -Replete electrolytes PRN  -Continue pain control  -TPN last dose this evening  -Continue to monitor ostomy output  -Dispo: ACS team has advocated for acute rehab; patient and wife would like more time to make decision

## 2018-01-30 NOTE — CONSULT NOTE ADULT - SUBJECTIVE AND OBJECTIVE BOX
HPI:  67 YOM w/PMH of COPD stage 4 s/p right lung reduction in 2010, currently on home oxygen 4L (sat 92-93%), HTN, CVA on 2014, DM, hypothyroidism, hydrocele, kidney stones who came because he noted that his O2 sat was 76% today. He mentions more difficult to talk in full sencentces compared to his baseline. He also has a chronic productive cough which has not changed in frequency or color. He mentions that his oxygen machine broke 4 days ago and he got a new one since, but is not sure if its working properly. He denies SOB, increased cough, chest pain, hemoptysis nausea, weakness. Pt has to sleep in a sit position with 2 pillows since he gets SOB if he lyes flat, condition that he attributes to his postnasal drip (29 Nov 2017 02:05)  Pt has a hx of mild to moderate lower urinary tract symptoms and bilateral hydroceles R>L    PAST MEDICAL & SURGICAL HISTORY:  Hypothyroidism  Hydrocele  Renal calculi  Diabetes mellitus  Hypertension  CVA (cerebral vascular accident)  Chronic obstructive pulmonary disease, unspecified COPD type  History of lung surgery      REVIEW OF SYSTEMS:    Constitutional: No fever, weight loss or fatigue  Eyes: No eye pain, visual disturbances, or discharge  ENMT:  No difficulty hearing, tinnitus, vertigo; No sinus or throat pain  Respiratory: No cough, wheezing, chills or hemoptysis  Cardiovascular: No chest pain, palpitations, shortness of breath, dizziness or leg swelling  Gastrointestinal: No abdominal or epigastric pain. No nausea, vomiting or hematemesis; No diarrhea or constipation. No melena or hematochezia.  Genitourinary: No dysuria, frequency, hematuria or incontinence. He does c/o of a right hydrocele being "heavy" but no pain  Rectal: No pain, hemorrhoids or incontinence  Neurological: No headaches, memory loss, loss of strength, numbness or tremors  Skin: No itching, burning, rashes or lesions   Lymph Nodes: No enlarged glands  Musculoskeletal: No joint pain or swelling; No muscle, back or extremity pain  Psychiatric: No depression, anxiety, mood swings or difficulty sleeping  Heme/Lymph: No easy bruising or bleeding gums  Allergy and Immunologic: No hives or eczema    MEDICATIONS  (STANDING):  aspirin  chewable 81 milliGRAM(s) Oral daily  dextrose 5%. 1000 milliLiter(s) (50 mL/Hr) IV Continuous <Continuous>  dextrose 50% Injectable 12.5 Gram(s) IV Push once  dextrose 50% Injectable 25 Gram(s) IV Push once  diltiazem    milliGRAM(s) Oral daily  enoxaparin Injectable 40 milliGRAM(s) SubCutaneous daily  FIRST- Mouthwash  BLM 10 milliLiter(s) Swish and Spit two times a day  fluticasone propionate/ salmeterol 250-50 MICROgram(s) Diskus 1 Dose(s) Inhalation two times a day  multivitamin 1 Tablet(s) Oral daily  pantoprazole    Tablet 40 milliGRAM(s) Oral two times a day before meals  Parenteral Nutrition - Adult 1 Each (83 mL/Hr) TPN Continuous <Continuous>  Parenteral Nutrition - Adult 1 Each (83 mL/Hr) TPN Continuous <Continuous>  sodium chloride 0.65% Nasal 1 Spray(s) Both Nostrils four times a day  tamsulosin 0.4 milliGRAM(s) Oral at bedtime  tiotropium 18 MICROgram(s) Capsule 1 Capsule(s) Inhalation daily    MEDICATIONS  (PRN):  ALPRAZolam 0.25 milliGRAM(s) Oral every 6 hours PRN anxiety  benzocaine 15 mG/menthol 3.6 mG Lozenge 1 Lozenge Oral every 4 hours PRN Sore Throat  benzonatate 100 milliGRAM(s) Oral three times a day PRN Cough  ondansetron Injectable 4 milliGRAM(s) IV Push every 4 hours PRN Nausea and/or Vomiting      Allergies    codeine (Unknown)  no veges (Unknown)  penicillin (Unknown)    Intolerances    DO NOT SEND ORANGES (Unknown)  Symbicort (Short breath)      SOCIAL HISTORY:    FAMILY HISTORY:  No pertinent family history in first degree relatives      Vital Signs Last 24 Hrs  T(C): 36.4 (30 Jan 2018 16:06), Max: 36.8 (29 Jan 2018 17:03)  T(F): 97.6 (30 Jan 2018 16:06), Max: 98.2 (29 Jan 2018 17:03)  HR: 113 (30 Jan 2018 16:06) (100 - 115)  BP: 110/69 (30 Jan 2018 16:06) (110/69 - 127/67)  BP(mean): --  RR: 20 (30 Jan 2018 16:06) (18 - 20)  SpO2: 100% (30 Jan 2018 06:07) (99% - 100%)    PHYSICAL EXAM:    General: Well developed; well nourished; in no acute distress  Head: Normocephalic; atraumatic  Respiratory: No wheezes, rales or rhonchi  Cardiovascular: Regular rate and rhythm. S1 and S2 Normal; No murmurs, gallops or rubs  Gastrointestinal: Soft non-tender non-distended; Normal bowel sounds; No hepatosplenomegaly. Right sided ileostomy  Genitourinary: No costovertebral angle tenderness.  Urinary bladder is clinically not distended. There are bilateral hydroceles R > L noted that are nontender with no masses or fluctuance.  Extremities: Normal range of motion, No clubbing, cyanosis or edema  Vascular: Peripheral pulses palpable 2+ bilaterally  Neurological: Alert and oriented x4  Skin: Warm and dry. No acute rash  Musculoskeletal: Normal gait, tone, without deformities  Psychiatric: Cooperative and appropriate      LABS:    01-30    137  |  102  |  9.0  ----------------------------<  160<H>  4.7   |  24.0  |  0.31<L>    Ca    8.0<L>      30 Jan 2018 06:27  Phos  2.4     01-30  Mg     1.7     01-30    TPro  5.7<L>  /  Alb  2.4<L>  /  TBili  0.3<L>  /  DBili  x   /  AST  62<H>  /  ALT  154<H>  /  AlkPhos  130<H>  01-30          RADIOLOGY & ADDITIONAL STUDIES:

## 2018-01-30 NOTE — PROGRESS NOTE ADULT - EYES
EOMI; PERRL; no drainage or redness

## 2018-01-30 NOTE — CONSULT NOTE ADULT - PROBLEM SELECTOR RECOMMENDATION 2
Conservative management of left lower pole intra renal calculus
S/P Levofloxacin 11/29 to 12/5/17
Follow up CT as o/p as directed

## 2018-01-30 NOTE — PROGRESS NOTE ADULT - SUBJECTIVE AND OBJECTIVE BOX
INTERVAL HPI/OVERNIGHT EVENTS:    MEDICATIONS  (STANDING):  aspirin  chewable 81 milliGRAM(s) Oral daily  dextrose 5%. 1000 milliLiter(s) (50 mL/Hr) IV Continuous <Continuous>  dextrose 50% Injectable 12.5 Gram(s) IV Push once  dextrose 50% Injectable 25 Gram(s) IV Push once  diltiazem    milliGRAM(s) Oral daily  enoxaparin Injectable 40 milliGRAM(s) SubCutaneous daily  FIRST- Mouthwash  BLM 10 milliLiter(s) Swish and Spit two times a day  fluticasone propionate/ salmeterol 250-50 MICROgram(s) Diskus 1 Dose(s) Inhalation two times a day  multivitamin 1 Tablet(s) Oral daily  pantoprazole    Tablet 40 milliGRAM(s) Oral two times a day before meals  Parenteral Nutrition - Adult 1 Each (83 mL/Hr) TPN Continuous <Continuous>  Parenteral Nutrition - Adult 1 Each (83 mL/Hr) TPN Continuous <Continuous>  sodium chloride 0.65% Nasal 1 Spray(s) Both Nostrils four times a day  tamsulosin 0.4 milliGRAM(s) Oral at bedtime  tiotropium 18 MICROgram(s) Capsule 1 Capsule(s) Inhalation daily    MEDICATIONS  (PRN):  ALPRAZolam 0.25 milliGRAM(s) Oral every 6 hours PRN anxiety  benzocaine 15 mG/menthol 3.6 mG Lozenge 1 Lozenge Oral every 4 hours PRN Sore Throat  benzonatate 100 milliGRAM(s) Oral three times a day PRN Cough  ondansetron Injectable 4 milliGRAM(s) IV Push every 4 hours PRN Nausea and/or Vomiting      Allergies    codeine (Unknown)  no veges (Unknown)  penicillin (Unknown)    Intolerances    DO NOT SEND ORANGES (Unknown)  Symbicort (Short breath)      Vital Signs Last 24 Hrs  T(C): 36.4 (30 Jan 2018 06:07), Max: 36.8 (29 Jan 2018 17:03)  T(F): 97.6 (30 Jan 2018 06:07), Max: 98.2 (29 Jan 2018 17:03)  HR: 115 (30 Jan 2018 06:07) (98 - 115)  BP: 120/74 (30 Jan 2018 06:07) (117/75 - 127/67)  BP(mean): --  RR: 18 (30 Jan 2018 06:07) (18 - 22)  SpO2: 100% (30 Jan 2018 06:07) (95% - 100%)    LABS:                        8.1    10.2  )-----------( 237      ( 28 Jan 2018 10:19 )             25.3     01-30    137  |  102  |  9.0  ----------------------------<  160<H>  4.7   |  24.0  |  0.31<L>    Ca    8.0<L>      30 Jan 2018 06:27  Phos  2.4     01-30  Mg     1.7     01-30    TPro  5.7<L>  /  Alb  2.4<L>  /  TBili  0.3<L>  /  DBili  x   /  AST  62<H>  /  ALT  154<H>  /  AlkPhos  130<H>  01-30          RADIOLOGY & ADDITIONAL TESTS: INTERVAL HPI/OVERNIGHT EVENTS:FU for TPN. Ordered. No complaints. Mildly elevated LFTs-cholestasis due to TPN    MEDICATIONS  (STANDING):  aspirin  chewable 81 milliGRAM(s) Oral daily  dextrose 5%. 1000 milliLiter(s) (50 mL/Hr) IV Continuous <Continuous>  dextrose 50% Injectable 12.5 Gram(s) IV Push once  dextrose 50% Injectable 25 Gram(s) IV Push once  diltiazem    milliGRAM(s) Oral daily  enoxaparin Injectable 40 milliGRAM(s) SubCutaneous daily  FIRST- Mouthwash  BLM 10 milliLiter(s) Swish and Spit two times a day  fluticasone propionate/ salmeterol 250-50 MICROgram(s) Diskus 1 Dose(s) Inhalation two times a day  multivitamin 1 Tablet(s) Oral daily  pantoprazole    Tablet 40 milliGRAM(s) Oral two times a day before meals  Parenteral Nutrition - Adult 1 Each (83 mL/Hr) TPN Continuous <Continuous>  Parenteral Nutrition - Adult 1 Each (83 mL/Hr) TPN Continuous <Continuous>  sodium chloride 0.65% Nasal 1 Spray(s) Both Nostrils four times a day  tamsulosin 0.4 milliGRAM(s) Oral at bedtime  tiotropium 18 MICROgram(s) Capsule 1 Capsule(s) Inhalation daily    MEDICATIONS  (PRN):  ALPRAZolam 0.25 milliGRAM(s) Oral every 6 hours PRN anxiety  benzocaine 15 mG/menthol 3.6 mG Lozenge 1 Lozenge Oral every 4 hours PRN Sore Throat  benzonatate 100 milliGRAM(s) Oral three times a day PRN Cough  ondansetron Injectable 4 milliGRAM(s) IV Push every 4 hours PRN Nausea and/or Vomiting      Allergies    codeine (Unknown)  no veges (Unknown)  penicillin (Unknown)    Intolerances    DO NOT SEND ORANGES (Unknown)  Symbicort (Short breath)      Vital Signs Last 24 Hrs  T(C): 36.4 (30 Jan 2018 06:07), Max: 36.8 (29 Jan 2018 17:03)  T(F): 97.6 (30 Jan 2018 06:07), Max: 98.2 (29 Jan 2018 17:03)  HR: 115 (30 Jan 2018 06:07) (98 - 115)  BP: 120/74 (30 Jan 2018 06:07) (117/75 - 127/67)  BP(mean): --  RR: 18 (30 Jan 2018 06:07) (18 - 22)  SpO2: 100% (30 Jan 2018 06:07) (95% - 100%)    LABS:                        8.1    10.2  )-----------( 237      ( 28 Jan 2018 10:19 )             25.3     01-30    137  |  102  |  9.0  ----------------------------<  160<H>  4.7   |  24.0  |  0.31<L>    Ca    8.0<L>      30 Jan 2018 06:27  Phos  2.4     01-30  Mg     1.7     01-30    TPro  5.7<L>  /  Alb  2.4<L>  /  TBili  0.3<L>  /  DBili  x   /  AST  62<H>  /  ALT  154<H>  /  AlkPhos  130<H>  01-30          RADIOLOGY & ADDITIONAL TESTS:

## 2018-01-30 NOTE — CONSULT NOTE ADULT - PROBLEM SELECTOR PROBLEM 1
Pre-operative cardiovascular examination
Hydrocele, unspecified hydrocele type
Leukocytosis, unspecified type
COPD exacerbation
COPD exacerbation

## 2018-01-30 NOTE — PROGRESS NOTE ADULT - CONSTITUTIONAL
Well-developed, well nourished

## 2018-01-30 NOTE — CONSULT NOTE ADULT - CONSULT REQUESTED DATE/TIME
04-Dec-2017 11:29
04-Dec-2017 21:27
05-Dec-2017 17:06
06-Dec-2017 17:13
06-Jan-2018 19:02
11-Jan-2018 18:00
14-Jan-2018 18:26
18-Dec-2017 12:27
29-Nov-2017 10:46
30-Jan-2018 16:52
05-Dec-2017 17:51

## 2018-01-30 NOTE — PROGRESS NOTE ADULT - ASSESSMENT
Patient with ileostomy due to colonic obstruction, malnutrition on TPN    1. TPN reodered  2. Once better, should come off TPN as patient is eating  3. Monitor LFTs

## 2018-01-30 NOTE — PROGRESS NOTE ADULT - SUBJECTIVE AND OBJECTIVE BOX
Patient working with PT. Wife at bedside. Patient able to stand and take a few steps until fear sets in and does not want to walk further.  Continues to have SOB, fatigue.  No pain.     FUNCTIONAL PROGRESS  1/30  Sit-Stand Transfer Training  Transfer Training Sit-to-Stand Transfer: minimum assist (75% patient effort);  1 person assist;  full weight-bearing   rolling walker  Transfer Training Stand-to-Sit Transfer: minimum assist (75% patient effort);  1 person assist;  full weight-bearing   rolling walker  Sit-to-Stand Transfer Training Transfer Safety Analysis: decreased balance;  decreased strength;  impaired balance;  rolling walker    Gait Training  Gait Training: minimum assist (75% patient effort);  1 person + 1 person to manage equipment;  full weight-bearing   rolling walker;  5 feet  Gait Analysis: 3-point gait   decreased donta;  increased time in double stance;  decreased strength;  impaired balance;  5 feet;  rolling walker  Gait Number of Times:: x 2    REVIEW OF SYSTEMS  Constitutional - No fever,  +fatigue  HEENT - No vertigo, No neck pain  Neurological - No headaches, No memory loss, +loss of strength, No numbness, No tremors  Musculoskeletal - No joint pain, No joint swelling, No muscle pain  Psychiatric - +depression, +anxiety    VITALS  T(C): 36.3 (01-30-18 @ 09:14), Max: 36.8 (01-29-18 @ 17:03)  HR: 100 (01-30-18 @ 09:14) (100 - 115)  BP: 124/76 (01-30-18 @ 09:14) (117/75 - 127/67)  RR: 18 (01-30-18 @ 06:07) (18 - 18)  SpO2: 100% (01-30-18 @ 06:07) (99% - 100%)  Wt(kg): --    MEDICATIONS   ALPRAZolam 0.25 milliGRAM(s) every 6 hours PRN  aspirin  chewable 81 milliGRAM(s) daily  benzocaine 15 mG/menthol 3.6 mG Lozenge 1 Lozenge every 4 hours PRN  benzonatate 100 milliGRAM(s) three times a day PRN  dextrose 5%. 1000 milliLiter(s) <Continuous>  dextrose 50% Injectable 12.5 Gram(s) once  dextrose 50% Injectable 25 Gram(s) once  diltiazem    milliGRAM(s) daily  enoxaparin Injectable 40 milliGRAM(s) daily  FIRST- Mouthwash  BLM 10 milliLiter(s) two times a day  fluticasone propionate/ salmeterol 250-50 MICROgram(s) Diskus 1 Dose(s) two times a day  multivitamin 1 Tablet(s) daily  ondansetron Injectable 4 milliGRAM(s) every 4 hours PRN  pantoprazole    Tablet 40 milliGRAM(s) two times a day before meals  Parenteral Nutrition - Adult 1 Each <Continuous>  Parenteral Nutrition - Adult 1 Each <Continuous>  sodium chloride 0.65% Nasal 1 Spray(s) four times a day  tamsulosin 0.4 milliGRAM(s) at bedtime  tiotropium 18 MICROgram(s) Capsule 1 Capsule(s) daily      RECENT LABS/IMAGING    01-30    137  |  102  |  9.0  ----------------------------<  160<H>  4.7   |  24.0  |  0.31<L>    Ca    8.0<L>      30 Jan 2018 06:27  Phos  2.4     01-30  Mg     1.7     01-30    TPro  5.7<L>  /  Alb  2.4<L>  /  TBili  0.3<L>  /  DBili  x   /  AST  62<H>  /  ALT  154<H>  /  AlkPhos  130<H>  01-30      ----------------------------------------------------------------------------------------  PHYSICAL EXAM  Constitutional - NAD, Comfortable  Chest - Tachypnea  Extremities - No C/C, No calf tenderness, BLE foot/ankle edema  Neurologic Exam -                    Cognitive - Awake, Alert, AAO to self, place, date, year, situation     Motor - No focal deficits  Psychiatric - Anxious  ----------------------------------------------------------------------------------------  ASSESSMENT/PLAN  67y Male with functional deficits after COPD exacerbation, complicated by SBO requiring multiple surgeries.  Diet - TPN, CC/Thins  DVT PPX - SCDs, Lovenox  Rehab - Patient currently on TPN which precludes discharge to community or other facility unless there are physicians able to provide on-going management. Patient refusing any dispo except home. Pending progression of diet and TPN (or DC plans for management), may achieve DC goals for home with HOME CARE. However, based on today, suspect patient will not be able to progress for DC home and will likely need ACUTE. Continue PT BID.     Continue bedside therapy as well as OOB throughout the day with mobilization throughout the day with staff to maintain cardiopulmonary function and prevention of secondary complications related to debility.

## 2018-01-30 NOTE — CONSULT NOTE ADULT - PROVIDER SPECIALTY LIST ADULT
Cardiology
Gastroenterology
Infectious Disease
Nephrology
Pulmonology
Pulmonology
Rehab Medicine
Surgery
Surgery
Urology
Vascular Surgery
Palliative Care

## 2018-01-31 LAB
ANION GAP SERPL CALC-SCNC: 10 MMOL/L — SIGNIFICANT CHANGE UP (ref 5–17)
BUN SERPL-MCNC: 11 MG/DL — SIGNIFICANT CHANGE UP (ref 8–20)
CALCIUM SERPL-MCNC: 8.1 MG/DL — LOW (ref 8.6–10.2)
CHLORIDE SERPL-SCNC: 100 MMOL/L — SIGNIFICANT CHANGE UP (ref 98–107)
CO2 SERPL-SCNC: 24 MMOL/L — SIGNIFICANT CHANGE UP (ref 22–29)
CREAT SERPL-MCNC: 0.31 MG/DL — LOW (ref 0.5–1.3)
GLUCOSE SERPL-MCNC: 180 MG/DL — HIGH (ref 70–115)
HCT VFR BLD CALC: 22.9 % — LOW (ref 42–52)
HGB BLD-MCNC: 7.3 G/DL — LOW (ref 14–18)
MAGNESIUM SERPL-MCNC: 1.8 MG/DL — SIGNIFICANT CHANGE UP (ref 1.6–2.6)
MCHC RBC-ENTMCNC: 29.2 PG — SIGNIFICANT CHANGE UP (ref 27–31)
MCHC RBC-ENTMCNC: 31.9 G/DL — LOW (ref 32–36)
MCV RBC AUTO: 91.6 FL — SIGNIFICANT CHANGE UP (ref 80–94)
PHOSPHATE SERPL-MCNC: 2.9 MG/DL — SIGNIFICANT CHANGE UP (ref 2.4–4.7)
PLATELET # BLD AUTO: 295 K/UL — SIGNIFICANT CHANGE UP (ref 150–400)
POTASSIUM SERPL-MCNC: 5.2 MMOL/L — SIGNIFICANT CHANGE UP (ref 3.5–5.3)
POTASSIUM SERPL-SCNC: 5.2 MMOL/L — SIGNIFICANT CHANGE UP (ref 3.5–5.3)
RBC # BLD: 2.5 M/UL — LOW (ref 4.6–6.2)
RBC # FLD: 19.4 % — HIGH (ref 11–15.6)
SODIUM SERPL-SCNC: 134 MMOL/L — LOW (ref 135–145)
WBC # BLD: 8 K/UL — SIGNIFICANT CHANGE UP (ref 4.8–10.8)
WBC # FLD AUTO: 8 K/UL — SIGNIFICANT CHANGE UP (ref 4.8–10.8)

## 2018-01-31 PROCEDURE — 99232 SBSQ HOSP IP/OBS MODERATE 35: CPT

## 2018-01-31 PROCEDURE — 99233 SBSQ HOSP IP/OBS HIGH 50: CPT

## 2018-01-31 RX ORDER — SODIUM CHLORIDE 9 MG/ML
500 INJECTION, SOLUTION INTRAVENOUS ONCE
Qty: 0 | Refills: 0 | Status: COMPLETED | OUTPATIENT
Start: 2018-01-31 | End: 2018-01-31

## 2018-01-31 RX ORDER — DILTIAZEM HCL 120 MG
360 CAPSULE, EXT RELEASE 24 HR ORAL DAILY
Qty: 0 | Refills: 0 | Status: DISCONTINUED | OUTPATIENT
Start: 2018-01-31 | End: 2018-02-06

## 2018-01-31 RX ORDER — MAGNESIUM SULFATE 500 MG/ML
2 VIAL (ML) INJECTION ONCE
Qty: 0 | Refills: 0 | Status: COMPLETED | OUTPATIENT
Start: 2018-01-31 | End: 2018-01-31

## 2018-01-31 RX ADMIN — TIOTROPIUM BROMIDE 1 CAPSULE(S): 18 CAPSULE ORAL; RESPIRATORY (INHALATION) at 12:16

## 2018-01-31 RX ADMIN — TAMSULOSIN HYDROCHLORIDE 0.4 MILLIGRAM(S): 0.4 CAPSULE ORAL at 23:46

## 2018-01-31 RX ADMIN — FLUTICASONE PROPIONATE AND SALMETEROL 1 DOSE(S): 50; 250 POWDER ORAL; RESPIRATORY (INHALATION) at 09:09

## 2018-01-31 RX ADMIN — Medication 0.25 MILLIGRAM(S): at 10:15

## 2018-01-31 RX ADMIN — Medication 1 SPRAY(S): at 12:33

## 2018-01-31 RX ADMIN — Medication 1 SPRAY(S): at 23:46

## 2018-01-31 RX ADMIN — SODIUM CHLORIDE 500 MILLILITER(S): 9 INJECTION, SOLUTION INTRAVENOUS at 12:26

## 2018-01-31 RX ADMIN — Medication 1 SPRAY(S): at 17:14

## 2018-01-31 RX ADMIN — Medication 300 MILLIGRAM(S): at 06:27

## 2018-01-31 RX ADMIN — Medication 0.25 MILLIGRAM(S): at 17:13

## 2018-01-31 RX ADMIN — PANTOPRAZOLE SODIUM 40 MILLIGRAM(S): 20 TABLET, DELAYED RELEASE ORAL at 06:27

## 2018-01-31 RX ADMIN — Medication 0.25 MILLIGRAM(S): at 23:46

## 2018-01-31 RX ADMIN — Medication 81 MILLIGRAM(S): at 12:32

## 2018-01-31 RX ADMIN — Medication 1 TABLET(S): at 12:32

## 2018-01-31 RX ADMIN — FLUTICASONE PROPIONATE AND SALMETEROL 1 DOSE(S): 50; 250 POWDER ORAL; RESPIRATORY (INHALATION) at 20:27

## 2018-01-31 RX ADMIN — PANTOPRAZOLE SODIUM 40 MILLIGRAM(S): 20 TABLET, DELAYED RELEASE ORAL at 17:13

## 2018-01-31 RX ADMIN — Medication 1 EACH: at 03:37

## 2018-01-31 RX ADMIN — Medication 50 GRAM(S): at 12:27

## 2018-01-31 NOTE — PROGRESS NOTE ADULT - SUBJECTIVE AND OBJECTIVE BOX
Patient is a 67y old  Male who presents with a chief complaint of SOB (15 Dec 2017 10:17)      HPI:  67 YOM w/PMH of COPD stage 4 s/p right lung reduction in 2010, currently on home oxygen 4L (sat 92-93%), HTN, CVA on 2014, DM, hypothyroidism, hydrocele, kidney stones . Has ileostomy for hernia repair. Patient is tolearating solid diet. Pt is not happy with dietary service as he states they are bringing food that he should not eat or food he does not like. Good output from ileostomy. NO pain, no nauasea , no vomiting    REVIEW OF SYSTEMS:  Constitutional: No fever, weight loss or fatigue  ENMT:  No difficulty hearing, tinnitus, vertigo; No sinus or throat pain  Respiratory: No cough, wheezing, chills or hemoptysis  Cardiovascular: No chest pain, palpitations, dizziness or leg swelling  Gastrointestinal: No abdominal or epigastric pain. No nausea, vomiting or hematemesis; No diarrhea or constipation. No melena or hematochezia.  Skin: No itching, burning, rashes or lesions   Musculoskeletal: No joint pain or swelling; No muscle, back or extremity pain    PAST MEDICAL & SURGICAL HISTORY:  Hypothyroidism  Hydrocele  Renal calculi  Diabetes mellitus  Hypertension  CVA (cerebral vascular accident)  Chronic obstructive pulmonary disease, unspecified COPD type  History of lung surgery      FAMILY HISTORY:  No pertinent family history in first degree relatives      SOCIAL HISTORY:  Smoking Status: [ ] Current, [ ] Former, [ ] Never  Pack Years:  [  ] EtOH-no  [  ] IVDA-no    MEDICATIONS:  MEDICATIONS  (STANDING):  aspirin  chewable 81 milliGRAM(s) Oral daily  dextrose 5%. 1000 milliLiter(s) (50 mL/Hr) IV Continuous <Continuous>  dextrose 50% Injectable 12.5 Gram(s) IV Push once  dextrose 50% Injectable 25 Gram(s) IV Push once  diltiazem    milliGRAM(s) Oral daily  enoxaparin Injectable 40 milliGRAM(s) SubCutaneous daily  FIRST- Mouthwash  BLM 10 milliLiter(s) Swish and Spit two times a day  fluticasone propionate/ salmeterol 250-50 MICROgram(s) Diskus 1 Dose(s) Inhalation two times a day  multivitamin 1 Tablet(s) Oral daily  pantoprazole    Tablet 40 milliGRAM(s) Oral two times a day before meals  Parenteral Nutrition - Adult 1 Each (83 mL/Hr) TPN Continuous <Continuous>  sodium chloride 0.65% Nasal 1 Spray(s) Both Nostrils four times a day  tamsulosin 0.4 milliGRAM(s) Oral at bedtime  tiotropium 18 MICROgram(s) Capsule 1 Capsule(s) Inhalation daily    MEDICATIONS  (PRN):  ALPRAZolam 0.25 milliGRAM(s) Oral every 6 hours PRN anxiety  benzocaine 15 mG/menthol 3.6 mG Lozenge 1 Lozenge Oral every 4 hours PRN Sore Throat  benzonatate 100 milliGRAM(s) Oral three times a day PRN Cough  ondansetron Injectable 4 milliGRAM(s) IV Push every 4 hours PRN Nausea and/or Vomiting      Allergies    codeine (Unknown)  no veges (Unknown)  penicillin (Unknown)    Intolerances    DO NOT SEND ORANGES (Unknown)  Symbicort (Short breath)      Vital Signs Last 24 Hrs  T(C): 36.4 (31 Jan 2018 05:33), Max: 36.7 (30 Jan 2018 20:12)  T(F): 97.5 (31 Jan 2018 05:33), Max: 98 (30 Jan 2018 20:12)  HR: 112 (31 Jan 2018 05:33) (112 - 116)  BP: 116/66 (31 Jan 2018 05:33) (110/69 - 124/65)  BP(mean): --  RR: 18 (31 Jan 2018 05:33) (18 - 20)  SpO2: 99% (30 Jan 2018 20:12) (99% - 99%)    01-30 @ 07:01 - 01-31 @ 07:00  --------------------------------------------------------  IN: 3155 mL / OUT: 2100 mL / NET: 1055 mL    01-31 @ 07:01 - 01-31 @ 12:27  --------------------------------------------------------  IN: 0 mL / OUT: 500 mL / NET: -500 mL          PHYSICAL EXAM:    General: Well developed; well nourished; in no acute distress  HEENT: MMM, conjunctiva and sclera clear  H- RRR  l -CTA  Gastrointestinal: Soft, non-tender non-distended; Normal bowel sounds; No rebound or guarding  Extremities: Normal range of motion, No clubbing, cyanosis or edema  Neurological: Alert and oriented x3  Skin: Warm and dry. No obvious rash      LABS:                        7.3    8.0   )-----------( 295      ( 31 Jan 2018 07:44 )             22.9     31 Jan 2018 07:44    134    |  100    |  11.0   ----------------------------<  180    5.2     |  24.0   |  0.31     Ca    8.1        31 Jan 2018 07:44  Phos  2.9       31 Jan 2018 07:44  Mg     1.8       31 Jan 2018 07:44    TPro  5.7    /  Alb  2.4    /  TBili  0.3    /  DBili  x      /  AST  62     /  ALT  154    /  AlkPhos  130    / Amylase x      /Lipase x      30 Jan 2018 06:27              RADIOLOGY & ADDITIONAL STUDIES:

## 2018-01-31 NOTE — CHART NOTE - NSCHARTNOTEFT_GEN_A_CORE
Source: Patient [x ]  Family [ ]   other [ x]  wife    Current Diet: consistent CHO, low fiber with ensure clears TID. pt with ileostomy    Patient reports [ ] nausea  [ ] vomiting [ ] diarrhea [ ] constipation  [ ]chewing problems [ ] swallowing issues  [ ] other:     PO intake:  < 50% [ ]   50-75%  [x ]   %  [ ]  other :    Source for PO intake [ x] Patient [ ] family [ ] chart [ ] staff [ ] other    Enteral /Parenteral Nutrition: Receiving TPN 1700kcal, 85gr pro, at 83cchr    Current Weight: 112.6# 1/18    % Weight Change     Pertinent Medications: MEDICATIONS  (STANDING):  aspirin  chewable 81 milliGRAM(s) Oral daily  dextrose 5%. 1000 milliLiter(s) (50 mL/Hr) IV Continuous <Continuous>  dextrose 50% Injectable 12.5 Gram(s) IV Push once  dextrose 50% Injectable 25 Gram(s) IV Push once  diltiazem    milliGRAM(s) Oral daily  enoxaparin Injectable 40 milliGRAM(s) SubCutaneous daily  FIRST- Mouthwash  BLM 10 milliLiter(s) Swish and Spit two times a day  fluticasone propionate/ salmeterol 250-50 MICROgram(s) Diskus 1 Dose(s) Inhalation two times a day  lactated ringers Bolus 500 milliLiter(s) IV Bolus once  magnesium sulfate  IVPB 2 Gram(s) IV Intermittent once  multivitamin 1 Tablet(s) Oral daily  pantoprazole    Tablet 40 milliGRAM(s) Oral two times a day before meals  Parenteral Nutrition - Adult 1 Each (83 mL/Hr) TPN Continuous <Continuous>  sodium chloride 0.65% Nasal 1 Spray(s) Both Nostrils four times a day  tamsulosin 0.4 milliGRAM(s) Oral at bedtime  tiotropium 18 MICROgram(s) Capsule 1 Capsule(s) Inhalation daily    MEDICATIONS  (PRN):  ALPRAZolam 0.25 milliGRAM(s) Oral every 6 hours PRN anxiety  benzocaine 15 mG/menthol 3.6 mG Lozenge 1 Lozenge Oral every 4 hours PRN Sore Throat  benzonatate 100 milliGRAM(s) Oral three times a day PRN Cough  ondansetron Injectable 4 milliGRAM(s) IV Push every 4 hours PRN Nausea and/or Vomiting    Pertinent Labs: CBC Full  -  ( 31 Jan 2018 07:44 )  WBC Count : 8.0 K/uL  Hemoglobin : 7.3 g/dL  Hematocrit : 22.9 %  Platelet Count - Automated : 295 K/uL  Mean Cell Volume : 91.6 fl  Mean Cell Hemoglobin : 29.2 pg  Mean Cell Hemoglobin Concentration : 31.9 g/dL  Auto Neutrophil # : x  Auto Lymphocyte # : x  Auto Monocyte # : x  Auto Eosinophil # : x  Auto Basophil # : x  Auto Neutrophil % : x  Auto Lymphocyte % : x  Auto Monocyte % : x  Auto Eosinophil % : x  Auto Basophil % : x      01-31 Na134 mmol/L<L> Glu 180 mg/dL<H> K+ 5.2 mmol/L Cr  0.31 mg/dL<L> BUN 11.0 mg/dL Phos 2.9 mg/dL Alb n/a   PAB n/a           Skin:     Nutrition focused physical exam conducted - found signs of malnutrition [ ]absent [x ]present    Subcutaneous fat loss: [ ] Orbital fat pads region, [ ]Buccal fat region, [ ]Triceps region,  [x ]Ribs region    Muscle wasting: [x ]Temples region, [ x]Clavicle region, [ ]Shoulder region, [ ]Scapula region, [ ]Interosseous region,  [x ]thigh region, [ ]Calf region    Estimated Needs:   [x ] no change since previous assessment  [ ] recalculated:     Current Nutrition Diagnosis:  Pt meets criteria for severe chronic malnutrition related to absorption issues, in setting of ileostomy as evidenced by high ostomy output, 36# weight loss since admission 11/29, severe fat/ muscle depletion in thoracic region, thighs, temples, clavicles. Pt with s/p ileostomy revision. Diet restarted and pt tolerating well ~75% as per pt.   TPN started and running at 83cchr. Pt meets/exceeds nutritional needs with po and TPN.  Ileostomy functioning well with formed stools.  Monitor TPN. Pt with continued improved appetite. Ileostomy diet provided to wife with good understanding. Monitor po intake, tolerance.        Recommendations: Continue diet as ordered. Monitor po intake    Monitoring and Evaluation:   [x ] PO intake [x ] Tolerance to diet prescription [X] Weights  [X] Follow up per protocol [X] Labs:

## 2018-01-31 NOTE — PROGRESS NOTE ADULT - ASSESSMENT
68 y/o M s/p revision of ileostomy with postoperative course complicated by ileus and rapid intestinal transit. Patient tolerating TPN (day 7 of 7), and diet PO. Afebrile, hemodynamically stable.       -Continue ileostomy diet  -Replete electrolytes PRN  -Continue pain control  -likely no TPN tomorrow  -Continue to monitor ostomy output  -Dispo planning- likely home per pt request

## 2018-01-31 NOTE — PROGRESS NOTE ADULT - PROBLEM SELECTOR PLAN 1
S/O ileostomy for hernia repair . Had repeat surgery/revision. Now ileostomy output acceptable.   - had post op ileus which is resolve. Tolerating solid diet.   - Will finish this TPN bag tonight and will no reorder as pt is tolerating solid po  - Will sign off.

## 2018-01-31 NOTE — PROGRESS NOTE ADULT - SUBJECTIVE AND OBJECTIVE BOX
INTERVAL HPI/OVERNIGHT EVENTS:  no issues overnight. tolerating diet. still on TPN. no complaints of abdominal pain; ostomy functional.        MEDICATIONS  (STANDING):  aspirin  chewable 81 milliGRAM(s) Oral daily  dextrose 5%. 1000 milliLiter(s) (50 mL/Hr) IV Continuous <Continuous>  dextrose 50% Injectable 12.5 Gram(s) IV Push once  dextrose 50% Injectable 25 Gram(s) IV Push once  diltiazem    milliGRAM(s) Oral daily  enoxaparin Injectable 40 milliGRAM(s) SubCutaneous daily  FIRST- Mouthwash  BLM 10 milliLiter(s) Swish and Spit two times a day  fluticasone propionate/ salmeterol 250-50 MICROgram(s) Diskus 1 Dose(s) Inhalation two times a day  multivitamin 1 Tablet(s) Oral daily  pantoprazole    Tablet 40 milliGRAM(s) Oral two times a day before meals  Parenteral Nutrition - Adult 1 Each (83 mL/Hr) TPN Continuous <Continuous>  sodium chloride 0.65% Nasal 1 Spray(s) Both Nostrils four times a day  tamsulosin 0.4 milliGRAM(s) Oral at bedtime  tiotropium 18 MICROgram(s) Capsule 1 Capsule(s) Inhalation daily    MEDICATIONS  (PRN):  ALPRAZolam 0.25 milliGRAM(s) Oral every 6 hours PRN anxiety  benzocaine 15 mG/menthol 3.6 mG Lozenge 1 Lozenge Oral every 4 hours PRN Sore Throat  benzonatate 100 milliGRAM(s) Oral three times a day PRN Cough  ondansetron Injectable 4 milliGRAM(s) IV Push every 4 hours PRN Nausea and/or Vomiting      Vital Signs Last 24 Hrs  T(C): 36.4 (31 Jan 2018 05:33), Max: 36.7 (30 Jan 2018 20:12)  T(F): 97.5 (31 Jan 2018 05:33), Max: 98 (30 Jan 2018 20:12)  HR: 112 (31 Jan 2018 05:33) (112 - 116)  BP: 116/66 (31 Jan 2018 05:33) (110/69 - 124/65)  BP(mean): --  RR: 18 (31 Jan 2018 05:33) (18 - 20)  SpO2: 99% (30 Jan 2018 20:12) (99% - 99%)    PE  Gen: NAD  Pulm: no respiratory distress  CV: no tachycardia  Abd: s/nd/nt, no rebound tenderness or guarding; stool now well formed from ostomy, ostomy pink/viable  Neuro: no focal deficits      I&O's Detail    30 Jan 2018 07:01  -  31 Jan 2018 07:00  --------------------------------------------------------  IN:    Oral Fluid: 1080 mL    TPN (Total Parenteral Nutrition): 2075 mL  Total IN: 3155 mL    OUT:    Ileostomy: 450 mL    Voided: 1650 mL  Total OUT: 2100 mL    Total NET: 1055 mL      31 Jan 2018 07:01  -  31 Jan 2018 14:22  --------------------------------------------------------  IN:  Total IN: 0 mL    OUT:    Voided: 725 mL  Total OUT: 725 mL    Total NET: -725 mL          LABS:                        7.3    8.0   )-----------( 295      ( 31 Jan 2018 07:44 )             22.9     01-31    134<L>  |  100  |  11.0  ----------------------------<  180<H>  5.2   |  24.0  |  0.31<L>    Ca    8.1<L>      31 Jan 2018 07:44  Phos  2.9     01-31  Mg     1.8     01-31    TPro  5.7<L>  /  Alb  2.4<L>  /  TBili  0.3<L>  /  DBili  x   /  AST  62<H>  /  ALT  154<H>  /  AlkPhos  130<H>  01-30          RADIOLOGY & ADDITIONAL STUDIES:

## 2018-01-31 NOTE — PROGRESS NOTE ADULT - SUBJECTIVE AND OBJECTIVE BOX
Patient in bed, slept fairly well last night.  Denied pain. SOB is improved.  Walked more yesterday with PT.   Will continue to work on mobility and endurance.   Does not want rehab.     FUNCTIONAL PROGRESS  1/30  Gait Training  Gait Training: minimum assist (75% patient effort);  1 person + 1 person to manage equipment;  IV pole follow,o2;  full weight-bearing   rolling walker;  10 feet    REVIEW OF SYSTEMS  Constitutional - No fever,  +fatigue  Neurological - +loss of strength, No numbness, No tremors  Musculoskeletal - No joint pain, No joint swelling, No muscle pain  Psychiatric - +depression, +anxiety    VITALS  T(C): 36.4 (01-31-18 @ 05:33), Max: 36.7 (01-30-18 @ 20:12)  HR: 112 (01-31-18 @ 05:33) (112 - 116)  BP: 116/66 (01-31-18 @ 05:33) (110/69 - 124/65)  RR: 18 (01-31-18 @ 05:33) (18 - 20)  SpO2: 99% (01-30-18 @ 20:12) (99% - 99%)  Wt(kg): --    MEDICATIONS   ALPRAZolam 0.25 milliGRAM(s) every 6 hours PRN  aspirin  chewable 81 milliGRAM(s) daily  benzocaine 15 mG/menthol 3.6 mG Lozenge 1 Lozenge every 4 hours PRN  benzonatate 100 milliGRAM(s) three times a day PRN  dextrose 5%. 1000 milliLiter(s) <Continuous>  dextrose 50% Injectable 12.5 Gram(s) once  dextrose 50% Injectable 25 Gram(s) once  diltiazem    milliGRAM(s) daily  enoxaparin Injectable 40 milliGRAM(s) daily  FIRST- Mouthwash  BLM 10 milliLiter(s) two times a day  fluticasone propionate/ salmeterol 250-50 MICROgram(s) Diskus 1 Dose(s) two times a day  magnesium sulfate  IVPB 2 Gram(s) once  multivitamin 1 Tablet(s) daily  ondansetron Injectable 4 milliGRAM(s) every 4 hours PRN  pantoprazole    Tablet 40 milliGRAM(s) two times a day before meals  Parenteral Nutrition - Adult 1 Each <Continuous>  sodium chloride 0.65% Nasal 1 Spray(s) four times a day  tamsulosin 0.4 milliGRAM(s) at bedtime  tiotropium 18 MICROgram(s) Capsule 1 Capsule(s) daily      RECENT LABS/IMAGING  CBC Full  -  ( 31 Jan 2018 07:44 )  WBC Count : 8.0 K/uL  Hemoglobin : 7.3 g/dL  Hematocrit : 22.9 %  Platelet Count - Automated : 295 K/uL  Mean Cell Volume : 91.6 fl  Mean Cell Hemoglobin : 29.2 pg  Mean Cell Hemoglobin Concentration : 31.9 g/dL  Auto Neutrophil # : x  Auto Lymphocyte # : x  Auto Monocyte # : x  Auto Eosinophil # : x  Auto Basophil # : x  Auto Neutrophil % : x  Auto Lymphocyte % : x  Auto Monocyte % : x  Auto Eosinophil % : x  Auto Basophil % : x    01-31    134<L>  |  100  |  11.0  ----------------------------<  180<H>  5.2   |  24.0  |  0.31<L>    Ca    8.1<L>      31 Jan 2018 07:44  Phos  2.9     01-31  Mg     1.8     01-31    TPro  5.7<L>  /  Alb  2.4<L>  /  TBili  0.3<L>  /  DBili  x   /  AST  62<H>  /  ALT  154<H>  /  AlkPhos  130<H>  01-30      ----------------------------------------------------------------------------------------  PHYSICAL EXAM  Constitutional - NAD, Comfortable  Extremities - No C/C, No calf tenderness, BLE foot/ankle edema - improved  Neurologic Exam -                    Cognitive - AAOx4     Motor - No focal deficits  Psychiatric - Frustrated  ----------------------------------------------------------------------------------------  ASSESSMENT/PLAN  67y Male with functional deficits after COPD exacerbation, complicated by SBO requiring multiple surgeries.  Diet - TPN, CC/Thins  DVT PPX - SCDs, Lovenox  Rehab - Patient currently on TPN which precludes discharge to community or other facility unless there are physicians able to provide on-going management. Patient refusing any dispo except home. Pending progression of diet and TPN (with DC plans for management), may achieve DC goals for home with HOME CARE. However, suspect patient will not be able to progress for DC home and will likely need ACUTE. Patient has been to acute prior and wife is agreeable. Continue PT BID.     Continue bedside therapy as well as OOB throughout the day with mobilization throughout the day with staff to maintain cardiopulmonary function and prevention of secondary complications related to debility.

## 2018-02-01 LAB
ACANTHOCYTES BLD QL SMEAR: SLIGHT — SIGNIFICANT CHANGE UP
ANION GAP SERPL CALC-SCNC: 12 MMOL/L — SIGNIFICANT CHANGE UP (ref 5–17)
ANISOCYTOSIS BLD QL: SLIGHT — SIGNIFICANT CHANGE UP
BASOPHILS # BLD AUTO: 0 K/UL — SIGNIFICANT CHANGE UP (ref 0–0.2)
BASOPHILS # BLD AUTO: 0.1 K/UL — SIGNIFICANT CHANGE UP (ref 0–0.2)
BASOPHILS NFR BLD AUTO: 0.5 % — SIGNIFICANT CHANGE UP (ref 0–2)
BASOPHILS NFR BLD AUTO: 0.7 % — SIGNIFICANT CHANGE UP (ref 0–2)
BLD GP AB SCN SERPL QL: SIGNIFICANT CHANGE UP
BUN SERPL-MCNC: 14 MG/DL — SIGNIFICANT CHANGE UP (ref 8–20)
BURR CELLS BLD QL SMEAR: PRESENT — SIGNIFICANT CHANGE UP
CALCIUM SERPL-MCNC: 8.2 MG/DL — LOW (ref 8.6–10.2)
CHLORIDE SERPL-SCNC: 101 MMOL/L — SIGNIFICANT CHANGE UP (ref 98–107)
CO2 SERPL-SCNC: 24 MMOL/L — SIGNIFICANT CHANGE UP (ref 22–29)
CREAT SERPL-MCNC: 0.4 MG/DL — LOW (ref 0.5–1.3)
EOSINOPHIL # BLD AUTO: 0.1 K/UL — SIGNIFICANT CHANGE UP (ref 0–0.5)
EOSINOPHIL # BLD AUTO: 0.2 K/UL — SIGNIFICANT CHANGE UP (ref 0–0.5)
EOSINOPHIL NFR BLD AUTO: 1.6 % — SIGNIFICANT CHANGE UP (ref 0–5)
EOSINOPHIL NFR BLD AUTO: 1.8 % — SIGNIFICANT CHANGE UP (ref 0–5)
GLUCOSE SERPL-MCNC: 128 MG/DL — HIGH (ref 70–115)
HCT VFR BLD CALC: 22.8 % — LOW (ref 42–52)
HCT VFR BLD CALC: 26.1 % — LOW (ref 42–52)
HGB BLD-MCNC: 7.2 G/DL — LOW (ref 14–18)
HGB BLD-MCNC: 8.2 G/DL — LOW (ref 14–18)
LYMPHOCYTES # BLD AUTO: 1.1 K/UL — SIGNIFICANT CHANGE UP (ref 1–4.8)
LYMPHOCYTES # BLD AUTO: 1.3 K/UL — SIGNIFICANT CHANGE UP (ref 1–4.8)
LYMPHOCYTES # BLD AUTO: 12.9 % — LOW (ref 20–55)
LYMPHOCYTES # BLD AUTO: 15.6 % — LOW (ref 20–55)
MACROCYTES BLD QL: SLIGHT — SIGNIFICANT CHANGE UP
MAGNESIUM SERPL-MCNC: 1.9 MG/DL — SIGNIFICANT CHANGE UP (ref 1.6–2.6)
MCHC RBC-ENTMCNC: 27.5 PG — SIGNIFICANT CHANGE UP (ref 27–31)
MCHC RBC-ENTMCNC: 28.5 PG — SIGNIFICANT CHANGE UP (ref 27–31)
MCHC RBC-ENTMCNC: 31.4 G/DL — LOW (ref 32–36)
MCHC RBC-ENTMCNC: 31.6 G/DL — LOW (ref 32–36)
MCV RBC AUTO: 87.6 FL — SIGNIFICANT CHANGE UP (ref 80–94)
MCV RBC AUTO: 90.1 FL — SIGNIFICANT CHANGE UP (ref 80–94)
MONOCYTES # BLD AUTO: 0.7 K/UL — SIGNIFICANT CHANGE UP (ref 0–0.8)
MONOCYTES # BLD AUTO: 0.7 K/UL — SIGNIFICANT CHANGE UP (ref 0–0.8)
MONOCYTES NFR BLD AUTO: 8.5 % — SIGNIFICANT CHANGE UP (ref 3–10)
MONOCYTES NFR BLD AUTO: 8.7 % — SIGNIFICANT CHANGE UP (ref 3–10)
NEUTROPHILS # BLD AUTO: 5.9 K/UL — SIGNIFICANT CHANGE UP (ref 1.8–8)
NEUTROPHILS # BLD AUTO: 6.4 K/UL — SIGNIFICANT CHANGE UP (ref 1.8–8)
NEUTROPHILS NFR BLD AUTO: 73.4 % — HIGH (ref 37–73)
NEUTROPHILS NFR BLD AUTO: 75.9 % — HIGH (ref 37–73)
OVALOCYTES BLD QL SMEAR: SLIGHT — SIGNIFICANT CHANGE UP
PHOSPHATE SERPL-MCNC: 3.2 MG/DL — SIGNIFICANT CHANGE UP (ref 2.4–4.7)
PLAT MORPH BLD: NORMAL — SIGNIFICANT CHANGE UP
PLATELET # BLD AUTO: 310 K/UL — SIGNIFICANT CHANGE UP (ref 150–400)
PLATELET # BLD AUTO: 333 K/UL — SIGNIFICANT CHANGE UP (ref 150–400)
POIKILOCYTOSIS BLD QL AUTO: SIGNIFICANT CHANGE UP
POTASSIUM SERPL-MCNC: 5.1 MMOL/L — SIGNIFICANT CHANGE UP (ref 3.5–5.3)
POTASSIUM SERPL-SCNC: 5.1 MMOL/L — SIGNIFICANT CHANGE UP (ref 3.5–5.3)
RBC # BLD: 2.53 M/UL — LOW (ref 4.6–6.2)
RBC # BLD: 2.98 M/UL — LOW (ref 4.6–6.2)
RBC # FLD: 20 % — HIGH (ref 11–15.6)
RBC # FLD: 20.3 % — HIGH (ref 11–15.6)
RBC BLD AUTO: ABNORMAL
SODIUM SERPL-SCNC: 137 MMOL/L — SIGNIFICANT CHANGE UP (ref 135–145)
TYPE + AB SCN PNL BLD: SIGNIFICANT CHANGE UP
WBC # BLD: 8.1 K/UL — SIGNIFICANT CHANGE UP (ref 4.8–10.8)
WBC # BLD: 8.5 K/UL — SIGNIFICANT CHANGE UP (ref 4.8–10.8)
WBC # FLD AUTO: 8.1 K/UL — SIGNIFICANT CHANGE UP (ref 4.8–10.8)
WBC # FLD AUTO: 8.5 K/UL — SIGNIFICANT CHANGE UP (ref 4.8–10.8)

## 2018-02-01 PROCEDURE — 71045 X-RAY EXAM CHEST 1 VIEW: CPT | Mod: 26

## 2018-02-01 RX ORDER — LORATADINE 10 MG/1
10 TABLET ORAL DAILY
Qty: 0 | Refills: 0 | Status: DISCONTINUED | OUTPATIENT
Start: 2018-02-01 | End: 2018-02-01

## 2018-02-01 RX ORDER — ACETYLCYSTEINE 200 MG/ML
5 VIAL (ML) MISCELLANEOUS EVERY 6 HOURS
Qty: 0 | Refills: 0 | Status: DISCONTINUED | OUTPATIENT
Start: 2018-02-01 | End: 2018-02-01

## 2018-02-01 RX ORDER — LORATADINE 10 MG/1
10 TABLET ORAL DAILY
Qty: 0 | Refills: 0 | Status: DISCONTINUED | OUTPATIENT
Start: 2018-02-01 | End: 2018-02-02

## 2018-02-01 RX ORDER — IPRATROPIUM/ALBUTEROL SULFATE 18-103MCG
3 AEROSOL WITH ADAPTER (GRAM) INHALATION EVERY 6 HOURS
Qty: 0 | Refills: 0 | Status: DISCONTINUED | OUTPATIENT
Start: 2018-02-01 | End: 2018-02-03

## 2018-02-01 RX ORDER — ACETYLCYSTEINE 200 MG/ML
2.5 VIAL (ML) MISCELLANEOUS EVERY 6 HOURS
Qty: 0 | Refills: 0 | Status: DISCONTINUED | OUTPATIENT
Start: 2018-02-01 | End: 2018-02-15

## 2018-02-01 RX ADMIN — TAMSULOSIN HYDROCHLORIDE 0.4 MILLIGRAM(S): 0.4 CAPSULE ORAL at 21:18

## 2018-02-01 RX ADMIN — Medication 0.25 MILLIGRAM(S): at 15:38

## 2018-02-01 RX ADMIN — FLUTICASONE PROPIONATE AND SALMETEROL 1 DOSE(S): 50; 250 POWDER ORAL; RESPIRATORY (INHALATION) at 09:30

## 2018-02-01 RX ADMIN — PANTOPRAZOLE SODIUM 40 MILLIGRAM(S): 20 TABLET, DELAYED RELEASE ORAL at 17:50

## 2018-02-01 RX ADMIN — Medication 3 MILLILITER(S): at 10:02

## 2018-02-01 RX ADMIN — FLUTICASONE PROPIONATE AND SALMETEROL 1 DOSE(S): 50; 250 POWDER ORAL; RESPIRATORY (INHALATION) at 23:25

## 2018-02-01 RX ADMIN — Medication 360 MILLIGRAM(S): at 06:23

## 2018-02-01 RX ADMIN — Medication 1 TABLET(S): at 13:26

## 2018-02-01 RX ADMIN — Medication 0.25 MILLIGRAM(S): at 09:22

## 2018-02-01 RX ADMIN — PANTOPRAZOLE SODIUM 40 MILLIGRAM(S): 20 TABLET, DELAYED RELEASE ORAL at 06:23

## 2018-02-01 RX ADMIN — ENOXAPARIN SODIUM 40 MILLIGRAM(S): 100 INJECTION SUBCUTANEOUS at 13:26

## 2018-02-01 RX ADMIN — Medication 3 MILLILITER(S): at 21:24

## 2018-02-01 RX ADMIN — Medication 81 MILLIGRAM(S): at 13:26

## 2018-02-01 RX ADMIN — DIPHENHYDRAMINE HYDROCHLORIDE AND LIDOCAINE HYDROCHLORIDE AND ALUMINUM HYDROXIDE AND MAGNESIUM HYDRO 10 MILLILITER(S): KIT at 21:16

## 2018-02-01 RX ADMIN — TIOTROPIUM BROMIDE 1 CAPSULE(S): 18 CAPSULE ORAL; RESPIRATORY (INHALATION) at 11:55

## 2018-02-01 RX ADMIN — Medication 3 MILLILITER(S): at 15:21

## 2018-02-01 NOTE — PROGRESS NOTE ADULT - ASSESSMENT
A/P 78M s/p revision of jejunostomy and creation of loop ileostomy POD 15    -ADAT  -heplock IVF  -f/u AM labs and trend LFTs  -replete lytes prn   -monitor drains output  -strict Is and Os  -pain control  -encourage ambulation and IS  -DVT ppx A/P 78M s/p revision of jejunostomy and creation of loop ileostomy POD 15    -c/w current diet  -no further TPN needed at this time  -heplock IVF  -f/u AM labs and trend LFTs  -calorie count  -f/u dietician recs  -replete lytes prn   -monitor ostomy output  -strict Is and Os  -pain control  -encourage ambulation and IS  -DVT ppx  -PT/OT

## 2018-02-01 NOTE — CHART NOTE - NSCHARTNOTEFT_GEN_A_CORE
Patient seen and examined at bedside. Reports that he is not feeling short of breath necessarily but is mouth breathing now secondary to nasal congestion. Reports that Claritin and Mucomyst alleviates these symptoms. Wife brought in home claritin prescription (10mg QD) from phone. Nursing staff to take to pharmacy to verify so that the patient can get started on it. Currently still receiving blood. Hemodynamically stable with vitals below:     Vital Signs Last 24 Hrs  T(C): 36.8 (01 Feb 2018 10:14), Max: 36.9 (31 Jan 2018 20:25)  T(F): 98.3 (01 Feb 2018 10:14), Max: 98.4 (31 Jan 2018 20:25)  HR: 99 (01 Feb 2018 10:14) (99 - 115)  BP: 99/59 (01 Feb 2018 10:14) (99/59 - 123/80)  BP(mean): --  RR: 18 (01 Feb 2018 10:14) (18 - 18)  SpO2: 97% (01 Feb 2018 10:14) (97% - 100%) on 2L NC     ACS/trauma to f/u repeat H/H @ 10pm

## 2018-02-01 NOTE — PROGRESS NOTE ADULT - SUBJECTIVE AND OBJECTIVE BOX
INTERVAL HPI/OVERNIGHT EVENTS:    Patient seen and examined this AM  No ON events  Complaining of SOB and labored breathing during rounds  Using accessory muscle to breathe  poppy diet, adequate UOP, ambulating  denies f/c/n/v      MEDICATIONS  (STANDING):  ALBUTerol/ipratropium for Nebulization 3 milliLiter(s) Nebulizer every 6 hours  aspirin  chewable 81 milliGRAM(s) Oral daily  dextrose 5%. 1000 milliLiter(s) (50 mL/Hr) IV Continuous <Continuous>  dextrose 50% Injectable 12.5 Gram(s) IV Push once  dextrose 50% Injectable 25 Gram(s) IV Push once  diltiazem    milliGRAM(s) Oral daily  enoxaparin Injectable 40 milliGRAM(s) SubCutaneous daily  FIRST- Mouthwash  BLM 10 milliLiter(s) Swish and Spit two times a day  fluticasone propionate/ salmeterol 250-50 MICROgram(s) Diskus 1 Dose(s) Inhalation two times a day  loratadine 10 milliGRAM(s) Oral daily  multivitamin 1 Tablet(s) Oral daily  pantoprazole    Tablet 40 milliGRAM(s) Oral two times a day before meals  sodium chloride 0.65% Nasal 1 Spray(s) Both Nostrils four times a day  tamsulosin 0.4 milliGRAM(s) Oral at bedtime  tiotropium 18 MICROgram(s) Capsule 1 Capsule(s) Inhalation daily    MEDICATIONS  (PRN):  acetylcysteine 10% Inhalation 5 milliLiter(s) Inhalation every 6 hours PRN expectorant  ALPRAZolam 0.25 milliGRAM(s) Oral every 6 hours PRN anxiety  benzocaine 15 mG/menthol 3.6 mG Lozenge 1 Lozenge Oral every 4 hours PRN Sore Throat  benzonatate 100 milliGRAM(s) Oral three times a day PRN Cough  ondansetron Injectable 4 milliGRAM(s) IV Push every 4 hours PRN Nausea and/or Vomiting      Vital Signs Last 24 Hrs  T(C): 36.8 (01 Feb 2018 10:14), Max: 36.9 (31 Jan 2018 20:25)  T(F): 98.3 (01 Feb 2018 10:14), Max: 98.4 (31 Jan 2018 20:25)  HR: 99 (01 Feb 2018 10:14) (99 - 115)  BP: 99/59 (01 Feb 2018 10:14) (99/59 - 123/80)  BP(mean): --  RR: 18 (01 Feb 2018 10:14) (18 - 18)  SpO2: 97% (01 Feb 2018 10:14) (97% - 100%)    PE  AAOx3, NAD, frail  CTAx2  RRR, S1/S2  S/ND/NT, ileostomy functioning- stoma-> pink and viable, +gas and stool in ostomy bag, no rebound/guarding  no pitting edema, no BLE tenderness      I&O's Detail    31 Jan 2018 07:01  -  01 Feb 2018 07:00  --------------------------------------------------------  IN:    Oral Fluid: 120 mL    TPN (Total Parenteral Nutrition): 830 mL  Total IN: 950 mL    OUT:    Ileostomy: 325 mL    Voided: 2125 mL  Total OUT: 2450 mL    Total NET: -1500 mL          LABS:                        7.2    8.5   )-----------( 333      ( 01 Feb 2018 07:16 )             22.8     02-01    137  |  101  |  14.0  ----------------------------<  128<H>  5.1   |  24.0  |  0.40<L>    Ca    8.2<L>      01 Feb 2018 07:15  Phos  3.2     02-01  Mg     1.9     02-01

## 2018-02-02 LAB
ANION GAP SERPL CALC-SCNC: 12 MMOL/L — SIGNIFICANT CHANGE UP (ref 5–17)
BUN SERPL-MCNC: 14 MG/DL — SIGNIFICANT CHANGE UP (ref 8–20)
CALCIUM SERPL-MCNC: 8.4 MG/DL — LOW (ref 8.6–10.2)
CHLORIDE SERPL-SCNC: 100 MMOL/L — SIGNIFICANT CHANGE UP (ref 98–107)
CO2 SERPL-SCNC: 24 MMOL/L — SIGNIFICANT CHANGE UP (ref 22–29)
CREAT SERPL-MCNC: 0.34 MG/DL — LOW (ref 0.5–1.3)
GLUCOSE SERPL-MCNC: 141 MG/DL — HIGH (ref 70–115)
HCT VFR BLD CALC: 26.7 % — LOW (ref 42–52)
HGB BLD-MCNC: 8.6 G/DL — LOW (ref 14–18)
MAGNESIUM SERPL-MCNC: 1.7 MG/DL — SIGNIFICANT CHANGE UP (ref 1.6–2.6)
MCHC RBC-ENTMCNC: 28.1 PG — SIGNIFICANT CHANGE UP (ref 27–31)
MCHC RBC-ENTMCNC: 32.2 G/DL — SIGNIFICANT CHANGE UP (ref 32–36)
MCV RBC AUTO: 87.3 FL — SIGNIFICANT CHANGE UP (ref 80–94)
PHOSPHATE SERPL-MCNC: 3.9 MG/DL — SIGNIFICANT CHANGE UP (ref 2.4–4.7)
PLATELET # BLD AUTO: 337 K/UL — SIGNIFICANT CHANGE UP (ref 150–400)
POTASSIUM SERPL-MCNC: 4.5 MMOL/L — SIGNIFICANT CHANGE UP (ref 3.5–5.3)
POTASSIUM SERPL-SCNC: 4.5 MMOL/L — SIGNIFICANT CHANGE UP (ref 3.5–5.3)
RBC # BLD: 3.06 M/UL — LOW (ref 4.6–6.2)
RBC # FLD: 20.6 % — HIGH (ref 11–15.6)
SODIUM SERPL-SCNC: 136 MMOL/L — SIGNIFICANT CHANGE UP (ref 135–145)
WBC # BLD: 8.7 K/UL — SIGNIFICANT CHANGE UP (ref 4.8–10.8)
WBC # FLD AUTO: 8.7 K/UL — SIGNIFICANT CHANGE UP (ref 4.8–10.8)

## 2018-02-02 PROCEDURE — 99233 SBSQ HOSP IP/OBS HIGH 50: CPT

## 2018-02-02 RX ORDER — LORATADINE 10 MG/1
10 TABLET ORAL DAILY
Qty: 0 | Refills: 0 | Status: DISCONTINUED | OUTPATIENT
Start: 2018-02-02 | End: 2018-02-15

## 2018-02-02 RX ORDER — MAGNESIUM SULFATE 500 MG/ML
2 VIAL (ML) INJECTION ONCE
Qty: 0 | Refills: 0 | Status: COMPLETED | OUTPATIENT
Start: 2018-02-02 | End: 2018-02-02

## 2018-02-02 RX ADMIN — Medication 0.25 MILLIGRAM(S): at 05:37

## 2018-02-02 RX ADMIN — LORATADINE 10 MILLIGRAM(S): 10 TABLET ORAL at 12:59

## 2018-02-02 RX ADMIN — Medication 3 MILLILITER(S): at 03:37

## 2018-02-02 RX ADMIN — Medication 1 TABLET(S): at 11:33

## 2018-02-02 RX ADMIN — Medication 50 GRAM(S): at 11:41

## 2018-02-02 RX ADMIN — DIPHENHYDRAMINE HYDROCHLORIDE AND LIDOCAINE HYDROCHLORIDE AND ALUMINUM HYDROXIDE AND MAGNESIUM HYDRO 10 MILLILITER(S): KIT at 18:31

## 2018-02-02 RX ADMIN — Medication 1 SPRAY(S): at 11:33

## 2018-02-02 RX ADMIN — Medication 3 MILLILITER(S): at 15:15

## 2018-02-02 RX ADMIN — Medication 100 MILLIGRAM(S): at 14:25

## 2018-02-02 RX ADMIN — DIPHENHYDRAMINE HYDROCHLORIDE AND LIDOCAINE HYDROCHLORIDE AND ALUMINUM HYDROXIDE AND MAGNESIUM HYDRO 10 MILLILITER(S): KIT at 05:38

## 2018-02-02 RX ADMIN — Medication 100 MILLIGRAM(S): at 21:54

## 2018-02-02 RX ADMIN — Medication 100 MILLIGRAM(S): at 17:59

## 2018-02-02 RX ADMIN — FLUTICASONE PROPIONATE AND SALMETEROL 1 DOSE(S): 50; 250 POWDER ORAL; RESPIRATORY (INHALATION) at 20:00

## 2018-02-02 RX ADMIN — TIOTROPIUM BROMIDE 1 CAPSULE(S): 18 CAPSULE ORAL; RESPIRATORY (INHALATION) at 12:16

## 2018-02-02 RX ADMIN — PANTOPRAZOLE SODIUM 40 MILLIGRAM(S): 20 TABLET, DELAYED RELEASE ORAL at 05:38

## 2018-02-02 RX ADMIN — FLUTICASONE PROPIONATE AND SALMETEROL 1 DOSE(S): 50; 250 POWDER ORAL; RESPIRATORY (INHALATION) at 11:34

## 2018-02-02 RX ADMIN — Medication 1 SPRAY(S): at 01:40

## 2018-02-02 RX ADMIN — TAMSULOSIN HYDROCHLORIDE 0.4 MILLIGRAM(S): 0.4 CAPSULE ORAL at 21:55

## 2018-02-02 RX ADMIN — Medication 81 MILLIGRAM(S): at 11:33

## 2018-02-02 RX ADMIN — Medication 3 MILLILITER(S): at 09:11

## 2018-02-02 RX ADMIN — PANTOPRAZOLE SODIUM 40 MILLIGRAM(S): 20 TABLET, DELAYED RELEASE ORAL at 16:20

## 2018-02-02 RX ADMIN — Medication 1 SPRAY(S): at 05:39

## 2018-02-02 RX ADMIN — Medication 0.25 MILLIGRAM(S): at 14:25

## 2018-02-02 RX ADMIN — Medication 1 SPRAY(S): at 17:59

## 2018-02-02 NOTE — ADVANCED PRACTICE NURSE CONSULT - ASSESSMENT
Pt is alert and oriented, out of bed to chair, wife presented at the bedside. Pt stated that he felt much better now, he was drinking ensure at the bedside table. Wife stated that ileostomy is functioning good with pasty to formed fecal output, pt is able to tolerate P.O intake with no nausea and vomiting. Wife denied the pouching leaking issues. Wife still performing the pouching change for the pt, pt still refused to perform the ileostomy care. Emotional support provided.

## 2018-02-02 NOTE — PROGRESS NOTE ADULT - ASSESSMENT
1. S/P ileostomy for colonic obstruction -  GI following  TPN discontinued as patient is tolerating adequate solid po diet .   Ileostomy functioning properly .    2. Acute on chronic hypoxic respiratory failure secondary to COPD exacerbation, lung reduction in the past for hemothorax right.  improved, he is at his baseline, , continue bronchodilators symbicort. Duonebs     3. CAP - completed course of Levaquin on 12/5.     4. Hypertension - stable   - continue cardizem    5.Electrolyte abnormality- Due to short gut syndrome.  Replete as becomes necessary.      6. Scrotal edema chronic with urinary retention-  Stable jennie hydrocele per urology dr Powell.  Will require outpatient hydrocelectomy .    7. Right femoral artery dissection  - incidental finding on CT, no intervention as per vascular     8. anxiety: xanax at hs PRN.    9 Afib: On Cardizem not on anticoagulation, will continue monitoring HR, if high will adjust meds.     10. Hx of TIA: patient of was aspirin 81mg po daily .     Dispo: Rehab .

## 2018-02-02 NOTE — ADVANCED PRACTICE NURSE CONSULT - RECOMMEDATIONS
Encouraged pt and wife to participate in the ileostomy care and contact ostomy nurse during the hospitalization as needed.

## 2018-02-02 NOTE — PROGRESS NOTE ADULT - ASSESSMENT
78M s/p revision of jejunostomy and creation of loop ileostomy POD 16.    -c/w current diet  -no further TPN needed at this time  -heplock IVF  -f/u AM labs and trend LFTs  -calorie count  -f/u dietician recs  -replete lytes prn   -monitor ostomy output  -strict Is and Os  -pain control  -encourage ambulation and IS  -DVT ppx

## 2018-02-02 NOTE — PROGRESS NOTE ADULT - SUBJECTIVE AND OBJECTIVE BOX
INTERVAL HPI/OVERNIGHT EVENTS:  no issues overnight. no nausea/ no vomiting; no complaints of abdominal pain; ostomy functional. Tolerating diet with no issues.      MEDICATIONS  (STANDING):  ALBUTerol/ipratropium for Nebulization 3 milliLiter(s) Nebulizer every 6 hours  aspirin  chewable 81 milliGRAM(s) Oral daily  dextrose 5%. 1000 milliLiter(s) (50 mL/Hr) IV Continuous <Continuous>  dextrose 50% Injectable 12.5 Gram(s) IV Push once  dextrose 50% Injectable 25 Gram(s) IV Push once  diltiazem    milliGRAM(s) Oral daily  enoxaparin Injectable 40 milliGRAM(s) SubCutaneous daily  FIRST- Mouthwash  BLM 10 milliLiter(s) Swish and Spit two times a day  fluticasone propionate/ salmeterol 250-50 MICROgram(s) Diskus 1 Dose(s) Inhalation two times a day  guaiFENesin    Syrup 100 milliGRAM(s) Oral every 4 hours  loratadine 10 milliGRAM(s) Oral daily  multivitamin 1 Tablet(s) Oral daily  pantoprazole    Tablet 40 milliGRAM(s) Oral two times a day before meals  sodium chloride 0.65% Nasal 1 Spray(s) Both Nostrils four times a day  tamsulosin 0.4 milliGRAM(s) Oral at bedtime  tiotropium 18 MICROgram(s) Capsule 1 Capsule(s) Inhalation daily    MEDICATIONS  (PRN):  acetylcysteine 20% Inhalation 2.5 milliLiter(s) Inhalation every 6 hours PRN expectorant  ALPRAZolam 0.25 milliGRAM(s) Oral every 6 hours PRN anxiety  benzocaine 15 mG/menthol 3.6 mG Lozenge 1 Lozenge Oral every 4 hours PRN Sore Throat  benzonatate 100 milliGRAM(s) Oral three times a day PRN Cough  ondansetron Injectable 4 milliGRAM(s) IV Push every 4 hours PRN Nausea and/or Vomiting      Vital Signs Last 24 Hrs  T(C): 36.4 (02 Feb 2018 05:00), Max: 36.8 (01 Feb 2018 21:00)  T(F): 97.6 (02 Feb 2018 05:00), Max: 98.3 (01 Feb 2018 21:00)  HR: 130 (02 Feb 2018 10:35) (76 - 130)  BP: 100/74 (02 Feb 2018 05:00) (100/60 - 111/62)  BP(mean): --  RR: 18 (02 Feb 2018 05:00) (17 - 20)  SpO2: 97% (01 Feb 2018 21:00) (94% - 97%)    PE  Gen: NAD  Pulm: no respiratory distress; using O2 supplement  CV:  no tachycardia  Abd: s/nd/nt, no rebound tenderness or guarding, ostomy functional/pink/viable  Ext: LE's warm, dry  Neuro: no focal deficits      I&O's Detail    01 Feb 2018 07:01  -  02 Feb 2018 07:00  --------------------------------------------------------  IN:    Oral Fluid: 1460 mL    Packed Red Blood Cells: 350 mL  Total IN: 1810 mL    OUT:    Ileostomy: 595 mL    Voided: 2134 mL  Total OUT: 2729 mL    Total NET: -919 mL          LABS:                        8.6    8.7   )-----------( 337      ( 02 Feb 2018 07:30 )             26.7     02-02    136  |  100  |  14.0  ----------------------------<  141<H>  4.5   |  24.0  |  0.34<L>    Ca    8.4<L>      02 Feb 2018 07:28  Phos  3.9     02-02  Mg     1.7     02-02            RADIOLOGY & ADDITIONAL STUDIES:

## 2018-02-02 NOTE — PROGRESS NOTE ADULT - SUBJECTIVE AND OBJECTIVE BOX
CC: S/p terminal ileostomy placement for colonic obstruction.  Patient is tolerating oral feeds and ileostomy functioning   HPI:  67 YOM w/PMH of COPD stage 4 s/p right lung reduction in 2010, currently on home oxygen 4L (sat 92-93%), HTN, CVA on 2014, DM, hypothyroidism, hydrocele, kidney stones who came because he noted that his O2 sat was 76% today. He mentions more difficult to talk in full sencentces compared to his baseline. He also has a chronic productive cough which has not changed in frequency or color. He mentions that his oxygen machine broke 4 days ago and he got a new one since, but is not sure if its working properly. He denies SOB, increased cough, chest pain, hemoptysis nausea, weakness. Pt has to sleep in a sit position with 2 pillows since he gets SOB if he lyes flat, condition that he attributes to his postnasal drip (29 Nov 2017 02:05)    REVIEW OF SYSTEMS:    Patient denied fever, chills, abdominal pain, nausea, vomiting, cough, shortness of breath, chest pain or palpitations    Vital Signs Last 24 Hrs  T(C): 36.4 (02 Feb 2018 05:00), Max: 36.8 (01 Feb 2018 21:00)  T(F): 97.6 (02 Feb 2018 05:00), Max: 98.3 (01 Feb 2018 21:00)  HR: 76 (02 Feb 2018 05:00) (76 - 104)  BP: 100/74 (02 Feb 2018 05:00) (100/60 - 111/62)  BP(mean): --  RR: 18 (02 Feb 2018 05:00) (17 - 20)  SpO2: 97% (01 Feb 2018 21:00) (94% - 97%)I&O's Summary    01 Feb 2018 07:01  -  02 Feb 2018 07:00  --------------------------------------------------------  IN: 1810 mL / OUT: 2729 mL / NET: -919 mL      PHYSICAL EXAM:  GENERAL: Cachexia   HEENT: PERRL, +EOMI, anicteric, no [place]  NECK: Supple, No JVD   CHEST/LUNG: CTA bilaterally; Normal effort  HEART: S1S2 Normal intensity, no murmurs, gallops or rubs noted  ABDOMEN: Soft, BS Normoactive, NT, ND, no HSM noted. Scrotal swelling. Ileostomy right side.   EXTREMITIES:  2+ radial and DP pulses noted, no clubbing, cyanosis, or edema noted. Limited mobility   SKIN: No rashes or lesions noted  NEURO: A&Ox3, no focal deficits noted, CN II-XII intact  PSYCH: Depressed mood and affect; insight/judgement appropriate  LABS:                        8.6    8.7   )-----------( 337      ( 02 Feb 2018 07:30 )             26.7     02-02    136  |  100  |  14.0  ----------------------------<  141<H>  4.5   |  24.0  |  0.34<L>    Ca    8.4<L>      02 Feb 2018 07:28  Phos  3.9     02-02  Mg     1.7     02-02          RADIOLOGY & ADDITIONAL TESTS:    MEDICATIONS:  MEDICATIONS  (STANDING):  ALBUTerol/ipratropium for Nebulization 3 milliLiter(s) Nebulizer every 6 hours  aspirin  chewable 81 milliGRAM(s) Oral daily  dextrose 5%. 1000 milliLiter(s) (50 mL/Hr) IV Continuous <Continuous>  dextrose 50% Injectable 12.5 Gram(s) IV Push once  dextrose 50% Injectable 25 Gram(s) IV Push once  diltiazem    milliGRAM(s) Oral daily  enoxaparin Injectable 40 milliGRAM(s) SubCutaneous daily  FIRST- Mouthwash  BLM 10 milliLiter(s) Swish and Spit two times a day  fluticasone propionate/ salmeterol 250-50 MICROgram(s) Diskus 1 Dose(s) Inhalation two times a day  loratadine 10 milliGRAM(s) Oral daily  multivitamin 1 Tablet(s) Oral daily  pantoprazole    Tablet 40 milliGRAM(s) Oral two times a day before meals  sodium chloride 0.65% Nasal 1 Spray(s) Both Nostrils four times a day  tamsulosin 0.4 milliGRAM(s) Oral at bedtime  tiotropium 18 MICROgram(s) Capsule 1 Capsule(s) Inhalation daily    MEDICATIONS  (PRN):  acetylcysteine 20% Inhalation 2.5 milliLiter(s) Inhalation every 6 hours PRN expectorant  ALPRAZolam 0.25 milliGRAM(s) Oral every 6 hours PRN anxiety  benzocaine 15 mG/menthol 3.6 mG Lozenge 1 Lozenge Oral every 4 hours PRN Sore Throat  benzonatate 100 milliGRAM(s) Oral three times a day PRN Cough  ondansetron Injectable 4 milliGRAM(s) IV Push every 4 hours PRN Nausea and/or Vomiting

## 2018-02-03 LAB
ANION GAP SERPL CALC-SCNC: 12 MMOL/L — SIGNIFICANT CHANGE UP (ref 5–17)
BUN SERPL-MCNC: 13 MG/DL — SIGNIFICANT CHANGE UP (ref 8–20)
CALCIUM SERPL-MCNC: 8.4 MG/DL — LOW (ref 8.6–10.2)
CHLORIDE SERPL-SCNC: 99 MMOL/L — SIGNIFICANT CHANGE UP (ref 98–107)
CO2 SERPL-SCNC: 25 MMOL/L — SIGNIFICANT CHANGE UP (ref 22–29)
CREAT SERPL-MCNC: 0.47 MG/DL — LOW (ref 0.5–1.3)
GLUCOSE SERPL-MCNC: 135 MG/DL — HIGH (ref 70–115)
HCT VFR BLD CALC: 27.1 % — LOW (ref 42–52)
HGB BLD-MCNC: 8.5 G/DL — LOW (ref 14–18)
MAGNESIUM SERPL-MCNC: 1.7 MG/DL — SIGNIFICANT CHANGE UP (ref 1.6–2.6)
MCHC RBC-ENTMCNC: 27.8 PG — SIGNIFICANT CHANGE UP (ref 27–31)
MCHC RBC-ENTMCNC: 31.4 G/DL — LOW (ref 32–36)
MCV RBC AUTO: 88.6 FL — SIGNIFICANT CHANGE UP (ref 80–94)
PHOSPHATE SERPL-MCNC: 3.3 MG/DL — SIGNIFICANT CHANGE UP (ref 2.4–4.7)
PLATELET # BLD AUTO: 365 K/UL — SIGNIFICANT CHANGE UP (ref 150–400)
POTASSIUM SERPL-MCNC: 4.7 MMOL/L — SIGNIFICANT CHANGE UP (ref 3.5–5.3)
POTASSIUM SERPL-SCNC: 4.7 MMOL/L — SIGNIFICANT CHANGE UP (ref 3.5–5.3)
RBC # BLD: 3.06 M/UL — LOW (ref 4.6–6.2)
RBC # FLD: 20.5 % — HIGH (ref 11–15.6)
SODIUM SERPL-SCNC: 136 MMOL/L — SIGNIFICANT CHANGE UP (ref 135–145)
WBC # BLD: 8.7 K/UL — SIGNIFICANT CHANGE UP (ref 4.8–10.8)
WBC # FLD AUTO: 8.7 K/UL — SIGNIFICANT CHANGE UP (ref 4.8–10.8)

## 2018-02-03 PROCEDURE — 71045 X-RAY EXAM CHEST 1 VIEW: CPT | Mod: 26

## 2018-02-03 PROCEDURE — 99233 SBSQ HOSP IP/OBS HIGH 50: CPT

## 2018-02-03 PROCEDURE — 99232 SBSQ HOSP IP/OBS MODERATE 35: CPT

## 2018-02-03 RX ORDER — ALPRAZOLAM 0.25 MG
0.25 TABLET ORAL EVERY 8 HOURS
Qty: 0 | Refills: 0 | Status: DISCONTINUED | OUTPATIENT
Start: 2018-02-03 | End: 2018-02-04

## 2018-02-03 RX ORDER — ALBUTEROL 90 UG/1
2.5 AEROSOL, METERED ORAL EVERY 6 HOURS
Qty: 0 | Refills: 0 | Status: DISCONTINUED | OUTPATIENT
Start: 2018-02-03 | End: 2018-02-04

## 2018-02-03 RX ORDER — MAGNESIUM SULFATE 500 MG/ML
2 VIAL (ML) INJECTION ONCE
Qty: 0 | Refills: 0 | Status: COMPLETED | OUTPATIENT
Start: 2018-02-03 | End: 2018-02-03

## 2018-02-03 RX ORDER — IPRATROPIUM/ALBUTEROL SULFATE 18-103MCG
3 AEROSOL WITH ADAPTER (GRAM) INHALATION EVERY 6 HOURS
Qty: 0 | Refills: 0 | Status: DISCONTINUED | OUTPATIENT
Start: 2018-02-03 | End: 2018-02-03

## 2018-02-03 RX ORDER — FLUTICASONE PROPIONATE 50 MCG
1 SPRAY, SUSPENSION NASAL
Qty: 0 | Refills: 0 | Status: DISCONTINUED | OUTPATIENT
Start: 2018-02-03 | End: 2018-02-15

## 2018-02-03 RX ADMIN — Medication 1 SPRAY(S): at 18:39

## 2018-02-03 RX ADMIN — Medication 3 MILLILITER(S): at 03:01

## 2018-02-03 RX ADMIN — DIPHENHYDRAMINE HYDROCHLORIDE AND LIDOCAINE HYDROCHLORIDE AND ALUMINUM HYDROXIDE AND MAGNESIUM HYDRO 10 MILLILITER(S): KIT at 06:00

## 2018-02-03 RX ADMIN — Medication 1 SPRAY(S): at 11:43

## 2018-02-03 RX ADMIN — Medication 1 SPRAY(S): at 20:59

## 2018-02-03 RX ADMIN — Medication 0.25 MILLIGRAM(S): at 21:00

## 2018-02-03 RX ADMIN — PANTOPRAZOLE SODIUM 40 MILLIGRAM(S): 20 TABLET, DELAYED RELEASE ORAL at 18:39

## 2018-02-03 RX ADMIN — Medication 3 MILLILITER(S): at 00:08

## 2018-02-03 RX ADMIN — Medication 100 MILLIGRAM(S): at 20:59

## 2018-02-03 RX ADMIN — FLUTICASONE PROPIONATE AND SALMETEROL 1 DOSE(S): 50; 250 POWDER ORAL; RESPIRATORY (INHALATION) at 10:50

## 2018-02-03 RX ADMIN — Medication 3 MILLILITER(S): at 09:27

## 2018-02-03 RX ADMIN — Medication 100 MILLIGRAM(S): at 11:42

## 2018-02-03 RX ADMIN — Medication 360 MILLIGRAM(S): at 06:01

## 2018-02-03 RX ADMIN — PANTOPRAZOLE SODIUM 40 MILLIGRAM(S): 20 TABLET, DELAYED RELEASE ORAL at 06:02

## 2018-02-03 RX ADMIN — Medication 0.25 MILLIGRAM(S): at 11:52

## 2018-02-03 RX ADMIN — Medication 100 MILLIGRAM(S): at 02:29

## 2018-02-03 RX ADMIN — DIPHENHYDRAMINE HYDROCHLORIDE AND LIDOCAINE HYDROCHLORIDE AND ALUMINUM HYDROXIDE AND MAGNESIUM HYDRO 10 MILLILITER(S): KIT at 18:38

## 2018-02-03 RX ADMIN — Medication 0.25 MILLIGRAM(S): at 00:57

## 2018-02-03 RX ADMIN — LORATADINE 10 MILLIGRAM(S): 10 TABLET ORAL at 11:43

## 2018-02-03 RX ADMIN — ALBUTEROL 2.5 MILLIGRAM(S): 90 AEROSOL, METERED ORAL at 15:24

## 2018-02-03 RX ADMIN — TAMSULOSIN HYDROCHLORIDE 0.4 MILLIGRAM(S): 0.4 CAPSULE ORAL at 20:58

## 2018-02-03 RX ADMIN — ALBUTEROL 2.5 MILLIGRAM(S): 90 AEROSOL, METERED ORAL at 21:03

## 2018-02-03 RX ADMIN — Medication 1 SPRAY(S): at 00:55

## 2018-02-03 RX ADMIN — Medication 81 MILLIGRAM(S): at 11:42

## 2018-02-03 RX ADMIN — FLUTICASONE PROPIONATE AND SALMETEROL 1 DOSE(S): 50; 250 POWDER ORAL; RESPIRATORY (INHALATION) at 20:58

## 2018-02-03 RX ADMIN — Medication 1 TABLET(S): at 11:42

## 2018-02-03 RX ADMIN — Medication 50 GRAM(S): at 11:43

## 2018-02-03 RX ADMIN — TIOTROPIUM BROMIDE 1 CAPSULE(S): 18 CAPSULE ORAL; RESPIRATORY (INHALATION) at 09:27

## 2018-02-03 RX ADMIN — Medication 100 MILLIGRAM(S): at 18:38

## 2018-02-03 RX ADMIN — Medication 100 MILLIGRAM(S): at 06:02

## 2018-02-03 RX ADMIN — Medication 1 SPRAY(S): at 06:01

## 2018-02-03 NOTE — PROGRESS NOTE ADULT - NSHPATTENDINGPLANDISCUSS_GEN_ALL_CORE
patient, wife, RN and surgery
pt and wife
patient, RN ,
Family at bedside
patient, RN , wife at bedside
patient, wife, RN and surgery
Patient, Family, ICU staff. all questions answered.
Patient, wife, family, and medicine
Pt, wife, surgeon
Wife, anesthesia, pt
patient
patient, RN ,
patient, RN , wife at bedside
patient, RN , wife at bedside
patient, wife, RN and surgery
patient, wife, RN and surgery
patient, wife, RN and surgery PA
pt and wife at bedside
wife at bedside
Patient family ACS team,. all questions answered.
Patient, ACs team. all question answered,
Patient, Wife, ACS team,. all questions answered,
patient, wife, RN and surgery
Patient, ACs team. all questions answered.
Patient, Family, ACs team. all questions answered.

## 2018-02-03 NOTE — PROGRESS NOTE ADULT - SUBJECTIVE AND OBJECTIVE BOX
PULMONARY PROGRESS NOTE      FRANDY BAUTISTAJANE-[id number]    Patient is a 67y old  Male who presents with a chief complaint of SOB (15 Dec 2017 10:17)      INTERVAL HPI/OVERNIGHT EVENTS: Called to see patient. As per surgery NP, pt noted to have increased SOB last day. No distress. Per patient, his main complaint is nasal congestion, can't breath through nose. Significant secretions from nose. On NCO2.     MEDICATIONS  (STANDING):  aspirin  chewable 81 milliGRAM(s) Oral daily  dextrose 5%. 1000 milliLiter(s) (50 mL/Hr) IV Continuous <Continuous>  dextrose 50% Injectable 12.5 Gram(s) IV Push once  dextrose 50% Injectable 25 Gram(s) IV Push once  diltiazem    milliGRAM(s) Oral daily  enoxaparin Injectable 40 milliGRAM(s) SubCutaneous daily  FIRST- Mouthwash  BLM 10 milliLiter(s) Swish and Spit two times a day  fluticasone propionate 50 MICROgram(s)/spray Nasal Spray 1 Spray(s) Both Nostrils two times a day  fluticasone propionate/ salmeterol 250-50 MICROgram(s) Diskus 1 Dose(s) Inhalation two times a day  guaiFENesin    Syrup 100 milliGRAM(s) Oral every 4 hours  loratadine 10 milliGRAM(s) Oral daily  magnesium sulfate  IVPB 2 Gram(s) IV Intermittent once  multivitamin 1 Tablet(s) Oral daily  pantoprazole    Tablet 40 milliGRAM(s) Oral two times a day before meals  sodium chloride 0.65% Nasal 1 Spray(s) Both Nostrils four times a day  tamsulosin 0.4 milliGRAM(s) Oral at bedtime  tiotropium 18 MICROgram(s) Capsule 1 Capsule(s) Inhalation daily      MEDICATIONS  (PRN):  acetylcysteine 20% Inhalation 2.5 milliLiter(s) Inhalation every 6 hours PRN expectorant  ALBUTerol/ipratropium for Nebulization 3 milliLiter(s) Nebulizer every 6 hours PRN SOB and wheezing  benzocaine 15 mG/menthol 3.6 mG Lozenge 1 Lozenge Oral every 4 hours PRN Sore Throat  benzonatate 100 milliGRAM(s) Oral three times a day PRN Cough  ondansetron Injectable 4 milliGRAM(s) IV Push every 4 hours PRN Nausea and/or Vomiting      Allergies    codeine (Unknown)  no veges (Unknown)  penicillin (Unknown)    Intolerances    DO NOT SEND ORANGES (Unknown)  Symbicort (Short breath)      PAST MEDICAL & SURGICAL HISTORY:  Hypothyroidism  Hydrocele  Renal calculi  Diabetes mellitus  Hypertension  CVA (cerebral vascular accident)  Chronic obstructive pulmonary disease, unspecified COPD type  History of lung surgery      SOCIAL HISTORY  Smoking History: former      REVIEW OF SYSTEMS:    CONSTITUTIONAL:  No distress    HEENT:  Eyes:  No diplopia or blurred vision. ENT:  No earache, sore throat or runny nose.    CARDIOVASCULAR:  No pressure, squeezing, tightness, heaviness or aching about the chest; no palpitations.    RESPIRATORY:  per HPI     GASTROINTESTINAL:  No nausea, vomiting or diarrhea.    GENITOURINARY:  No dysuria, frequency or urgency.    NEUROLOGIC:  No paresthesias, fasciculations, seizures or weakness.    PSYCHIATRIC:  No disorder of thought or mood.    Vital Signs Last 24 Hrs  T(C): 36.7 (02 Feb 2018 15:57), Max: 36.7 (02 Feb 2018 15:57)  T(F): 98.1 (02 Feb 2018 15:57), Max: 98.1 (02 Feb 2018 15:57)  HR: 129 (02 Feb 2018 18:30) (129 - 129)  BP: 116/65 (02 Feb 2018 15:57) (116/65 - 116/65)  BP(mean): --  RR: 20 (02 Feb 2018 15:57) (20 - 20)  SpO2: --    PHYSICAL EXAMINATION:    GENERAL: The patient is awake and alert in no apparent distress.     HEENT: Head is normocephalic and atraumatic. Extraocular muscles are intact. Mucous membranes are moist.    NECK: Supple.    LUNGS: Decreased BS apices, no wheeze, no rales, respirations unlabored    HEART: Regular rate and rhythm      ABDOMEN: s/p surgery.      EXTREMITIES: Without any cyanosis, clubbing, rash, lesions or edema.    NEUROLOGIC: Grossly intact.    LABS:                        8.5    8.7   )-----------( 365      ( 03 Feb 2018 07:07 )             27.1     02-03    136  |  99  |  13.0  ----------------------------<  135<H>  4.7   |  25.0  |  0.47<L>    Ca    8.4<L>      03 Feb 2018 07:07  Phos  3.3     02-03  Mg     1.7     02-03       :    RADIOLOGY & ADDITIONAL STUDIES:  < from: Xray Chest 1 View-PORTABLE IMMEDIATE (02.01.18 @ 10:33) >   EXAM:  XR CHEST PORTABLE IMMED 1V                          PROCEDURE DATE:  02/01/2018          INTERPRETATION:  Portable chest radiograph dated 2/1/18.    COMPARISON: 1/23/18.    CLINICAL INFORMATION: Shortness of breath.  FINDINGS:    The airwayis midline. Continued application of the right arm PICC line,   distal tip is in the midsuperior vena cava.  COPD changes involving both lungs are again noted. Bilateral pleural   effusions are again noted, however, have decreased in size. Some   associated atelectatic changes in the lower lobes. Some new infiltrates   in the inferior right upper lobe. Lungs are less congested as compared to   the prior study.   The cardiac silhouette is normal size.   The bones are normal.     IMPRESSION:  Some improvement in the magnitude of bilateral pulmonary vascular   congestion and bilateral pleural effusions. Some new infiltrates in the   inferior right upper lobe?.   COPD.                JESSY MAYA M.D., ATTENDING RADIOLOGIST    < end of copied text >

## 2018-02-03 NOTE — PROGRESS NOTE ADULT - ASSESSMENT
78M s/p revision of jejunostomy and creation of loop ileostomy POD 17.    - F/u EKG this am due to tacchycardia  -f/u AM labs  -replete lytes prn  -c/w current diet  -no further TPN needed at this time  -monitor ostomy output  -strict Is and Os  -pain control  -encourage ambulation and IS  -DVT ppx

## 2018-02-03 NOTE — PROGRESS NOTE ADULT - SUBJECTIVE AND OBJECTIVE BOX
INTERVAL HPI/OVERNIGHT EVENTS: Seen and examined. No acute events overnight but this am pt was c/o discomfort secondary to phlegm. Tolerating diet. Ostomy functioning well.    STATUS POST:      POST OPERATIVE DAY #:     SUBJECTIVE:  Flatus: [x ] YES [ ] NO             Bowel Movement: [ x] YES [ ] NO  Pain (0-10):            Pain Control Adequate: [x ] YES [ ] NO  Nausea: [ ] YES [x ] NO            Vomiting: [ ] YES [x ] NO  Diarrhea: [ ] YES [x ] NO         Constipation: [ ] YES [x ] NO     Chest Pain: [ ] YES [x ] NO    SOB:  [ ] YES [x ] NO    MEDICATIONS  (STANDING):  aspirin  chewable 81 milliGRAM(s) Oral daily  dextrose 5%. 1000 milliLiter(s) (50 mL/Hr) IV Continuous <Continuous>  dextrose 50% Injectable 12.5 Gram(s) IV Push once  dextrose 50% Injectable 25 Gram(s) IV Push once  diltiazem    milliGRAM(s) Oral daily  enoxaparin Injectable 40 milliGRAM(s) SubCutaneous daily  FIRST- Mouthwash  BLM 10 milliLiter(s) Swish and Spit two times a day  fluticasone propionate 50 MICROgram(s)/spray Nasal Spray 1 Spray(s) Both Nostrils two times a day  fluticasone propionate/ salmeterol 250-50 MICROgram(s) Diskus 1 Dose(s) Inhalation two times a day  guaiFENesin    Syrup 100 milliGRAM(s) Oral every 4 hours  loratadine 10 milliGRAM(s) Oral daily  magnesium sulfate  IVPB 2 Gram(s) IV Intermittent once  multivitamin 1 Tablet(s) Oral daily  pantoprazole    Tablet 40 milliGRAM(s) Oral two times a day before meals  sodium chloride 0.65% Nasal 1 Spray(s) Both Nostrils four times a day  tamsulosin 0.4 milliGRAM(s) Oral at bedtime  tiotropium 18 MICROgram(s) Capsule 1 Capsule(s) Inhalation daily    MEDICATIONS  (PRN):  acetylcysteine 20% Inhalation 2.5 milliLiter(s) Inhalation every 6 hours PRN expectorant  ALBUTerol/ipratropium for Nebulization 3 milliLiter(s) Nebulizer every 6 hours PRN SOB and wheezing  benzocaine 15 mG/menthol 3.6 mG Lozenge 1 Lozenge Oral every 4 hours PRN Sore Throat  benzonatate 100 milliGRAM(s) Oral three times a day PRN Cough  ondansetron Injectable 4 milliGRAM(s) IV Push every 4 hours PRN Nausea and/or Vomiting      Vital Signs Last 24 Hrs  T(C): 36.7 (02 Feb 2018 15:57), Max: 36.7 (02 Feb 2018 15:57)  T(F): 98.1 (02 Feb 2018 15:57), Max: 98.1 (02 Feb 2018 15:57)  HR: 129 (02 Feb 2018 18:30) (129 - 130)  BP: 116/65 (02 Feb 2018 15:57) (116/65 - 116/65)  BP(mean): --  RR: 20 (02 Feb 2018 15:57) (20 - 20)  SpO2: --    PHYSICAL EXAM:      Constitutional: NAD    Eyes: EOMI    ENMT:    Neck:    Breasts:    Back:    Respiratory: CTA B/L    Cardiovascular: +S1S2    Gastrointestinal: Soft, NT, ND, ostomy pink and viable with stool in bag    Genitourinary:    Rectal:    Extremities: No edema    Vascular:    Neurological:    Skin:    Lymph Nodes:    Musculoskeletal:    Psychiatric:        I&O's Detail    02 Feb 2018 07:01  -  03 Feb 2018 07:00  --------------------------------------------------------  IN:    Oral Fluid: 360 mL    Solution: 50 mL  Total IN: 410 mL    OUT:    Ileostomy: 650 mL    Voided: 375 mL  Total OUT: 1025 mL    Total NET: -615 mL          LABS:                        8.5    8.7   )-----------( 365      ( 03 Feb 2018 07:07 )             27.1     02-03    136  |  99  |  13.0  ----------------------------<  135<H>  4.7   |  25.0  |  0.47<L>    Ca    8.4<L>      03 Feb 2018 07:07  Phos  3.3     02-03  Mg     1.7     02-03            RADIOLOGY & ADDITIONAL STUDIES: INTERVAL HPI/OVERNIGHT EVENTS: Seen and examined. No acute events overnight but this am pt was c/o discomfort secondary to phlegm. Tolerating diet. Ostomy functioning well.    STATUS POST:      POST OPERATIVE DAY #:     SUBJECTIVE:  Flatus: [x ] YES [ ] NO             Bowel Movement: [ x] YES [ ] NO  Pain (0-10):            Pain Control Adequate: [x ] YES [ ] NO  Nausea: [ ] YES [x ] NO            Vomiting: [ ] YES [x ] NO  Diarrhea: [ ] YES [x ] NO         Constipation: [ ] YES [x ] NO     Chest Pain: [ ] YES [x ] NO    SOB:  [ ] YES [x ] NO    MEDICATIONS  (STANDING):  aspirin  chewable 81 milliGRAM(s) Oral daily  dextrose 5%. 1000 milliLiter(s) (50 mL/Hr) IV Continuous <Continuous>  dextrose 50% Injectable 12.5 Gram(s) IV Push once  dextrose 50% Injectable 25 Gram(s) IV Push once  diltiazem    milliGRAM(s) Oral daily  enoxaparin Injectable 40 milliGRAM(s) SubCutaneous daily  FIRST- Mouthwash  BLM 10 milliLiter(s) Swish and Spit two times a day  fluticasone propionate 50 MICROgram(s)/spray Nasal Spray 1 Spray(s) Both Nostrils two times a day  fluticasone propionate/ salmeterol 250-50 MICROgram(s) Diskus 1 Dose(s) Inhalation two times a day  guaiFENesin    Syrup 100 milliGRAM(s) Oral every 4 hours  loratadine 10 milliGRAM(s) Oral daily  magnesium sulfate  IVPB 2 Gram(s) IV Intermittent once  multivitamin 1 Tablet(s) Oral daily  pantoprazole    Tablet 40 milliGRAM(s) Oral two times a day before meals  sodium chloride 0.65% Nasal 1 Spray(s) Both Nostrils four times a day  tamsulosin 0.4 milliGRAM(s) Oral at bedtime  tiotropium 18 MICROgram(s) Capsule 1 Capsule(s) Inhalation daily    MEDICATIONS  (PRN):  acetylcysteine 20% Inhalation 2.5 milliLiter(s) Inhalation every 6 hours PRN expectorant  ALBUTerol/ipratropium for Nebulization 3 milliLiter(s) Nebulizer every 6 hours PRN SOB and wheezing  benzocaine 15 mG/menthol 3.6 mG Lozenge 1 Lozenge Oral every 4 hours PRN Sore Throat  benzonatate 100 milliGRAM(s) Oral three times a day PRN Cough  ondansetron Injectable 4 milliGRAM(s) IV Push every 4 hours PRN Nausea and/or Vomiting      Vital Signs Last 24 Hrs  T(C): 36.7 (02 Feb 2018 15:57), Max: 36.7 (02 Feb 2018 15:57)  T(F): 98.1 (02 Feb 2018 15:57), Max: 98.1 (02 Feb 2018 15:57)  HR: 129 (02 Feb 2018 18:30) (129 - 130)  BP: 116/65 (02 Feb 2018 15:57) (116/65 - 116/65)  BP(mean): --  RR: 20 (02 Feb 2018 15:57) (20 - 20)  SpO2: --    PHYSICAL EXAM:      Constitutional: NAD    Eyes: EOMI    ENMT:    Neck:    Breasts:    Back:    Respiratory: CTA B/L    Cardiovascular: +S1S2    Gastrointestinal: Soft, NT, ND, ostomy pink and viable with stool in bag            I&O's Detail    02 Feb 2018 07:01  -  03 Feb 2018 07:00  --------------------------------------------------------  IN:    Oral Fluid: 360 mL    Solution: 50 mL  Total IN: 410 mL    OUT:    Ileostomy: 650 mL    Voided: 375 mL  Total OUT: 1025 mL    Total NET: -615 mL          LABS:                        8.5    8.7   )-----------( 365      ( 03 Feb 2018 07:07 )             27.1     02-03    136  |  99  |  13.0  ----------------------------<  135<H>  4.7   |  25.0  |  0.47<L>    Ca    8.4<L>      03 Feb 2018 07:07  Phos  3.3     02-03  Mg     1.7     02-03            RADIOLOGY & ADDITIONAL STUDIES:

## 2018-02-03 NOTE — PROGRESS NOTE ADULT - ASSESSMENT
67 year old male with PMH of COPD stage 4 s/p right lung reduction in 2010 (on home oxygen 4L (sat 92-93%), HTN, CVA on 2014, DM, hypothyroidism, hydrocele, and nephrolithiasis who is admitted for a COPD exacerbation. Patient has been weaned off ventimask to 4 liters nasal cannula and respiratory status is stable. CTA of the chest was negative for PE but showed bilateral lower lobe infiltrates with mucous plugging for which he was started on Levaquin.  Respiratory status stable and steroids are being tapered. Hospital course complicated by constipation, which had not improved despite aggressive bowel regimen, laxatives and enemas. XRAY with large stool burden. CT with diaphragmatic hernia and patient was consequently taken to the OR for loop ileostomy under local anesthesia. Pt cont, to have copious amount in ileostomy. Pt is noted to have hyponatremia due to high output in ileostomy, got IVF, hyponatremia and hypokalemia improving, ileostomy secretion are getting thicker, he is tolerating diet, ostomy care as per ostomy RN,  he is continued with cholestyramine, he has been continued with IV hydration, Ileostomy out is still high, but secretions are getting thicker, monitored, I/Os, being continued with IV fluid rate to 125ml/h and GI has increased Octreotide to 150mcgs tid,  has CT abdomen and Plevis done, results reviewed, GI add Imodium 2 mg po bid and continued with octreotide along, GI recommended Surgical consult for Ileostomy reversal and pulmonary consult for optimization for surgery, appreciate surgical and pulmonary consults, GI recommended to get  CT abdomen and pelvis with oral contrast, done showed Proximal jejunum emptying into a loop jejunostomy the right upper quadrant as described. No contrast seen within the distal small bowel, Large bilateral scrotal hydroceles, talked with surgical team, they did the upper GI series confirmed Jejunostomy, patient is s/p jejunostomy closure and s/p Ileostomy was eating and drinking later on developed, nausea and vomiting, NG was put in and XRays were done showed penumoperitonium, CT abdomen done showed same pneumo peritoneum, surgery is following closely, as per surgery team, patient is most likely having refeeding syndrome, NG secretions are less, NG was  taken out, started on oral feeding able to tolerate well, he has been started  getting TPN, finished, change Cardizem po  dose was increased to 300mg, continued on Protonix patient Ileostomy is working well, has no abdominal pain, tolerating diet well.     During the earlier part of the hospitalization patient was treated for acute on chronic hypoxic respiratory failure secondary to COPD exacerbation and CAP, was treated with steroids now tapered and duo nebs, SOB resolved, he uses home oxygen (4 liters), bicarb elevated likely due to metabolic compensation for resp acidosis  continue bronchodilators and prednisone tapered off and completed Levaquin as well.  His electrolytes monitored and replaced accordingly, he was found to have right femoral artery dissection as incidental finding on CT, no intervention as per vascular, follow up with vascular clinic as out patient.     Plan:    1. High out put from Ileostomy: Patient has been continued on cholestyramine, Octreotide, Imodium, he is tolerating diet, ostomy care as per ostomy RN, IV hydration, Ileostomy output is still high, but secretions are thicker, encouraged for oral hydration, monitor I/Os, GI is following, GI recommended Surgical consult for Ileostomy reversal and pulmonary consult for optimization for surgery, appreciate surgical and pulmonary consults, GI has changed his  diet to high fiber diet, fluid restriction, he is feeling little dehydrated, his electrolytes has been stable will monitor I/O, BMP, will continued with IV fluids, GI recommended CT abdomen and pelvis with oral contrast, done today showed Proximal jejunum emptying into a loop jejunostomy the right upper quadrant as described. No contrast seen within the distal small bowel, Large bilateral scrotal hydroceles, talked with surgical team surgical team, they did the upper GI series confirmed Jejunostomy, patient is s/p jejunostomy closure and s/p Ileostomy was eating and drinking later on developed, nausea and vomiting, NG was put in and XRays were done showed penumoperitonium, CT abdomen done showed same pneumo peritoneum, surgery is follwoing closely, as per surgery team, patient is most likely having refeeding syndrome, NG secretions are less, NG was  taken out, started on oral feeding able to tolerate well, he has finished TPN course, patient Ileostomy is working well, has no abdominal pain, he is tolerating diet very well.       2. Acute on chronic hypoxic respiratory failure secondary to COPD exacerbation, improved, he is at his baseline, on home oxygen (4 liters), continue bronchodilators and prednisone tapered off, continue Mucomyst PRN, completed course of Levaquin, cough better with tensilon otoniel, his looks stable from respiratory stand point.     3. CAP - completed course of Levaquin on 12/5.     4. Hypertension - stable   - continue cardizem    5. Hyperlipidemia   - continue statin    6.hyponatremia: resolved, it was due to high ileostomy output.   Hypokalemia - being replaced  Hypophosphatemia: improved  Hypomagnesemia: improved  on multiple electrolyte sol.   monitor Electrolytes    7. Scrotal edema chronic with urinary retention, no more retention, Roy if not able to urinate  - no redness going down  - patient following with  as outpatient  - cont scrotal elevation    8. Right femoral artery dissection  - incidental finding on CT, no intervention as per vascular       9. DVT Prophylaxis - Lovenox       10. anxiety: xanax at hs PRN.    11. Afib: On Cardizem dose adjusted accordingly, not on anticoagulation, will continue monitoring HR, if high will adjust meds.     12. Hx of TIA: patient of was plavix, he is on aspirin at this point of view, need to resume Plavix before discharge.     Dispo: Patient and his wife do not wanted to go for the TOM, working with PT once ready might be going to home with home PT, PT team was requested to work with patient twice a day.

## 2018-02-03 NOTE — PROGRESS NOTE ADULT - ASSESSMENT
-COPD; severe to very severe dz at baseline; FEV1 33%, 0.87L; was pursuing lung transplant at [place]  -Advanced emphysema  -Chronic hypoxic resp failure  -S/P revision of jejunostomy 1/16  -DOE    RECC:   Advair. Spiriva. Nebs; albuterol. Flonase. Nasal saline. O2. Repeat CXR. D/W surgery NP. -COPD; severe to very severe dz at baseline; FEV1 33%, 0.87L; was pursuing lung transplant at [place]  -Advanced emphysema  -Chronic hypoxic resp failure  -S/P revision of jejunostomy 1/16  -HAIDER  -Anemia    RECC:   Advair. Spiriva. Nebs; albuterol. Flonase. Nasal saline. O2. Repeat CXR. Anemia per primary team. D/W surgery NP.

## 2018-02-03 NOTE — PROGRESS NOTE ADULT - SUBJECTIVE AND OBJECTIVE BOX
FRANDY BAUTISTA    [id number]    67y      Male    Patient is a 67y old  Male who presents with a chief complaint of SOB (15 Dec 2017 10:17)      INTERVAL HPI/OVERNIGHT EVENTS:    Patient has finished TNP, no nausea, vomiting, abdominal pain, fever, chills, chest pain.     REVIEW OF SYSTEMS:    CONSTITUTIONAL: No fever, has fatigue  RESPIRATORY: No cough, no shortness of breath  CARDIOVASCULAR: No chest pain, no palpitations.   GASTROINTESTINAL: No abdominal, No nausea, vomiting  NEUROLOGICAL: No headaches,  loss of strength.  MISCELLANEOUS: No joint swelling or pain.          Vital Signs Last 24 Hrs  T(C): 36.3 (03 Feb 2018 10:00), Max: 36.7 (02 Feb 2018 15:57)  T(F): 97.3 (03 Feb 2018 10:00), Max: 98.1 (02 Feb 2018 15:57)  HR: 105 (03 Feb 2018 10:00) (105 - 129)  BP: 112/73 (03 Feb 2018 10:00) (112/73 - 116/65)  RR: 20 (03 Feb 2018 10:00) (20 - 20)  SpO2: 95% (03 Feb 2018 10:00) (95% - 95%)    PHYSICAL EXAM:    GENERAL: Thin built  Elderly male looking comfortable  HEENT: MADELINE  NECK: soft, Supple, No JVD,   CHEST/LUNG: Decrease air entry bilaterally; No wheezing  HEART: S1S2+, Regular rate and rhythm; No murmurs  ABDOMEN: Soft, Nontender, Bowel sounds present, s/p Ileostomy with stool in the bag  EXTREMITIES:  1+ Peripheral Pulses, No edema  SKIN: No rashes or lesions  NEURO: AAOX3, no focal deficits, no motor r sensory loss  PSYCH: normal mood      LABS:                        8.5    8.7   )-----------( 365      ( 03 Feb 2018 07:07 )             27.1     02-03    136  |  99  |  13.0  ----------------------------<  135<H>  4.7   |  25.0  |  0.47<L>    Ca    8.4<L>      03 Feb 2018 07:07  Phos  3.3     02-03  Mg     1.7     02-03        I&O's Summary    02 Feb 2018 07:01  -  03 Feb 2018 07:00  --------------------------------------------------------  IN: 410 mL / OUT: 1025 mL / NET: -615 mL    03 Feb 2018 07:01  -  03 Feb 2018 13:00  --------------------------------------------------------  IN: 0 mL / OUT: 400 mL / NET: -400 mL        MEDICATIONS  (STANDING):  ALBUTerol    0.083% 2.5 milliGRAM(s) Nebulizer every 6 hours  aspirin  chewable 81 milliGRAM(s) Oral daily  dextrose 5%. 1000 milliLiter(s) (50 mL/Hr) IV Continuous <Continuous>  dextrose 50% Injectable 12.5 Gram(s) IV Push once  dextrose 50% Injectable 25 Gram(s) IV Push once  diltiazem    milliGRAM(s) Oral daily  enoxaparin Injectable 40 milliGRAM(s) SubCutaneous daily  FIRST- Mouthwash  BLM 10 milliLiter(s) Swish and Spit two times a day  fluticasone propionate 50 MICROgram(s)/spray Nasal Spray 1 Spray(s) Both Nostrils two times a day  fluticasone propionate/ salmeterol 250-50 MICROgram(s) Diskus 1 Dose(s) Inhalation two times a day  guaiFENesin    Syrup 100 milliGRAM(s) Oral every 4 hours  loratadine 10 milliGRAM(s) Oral daily  multivitamin 1 Tablet(s) Oral daily  pantoprazole    Tablet 40 milliGRAM(s) Oral two times a day before meals  sodium chloride 0.65% Nasal 1 Spray(s) Both Nostrils four times a day  tamsulosin 0.4 milliGRAM(s) Oral at bedtime  tiotropium 18 MICROgram(s) Capsule 1 Capsule(s) Inhalation daily    MEDICATIONS  (PRN):  acetylcysteine 20% Inhalation 2.5 milliLiter(s) Inhalation every 6 hours PRN expectorant  ALPRAZolam 0.25 milliGRAM(s) Oral every 8 hours PRN Anxiety  benzocaine 15 mG/menthol 3.6 mG Lozenge 1 Lozenge Oral every 4 hours PRN Sore Throat  benzonatate 100 milliGRAM(s) Oral three times a day PRN Cough  ondansetron Injectable 4 milliGRAM(s) IV Push every 4 hours PRN Nausea and/or Vomiting

## 2018-02-04 PROCEDURE — 99232 SBSQ HOSP IP/OBS MODERATE 35: CPT

## 2018-02-04 PROCEDURE — 99233 SBSQ HOSP IP/OBS HIGH 50: CPT

## 2018-02-04 RX ORDER — ALPRAZOLAM 0.25 MG
0.25 TABLET ORAL EVERY 6 HOURS
Qty: 0 | Refills: 0 | Status: DISCONTINUED | OUTPATIENT
Start: 2018-02-04 | End: 2018-02-11

## 2018-02-04 RX ORDER — LEVALBUTEROL 1.25 MG/.5ML
0.63 SOLUTION, CONCENTRATE RESPIRATORY (INHALATION) EVERY 6 HOURS
Qty: 0 | Refills: 0 | Status: DISCONTINUED | OUTPATIENT
Start: 2018-02-04 | End: 2018-02-09

## 2018-02-04 RX ADMIN — Medication 1 SPRAY(S): at 13:00

## 2018-02-04 RX ADMIN — Medication 360 MILLIGRAM(S): at 06:06

## 2018-02-04 RX ADMIN — Medication 100 MILLIGRAM(S): at 18:29

## 2018-02-04 RX ADMIN — Medication 1 SPRAY(S): at 23:12

## 2018-02-04 RX ADMIN — Medication 1 SPRAY(S): at 06:06

## 2018-02-04 RX ADMIN — FLUTICASONE PROPIONATE AND SALMETEROL 1 DOSE(S): 50; 250 POWDER ORAL; RESPIRATORY (INHALATION) at 09:31

## 2018-02-04 RX ADMIN — Medication 100 MILLIGRAM(S): at 23:15

## 2018-02-04 RX ADMIN — TAMSULOSIN HYDROCHLORIDE 0.4 MILLIGRAM(S): 0.4 CAPSULE ORAL at 23:15

## 2018-02-04 RX ADMIN — LORATADINE 10 MILLIGRAM(S): 10 TABLET ORAL at 12:59

## 2018-02-04 RX ADMIN — Medication 100 MILLIGRAM(S): at 06:08

## 2018-02-04 RX ADMIN — Medication 0.25 MILLIGRAM(S): at 13:51

## 2018-02-04 RX ADMIN — Medication 81 MILLIGRAM(S): at 12:59

## 2018-02-04 RX ADMIN — Medication 1 TABLET(S): at 13:00

## 2018-02-04 RX ADMIN — TIOTROPIUM BROMIDE 1 CAPSULE(S): 18 CAPSULE ORAL; RESPIRATORY (INHALATION) at 09:23

## 2018-02-04 RX ADMIN — Medication 100 MILLIGRAM(S): at 13:50

## 2018-02-04 RX ADMIN — Medication 0.25 MILLIGRAM(S): at 18:29

## 2018-02-04 RX ADMIN — ALBUTEROL 2.5 MILLIGRAM(S): 90 AEROSOL, METERED ORAL at 09:22

## 2018-02-04 RX ADMIN — Medication 1 SPRAY(S): at 18:31

## 2018-02-04 RX ADMIN — Medication 100 MILLIGRAM(S): at 09:32

## 2018-02-04 RX ADMIN — ALBUTEROL 2.5 MILLIGRAM(S): 90 AEROSOL, METERED ORAL at 03:35

## 2018-02-04 RX ADMIN — PANTOPRAZOLE SODIUM 40 MILLIGRAM(S): 20 TABLET, DELAYED RELEASE ORAL at 06:06

## 2018-02-04 RX ADMIN — Medication 100 MILLIGRAM(S): at 03:45

## 2018-02-04 RX ADMIN — LEVALBUTEROL 0.63 MILLIGRAM(S): 1.25 SOLUTION, CONCENTRATE RESPIRATORY (INHALATION) at 14:58

## 2018-02-04 RX ADMIN — PANTOPRAZOLE SODIUM 40 MILLIGRAM(S): 20 TABLET, DELAYED RELEASE ORAL at 18:31

## 2018-02-04 RX ADMIN — Medication 0.25 MILLIGRAM(S): at 23:15

## 2018-02-04 RX ADMIN — FLUTICASONE PROPIONATE AND SALMETEROL 1 DOSE(S): 50; 250 POWDER ORAL; RESPIRATORY (INHALATION) at 22:13

## 2018-02-04 RX ADMIN — LEVALBUTEROL 0.63 MILLIGRAM(S): 1.25 SOLUTION, CONCENTRATE RESPIRATORY (INHALATION) at 20:46

## 2018-02-04 RX ADMIN — Medication 0.25 MILLIGRAM(S): at 06:05

## 2018-02-04 NOTE — PROGRESS NOTE ADULT - SUBJECTIVE AND OBJECTIVE BOX
FRANDY BAUTISTA    [id number]    67y      Male    Patient is a 67y old  Male who presents with a chief complaint of SOB (15 Dec 2017 10:17)      INTERVAL HPI/OVERNIGHT EVENTS:    Patient has finished TNP, no nausea, vomiting, abdominal pain, fever, chills, chest pain, tolerating diet well.     REVIEW OF SYSTEMS:    CONSTITUTIONAL: No fever, has fatigue  RESPIRATORY: No cough, no shortness of breath  CARDIOVASCULAR: No chest pain, no palpitations.   GASTROINTESTINAL: No abdominal, No nausea, vomiting  NEUROLOGICAL: No headaches,  loss of strength.  MISCELLANEOUS: No joint swelling or pain.       Vital Signs Last 24 Hrs  T(C): 36.8 (04 Feb 2018 09:33), Max: 36.8 (04 Feb 2018 09:33)  T(F): 98.2 (04 Feb 2018 09:33), Max: 98.2 (04 Feb 2018 09:33)  HR: 98 (04 Feb 2018 09:33) (98 - 106)  BP: 97/57 (04 Feb 2018 09:33) (97/57 - 116/70)  RR: 22 (04 Feb 2018 09:33) (18 - 22)  SpO2: 99% (04 Feb 2018 09:33) (92% - 99%)    PHYSICAL EXAM:    GENERAL: Thin built  Elderly male looking comfortable  HEENT: MADELINE  NECK: soft, Supple, No JVD,   CHEST/LUNG: Decrease air entry bilaterally; No wheezing  HEART: S1S2+, Regular rate and rhythm; No murmurs  ABDOMEN: Soft, Nontender, Bowel sounds present, s/p Ileostomy with stool in the bag  EXTREMITIES:  1+ Peripheral Pulses, No edema  SKIN: No rashes or lesions  NEURO: AAOX3, no focal deficits, no motor r sensory loss  PSYCH: normal mood      LABS:                        8.2    7.9   )-----------( 358      ( 04 Feb 2018 06:20 )             27.2     02-04    136  |  99  |  13.0  ----------------------------<  126<H>  4.3   |  26.0  |  0.44<L>    Ca    8.4<L>      04 Feb 2018 06:20  Phos  3.4     02-04  Mg     1.8     02-04              I&O's Summary    03 Feb 2018 07:01  -  04 Feb 2018 07:00  --------------------------------------------------------  IN: 1060 mL / OUT: 1250 mL / NET: -190 mL    04 Feb 2018 07:01  -  04 Feb 2018 15:31  --------------------------------------------------------  IN: 200 mL / OUT: 800 mL / NET: -600 mL        MEDICATIONS  (STANDING):  aspirin  chewable 81 milliGRAM(s) Oral daily  dextrose 5%. 1000 milliLiter(s) (50 mL/Hr) IV Continuous <Continuous>  dextrose 50% Injectable 12.5 Gram(s) IV Push once  dextrose 50% Injectable 25 Gram(s) IV Push once  diltiazem    milliGRAM(s) Oral daily  enoxaparin Injectable 40 milliGRAM(s) SubCutaneous daily  FIRST- Mouthwash  BLM 10 milliLiter(s) Swish and Spit two times a day  fluticasone propionate 50 MICROgram(s)/spray Nasal Spray 1 Spray(s) Both Nostrils two times a day  fluticasone propionate/ salmeterol 250-50 MICROgram(s) Diskus 1 Dose(s) Inhalation two times a day  guaiFENesin    Syrup 100 milliGRAM(s) Oral every 4 hours  levalbuterol Inhalation 0.63 milliGRAM(s) Inhalation every 6 hours  loratadine 10 milliGRAM(s) Oral daily  multivitamin 1 Tablet(s) Oral daily  pantoprazole    Tablet 40 milliGRAM(s) Oral two times a day before meals  sodium chloride 0.65% Nasal 1 Spray(s) Both Nostrils four times a day  tamsulosin 0.4 milliGRAM(s) Oral at bedtime  tiotropium 18 MICROgram(s) Capsule 1 Capsule(s) Inhalation daily    MEDICATIONS  (PRN):  acetylcysteine 20% Inhalation 2.5 milliLiter(s) Inhalation every 6 hours PRN expectorant  ALPRAZolam 0.25 milliGRAM(s) Oral every 8 hours PRN Anxiety  benzocaine 15 mG/menthol 3.6 mG Lozenge 1 Lozenge Oral every 4 hours PRN Sore Throat  benzonatate 100 milliGRAM(s) Oral three times a day PRN Cough  ondansetron Injectable 4 milliGRAM(s) IV Push every 4 hours PRN Nausea and/or Vomiting

## 2018-02-04 NOTE — PROGRESS NOTE ADULT - SUBJECTIVE AND OBJECTIVE BOX
pt seen and examined at bed side  no acute events overnight  pt still complaints of some difficulty breathing and persistently tachycardic at about 105-110  tolerating diet and ostomy functioning  pt does not want to go to rehab and want to go home.    MEDICATIONS  (STANDING):  ALBUTerol    0.083% 2.5 milliGRAM(s) Nebulizer every 6 hours  aspirin  chewable 81 milliGRAM(s) Oral daily  dextrose 5%. 1000 milliLiter(s) (50 mL/Hr) IV Continuous <Continuous>  dextrose 50% Injectable 12.5 Gram(s) IV Push once  dextrose 50% Injectable 25 Gram(s) IV Push once  diltiazem    milliGRAM(s) Oral daily  enoxaparin Injectable 40 milliGRAM(s) SubCutaneous daily  FIRST- Mouthwash  BLM 10 milliLiter(s) Swish and Spit two times a day  fluticasone propionate 50 MICROgram(s)/spray Nasal Spray 1 Spray(s) Both Nostrils two times a day  fluticasone propionate/ salmeterol 250-50 MICROgram(s) Diskus 1 Dose(s) Inhalation two times a day  guaiFENesin    Syrup 100 milliGRAM(s) Oral every 4 hours  loratadine 10 milliGRAM(s) Oral daily  multivitamin 1 Tablet(s) Oral daily  pantoprazole    Tablet 40 milliGRAM(s) Oral two times a day before meals  sodium chloride 0.65% Nasal 1 Spray(s) Both Nostrils four times a day  tamsulosin 0.4 milliGRAM(s) Oral at bedtime  tiotropium 18 MICROgram(s) Capsule 1 Capsule(s) Inhalation daily    MEDICATIONS  (PRN):  acetylcysteine 20% Inhalation 2.5 milliLiter(s) Inhalation every 6 hours PRN expectorant  ALPRAZolam 0.25 milliGRAM(s) Oral every 8 hours PRN Anxiety  benzocaine 15 mG/menthol 3.6 mG Lozenge 1 Lozenge Oral every 4 hours PRN Sore Throat  benzonatate 100 milliGRAM(s) Oral three times a day PRN Cough  ondansetron Injectable 4 milliGRAM(s) IV Push every 4 hours PRN Nausea and/or Vomiting      Vital Signs Last 24 Hrs  T(C): 36.7 (04 Feb 2018 06:23), Max: 36.7 (03 Feb 2018 17:16)  T(F): 98 (04 Feb 2018 06:23), Max: 98.1 (04 Feb 2018 06:03)  HR: 106 (04 Feb 2018 06:23) (98 - 106)  BP: 113/73 (04 Feb 2018 06:23) (108/60 - 116/70)  BP(mean): --  RR: 20 (04 Feb 2018 06:23) (18 - 20)  SpO2: 92% (04 Feb 2018 06:23) (92% - 98%)    GENERAL: Thin built  Elderly male looking comfortable  HEENT: MADELINE  NECK: soft, Supple, No JVD,   CHEST/LUNG: Decrease air entry bilaterally; No wheezing  HEART: S1S2+, Regular rate and rhythm; No murmurs  ABDOMEN: Soft, Nontender, Bowel sounds present, s/p Ileostomy with stool in the bag  EXTREMITIES:  1+ Peripheral Pulses, No edema  SKIN: No rashes or lesions  NEURO: AAOX3, no focal deficits, no motor r sensory loss  PSYCH: normal mood      I&O's Detail    03 Feb 2018 07:01  -  04 Feb 2018 07:00  --------------------------------------------------------  IN:    Oral Fluid: 960 mL    Solution: 100 mL  Total IN: 1060 mL    OUT:    Ileostomy: 750 mL    Voided: 500 mL  Total OUT: 1250 mL    Total NET: -190 mL          LABS:                        8.2    7.9   )-----------( 358      ( 04 Feb 2018 06:20 )             27.2     02-04    136  |  99  |  13.0  ----------------------------<  126<H>  4.3   |  26.0  |  0.44<L>    Ca    8.4<L>      04 Feb 2018 06:20  Phos  3.4     02-04  Mg     1.8     02-04            RADIOLOGY & ADDITIONAL STUDIES:

## 2018-02-04 NOTE — PROGRESS NOTE ADULT - ASSESSMENT
67 year old male with PMH of COPD stage 4 s/p right lung reduction in 2010 (on home oxygen 4L (sat 92-93%), HTN, CVA on 2014, DM, hypothyroidism, hydrocele, and nephrolithiasis who is admitted for a COPD exacerbation. Patient has been weaned off ventimask to 4 liters nasal cannula and respiratory status is stable. CTA of the chest was negative for PE but showed bilateral lower lobe infiltrates with mucous plugging for which he was started on Levaquin.  Respiratory status stable and steroids are being tapered. Hospital course complicated by constipation, which had not improved despite aggressive bowel regimen, laxatives and enemas. XRAY with large stool burden. CT with diaphragmatic hernia and patient was consequently taken to the OR for loop ileostomy under local anesthesia. Pt cont, to have copious amount in ileostomy. Pt is noted to have hyponatremia due to high output in ileostomy, got IVF, hyponatremia and hypokalemia improving, ileostomy secretion are getting thicker, he is tolerating diet, ostomy care as per ostomy RN,  he is continued with cholestyramine, he has been continued with IV hydration, Ileostomy out is still high, but secretions are getting thicker, monitored, I/Os, being continued with IV fluid rate to 125ml/h and GI has increased Octreotide to 150mcgs tid,  has CT abdomen and Plevis done, results reviewed, GI add Imodium 2 mg po bid and continued with octreotide along, GI recommended Surgical consult for Ileostomy reversal and pulmonary consult for optimization for surgery, appreciate surgical and pulmonary consults, GI recommended to get  CT abdomen and pelvis with oral contrast, done showed Proximal jejunum emptying into a loop jejunostomy the right upper quadrant as described. No contrast seen within the distal small bowel, Large bilateral scrotal hydroceles, talked with surgical team, they did the upper GI series confirmed Jejunostomy, patient is s/p jejunostomy closure and s/p Ileostomy was eating and drinking later on developed, nausea and vomiting, NG was put in and XRays were done showed penumoperitonium, CT abdomen done showed same pneumo peritoneum, surgery is following closely, as per surgery team, patient is most likely having refeeding syndrome, NG secretions are less, NG was  taken out, started on oral feeding able to tolerate well, he has been started  getting TPN, finished, change Cardizem po  dose was increased to 300mg, continued on Protonix patient Ileostomy is working well, has no abdominal pain, tolerating diet well.     During the earlier part of the hospitalization patient was treated for acute on chronic hypoxic respiratory failure secondary to COPD exacerbation and CAP, was treated with steroids now tapered and duo nebs, SOB resolved, he uses home oxygen (4 liters), bicarb elevated likely due to metabolic compensation for resp acidosis  continue bronchodilators and prednisone tapered off and completed Levaquin as well.  His electrolytes monitored and replaced accordingly, he was found to have right femoral artery dissection as incidental finding on CT, no intervention as per vascular, follow up with vascular clinic as out patient.     Plan:    1. High out put from Ileostomy: Patient has been continued on cholestyramine, Octreotide, Imodium, he is tolerating diet, ostomy care as per ostomy RN, IV hydration, Ileostomy output is still high, but secretions are thicker, encouraged for oral hydration, monitor I/Os, GI is following, GI recommended Surgical consult for Ileostomy reversal and pulmonary consult for optimization for surgery, appreciate surgical and pulmonary consults, GI has changed his  diet to high fiber diet, fluid restriction, he is feeling little dehydrated, his electrolytes has been stable will monitor I/O, BMP, will continued with IV fluids, GI recommended CT abdomen and pelvis with oral contrast, done today showed Proximal jejunum emptying into a loop jejunostomy the right upper quadrant as described. No contrast seen within the distal small bowel, Large bilateral scrotal hydroceles, talked with surgical team surgical team, they did the upper GI series confirmed Jejunostomy, patient is s/p jejunostomy closure and s/p Ileostomy was eating and drinking later on developed, nausea and vomiting, NG was put in and XRays were done showed penumoperitonium, CT abdomen done showed same pneumo peritoneum, surgery is follwoing closely, as per surgery team, patient is most likely having refeeding syndrome, NG secretions are less, NG was  taken out, started on oral feeding able to tolerate well, he has finished TPN course, patient Ileostomy is working well, has no abdominal pain, he is tolerating diet very well, continue with current diet.    2. Acute on chronic hypoxic respiratory failure secondary to COPD exacerbation, improved, he is at his baseline, on home oxygen (4 liters), continue bronchodilators and prednisone tapered off, continue Mucomyst PRN, completed course of Levaquin, cough better with tensilon otoniel, his looks stable from respiratory stand point.     3. CAP - completed course of Levaquin on 12/5.     4. Hypertension - stable, continue Cardizem.     5. Hyperlipidemia, continue statin    6.hyponatremia: resolved, it was due to high ileostomy output.   Hypokalemia - being replaced  Hypophosphatemia: improved  Hypomagnesemia: improved  on multiple electrolyte sol.   monitor Electrolytes    7. Scrotal edema chronic with urinary retention, no more retention, Roy if not able to urinate, no redness going down  - patient following with  as outpatient  - cont scrotal elevation    8. Right femoral artery dissection  - incidental finding on CT, no intervention as per vascular       9. DVT Prophylaxis - Lovenox       10. anxiety: xanax at hs PRN.    11. Afib: On Cardizem dose adjusted accordingly, not on anticoagulation, will continue monitoring HR, if high will adjust meds.     12. Hx of TIA: patient of was plavix, he is on aspirin at this point of view, need to resume Plavix before discharge.     Dispo: Patient and his wife do not wanted to go for the TOM, working with PT once ready might be going to home with home PT, PT team was requested to work with patient twice a day.

## 2018-02-04 NOTE — PROGRESS NOTE ADULT - SUBJECTIVE AND OBJECTIVE BOX
PULMONARY PROGRESS NOTE      FRANDY BAUTISTAJANE-[id number]    Patient is a 67y old  Male who presents with a chief complaint of SOB (15 Dec 2017 10:17)      INTERVAL HPI/OVERNIGHT EVENTS: Still main complaint of nasal congestion. HAIDER seems at baseline. On NCO2. Cough.     MEDICATIONS  (STANDING):  ALBUTerol    0.083% 2.5 milliGRAM(s) Nebulizer every 6 hours  aspirin  chewable 81 milliGRAM(s) Oral daily  dextrose 5%. 1000 milliLiter(s) (50 mL/Hr) IV Continuous <Continuous>  dextrose 50% Injectable 12.5 Gram(s) IV Push once  dextrose 50% Injectable 25 Gram(s) IV Push once  diltiazem    milliGRAM(s) Oral daily  enoxaparin Injectable 40 milliGRAM(s) SubCutaneous daily  FIRST- Mouthwash  BLM 10 milliLiter(s) Swish and Spit two times a day  fluticasone propionate 50 MICROgram(s)/spray Nasal Spray 1 Spray(s) Both Nostrils two times a day  fluticasone propionate/ salmeterol 250-50 MICROgram(s) Diskus 1 Dose(s) Inhalation two times a day  guaiFENesin    Syrup 100 milliGRAM(s) Oral every 4 hours  loratadine 10 milliGRAM(s) Oral daily  multivitamin 1 Tablet(s) Oral daily  pantoprazole    Tablet 40 milliGRAM(s) Oral two times a day before meals  sodium chloride 0.65% Nasal 1 Spray(s) Both Nostrils four times a day  tamsulosin 0.4 milliGRAM(s) Oral at bedtime  tiotropium 18 MICROgram(s) Capsule 1 Capsule(s) Inhalation daily      MEDICATIONS  (PRN):  acetylcysteine 20% Inhalation 2.5 milliLiter(s) Inhalation every 6 hours PRN expectorant  ALPRAZolam 0.25 milliGRAM(s) Oral every 8 hours PRN Anxiety  benzocaine 15 mG/menthol 3.6 mG Lozenge 1 Lozenge Oral every 4 hours PRN Sore Throat  benzonatate 100 milliGRAM(s) Oral three times a day PRN Cough  ondansetron Injectable 4 milliGRAM(s) IV Push every 4 hours PRN Nausea and/or Vomiting      Allergies    codeine (Unknown)  no veges (Unknown)  penicillin (Unknown)    Intolerances    DO NOT SEND ORANGES (Unknown)  Symbicort (Short breath)      PAST MEDICAL & SURGICAL HISTORY:  Hypothyroidism  Hydrocele  Renal calculi  Diabetes mellitus  Hypertension  CVA (cerebral vascular accident)  Chronic obstructive pulmonary disease, unspecified COPD type  History of lung surgery      SOCIAL HISTORY  Smoking History: former      REVIEW OF SYSTEMS:    CONSTITUTIONAL:  No distress    HEENT: per HPI    CARDIOVASCULAR:  No pressure, squeezing, tightness, heaviness or aching about the chest; no palpitations.    RESPIRATORY:  per HPI     GASTROINTESTINAL:  No nausea, vomiting or diarrhea.    GENITOURINARY:  No dysuria, frequency or urgency.    NEUROLOGIC:  No paresthesias, fasciculations, seizures or weakness.    PSYCHIATRIC:  No disorder of thought or mood.    Vital Signs Last 24 Hrs  T(C): 36.8 (04 Feb 2018 09:33), Max: 36.8 (04 Feb 2018 09:33)  T(F): 98.2 (04 Feb 2018 09:33), Max: 98.2 (04 Feb 2018 09:33)  HR: 98 (04 Feb 2018 09:33) (98 - 106)  BP: 97/57 (04 Feb 2018 09:33) (97/57 - 116/70)  BP(mean): --  RR: 22 (04 Feb 2018 09:33) (18 - 22)  SpO2: 99% (04 Feb 2018 09:33) (92% - 99%)    PHYSICAL EXAMINATION:    GENERAL: The patient is awake and alert in no apparent distress.     HEENT: Head is normocephalic and atraumatic. Extraocular muscles are intact. Mucous membranes are moist.    NECK: Supple.    LUNGS: decreased apices, no wheeze, respirations unlabored    HEART: Regular rate and rhythm      ABDOMEN: Soft,  nondistended.      EXTREMITIES: Without any cyanosis, clubbing, rash, lesions or edema.    NEUROLOGIC: Grossly intact.    LABS:                        8.2    7.9   )-----------( 358      ( 04 Feb 2018 06:20 )             27.2     02-04    136  |  99  |  13.0  ----------------------------<  126<H>  4.3   |  26.0  |  0.44<L>    Ca    8.4<L>      04 Feb 2018 06:20  Phos  3.4     02-04  Mg     1.8     02-04           RADIOLOGY & ADDITIONAL STUDIES:  < from: Xray Chest 1 View- PORTABLE-Urgent (02.03.18 @ 11:23) >     EXAM:  XR CHEST PORTABLE URGENT 1V                          PROCEDURE DATE:  02/03/2018          INTERPRETATION:  TECHNIQUE: Single portable view of the chest.    COMPARISON: 2/1/2018    CLINICAL HISTORY: COPD, ileostomy.     FINDINGS:    Single frontal view of the chest demonstrates severe COPD changes. Tiny   right-sided pleural effusion. Patchy right lower lobe infiltrate,   unchanged. Mild left lower lobe linear atelectasis, unchanged.   Right-sided PICC line catheter tip in the distal SVC/RA junction. The   cardiomediastinal silhouette is normal. No acute osseous abnormalities.   Overlying EKG leads and wires are noted.    IMPRESSION: No interval change.                QUINTEN KIRK M.D., ATTENDING RADIOLOGIST    < end of copied text >

## 2018-02-04 NOTE — PROGRESS NOTE ADULT - ASSESSMENT
78M s/p revision of jejunostomy and creation of loop ileostomy.  -f/u AM labs  -replete lytes prn  -c/w current diet  -no further TPN needed at this time  -monitor ostomy output  -strict Is and Os  -pain control  -encourage ambulation and IS  -DVT ppx

## 2018-02-04 NOTE — PROGRESS NOTE ADULT - ASSESSMENT
-COPD; severe to very severe dz at baseline; FEV1 33%, 0.87L; was pursuing lung transplant at [facility]  -Advanced emphysema  -Chronic hypoxic resp failure  -S/P revision of jejunostomy 1/16  -HAIDER  -Anemia    RECC:   Advair. Spiriva. Nebs; albuterol. Flonase. Nasal saline. O2.  Anemia per primary team.

## 2018-02-05 LAB
ALBUMIN SERPL ELPH-MCNC: 2.5 G/DL — LOW (ref 3.3–5.2)
ALP SERPL-CCNC: 163 U/L — HIGH (ref 40–120)
ALT FLD-CCNC: 101 U/L — HIGH
ANION GAP SERPL CALC-SCNC: 13 MMOL/L — SIGNIFICANT CHANGE UP (ref 5–17)
AST SERPL-CCNC: 37 U/L — SIGNIFICANT CHANGE UP
BASOPHILS # BLD AUTO: 0 K/UL — SIGNIFICANT CHANGE UP (ref 0–0.2)
BASOPHILS NFR BLD AUTO: 0.8 % — SIGNIFICANT CHANGE UP (ref 0–2)
BILIRUB SERPL-MCNC: 0.2 MG/DL — LOW (ref 0.4–2)
BUN SERPL-MCNC: 14 MG/DL — SIGNIFICANT CHANGE UP (ref 8–20)
CALCIUM SERPL-MCNC: 8.2 MG/DL — LOW (ref 8.6–10.2)
CHLORIDE SERPL-SCNC: 101 MMOL/L — SIGNIFICANT CHANGE UP (ref 98–107)
CO2 SERPL-SCNC: 25 MMOL/L — SIGNIFICANT CHANGE UP (ref 22–29)
CREAT SERPL-MCNC: 0.41 MG/DL — LOW (ref 0.5–1.3)
EOSINOPHIL # BLD AUTO: 0.2 K/UL — SIGNIFICANT CHANGE UP (ref 0–0.5)
EOSINOPHIL NFR BLD AUTO: 2.6 % — SIGNIFICANT CHANGE UP (ref 0–6)
GLUCOSE SERPL-MCNC: 133 MG/DL — HIGH (ref 70–115)
HCT VFR BLD CALC: 25.9 % — LOW (ref 42–52)
HGB BLD-MCNC: 8.1 G/DL — LOW (ref 14–18)
LYMPHOCYTES # BLD AUTO: 1.1 K/UL — SIGNIFICANT CHANGE UP (ref 1–4.8)
LYMPHOCYTES # BLD AUTO: 17.3 % — LOW (ref 20–55)
MAGNESIUM SERPL-MCNC: 1.6 MG/DL — SIGNIFICANT CHANGE UP (ref 1.6–2.6)
MCHC RBC-ENTMCNC: 28.3 PG — SIGNIFICANT CHANGE UP (ref 27–31)
MCHC RBC-ENTMCNC: 31.3 G/DL — LOW (ref 32–36)
MCV RBC AUTO: 90.6 FL — SIGNIFICANT CHANGE UP (ref 80–94)
MONOCYTES # BLD AUTO: 0.4 K/UL — SIGNIFICANT CHANGE UP (ref 0–0.8)
MONOCYTES NFR BLD AUTO: 6.7 % — SIGNIFICANT CHANGE UP (ref 3–10)
NEUTROPHILS # BLD AUTO: 4.5 K/UL — SIGNIFICANT CHANGE UP (ref 1.8–8)
NEUTROPHILS NFR BLD AUTO: 72.3 % — SIGNIFICANT CHANGE UP (ref 37–73)
PHOSPHATE SERPL-MCNC: 2.9 MG/DL — SIGNIFICANT CHANGE UP (ref 2.4–4.7)
PLATELET # BLD AUTO: 349 K/UL — SIGNIFICANT CHANGE UP (ref 150–400)
POTASSIUM SERPL-MCNC: 4.1 MMOL/L — SIGNIFICANT CHANGE UP (ref 3.5–5.3)
POTASSIUM SERPL-SCNC: 4.1 MMOL/L — SIGNIFICANT CHANGE UP (ref 3.5–5.3)
PREALB SERPL-MCNC: 16 MG/DL — LOW (ref 18–38)
PROT SERPL-MCNC: 6.1 G/DL — LOW (ref 6.6–8.7)
RBC # BLD: 2.86 M/UL — LOW (ref 4.6–6.2)
RBC # FLD: 19.9 % — HIGH (ref 11–15.6)
SODIUM SERPL-SCNC: 139 MMOL/L — SIGNIFICANT CHANGE UP (ref 135–145)
WBC # BLD: 6.3 K/UL — SIGNIFICANT CHANGE UP (ref 4.8–10.8)
WBC # FLD AUTO: 6.3 K/UL — SIGNIFICANT CHANGE UP (ref 4.8–10.8)

## 2018-02-05 PROCEDURE — 99232 SBSQ HOSP IP/OBS MODERATE 35: CPT

## 2018-02-05 RX ORDER — PANTOPRAZOLE SODIUM 20 MG/1
1 TABLET, DELAYED RELEASE ORAL
Qty: 0 | Refills: 0 | COMMUNITY
Start: 2018-02-05

## 2018-02-05 RX ORDER — DILTIAZEM HCL 120 MG
1 CAPSULE, EXT RELEASE 24 HR ORAL
Qty: 0 | Refills: 0 | COMMUNITY
Start: 2018-02-05

## 2018-02-05 RX ORDER — LORATADINE 10 MG/1
1 TABLET ORAL
Qty: 0 | Refills: 0 | COMMUNITY
Start: 2018-02-05

## 2018-02-05 RX ORDER — TAMSULOSIN HYDROCHLORIDE 0.4 MG/1
1 CAPSULE ORAL
Qty: 0 | Refills: 0 | COMMUNITY
Start: 2018-02-05

## 2018-02-05 RX ORDER — TIOTROPIUM BROMIDE 18 UG/1
1 CAPSULE ORAL; RESPIRATORY (INHALATION)
Qty: 0 | Refills: 0 | COMMUNITY

## 2018-02-05 RX ORDER — ASPIRIN/CALCIUM CARB/MAGNESIUM 324 MG
1 TABLET ORAL
Qty: 0 | Refills: 0 | COMMUNITY
Start: 2018-02-05

## 2018-02-05 RX ORDER — ALPRAZOLAM 0.25 MG
1 TABLET ORAL
Qty: 0 | Refills: 0 | COMMUNITY

## 2018-02-05 RX ORDER — MAGNESIUM SULFATE 500 MG/ML
2 VIAL (ML) INJECTION ONCE
Qty: 0 | Refills: 0 | Status: COMPLETED | OUTPATIENT
Start: 2018-02-05 | End: 2018-02-05

## 2018-02-05 RX ORDER — ALPRAZOLAM 0.25 MG
1 TABLET ORAL
Qty: 0 | Refills: 0 | COMMUNITY
Start: 2018-02-05

## 2018-02-05 RX ORDER — TIOTROPIUM BROMIDE 18 UG/1
1 CAPSULE ORAL; RESPIRATORY (INHALATION)
Qty: 0 | Refills: 0 | COMMUNITY
Start: 2018-02-05

## 2018-02-05 RX ADMIN — Medication 100 MILLIGRAM(S): at 15:43

## 2018-02-05 RX ADMIN — Medication 0.25 MILLIGRAM(S): at 18:19

## 2018-02-05 RX ADMIN — LORATADINE 10 MILLIGRAM(S): 10 TABLET ORAL at 12:29

## 2018-02-05 RX ADMIN — Medication 100 MILLIGRAM(S): at 06:22

## 2018-02-05 RX ADMIN — TIOTROPIUM BROMIDE 1 CAPSULE(S): 18 CAPSULE ORAL; RESPIRATORY (INHALATION) at 09:19

## 2018-02-05 RX ADMIN — LEVALBUTEROL 0.63 MILLIGRAM(S): 1.25 SOLUTION, CONCENTRATE RESPIRATORY (INHALATION) at 03:23

## 2018-02-05 RX ADMIN — Medication 100 MILLIGRAM(S): at 18:20

## 2018-02-05 RX ADMIN — Medication 1 TABLET(S): at 12:29

## 2018-02-05 RX ADMIN — PANTOPRAZOLE SODIUM 40 MILLIGRAM(S): 20 TABLET, DELAYED RELEASE ORAL at 06:22

## 2018-02-05 RX ADMIN — LEVALBUTEROL 0.63 MILLIGRAM(S): 1.25 SOLUTION, CONCENTRATE RESPIRATORY (INHALATION) at 21:30

## 2018-02-05 RX ADMIN — Medication 1 SPRAY(S): at 06:23

## 2018-02-05 RX ADMIN — Medication 100 MILLIGRAM(S): at 22:01

## 2018-02-05 RX ADMIN — DIPHENHYDRAMINE HYDROCHLORIDE AND LIDOCAINE HYDROCHLORIDE AND ALUMINUM HYDROXIDE AND MAGNESIUM HYDRO 10 MILLILITER(S): KIT at 06:21

## 2018-02-05 RX ADMIN — TAMSULOSIN HYDROCHLORIDE 0.4 MILLIGRAM(S): 0.4 CAPSULE ORAL at 22:01

## 2018-02-05 RX ADMIN — DIPHENHYDRAMINE HYDROCHLORIDE AND LIDOCAINE HYDROCHLORIDE AND ALUMINUM HYDROXIDE AND MAGNESIUM HYDRO 10 MILLILITER(S): KIT at 18:22

## 2018-02-05 RX ADMIN — Medication 50 GRAM(S): at 12:29

## 2018-02-05 RX ADMIN — FLUTICASONE PROPIONATE AND SALMETEROL 1 DOSE(S): 50; 250 POWDER ORAL; RESPIRATORY (INHALATION) at 10:20

## 2018-02-05 RX ADMIN — Medication 0.25 MILLIGRAM(S): at 12:29

## 2018-02-05 RX ADMIN — Medication 81 MILLIGRAM(S): at 12:29

## 2018-02-05 RX ADMIN — Medication 0.25 MILLIGRAM(S): at 06:21

## 2018-02-05 RX ADMIN — FLUTICASONE PROPIONATE AND SALMETEROL 1 DOSE(S): 50; 250 POWDER ORAL; RESPIRATORY (INHALATION) at 22:02

## 2018-02-05 RX ADMIN — PANTOPRAZOLE SODIUM 40 MILLIGRAM(S): 20 TABLET, DELAYED RELEASE ORAL at 18:20

## 2018-02-05 RX ADMIN — Medication 1 SPRAY(S): at 18:19

## 2018-02-05 RX ADMIN — LEVALBUTEROL 0.63 MILLIGRAM(S): 1.25 SOLUTION, CONCENTRATE RESPIRATORY (INHALATION) at 09:19

## 2018-02-05 RX ADMIN — Medication 360 MILLIGRAM(S): at 06:21

## 2018-02-05 RX ADMIN — LEVALBUTEROL 0.63 MILLIGRAM(S): 1.25 SOLUTION, CONCENTRATE RESPIRATORY (INHALATION) at 15:38

## 2018-02-05 RX ADMIN — Medication 1 SPRAY(S): at 06:22

## 2018-02-05 RX ADMIN — Medication 100 MILLIGRAM(S): at 10:20

## 2018-02-05 NOTE — PROGRESS NOTE ADULT - SUBJECTIVE AND OBJECTIVE BOX
FRANDY BAUTISTA    [id number]    67y      Male    Patient is a 67y old  Male who presents with a chief complaint of SOB (15 Dec 2017 10:17)      INTERVAL HPI/OVERNIGHT EVENTS:    Patient is doing well, has good diet, no nausea, vomiting, abdominal pain, fever, chills, chest pain, tolerating diet well.     REVIEW OF SYSTEMS:    CONSTITUTIONAL: No fever, has fatigue  RESPIRATORY: No cough, no shortness of breath  CARDIOVASCULAR: No chest pain, no palpitations.   GASTROINTESTINAL: No abdominal, No nausea, vomiting  NEUROLOGICAL: No headaches,  loss of strength.  MISCELLANEOUS: No joint swelling or pain.       Vital Signs Last 24 Hrs  T(C): 36.8 (05 Feb 2018 05:00), Max: 36.8 (05 Feb 2018 05:00)  T(F): 98.3 (05 Feb 2018 05:00), Max: 98.3 (05 Feb 2018 05:00)  HR: 105 (05 Feb 2018 05:00) (92 - 105)  BP: 109/68 (05 Feb 2018 05:00) (104/62 - 120/70)  RR: 18 (05 Feb 2018 05:00) (18 - 18)  SpO2: 99% (05 Feb 2018 05:00) (94% - 99%)    PHYSICAL EXAM:    GENERAL: Thin built  Elderly male looking comfortable  HEENT: MADELINE  NECK: soft, Supple, No JVD,   CHEST/LUNG: Decrease air entry bilaterally; No wheezing  HEART: S1S2+, Regular rate and rhythm; No murmurs  ABDOMEN: Soft, Nontender, Bowel sounds present, s/p Ileostomy with stool in the bag  EXTREMITIES:  1+ Peripheral Pulses, No edema  SKIN: No rashes or lesions  NEURO: AAOX3, no focal deficits, no motor r sensory loss  PSYCH: normal mood      LABS:                        8.1    6.3   )-----------( 349      ( 05 Feb 2018 06:57 )             25.9     02-05    139  |  101  |  14.0  ----------------------------<  133<H>  4.1   |  25.0  |  0.41<L>    Ca    8.2<L>      05 Feb 2018 06:52  Phos  2.9     02-05  Mg     1.6     02-05    TPro  6.1<L>  /  Alb  2.5<L>  /  TBili  0.2<L>  /  DBili  x   /  AST  37  /  ALT  101<H>  /  AlkPhos  163<H>  02-05            I&O's Summary    04 Feb 2018 07:01  -  05 Feb 2018 07:00  --------------------------------------------------------  IN: 970 mL / OUT: 1850 mL / NET: -880 mL    05 Feb 2018 07:01  -  05 Feb 2018 13:43  --------------------------------------------------------  IN: 0 mL / OUT: 100 mL / NET: -100 mL        MEDICATIONS  (STANDING):  ALPRAZolam 0.25 milliGRAM(s) Oral every 6 hours  aspirin  chewable 81 milliGRAM(s) Oral daily  dextrose 5%. 1000 milliLiter(s) (50 mL/Hr) IV Continuous <Continuous>  dextrose 50% Injectable 12.5 Gram(s) IV Push once  dextrose 50% Injectable 25 Gram(s) IV Push once  diltiazem    milliGRAM(s) Oral daily  enoxaparin Injectable 40 milliGRAM(s) SubCutaneous daily  FIRST- Mouthwash  BLM 10 milliLiter(s) Swish and Spit two times a day  fluticasone propionate 50 MICROgram(s)/spray Nasal Spray 1 Spray(s) Both Nostrils two times a day  fluticasone propionate/ salmeterol 250-50 MICROgram(s) Diskus 1 Dose(s) Inhalation two times a day  guaiFENesin    Syrup 100 milliGRAM(s) Oral every 4 hours  levalbuterol Inhalation 0.63 milliGRAM(s) Inhalation every 6 hours  loratadine 10 milliGRAM(s) Oral daily  multivitamin 1 Tablet(s) Oral daily  pantoprazole    Tablet 40 milliGRAM(s) Oral two times a day before meals  sodium chloride 0.65% Nasal 1 Spray(s) Both Nostrils four times a day  tamsulosin 0.4 milliGRAM(s) Oral at bedtime  tiotropium 18 MICROgram(s) Capsule 1 Capsule(s) Inhalation daily    MEDICATIONS  (PRN):  acetylcysteine 20% Inhalation 2.5 milliLiter(s) Inhalation every 6 hours PRN expectorant  benzocaine 15 mG/menthol 3.6 mG Lozenge 1 Lozenge Oral every 4 hours PRN Sore Throat  ondansetron Injectable 4 milliGRAM(s) IV Push every 4 hours PRN Nausea and/or Vomiting

## 2018-02-05 NOTE — PROGRESS NOTE ADULT - SUBJECTIVE AND OBJECTIVE BOX
INTERVAL HPI/OVERNIGHT EVENTS:    Patient seen and examined this AM  No ON events  tachycardia improving with switching albuterol to Xopenox  HR@82 at eval  Respiratory status improving with aggressive pulm tx  Rejecting acute rehab; would like to go home with home PT  poppy diet, adequate UOP, ambulating  denies f/c/n/v, SOB or CP      MEDICATIONS  (STANDING):  ALPRAZolam 0.25 milliGRAM(s) Oral every 6 hours  aspirin  chewable 81 milliGRAM(s) Oral daily  dextrose 5%. 1000 milliLiter(s) (50 mL/Hr) IV Continuous <Continuous>  dextrose 50% Injectable 12.5 Gram(s) IV Push once  dextrose 50% Injectable 25 Gram(s) IV Push once  diltiazem    milliGRAM(s) Oral daily  enoxaparin Injectable 40 milliGRAM(s) SubCutaneous daily  FIRST- Mouthwash  BLM 10 milliLiter(s) Swish and Spit two times a day  fluticasone propionate 50 MICROgram(s)/spray Nasal Spray 1 Spray(s) Both Nostrils two times a day  fluticasone propionate/ salmeterol 250-50 MICROgram(s) Diskus 1 Dose(s) Inhalation two times a day  guaiFENesin    Syrup 100 milliGRAM(s) Oral every 4 hours  levalbuterol Inhalation 0.63 milliGRAM(s) Inhalation every 6 hours  loratadine 10 milliGRAM(s) Oral daily  magnesium sulfate  IVPB 2 Gram(s) IV Intermittent once  multivitamin 1 Tablet(s) Oral daily  pantoprazole    Tablet 40 milliGRAM(s) Oral two times a day before meals  sodium chloride 0.65% Nasal 1 Spray(s) Both Nostrils four times a day  tamsulosin 0.4 milliGRAM(s) Oral at bedtime  tiotropium 18 MICROgram(s) Capsule 1 Capsule(s) Inhalation daily    MEDICATIONS  (PRN):  acetylcysteine 20% Inhalation 2.5 milliLiter(s) Inhalation every 6 hours PRN expectorant  benzocaine 15 mG/menthol 3.6 mG Lozenge 1 Lozenge Oral every 4 hours PRN Sore Throat  ondansetron Injectable 4 milliGRAM(s) IV Push every 4 hours PRN Nausea and/or Vomiting      Vital Signs Last 24 Hrs  T(C): 36.8 (05 Feb 2018 05:00), Max: 36.8 (04 Feb 2018 09:33)  T(F): 98.3 (05 Feb 2018 05:00), Max: 98.3 (05 Feb 2018 05:00)  HR: 105 (05 Feb 2018 05:00) (92 - 105)  BP: 109/68 (05 Feb 2018 05:00) (97/57 - 120/70)  BP(mean): --  RR: 18 (05 Feb 2018 05:00) (18 - 22)  SpO2: 99% (05 Feb 2018 05:00) (92% - 99%)    PE  AAOx3, NAD  CTAx2  RRR, S1/S2  S/ND/NT, no rebound or guarding, ileostomy functioning; stoma ispink and viable, +gas, red rubber bridge and formed stool in ostomy bag  no pitting edema    I&O's Detail    04 Feb 2018 07:01  -  05 Feb 2018 07:00  --------------------------------------------------------  IN:    Oral Fluid: 970 mL  Total IN: 970 mL    OUT:    Ileostomy: 625 mL    Voided: 1225 mL  Total OUT: 1850 mL    Total NET: -880 mL          LABS:                        8.1    6.3   )-----------( 349      ( 05 Feb 2018 06:57 )             25.9     02-05    139  |  101  |  14.0  ----------------------------<  133<H>  4.1   |  25.0  |  0.41<L>    Ca    8.2<L>      05 Feb 2018 06:52  Phos  2.9     02-05  Mg     1.6     02-05    TPro  6.1<L>  /  Alb  2.5<L>  /  TBili  0.2<L>  /  DBili  x   /  AST  37  /  ALT  101<H>  /  AlkPhos  163<H>  02-05          RADIOLOGY & ADDITIONAL STUDIES:

## 2018-02-05 NOTE — PROGRESS NOTE ADULT - ASSESSMENT
A/P 78M s/p revision of jejunostomy and creation of loop ileostomy POD 18    -c/w current diet  -f/u AM labs and trend LFTs  -calorie count  -f/u dietician recs  -replete lytes prn   -monitor ostomy output  -strict Is and Os  -pain control  -encourage ambulation and IS  -DVT ppx  -PT/OT BID  -Dispo planning: home with home PT

## 2018-02-05 NOTE — PROGRESS NOTE ADULT - ASSESSMENT
67 year old male with PMH of COPD stage 4 s/p right lung reduction in 2010 (on home oxygen 4L (sat 92-93%), HTN, CVA on 2014, DM, hypothyroidism, hydrocele, and nephrolithiasis who is admitted for a COPD exacerbation. Patient has been weaned off ventimask to 4 liters nasal cannula and respiratory status is stable. CTA of the chest was negative for PE but showed bilateral lower lobe infiltrates with mucous plugging for which he was started on Levaquin.  Respiratory status stable and steroids are being tapered. Hospital course complicated by constipation, which had not improved despite aggressive bowel regimen, laxatives and enemas. XRAY with large stool burden. CT with diaphragmatic hernia and patient was consequently taken to the OR for loop ileostomy under local anesthesia. Pt cont, to have copious amount in ileostomy. Pt is noted to have hyponatremia due to high output in ileostomy, got IVF, hyponatremia and hypokalemia improving, ileostomy secretion are getting thicker, he is tolerating diet, ostomy care as per ostomy RN,  he is continued with cholestyramine, he has been continued with IV hydration, Ileostomy out is still high, but secretions are getting thicker, monitored, I/Os, being continued with IV fluid rate to 125ml/h and GI has increased Octreotide to 150mcgs tid,  has CT abdomen and Plevis done, results reviewed, GI add Imodium 2 mg po bid and continued with octreotide along, GI recommended Surgical consult for Ileostomy reversal and pulmonary consult for optimization for surgery, appreciate surgical and pulmonary consults, GI recommended to get  CT abdomen and pelvis with oral contrast, done showed Proximal jejunum emptying into a loop jejunostomy the right upper quadrant as described. No contrast seen within the distal small bowel, Large bilateral scrotal hydroceles, talked with surgical team, they did the upper GI series confirmed Jejunostomy, patient is s/p jejunostomy closure and s/p Ileostomy was eating and drinking later on developed, nausea and vomiting, NG was put in and XRays were done showed penumoperitonium, CT abdomen done showed same pneumo peritoneum, surgery is following closely, as per surgery team, patient is most likely having refeeding syndrome, NG secretions are less, NG was  taken out, started on oral feeding able to tolerate well, he has been started  getting TPN, finished, change Cardizem po  dose was increased to 300mg, continued on Protonix patient Ileostomy is working well, has no abdominal pain, tolerating diet well.     During the earlier part of the hospitalization patient was treated for acute on chronic hypoxic respiratory failure secondary to COPD exacerbation and CAP, was treated with steroids now tapered and duo nebs, SOB resolved, he uses home oxygen (4 liters), bicarb elevated likely due to metabolic compensation for resp acidosis  continue bronchodilators and prednisone tapered off and completed Levaquin as well.  His electrolytes monitored and replaced accordingly, he was found to have right femoral artery dissection as incidental finding on CT, no intervention as per vascular, follow up with vascular clinic as out patient.     Plan:    1. High out put from Ileostomy: Patient has been continued on cholestyramine, Octreotide, Imodium, he is tolerating diet, ostomy care as per ostomy RN, IV hydration, Ileostomy output is still high, but secretions are thicker, encouraged for oral hydration, monitor I/Os, GI is following, GI recommended Surgical consult for Ileostomy reversal and pulmonary consult for optimization for surgery, appreciate surgical and pulmonary consults, GI has changed his  diet to high fiber diet, fluid restriction, he is feeling little dehydrated, his electrolytes has been stable will monitor I/O, BMP, will continued with IV fluids, GI recommended CT abdomen and pelvis with oral contrast, done today showed Proximal jejunum emptying into a loop jejunostomy the right upper quadrant as described. No contrast seen within the distal small bowel, Large bilateral scrotal hydroceles, talked with surgical team surgical team, they did the upper GI series confirmed Jejunostomy, patient is s/p jejunostomy closure and s/p Ileostomy was eating and drinking later on developed, nausea and vomiting, NG was put in and XRays were done showed penumoperitonium, CT abdomen done showed same pneumo peritoneum, surgery is follwoing closely, as per surgery team, patient is most likely having refeeding syndrome, NG secretions became less, NG was  taken out, started on oral feeding able to tolerate well, he has finished TPN course, patient Ileostomy is working well, has no abdominal pain, he is tolerating diet very well, continue with current diet, he has good appetite.     2. Acute on chronic hypoxic respiratory failure secondary to COPD exacerbation, improved, he is at his baseline, on home oxygen (4 liters), continue bronchodilators and prednisone tapered off, continue Mucomyst PRN, completed course of Levaquin, his looks stable from respiratory stand point.     3. CAP - completed course of Levaquin on 12/5.     4. Hypertension - stable, continue Cardizem.     5. Hyperlipidemia, continue statin    6.hyponatremia: resolved, it was due to high ileostomy output.   Hypokalemia - being replaced  Hypophosphatemia: improved  Hypomagnesemia: improved  on multiple electrolyte sol.   monitor Electrolytes    7. Scrotal edema chronic with urinary retention, no more retention, Roy if not able to urinate, no redness going down  - patient following with  as outpatient, cont scrotal elevation    8. 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 DVT Prophylaxis - Lovenox       10. anxiety: xanax at hs PRN.    11. Afib: On Cardizem dose adjusted accordingly, not on anticoagulation, will continue monitoring HR, if high will adjust meds.     12. Hx of TIA: patient of was plavix, he is on aspirin at this point of view, need to resume Plavix before discharge.     Dispo: Patient and his wife do not wanted to go for the TOM, working with PT once ready might be going to home with home PT, PT team was requested to work with patient twice a day.

## 2018-02-06 PROCEDURE — 93010 ELECTROCARDIOGRAM REPORT: CPT

## 2018-02-06 PROCEDURE — 99232 SBSQ HOSP IP/OBS MODERATE 35: CPT

## 2018-02-06 RX ORDER — CLOPIDOGREL BISULFATE 75 MG/1
75 TABLET, FILM COATED ORAL DAILY
Qty: 0 | Refills: 0 | Status: DISCONTINUED | OUTPATIENT
Start: 2018-02-07 | End: 2018-02-15

## 2018-02-06 RX ADMIN — Medication 0.25 MILLIGRAM(S): at 05:48

## 2018-02-06 RX ADMIN — Medication 0.25 MILLIGRAM(S): at 12:24

## 2018-02-06 RX ADMIN — Medication 1 TABLET(S): at 12:21

## 2018-02-06 RX ADMIN — Medication 1 SPRAY(S): at 05:48

## 2018-02-06 RX ADMIN — Medication 1 SPRAY(S): at 12:21

## 2018-02-06 RX ADMIN — LEVALBUTEROL 0.63 MILLIGRAM(S): 1.25 SOLUTION, CONCENTRATE RESPIRATORY (INHALATION) at 09:38

## 2018-02-06 RX ADMIN — PANTOPRAZOLE SODIUM 40 MILLIGRAM(S): 20 TABLET, DELAYED RELEASE ORAL at 17:36

## 2018-02-06 RX ADMIN — Medication 100 MILLIGRAM(S): at 14:32

## 2018-02-06 RX ADMIN — Medication 0.25 MILLIGRAM(S): at 17:36

## 2018-02-06 RX ADMIN — Medication 1 SPRAY(S): at 00:03

## 2018-02-06 RX ADMIN — Medication 100 MILLIGRAM(S): at 21:22

## 2018-02-06 RX ADMIN — Medication 100 MILLIGRAM(S): at 11:15

## 2018-02-06 RX ADMIN — LEVALBUTEROL 0.63 MILLIGRAM(S): 1.25 SOLUTION, CONCENTRATE RESPIRATORY (INHALATION) at 20:25

## 2018-02-06 RX ADMIN — TAMSULOSIN HYDROCHLORIDE 0.4 MILLIGRAM(S): 0.4 CAPSULE ORAL at 21:19

## 2018-02-06 RX ADMIN — TIOTROPIUM BROMIDE 1 CAPSULE(S): 18 CAPSULE ORAL; RESPIRATORY (INHALATION) at 09:39

## 2018-02-06 RX ADMIN — FLUTICASONE PROPIONATE AND SALMETEROL 1 DOSE(S): 50; 250 POWDER ORAL; RESPIRATORY (INHALATION) at 19:49

## 2018-02-06 RX ADMIN — Medication 0.25 MILLIGRAM(S): at 00:03

## 2018-02-06 RX ADMIN — Medication 1 SPRAY(S): at 17:36

## 2018-02-06 RX ADMIN — Medication 360 MILLIGRAM(S): at 05:48

## 2018-02-06 RX ADMIN — Medication 81 MILLIGRAM(S): at 12:21

## 2018-02-06 RX ADMIN — FLUTICASONE PROPIONATE AND SALMETEROL 1 DOSE(S): 50; 250 POWDER ORAL; RESPIRATORY (INHALATION) at 11:14

## 2018-02-06 RX ADMIN — PANTOPRAZOLE SODIUM 40 MILLIGRAM(S): 20 TABLET, DELAYED RELEASE ORAL at 05:48

## 2018-02-06 RX ADMIN — DIPHENHYDRAMINE HYDROCHLORIDE AND LIDOCAINE HYDROCHLORIDE AND ALUMINUM HYDROXIDE AND MAGNESIUM HYDRO 10 MILLILITER(S): KIT at 05:48

## 2018-02-06 RX ADMIN — LEVALBUTEROL 0.63 MILLIGRAM(S): 1.25 SOLUTION, CONCENTRATE RESPIRATORY (INHALATION) at 14:48

## 2018-02-06 RX ADMIN — ENOXAPARIN SODIUM 40 MILLIGRAM(S): 100 INJECTION SUBCUTANEOUS at 12:21

## 2018-02-06 RX ADMIN — DIPHENHYDRAMINE HYDROCHLORIDE AND LIDOCAINE HYDROCHLORIDE AND ALUMINUM HYDROXIDE AND MAGNESIUM HYDRO 10 MILLILITER(S): KIT at 19:49

## 2018-02-06 RX ADMIN — Medication 1 SPRAY(S): at 05:49

## 2018-02-06 RX ADMIN — Medication 100 MILLIGRAM(S): at 05:49

## 2018-02-06 RX ADMIN — LEVALBUTEROL 0.63 MILLIGRAM(S): 1.25 SOLUTION, CONCENTRATE RESPIRATORY (INHALATION) at 02:39

## 2018-02-06 RX ADMIN — LORATADINE 10 MILLIGRAM(S): 10 TABLET ORAL at 12:22

## 2018-02-06 RX ADMIN — Medication 100 MILLIGRAM(S): at 17:36

## 2018-02-06 NOTE — PROGRESS NOTE ADULT - SUBJECTIVE AND OBJECTIVE BOX
FRANDY BAUTISTA    [id number]    67y      Male    Patient is a 67y old  Male who presents with a chief complaint of SOB (15 Dec 2017 10:17)      INTERVAL HPI/OVERNIGHT EVENTS:      Patient is doing well, he has no complains, except his nose is still clogged, he has good diet, no nausea, vomiting, abdominal pain, fever, chills, chest pain, tolerating diet well.     REVIEW OF SYSTEMS:    CONSTITUTIONAL: No fever, has fatigue  RESPIRATORY: No cough, no shortness of breath  CARDIOVASCULAR: No chest pain, no palpitations.   GASTROINTESTINAL: No abdominal, No nausea, vomiting  NEUROLOGICAL: No headaches,  loss of strength.  MISCELLANEOUS: No joint swelling or pain.        Vital Signs Last 24 Hrs  T(C): 36.8 (06 Feb 2018 11:16), Max: 36.8 (06 Feb 2018 11:16)  T(F): 98.3 (06 Feb 2018 11:16), Max: 98.3 (06 Feb 2018 11:16)  HR: 96 (06 Feb 2018 11:16) (88 - 103)  BP: 96/63 (06 Feb 2018 11:16) (96/63 - 117/74)  RR: 18 (06 Feb 2018 11:16) (18 - 20)  SpO2: 93% (06 Feb 2018 11:16) (92% - 97%)    PHYSICAL EXAM:    GENERAL: Thin built  Elderly male looking comfortable  HEENT: MADELINE  NECK: soft, Supple, No JVD,   CHEST/LUNG: Decrease air entry bilaterally; No wheezing  HEART: S1S2+, iregular rate and rhythm; No murmurs  ABDOMEN: Soft, Nontender, Bowel sounds present, s/p Ileostomy with stool in the bag  EXTREMITIES:  1+ Peripheral Pulses, No edema  SKIN: No rashes or lesions  NEURO: AAOX3, no focal deficits, no motor r sensory loss  PSYCH: normal mood      LABS:                        8.1    6.3   )-----------( 349      ( 05 Feb 2018 06:57 )             25.9     02-05    139  |  101  |  14.0  ----------------------------<  133<H>  4.1   |  25.0  |  0.41<L>    Ca    8.2<L>      05 Feb 2018 06:52  Phos  2.9     02-05  Mg     1.6     02-05    TPro  6.1<L>  /  Alb  2.5<L>  /  TBili  0.2<L>  /  DBili  x   /  AST  37  /  ALT  101<H>  /  AlkPhos  163<H>  02-05            I&O's Summary    05 Feb 2018 07:01  -  06 Feb 2018 07:00  --------------------------------------------------------  IN: 360 mL / OUT: 1100 mL / NET: -740 mL    06 Feb 2018 07:01  -  06 Feb 2018 15:18  --------------------------------------------------------  IN: 0 mL / OUT: 885 mL / NET: -885 mL        MEDICATIONS  (STANDING):  ALPRAZolam 0.25 milliGRAM(s) Oral every 6 hours  aspirin  chewable 81 milliGRAM(s) Oral daily  dextrose 5%. 1000 milliLiter(s) (50 mL/Hr) IV Continuous <Continuous>  dextrose 50% Injectable 12.5 Gram(s) IV Push once  dextrose 50% Injectable 25 Gram(s) IV Push once  diltiazem    Tablet 120 milliGRAM(s) Oral every 8 hours  enoxaparin Injectable 40 milliGRAM(s) SubCutaneous daily  FIRST- Mouthwash  BLM 10 milliLiter(s) Swish and Spit two times a day  fluticasone propionate 50 MICROgram(s)/spray Nasal Spray 1 Spray(s) Both Nostrils two times a day  fluticasone propionate/ salmeterol 250-50 MICROgram(s) Diskus 1 Dose(s) Inhalation two times a day  guaiFENesin    Syrup 100 milliGRAM(s) Oral every 4 hours  levalbuterol Inhalation 0.63 milliGRAM(s) Inhalation every 6 hours  loratadine 10 milliGRAM(s) Oral daily  multivitamin 1 Tablet(s) Oral daily  pantoprazole    Tablet 40 milliGRAM(s) Oral two times a day before meals  sodium chloride 0.65% Nasal 1 Spray(s) Both Nostrils four times a day  tamsulosin 0.4 milliGRAM(s) Oral at bedtime  tiotropium 18 MICROgram(s) Capsule 1 Capsule(s) Inhalation daily    MEDICATIONS  (PRN):  acetylcysteine 20% Inhalation 2.5 milliLiter(s) Inhalation every 6 hours PRN expectorant  benzocaine 15 mG/menthol 3.6 mG Lozenge 1 Lozenge Oral every 4 hours PRN Sore Throat  ondansetron Injectable 4 milliGRAM(s) IV Push every 4 hours PRN Nausea and/or Vomiting

## 2018-02-06 NOTE — PROGRESS NOTE ADULT - ASSESSMENT
67 year old male with PMH of COPD stage 4 s/p right lung reduction in 2010 (on home oxygen 4L (sat 92-93%), HTN, CVA on 2014, DM, hypothyroidism, hydrocele, and nephrolithiasis who is admitted for a COPD exacerbation. Patient has been weaned off ventimask to 4 liters nasal cannula and respiratory status is stable. CTA of the chest was negative for PE but showed bilateral lower lobe infiltrates with mucous plugging for which he was started on Levaquin.  Respiratory status stable and steroids are being tapered. Hospital course complicated by constipation, which had not improved despite aggressive bowel regimen, laxatives and enemas. XRAY with large stool burden. CT with diaphragmatic hernia and patient was consequently taken to the OR for loop ileostomy under local anesthesia. Pt cont, to have copious amount in ileostomy. Pt is noted to have hyponatremia due to high output in ileostomy, got IVF, hyponatremia and hypokalemia improving, ileostomy secretion are getting thicker, he is tolerating diet, ostomy care as per ostomy RN,  he is continued with cholestyramine, he has been continued with IV hydration, Ileostomy out is still high, but secretions are getting thicker, monitored, I/Os, being continued with IV fluid rate to 125ml/h and GI has increased Octreotide to 150mcgs tid,  has CT abdomen and Plevis done, results reviewed, GI add Imodium 2 mg po bid and continued with octreotide along, GI recommended Surgical consult for Ileostomy reversal and pulmonary consult for optimization for surgery, appreciate surgical and pulmonary consults, GI recommended to get  CT abdomen and pelvis with oral contrast, done showed Proximal jejunum emptying into a loop jejunostomy the right upper quadrant as described. No contrast seen within the distal small bowel, Large bilateral scrotal hydroceles, talked with surgical team, they did the upper GI series confirmed Jejunostomy, patient is s/p jejunostomy closure and s/p Ileostomy was eating and drinking later on developed, nausea and vomiting, NG was put in and XRays were done showed penumoperitonium, CT abdomen done showed same pneumo peritoneum, surgery is following closely, as per surgery team, patient is most likely having refeeding syndrome, NG secretions are less, NG was  taken out, started on oral feeding able to tolerate well, he has been started  getting TPN, finished, change Cardizem po  dose was increased to 300mg, continued on Protonix patient Ileostomy is working well, has no abdominal pain, tolerating diet well.     During the earlier part of the hospitalization patient was treated for acute on chronic hypoxic respiratory failure secondary to COPD exacerbation and CAP, was treated with steroids now tapered and duo nebs, SOB resolved, he uses home oxygen (4 liters), bicarb elevated likely due to metabolic compensation for resp acidosis  continue bronchodilators and prednisone tapered off and completed Levaquin as well.  His electrolytes monitored and replaced accordingly, he was found to have right femoral artery dissection as incidental finding on CT, no intervention as per vascular, follow up with vascular clinic as out patient.     Plan:    1. High out put from Ileostomy: Patient has been continued on cholestyramine, Octreotide, Imodium, he is tolerating diet, ostomy care as per ostomy RN, IV hydration, Ileostomy output is still high, but secretions are thicker, encouraged for oral hydration, monitor I/Os, GI is following, GI recommended Surgical consult for Ileostomy reversal and pulmonary consult for optimization for surgery, appreciate surgical and pulmonary consults, GI has changed his  diet to high fiber diet, fluid restriction, he is feeling little dehydrated, his electrolytes has been stable will monitor I/O, BMP, will continued with IV fluids, GI recommended CT abdomen and pelvis with oral contrast, done today showed Proximal jejunum emptying into a loop jejunostomy the right upper quadrant as described. No contrast seen within the distal small bowel, Large bilateral scrotal hydroceles, talked with surgical team surgical team, they did the upper GI series confirmed Jejunostomy, patient is s/p jejunostomy closure and s/p Ileostomy was eating and drinking later on developed, nausea and vomiting, NG was put in and XRays were done showed penumoperitonium, CT abdomen done showed same pneumo peritoneum, surgery is follwoing closely, as per surgery team, patient is most likely having refeeding syndrome, NG secretions became less, NG was  taken out, started on oral feeding able to tolerate well, he has finished TPN course, patient Ileostomy is working well, has no abdominal pain, he is tolerating diet very well, continue with current diet, he has good appetite, he is being followed by dietician.     2. Acute on chronic hypoxic respiratory failure secondary to COPD exacerbation, improved, he is at his baseline, on home oxygen (4 liters), continue bronchodilators and prednisone tapered off, continue Mucomyst PRN, completed course of Levaquin, his looks stable from respiratory stand point.     3. CAP - completed course of Levaquin on 12/5.     4. Hypertension - stable, continue Cardizem.     5. Hyperlipidemia, continue statin    6.hyponatremia: resolved, it was due to high ileostomy output.   Hypokalemia - being replaced  Hypophosphatemia: improved  Hypomagnesemia: improved  on multiple electrolyte sol.   monitor Electrolytes    7. Scrotal edema chronic with urinary retention, no more retention, Roy if not able to urinate, no redness going down  - patient following with  as outpatient, cont scrotal elevation    8. 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 DVT Prophylaxis - Lovenox       10. anxiety: xanax at hs PRN.    11. Afib: patient HR is in 90s, seen and followed by cardiology, his cardizam changed to short acting one because of small bowel absorption issues, will continue to monitor, cardiology wanted to get TTE, will follow along, not on anticoagulation, will continue monitoring HR, if high will adjust meds.     12. Hx of TIA: patient of was plavix, will d/c aspirin and resume his plavix.     Dispo: Patient and his wife do not wanted to go for the TOM, working with PT once ready might be going to home with home PT, PT team was requested to work with patient twice a day.

## 2018-02-06 NOTE — PROGRESS NOTE ADULT - ASSESSMENT
A/P 78M s/p revision of jejunostomy and creation of loop ileostomy  - will DC PICC line today  - c/w current diet  -replete lytes prn   -monitor ostomy output  -strict Is and Os  -pain control  -encourage ambulation and IS  -DVT ppx  -PT/OT BID  -Dispo planning: home with home PT   - Cardio consultation of optimization for discharge, for persistent tachycardia

## 2018-02-06 NOTE — PROGRESS NOTE ADULT - SUBJECTIVE AND OBJECTIVE BOX
pt seen and examined at bed side  no acute events overnight.  pt complaints that he is having some difficulty in breathing but that is his base line with his history fo COPD  ostomy funtioning  HR 98/min in AM    MEDICATIONS  (STANDING):  ALPRAZolam 0.25 milliGRAM(s) Oral every 6 hours  aspirin  chewable 81 milliGRAM(s) Oral daily  dextrose 5%. 1000 milliLiter(s) (50 mL/Hr) IV Continuous <Continuous>  dextrose 50% Injectable 12.5 Gram(s) IV Push once  dextrose 50% Injectable 25 Gram(s) IV Push once  diltiazem    milliGRAM(s) Oral daily  enoxaparin Injectable 40 milliGRAM(s) SubCutaneous daily  FIRST- Mouthwash  BLM 10 milliLiter(s) Swish and Spit two times a day  fluticasone propionate 50 MICROgram(s)/spray Nasal Spray 1 Spray(s) Both Nostrils two times a day  fluticasone propionate/ salmeterol 250-50 MICROgram(s) Diskus 1 Dose(s) Inhalation two times a day  guaiFENesin    Syrup 100 milliGRAM(s) Oral every 4 hours  levalbuterol Inhalation 0.63 milliGRAM(s) Inhalation every 6 hours  loratadine 10 milliGRAM(s) Oral daily  multivitamin 1 Tablet(s) Oral daily  pantoprazole    Tablet 40 milliGRAM(s) Oral two times a day before meals  sodium chloride 0.65% Nasal 1 Spray(s) Both Nostrils four times a day  tamsulosin 0.4 milliGRAM(s) Oral at bedtime  tiotropium 18 MICROgram(s) Capsule 1 Capsule(s) Inhalation daily    MEDICATIONS  (PRN):  acetylcysteine 20% Inhalation 2.5 milliLiter(s) Inhalation every 6 hours PRN expectorant  benzocaine 15 mG/menthol 3.6 mG Lozenge 1 Lozenge Oral every 4 hours PRN Sore Throat  ondansetron Injectable 4 milliGRAM(s) IV Push every 4 hours PRN Nausea and/or Vomiting      Vital Signs Last 24 Hrs  T(C): 36.7 (06 Feb 2018 06:19), Max: 36.7 (06 Feb 2018 06:19)  T(F): 98 (06 Feb 2018 06:19), Max: 98 (06 Feb 2018 06:19)  HR: 98 (06 Feb 2018 07:00) (88 - 103)  BP: 117/74 (06 Feb 2018 07:00) (102/62 - 117/74)  BP(mean): --  RR: 18 (06 Feb 2018 07:00) (18 - 20)  SpO2: 92% (06 Feb 2018 07:00) (92% - 97%)    OE  AAOx3, NAD  CTAx2  RRR, S1/S2  S/ND/NT, no rebound or guarding, ileostomy functioning; stoma ispink and viable, +gas, red rubber bridge and formed stool in ostomy bag  no pitting edema      I&O's Detail    05 Feb 2018 07:01  -  06 Feb 2018 07:00  --------------------------------------------------------  IN:    Oral Fluid: 360 mL  Total IN: 360 mL    OUT:    Ileostomy: 275 mL    Voided: 825 mL  Total OUT: 1100 mL    Total NET: -740 mL      06 Feb 2018 07:01  -  06 Feb 2018 11:11  --------------------------------------------------------  IN:  Total IN: 0 mL    OUT:    Ileostomy: 50 mL    Voided: 160 mL  Total OUT: 210 mL    Total NET: -210 mL          LABS:                        8.1    6.3   )-----------( 349      ( 05 Feb 2018 06:57 )             25.9     02-05    139  |  101  |  14.0  ----------------------------<  133<H>  4.1   |  25.0  |  0.41<L>    Ca    8.2<L>      05 Feb 2018 06:52  Phos  2.9     02-05  Mg     1.6     02-05    TPro  6.1<L>  /  Alb  2.5<L>  /  TBili  0.2<L>  /  DBili  x   /  AST  37  /  ALT  101<H>  /  AlkPhos  163<H>  02-05          RADIOLOGY & ADDITIONAL STUDIES:

## 2018-02-06 NOTE — PROGRESS NOTE ADULT - SUBJECTIVE AND OBJECTIVE BOX
CHIEF COMPLAINT:  Patient is a 67y old  Male who is seen with tachycardia.  He is s.p surgery has is on cardizem  mg daily.     Allergies:  codeine (Unknown)  no veges (Unknown)  penicillin (Unknown)    MEDICATIONS:  diltiazem    milliGRAM(s) Oral daily  tamsulosin 0.4 milliGRAM(s) Oral at bedtime  acetylcysteine 20% Inhalation 2.5 milliLiter(s) Inhalation every 6 hours PRN  fluticasone propionate/ salmeterol 250-50 MICROgram(s) Diskus 1 Dose(s) Inhalation two times a day  guaiFENesin    Syrup 100 milliGRAM(s) Oral every 4 hours  levalbuterol Inhalation 0.63 milliGRAM(s) Inhalation every 6 hours  loratadine 10 milliGRAM(s) Oral daily  tiotropium 18 MICROgram(s) Capsule 1 Capsule(s) Inhalation daily  ALPRAZolam 0.25 milliGRAM(s) Oral every 6 hours  ondansetron Injectable 4 milliGRAM(s) IV Push every 4 hours PRN  pantoprazole    Tablet 40 milliGRAM(s) Oral two times a day before meals  dextrose 50% Injectable 12.5 Gram(s) IV Push once  dextrose 50% Injectable 25 Gram(s) IV Push once  aspirin  chewable 81 milliGRAM(s) Oral daily  benzocaine 15 mG/menthol 3.6 mG Lozenge 1 Lozenge Oral every 4 hours PRN  dextrose 5%. 1000 milliLiter(s) IV Continuous <Continuous>  enoxaparin Injectable 40 milliGRAM(s) SubCutaneous daily  FIRST- Mouthwash  BLM 10 milliLiter(s) Swish and Spit two times a day  fluticasone propionate 50 MICROgram(s)/spray Nasal Spray 1 Spray(s) Both Nostrils two times a day  multivitamin 1 Tablet(s) Oral daily  sodium chloride 0.65% Nasal 1 Spray(s) Both Nostrils four times a day    PHYSICAL EXAM:  T(C): 36.8 (02-06-18 @ 11:16), Max: 36.8 (02-06-18 @ 11:16)  HR: 96 (02-06-18 @ 11:16) (88 - 103)  BP: 96/63 (02-06-18 @ 11:16) (96/63 - 117/74)  RR: 18 (02-06-18 @ 11:16) (18 - 20)  SpO2: 93% (02-06-18 @ 11:16) (92% - 97%)  Wt(kg): --    I&O's Summary    05 Feb 2018 07:01  -  06 Feb 2018 07:00  --------------------------------------------------------  IN: 360 mL / OUT: 1100 mL / NET: -740 mL    06 Feb 2018 07:01  -  06 Feb 2018 14:06  --------------------------------------------------------  IN: 0 mL / OUT: 735 mL / NET: -735 mL  Appearance: Normal	  HEENT:   NC/AT  Eye: Pink Conjunctiva  Lungs: ronchi at bases  CVS: RRR, Normal S1 and S2, No Edema  Pulses: Normal distal pulses.  Neuro: A&O x3      ECG:  	NSR, 99 BPM, no st/t changes    CARDIAC MARKERS:                        8.1    6.3   )-----------( 349      ( 05 Feb 2018 06:57 )             25.9     02-05    139  |  101  |  14.0  ----------------------------<  133<H>  4.1   |  25.0  |  0.41<L>    Ca    8.2<L>      05 Feb 2018 06:52  Phos  2.9     02-05  Mg     1.6     02-05    TPro  6.1<L>  /  Alb  2.5<L>  /  TBili  0.2<L>  /  DBili  x   /  AST  37  /  ALT  101<H>  /  AlkPhos  163<H>  02-05    ASSESSMENT/PLAN: 	    Pt has sinus tachycardia. Currently Hr is in the 90s. He has no cardiac symptoms. This is most likely reactive due to general medical/surgical condition. Check TSH.  It is possible that cardizem is long acting and due to short gut it is not being absorbed effectively. We can try short acting cardizem for now. Increase fluid intake. Obtain echo.

## 2018-02-07 LAB
ANION GAP SERPL CALC-SCNC: 12 MMOL/L — SIGNIFICANT CHANGE UP (ref 5–17)
BUN SERPL-MCNC: 13 MG/DL — SIGNIFICANT CHANGE UP (ref 8–20)
CALCIUM SERPL-MCNC: 8.4 MG/DL — LOW (ref 8.6–10.2)
CHLORIDE SERPL-SCNC: 102 MMOL/L — SIGNIFICANT CHANGE UP (ref 98–107)
CO2 SERPL-SCNC: 26 MMOL/L — SIGNIFICANT CHANGE UP (ref 22–29)
CREAT SERPL-MCNC: 0.5 MG/DL — SIGNIFICANT CHANGE UP (ref 0.5–1.3)
GLUCOSE SERPL-MCNC: 136 MG/DL — HIGH (ref 70–115)
HCT VFR BLD CALC: 27.2 % — LOW (ref 42–52)
HGB BLD-MCNC: 8.2 G/DL — LOW (ref 14–18)
MCHC RBC-ENTMCNC: 27.8 PG — SIGNIFICANT CHANGE UP (ref 27–31)
MCHC RBC-ENTMCNC: 30.1 G/DL — LOW (ref 32–36)
MCV RBC AUTO: 92.2 FL — SIGNIFICANT CHANGE UP (ref 80–94)
PLATELET # BLD AUTO: 373 K/UL — SIGNIFICANT CHANGE UP (ref 150–400)
POTASSIUM SERPL-MCNC: 4.1 MMOL/L — SIGNIFICANT CHANGE UP (ref 3.5–5.3)
POTASSIUM SERPL-SCNC: 4.1 MMOL/L — SIGNIFICANT CHANGE UP (ref 3.5–5.3)
RBC # BLD: 2.95 M/UL — LOW (ref 4.6–6.2)
RBC # FLD: 19.7 % — HIGH (ref 11–15.6)
SODIUM SERPL-SCNC: 140 MMOL/L — SIGNIFICANT CHANGE UP (ref 135–145)
TSH SERPL-MCNC: 8.55 UIU/ML — HIGH (ref 0.27–4.2)
WBC # BLD: 6.1 K/UL — SIGNIFICANT CHANGE UP (ref 4.8–10.8)
WBC # FLD AUTO: 6.1 K/UL — SIGNIFICANT CHANGE UP (ref 4.8–10.8)

## 2018-02-07 PROCEDURE — 99232 SBSQ HOSP IP/OBS MODERATE 35: CPT

## 2018-02-07 PROCEDURE — 93306 TTE W/DOPPLER COMPLETE: CPT | Mod: 26

## 2018-02-07 RX ADMIN — Medication 0.25 MILLIGRAM(S): at 22:31

## 2018-02-07 RX ADMIN — LORATADINE 10 MILLIGRAM(S): 10 TABLET ORAL at 12:09

## 2018-02-07 RX ADMIN — CLOPIDOGREL BISULFATE 75 MILLIGRAM(S): 75 TABLET, FILM COATED ORAL at 12:09

## 2018-02-07 RX ADMIN — Medication 100 MILLIGRAM(S): at 12:11

## 2018-02-07 RX ADMIN — Medication 1 SPRAY(S): at 17:26

## 2018-02-07 RX ADMIN — TIOTROPIUM BROMIDE 1 CAPSULE(S): 18 CAPSULE ORAL; RESPIRATORY (INHALATION) at 08:13

## 2018-02-07 RX ADMIN — FLUTICASONE PROPIONATE AND SALMETEROL 1 DOSE(S): 50; 250 POWDER ORAL; RESPIRATORY (INHALATION) at 07:59

## 2018-02-07 RX ADMIN — Medication 0.25 MILLIGRAM(S): at 12:09

## 2018-02-07 RX ADMIN — DIPHENHYDRAMINE HYDROCHLORIDE AND LIDOCAINE HYDROCHLORIDE AND ALUMINUM HYDROXIDE AND MAGNESIUM HYDRO 10 MILLILITER(S): KIT at 05:43

## 2018-02-07 RX ADMIN — ENOXAPARIN SODIUM 40 MILLIGRAM(S): 100 INJECTION SUBCUTANEOUS at 12:09

## 2018-02-07 RX ADMIN — PANTOPRAZOLE SODIUM 40 MILLIGRAM(S): 20 TABLET, DELAYED RELEASE ORAL at 17:26

## 2018-02-07 RX ADMIN — Medication 0.25 MILLIGRAM(S): at 00:04

## 2018-02-07 RX ADMIN — Medication 100 MILLIGRAM(S): at 05:43

## 2018-02-07 RX ADMIN — LEVALBUTEROL 0.63 MILLIGRAM(S): 1.25 SOLUTION, CONCENTRATE RESPIRATORY (INHALATION) at 20:23

## 2018-02-07 RX ADMIN — Medication 1 SPRAY(S): at 05:43

## 2018-02-07 RX ADMIN — PANTOPRAZOLE SODIUM 40 MILLIGRAM(S): 20 TABLET, DELAYED RELEASE ORAL at 05:44

## 2018-02-07 RX ADMIN — LEVALBUTEROL 0.63 MILLIGRAM(S): 1.25 SOLUTION, CONCENTRATE RESPIRATORY (INHALATION) at 03:27

## 2018-02-07 RX ADMIN — LEVALBUTEROL 0.63 MILLIGRAM(S): 1.25 SOLUTION, CONCENTRATE RESPIRATORY (INHALATION) at 15:59

## 2018-02-07 RX ADMIN — Medication 1 TABLET(S): at 12:09

## 2018-02-07 RX ADMIN — Medication 100 MILLIGRAM(S): at 17:26

## 2018-02-07 RX ADMIN — TAMSULOSIN HYDROCHLORIDE 0.4 MILLIGRAM(S): 0.4 CAPSULE ORAL at 22:24

## 2018-02-07 RX ADMIN — Medication 0.25 MILLIGRAM(S): at 17:26

## 2018-02-07 RX ADMIN — DIPHENHYDRAMINE HYDROCHLORIDE AND LIDOCAINE HYDROCHLORIDE AND ALUMINUM HYDROXIDE AND MAGNESIUM HYDRO 10 MILLILITER(S): KIT at 18:42

## 2018-02-07 RX ADMIN — Medication 1 SPRAY(S): at 22:24

## 2018-02-07 RX ADMIN — Medication 0.25 MILLIGRAM(S): at 05:44

## 2018-02-07 RX ADMIN — Medication 1 SPRAY(S): at 05:44

## 2018-02-07 RX ADMIN — FLUTICASONE PROPIONATE AND SALMETEROL 1 DOSE(S): 50; 250 POWDER ORAL; RESPIRATORY (INHALATION) at 22:24

## 2018-02-07 RX ADMIN — LEVALBUTEROL 0.63 MILLIGRAM(S): 1.25 SOLUTION, CONCENTRATE RESPIRATORY (INHALATION) at 08:13

## 2018-02-07 NOTE — PROGRESS NOTE ADULT - SUBJECTIVE AND OBJECTIVE BOX
INTERVAL HPI/OVERNIGHT EVENTS/SUBJECTIVE:68 yo male s/p loop jejunostomy 12/7, revision of jejunostomy 1/16, loop ileostomy creation examined at bedside, pt and nurse state that there were no acute events or complaints overnight. Pt states that pain is well controlled and is tolerating current diet w/o any n/v/d. [+ ]OOBA]. Denies fever, chills, CP, SOB.    ICU Vital Signs Last 24 Hrs  T(C): 36.1 (07 Feb 2018 06:33), Max: 36.8 (06 Feb 2018 11:16)  T(F): 97 (07 Feb 2018 06:33), Max: 98.3 (06 Feb 2018 11:16)  HR: 98 (07 Feb 2018 06:33) (96 - 103)  BP: 104/68 (07 Feb 2018 06:33) (96/63 - 111/68)  RR: 18 (07 Feb 2018 06:33) (18 - 18)  SpO2: 97% (07 Feb 2018 06:33) (93% - 97%)      I&O's Detail    06 Feb 2018 07:01  -  07 Feb 2018 07:00  --------------------------------------------------------  IN:    Oral Fluid: 100 mL  Total IN: 100 mL    OUT:    Ileostomy: 600 mL    Voided: 1455 mL  Total OUT: 2055 mL    Total NET: -1955 mL                MEDICATIONS  (STANDING):  ALPRAZolam 0.25 milliGRAM(s) Oral every 6 hours  clopidogrel Tablet 75 milliGRAM(s) Oral daily  dextrose 5%. 1000 milliLiter(s) (50 mL/Hr) IV Continuous <Continuous>  dextrose 50% Injectable 12.5 Gram(s) IV Push once  dextrose 50% Injectable 25 Gram(s) IV Push once  diltiazem    Tablet 120 milliGRAM(s) Oral every 8 hours  enoxaparin Injectable 40 milliGRAM(s) SubCutaneous daily  FIRST- Mouthwash  BLM 10 milliLiter(s) Swish and Spit two times a day  fluticasone propionate 50 MICROgram(s)/spray Nasal Spray 1 Spray(s) Both Nostrils two times a day  fluticasone propionate/ salmeterol 250-50 MICROgram(s) Diskus 1 Dose(s) Inhalation two times a day  guaiFENesin    Syrup 100 milliGRAM(s) Oral every 4 hours  levalbuterol Inhalation 0.63 milliGRAM(s) Inhalation every 6 hours  loratadine 10 milliGRAM(s) Oral daily  multivitamin 1 Tablet(s) Oral daily  pantoprazole    Tablet 40 milliGRAM(s) Oral two times a day before meals  sodium chloride 0.65% Nasal 1 Spray(s) Both Nostrils four times a day  tamsulosin 0.4 milliGRAM(s) Oral at bedtime  tiotropium 18 MICROgram(s) Capsule 1 Capsule(s) Inhalation daily    MEDICATIONS  (PRN):  acetylcysteine 20% Inhalation 2.5 milliLiter(s) Inhalation every 6 hours PRN expectorant  benzocaine 15 mG/menthol 3.6 mG Lozenge 1 Lozenge Oral every 4 hours PRN Sore Throat  ondansetron Injectable 4 milliGRAM(s) IV Push every 4 hours PRN Nausea and/or Vomiting      NUTRITION/IVF: Regular      PHYSICAL EXAM:    Gen: Alert, NAD, Mentating well    Eyes: EOMI, PERRL    Neurological: A&Ox3, GCS 15, Baseline mental status    ENMT: Nares patent w/o discharge, MMM, no LAD     Neck: supple, no masses appreciated, Trachea midline, No JVD    Pulmonary: Productive cough, Rales, the Non labored, chest rise is  symmetrical with respiration    Cardiovascular: S1S2 RRR    Gastrointestinal: Abdomen soft and non tympanic, ostomy appliance not seated properly, leaking stool.     Genitourinary: No Roy    Extremities: No gross deformities or angulations, No calf tenderness, Distal pulses intact, Capillary refill < 2sec    Skin: Warm and dry, no rash.     Musculoskeletal: No focal deficits, strength 5/5 through out all extremities bilaterally, Baseline ROM.            LABS:  CBC Full  -  ( 07 Feb 2018 07:55 )  WBC Count : 6.1 K/uL  Hemoglobin : 8.2 g/dL  Hematocrit : 27.2 %  Platelet Count - Automated : 373 K/uL  Mean Cell Volume : 92.2 fl  Mean Cell Hemoglobin : 27.8 pg  Mean Cell Hemoglobin Concentration : 30.1 g/dL      ASSESSMENT/PLAN:  67yMale presenting with: s/p loop jejunostomy 12/7, revision of jejunostomy 1/16, loop ileostomy creation  Neuro: Pain control as prescribed  Cardio: Monitor Vital signs; Continue home meds, follow up TSH/ECHO  Pulm: Breathing Independently; Incentive spirometry and deep breathing, Continue with TX via Pulm  GI: Diet: [Regular ]; Monitor bowel function, Monitor Ostomy for leakage and output.   : Urinating independently  MS: Encourage OOB and ambulation;  DVT ppx: Plavix, Lovenox

## 2018-02-07 NOTE — PROGRESS NOTE ADULT - ASSESSMENT
67 year old male with PMH of COPD stage 4 s/p right lung reduction in 2010 (on home oxygen 4L (sat 92-93%), HTN, CVA on 2014, DM, hypothyroidism, hydrocele, and nephrolithiasis who is admitted for a COPD exacerbation. Patient has been weaned off ventimask to 4 liters nasal cannula and respiratory status is stable. CTA of the chest was negative for PE but showed bilateral lower lobe infiltrates with mucous plugging for which he was started on Levaquin.  Respiratory status stable and steroids are being tapered. Hospital course complicated by constipation, which had not improved despite aggressive bowel regimen, laxatives and enemas. XRAY with large stool burden. CT with diaphragmatic hernia and patient was consequently taken to the OR for loop ileostomy under local anesthesia. Pt cont, to have copious amount in ileostomy. Pt is noted to have hyponatremia due to high output in ileostomy, got IVF, hyponatremia and hypokalemia improving, ileostomy secretion are getting thicker, he is tolerating diet, ostomy care as per ostomy RN,  he is continued with cholestyramine, he has been continued with IV hydration, Ileostomy out is still high, but secretions are getting thicker, monitored, I/Os, being continued with IV fluid rate to 125ml/h and GI has increased Octreotide to 150mcgs tid,  has CT abdomen and Plevis done, results reviewed, GI add Imodium 2 mg po bid and continued with octreotide along, GI recommended Surgical consult for Ileostomy reversal and pulmonary consult for optimization for surgery, appreciate surgical and pulmonary consults, GI recommended to get  CT abdomen and pelvis with oral contrast, done showed Proximal jejunum emptying into a loop jejunostomy the right upper quadrant as described. No contrast seen within the distal small bowel, Large bilateral scrotal hydroceles, talked with surgical team, they did the upper GI series confirmed Jejunostomy, patient is s/p jejunostomy closure and s/p Ileostomy was eating and drinking later on developed, nausea and vomiting, NG was put in and XRays were done showed penumoperitonium, CT abdomen done showed same pneumo peritoneum, surgery is following closely, as per surgery team, patient is most likely having refeeding syndrome, NG secretions are less, NG was  taken out, started on oral feeding able to tolerate well, he has been started  getting TPN, finished, change Cardizem po  dose was increased to 300mg, continued on Protonix patient Ileostomy is working well, has no abdominal pain, tolerating diet well.     During the earlier part of the hospitalization patient was treated for acute on chronic hypoxic respiratory failure secondary to COPD exacerbation and CAP, was treated with steroids now tapered and duo nebs, SOB resolved, he uses home oxygen (4 liters), bicarb elevated likely due to metabolic compensation for resp acidosis  continue bronchodilators and prednisone tapered off and completed Levaquin as well.  His electrolytes monitored and replaced accordingly, he was found to have right femoral artery dissection as incidental finding on CT, no intervention as per vascular, follow up with vascular clinic as out patient.     Plan:    1. High out put from Ileostomy: Patient has been continued on cholestyramine, Octreotide, Imodium, he is tolerating diet, ostomy care as per ostomy RN, IV hydration, Ileostomy output is still high, but secretions are thicker, encouraged for oral hydration, monitor I/Os, GI is following, GI recommended Surgical consult for Ileostomy reversal and pulmonary consult for optimization for surgery, appreciate surgical and pulmonary consults, GI has changed his  diet to high fiber diet, fluid restriction, he is feeling little dehydrated, his electrolytes has been stable will monitor I/O, BMP, will continued with IV fluids, GI recommended CT abdomen and pelvis with oral contrast, done today showed Proximal jejunum emptying into a loop jejunostomy the right upper quadrant as described. No contrast seen within the distal small bowel, Large bilateral scrotal hydroceles, talked with surgical team surgical team, they did the upper GI series confirmed Jejunostomy, patient is s/p jejunostomy closure and s/p Ileostomy was eating and drinking later on developed, nausea and vomiting, NG was put in and XRays were done showed penumoperitonium, CT abdomen done showed same pneumo peritoneum, surgery is follwoing closely, as per surgery team, patient is most likely having refeeding syndrome, NG secretions became less, NG was  taken out, started on oral feeding able to tolerate well, he has finished TPN course, patient Ileostomy is working well, has no abdominal pain, he is tolerating diet very well, continue with current diet, he has good appetite, he is being followed by dietician.     2. Acute on chronic hypoxic respiratory failure secondary to COPD exacerbation, improved, he is at his baseline, on home oxygen (4 liters), continue bronchodilators and prednisone tapered off, continue Mucomyst PRN, completed course of Levaquin, his looks stable from respiratory stand point.     3. CAP - completed course of Levaquin on 12/5.     4. Hypertension - stable, continue Cardizem.     5. Hyperlipidemia, continue statin    6.hyponatremia: resolved, it was due to high ileostomy output.   Hypokalemia - being replaced  Hypophosphatemia: improved  Hypomagnesemia: improved  on multiple electrolyte sol.   monitor Electrolytes    7. Scrotal edema chronic with urinary retention, no more retention, Roy if not able to urinate, no redness going down  - patient following with  as outpatient, cont scrotal elevation    8. 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 DVT Prophylaxis - Lovenox       10. anxiety: xanax at hs PRN.    11. Afib: patient HR is in 90s, seen and followed by cardiology, his cardizam changed to short acting one because of small bowel absorption issues, will continue to monitor, cardiology wanted to get TTE, will follow along, not on anticoagulation, will continue monitoring HR, if high will adjust meds.     12. Hx of TIA: patient of was plavix, will d/c aspirin and resume his plavix.     Dispo: Patient and his wife do not wanted to go for the TOM, working with PT once ready might be going to home with home PT, PT team was requested to work with patient twice a day. 67 year old male with PMH of COPD stage 4 s/p right lung reduction in 2010 (on home oxygen 4L (sat 92-93%), HTN, CVA on 2014, DM, hypothyroidism, hydrocele, and nephrolithiasis who is admitted for a COPD exacerbation. Patient has been weaned off ventimask to 4 liters nasal cannula and respiratory status is stable. CTA of the chest was negative for PE but showed bilateral lower lobe infiltrates with mucous plugging for which he was started on Levaquin.  Respiratory status stable and steroids are being tapered. Hospital course complicated by constipation, which had not improved despite aggressive bowel regimen, laxatives and enemas. XRAY with large stool burden. CT with diaphragmatic hernia and patient was consequently taken to the OR for loop ileostomy under local anesthesia. Pt cont, to have copious amount in ileostomy. Pt is noted to have hyponatremia due to high output in ileostomy, got IVF, hyponatremia and hypokalemia improving, ileostomy secretion are getting thicker, he is tolerating diet, ostomy care as per ostomy RN,  he is continued with cholestyramine, he has been continued with IV hydration, Ileostomy out is still high, but secretions are getting thicker, monitored, I/Os, being continued with IV fluid rate to 125ml/h and GI has increased Octreotide to 150mcgs tid,  has CT abdomen and Plevis done, results reviewed, GI add Imodium 2 mg po bid and continued with octreotide along, GI recommended Surgical consult for Ileostomy reversal and pulmonary consult for optimization for surgery, appreciate surgical and pulmonary consults, GI recommended to get  CT abdomen and pelvis with oral contrast, done showed Proximal jejunum emptying into a loop jejunostomy the right upper quadrant as described. No contrast seen within the distal small bowel, Large bilateral scrotal hydroceles, talked with surgical team, they did the upper GI series confirmed Jejunostomy, patient is s/p jejunostomy closure and s/p Ileostomy was eating and drinking later on developed, nausea and vomiting, NG was put in and XRays were done showed penumoperitonium, CT abdomen done showed same pneumo peritoneum, surgery is following closely, as per surgery team, patient is most likely having refeeding syndrome, NG secretions are less, NG was  taken out, started on oral feeding able to tolerate well, he has been started  getting TPN, finished, change Cardizem po  dose was increased to 300mg, continued on Protonix patient Ileostomy is working well, has no abdominal pain, tolerating diet well.     During the earlier part of the hospitalization patient was treated for acute on chronic hypoxic respiratory failure secondary to COPD exacerbation and CAP, was treated with steroids now tapered and duo nebs, SOB resolved, he uses home oxygen (4 liters), bicarb elevated likely due to metabolic compensation for resp acidosis  continue bronchodilators and prednisone tapered off and completed Levaquin as well.  His electrolytes monitored and replaced accordingly, he was found to have right femoral artery dissection as incidental finding on CT, no intervention as per vascular, follow up with vascular clinic as out patient.     Plan:    1. High out put from Ileostomy: Patient has been continued on cholestyramine, Octreotide, Imodium, he is tolerating diet, ostomy care as per ostomy RN, IV hydration, Ileostomy output is still high, but secretions are thicker, encouraged for oral hydration, monitor I/Os, GI is following, GI recommended Surgical consult for Ileostomy reversal and pulmonary consult for optimization for surgery, appreciate surgical and pulmonary consults, GI has changed his  diet to high fiber diet, fluid restriction, he is feeling little dehydrated, his electrolytes has been stable will monitor I/O, BMP, will continued with IV fluids, GI recommended CT abdomen and pelvis with oral contrast, done today showed Proximal jejunum emptying into a loop jejunostomy the right upper quadrant as described. No contrast seen within the distal small bowel, Large bilateral scrotal hydroceles, talked with surgical team surgical team, they did the upper GI series confirmed Jejunostomy, patient is s/p jejunostomy closure and s/p Ileostomy was eating and drinking later on developed, nausea and vomiting, NG was put in and XRays were done showed penumoperitonium, CT abdomen done showed same pneumo peritoneum, surgery is follwoing closely, as per surgery team, patient is most likely having refeeding syndrome, NG secretions became less, NG was  taken out, started on oral feeding able to tolerate well, he has finished TPN course, patient Ileostomy is working well, has no abdominal pain, he is tolerating diet very well, continue with current diet, he has good appetite, he is being followed by dietician.     2. Acute on chronic hypoxic respiratory failure secondary to COPD exacerbation, improved, he is at his baseline, on home oxygen (4 liters), continue bronchodilators and prednisone tapered off, continue Mucomyst PRN,Kameron epps, completed course of Levaquin, his looks stable from respiratory stand point.     3. CAP - completed course of Levaquin on 12/5.     4. Hypertension - stable, continue Cardizem.     5. Hyperlipidemia, continue statin    6.hyponatremia: resolved, it was due to high ileostomy output.   Hypokalemia - being replaced  Hypophosphatemia: improved  Hypomagnesemia: improved  on multiple electrolyte sol.   monitor Electrolytes    7. Scrotal edema chronic with urinary retention, no more retention, Roy if not able to urinate, no redness going down  - patient following with  as outpatient, cont scrotal elevation    8. 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 DVT Prophylaxis - Lovenox       10. anxiety: xanax at hs PRN.    11. Afib/ SInus tachycardia: Multifactorial, patient HR is in 90s, seen and followed by cardiology, his Cardizem changed to short acting one because of small bowel absorption issues, will continue to monitor, repeat TTE done showed Normal global left ventricular systolic function,  Left ventricular ejection fraction, by visual estimation, is 60 to 65%, Moderately enlarged right ventricle, Estimated pulmonary artery systolic pressure is 44.7 mmHg assuming a right atrial pressure of 3 mmHg, which is consistent with mild pulmonary hypertension, There is no evidence of pericardial effusion, will follow along, not on anticoagulation, will continue monitoring HR, if high will adjust meds.     12. Hx of TIA: on plavix, will continue    Dispo: Patient and his wife do not wanted to go for the TOM, working with PT once ready might be going to home with home PT, PT team was requested to work with patient twice a day.

## 2018-02-07 NOTE — PROGRESS NOTE ADULT - SUBJECTIVE AND OBJECTIVE BOX
FRANDY BAUTISTA    [id number]    67y      Male    Patient is a 67y old  Male who presents with a chief complaint of SOB (15 Dec 2017 10:17)      INTERVAL HPI/OVERNIGHT EVENTS:    Patient is doing well, he has no complains, except his nose is still clogged, he has good diet, no nausea, vomiting, abdominal pain, fever, chills, chest pain, tolerating diet well.     REVIEW OF SYSTEMS:    CONSTITUTIONAL: No fever, has fatigue  RESPIRATORY: No cough, no shortness of breath  CARDIOVASCULAR: No chest pain, no palpitations.   GASTROINTESTINAL: No abdominal, No nausea, vomiting  NEUROLOGICAL: No headaches,  loss of strength.  MISCELLANEOUS: No joint swelling or pain.         Vital Signs Last 24 Hrs  T(C): 36.1 (07 Feb 2018 06:33), Max: 36.6 (06 Feb 2018 17:00)  T(F): 97 (07 Feb 2018 06:33), Max: 97.8 (06 Feb 2018 17:00)  HR: 98 (07 Feb 2018 06:33) (98 - 103)  BP: 104/68 (07 Feb 2018 06:33) (104/68 - 111/68)  BP(mean): --  RR: 18 (07 Feb 2018 06:33) (18 - 18)  SpO2: 97% (07 Feb 2018 06:33) (97% - 97%)    PHYSICAL EXAM:    GENERAL: Thin built  Elderly male looking comfortable  HEENT: MADELINE  NECK: soft, Supple, No JVD,   CHEST/LUNG: Decrease air entry bilaterally; No wheezing  HEART: S1S2+, iregular rate and rhythm; No murmurs  ABDOMEN: Soft, Nontender, Bowel sounds present, s/p Ileostomy with stool in the bag  EXTREMITIES:  1+ Peripheral Pulses, No edema  SKIN: No rashes or lesions  NEURO: AAOX3, no focal deficits, no motor r sensory loss  PSYCH: normal mood      LABS:                        8.2    6.1   )-----------( 373      ( 07 Feb 2018 07:55 )             27.2     02-07    140  |  102  |  13.0  ----------------------------<  136<H>  4.1   |  26.0  |  0.50    Ca    8.4<L>      07 Feb 2018 07:55              I&O's Summary    06 Feb 2018 07:01  -  07 Feb 2018 07:00  --------------------------------------------------------  IN: 100 mL / OUT: 2055 mL / NET: -1955 mL        MEDICATIONS  (STANDING):  ALPRAZolam 0.25 milliGRAM(s) Oral every 6 hours  clopidogrel Tablet 75 milliGRAM(s) Oral daily  dextrose 5%. 1000 milliLiter(s) (50 mL/Hr) IV Continuous <Continuous>  dextrose 50% Injectable 12.5 Gram(s) IV Push once  dextrose 50% Injectable 25 Gram(s) IV Push once  diltiazem    Tablet 120 milliGRAM(s) Oral every 8 hours  enoxaparin Injectable 40 milliGRAM(s) SubCutaneous daily  FIRST- Mouthwash  BLM 10 milliLiter(s) Swish and Spit two times a day  fluticasone propionate 50 MICROgram(s)/spray Nasal Spray 1 Spray(s) Both Nostrils two times a day  fluticasone propionate/ salmeterol 250-50 MICROgram(s) Diskus 1 Dose(s) Inhalation two times a day  guaiFENesin    Syrup 100 milliGRAM(s) Oral every 4 hours  levalbuterol Inhalation 0.63 milliGRAM(s) Inhalation every 6 hours  loratadine 10 milliGRAM(s) Oral daily  multivitamin 1 Tablet(s) Oral daily  pantoprazole    Tablet 40 milliGRAM(s) Oral two times a day before meals  sodium chloride 0.65% Nasal 1 Spray(s) Both Nostrils four times a day  tamsulosin 0.4 milliGRAM(s) Oral at bedtime  tiotropium 18 MICROgram(s) Capsule 1 Capsule(s) Inhalation daily    MEDICATIONS  (PRN):  acetylcysteine 20% Inhalation 2.5 milliLiter(s) Inhalation every 6 hours PRN expectorant  benzocaine 15 mG/menthol 3.6 mG Lozenge 1 Lozenge Oral every 4 hours PRN Sore Throat  benzonatate 100 milliGRAM(s) Oral three times a day PRN Cough  ondansetron Injectable 4 milliGRAM(s) IV Push every 4 hours PRN Nausea and/or Vomiting FRANDY BAUTISTA    [id number]    67y      Male    Patient is a 67y old  Male who presents with a chief complaint of SOB (15 Dec 2017 10:17)      INTERVAL HPI/OVERNIGHT EVENTS:    Patient is doing well, he has no complains, he has good diet, no nausea, vomiting, abdominal pain, fever, chills, chest pain, tolerating diet well.     REVIEW OF SYSTEMS:    CONSTITUTIONAL: No fever, has fatigue  RESPIRATORY: No cough, no shortness of breath  CARDIOVASCULAR: No chest pain, no palpitations.   GASTROINTESTINAL: No abdominal, No nausea, vomiting  NEUROLOGICAL: No headaches,  loss of strength.  MISCELLANEOUS: No joint swelling or pain.         Vital Signs Last 24 Hrs  T(C): 36.1 (07 Feb 2018 06:33), Max: 36.6 (06 Feb 2018 17:00)  T(F): 97 (07 Feb 2018 06:33), Max: 97.8 (06 Feb 2018 17:00)  HR: 98 (07 Feb 2018 06:33) (98 - 103)  BP: 104/68 (07 Feb 2018 06:33) (104/68 - 111/68)  RR: 18 (07 Feb 2018 06:33) (18 - 18)  SpO2: 97% (07 Feb 2018 06:33) (97% - 97%)    PHYSICAL EXAM:    GENERAL: Thin built  Elderly male looking comfortable  HEENT: MADELINE  NECK: soft, Supple, No JVD,   CHEST/LUNG: Decrease air entry bilaterally; No wheezing  HEART: S1S2+, regular rate and rhythm; No murmurs  ABDOMEN: Soft, Nontender, Bowel sounds present, s/p Ileostomy with stool in the bag  EXTREMITIES:  1+ Peripheral Pulses, No edema  SKIN: No rashes or lesions  NEURO: AAOX3, no focal deficits, no motor r sensory loss  PSYCH: normal mood      LABS:                        8.2    6.1   )-----------( 373      ( 07 Feb 2018 07:55 )             27.2     02-07    140  |  102  |  13.0  ----------------------------<  136<H>  4.1   |  26.0  |  0.50    Ca    8.4<L>      07 Feb 2018 07:55              I&O's Summary    06 Feb 2018 07:01 - 07 Feb 2018 07:00  --------------------------------------------------------  IN: 100 mL / OUT: 2055 mL / NET: -1955 mL        MEDICATIONS  (STANDING):  ALPRAZolam 0.25 milliGRAM(s) Oral every 6 hours  clopidogrel Tablet 75 milliGRAM(s) Oral daily  dextrose 5%. 1000 milliLiter(s) (50 mL/Hr) IV Continuous <Continuous>  dextrose 50% Injectable 12.5 Gram(s) IV Push once  dextrose 50% Injectable 25 Gram(s) IV Push once  diltiazem    Tablet 120 milliGRAM(s) Oral every 8 hours  enoxaparin Injectable 40 milliGRAM(s) SubCutaneous daily  FIRST- Mouthwash  BLM 10 milliLiter(s) Swish and Spit two times a day  fluticasone propionate 50 MICROgram(s)/spray Nasal Spray 1 Spray(s) Both Nostrils two times a day  fluticasone propionate/ salmeterol 250-50 MICROgram(s) Diskus 1 Dose(s) Inhalation two times a day  guaiFENesin    Syrup 100 milliGRAM(s) Oral every 4 hours  levalbuterol Inhalation 0.63 milliGRAM(s) Inhalation every 6 hours  loratadine 10 milliGRAM(s) Oral daily  multivitamin 1 Tablet(s) Oral daily  pantoprazole    Tablet 40 milliGRAM(s) Oral two times a day before meals  sodium chloride 0.65% Nasal 1 Spray(s) Both Nostrils four times a day  tamsulosin 0.4 milliGRAM(s) Oral at bedtime  tiotropium 18 MICROgram(s) Capsule 1 Capsule(s) Inhalation daily    MEDICATIONS  (PRN):  acetylcysteine 20% Inhalation 2.5 milliLiter(s) Inhalation every 6 hours PRN expectorant  benzocaine 15 mG/menthol 3.6 mG Lozenge 1 Lozenge Oral every 4 hours PRN Sore Throat  benzonatate 100 milliGRAM(s) Oral three times a day PRN Cough  ondansetron Injectable 4 milliGRAM(s) IV Push every 4 hours PRN Nausea and/or Vomiting

## 2018-02-08 PROCEDURE — 99232 SBSQ HOSP IP/OBS MODERATE 35: CPT

## 2018-02-08 RX ADMIN — FLUTICASONE PROPIONATE AND SALMETEROL 1 DOSE(S): 50; 250 POWDER ORAL; RESPIRATORY (INHALATION) at 21:00

## 2018-02-08 RX ADMIN — TIOTROPIUM BROMIDE 1 CAPSULE(S): 18 CAPSULE ORAL; RESPIRATORY (INHALATION) at 09:28

## 2018-02-08 RX ADMIN — Medication 0.25 MILLIGRAM(S): at 18:03

## 2018-02-08 RX ADMIN — Medication 100 MILLIGRAM(S): at 06:46

## 2018-02-08 RX ADMIN — CLOPIDOGREL BISULFATE 75 MILLIGRAM(S): 75 TABLET, FILM COATED ORAL at 12:33

## 2018-02-08 RX ADMIN — Medication 0.25 MILLIGRAM(S): at 12:33

## 2018-02-08 RX ADMIN — DIPHENHYDRAMINE HYDROCHLORIDE AND LIDOCAINE HYDROCHLORIDE AND ALUMINUM HYDROXIDE AND MAGNESIUM HYDRO 10 MILLILITER(S): KIT at 06:46

## 2018-02-08 RX ADMIN — LORATADINE 10 MILLIGRAM(S): 10 TABLET ORAL at 12:33

## 2018-02-08 RX ADMIN — FLUTICASONE PROPIONATE AND SALMETEROL 1 DOSE(S): 50; 250 POWDER ORAL; RESPIRATORY (INHALATION) at 08:55

## 2018-02-08 RX ADMIN — Medication 1 SPRAY(S): at 18:03

## 2018-02-08 RX ADMIN — Medication 0.25 MILLIGRAM(S): at 23:23

## 2018-02-08 RX ADMIN — LEVALBUTEROL 0.63 MILLIGRAM(S): 1.25 SOLUTION, CONCENTRATE RESPIRATORY (INHALATION) at 09:28

## 2018-02-08 RX ADMIN — Medication 1 SPRAY(S): at 06:46

## 2018-02-08 RX ADMIN — Medication 0.25 MILLIGRAM(S): at 06:45

## 2018-02-08 RX ADMIN — LEVALBUTEROL 0.63 MILLIGRAM(S): 1.25 SOLUTION, CONCENTRATE RESPIRATORY (INHALATION) at 03:25

## 2018-02-08 RX ADMIN — TAMSULOSIN HYDROCHLORIDE 0.4 MILLIGRAM(S): 0.4 CAPSULE ORAL at 23:22

## 2018-02-08 RX ADMIN — Medication 1 TABLET(S): at 12:33

## 2018-02-08 RX ADMIN — PANTOPRAZOLE SODIUM 40 MILLIGRAM(S): 20 TABLET, DELAYED RELEASE ORAL at 06:46

## 2018-02-08 RX ADMIN — LEVALBUTEROL 0.63 MILLIGRAM(S): 1.25 SOLUTION, CONCENTRATE RESPIRATORY (INHALATION) at 20:29

## 2018-02-08 RX ADMIN — PANTOPRAZOLE SODIUM 40 MILLIGRAM(S): 20 TABLET, DELAYED RELEASE ORAL at 18:03

## 2018-02-08 RX ADMIN — LEVALBUTEROL 0.63 MILLIGRAM(S): 1.25 SOLUTION, CONCENTRATE RESPIRATORY (INHALATION) at 15:57

## 2018-02-08 NOTE — PROGRESS NOTE ADULT - SUBJECTIVE AND OBJECTIVE BOX
CHIEF COMPLAINT:Patient is a 67y old  Male who presents with a chief complaint of SOB.  Pt was seen for tachycardia.   Meds adjusted.  Feels well, on o2 with ILD    Allergies  codeine (Unknown)  no veges (Unknown)  penicillin (Unknown)    Intolerances  DO NOT SEND ORANGES (Unknown)  Symbicort (Short breath)  	  MEDICATIONS:  diltiazem    Tablet 120 milliGRAM(s) Oral every 8 hours  tamsulosin 0.4 milliGRAM(s) Oral at bedtime  acetylcysteine 20% Inhalation 2.5 milliLiter(s) Inhalation every 6 hours PRN  benzonatate 100 milliGRAM(s) Oral three times a day PRN  fluticasone propionate/ salmeterol 250-50 MICROgram(s) Diskus 1 Dose(s) Inhalation two times a day  guaiFENesin    Syrup 100 milliGRAM(s) Oral every 4 hours  levalbuterol Inhalation 0.63 milliGRAM(s) Inhalation every 6 hours  loratadine 10 milliGRAM(s) Oral daily  tiotropium 18 MICROgram(s) Capsule 1 Capsule(s) Inhalation daily  ALPRAZolam 0.25 milliGRAM(s) Oral every 6 hours  ondansetron Injectable 4 milliGRAM(s) IV Push every 4 hours PRN  pantoprazole    Tablet 40 milliGRAM(s) Oral two times a day before meals  dextrose 50% Injectable 12.5 Gram(s) IV Push once  dextrose 50% Injectable 25 Gram(s) IV Push once  benzocaine 15 mG/menthol 3.6 mG Lozenge 1 Lozenge Oral every 4 hours PRN  clopidogrel Tablet 75 milliGRAM(s) Oral daily  dextrose 5%. 1000 milliLiter(s) IV Continuous <Continuous>  enoxaparin Injectable 40 milliGRAM(s) SubCutaneous daily  FIRST- Mouthwash  BLM 10 milliLiter(s) Swish and Spit two times a day  fluticasone propionate 50 MICROgram(s)/spray Nasal Spray 1 Spray(s) Both Nostrils two times a day  multivitamin 1 Tablet(s) Oral daily  sodium chloride 0.65% Nasal 1 Spray(s) Both Nostrils four times a day    PHYSICAL EXAM:  T(C): 36.6 (02-08-18 @ 04:47), Max: 36.8 (02-07-18 @ 10:00)  HR: 78 (02-08-18 @ 04:47) (78 - 98)  BP: 104/66 (02-08-18 @ 04:47) (104/66 - 112/69)  RR: 20 (02-08-18 @ 04:47) (16 - 20)  SpO2: 96% (02-08-18 @ 04:47) (96% - 98%)  07 Feb 2018 07:01  -  08 Feb 2018 07:00  --------------------------------------------------------  IN: 1720 mL / OUT: 1665 mL / NET: 55 mL    Appearance: Normal	  HEENT:   NC/AT  Eye: Pink Conjunctiva  Lungs: crackles b/l.   CVS: RRR, Normal S1 and S2, No Edema  Pulses: Normal distal pulses.  Neuro: A&O x3    LABS:	 	                       8.2    6.1   )-----------( 373      ( 07 Feb 2018 07:55 )             27.2     02-07    140  |  102  |  13.0  ----------------------------<  136<H>  4.1   |  26.0  |  0.50    Ca    8.4<L>      07 Feb 2018 07:55    ASSESSMENT/PLAN:

## 2018-02-08 NOTE — PROGRESS NOTE ADULT - SUBJECTIVE AND OBJECTIVE BOX
FRANDY BAUTISTA    [id number]    67y      Male    Patient is a 67y old  Male who presents with a chief complaint of SOB (15 Dec 2017 10:17)      INTERVAL HPI/OVERNIGHT EVENTS:    Patient is doing well, he has no complains, he has good diet, no nausea, vomiting, abdominal pain, fever, chills, chest pain, tolerating diet well.     REVIEW OF SYSTEMS:    CONSTITUTIONAL: No fever, has fatigue  RESPIRATORY: No cough, no shortness of breath  CARDIOVASCULAR: No chest pain, no palpitations.   GASTROINTESTINAL: No abdominal, No nausea, vomiting  NEUROLOGICAL: No headaches,  loss of strength.  MISCELLANEOUS: No joint swelling or pain.        Vital Signs Last 24 Hrs  T(C): 36.6 (08 Feb 2018 08:55), Max: 36.7 (07 Feb 2018 22:12)  T(F): 97.8 (08 Feb 2018 08:55), Max: 98 (07 Feb 2018 22:12)  HR: 92 (08 Feb 2018 13:53) (78 - 98)  BP: 110/60 (08 Feb 2018 13:53) (87/48 - 110/60)  BP(mean): --  RR: 18 (08 Feb 2018 08:55) (16 - 20)  SpO2: 96% (08 Feb 2018 04:47) (96% - 96%)    PHYSICAL EXAM:    GENERAL: Thin built  Elderly male looking comfortable  HEENT: MADELINE  NECK: soft, Supple, No JVD,   CHEST/LUNG: Decrease air entry bilaterally; No wheezing  HEART: S1S2+, regular rate and rhythm; No murmurs  ABDOMEN: Soft, Nontender, Bowel sounds present, s/p Ileostomy with stool in the bag  EXTREMITIES:  1+ Peripheral Pulses, No edema  SKIN: No rashes or lesions  NEURO: AAOX3, no focal deficits, no motor r sensory loss  PSYCH: normal mood      LABS:                        8.2    6.1   )-----------( 373      ( 07 Feb 2018 07:55 )             27.2     02-07    140  |  102  |  13.0  ----------------------------<  136<H>  4.1   |  26.0  |  0.50    Ca    8.4<L>      07 Feb 2018 07:55              I&O's Summary    07 Feb 2018 07:01  -  08 Feb 2018 07:00  --------------------------------------------------------  IN: 1720 mL / OUT: 1665 mL / NET: 55 mL    08 Feb 2018 07:01  -  08 Feb 2018 16:10  --------------------------------------------------------  IN: 360 mL / OUT: 400 mL / NET: -40 mL        MEDICATIONS  (STANDING):  ALPRAZolam 0.25 milliGRAM(s) Oral every 6 hours  clopidogrel Tablet 75 milliGRAM(s) Oral daily  dextrose 5%. 1000 milliLiter(s) (50 mL/Hr) IV Continuous <Continuous>  dextrose 50% Injectable 12.5 Gram(s) IV Push once  dextrose 50% Injectable 25 Gram(s) IV Push once  diltiazem    Tablet 120 milliGRAM(s) Oral every 8 hours  enoxaparin Injectable 40 milliGRAM(s) SubCutaneous daily  FIRST- Mouthwash  BLM 10 milliLiter(s) Swish and Spit two times a day  fluticasone propionate 50 MICROgram(s)/spray Nasal Spray 1 Spray(s) Both Nostrils two times a day  fluticasone propionate/ salmeterol 250-50 MICROgram(s) Diskus 1 Dose(s) Inhalation two times a day  guaiFENesin    Syrup 100 milliGRAM(s) Oral every 4 hours  levalbuterol Inhalation 0.63 milliGRAM(s) Inhalation every 6 hours  loratadine 10 milliGRAM(s) Oral daily  Mucinex DM 1200 mg/ 60 mg tablet 1 Tablet(s) 1 Tablet(s) Oral two times a day  multivitamin 1 Tablet(s) Oral daily  pantoprazole    Tablet 40 milliGRAM(s) Oral two times a day before meals  sodium chloride 0.65% Nasal 1 Spray(s) Both Nostrils four times a day  tamsulosin 0.4 milliGRAM(s) Oral at bedtime  tiotropium 18 MICROgram(s) Capsule 1 Capsule(s) Inhalation daily    MEDICATIONS  (PRN):  acetylcysteine 20% Inhalation 2.5 milliLiter(s) Inhalation every 6 hours PRN expectorant  benzocaine 15 mG/menthol 3.6 mG Lozenge 1 Lozenge Oral every 4 hours PRN Sore Throat  benzonatate 100 milliGRAM(s) Oral three times a day PRN Cough  ondansetron Injectable 4 milliGRAM(s) IV Push every 4 hours PRN Nausea and/or Vomiting FRANDY BAUTISTA    [id number]    67y      Male    Patient is a 67y old  Male who presents with a chief complaint of SOB (15 Dec 2017 10:17)      INTERVAL HPI/OVERNIGHT EVENTS:    Patient is doing well, he has no complains, he has good diet, no nausea, vomiting, abdominal pain, fever, chills, chest pain, tolerating diet well, his nasal congestion is better.      REVIEW OF SYSTEMS:    CONSTITUTIONAL: No fever, no fatigue  RESPIRATORY: No cough, no shortness of breath  CARDIOVASCULAR: No chest pain, no palpitations.   GASTROINTESTINAL: No abdominal, No nausea, vomiting  NEUROLOGICAL: No headaches,  loss of strength.  MISCELLANEOUS: No joint swelling or pain.        Vital Signs Last 24 Hrs  T(C): 36.6 (08 Feb 2018 08:55), Max: 36.7 (07 Feb 2018 22:12)  T(F): 97.8 (08 Feb 2018 08:55), Max: 98 (07 Feb 2018 22:12)  HR: 92 (08 Feb 2018 13:53) (78 - 98)  BP: 110/60 (08 Feb 2018 13:53) (87/48 - 110/60)  RR: 18 (08 Feb 2018 08:55) (16 - 20)  SpO2: 96% (08 Feb 2018 04:47) (96% - 96%)    PHYSICAL EXAM:    GENERAL: Thin built  Elderly male looking comfortable  HEENT: MADELINE  NECK: soft, Supple, No JVD,   CHEST/LUNG: Decrease air entry bilaterally; No wheezing  HEART: S1S2+, regular rate and rhythm; No murmurs  ABDOMEN: Soft, Nontender, Bowel sounds present, s/p Ileostomy with stool in the bag  EXTREMITIES:  1+ Peripheral Pulses, No edema  SKIN: No rashes or lesions  NEURO: AAOX3, no focal deficits, no motor r sensory loss  PSYCH: normal mood      LABS:                        8.2    6.1   )-----------( 373      ( 07 Feb 2018 07:55 )             27.2     02-07    140  |  102  |  13.0  ----------------------------<  136<H>  4.1   |  26.0  |  0.50    Ca    8.4<L>      07 Feb 2018 07:55              I&O's Summary    07 Feb 2018 07:01  -  08 Feb 2018 07:00  --------------------------------------------------------  IN: 1720 mL / OUT: 1665 mL / NET: 55 mL    08 Feb 2018 07:01  -  08 Feb 2018 16:10  --------------------------------------------------------  IN: 360 mL / OUT: 400 mL / NET: -40 mL        MEDICATIONS  (STANDING):  ALPRAZolam 0.25 milliGRAM(s) Oral every 6 hours  clopidogrel Tablet 75 milliGRAM(s) Oral daily  dextrose 5%. 1000 milliLiter(s) (50 mL/Hr) IV Continuous <Continuous>  dextrose 50% Injectable 12.5 Gram(s) IV Push once  dextrose 50% Injectable 25 Gram(s) IV Push once  diltiazem    Tablet 120 milliGRAM(s) Oral every 8 hours  enoxaparin Injectable 40 milliGRAM(s) SubCutaneous daily  FIRST- Mouthwash  BLM 10 milliLiter(s) Swish and Spit two times a day  fluticasone propionate 50 MICROgram(s)/spray Nasal Spray 1 Spray(s) Both Nostrils two times a day  fluticasone propionate/ salmeterol 250-50 MICROgram(s) Diskus 1 Dose(s) Inhalation two times a day  guaiFENesin    Syrup 100 milliGRAM(s) Oral every 4 hours  levalbuterol Inhalation 0.63 milliGRAM(s) Inhalation every 6 hours  loratadine 10 milliGRAM(s) Oral daily  Mucinex DM 1200 mg/ 60 mg tablet 1 Tablet(s) 1 Tablet(s) Oral two times a day  multivitamin 1 Tablet(s) Oral daily  pantoprazole    Tablet 40 milliGRAM(s) Oral two times a day before meals  sodium chloride 0.65% Nasal 1 Spray(s) Both Nostrils four times a day  tamsulosin 0.4 milliGRAM(s) Oral at bedtime  tiotropium 18 MICROgram(s) Capsule 1 Capsule(s) Inhalation daily    MEDICATIONS  (PRN):  acetylcysteine 20% Inhalation 2.5 milliLiter(s) Inhalation every 6 hours PRN expectorant  benzocaine 15 mG/menthol 3.6 mG Lozenge 1 Lozenge Oral every 4 hours PRN Sore Throat  benzonatate 100 milliGRAM(s) Oral three times a day PRN Cough  ondansetron Injectable 4 milliGRAM(s) IV Push every 4 hours PRN Nausea and/or Vomiting

## 2018-02-08 NOTE — PROGRESS NOTE ADULT - ASSESSMENT
67 year old male with PMH of COPD stage 4 s/p right lung reduction in 2010 (on home oxygen 4L (sat 92-93%), HTN, CVA on 2014, DM, hypothyroidism, hydrocele, and nephrolithiasis who is admitted for a COPD exacerbation. Patient has been weaned off ventimask to 4 liters nasal cannula and respiratory status is stable. CTA of the chest was negative for PE but showed bilateral lower lobe infiltrates with mucous plugging for which he was started on Levaquin.  Respiratory status stable and steroids are being tapered. Hospital course complicated by constipation, which had not improved despite aggressive bowel regimen, laxatives and enemas. XRAY with large stool burden. CT with diaphragmatic hernia and patient was consequently taken to the OR for loop ileostomy under local anesthesia. Pt cont, to have copious amount in ileostomy. Pt is noted to have hyponatremia due to high output in ileostomy, got IVF, hyponatremia and hypokalemia improving, ileostomy secretion are getting thicker, he is tolerating diet, ostomy care as per ostomy RN,  he is continued with cholestyramine, he has been continued with IV hydration, Ileostomy out is still high, but secretions are getting thicker, monitored, I/Os, being continued with IV fluid rate to 125ml/h and GI has increased Octreotide to 150mcgs tid,  has CT abdomen and Plevis done, results reviewed, GI add Imodium 2 mg po bid and continued with octreotide along, GI recommended Surgical consult for Ileostomy reversal and pulmonary consult for optimization for surgery, appreciate surgical and pulmonary consults, GI recommended to get  CT abdomen and pelvis with oral contrast, done showed Proximal jejunum emptying into a loop jejunostomy the right upper quadrant as described. No contrast seen within the distal small bowel, Large bilateral scrotal hydroceles, talked with surgical team, they did the upper GI series confirmed Jejunostomy, patient is s/p jejunostomy closure and s/p Ileostomy was eating and drinking later on developed, nausea and vomiting, NG was put in and XRays were done showed penumoperitonium, CT abdomen done showed same pneumo peritoneum, surgery is following closely, as per surgery team, patient is most likely having refeeding syndrome, NG secretions are less, NG was  taken out, started on oral feeding able to tolerate well, he has been started  getting TPN, finished, change Cardizem po  dose was increased to 300mg, continued on Protonix patient Ileostomy is working well, has no abdominal pain, tolerating diet well.     During the earlier part of the hospitalization patient was treated for acute on chronic hypoxic respiratory failure secondary to COPD exacerbation and CAP, was treated with steroids now tapered and duo nebs, SOB resolved, he uses home oxygen (4 liters), bicarb elevated likely due to metabolic compensation for resp acidosis  continue bronchodilators and prednisone tapered off and completed Levaquin as well.  His electrolytes monitored and replaced accordingly, he was found to have right femoral artery dissection as incidental finding on CT, no intervention as per vascular, follow up with vascular clinic as out patient.     Plan:    1. High out put from Ileostomy: Patient has been continued on cholestyramine, Octreotide, Imodium, he is tolerating diet, ostomy care as per ostomy RN, IV hydration, Ileostomy output is still high, but secretions are thicker, encouraged for oral hydration, monitor I/Os, GI is following, GI recommended Surgical consult for Ileostomy reversal and pulmonary consult for optimization for surgery, appreciate surgical and pulmonary consults, GI has changed his  diet to high fiber diet, fluid restriction, he is feeling little dehydrated, his electrolytes has been stable will monitor I/O, BMP, will continued with IV fluids, GI recommended CT abdomen and pelvis with oral contrast, done today showed Proximal jejunum emptying into a loop jejunostomy the right upper quadrant as described. No contrast seen within the distal small bowel, Large bilateral scrotal hydroceles, talked with surgical team surgical team, they did the upper GI series confirmed Jejunostomy, patient is s/p jejunostomy closure and s/p Ileostomy was eating and drinking later on developed, nausea and vomiting, NG was put in and XRays were done showed penumoperitonium, CT abdomen done showed same pneumo peritoneum, surgery is follwoing closely, as per surgery team, patient is most likely having refeeding syndrome, NG secretions became less, NG was  taken out, started on oral feeding able to tolerate well, he has finished TPN course, patient Ileostomy is working well, has no abdominal pain, he is tolerating diet very well, continue with current diet, he has good appetite, he is being followed by dietician.     2. Acute on chronic hypoxic respiratory failure secondary to COPD exacerbation, improved, he is at his baseline, on home oxygen (4 liters), continue bronchodilators and prednisone tapered off, continue Mucomyst PRN,Kameron epps, completed course of Levaquin, his looks stable from respiratory stand point.     3. CAP - completed course of Levaquin on 12/5.     4. Hypertension - stable, continue Cardizem.     5. Hyperlipidemia, continue statin    6.hyponatremia: resolved, it was due to high ileostomy output.   Hypokalemia - being replaced  Hypophosphatemia: improved  Hypomagnesemia: improved  on multiple electrolyte sol.   monitor Electrolytes    7. Scrotal edema chronic with urinary retention, no more retention, Roy if not able to urinate, no redness going down  - patient following with  as outpatient, cont scrotal elevation    8. 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 DVT Prophylaxis - Lovenox       10. anxiety: xanax at hs PRN.    11. Afib/ SInus tachycardia: Multifactorial, patient HR is in 90s, seen and followed by cardiology, his Cardizem changed to short acting one because of small bowel absorption issues, will continue to monitor, repeat TTE done showed Normal global left ventricular systolic function,  Left ventricular ejection fraction, by visual estimation, is 60 to 65%, Moderately enlarged right ventricle, Estimated pulmonary artery systolic pressure is 44.7 mmHg assuming a right atrial pressure of 3 mmHg, which is consistent with mild pulmonary hypertension, There is no evidence of pericardial effusion, will follow along, not on anticoagulation, will continue monitoring HR, if high will adjust meds.     12. Hx of TIA: on plavix, will continue    Dispo: Patient and his wife do not wanted to go for the TOM, working with PT once ready might be going to home with home PT, PT team was requested to work with patient twice a day. 67 year old male with PMH of COPD stage 4 s/p right lung reduction in 2010 (on home oxygen 4L (sat 92-93%), HTN, CVA on 2014, DM, hypothyroidism, hydrocele, and nephrolithiasis who is admitted for a COPD exacerbation. Patient has been weaned off ventimask to 4 liters nasal cannula and respiratory status is stable. CTA of the chest was negative for PE but showed bilateral lower lobe infiltrates with mucous plugging for which he was started on Levaquin.  Respiratory status stable and steroids are being tapered. Hospital course complicated by constipation, which had not improved despite aggressive bowel regimen, laxatives and enemas. XRAY with large stool burden. CT with diaphragmatic hernia and patient was consequently taken to the OR for loop ileostomy under local anesthesia. Pt cont, to have copious amount in ileostomy. Pt is noted to have hyponatremia due to high output in ileostomy, got IVF, hyponatremia and hypokalemia improving, ileostomy secretion are getting thicker, he is tolerating diet, ostomy care as per ostomy RN,  he is continued with cholestyramine, he has been continued with IV hydration, Ileostomy out is still high, but secretions are getting thicker, monitored, I/Os, being continued with IV fluid rate to 125ml/h and GI has increased Octreotide to 150mcgs tid,  has CT abdomen and Plevis done, results reviewed, GI add Imodium 2 mg po bid and continued with octreotide along, GI recommended Surgical consult for Ileostomy reversal and pulmonary consult for optimization for surgery, appreciate surgical and pulmonary consults, GI recommended to get  CT abdomen and pelvis with oral contrast, done showed Proximal jejunum emptying into a loop jejunostomy the right upper quadrant as described. No contrast seen within the distal small bowel, Large bilateral scrotal hydroceles, talked with surgical team, they did the upper GI series confirmed Jejunostomy, patient is s/p jejunostomy closure and s/p Ileostomy was eating and drinking later on developed, nausea and vomiting, NG was put in and XRays were done showed penumoperitonium, CT abdomen done showed same pneumo peritoneum, surgery is following closely, as per surgery team, patient is most likely having refeeding syndrome, NG secretions are less, NG was  taken out, started on oral feeding able to tolerate well, he has been started  getting TPN, finished, change Cardizem po  dose was increased to 300mg, continued on Protonix patient Ileostomy is working well, has no abdominal pain, tolerating diet well.     During the earlier part of the hospitalization patient was treated for acute on chronic hypoxic respiratory failure secondary to COPD exacerbation and CAP, was treated with steroids now tapered and duo nebs, SOB resolved, he uses home oxygen (4 liters), bicarb elevated likely due to metabolic compensation for resp acidosis  continue bronchodilators and prednisone tapered off and completed Levaquin as well.  His electrolytes monitored and replaced accordingly, he was found to have right femoral artery dissection as incidental finding on CT, no intervention as per vascular, follow up with vascular clinic as out patient.     Plan:    1. High out put from Ileostomy: Patient has been continued on cholestyramine, Octreotide, Imodium, he is tolerating diet, ostomy care as per ostomy RN, IV hydration, Ileostomy output is still high, but secretions are thicker, encouraged for oral hydration, monitor I/Os, GI is following, GI recommended Surgical consult for Ileostomy reversal and pulmonary consult for optimization for surgery, appreciate surgical and pulmonary consults, GI has changed his  diet to high fiber diet, fluid restriction, he is feeling little dehydrated, his electrolytes has been stable will monitor I/O, BMP, will continued with IV fluids, GI recommended CT abdomen and pelvis with oral contrast, done today showed Proximal jejunum emptying into a loop jejunostomy the right upper quadrant as described. No contrast seen within the distal small bowel, Large bilateral scrotal hydroceles, talked with surgical team surgical team, they did the upper GI series confirmed Jejunostomy, patient is s/p jejunostomy closure and s/p Ileostomy was eating and drinking later on developed, nausea and vomiting, NG was put in and XRays were done showed penumoperitonium, CT abdomen done showed same pneumo peritoneum, surgery is follwoing closely, as per surgery team, patient is most likely having refeeding syndrome, NG secretions became less, NG was  taken out, started on oral feeding able to tolerate well, he has finished TPN course, patient Ileostomy is working well, has no abdominal pain, he is tolerating diet very well, continue with current diet, he has good appetite, he is being followed by dietician.     2. Acute on chronic hypoxic respiratory failure secondary to COPD exacerbation, improved, he is at his baseline, on home oxygen (4 liters), continue bronchodilators and prednisone tapered off, continue Mucomyst PRN,Kameron epps, completed course of Levaquin, his looks stable from respiratory stand point, will continued with current therapy.      3. CAP - completed course of Levaquin on 12/5.     4. Hypertension - stable, continue Cardizem.     5. Hyperlipidemia, continue statin    6.hyponatremia: resolved, it was due to high ileostomy output.   Hypokalemia - being replaced  Hypophosphatemia: improved  Hypomagnesemia: improved  on multiple electrolyte sol.   monitor Electrolytes    7. Scrotal edema chronic with urinary retention, no more retention, Roy if not able to urinate, no redness going down  - patient following with  as outpatient, cont scrotal elevation    8. 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 DVT Prophylaxis - Lovenox       10. anxiety: xanax at hs PRN.    11. Afib/ SInus tachycardia: Multifactorial, patient HR is in 90s, seen and followed by cardiology, his Cardizem changed to short acting one because of small bowel absorption issues, will continue to monitor, repeat TTE done showed Normal global left ventricular systolic function,  Left ventricular ejection fraction, by visual estimation, is 60 to 65%, Moderately enlarged right ventricle, Estimated pulmonary artery systolic pressure is 44.7 mmHg assuming a right atrial pressure of 3 mmHg, which is consistent with mild pulmonary hypertension, There is no evidence of pericardial effusion, will follow along, not on anticoagulation, will continue monitoring HR, if high will adjust meds, his HR has been stable in 90s as per patient he always runs around that.     12. Hx of TIA: on plavix, will continue    Dispo: Patient and his wife do not wanted to go for the TOM, working with PT once ready might be going to home with home PT, PT team was requested to work with patient twice a day, likely going home on Saturday.

## 2018-02-08 NOTE — PROGRESS NOTE ADULT - SUBJECTIVE AND OBJECTIVE BOX
pt seen and examined at bed side  no acute events overnight  tolerating diet and having normal ostomy functioning  Cardio saw the pt and recommended DC on saturday, continue cardizem and no other intervention needed      MEDICATIONS  (STANDING):  ALPRAZolam 0.25 milliGRAM(s) Oral every 6 hours  clopidogrel Tablet 75 milliGRAM(s) Oral daily  dextrose 5%. 1000 milliLiter(s) (50 mL/Hr) IV Continuous <Continuous>  dextrose 50% Injectable 12.5 Gram(s) IV Push once  dextrose 50% Injectable 25 Gram(s) IV Push once  diltiazem    Tablet 120 milliGRAM(s) Oral every 8 hours  enoxaparin Injectable 40 milliGRAM(s) SubCutaneous daily  FIRST- Mouthwash  BLM 10 milliLiter(s) Swish and Spit two times a day  fluticasone propionate 50 MICROgram(s)/spray Nasal Spray 1 Spray(s) Both Nostrils two times a day  fluticasone propionate/ salmeterol 250-50 MICROgram(s) Diskus 1 Dose(s) Inhalation two times a day  guaiFENesin    Syrup 100 milliGRAM(s) Oral every 4 hours  levalbuterol Inhalation 0.63 milliGRAM(s) Inhalation every 6 hours  loratadine 10 milliGRAM(s) Oral daily  multivitamin 1 Tablet(s) Oral daily  pantoprazole    Tablet 40 milliGRAM(s) Oral two times a day before meals  sodium chloride 0.65% Nasal 1 Spray(s) Both Nostrils four times a day  tamsulosin 0.4 milliGRAM(s) Oral at bedtime  tiotropium 18 MICROgram(s) Capsule 1 Capsule(s) Inhalation daily    MEDICATIONS  (PRN):  acetylcysteine 20% Inhalation 2.5 milliLiter(s) Inhalation every 6 hours PRN expectorant  benzocaine 15 mG/menthol 3.6 mG Lozenge 1 Lozenge Oral every 4 hours PRN Sore Throat  benzonatate 100 milliGRAM(s) Oral three times a day PRN Cough  ondansetron Injectable 4 milliGRAM(s) IV Push every 4 hours PRN Nausea and/or Vomiting      Vital Signs Last 24 Hrs  T(C): 36.6 (08 Feb 2018 08:55), Max: 36.8 (07 Feb 2018 10:00)  T(F): 97.8 (08 Feb 2018 08:55), Max: 98.3 (07 Feb 2018 10:00)  HR: 86 (08 Feb 2018 08:55) (78 - 98)  BP: 87/48 (08 Feb 2018 08:55) (87/48 - 112/69)  BP(mean): --  RR: 18 (08 Feb 2018 08:55) (16 - 20)  SpO2: 96% (08 Feb 2018 04:47) (96% - 98%)    PHYSICAL EXAM:    Gen: Alert, NAD, Mentating well    Eyes: EOMI, PERRL    Neurological: A&Ox3, GCS 15, Baseline mental status    ENMT: Nares patent w/o discharge, MMM, no LAD     Neck: supple, no masses appreciated, Trachea midline, No JVD    Pulmonary: Productive cough, Rales, the Non labored, chest rise is  symmetrical with respiration    Cardiovascular: S1S2 RRR    Gastrointestinal: Abdomen soft and non tympanic, ostomy appliance not seated properly, leaking stool.     Genitourinary: No Roy    Extremities: No gross deformities or angulations, No calf tenderness, Distal pulses intact, Capillary refill < 2sec    Skin: Warm and dry, no rash.       I&O's Detail    07 Feb 2018 07:01  -  08 Feb 2018 07:00  --------------------------------------------------------  IN:    Oral Fluid: 1720 mL  Total IN: 1720 mL    OUT:    Ileostomy: 885 mL    Voided: 780 mL  Total OUT: 1665 mL    Total NET: 55 mL          LABS:                        8.2    6.1   )-----------( 373      ( 07 Feb 2018 07:55 )             27.2     02-07    140  |  102  |  13.0  ----------------------------<  136<H>  4.1   |  26.0  |  0.50    Ca    8.4<L>      07 Feb 2018 07:55            RADIOLOGY & ADDITIONAL STUDIES:

## 2018-02-08 NOTE — PROGRESS NOTE ADULT - ASSESSMENT
1. Sinus tach, echo no effusion. normal LVEF  2. Cont with current cardizem  3. Hr better controlled  4. Plan for DC home sat.  Please call if needed .

## 2018-02-08 NOTE — PROGRESS NOTE ADULT - ASSESSMENT
67yMale presenting with: s/p loop jejunostomy 12/7, revision of jejunostomy 1/16, loop ileostomy creation  - Cardio saw the pt and gave recommendation: continue cardizem  - continue regular diet, F/U ostomy  - monitor vitals  - DC on saturday

## 2018-02-09 LAB
ALBUMIN SERPL ELPH-MCNC: 3.1 G/DL — LOW (ref 3.3–5.2)
ALP SERPL-CCNC: 148 U/L — HIGH (ref 40–120)
ALT FLD-CCNC: 57 U/L — HIGH
ANION GAP SERPL CALC-SCNC: 10 MMOL/L — SIGNIFICANT CHANGE UP (ref 5–17)
ANION GAP SERPL CALC-SCNC: 15 MMOL/L — SIGNIFICANT CHANGE UP (ref 5–17)
ANISOCYTOSIS BLD QL: SLIGHT — SIGNIFICANT CHANGE UP
AST SERPL-CCNC: 24 U/L — SIGNIFICANT CHANGE UP
BASE EXCESS BLDV CALC-SCNC: 1.3 MMOL/L — SIGNIFICANT CHANGE UP (ref -2–2)
BILIRUB SERPL-MCNC: 0.5 MG/DL — SIGNIFICANT CHANGE UP (ref 0.4–2)
BUN SERPL-MCNC: 11 MG/DL — SIGNIFICANT CHANGE UP (ref 8–20)
BUN SERPL-MCNC: 12 MG/DL — SIGNIFICANT CHANGE UP (ref 8–20)
CA-I SERPL-SCNC: 1.09 MMOL/L — LOW (ref 1.15–1.33)
CALCIUM SERPL-MCNC: 8.4 MG/DL — LOW (ref 8.6–10.2)
CALCIUM SERPL-MCNC: 8.4 MG/DL — LOW (ref 8.6–10.2)
CHLORIDE BLDV-SCNC: 103 MMOL/L — SIGNIFICANT CHANGE UP (ref 98–107)
CHLORIDE SERPL-SCNC: 100 MMOL/L — SIGNIFICANT CHANGE UP (ref 98–107)
CHLORIDE SERPL-SCNC: 101 MMOL/L — SIGNIFICANT CHANGE UP (ref 98–107)
CO2 SERPL-SCNC: 22 MMOL/L — SIGNIFICANT CHANGE UP (ref 22–29)
CO2 SERPL-SCNC: 25 MMOL/L — SIGNIFICANT CHANGE UP (ref 22–29)
CREAT SERPL-MCNC: 0.49 MG/DL — LOW (ref 0.5–1.3)
CREAT SERPL-MCNC: 0.54 MG/DL — SIGNIFICANT CHANGE UP (ref 0.5–1.3)
EOSINOPHIL NFR BLD AUTO: 1 % — SIGNIFICANT CHANGE UP (ref 0–6)
GAS PNL BLDV: 136 MMOL/L — SIGNIFICANT CHANGE UP (ref 135–145)
GAS PNL BLDV: SIGNIFICANT CHANGE UP
GAS PNL BLDV: SIGNIFICANT CHANGE UP
GLUCOSE BLDV-MCNC: 148 MG/DL — HIGH (ref 70–99)
GLUCOSE SERPL-MCNC: 144 MG/DL — HIGH (ref 70–115)
GLUCOSE SERPL-MCNC: 155 MG/DL — HIGH (ref 70–115)
GRAM STN FLD: SIGNIFICANT CHANGE UP
HBA1C BLD-MCNC: 5.9 % — HIGH (ref 4–5.6)
HCO3 BLDV-SCNC: 26 MMOL/L — SIGNIFICANT CHANGE UP (ref 21–29)
HCT VFR BLD CALC: 30.7 % — LOW (ref 42–52)
HCT VFR BLD CALC: 31.8 % — LOW (ref 42–52)
HGB BLD-MCNC: 9.4 G/DL — LOW (ref 14–18)
HGB BLD-MCNC: 9.5 G/DL — LOW (ref 14–18)
LACTATE BLDV-MCNC: 1.7 MMOL/L — SIGNIFICANT CHANGE UP (ref 0.5–2)
LYMPHOCYTES # BLD AUTO: 3 % — LOW (ref 20–55)
MACROCYTES BLD QL: SLIGHT — SIGNIFICANT CHANGE UP
MAGNESIUM SERPL-MCNC: 1.1 MG/DL — LOW (ref 1.8–2.6)
MCHC RBC-ENTMCNC: 27.5 PG — SIGNIFICANT CHANGE UP (ref 27–31)
MCHC RBC-ENTMCNC: 27.6 PG — SIGNIFICANT CHANGE UP (ref 27–31)
MCHC RBC-ENTMCNC: 29.9 G/DL — LOW (ref 32–36)
MCHC RBC-ENTMCNC: 30.6 G/DL — LOW (ref 32–36)
MCV RBC AUTO: 90 FL — SIGNIFICANT CHANGE UP (ref 80–94)
MCV RBC AUTO: 92.2 FL — SIGNIFICANT CHANGE UP (ref 80–94)
MICROCYTES BLD QL: SLIGHT — SIGNIFICANT CHANGE UP
MONOCYTES NFR BLD AUTO: 4 % — SIGNIFICANT CHANGE UP (ref 3–10)
NEUTROPHILS NFR BLD AUTO: 91 % — HIGH (ref 37–73)
NEUTS BAND # BLD: 1 % — SIGNIFICANT CHANGE UP (ref 0–8)
OVALOCYTES BLD QL SMEAR: SLIGHT — SIGNIFICANT CHANGE UP
PCO2 BLDV: 36 MMHG — SIGNIFICANT CHANGE UP (ref 35–50)
PH BLDV: 7.45 — HIGH (ref 7.32–7.43)
PHOSPHATE SERPL-MCNC: 2 MG/DL — LOW (ref 2.4–4.7)
PLAT MORPH BLD: NORMAL — SIGNIFICANT CHANGE UP
PLATELET # BLD AUTO: 420 K/UL — HIGH (ref 150–400)
PLATELET # BLD AUTO: 436 K/UL — HIGH (ref 150–400)
PO2 BLDV: 56 MMHG — HIGH (ref 25–45)
POTASSIUM BLDV-SCNC: 3.6 MMOL/L — SIGNIFICANT CHANGE UP (ref 3.4–4.5)
POTASSIUM SERPL-MCNC: 3.8 MMOL/L — SIGNIFICANT CHANGE UP (ref 3.5–5.3)
POTASSIUM SERPL-MCNC: 4.4 MMOL/L — SIGNIFICANT CHANGE UP (ref 3.5–5.3)
POTASSIUM SERPL-SCNC: 3.8 MMOL/L — SIGNIFICANT CHANGE UP (ref 3.5–5.3)
POTASSIUM SERPL-SCNC: 4.4 MMOL/L — SIGNIFICANT CHANGE UP (ref 3.5–5.3)
PROT SERPL-MCNC: 6.7 G/DL — SIGNIFICANT CHANGE UP (ref 6.6–8.7)
RAPID RVP RESULT: SIGNIFICANT CHANGE UP
RBC # BLD: 3.41 M/UL — LOW (ref 4.6–6.2)
RBC # BLD: 3.45 M/UL — LOW (ref 4.6–6.2)
RBC # FLD: 19.3 % — HIGH (ref 11–15.6)
RBC # FLD: 19.5 % — HIGH (ref 11–15.6)
RBC BLD AUTO: PRESENT — SIGNIFICANT CHANGE UP
SAO2 % BLDV: 91 % — SIGNIFICANT CHANGE UP
SODIUM SERPL-SCNC: 136 MMOL/L — SIGNIFICANT CHANGE UP (ref 135–145)
SODIUM SERPL-SCNC: 137 MMOL/L — SIGNIFICANT CHANGE UP (ref 135–145)
SPECIMEN SOURCE: SIGNIFICANT CHANGE UP
WBC # BLD: 10.6 K/UL — SIGNIFICANT CHANGE UP (ref 4.8–10.8)
WBC # BLD: 9.8 K/UL — SIGNIFICANT CHANGE UP (ref 4.8–10.8)
WBC # FLD AUTO: 10.6 K/UL — SIGNIFICANT CHANGE UP (ref 4.8–10.8)
WBC # FLD AUTO: 9.8 K/UL — SIGNIFICANT CHANGE UP (ref 4.8–10.8)

## 2018-02-09 PROCEDURE — 99233 SBSQ HOSP IP/OBS HIGH 50: CPT

## 2018-02-09 PROCEDURE — 93010 ELECTROCARDIOGRAM REPORT: CPT

## 2018-02-09 PROCEDURE — 71045 X-RAY EXAM CHEST 1 VIEW: CPT | Mod: 26

## 2018-02-09 RX ORDER — ASPIRIN/CALCIUM CARB/MAGNESIUM 324 MG
81 TABLET ORAL DAILY
Qty: 0 | Refills: 0 | Status: DISCONTINUED | OUTPATIENT
Start: 2018-02-09 | End: 2018-02-15

## 2018-02-09 RX ORDER — ATORVASTATIN CALCIUM 80 MG/1
10 TABLET, FILM COATED ORAL AT BEDTIME
Qty: 0 | Refills: 0 | Status: DISCONTINUED | OUTPATIENT
Start: 2018-02-09 | End: 2018-02-10

## 2018-02-09 RX ORDER — LEVALBUTEROL 1.25 MG/.5ML
0.63 SOLUTION, CONCENTRATE RESPIRATORY (INHALATION) ONCE
Qty: 0 | Refills: 0 | Status: COMPLETED | OUTPATIENT
Start: 2018-02-09 | End: 2018-02-09

## 2018-02-09 RX ORDER — SODIUM CHLORIDE 9 MG/ML
1000 INJECTION, SOLUTION INTRAVENOUS
Qty: 0 | Refills: 0 | Status: DISCONTINUED | OUTPATIENT
Start: 2018-02-09 | End: 2018-02-10

## 2018-02-09 RX ORDER — MAGNESIUM SULFATE 500 MG/ML
2 VIAL (ML) INJECTION ONCE
Qty: 0 | Refills: 0 | Status: COMPLETED | OUTPATIENT
Start: 2018-02-09 | End: 2018-02-09

## 2018-02-09 RX ORDER — ACETAMINOPHEN 500 MG
650 TABLET ORAL EVERY 6 HOURS
Qty: 0 | Refills: 0 | Status: DISCONTINUED | OUTPATIENT
Start: 2018-02-09 | End: 2018-02-15

## 2018-02-09 RX ORDER — LEVOTHYROXINE SODIUM 125 MCG
50 TABLET ORAL DAILY
Qty: 0 | Refills: 0 | Status: DISCONTINUED | OUTPATIENT
Start: 2018-02-09 | End: 2018-02-15

## 2018-02-09 RX ORDER — MAGNESIUM SULFATE 500 MG/ML
2 VIAL (ML) INJECTION DAILY
Qty: 0 | Refills: 0 | Status: COMPLETED | OUTPATIENT
Start: 2018-02-09 | End: 2018-02-09

## 2018-02-09 RX ORDER — LEVALBUTEROL 1.25 MG/.5ML
0.63 SOLUTION, CONCENTRATE RESPIRATORY (INHALATION) EVERY 6 HOURS
Qty: 0 | Refills: 0 | Status: DISCONTINUED | OUTPATIENT
Start: 2018-02-09 | End: 2018-02-15

## 2018-02-09 RX ORDER — SODIUM CHLORIDE 9 MG/ML
1000 INJECTION, SOLUTION INTRAVENOUS ONCE
Qty: 0 | Refills: 0 | Status: COMPLETED | OUTPATIENT
Start: 2018-02-09 | End: 2018-02-09

## 2018-02-09 RX ADMIN — Medication 1 TABLET(S): at 12:06

## 2018-02-09 RX ADMIN — Medication 81 MILLIGRAM(S): at 13:11

## 2018-02-09 RX ADMIN — Medication 50 GRAM(S): at 20:44

## 2018-02-09 RX ADMIN — Medication 100 MILLIGRAM(S): at 18:33

## 2018-02-09 RX ADMIN — Medication 1 SPRAY(S): at 12:04

## 2018-02-09 RX ADMIN — CLOPIDOGREL BISULFATE 75 MILLIGRAM(S): 75 TABLET, FILM COATED ORAL at 12:05

## 2018-02-09 RX ADMIN — TIOTROPIUM BROMIDE 1 CAPSULE(S): 18 CAPSULE ORAL; RESPIRATORY (INHALATION) at 08:42

## 2018-02-09 RX ADMIN — SODIUM CHLORIDE 75 MILLILITER(S): 9 INJECTION, SOLUTION INTRAVENOUS at 18:22

## 2018-02-09 RX ADMIN — SODIUM CHLORIDE 2000 MILLILITER(S): 9 INJECTION, SOLUTION INTRAVENOUS at 17:01

## 2018-02-09 RX ADMIN — LEVALBUTEROL 0.63 MILLIGRAM(S): 1.25 SOLUTION, CONCENTRATE RESPIRATORY (INHALATION) at 19:58

## 2018-02-09 RX ADMIN — Medication 50 GRAM(S): at 18:21

## 2018-02-09 RX ADMIN — Medication 1 SPRAY(S): at 06:36

## 2018-02-09 RX ADMIN — Medication 60 MILLIGRAM(S): at 13:11

## 2018-02-09 RX ADMIN — FLUTICASONE PROPIONATE AND SALMETEROL 1 DOSE(S): 50; 250 POWDER ORAL; RESPIRATORY (INHALATION) at 20:45

## 2018-02-09 RX ADMIN — FLUTICASONE PROPIONATE AND SALMETEROL 1 DOSE(S): 50; 250 POWDER ORAL; RESPIRATORY (INHALATION) at 12:04

## 2018-02-09 RX ADMIN — LORATADINE 10 MILLIGRAM(S): 10 TABLET ORAL at 12:06

## 2018-02-09 RX ADMIN — LEVALBUTEROL 0.63 MILLIGRAM(S): 1.25 SOLUTION, CONCENTRATE RESPIRATORY (INHALATION) at 13:35

## 2018-02-09 RX ADMIN — Medication 1 SPRAY(S): at 18:23

## 2018-02-09 RX ADMIN — PANTOPRAZOLE SODIUM 40 MILLIGRAM(S): 20 TABLET, DELAYED RELEASE ORAL at 06:36

## 2018-02-09 RX ADMIN — SODIUM CHLORIDE 75 MILLILITER(S): 9 INJECTION, SOLUTION INTRAVENOUS at 14:49

## 2018-02-09 RX ADMIN — LEVALBUTEROL 0.63 MILLIGRAM(S): 1.25 SOLUTION, CONCENTRATE RESPIRATORY (INHALATION) at 03:25

## 2018-02-09 RX ADMIN — PANTOPRAZOLE SODIUM 40 MILLIGRAM(S): 20 TABLET, DELAYED RELEASE ORAL at 19:39

## 2018-02-09 RX ADMIN — Medication 0.25 MILLIGRAM(S): at 06:36

## 2018-02-09 RX ADMIN — LEVALBUTEROL 0.63 MILLIGRAM(S): 1.25 SOLUTION, CONCENTRATE RESPIRATORY (INHALATION) at 16:05

## 2018-02-09 RX ADMIN — LEVALBUTEROL 0.63 MILLIGRAM(S): 1.25 SOLUTION, CONCENTRATE RESPIRATORY (INHALATION) at 08:43

## 2018-02-09 NOTE — CHART NOTE - NSCHARTNOTEFT_GEN_A_CORE
I was asked to evaluate pt for possible need for SICU transfer.  I noted pt to have hypoxia to 89% on 5L NC, RR 28 and HR of 119.  Pt admits he feels unwell but was refusing ABG because he states it hurt too much.  Would allow venipuncture "but not arterial under any circumstances" even after local anesthesia was offered.    After full evaluation, I explained that I felt he would require ICU transfer.  I also asked him if he would allow us to intubate him if he worsens to which he replied "Not even for my life".   He also received SICU transfer.  He seemed to give reasonable explanations for why he made these decisions and stated he understood he could die as a result but felt he was currently getting better from earlier in the day.  I later spoke to his wife over the phone who stated she could not unfortunately make it in to the hospital due to a throat infection and bronchitis but stated that her husbands wishes to not be intubated and not to go to the SICU were consistent with conversations she has had with him in the past. She stated that she will call her  however and her daughter would arrive to hospital shortly to talk with pt.  I discussed this case with Dr Blancas and she agreed with plan of transfer.  She was also aware of the conversation of DNI and refusal of transfer and urged family to discuss this with the pt.    Dr Isabel of hospitalist service also evaluated the pt and felt the pt did not require the transfer to SICU.

## 2018-02-09 NOTE — PROGRESS NOTE ADULT - ASSESSMENT
67 year old male with PMH of COPD stage 4 s/p right lung reduction in 2010 (on home oxygen 4L (sat 92-93%), HTN, CVA on 2014, DM, hypothyroidism, hydrocele, and nephrolithiasis who is admitted for a COPD exacerbation. Patient has been weaned off ventimask to 4 liters nasal cannula and respiratory status is stable. CTA of the chest was negative for PE but showed bilateral lower lobe infiltrates with mucous plugging for which he was started on Levaquin.  Respiratory status stable and steroids are being tapered. Hospital course complicated by constipation, which had not improved despite aggressive bowel regimen, laxatives and enemas. XRAY with large stool burden. CT with diaphragmatic hernia and patient was consequently taken to the OR for loop ileostomy under local anesthesia. Pt cont, to have copious amount in ileostomy. Pt is noted to have hyponatremia due to high output in ileostomy, got IVF, hyponatremia and hypokalemia improving, ileostomy secretion are getting thicker, he is tolerating diet, ostomy care as per ostomy RN,  he is continued with cholestyramine, he has been continued with IV hydration, Ileostomy out is still high, but secretions are getting thicker, monitored, I/Os, being continued with IV fluid rate to 125ml/h and GI has increased Octreotide to 150mcgs tid,  has CT abdomen and Plevis done, results reviewed, GI add Imodium 2 mg po bid and continued with octreotide along, GI recommended Surgical consult for Ileostomy reversal and pulmonary consult for optimization for surgery, appreciate surgical and pulmonary consults, GI recommended to get  CT abdomen and pelvis with oral contrast, done showed Proximal jejunum emptying into a loop jejunostomy the right upper quadrant as described. No contrast seen within the distal small bowel, Large bilateral scrotal hydroceles, talked with surgical team, they did the upper GI series confirmed Jejunostomy, patient is s/p jejunostomy closure and s/p Ileostomy was eating and drinking later on developed, nausea and vomiting, NG was put in and XRays were done showed penumoperitonium, CT abdomen done showed same pneumo peritoneum, surgery is following closely, as per surgery team, patient is most likely having refeeding syndrome, NG secretions are less, NG was  taken out, started on oral feeding able to tolerate well, he has been started  getting TPN, finished, change Cardizem po  dose was increased to 300mg, continued on Protonix patient Ileostomy is working well, has no abdominal pain, tolerating diet well.     During the earlier part of the hospitalization patient was treated for acute on chronic hypoxic respiratory failure secondary to COPD exacerbation and CAP, was treated with steroids now tapered and duo nebs, SOB resolved, he uses home oxygen (4 liters), bicarb elevated likely due to metabolic compensation for resp acidosis  continue bronchodilators and prednisone tapered off and completed Levaquin as well.  His electrolytes monitored and replaced accordingly, he was found to have right femoral artery dissection as incidental finding on CT, no intervention as per vascular, follow up with vascular clinic as out patient.     Plan:    1. High out put from Ileostomy: Patient has been continued on cholestyramine, Octreotide, Imodium, he is tolerating diet, ostomy care as per ostomy RN, IV hydration, Ileostomy output is still high, but secretions are thicker, encouraged for oral hydration, monitor I/Os, GI is following, GI recommended Surgical consult for Ileostomy reversal and pulmonary consult for optimization for surgery, appreciate surgical and pulmonary consults, GI has changed his  diet to high fiber diet, fluid restriction, he is feeling little dehydrated, his electrolytes has been stable will monitor I/O, BMP, will continued with IV fluids, GI recommended CT abdomen and pelvis with oral contrast, done today showed Proximal jejunum emptying into a loop jejunostomy the right upper quadrant as described. No contrast seen within the distal small bowel, Large bilateral scrotal hydroceles, talked with surgical team surgical team, they did the upper GI series confirmed Jejunostomy, patient is s/p jejunostomy closure and s/p Ileostomy was eating and drinking later on developed, nausea and vomiting, NG was put in and XRays were done showed penumoperitonium, CT abdomen done showed same pneumo peritoneum, surgery is follwoing closely, as per surgery team, patient is most likely having refeeding syndrome, NG secretions became less, NG was  taken out, started on oral feeding able to tolerate well, he has finished TPN course, patient Ileostomy is working well, has no abdominal pain, he is tolerating diet very well, continue with current diet, he has good appetite, he is being followed by dietician.     2. Acute on chronic hypoxic respiratory failure secondary to COPD exacerbation, patient is having SOB with cough with low grad temp, he has productive cough, CXR done no changes compare to previous CXR, currently on oxygen (4 liters) same as before, increased bronchodilators frequency, he has been started on methylprednisolone will tapered off once better, will continue with Mucomyst PRN, Kameron epps, will start patient on Levaquin as well as he has yellow to green sputum production in setting of sever COPD and could not really differentia on CXR if patient has infiltrated or not, will get sputum cultures, acute viral panel, as his wife is also sick with upper respiratory infection, she visits him every day, will monitor him closely.        3. Hypertension - stable, continue Cardizem.     4. Hyperlipidemia, continue statin    5.hyponatremia: resolved, it was due to high ileostomy output.   Hypokalemia - being replaced  Hypophosphatemia: improved  Hypomagnesemia: improved  on multiple electrolyte sol.   monitor Electrolytes    6. Scrotal edema chronic with urinary retention, no more retention, Roy if not able to urinate, no redness going down  - patient following with  as outpatient, cont scrotal elevation    7. Right femoral artery dissection, incidental finding on CT, no intervention as per vascular       8. DVT Prophylaxis - Lovenox       9. anxiety: xanax at hs PRN.    10. Afib/ SInus tachycardia: Multifactorial, patient HR is in 90s, seen and followed by cardiology, his Cardizem changed to short acting one because of small bowel absorption issues, will continue to monitor, repeat TTE done showed Normal global left ventricular systolic function,  Left ventricular ejection fraction, by visual estimation, is 60 to 65%, Moderately enlarged right ventricle, Estimated pulmonary artery systolic pressure is 44.7 mmHg assuming a right atrial pressure of 3 mmHg, which is consistent with mild pulmonary hypertension, There is no evidence of pericardial effusion, will follow along, not on anticoagulation, will continue monitoring HR, if high will adjust meds, his HR has been stable in 90s as per patient he always runs around that.     11. Hx of TIA: on plavix and aspirin resumed, will continue    12. hx of hypothyroidism: levothyroxine resumed, his TSH is 8, will repeat TSH in 4 weeks as out patient by PMD     Dispo: Patient and his wife do not wanted to go for the TOM, working with PT once ready might be going to home with home PT, PT team was requested to work with patient twice a day, once better from respiratory stand point will discharge him.

## 2018-02-09 NOTE — CHART NOTE - NSCHARTNOTEFT_GEN_A_CORE
I have just had another conversation with pt about advanced directives and he states he would not want to be on a breathing machine even to save his life.  He also does not want us to pump on his chest.  He states "Leave me dead if I am dead".

## 2018-02-09 NOTE — CHART NOTE - NSCHARTNOTEFT_GEN_A_CORE
Came to re-evaluate patient after found to be tachycardic with temp of 100.2 on most recently documented vitals signs. Pt seen and examined. He admits to feeling SOB. Says cough has become worse since this morning. Cough continues to be productive. As per Dr. Benito who had just finished examining patient as I entered the room sputum was yellow/green in nature - sputum culture sent. He also states he feels very tired and weak. Reports he has had no appetite today, also stating he has not had much to drink. He denies chest pain, pressure, abdominal pain, nausea, vomiting. Upon my examination patient sitting in bed, breathing appears labored however pt is speaking in full sentences. On 4L NC with documented O2 sat of 90%. Sinus tachycardia. Abdomen remains soft, NT, ND, stool and gas in ostomy bag. Dr. Grayson and ICU PA at bedside. Orders placed for cardiac monitor, ABG, EKG, blood cx x 2, CBC/CMP, lactate 1L plasmalyte bolus. Respiratory therapist attempted to get ABG - patient refusing. Explained to patient importance of ABG, he expressed understanding, continued to refuse. Awaiting results - ACS following closely. Came to re-evaluate patient after found to be tachycardic with temp of 100.2 on most recently documented vitals signs. Pt seen and examined. He admits to feeling SOB. Says cough has become worse since this morning. Cough continues to be productive. As per Dr. Benito who had just finished examining patient as I entered the room sputum was yellow/green in nature - sputum culture sent and Dr. Benito started patient on IV abx, steroids, and nebs. Patient also states he feels very tired / weak. Reports he has had no appetite today, also stating he has not had much to drink. He denies chest pain, pressure, abdominal pain, nausea, vomiting. Upon my examination patient sitting in bed, breathing appears labored however pt is speaking in full sentences. On 4L NC with documented O2 sat of 90%. Sinus tachycardia. Abdomen remains soft, NT, ND, stool and gas in ostomy bag. Dr. Grayson and ICU PA at bedside. Orders placed for cardiac monitor, ABG, EKG, blood cx x 2, CBC/CMP, lactate 1L plasmalyte bolus. Respiratory therapist attempted to get ABG - patient refusing. Explained to patient importance of ABG, he expressed understanding, continued to refuse. Awaiting results - ACS following closely.

## 2018-02-09 NOTE — PROGRESS NOTE ADULT - SUBJECTIVE AND OBJECTIVE BOX
FRANDY BAUTISTA    [id number]    67y      Male    Patient is a 67y old  Male who presents with a chief complaint of SOB (15 Dec 2017 10:17)      INTERVAL HPI/OVERNIGHT EVENTS:    Patient is feeling tired and having cough productive fo yellow to greenish sputum production, he also has SOB and feeling tired, no appetite, no nausea, vomiting, abdominal pain, has 100.2 fever, no chest pain.       REVIEW OF SYSTEMS:    CONSTITUTIONAL: No fever, no fatigue  RESPIRATORY: No cough, no shortness of breath  CARDIOVASCULAR: No chest pain, no palpitations.   GASTROINTESTINAL: No abdominal, No nausea, vomiting  NEUROLOGICAL: No headaches,  loss of strength.  MISCELLANEOUS: No joint swelling or pain.        Vital Signs Last 24 Hrs  T(C): 37 (09 Feb 2018 14:56), Max: 37.9 (09 Feb 2018 12:00)  T(F): 98.6 (09 Feb 2018 14:56), Max: 100.2 (09 Feb 2018 12:00)  HR: 101 (09 Feb 2018 14:56) (98 - 133)  BP: 111/66 (09 Feb 2018 14:56) (111/66 - 137/68  RR: 18 (09 Feb 2018 14:56) (18 - 20)  SpO2: 90% (09 Feb 2018 14:56) (90% - 98%)    PHYSICAL EXAM:    GENERAL: Thin built  Elderly male looking in moderate respiratory distress  HEENT: MADELINE  NECK: soft, Supple, No JVD,   CHEST/LUNG: Decrease air entry bilaterally; some expiratory wheezing  HEART: S1S2+, regular rate and rhythm; No murmurs  ABDOMEN: Soft, Nontender, Bowel sounds present, s/p Ileostomy with stool in the bag  EXTREMITIES:  1+ Peripheral Pulses, No edema  SKIN: No rashes or lesions  NEURO: AAOX3, no focal deficits, no motor r sensory loss  PSYCH: normal mood      LABS:                        9.4    10.6  )-----------( 420      ( 09 Feb 2018 15:50 )             30.7     02-09    136  |  101  |  12.0  ----------------------------<  144<H>  4.4   |  25.0  |  0.49<L>    Ca    8.4<L>      09 Feb 2018 07:27              I&O's Summary    08 Feb 2018 07:01  -  09 Feb 2018 07:00  --------------------------------------------------------  IN: 480 mL / OUT: 1300 mL / NET: -820 mL    09 Feb 2018 07:01  -  09 Feb 2018 15:58  --------------------------------------------------------  IN: 720 mL / OUT: 102 mL / NET: 618 mL        MEDICATIONS  (STANDING):  ALPRAZolam 0.25 milliGRAM(s) Oral every 6 hours  aspirin enteric coated 81 milliGRAM(s) Oral daily  atorvastatin 10 milliGRAM(s) Oral at bedtime  clopidogrel Tablet 75 milliGRAM(s) Oral daily  dextrose 5%. 1000 milliLiter(s) (50 mL/Hr) IV Continuous <Continuous>  dextrose 50% Injectable 12.5 Gram(s) IV Push once  dextrose 50% Injectable 25 Gram(s) IV Push once  diltiazem    Tablet 120 milliGRAM(s) Oral every 8 hours  enoxaparin Injectable 40 milliGRAM(s) SubCutaneous daily  FIRST- Mouthwash  BLM 10 milliLiter(s) Swish and Spit two times a day  fluticasone propionate 50 MICROgram(s)/spray Nasal Spray 1 Spray(s) Both Nostrils two times a day  fluticasone propionate/ salmeterol 250-50 MICROgram(s) Diskus 1 Dose(s) Inhalation two times a day  levalbuterol Inhalation 0.63 milliGRAM(s) Inhalation every 4 hours  levoFLOXacin IVPB      levothyroxine 50 MICROGram(s) Oral daily  loratadine 10 milliGRAM(s) Oral daily  methylPREDNISolone sodium succinate Injectable 40 milliGRAM(s) IV Push every 8 hours  Mucinex DM 1200 mg/ 60 mg tablet 1 Tablet(s) 1 Tablet(s) Oral two times a day  multiple electrolytes Injection Type 1 1000 milliLiter(s) (75 mL/Hr) IV Continuous <Continuous>  multiple electrolytes Injection Type 1 Bolus 1000 milliLiter(s) IV Bolus once  multivitamin 1 Tablet(s) Oral daily  pantoprazole    Tablet 40 milliGRAM(s) Oral two times a day before meals  sodium chloride 0.65% Nasal 1 Spray(s) Both Nostrils four times a day  tamsulosin 0.4 milliGRAM(s) Oral at bedtime  tiotropium 18 MICROgram(s) Capsule 1 Capsule(s) Inhalation daily    MEDICATIONS  (PRN):  acetaminophen   Tablet 650 milliGRAM(s) Oral every 6 hours PRN Fever, pain, headache  acetylcysteine 20% Inhalation 2.5 milliLiter(s) Inhalation every 6 hours PRN expectorant  benzocaine 15 mG/menthol 3.6 mG Lozenge 1 Lozenge Oral every 4 hours PRN Sore Throat  benzonatate 100 milliGRAM(s) Oral three times a day PRN Cough  ondansetron Injectable 4 milliGRAM(s) IV Push every 4 hours PRN Nausea and/or Vomiting

## 2018-02-09 NOTE — PROGRESS NOTE ADULT - SUBJECTIVE AND OBJECTIVE BOX
HPI/OVERNIGHT EVENTS: Patient seen and examined at bedside this AM. No acute events overnight. Found on 4L NC this early this morning. Tolerating diet. Exhibiting normal bowel function. Continuing to work with physical therapy. Denies fever, chills, nausea, vomitting, chest pain, SOB, dizziness, abd pain or any other concerning symptoms. HR range this AM 98 - was tachy to 112 and came back to 98 before being tachy to 133 later in the afternoon. Trauma team not notified since nurse notified Dr. Benito instead. ACS team member en route to evaluate patient.     Vital Signs Last 24 Hrs  T(C): 37.9 (09 Feb 2018 12:00), Max: 37.9 (09 Feb 2018 12:00)  T(F): 100.2 (09 Feb 2018 12:00), Max: 100.2 (09 Feb 2018 12:00)  HR: 133 (09 Feb 2018 12:00) (98 - 133)  BP: 137/68 (09 Feb 2018 12:00) (111/70 - 137/68)  BP(mean): --  RR: 18 (09 Feb 2018 12:00) (18 - 20)  SpO2: 92% (09 Feb 2018 12:26) (90% - 98%)    I&O's Detail    08 Feb 2018 07:01  -  09 Feb 2018 07:00  --------------------------------------------------------  IN:    Oral Fluid: 480 mL  Total IN: 480 mL    OUT:    Ileostomy: 375 mL    Voided: 925 mL  Total OUT: 1300 mL    Total NET: -820 mL      09 Feb 2018 07:01  -  09 Feb 2018 13:59  --------------------------------------------------------  IN:    Oral Fluid: 360 mL  Total IN: 360 mL    OUT:    Voided: 101 mL  Total OUT: 101 mL    Total NET: 259 mL    Constitutional: Frail elderly patient resting comfortably in bed in no acute distress   HEENT: EOMI/PERRL b/l  Neck: No JVD, full ROM w/o pain  Respiratory: CTAB with unlabored respirations, no accessory muscle use or conversational dyspnea  Cardiovascular: Regular rate and rhythm with no arrythmia or murmurs  Gastrointestinal: Abdomen soft, non-tender, non-distended with no rebound tenderness or guarding   Rectal: Not indicated   Neurological: GCS 15 ([id number]) with no gross sensory, motor or coordination deficits   Psychiatric: Normal mood and affect   Musculoskeletal: No joint pain, swelling or deformity with no limitation of movement     LABS:                        9.5    9.8   )-----------( 436      ( 09 Feb 2018 07:27 )             31.8     02-09    136  |  101  |  12.0  ----------------------------<  144<H>  4.4   |  25.0  |  0.49<L>    Ca    8.4<L>      09 Feb 2018 07:27    MEDICATIONS  (STANDING):  ALPRAZolam 0.25 milliGRAM(s) Oral every 6 hours  aspirin enteric coated 81 milliGRAM(s) Oral daily  atorvastatin 10 milliGRAM(s) Oral at bedtime  clopidogrel Tablet 75 milliGRAM(s) Oral daily  dextrose 5%. 1000 milliLiter(s) (50 mL/Hr) IV Continuous <Continuous>  dextrose 50% Injectable 12.5 Gram(s) IV Push once  dextrose 50% Injectable 25 Gram(s) IV Push once  diltiazem    Tablet 120 milliGRAM(s) Oral every 8 hours  enoxaparin Injectable 40 milliGRAM(s) SubCutaneous daily  FIRST- Mouthwash  BLM 10 milliLiter(s) Swish and Spit two times a day  fluticasone propionate 50 MICROgram(s)/spray Nasal Spray 1 Spray(s) Both Nostrils two times a day  fluticasone propionate/ salmeterol 250-50 MICROgram(s) Diskus 1 Dose(s) Inhalation two times a day  levalbuterol Inhalation 0.63 milliGRAM(s) Inhalation every 4 hours  levoFLOXacin IVPB      levothyroxine 50 MICROGram(s) Oral daily  loratadine 10 milliGRAM(s) Oral daily  methylPREDNISolone sodium succinate Injectable 40 milliGRAM(s) IV Push every 8 hours  Mucinex DM 1200 mg/ 60 mg tablet 1 Tablet(s) 1 Tablet(s) Oral two times a day  multivitamin 1 Tablet(s) Oral daily  pantoprazole    Tablet 40 milliGRAM(s) Oral two times a day before meals  sodium chloride 0.65% Nasal 1 Spray(s) Both Nostrils four times a day  tamsulosin 0.4 milliGRAM(s) Oral at bedtime  tiotropium 18 MICROgram(s) Capsule 1 Capsule(s) Inhalation daily    MEDICATIONS  (PRN):  acetaminophen   Tablet 650 milliGRAM(s) Oral every 6 hours PRN Fever, pain, headache  acetylcysteine 20% Inhalation 2.5 milliLiter(s) Inhalation every 6 hours PRN expectorant  benzocaine 15 mG/menthol 3.6 mG Lozenge 1 Lozenge Oral every 4 hours PRN Sore Throat  benzonatate 100 milliGRAM(s) Oral three times a day PRN Cough  ondansetron Injectable 4 milliGRAM(s) IV Push every 4 hours PRN Nausea and/or Vomiting      MICRO:        STUDIES:

## 2018-02-09 NOTE — PROGRESS NOTE ADULT - ASSESSMENT
67 year old male presenting s/p loop jejunostomy 12/7 who underwent revision of jejunostomy and creation of loop ileostomy 1/16 with currently with worsening tachycardia (133 recorded today at noon). Cardiology recommendations were to continue current dose of cardizem (120mg q8hrs). ACS to evaluate and contact cardiology today if needed.

## 2018-02-10 LAB
ALBUMIN SERPL ELPH-MCNC: 2.9 G/DL — LOW (ref 3.3–5.2)
ALP SERPL-CCNC: 136 U/L — HIGH (ref 40–120)
ALT FLD-CCNC: 46 U/L — HIGH
ANION GAP SERPL CALC-SCNC: 16 MMOL/L — SIGNIFICANT CHANGE UP (ref 5–17)
AST SERPL-CCNC: 17 U/L — SIGNIFICANT CHANGE UP
BILIRUB SERPL-MCNC: 0.4 MG/DL — SIGNIFICANT CHANGE UP (ref 0.4–2)
BUN SERPL-MCNC: 14 MG/DL — SIGNIFICANT CHANGE UP (ref 8–20)
CALCIUM SERPL-MCNC: 7.6 MG/DL — LOW (ref 8.6–10.2)
CHLORIDE SERPL-SCNC: 98 MMOL/L — SIGNIFICANT CHANGE UP (ref 98–107)
CO2 SERPL-SCNC: 22 MMOL/L — SIGNIFICANT CHANGE UP (ref 22–29)
CREAT SERPL-MCNC: 0.52 MG/DL — SIGNIFICANT CHANGE UP (ref 0.5–1.3)
EOSINOPHIL # BLD AUTO: 0 K/UL — SIGNIFICANT CHANGE UP (ref 0–0.5)
EOSINOPHIL NFR BLD AUTO: 0 % — SIGNIFICANT CHANGE UP (ref 0–5)
GLUCOSE SERPL-MCNC: 282 MG/DL — HIGH (ref 70–115)
HCT VFR BLD CALC: 28.2 % — LOW (ref 42–52)
HGB BLD-MCNC: 8.7 G/DL — LOW (ref 14–18)
LYMPHOCYTES # BLD AUTO: 0.2 K/UL — LOW (ref 1–4.8)
LYMPHOCYTES # BLD AUTO: 4 % — LOW (ref 20–55)
MAGNESIUM SERPL-MCNC: 2.2 MG/DL — SIGNIFICANT CHANGE UP (ref 1.8–2.6)
MCHC RBC-ENTMCNC: 27.9 PG — SIGNIFICANT CHANGE UP (ref 27–31)
MCHC RBC-ENTMCNC: 30.9 G/DL — LOW (ref 32–36)
MCV RBC AUTO: 90.4 FL — SIGNIFICANT CHANGE UP (ref 80–94)
MONOCYTES # BLD AUTO: 0.1 K/UL — SIGNIFICANT CHANGE UP (ref 0–0.8)
MONOCYTES NFR BLD AUTO: 1.8 % — LOW (ref 3–10)
NEUTROPHILS # BLD AUTO: 5.6 K/UL — SIGNIFICANT CHANGE UP (ref 1.8–8)
NEUTROPHILS NFR BLD AUTO: 93.7 % — HIGH (ref 37–73)
PHOSPHATE SERPL-MCNC: 2.7 MG/DL — SIGNIFICANT CHANGE UP (ref 2.4–4.7)
PLATELET # BLD AUTO: 352 K/UL — SIGNIFICANT CHANGE UP (ref 150–400)
POTASSIUM SERPL-MCNC: 3.7 MMOL/L — SIGNIFICANT CHANGE UP (ref 3.5–5.3)
POTASSIUM SERPL-SCNC: 3.7 MMOL/L — SIGNIFICANT CHANGE UP (ref 3.5–5.3)
PROT SERPL-MCNC: 6.4 G/DL — LOW (ref 6.6–8.7)
RBC # BLD: 3.12 M/UL — LOW (ref 4.6–6.2)
RBC # FLD: 19.1 % — HIGH (ref 11–15.6)
SODIUM SERPL-SCNC: 136 MMOL/L — SIGNIFICANT CHANGE UP (ref 135–145)
WBC # BLD: 6 K/UL — SIGNIFICANT CHANGE UP (ref 4.8–10.8)
WBC # FLD AUTO: 6 K/UL — SIGNIFICANT CHANGE UP (ref 4.8–10.8)

## 2018-02-10 PROCEDURE — 99233 SBSQ HOSP IP/OBS HIGH 50: CPT

## 2018-02-10 RX ORDER — INSULIN LISPRO 100/ML
VIAL (ML) SUBCUTANEOUS AT BEDTIME
Qty: 0 | Refills: 0 | Status: DISCONTINUED | OUTPATIENT
Start: 2018-02-10 | End: 2018-02-15

## 2018-02-10 RX ORDER — DEXTROSE 50 % IN WATER 50 %
12.5 SYRINGE (ML) INTRAVENOUS ONCE
Qty: 0 | Refills: 0 | Status: DISCONTINUED | OUTPATIENT
Start: 2018-02-10 | End: 2018-02-15

## 2018-02-10 RX ORDER — DEXTROSE 50 % IN WATER 50 %
25 SYRINGE (ML) INTRAVENOUS ONCE
Qty: 0 | Refills: 0 | Status: DISCONTINUED | OUTPATIENT
Start: 2018-02-10 | End: 2018-02-15

## 2018-02-10 RX ORDER — DEXTROSE 50 % IN WATER 50 %
1 SYRINGE (ML) INTRAVENOUS ONCE
Qty: 0 | Refills: 0 | Status: DISCONTINUED | OUTPATIENT
Start: 2018-02-10 | End: 2018-02-15

## 2018-02-10 RX ORDER — GLUCAGON INJECTION, SOLUTION 0.5 MG/.1ML
1 INJECTION, SOLUTION SUBCUTANEOUS ONCE
Qty: 0 | Refills: 0 | Status: DISCONTINUED | OUTPATIENT
Start: 2018-02-10 | End: 2018-02-15

## 2018-02-10 RX ORDER — SODIUM,POTASSIUM PHOSPHATES 278-250MG
1 POWDER IN PACKET (EA) ORAL THREE TIMES A DAY
Qty: 0 | Refills: 0 | Status: COMPLETED | OUTPATIENT
Start: 2018-02-10 | End: 2018-02-11

## 2018-02-10 RX ORDER — SODIUM CHLORIDE 9 MG/ML
1000 INJECTION, SOLUTION INTRAVENOUS
Qty: 0 | Refills: 0 | Status: DISCONTINUED | OUTPATIENT
Start: 2018-02-10 | End: 2018-02-15

## 2018-02-10 RX ORDER — MAGNESIUM SULFATE 500 MG/ML
2 VIAL (ML) INJECTION ONCE
Qty: 0 | Refills: 0 | Status: COMPLETED | OUTPATIENT
Start: 2018-02-10 | End: 2018-02-10

## 2018-02-10 RX ORDER — ATORVASTATIN CALCIUM 80 MG/1
40 TABLET, FILM COATED ORAL AT BEDTIME
Qty: 0 | Refills: 0 | Status: DISCONTINUED | OUTPATIENT
Start: 2018-02-10 | End: 2018-02-15

## 2018-02-10 RX ORDER — INSULIN LISPRO 100/ML
VIAL (ML) SUBCUTANEOUS
Qty: 0 | Refills: 0 | Status: DISCONTINUED | OUTPATIENT
Start: 2018-02-10 | End: 2018-02-15

## 2018-02-10 RX ORDER — SODIUM CHLORIDE 9 MG/ML
1000 INJECTION, SOLUTION INTRAVENOUS
Qty: 0 | Refills: 0 | Status: DISCONTINUED | OUTPATIENT
Start: 2018-02-10 | End: 2018-02-13

## 2018-02-10 RX ADMIN — PANTOPRAZOLE SODIUM 40 MILLIGRAM(S): 20 TABLET, DELAYED RELEASE ORAL at 06:10

## 2018-02-10 RX ADMIN — CLOPIDOGREL BISULFATE 75 MILLIGRAM(S): 75 TABLET, FILM COATED ORAL at 12:08

## 2018-02-10 RX ADMIN — Medication 1 PACKET(S): at 13:31

## 2018-02-10 RX ADMIN — Medication 0.25 MILLIGRAM(S): at 23:24

## 2018-02-10 RX ADMIN — Medication 100 MILLIGRAM(S): at 06:11

## 2018-02-10 RX ADMIN — Medication 0.25 MILLIGRAM(S): at 06:10

## 2018-02-10 RX ADMIN — Medication 1 SPRAY(S): at 06:08

## 2018-02-10 RX ADMIN — FLUTICASONE PROPIONATE AND SALMETEROL 1 DOSE(S): 50; 250 POWDER ORAL; RESPIRATORY (INHALATION) at 23:23

## 2018-02-10 RX ADMIN — Medication 1 SPRAY(S): at 06:10

## 2018-02-10 RX ADMIN — Medication 1 SPRAY(S): at 11:59

## 2018-02-10 RX ADMIN — Medication 40 MILLIGRAM(S): at 18:09

## 2018-02-10 RX ADMIN — TIOTROPIUM BROMIDE 1 CAPSULE(S): 18 CAPSULE ORAL; RESPIRATORY (INHALATION) at 09:22

## 2018-02-10 RX ADMIN — Medication 0.25 MILLIGRAM(S): at 00:04

## 2018-02-10 RX ADMIN — Medication 50 MICROGRAM(S): at 06:10

## 2018-02-10 RX ADMIN — Medication 100 MILLIGRAM(S): at 00:12

## 2018-02-10 RX ADMIN — LORATADINE 10 MILLIGRAM(S): 10 TABLET ORAL at 12:08

## 2018-02-10 RX ADMIN — Medication 50 GRAM(S): at 04:12

## 2018-02-10 RX ADMIN — TAMSULOSIN HYDROCHLORIDE 0.4 MILLIGRAM(S): 0.4 CAPSULE ORAL at 23:24

## 2018-02-10 RX ADMIN — Medication 100 MILLIGRAM(S): at 12:08

## 2018-02-10 RX ADMIN — Medication 1 SPRAY(S): at 23:24

## 2018-02-10 RX ADMIN — Medication 0.25 MILLIGRAM(S): at 12:16

## 2018-02-10 RX ADMIN — Medication 1 SPRAY(S): at 00:04

## 2018-02-10 RX ADMIN — Medication 1 PACKET(S): at 23:24

## 2018-02-10 RX ADMIN — LEVALBUTEROL 0.63 MILLIGRAM(S): 1.25 SOLUTION, CONCENTRATE RESPIRATORY (INHALATION) at 09:23

## 2018-02-10 RX ADMIN — Medication 40 MILLIGRAM(S): at 06:09

## 2018-02-10 RX ADMIN — LEVALBUTEROL 0.63 MILLIGRAM(S): 1.25 SOLUTION, CONCENTRATE RESPIRATORY (INHALATION) at 15:51

## 2018-02-10 RX ADMIN — Medication 0.25 MILLIGRAM(S): at 18:13

## 2018-02-10 RX ADMIN — Medication 62.5 MILLIMOLE(S): at 00:04

## 2018-02-10 RX ADMIN — TAMSULOSIN HYDROCHLORIDE 0.4 MILLIGRAM(S): 0.4 CAPSULE ORAL at 00:03

## 2018-02-10 RX ADMIN — Medication 1 PACKET(S): at 06:10

## 2018-02-10 RX ADMIN — Medication 1 SPRAY(S): at 18:07

## 2018-02-10 RX ADMIN — FLUTICASONE PROPIONATE AND SALMETEROL 1 DOSE(S): 50; 250 POWDER ORAL; RESPIRATORY (INHALATION) at 12:16

## 2018-02-10 RX ADMIN — Medication 62.5 MILLIMOLE(S): at 06:59

## 2018-02-10 RX ADMIN — ATORVASTATIN CALCIUM 40 MILLIGRAM(S): 80 TABLET, FILM COATED ORAL at 23:24

## 2018-02-10 RX ADMIN — LEVALBUTEROL 0.63 MILLIGRAM(S): 1.25 SOLUTION, CONCENTRATE RESPIRATORY (INHALATION) at 00:22

## 2018-02-10 RX ADMIN — Medication 1 TABLET(S): at 12:08

## 2018-02-10 RX ADMIN — PANTOPRAZOLE SODIUM 40 MILLIGRAM(S): 20 TABLET, DELAYED RELEASE ORAL at 18:08

## 2018-02-10 RX ADMIN — Medication 81 MILLIGRAM(S): at 12:08

## 2018-02-10 RX ADMIN — ATORVASTATIN CALCIUM 10 MILLIGRAM(S): 80 TABLET, FILM COATED ORAL at 00:03

## 2018-02-10 RX ADMIN — Medication 40 MILLIGRAM(S): at 00:03

## 2018-02-10 RX ADMIN — LEVALBUTEROL 0.63 MILLIGRAM(S): 1.25 SOLUTION, CONCENTRATE RESPIRATORY (INHALATION) at 03:29

## 2018-02-10 RX ADMIN — LEVALBUTEROL 0.63 MILLIGRAM(S): 1.25 SOLUTION, CONCENTRATE RESPIRATORY (INHALATION) at 21:07

## 2018-02-10 NOTE — PROGRESS NOTE ADULT - ASSESSMENT
67 year old male with PMH of COPD stage 4 s/p right lung reduction in 2010 (on home oxygen 4L (sat 92-93%), HTN, CVA on 2014, DM, hypothyroidism, hydrocele, and nephrolithiasis who is admitted for a COPD exacerbation. Patient has been weaned off ventimask to 4 liters nasal cannula and respiratory status is stable. CTA of the chest was negative for PE but showed bilateral lower lobe infiltrates with mucous plugging for which he was started on Levaquin.  Respiratory status stable and steroids are being tapered. Hospital course complicated by constipation, which had not improved despite aggressive bowel regimen, laxatives and enemas. XRAY with large stool burden. CT with diaphragmatic hernia and patient was consequently taken to the OR for loop ileostomy under local anesthesia. Pt cont, to have copious amount in ileostomy. Pt is noted to have hyponatremia due to high output in ileostomy, got IVF, hyponatremia and hypokalemia improving, ileostomy secretion are getting thicker, he is tolerating diet, ostomy care as per ostomy RN,  he is continued with cholestyramine, he has been continued with IV hydration, Ileostomy out is still high, but secretions are getting thicker, monitored, I/Os, being continued with IV fluid rate to 125ml/h and GI has increased Octreotide to 150mcgs tid,  has CT abdomen and Plevis done, results reviewed, GI add Imodium 2 mg po bid and continued with octreotide along, GI recommended Surgical consult for Ileostomy reversal and pulmonary consult for optimization for surgery, appreciate surgical and pulmonary consults, GI recommended to get  CT abdomen and pelvis with oral contrast, done showed Proximal jejunum emptying into a loop jejunostomy the right upper quadrant as described. No contrast seen within the distal small bowel, Large bilateral scrotal hydroceles, talked with surgical team, they did the upper GI series confirmed Jejunostomy, patient is s/p jejunostomy closure and s/p Ileostomy was eating and drinking later on developed, nausea and vomiting, NG was put in and XRays were done showed penumoperitonium, CT abdomen done showed same pneumo peritoneum, surgery is following closely, as per surgery team, patient is most likely having refeeding syndrome, NG secretions are less, NG was  taken out, started on oral feeding able to tolerate well, he has been started  getting TPN, finished, change Cardizem po  dose was increased to 300mg, continued on Protonix patient Ileostomy is working well, has no abdominal pain, tolerating diet well.     During the earlier part of the hospitalization patient was treated for acute on chronic hypoxic respiratory failure secondary to COPD exacerbation and CAP, was treated with steroids now tapered and duo nebs, SOB resolved, he uses home oxygen (4 liters), bicarb elevated likely due to metabolic compensation for resp acidosis  continue bronchodilators and prednisone tapered off and completed Levaquin as well.  His electrolytes monitored and replaced accordingly, he was found to have right femoral artery dissection as incidental finding on CT, no intervention as per vascular, follow up with vascular clinic as out patient.     Plan:    1. High out put from Ileostomy: Patient has been continued on cholestyramine, Octreotide, Imodium, he is tolerating diet, ostomy care as per ostomy RN, IV hydration, Ileostomy output is still high, but secretions are thicker, encouraged for oral hydration, monitor I/Os, GI is following, GI recommended Surgical consult for Ileostomy reversal and pulmonary consult for optimization for surgery, appreciate surgical and pulmonary consults, GI has changed his  diet to high fiber diet, fluid restriction, he is feeling little dehydrated, his electrolytes has been stable will monitor I/O, BMP, will continued with IV fluids, GI recommended CT abdomen and pelvis with oral contrast, done today showed Proximal jejunum emptying into a loop jejunostomy the right upper quadrant as described. No contrast seen within the distal small bowel, Large bilateral scrotal hydroceles, talked with surgical team surgical team, they did the upper GI series confirmed Jejunostomy, patient is s/p jejunostomy closure and s/p Ileostomy was eating and drinking later on developed, nausea and vomiting, NG was put in and XRays were done showed penumoperitonium, CT abdomen done showed same pneumo peritoneum, surgery is follwoing closely, as per surgery team, patient is most likely having refeeding syndrome, NG secretions became less, NG was  taken out, started on oral feeding able to tolerate well, he has finished TPN course, patient Ileostomy is working well, has no abdominal pain, he is tolerating diet very well, continue with current diet, he has good appetite, he is being followed by dietician.     2. Acute on chronic hypoxic respiratory failure secondary to COPD exacerbation, patient's SOB with cough is improving, CXR done no changes compare to previous CXR, currently on oxygen (4 liters) same as before, bronchodilators, will change methylprednisolone Q12 and will tapered off to oral once better, will continue with Mucomyst PRN, Teaslon supriya, will continued with Levaquin for total of 5 day today is day, sputum cutlures is growing gram +ve cocci in pairs and chains, will follow sensitivity, will continue antibiotics as could not  really differentia on CXR if patient has infiltrated or not, acute viral panel -ve, as his wife is also sick with upper respiratory infection and postive for strep infection, she visits him every day, will monitor him closely.        3. Hypertension - stable, continue Cardizem.     4. Hyperlipidemia, continue statin    5.hyponatremia: resolved, it was due to high ileostomy output.   Hypokalemia - being replaced  Hypophosphatemia: improved  Hypomagnesemia: improved  on multiple electrolyte sol.   monitor Electrolytes    6. Scrotal edema chronic with urinary retention, no more retention, Roy if not able to urinate, no redness going down  - patient following with  as outpatient, cont scrotal elevation    7. Right femoral artery dissection, incidental finding on CT, no intervention as per vascular       8. DVT Prophylaxis - Lovenox       9. anxiety: xanax at hs PRN.    10. Afib/ SInus tachycardia: Multifactorial, patient HR up in setting of COPD exacerbation, patient has been followed by cardiology, his Cardizem changed to short acting one because of small bowel absorption issues, will continue to monitor, repeat TTE done showed Normal global left ventricular systolic function,  Left ventricular ejection fraction, by visual estimation, is 60 to 65%, Moderately enlarged right ventricle, Estimated pulmonary artery systolic pressure is 44.7 mmHg assuming a right atrial pressure of 3 mmHg, which is consistent with mild pulmonary hypertension, There is no evidence of pericardial effusion, will follow along, not on anticoagulation.     11. Hx of TIA: on plavix and aspirin resumed, will continue    12. hx of hypothyroidism: levothyroxine resumed, his TSH is 8, will repeat TSH in 4 weeks as out patient by PMD     Dispo: Patient and his wife do not wanted to go for the TOM, working with PT once ready might be going to home with home PT, PT team was requested to work with patient twice a day, once better from respiratory stand point will discharge him.     Plane of care discussed with Surgical team.

## 2018-02-10 NOTE — PROGRESS NOTE ADULT - ASSESSMENT
67 year old male presenting s/p loop jejunostomy 12/7 who underwent revision of jejunostomy and creation of loop ileostomy 1/16 with concern for acute on chronic hypoxic respiratory failure secondary to COPD exacerbation. Dr. Thong martinez (1) change solumedrol 2X/day (2) nebulizer q6hrs (3) levofloxacin ABx duration 5 days (4) d/c cardiac monitor if HR goes down to low 100s. Still on [id number] q8hrs. Refused SICU admission and is now DNR/DNI. 67 year old male presenting s/p loop jejunostomy 12/7 who underwent revision of jejunostomy and creation of loop ileostomy 1/16 with concern for acute on chronic hypoxic respiratory failure secondary to COPD exacerbation. Dr. Thong maritnez (1) change solumedrol 2X/day (2) nebulizer q6hrs (3) levofloxacin ABx duration 5 days (4) d/c cardiac monitor if HR goes down to low 100s. Still on [id number] q8hrs. Refused SICU admission and is now DNR/DNI. Plan to continue symptomatic and supportive care while respecting patient's wishes of DNR/DNI.

## 2018-02-10 NOTE — PROGRESS NOTE ADULT - SUBJECTIVE AND OBJECTIVE BOX
HPI/OVERNIGHT EVENTS: Patient seen and examined at bedside this AM. Had runs of SVTs with HR up to 150s. Last HR checked found to be 124. Continues to require 5L O2 NC. Started on levaquin by hospitalist due to concern for acute on chronic hypoxic respiratory failure secondary to COPD exacerbation. Continues to have SOB and productive cough this morning.     Vital Signs Last 24 Hrs  T(C): 36.9 (10 Feb 2018 05:07), Max: 37.9 (09 Feb 2018 12:00)  T(F): 98.5 (10 Feb 2018 05:07), Max: 100.2 (09 Feb 2018 12:00)  HR: 124 (10 Feb 2018 05:54) (101 - 133)  BP: 106/60 (10 Feb 2018 05:54) (99/56 - 137/68)  BP(mean): --  RR: 18 (10 Feb 2018 05:54) (18 - 18)  SpO2: 93% (10 Feb 2018 05:54) (90% - 99%)    I&O's Detail    09 Feb 2018 07:01  -  10 Feb 2018 07:00  --------------------------------------------------------  IN:    multiple electrolytes Injection Type 1: 1150 mL    Oral Fluid: 840 mL    Solution: 100 mL  Total IN: 2090 mL    OUT:    Voided: 552 mL  Total OUT: 552 mL    Total NET: 1538 mL    Constitutional: Frail elderly patient sitting upright in the bed with increased work of breathing in mild distress   HEENT: EOMI/PERRL b/l  Neck: No JVD, full ROM w/o pain  Respiratory: CTAB with unlabored respirations, no accessory muscle use or conversational dyspnea   Cardiovascular: NSR but tachycardic   Gastrointestinal: Abdomen soft, non-tender, non-distended with no rebound tenderness or guarding   Rectal: Not indicated   Neurological: GCS 15 ([id number]) with no gross sensory, motor or coordination deficits   Psychiatric: Depressed mood and affect   Musculoskeletal: No joint pain, swelling or deformity with no limitation of movement    LABS:                        8.7    6.0   )-----------( 352      ( 10 Feb 2018 07:21 )             28.2     02-10    136  |  98  |  14.0  ----------------------------<  282<H>  3.7   |  22.0  |  0.52    Ca    7.6<L>      10 Feb 2018 07:21  Phos  2.7     02-10  Mg     2.2     02-10    TPro  6.4<L>  /  Alb  2.9<L>  /  TBili  0.4  /  DBili  x   /  AST  17  /  ALT  46<H>  /  AlkPhos  136<H>  02-10    MEDICATIONS  (STANDING):  ALPRAZolam 0.25 milliGRAM(s) Oral every 6 hours  aspirin enteric coated 81 milliGRAM(s) Oral daily  atorvastatin 40 milliGRAM(s) Oral at bedtime  clopidogrel Tablet 75 milliGRAM(s) Oral daily  dextrose 5%. 1000 milliLiter(s) (50 mL/Hr) IV Continuous <Continuous>  dextrose 50% Injectable 12.5 Gram(s) IV Push once  dextrose 50% Injectable 25 Gram(s) IV Push once  diltiazem    Tablet 120 milliGRAM(s) Oral every 8 hours  enoxaparin Injectable 40 milliGRAM(s) SubCutaneous daily  FIRST- Mouthwash  BLM 10 milliLiter(s) Swish and Spit two times a day  fluticasone propionate 50 MICROgram(s)/spray Nasal Spray 1 Spray(s) Both Nostrils two times a day  fluticasone propionate/ salmeterol 250-50 MICROgram(s) Diskus 1 Dose(s) Inhalation two times a day  levalbuterol Inhalation 0.63 milliGRAM(s) Inhalation every 4 hours  levoFLOXacin IVPB      levoFLOXacin IVPB 750 milliGRAM(s) IV Intermittent every 24 hours  levothyroxine 50 MICROGram(s) Oral daily  loratadine 10 milliGRAM(s) Oral daily  methylPREDNISolone sodium succinate Injectable 40 milliGRAM(s) IV Push every 8 hours  Mucinex DM 1200 mg/ 60 mg tablet 1 Tablet(s) 1 Tablet(s) Oral two times a day  multiple electrolytes Injection Type 1 1000 milliLiter(s) (75 mL/Hr) IV Continuous <Continuous>  multivitamin 1 Tablet(s) Oral daily  pantoprazole    Tablet 40 milliGRAM(s) Oral two times a day before meals  potassium phosphate / sodium phosphate powder 1 Packet(s) Oral three times a day  sodium chloride 0.65% Nasal 1 Spray(s) Both Nostrils four times a day  tamsulosin 0.4 milliGRAM(s) Oral at bedtime  tiotropium 18 MICROgram(s) Capsule 1 Capsule(s) Inhalation daily    MEDICATIONS  (PRN):  acetaminophen   Tablet 650 milliGRAM(s) Oral every 6 hours PRN Fever, pain, headache  acetylcysteine 20% Inhalation 2.5 milliLiter(s) Inhalation every 6 hours PRN expectorant  benzocaine 15 mG/menthol 3.6 mG Lozenge 1 Lozenge Oral every 4 hours PRN Sore Throat  benzonatate 100 milliGRAM(s) Oral three times a day PRN Cough  ondansetron Injectable 4 milliGRAM(s) IV Push every 4 hours PRN Nausea and/or Vomiting      MICRO:       STUDIES:

## 2018-02-10 NOTE — PROGRESS NOTE ADULT - SUBJECTIVE AND OBJECTIVE BOX
FRANDY BAUTISTA    [id number]    67y      Male    Patient is a 67y old  Male who presents with a chief complaint of SOB (15 Dec 2017 10:17)      INTERVAL HPI/OVERNIGHT EVENTS:    Patient is much better, his cough is also better with less sputum production, his SOB is better as well, has appetite back, no nausea, vomiting, abdominal pain, has 100.2 fever, no chest pain.       REVIEW OF SYSTEMS:    CONSTITUTIONAL: No fever, no fatigue  RESPIRATORY: No cough, no shortness of breath  CARDIOVASCULAR: No chest pain, no palpitations.   GASTROINTESTINAL: No abdominal, No nausea, vomiting  NEUROLOGICAL: No headaches,  loss of strength.  MISCELLANEOUS: No joint swelling or pain.        Vital Signs Last 24 Hrs  T(C): 36.9 (10 Feb 2018 05:07), Max: 37.9 (09 Feb 2018 12:00)  T(F): 98.5 (10 Feb 2018 05:07), Max: 100.2 (09 Feb 2018 12:00)  HR: 124 (10 Feb 2018 05:54) (101 - 133)  BP: 106/60 (10 Feb 2018 05:54) (99/56 - 137/68)  RR: 18 (10 Feb 2018 05:54) (18 - 18)  SpO2: 93% (10 Feb 2018 05:54) (90% - 99%)    PHYSICAL EXAM:    GENERAL: Thin built  Elderly male looking comfortable  HEENT: MADELINE  NECK: soft, Supple, No JVD,   CHEST/LUNG: Decrease air entry bilaterally; no wheezing  HEART: S1S2+, regular rate and rhythm; No murmurs  ABDOMEN: Soft, Nontender, Bowel sounds present, s/p Ileostomy with stool in the bag  EXTREMITIES:  1+ Peripheral Pulses, No edema  SKIN: No rashes or lesions  NEURO: AAOX3, no focal deficits, no motor r sensory loss  PSYCH: normal mood        LABS:                        8.7    6.0   )-----------( 352      ( 10 Feb 2018 07:21 )             28.2     02-10    136  |  98  |  14.0  ----------------------------<  282<H>  3.7   |  22.0  |  0.52    Ca    7.6<L>      10 Feb 2018 07:21  Phos  2.7     02-10  Mg     2.2     02-10    TPro  6.4<L>  /  Alb  2.9<L>  /  TBili  0.4  /  DBili  x   /  AST  17  /  ALT  46<H>  /  AlkPhos  136<H>  02-10            I&O's Summary    09 Feb 2018 07:01  -  10 Feb 2018 07:00  --------------------------------------------------------  IN: 2090 mL / OUT: 552 mL / NET: 1538 mL        MEDICATIONS  (STANDING):  ALPRAZolam 0.25 milliGRAM(s) Oral every 6 hours  aspirin enteric coated 81 milliGRAM(s) Oral daily  atorvastatin 40 milliGRAM(s) Oral at bedtime  clopidogrel Tablet 75 milliGRAM(s) Oral daily  dextrose 5%. 1000 milliLiter(s) (50 mL/Hr) IV Continuous <Continuous>  dextrose 50% Injectable 12.5 Gram(s) IV Push once  dextrose 50% Injectable 25 Gram(s) IV Push once  diltiazem    Tablet 120 milliGRAM(s) Oral every 8 hours  enoxaparin Injectable 40 milliGRAM(s) SubCutaneous daily  FIRST- Mouthwash  BLM 10 milliLiter(s) Swish and Spit two times a day  fluticasone propionate 50 MICROgram(s)/spray Nasal Spray 1 Spray(s) Both Nostrils two times a day  fluticasone propionate/ salmeterol 250-50 MICROgram(s) Diskus 1 Dose(s) Inhalation two times a day  levalbuterol Inhalation 0.63 milliGRAM(s) Inhalation every 4 hours  levoFLOXacin IVPB      levoFLOXacin IVPB 750 milliGRAM(s) IV Intermittent every 24 hours  levothyroxine 50 MICROGram(s) Oral daily  loratadine 10 milliGRAM(s) Oral daily  methylPREDNISolone sodium succinate Injectable 40 milliGRAM(s) IV Push every 8 hours  Mucinex DM 1200 mg/ 60 mg tablet 1 Tablet(s) 1 Tablet(s) Oral two times a day  multiple electrolytes Injection Type 1 1000 milliLiter(s) (75 mL/Hr) IV Continuous <Continuous>  multivitamin 1 Tablet(s) Oral daily  pantoprazole    Tablet 40 milliGRAM(s) Oral two times a day before meals  potassium phosphate / sodium phosphate powder 1 Packet(s) Oral three times a day  sodium chloride 0.65% Nasal 1 Spray(s) Both Nostrils four times a day  tamsulosin 0.4 milliGRAM(s) Oral at bedtime  tiotropium 18 MICROgram(s) Capsule 1 Capsule(s) Inhalation daily    MEDICATIONS  (PRN):  acetaminophen   Tablet 650 milliGRAM(s) Oral every 6 hours PRN Fever, pain, headache  acetylcysteine 20% Inhalation 2.5 milliLiter(s) Inhalation every 6 hours PRN expectorant  benzocaine 15 mG/menthol 3.6 mG Lozenge 1 Lozenge Oral every 4 hours PRN Sore Throat  benzonatate 100 milliGRAM(s) Oral three times a day PRN Cough  ondansetron Injectable 4 milliGRAM(s) IV Push every 4 hours PRN Nausea and/or Vomiting

## 2018-02-11 LAB
-  AMIKACIN: SIGNIFICANT CHANGE UP
-  AMIKACIN: SIGNIFICANT CHANGE UP
-  AMPICILLIN/SULBACTAM: SIGNIFICANT CHANGE UP
-  AMPICILLIN: SIGNIFICANT CHANGE UP
-  AZTREONAM: SIGNIFICANT CHANGE UP
-  AZTREONAM: SIGNIFICANT CHANGE UP
-  CEFAZOLIN: SIGNIFICANT CHANGE UP
-  CEFEPIME: SIGNIFICANT CHANGE UP
-  CEFEPIME: SIGNIFICANT CHANGE UP
-  CEFOXITIN: SIGNIFICANT CHANGE UP
-  CEFTAZIDIME: SIGNIFICANT CHANGE UP
-  CEFTAZIDIME: SIGNIFICANT CHANGE UP
-  CEFTRIAXONE: SIGNIFICANT CHANGE UP
-  CIPROFLOXACIN: SIGNIFICANT CHANGE UP
-  CIPROFLOXACIN: SIGNIFICANT CHANGE UP
-  ERTAPENEM: SIGNIFICANT CHANGE UP
-  GENTAMICIN: SIGNIFICANT CHANGE UP
-  GENTAMICIN: SIGNIFICANT CHANGE UP
-  IMIPENEM: SIGNIFICANT CHANGE UP
-  IMIPENEM: SIGNIFICANT CHANGE UP
-  LEVOFLOXACIN: SIGNIFICANT CHANGE UP
-  LEVOFLOXACIN: SIGNIFICANT CHANGE UP
-  MEROPENEM: SIGNIFICANT CHANGE UP
-  MEROPENEM: SIGNIFICANT CHANGE UP
-  PIPERACILLIN/TAZOBACTAM: SIGNIFICANT CHANGE UP
-  PIPERACILLIN/TAZOBACTAM: SIGNIFICANT CHANGE UP
-  TOBRAMYCIN: SIGNIFICANT CHANGE UP
-  TOBRAMYCIN: SIGNIFICANT CHANGE UP
-  TRIMETHOPRIM/SULFAMETHOXAZOLE: SIGNIFICANT CHANGE UP
ANION GAP SERPL CALC-SCNC: 11 MMOL/L — SIGNIFICANT CHANGE UP (ref 5–17)
BUN SERPL-MCNC: 15 MG/DL — SIGNIFICANT CHANGE UP (ref 8–20)
CALCIUM SERPL-MCNC: 7.8 MG/DL — LOW (ref 8.6–10.2)
CHLORIDE SERPL-SCNC: 102 MMOL/L — SIGNIFICANT CHANGE UP (ref 98–107)
CO2 SERPL-SCNC: 26 MMOL/L — SIGNIFICANT CHANGE UP (ref 22–29)
CREAT SERPL-MCNC: 0.48 MG/DL — LOW (ref 0.5–1.3)
CULTURE RESULTS: SIGNIFICANT CHANGE UP
EOSINOPHIL # BLD AUTO: 0 K/UL — SIGNIFICANT CHANGE UP (ref 0–0.5)
EOSINOPHIL NFR BLD AUTO: 0 % — SIGNIFICANT CHANGE UP (ref 0–5)
GLUCOSE SERPL-MCNC: 201 MG/DL — HIGH (ref 70–115)
HCT VFR BLD CALC: 28.8 % — LOW (ref 42–52)
HGB BLD-MCNC: 8.8 G/DL — LOW (ref 14–18)
LYMPHOCYTES # BLD AUTO: 0.3 K/UL — LOW (ref 1–4.8)
LYMPHOCYTES # BLD AUTO: 4.3 % — LOW (ref 20–55)
MAGNESIUM SERPL-MCNC: 1.8 MG/DL — SIGNIFICANT CHANGE UP (ref 1.6–2.6)
MCHC RBC-ENTMCNC: 27.6 PG — SIGNIFICANT CHANGE UP (ref 27–31)
MCHC RBC-ENTMCNC: 30.6 G/DL — LOW (ref 32–36)
MCV RBC AUTO: 90.3 FL — SIGNIFICANT CHANGE UP (ref 80–94)
METHOD TYPE: SIGNIFICANT CHANGE UP
METHOD TYPE: SIGNIFICANT CHANGE UP
MONOCYTES # BLD AUTO: 0.4 K/UL — SIGNIFICANT CHANGE UP (ref 0–0.8)
MONOCYTES NFR BLD AUTO: 5 % — SIGNIFICANT CHANGE UP (ref 3–10)
NEUTROPHILS # BLD AUTO: 6.9 K/UL — SIGNIFICANT CHANGE UP (ref 1.8–8)
NEUTROPHILS NFR BLD AUTO: 90.6 % — HIGH (ref 37–73)
ORGANISM # SPEC MICROSCOPIC CNT: SIGNIFICANT CHANGE UP
PHOSPHATE SERPL-MCNC: 2.4 MG/DL — SIGNIFICANT CHANGE UP (ref 2.4–4.7)
PLATELET # BLD AUTO: 373 K/UL — SIGNIFICANT CHANGE UP (ref 150–400)
POTASSIUM SERPL-MCNC: 3.7 MMOL/L — SIGNIFICANT CHANGE UP (ref 3.5–5.3)
POTASSIUM SERPL-SCNC: 3.7 MMOL/L — SIGNIFICANT CHANGE UP (ref 3.5–5.3)
RBC # BLD: 3.19 M/UL — LOW (ref 4.6–6.2)
RBC # FLD: 18.9 % — HIGH (ref 11–15.6)
SODIUM SERPL-SCNC: 139 MMOL/L — SIGNIFICANT CHANGE UP (ref 135–145)
SPECIMEN SOURCE: SIGNIFICANT CHANGE UP
WBC # BLD: 7.6 K/UL — SIGNIFICANT CHANGE UP (ref 4.8–10.8)
WBC # FLD AUTO: 7.6 K/UL — SIGNIFICANT CHANGE UP (ref 4.8–10.8)

## 2018-02-11 RX ORDER — ALPRAZOLAM 0.25 MG
0.25 TABLET ORAL EVERY 6 HOURS
Qty: 0 | Refills: 0 | Status: DISCONTINUED | OUTPATIENT
Start: 2018-02-11 | End: 2018-02-15

## 2018-02-11 RX ADMIN — Medication 1 TABLET(S): at 12:31

## 2018-02-11 RX ADMIN — TIOTROPIUM BROMIDE 1 CAPSULE(S): 18 CAPSULE ORAL; RESPIRATORY (INHALATION) at 08:51

## 2018-02-11 RX ADMIN — LEVALBUTEROL 0.63 MILLIGRAM(S): 1.25 SOLUTION, CONCENTRATE RESPIRATORY (INHALATION) at 02:57

## 2018-02-11 RX ADMIN — LEVALBUTEROL 0.63 MILLIGRAM(S): 1.25 SOLUTION, CONCENTRATE RESPIRATORY (INHALATION) at 14:37

## 2018-02-11 RX ADMIN — TAMSULOSIN HYDROCHLORIDE 0.4 MILLIGRAM(S): 0.4 CAPSULE ORAL at 21:30

## 2018-02-11 RX ADMIN — Medication 0.25 MILLIGRAM(S): at 18:37

## 2018-02-11 RX ADMIN — PANTOPRAZOLE SODIUM 40 MILLIGRAM(S): 20 TABLET, DELAYED RELEASE ORAL at 06:52

## 2018-02-11 RX ADMIN — Medication 0.25 MILLIGRAM(S): at 12:34

## 2018-02-11 RX ADMIN — Medication 1 PACKET(S): at 06:52

## 2018-02-11 RX ADMIN — LEVALBUTEROL 0.63 MILLIGRAM(S): 1.25 SOLUTION, CONCENTRATE RESPIRATORY (INHALATION) at 08:51

## 2018-02-11 RX ADMIN — Medication 100 MILLIGRAM(S): at 06:53

## 2018-02-11 RX ADMIN — LORATADINE 10 MILLIGRAM(S): 10 TABLET ORAL at 12:31

## 2018-02-11 RX ADMIN — ENOXAPARIN SODIUM 40 MILLIGRAM(S): 100 INJECTION SUBCUTANEOUS at 12:31

## 2018-02-11 RX ADMIN — PANTOPRAZOLE SODIUM 40 MILLIGRAM(S): 20 TABLET, DELAYED RELEASE ORAL at 16:15

## 2018-02-11 RX ADMIN — Medication 1 SPRAY(S): at 22:58

## 2018-02-11 RX ADMIN — Medication 1 PACKET(S): at 14:00

## 2018-02-11 RX ADMIN — ATORVASTATIN CALCIUM 40 MILLIGRAM(S): 80 TABLET, FILM COATED ORAL at 21:30

## 2018-02-11 RX ADMIN — LEVALBUTEROL 0.63 MILLIGRAM(S): 1.25 SOLUTION, CONCENTRATE RESPIRATORY (INHALATION) at 20:51

## 2018-02-11 RX ADMIN — Medication 100 MILLIGRAM(S): at 14:07

## 2018-02-11 RX ADMIN — Medication 50 MICROGRAM(S): at 06:52

## 2018-02-11 RX ADMIN — CLOPIDOGREL BISULFATE 75 MILLIGRAM(S): 75 TABLET, FILM COATED ORAL at 12:31

## 2018-02-11 RX ADMIN — Medication 1 SPRAY(S): at 12:31

## 2018-02-11 RX ADMIN — Medication 0.25 MILLIGRAM(S): at 23:08

## 2018-02-11 RX ADMIN — Medication 40 MILLIGRAM(S): at 18:36

## 2018-02-11 RX ADMIN — FLUTICASONE PROPIONATE AND SALMETEROL 1 DOSE(S): 50; 250 POWDER ORAL; RESPIRATORY (INHALATION) at 21:30

## 2018-02-11 RX ADMIN — Medication 1 PACKET(S): at 21:31

## 2018-02-11 RX ADMIN — Medication 100 MILLIGRAM(S): at 18:43

## 2018-02-11 RX ADMIN — Medication 81 MILLIGRAM(S): at 12:31

## 2018-02-11 RX ADMIN — Medication 1 SPRAY(S): at 18:36

## 2018-02-11 RX ADMIN — FLUTICASONE PROPIONATE AND SALMETEROL 1 DOSE(S): 50; 250 POWDER ORAL; RESPIRATORY (INHALATION) at 12:30

## 2018-02-11 RX ADMIN — Medication 100 MILLIGRAM(S): at 23:58

## 2018-02-11 RX ADMIN — Medication 40 MILLIGRAM(S): at 06:53

## 2018-02-11 RX ADMIN — Medication 0.25 MILLIGRAM(S): at 06:52

## 2018-02-11 RX ADMIN — Medication 1 SPRAY(S): at 06:53

## 2018-02-11 NOTE — PROGRESS NOTE ADULT - ASSESSMENT
67yMale presenting with: s/p loop jejunostomy 12/7, revision of jejunostomy 1/16, loop ileostomy creation. no issueas overnight. feeling better. exam unchanged.   now DNR/DNI    - cont pain control  - cont diet  - cont IS, cont O2 as pt on oxygen at home  - f/u medicine/pulm recs  - cont DVT ppx  - PT/OOB

## 2018-02-11 NOTE — PROGRESS NOTE ADULT - SUBJECTIVE AND OBJECTIVE BOX
INTERVAL HPI/OVERNIGHT EVENTS:  No issues overnight. pt states that he's feeling better, no complaints of shortness of breath or chest pain. no complaitns of abdominal pan. states that hes been tolerating diet with no nuasea/ vomiring; and that ostomy has been functional.      MEDICATIONS  (STANDING):  ALPRAZolam 0.25 milliGRAM(s) Oral every 6 hours  aspirin enteric coated 81 milliGRAM(s) Oral daily  atorvastatin 40 milliGRAM(s) Oral at bedtime  clopidogrel Tablet 75 milliGRAM(s) Oral daily  dextrose 5%. 1000 milliLiter(s) (50 mL/Hr) IV Continuous <Continuous>  dextrose 5%. 1000 milliLiter(s) (50 mL/Hr) IV Continuous <Continuous>  dextrose 50% Injectable 12.5 Gram(s) IV Push once  dextrose 50% Injectable 25 Gram(s) IV Push once  dextrose 50% Injectable 12.5 Gram(s) IV Push once  dextrose 50% Injectable 25 Gram(s) IV Push once  dextrose 50% Injectable 25 Gram(s) IV Push once  diltiazem    Tablet 120 milliGRAM(s) Oral every 8 hours  enoxaparin Injectable 40 milliGRAM(s) SubCutaneous daily  FIRST- Mouthwash  BLM 10 milliLiter(s) Swish and Spit two times a day  fluticasone propionate 50 MICROgram(s)/spray Nasal Spray 1 Spray(s) Both Nostrils two times a day  fluticasone propionate/ salmeterol 250-50 MICROgram(s) Diskus 1 Dose(s) Inhalation two times a day  insulin lispro (HumaLOG) corrective regimen sliding scale   SubCutaneous three times a day before meals  insulin lispro (HumaLOG) corrective regimen sliding scale   SubCutaneous at bedtime  levalbuterol Inhalation 0.63 milliGRAM(s) Inhalation every 6 hours  levoFLOXacin IVPB      levoFLOXacin IVPB 750 milliGRAM(s) IV Intermittent every 24 hours  levothyroxine 50 MICROGram(s) Oral daily  loratadine 10 milliGRAM(s) Oral daily  methylPREDNISolone sodium succinate Injectable 40 milliGRAM(s) IV Push every 12 hours  Mucinex DM 1200 mg/ 60 mg tablet 1 Tablet(s) 1 Tablet(s) Oral two times a day  multiple electrolytes Injection Type 1 1000 milliLiter(s) (30 mL/Hr) IV Continuous <Continuous>  multivitamin 1 Tablet(s) Oral daily  pantoprazole    Tablet 40 milliGRAM(s) Oral two times a day before meals  potassium phosphate / sodium phosphate powder 1 Packet(s) Oral three times a day  sodium chloride 0.65% Nasal 1 Spray(s) Both Nostrils four times a day  tamsulosin 0.4 milliGRAM(s) Oral at bedtime  tiotropium 18 MICROgram(s) Capsule 1 Capsule(s) Inhalation daily    MEDICATIONS  (PRN):  acetaminophen   Tablet 650 milliGRAM(s) Oral every 6 hours PRN Fever, pain, headache  acetylcysteine 20% Inhalation 2.5 milliLiter(s) Inhalation every 6 hours PRN expectorant  benzocaine 15 mG/menthol 3.6 mG Lozenge 1 Lozenge Oral every 4 hours PRN Sore Throat  benzonatate 100 milliGRAM(s) Oral three times a day PRN Cough  dextrose Gel 1 Dose(s) Oral once PRN Blood Glucose LESS THAN 70 milliGRAM(s)/deciliter  glucagon  Injectable 1 milliGRAM(s) IntraMuscular once PRN Glucose LESS THAN 70 milligrams/deciliter  ondansetron Injectable 4 milliGRAM(s) IV Push every 4 hours PRN Nausea and/or Vomiting      Vital Signs Last 24 Hrs  T(C): 36.8 (11 Feb 2018 08:50), Max: 36.9 (10 Feb 2018 16:56)  T(F): 98.2 (11 Feb 2018 08:50), Max: 98.4 (10 Feb 2018 16:56)  HR: 81 (11 Feb 2018 08:50) (75 - 99)  BP: 97/66 (11 Feb 2018 08:50) (95/59 - 109/65)  BP(mean): --  RR: 20 (11 Feb 2018 09:33) (18 - 20)  SpO2: 97% (11 Feb 2018 09:33) (94% - 98%)    PE  Gen: NAD  Pulm: breathing comfortabley, no signs of respiratory distress  CV: no tachcyardia  Abd: s/nd/nt, ostomy pink/viable/functional with semi-solid stool  Ext: LE warm, dry  NEuro: no focal deficits      I&O's Detail    10 Feb 2018 07:01  -  11 Feb 2018 07:00  --------------------------------------------------------  IN:  Total IN: 0 mL    OUT:    Ileostomy: 750 mL    Voided: 700 mL  Total OUT: 1450 mL    Total NET: -1450 mL          LABS:                        8.8    7.6   )-----------( 373      ( 11 Feb 2018 05:47 )             28.8     02-11    139  |  102  |  15.0  ----------------------------<  201<H>  3.7   |  26.0  |  0.48<L>    Ca    7.8<L>      11 Feb 2018 05:47  Phos  2.4     02-11  Mg     1.8     02-11    TPro  6.4<L>  /  Alb  2.9<L>  /  TBili  0.4  /  DBili  x   /  AST  17  /  ALT  46<H>  /  AlkPhos  136<H>  02-10          RADIOLOGY & ADDITIONAL STUDIES:

## 2018-02-12 DIAGNOSIS — Z93.2 ILEOSTOMY STATUS: ICD-10-CM

## 2018-02-12 RX ADMIN — Medication 1 SPRAY(S): at 13:48

## 2018-02-12 RX ADMIN — Medication 1 SPRAY(S): at 19:05

## 2018-02-12 RX ADMIN — Medication 1 TABLET(S): at 13:49

## 2018-02-12 RX ADMIN — Medication 50 MICROGRAM(S): at 05:30

## 2018-02-12 RX ADMIN — SODIUM CHLORIDE 30 MILLILITER(S): 9 INJECTION, SOLUTION INTRAVENOUS at 05:32

## 2018-02-12 RX ADMIN — LEVALBUTEROL 0.63 MILLIGRAM(S): 1.25 SOLUTION, CONCENTRATE RESPIRATORY (INHALATION) at 15:15

## 2018-02-12 RX ADMIN — LEVALBUTEROL 0.63 MILLIGRAM(S): 1.25 SOLUTION, CONCENTRATE RESPIRATORY (INHALATION) at 09:07

## 2018-02-12 RX ADMIN — TIOTROPIUM BROMIDE 1 CAPSULE(S): 18 CAPSULE ORAL; RESPIRATORY (INHALATION) at 15:28

## 2018-02-12 RX ADMIN — Medication 1 SPRAY(S): at 23:27

## 2018-02-12 RX ADMIN — Medication 81 MILLIGRAM(S): at 13:49

## 2018-02-12 RX ADMIN — Medication 0.25 MILLIGRAM(S): at 19:06

## 2018-02-12 RX ADMIN — Medication 0.25 MILLIGRAM(S): at 05:30

## 2018-02-12 RX ADMIN — FLUTICASONE PROPIONATE AND SALMETEROL 1 DOSE(S): 50; 250 POWDER ORAL; RESPIRATORY (INHALATION) at 10:50

## 2018-02-12 RX ADMIN — Medication 0.25 MILLIGRAM(S): at 13:49

## 2018-02-12 RX ADMIN — PANTOPRAZOLE SODIUM 40 MILLIGRAM(S): 20 TABLET, DELAYED RELEASE ORAL at 05:30

## 2018-02-12 RX ADMIN — TAMSULOSIN HYDROCHLORIDE 0.4 MILLIGRAM(S): 0.4 CAPSULE ORAL at 23:27

## 2018-02-12 RX ADMIN — LEVALBUTEROL 0.63 MILLIGRAM(S): 1.25 SOLUTION, CONCENTRATE RESPIRATORY (INHALATION) at 20:06

## 2018-02-12 RX ADMIN — LORATADINE 10 MILLIGRAM(S): 10 TABLET ORAL at 13:49

## 2018-02-12 RX ADMIN — Medication 100 MILLIGRAM(S): at 23:31

## 2018-02-12 RX ADMIN — LEVALBUTEROL 0.63 MILLIGRAM(S): 1.25 SOLUTION, CONCENTRATE RESPIRATORY (INHALATION) at 03:40

## 2018-02-12 RX ADMIN — ATORVASTATIN CALCIUM 40 MILLIGRAM(S): 80 TABLET, FILM COATED ORAL at 23:27

## 2018-02-12 RX ADMIN — PANTOPRAZOLE SODIUM 40 MILLIGRAM(S): 20 TABLET, DELAYED RELEASE ORAL at 19:06

## 2018-02-12 RX ADMIN — Medication 0.25 MILLIGRAM(S): at 23:31

## 2018-02-12 RX ADMIN — FLUTICASONE PROPIONATE AND SALMETEROL 1 DOSE(S): 50; 250 POWDER ORAL; RESPIRATORY (INHALATION) at 21:45

## 2018-02-12 RX ADMIN — CLOPIDOGREL BISULFATE 75 MILLIGRAM(S): 75 TABLET, FILM COATED ORAL at 13:49

## 2018-02-12 RX ADMIN — Medication 40 MILLIGRAM(S): at 05:30

## 2018-02-12 RX ADMIN — Medication 1 SPRAY(S): at 05:31

## 2018-02-12 RX ADMIN — Medication 100 MILLIGRAM(S): at 13:49

## 2018-02-12 NOTE — PROGRESS NOTE ADULT - SUBJECTIVE AND OBJECTIVE BOX
Patient seen and examined at bedside this AM. No acute events overnight. Tolerating diet. Exhibiting normal bowel function. Seen regularly by physical therapy. Now at his baseline tachycardia (low 100s) Denies fever, chills, nausea, vomitting, chest pain, SOB, dizziness, abd pain or any other concerning symptoms.     Vital Signs Last 24 Hrs  T(C): 36.6 (12 Feb 2018 10:04), Max: 36.6 (11 Feb 2018 16:37)  T(F): 97.8 (12 Feb 2018 10:04), Max: 97.9 (12 Feb 2018 04:50)  HR: 89 (12 Feb 2018 10:04) (89 - 104)  BP: 111/59 (12 Feb 2018 10:04) (95/61 - 139/73)  BP(mean): --  RR: 20 (12 Feb 2018 10:04) (20 - 21)  SpO2: 97% (12 Feb 2018 10:04) (95% - 97%)    I&O's Detail    11 Feb 2018 07:01  -  12 Feb 2018 07:00  --------------------------------------------------------  IN:    multiple electrolytes Injection Type 1: 180 mL  Total IN: 180 mL    OUT:    Ileostomy: 600 mL    Voided: 950 mL  Total OUT: 1550 mL    Total NET: -1370 mL      12 Feb 2018 07:01  -  12 Feb 2018 13:03  --------------------------------------------------------  IN:    Oral Fluid: 360 mL  Total IN: 360 mL    OUT:    Voided: 225 mL  Total OUT: 225 mL    Total NET: 135 mL    Constitutional: Patient resting comfortably in bed in no acute distress   HEENT: EOMI/PERRL b/l  Neck: No JVD, full ROM w/o pain  Respiratory: CTAB with unlabored respirations, no accessory muscle use or conversational dyspnea   Cardiovascular: Sinus tachycardia   Gastrointestinal: Ostomy pink/viable/functional with semi-solid stool and recorded output of 500cc overnight; abdomen soft, non-tender, non-distended with no rebound tenderness or guarding   Rectal: Not indicated   Neurological: GCS 15 (E4V5M6) with no gross sensory, motor or coordination deficits   Psychiatric: Normal mood and affect   Musculoskeletal: No joint pain, swelling or deformity with no limitation of movement     LABS:                        8.8    7.6   )-----------( 373      ( 11 Feb 2018 05:47 )             28.8     02-11    139  |  102  |  15.0  ----------------------------<  201<H>  3.7   |  26.0  |  0.48<L>    Ca    7.8<L>      11 Feb 2018 05:47  Phos  2.4     02-11  Mg     1.8     02-11    MEDICATIONS  (STANDING):  ALPRAZolam 0.25 milliGRAM(s) Oral every 6 hours  aspirin enteric coated 81 milliGRAM(s) Oral daily  atorvastatin 40 milliGRAM(s) Oral at bedtime  clopidogrel Tablet 75 milliGRAM(s) Oral daily  dextrose 5%. 1000 milliLiter(s) (50 mL/Hr) IV Continuous <Continuous>  dextrose 5%. 1000 milliLiter(s) (50 mL/Hr) IV Continuous <Continuous>  dextrose 50% Injectable 12.5 Gram(s) IV Push once  dextrose 50% Injectable 25 Gram(s) IV Push once  dextrose 50% Injectable 12.5 Gram(s) IV Push once  dextrose 50% Injectable 25 Gram(s) IV Push once  dextrose 50% Injectable 25 Gram(s) IV Push once  diltiazem    Tablet 120 milliGRAM(s) Oral every 8 hours  enoxaparin Injectable 40 milliGRAM(s) SubCutaneous daily  FIRST- Mouthwash  BLM 10 milliLiter(s) Swish and Spit two times a day  fluticasone propionate 50 MICROgram(s)/spray Nasal Spray 1 Spray(s) Both Nostrils two times a day  fluticasone propionate/ salmeterol 250-50 MICROgram(s) Diskus 1 Dose(s) Inhalation two times a day  insulin lispro (HumaLOG) corrective regimen sliding scale   SubCutaneous three times a day before meals  insulin lispro (HumaLOG) corrective regimen sliding scale   SubCutaneous at bedtime  levalbuterol Inhalation 0.63 milliGRAM(s) Inhalation every 6 hours  levoFLOXacin IVPB      levoFLOXacin IVPB 750 milliGRAM(s) IV Intermittent every 24 hours  levothyroxine 50 MICROGram(s) Oral daily  loratadine 10 milliGRAM(s) Oral daily  methylPREDNISolone sodium succinate Injectable 40 milliGRAM(s) IV Push every 12 hours  Mucinex DM 1200 mg/ 60 mg tablet 1 Tablet(s) 1 Tablet(s) Oral two times a day  multiple electrolytes Injection Type 1 1000 milliLiter(s) (30 mL/Hr) IV Continuous <Continuous>  multivitamin 1 Tablet(s) Oral daily  pantoprazole    Tablet 40 milliGRAM(s) Oral two times a day before meals  sodium chloride 0.65% Nasal 1 Spray(s) Both Nostrils four times a day  tamsulosin 0.4 milliGRAM(s) Oral at bedtime  tiotropium 18 MICROgram(s) Capsule 1 Capsule(s) Inhalation daily    MEDICATIONS  (PRN):  acetaminophen   Tablet 650 milliGRAM(s) Oral every 6 hours PRN Fever, pain, headache  acetylcysteine 20% Inhalation 2.5 milliLiter(s) Inhalation every 6 hours PRN expectorant  benzocaine 15 mG/menthol 3.6 mG Lozenge 1 Lozenge Oral every 4 hours PRN Sore Throat  benzonatate 100 milliGRAM(s) Oral every 6 hours PRN Cough  dextrose Gel 1 Dose(s) Oral once PRN Blood Glucose LESS THAN 70 milliGRAM(s)/deciliter  glucagon  Injectable 1 milliGRAM(s) IntraMuscular once PRN Glucose LESS THAN 70 milligrams/deciliter  ondansetron Injectable 4 milliGRAM(s) IV Push every 4 hours PRN Nausea and/or Vomiting    MICRO:       STUDIES:

## 2018-02-12 NOTE — CHART NOTE - NSCHARTNOTEFT_GEN_A_CORE
Source: Patient [x ]  Family [ ]   other [ ]    Current Diet: regular with ensure clears TID    Patient reports [ ] nausea  [ ] vomiting [ ] diarrhea [ ] constipation  [ ]chewing problems [ ] swallowing issues  [ ] other:     PO intake:  < 50% [ ]   50-75%  [ ]   %  [ ]  other : po intake has been declining and varies from good to poor    Source for PO intake [x ] Patient [ ] family [x ] chart [ ] staff [ ] other    Enteral /Parenteral Nutrition:     Current Weight:     % Weight Change     Pertinent Medications: MEDICATIONS  (STANDING):  ALPRAZolam 0.25 milliGRAM(s) Oral every 6 hours  aspirin enteric coated 81 milliGRAM(s) Oral daily  atorvastatin 40 milliGRAM(s) Oral at bedtime  clopidogrel Tablet 75 milliGRAM(s) Oral daily  dextrose 5%. 1000 milliLiter(s) (50 mL/Hr) IV Continuous <Continuous>  dextrose 5%. 1000 milliLiter(s) (50 mL/Hr) IV Continuous <Continuous>  dextrose 50% Injectable 12.5 Gram(s) IV Push once  dextrose 50% Injectable 25 Gram(s) IV Push once  dextrose 50% Injectable 12.5 Gram(s) IV Push once  dextrose 50% Injectable 25 Gram(s) IV Push once  dextrose 50% Injectable 25 Gram(s) IV Push once  diltiazem    Tablet 120 milliGRAM(s) Oral every 8 hours  enoxaparin Injectable 40 milliGRAM(s) SubCutaneous daily  FIRST- Mouthwash  BLM 10 milliLiter(s) Swish and Spit two times a day  fluticasone propionate 50 MICROgram(s)/spray Nasal Spray 1 Spray(s) Both Nostrils two times a day  fluticasone propionate/ salmeterol 250-50 MICROgram(s) Diskus 1 Dose(s) Inhalation two times a day  insulin lispro (HumaLOG) corrective regimen sliding scale   SubCutaneous three times a day before meals  insulin lispro (HumaLOG) corrective regimen sliding scale   SubCutaneous at bedtime  levalbuterol Inhalation 0.63 milliGRAM(s) Inhalation every 6 hours  levoFLOXacin IVPB      levoFLOXacin IVPB 750 milliGRAM(s) IV Intermittent every 24 hours  levothyroxine 50 MICROGram(s) Oral daily  loratadine 10 milliGRAM(s) Oral daily  methylPREDNISolone sodium succinate Injectable 40 milliGRAM(s) IV Push every 12 hours  Mucinex DM 1200 mg/ 60 mg tablet 1 Tablet(s) 1 Tablet(s) Oral two times a day  multiple electrolytes Injection Type 1 1000 milliLiter(s) (30 mL/Hr) IV Continuous <Continuous>  multivitamin 1 Tablet(s) Oral daily  pantoprazole    Tablet 40 milliGRAM(s) Oral two times a day before meals  sodium chloride 0.65% Nasal 1 Spray(s) Both Nostrils four times a day  tamsulosin 0.4 milliGRAM(s) Oral at bedtime  tiotropium 18 MICROgram(s) Capsule 1 Capsule(s) Inhalation daily    MEDICATIONS  (PRN):  acetaminophen   Tablet 650 milliGRAM(s) Oral every 6 hours PRN Fever, pain, headache  acetylcysteine 20% Inhalation 2.5 milliLiter(s) Inhalation every 6 hours PRN expectorant  benzocaine 15 mG/menthol 3.6 mG Lozenge 1 Lozenge Oral every 4 hours PRN Sore Throat  benzonatate 100 milliGRAM(s) Oral every 6 hours PRN Cough  dextrose Gel 1 Dose(s) Oral once PRN Blood Glucose LESS THAN 70 milliGRAM(s)/deciliter  glucagon  Injectable 1 milliGRAM(s) IntraMuscular once PRN Glucose LESS THAN 70 milligrams/deciliter  ondansetron Injectable 4 milliGRAM(s) IV Push every 4 hours PRN Nausea and/or Vomiting    Pertinent Labs: CBC Full  -  ( 11 Feb 2018 05:47 )  WBC Count : 7.6 K/uL  Hemoglobin : 8.8 g/dL  Hematocrit : 28.8 %  Platelet Count - Automated : 373 K/uL  Mean Cell Volume : 90.3 fl  Mean Cell Hemoglobin : 27.6 pg  Mean Cell Hemoglobin Concentration : 30.6 g/dL  Auto Neutrophil # : 6.9 K/uL  Auto Lymphocyte # : 0.3 K/uL  Auto Monocyte # : 0.4 K/uL  Auto Eosinophil # : 0.0 K/uL  Auto Basophil # : x  Auto Neutrophil % : 90.6 %  Auto Lymphocyte % : 4.3 %  Auto Monocyte % : 5.0 %  Auto Eosinophil % : 0.0 %  Auto Basophil % : x    02-11 Na139 mmol/L Glu 201 mg/dL<H> K+ 3.7 mmol/L Cr  0.48 mg/dL<L> BUN 15.0 mg/dL Phos 2.4 mg/dL Alb n/a   PAB n/a             Skin:     Nutrition focused physical exam conducted - found signs of malnutrition [ ]absent [x ]present    Subcutaneous fat loss: [ x] Orbital fat pads region, [ ]Buccal fat region, [ ]Triceps region,  [x ]Ribs region    Muscle wasting: [x ]Temples region, [x ]Clavicle region, x ]Shoulder region, [x ]Scapula region, [ ]Interosseous region,  [x ]thigh region, [ ]Calf region    Estimated Needs:   [x ] no change since previous assessment  [ ] recalculated:     Current Nutrition Diagnosis:   Pt meets criteria for severe chronic malnutrition related to absorption issues, in setting of ileostomy as evidenced by high ostomy output, 36# weight loss since admission 11/29, severe fat/ muscle depletion in thoracic region, thighs, temples, clavicles. Pt with s/p ileostomy revision. Diet restarted and pt tolerating well ~% as per pt.   TPN now discontinued.  Ileostomy functioning well with formed stools. Now with respiratory distress and po intake has varied from poor to good. This AM pt ate well. Continue to encourage small frequent meals. Monitor po intake, tolerance.      Recommendations: continue to monitor po intake    Monitoring and Evaluation:   [x ] PO intake [x ] Tolerance to diet prescription [X] Weights  [X] Follow up per protocol [X] Labs:

## 2018-02-12 NOTE — PROGRESS NOTE ADULT - ASSESSMENT
67 year old male s/p loop jejunostomy 12/7 who underwent revision of jejunostomy and creation of loop ileostomy 1/16 now medically cleared for discharge. Patient unable to go home today since his primary caretaker (spouse) is sick with the flu and given patient's fragile condition, would be advisable for him to remain hospitalized for now. Pulmonary contacted regarding possibility of PPx Tamiflu for patient and do not think is necessary at this time.

## 2018-02-12 NOTE — PROGRESS NOTE ADULT - PROBLEM SELECTOR PLAN 1
1. Neuro - AOX3 with minimal pain complaints   2. Cardiovascular - Baseline tachycardia to one teens on 120mg cardizem per cards recs   3. Pulmonary - Encourage ICS use   4. Fluids/Electrolytes/Nutrition (FEN) - On plasmalyte @ 30 - heplock if adequate PO intake   5. GI - Regular diet with ensure supplementation   6. /Renal - Voiding independently with adequate UOP and no new issues  7. Heme - Stable H/H @ 8.8?28.2 from 8.7/28.2   8. ID - Afebrile with normal WBC   9. Endocrine - On synthroid home dose and ISS   10. MSK - Regularly ambulatory with physical therapy assistance   11. Prophylaxis - On Lov, ASA and Plavix   12. Lines/Tubes/Drains - Peripheral IV for access   13. Disposition - Anticipating discharge home 2/15

## 2018-02-13 RX ADMIN — Medication 0.25 MILLIGRAM(S): at 06:25

## 2018-02-13 RX ADMIN — Medication 1 SPRAY(S): at 18:12

## 2018-02-13 RX ADMIN — LEVALBUTEROL 0.63 MILLIGRAM(S): 1.25 SOLUTION, CONCENTRATE RESPIRATORY (INHALATION) at 03:28

## 2018-02-13 RX ADMIN — Medication 1 SPRAY(S): at 12:55

## 2018-02-13 RX ADMIN — Medication 50 MICROGRAM(S): at 06:26

## 2018-02-13 RX ADMIN — Medication 100 MILLIGRAM(S): at 23:58

## 2018-02-13 RX ADMIN — TIOTROPIUM BROMIDE 1 CAPSULE(S): 18 CAPSULE ORAL; RESPIRATORY (INHALATION) at 08:22

## 2018-02-13 RX ADMIN — LEVALBUTEROL 0.63 MILLIGRAM(S): 1.25 SOLUTION, CONCENTRATE RESPIRATORY (INHALATION) at 08:22

## 2018-02-13 RX ADMIN — TAMSULOSIN HYDROCHLORIDE 0.4 MILLIGRAM(S): 0.4 CAPSULE ORAL at 23:58

## 2018-02-13 RX ADMIN — Medication 40 MILLIGRAM(S): at 06:26

## 2018-02-13 RX ADMIN — Medication 0.25 MILLIGRAM(S): at 23:57

## 2018-02-13 RX ADMIN — Medication 100 MILLIGRAM(S): at 12:56

## 2018-02-13 RX ADMIN — PANTOPRAZOLE SODIUM 40 MILLIGRAM(S): 20 TABLET, DELAYED RELEASE ORAL at 06:26

## 2018-02-13 RX ADMIN — Medication 1 TABLET(S): at 12:56

## 2018-02-13 RX ADMIN — ATORVASTATIN CALCIUM 40 MILLIGRAM(S): 80 TABLET, FILM COATED ORAL at 23:58

## 2018-02-13 RX ADMIN — Medication 0.25 MILLIGRAM(S): at 12:56

## 2018-02-13 RX ADMIN — Medication 1 SPRAY(S): at 06:26

## 2018-02-13 RX ADMIN — CLOPIDOGREL BISULFATE 75 MILLIGRAM(S): 75 TABLET, FILM COATED ORAL at 12:56

## 2018-02-13 RX ADMIN — Medication 0.25 MILLIGRAM(S): at 18:14

## 2018-02-13 RX ADMIN — LORATADINE 10 MILLIGRAM(S): 10 TABLET ORAL at 12:56

## 2018-02-13 RX ADMIN — Medication 81 MILLIGRAM(S): at 12:56

## 2018-02-13 RX ADMIN — FLUTICASONE PROPIONATE AND SALMETEROL 1 DOSE(S): 50; 250 POWDER ORAL; RESPIRATORY (INHALATION) at 20:52

## 2018-02-13 RX ADMIN — LEVALBUTEROL 0.63 MILLIGRAM(S): 1.25 SOLUTION, CONCENTRATE RESPIRATORY (INHALATION) at 20:20

## 2018-02-13 RX ADMIN — FLUTICASONE PROPIONATE AND SALMETEROL 1 DOSE(S): 50; 250 POWDER ORAL; RESPIRATORY (INHALATION) at 10:24

## 2018-02-13 RX ADMIN — Medication 100 MILLIGRAM(S): at 06:27

## 2018-02-13 RX ADMIN — PANTOPRAZOLE SODIUM 40 MILLIGRAM(S): 20 TABLET, DELAYED RELEASE ORAL at 18:12

## 2018-02-13 NOTE — PROGRESS NOTE ADULT - ASSESSMENT
A/P 78M s/p revision of jejunostomy and creation of loop ileostomy     -c/w current diet  -f/u AM labs   -calorie count  -f/u dietician recs  -steroids taper  -last day of levaquin  -replete lytes prn   -monitor ostomy output  -strict Is and Os  -pain control  -encourage ambulation and IS  -DVT ppx  -PT/OT BID  -Dispo planning: home with home care on Thurs

## 2018-02-13 NOTE — PROGRESS NOTE ADULT - SUBJECTIVE AND OBJECTIVE BOX
INTERVAL HPI/OVERNIGHT EVENTS:    SUBJECTIVE:      MEDICATIONS  (STANDING):  ALPRAZolam 0.25 milliGRAM(s) Oral every 6 hours  aspirin enteric coated 81 milliGRAM(s) Oral daily  atorvastatin 40 milliGRAM(s) Oral at bedtime  clopidogrel Tablet 75 milliGRAM(s) Oral daily  dextrose 5%. 1000 milliLiter(s) (50 mL/Hr) IV Continuous <Continuous>  dextrose 5%. 1000 milliLiter(s) (50 mL/Hr) IV Continuous <Continuous>  dextrose 50% Injectable 12.5 Gram(s) IV Push once  dextrose 50% Injectable 25 Gram(s) IV Push once  dextrose 50% Injectable 12.5 Gram(s) IV Push once  dextrose 50% Injectable 25 Gram(s) IV Push once  dextrose 50% Injectable 25 Gram(s) IV Push once  diltiazem    Tablet 120 milliGRAM(s) Oral every 8 hours  enoxaparin Injectable 40 milliGRAM(s) SubCutaneous daily  FIRST- Mouthwash  BLM 10 milliLiter(s) Swish and Spit two times a day  fluticasone propionate 50 MICROgram(s)/spray Nasal Spray 1 Spray(s) Both Nostrils two times a day  fluticasone propionate/ salmeterol 250-50 MICROgram(s) Diskus 1 Dose(s) Inhalation two times a day  insulin lispro (HumaLOG) corrective regimen sliding scale   SubCutaneous three times a day before meals  insulin lispro (HumaLOG) corrective regimen sliding scale   SubCutaneous at bedtime  levalbuterol Inhalation 0.63 milliGRAM(s) Inhalation every 6 hours  levoFLOXacin IVPB      levoFLOXacin IVPB 750 milliGRAM(s) IV Intermittent every 24 hours  levothyroxine 50 MICROGram(s) Oral daily  loratadine 10 milliGRAM(s) Oral daily  Mucinex DM 1200 mg/ 60 mg tablet 1 Tablet(s) 1 Tablet(s) Oral two times a day  multivitamin 1 Tablet(s) Oral daily  pantoprazole    Tablet 40 milliGRAM(s) Oral two times a day before meals  sodium chloride 0.65% Nasal 1 Spray(s) Both Nostrils four times a day  tamsulosin 0.4 milliGRAM(s) Oral at bedtime  tiotropium 18 MICROgram(s) Capsule 1 Capsule(s) Inhalation daily    MEDICATIONS  (PRN):  acetaminophen   Tablet 650 milliGRAM(s) Oral every 6 hours PRN Fever, pain, headache  acetylcysteine 20% Inhalation 2.5 milliLiter(s) Inhalation every 6 hours PRN expectorant  benzocaine 15 mG/menthol 3.6 mG Lozenge 1 Lozenge Oral every 4 hours PRN Sore Throat  benzonatate 100 milliGRAM(s) Oral every 6 hours PRN Cough  dextrose Gel 1 Dose(s) Oral once PRN Blood Glucose LESS THAN 70 milliGRAM(s)/deciliter  glucagon  Injectable 1 milliGRAM(s) IntraMuscular once PRN Glucose LESS THAN 70 milligrams/deciliter  ondansetron Injectable 4 milliGRAM(s) IV Push every 4 hours PRN Nausea and/or Vomiting      Vital Signs Last 24 Hrs  T(C): 36.4 (13 Feb 2018 10:22), Max: 36.7 (13 Feb 2018 05:51)  T(F): 97.6 (13 Feb 2018 10:22), Max: 98 (13 Feb 2018 05:51)  HR: 93 (13 Feb 2018 10:22) (77 - 94)  BP: 126/72 (13 Feb 2018 10:22) (104/66 - 126/72)  BP(mean): --  RR: 19 (13 Feb 2018 10:22) (16 - 19)  SpO2: 96% (13 Feb 2018 10:22) (95% - 98%)    PE  AAOx3, NAD  CTAx2  RRR, S1/S2  S/ND/NT, no rebound or guarding, ileostomy functioning; stoma ispink and viable, +gas, red rubber bridge and formed stool in ostomy bag  no pitting edema        I&O's Detail    12 Feb 2018 07:01  -  13 Feb 2018 07:00  --------------------------------------------------------  IN:    multiple electrolytes Injection Type 1multiple electrolytes Injection Type 1: 630 mL    Oral Fluid: 720 mL  Total IN: 1350 mL    OUT:    Ileostomy: 450 mL    Voided: 1250 mL  Total OUT: 1700 mL    Total NET: -350 mL      13 Feb 2018 07:01  -  13 Feb 2018 12:15  --------------------------------------------------------  IN:  Total IN: 0 mL    OUT:    Ileostomy: 125 mL  Total OUT: 125 mL    Total NET: -125 mL          LABS:

## 2018-02-13 NOTE — PROGRESS NOTE ADULT - PROBLEM SELECTOR PROBLEM 2
Pneumonia
Pneumonia
Small bowel obstruction
Capillary refill less/equal to 2 seconds/Strong peripheral pulses

## 2018-02-13 NOTE — PROGRESS NOTE ADULT - PROBLEM SELECTOR PROBLEM 3
Diabetes mellitus
Type 2 diabetes mellitus without complication, without long-term current use of insulin

## 2018-02-13 NOTE — PROGRESS NOTE ADULT - PROBLEM SELECTOR PROBLEM 1
Diarrhea, unspecified type
Diarrhea, unspecified type
Constipation, unspecified constipation type
Constipation, unspecified constipation type
Diarrhea, unspecified type
Ileostomy dysfunction
Ileus
Small bowel obstruction
COPD exacerbation
ILD (interstitial lung disease)
Ileostomy dysfunction
Ileostomy in place
Ileostomy dysfunction
Ileostomy in place

## 2018-02-13 NOTE — ADVANCED PRACTICE NURSE CONSULT - ASSESSMENT
Pt is alert and oriented x3, OOB to bedside chair. Pt stated that he felt much better now. However, pt still refused to participate in the ileostomy care. As per RN, pt still not able to look at the stoma.  Extra ostomy supplies left at the bedside for the patient. As per pt, his wife is sick at home with flu, attempted to call pt's wife at home multiple time today, but no body  the phone at home. MASON Josue made aware, instructed RN to call ostomy nurse if wife comes to the hospital tomorrow.

## 2018-02-14 LAB
CULTURE RESULTS: SIGNIFICANT CHANGE UP
CULTURE RESULTS: SIGNIFICANT CHANGE UP
SPECIMEN SOURCE: SIGNIFICANT CHANGE UP
SPECIMEN SOURCE: SIGNIFICANT CHANGE UP

## 2018-02-14 RX ADMIN — Medication 1 SPRAY(S): at 00:01

## 2018-02-14 RX ADMIN — Medication 1 TABLET(S): at 13:01

## 2018-02-14 RX ADMIN — Medication 20 MILLIGRAM(S): at 07:28

## 2018-02-14 RX ADMIN — LEVALBUTEROL 0.63 MILLIGRAM(S): 1.25 SOLUTION, CONCENTRATE RESPIRATORY (INHALATION) at 15:48

## 2018-02-14 RX ADMIN — Medication 1 SPRAY(S): at 06:32

## 2018-02-14 RX ADMIN — LEVALBUTEROL 0.63 MILLIGRAM(S): 1.25 SOLUTION, CONCENTRATE RESPIRATORY (INHALATION) at 09:48

## 2018-02-14 RX ADMIN — CLOPIDOGREL BISULFATE 75 MILLIGRAM(S): 75 TABLET, FILM COATED ORAL at 13:01

## 2018-02-14 RX ADMIN — TIOTROPIUM BROMIDE 1 CAPSULE(S): 18 CAPSULE ORAL; RESPIRATORY (INHALATION) at 09:48

## 2018-02-14 RX ADMIN — Medication 0.25 MILLIGRAM(S): at 17:42

## 2018-02-14 RX ADMIN — Medication 0.25 MILLIGRAM(S): at 13:00

## 2018-02-14 RX ADMIN — LORATADINE 10 MILLIGRAM(S): 10 TABLET ORAL at 13:01

## 2018-02-14 RX ADMIN — PANTOPRAZOLE SODIUM 40 MILLIGRAM(S): 20 TABLET, DELAYED RELEASE ORAL at 17:42

## 2018-02-14 RX ADMIN — Medication 100 MILLIGRAM(S): at 21:49

## 2018-02-14 RX ADMIN — Medication 20 MILLIGRAM(S): at 17:43

## 2018-02-14 RX ADMIN — FLUTICASONE PROPIONATE AND SALMETEROL 1 DOSE(S): 50; 250 POWDER ORAL; RESPIRATORY (INHALATION) at 07:34

## 2018-02-14 RX ADMIN — FLUTICASONE PROPIONATE AND SALMETEROL 1 DOSE(S): 50; 250 POWDER ORAL; RESPIRATORY (INHALATION) at 21:50

## 2018-02-14 RX ADMIN — Medication 81 MILLIGRAM(S): at 13:01

## 2018-02-14 RX ADMIN — Medication 50 MICROGRAM(S): at 06:30

## 2018-02-14 RX ADMIN — LEVALBUTEROL 0.63 MILLIGRAM(S): 1.25 SOLUTION, CONCENTRATE RESPIRATORY (INHALATION) at 03:51

## 2018-02-14 RX ADMIN — TAMSULOSIN HYDROCHLORIDE 0.4 MILLIGRAM(S): 0.4 CAPSULE ORAL at 21:49

## 2018-02-14 RX ADMIN — ATORVASTATIN CALCIUM 40 MILLIGRAM(S): 80 TABLET, FILM COATED ORAL at 21:49

## 2018-02-14 RX ADMIN — PANTOPRAZOLE SODIUM 40 MILLIGRAM(S): 20 TABLET, DELAYED RELEASE ORAL at 06:30

## 2018-02-14 RX ADMIN — LEVALBUTEROL 0.63 MILLIGRAM(S): 1.25 SOLUTION, CONCENTRATE RESPIRATORY (INHALATION) at 20:17

## 2018-02-14 RX ADMIN — Medication 100 MILLIGRAM(S): at 06:30

## 2018-02-14 RX ADMIN — Medication 1 SPRAY(S): at 17:42

## 2018-02-14 RX ADMIN — Medication 1 SPRAY(S): at 13:01

## 2018-02-14 RX ADMIN — Medication 0.25 MILLIGRAM(S): at 06:30

## 2018-02-14 RX ADMIN — Medication 100 MILLIGRAM(S): at 13:01

## 2018-02-14 NOTE — PROGRESS NOTE ADULT - SUBJECTIVE AND OBJECTIVE BOX
HPI: 66yo male Patient seen and examined at bedside this AM. No acute events overnight. Tolerating diet. Exhibiting normal bowel function. Seen regularly by physical therapy. Now at his baseline tachycardia (low 100s) Denies fever, chills, nausea, vomiting chest pain, SOB, dizziness, abd pain or any other concerning symptoms.     ICU Vital Signs Last 24 Hrs  T(C): 36.4 (14 Feb 2018 09:34), Max: 36.4 (13 Feb 2018 16:54)  T(F): 97.5 (14 Feb 2018 09:34), Max: 97.5 (13 Feb 2018 16:54)  HR: 83 (14 Feb 2018 13:00) (79 - 96)  BP: 120/65 (14 Feb 2018 13:00) (104/63 - 120/65)  RR: 15 (14 Feb 2018 09:34) (15 - 16)  SpO2: 94% (14 Feb 2018 13:33) (93% - 98%)      I&O's Detail    13 Feb 2018 07:01  -  14 Feb 2018 07:00  --------------------------------------------------------  IN:    Solution: 150 mL  Total IN: 150 mL    OUT:    Ileostomy: 680 mL    Nasoenteral Tube: 150 mL    Voided: 1215 mL  Total OUT: 2045 mL    Total NET: -1895 mL      14 Feb 2018 07:01  -  14 Feb 2018 15:00  --------------------------------------------------------  IN:  Total IN: 0 mL    OUT:    Ileostomy: 325 mL  Total OUT: 325 mL    Total NET: -325 mL                MEDICATIONS  (STANDING):  ALPRAZolam 0.25 milliGRAM(s) Oral every 6 hours  aspirin enteric coated 81 milliGRAM(s) Oral daily  atorvastatin 40 milliGRAM(s) Oral at bedtime  clopidogrel Tablet 75 milliGRAM(s) Oral daily  dextrose 5%. 1000 milliLiter(s) (50 mL/Hr) IV Continuous <Continuous>  dextrose 5%. 1000 milliLiter(s) (50 mL/Hr) IV Continuous <Continuous>  dextrose 50% Injectable 12.5 Gram(s) IV Push once  dextrose 50% Injectable 25 Gram(s) IV Push once  dextrose 50% Injectable 12.5 Gram(s) IV Push once  dextrose 50% Injectable 25 Gram(s) IV Push once  dextrose 50% Injectable 25 Gram(s) IV Push once  diltiazem    Tablet 120 milliGRAM(s) Oral every 8 hours  enoxaparin Injectable 40 milliGRAM(s) SubCutaneous daily  FIRST- Mouthwash  BLM 10 milliLiter(s) Swish and Spit two times a day  fluticasone propionate 50 MICROgram(s)/spray Nasal Spray 1 Spray(s) Both Nostrils two times a day  fluticasone propionate/ salmeterol 250-50 MICROgram(s) Diskus 1 Dose(s) Inhalation two times a day  insulin lispro (HumaLOG) corrective regimen sliding scale   SubCutaneous three times a day before meals  insulin lispro (HumaLOG) corrective regimen sliding scale   SubCutaneous at bedtime  levalbuterol Inhalation 0.63 milliGRAM(s) Inhalation every 6 hours  levothyroxine 50 MICROGram(s) Oral daily  loratadine 10 milliGRAM(s) Oral daily  methylPREDNISolone sodium succinate Injectable 20 milliGRAM(s) IV Push every 12 hours  Mucinex DM 1200 mg/ 60 mg tablet 1 Tablet(s) 1 Tablet(s) Oral two times a day  multivitamin 1 Tablet(s) Oral daily  pantoprazole    Tablet 40 milliGRAM(s) Oral two times a day before meals  sodium chloride 0.65% Nasal 1 Spray(s) Both Nostrils four times a day  tamsulosin 0.4 milliGRAM(s) Oral at bedtime  tiotropium 18 MICROgram(s) Capsule 1 Capsule(s) Inhalation daily    MEDICATIONS  (PRN):  acetaminophen   Tablet 650 milliGRAM(s) Oral every 6 hours PRN Fever, pain, headache  acetylcysteine 20% Inhalation 2.5 milliLiter(s) Inhalation every 6 hours PRN expectorant  benzocaine 15 mG/menthol 3.6 mG Lozenge 1 Lozenge Oral every 4 hours PRN Sore Throat  benzonatate 100 milliGRAM(s) Oral every 6 hours PRN Cough  dextrose Gel 1 Dose(s) Oral once PRN Blood Glucose LESS THAN 70 milliGRAM(s)/deciliter  glucagon  Injectable 1 milliGRAM(s) IntraMuscular once PRN Glucose LESS THAN 70 milligrams/deciliter  ondansetron Injectable 4 milliGRAM(s) IV Push every 4 hours PRN Nausea and/or Vomiting      NUTRITION/IVF: DASH      PHYSICAL EXAM:    Constitutional: Patient resting comfortably in bed in no acute distress   HEENT: EOMI/PERRL b/l  Neck: No JVD, full ROM w/o pain  Respiratory: CTAB with unlabored respirations, no accessory muscle use or conversational dyspnea   Cardiovascular: Sinus tachycardia   Gastrointestinal: Ostomy pink/viable/functional with semi-solid stool and recorded output of 500cc overnight; abdomen soft, non-tender, non-distended with no rebound tenderness or guarding   Rectal: Not indicated   Neurological: GCS 15 (E4V5M6) with no gross sensory, motor or coordination deficits   Psychiatric: Normal mood and affect   Musculoskeletal: No joint pain, swelling or deformity with no limitation of movement     LABS:    RECENT CULTURES:  02-10 .Blood Blood-Peripheral XXXX XXXX   No growth at 48 hours    02-09 .Blood Blood-Peripheral XXXX XXXX   No growth at 48 hours    02-09 .Blood Blood-Peripheral XXXX XXXX   No growth at 48 hours    02-09 .Sputum Sputum Escherichia coli  Pseudomonas aeruginosa   Few White blood cells  Moderate Gram Positive Cocci in Pairs and Chains   Moderate Escherichia coli  Moderate Pseudomonas aeruginosa  Few Routine respiratory swetha present              CAPILLARY BLOOD GLUCOSE      RADIOLOGY & ADDITIONAL STUDIES:    ASSESSMENT/PLAN:  67yMale presenting with: s/p revision of jejunostomy and creation of loop ileostomy   -c/w current diet  -f/u AM labs   -steroids taper  -replete lytes prn   -monitor ostomy output  -pain control  -encourage ambulation and IS  -DVT ppx  -PT/OT BID  -Dispo planning: home with home care on Thurs

## 2018-02-14 NOTE — PROGRESS NOTE ADULT - I WAS PHYSICALLY PRESENT FOR THE KEY PORTIONS OF THE EVALUATION AND MANAGEMENT (E/M) SERVICE PROVIDED.  I AGREE WITH THE ABOVE HISTORY, PHYSICAL, AND PLAN WHICH I HAVE REVIEWED AND EDITED WHERE APPROPRIATE

## 2018-02-14 NOTE — PROGRESS NOTE ADULT - ATTENDING COMMENTS
Feels well this morning.  Denies abdominal pain.  Wants NGT removed.  Afebrile.  HD normal.  Lactate 1.2 from yesterday.  Today's labs pending.  NGT output documented at 150ml.  Abdomen: S/NT/ND, loop ileostomy functional.  Possibly clamp NGT today and if tolerates may remove.  Needs PICC for TPN.
Much improved this morning.  TPN has started.  Stoma functional.  Afebrile. HD normal.  WBC normalized (9.4K).  Abdomen: S/NT/ND, stoma is pink, viable and functional.  Continue with TPN.  Comfort feeds by mouth.  Stoma nurse to see patient at his request because he would like a new type of stoma bag.
Patient seen and examined twice over the course of the day.  Vitals and labs reviewed, no fevers, no leukocytosis.  Patient in no distress.  Does not want NGT in.  Denies abdominal pain.  Abdominal exam is benign, not distended, soft, non-tender.  CT A/P reviewed w/ Dr. Santillan of radiology.  Pneumoperitoneum, ascites in pelvis, some dilated loops of SB with some of them thickened, contrast flows through to ileostomy and bag with no contrast leak.  Details of CT discussed with patient, his wife and friend.  While CT findings are concerning, clinically he has a benign exam and has remained so over time.  Will continue serial abdominal exams.  Explained to patient and family that showed clinical exam, labs or vitals change then he will need exploration.  It may be that he has a refeeding syndrome and the distal SB loops are atrophic causing them to look thickened.  Plan to keep NGT and start TPN.
discussed with wife present bedside, updated
The patient was seen and examined  No new problems  Will plan for discharge tomorrow
The patient was seen and examined  Xrays reviewed  The patient remains obstipated   I believe the patient requires a palliative ileostomy (under local anesthesia)  Will discuss with GI
Asleep but arousable.  Feels better than the other day.  No complaints expressed on rounds this morning.  Abdomen: S/NT/ND, stoma viable and functional.  Dispo planning
Continues to improve.  More energetic.  Tolerating regular diet.  Getting out of bed.      Beginning disposition planning.  Feel that acute rehab setting would be best for pt given expected needs.  Pt very resistant to this.  Wants to go home.  Have for the third day consecutively discussed this recommendation with the patient and his wife.  Pt does possess capacity to make this decision and his wishes will need to be honored.  I am concerned that this will lead to further deconditioning and decline given the limited amount of therapy that can be provided in home.  Pt understands these concerns.  Will continue to discuss these options daily with Mr and Kaylie Stephanielon.
DC planning in am if stable
Discussed with Wife at bedside.  Hospitalist service to reassign care in am
Discussed with patient, spouse at bedside.
Patient seen and examined.  dyspneic at baseline.  discussed plan for revision of ileostomy tomorrow, low risk from cardiac stand point and high risk from respiratory stand point.  Discuss that under local analgesia the plan for revision, might required intubation with unclear timing of ventilator liberation.  Patient to continue to fully discuss with pulmonary.  As of nog plan for revision tomorrow under local anesthesia.
Patient seen and examined.  had dome emesis overnight, not feeling that well this am  abdomen is doft, non  tender, stoma is functional with more formed output.  emesis most likely from overeating yesterday,   keep him on liquids and continue TPN
Patient seen and examined.  no new issues.  tolerating clear liquids.  stoma functioning and productive, abdomen obese, soft, non tender    plan to advance diet, if tolerated will consider stopping TPN after 7 days
Plan is for revision of loop jejunostomy to a loop ileostomy today.
Pt lethargic and tachypneic on exam this AM.  States he just worked with PT and that why.  Offers no other complaints.      Reevaluation later in afternoon without improvement.  Tachycardia, low grade fever have also developed.      Labs with hemoconcentration.  Collateral hx suggests poor PO intake over past 24-36 hrs.      + resp distress, regular tachycardia, Abd soft, NT.  Ileostomy viable.  Brown semisolid stool.  Decreased BS throughout.      IV fluids initiated, BCX sent, abx started or presumed sepsis.  Pt refused abg and transfer to ICU for closer monitoring and more intensive therapy.
Recommend on going octreotide.  Recommend TPN to optimize nutritional status. Will need revision of ostomy. Please keep plavix on hold.
Resp distress resolved.  Symptoms of dyspnea resolved.      Eating well.  Acceptable ileostomy output.  Still resistant to Acute rehab placement.  Have again stressed needs.  Pt concerned that [facility] too far away for wife to come see him.  Will ask CM to assess for possibility of closer placement.
Seen and examined multiple times throughout the day.  Have had multiple conversations with patient and wife regarding clinical condition. Abdomen: S/NT/ND, loop jejunostomy viable and functional.  UGI series reviewed.   Very proximal, high-output loop jejunostomy.  Needs revision.  Patient says he would refuse general anesthesia.
The patient was seen and examined  Afebrile  Abdomen is softly distended, minimal diffuse tenderness  CT reviewed  I am concerned that this patient may have a colonic obstruction  Will place whitman for his distended bladder  Follow physical exam
The patient was seen and examined  No new problems  Patient is in good spirits and is eating better  Afebrile  Abdomen is soft, non-tender.  Stoma viable with solid output  Plan:  Discharge planning
The patient was seen and examined  Operation today
Tolerating current diet.  Ileostomy output brown stool.      Continue nutritional support and rehab services.  Have advocated to pt for acute rehab placement given prolonged hospital stay and expected rehab needs.  SW and case management for dispo planning.
discussed with wife present bedside, updated
extensive review clinical findings and Ct. All discussed with family. Recommendations made to GI. We did take patient on our service. A CT was done because of concern about possible SBO. Contrast transits the entire small bowel in less than an hour and was present in the ileostomy at time of study. free air was noted on CT scan. but there is no extravasation and no evidence of any peritoneal irritation on exam, normal wbc. three attendings saw the patient all agree. He definitely needs TPN for nutritional support to facilitate the recovery of small bowel absorptive service.  This will be started tomorrow.
Patient seen and examined.  No new issues.  dyspneic, will have pulmonary to reevaluate  Cont diet, stoma fucntioning  PT/OT
Patient seen and examined.  feels better  tolerating diet  Stoma with more formed output.  TPN for 5 days to allow SB mucosal recovery  Diet as tolerated.
Worsening dyspnea and WOB.  BS equal but diminished in all fields.      Suspect bronchospasm.  Albuterol ordered.  CXR obtained and reviewed.  No significant acute findings.      Anemia slightly worse.  May need great oxygen carrying capacity and pt agreeable to transfusion.      Pulm reconsulted for further recs.  Team to serially reevaluate pt.  Does not require ICU level care at this time.
Remains clinically well but malnourished.  Remains with high output proximal loop jejunostomy.  Abdomen: S/NT/ND, viable loop jejunostomy.  Have been asked by medical team whether diaphragmatic hernia should be reduced and repaired.  Given his overall fragility, poor pulmonary status and need to breach two cavities (thoracic and abdominal), length and extensive nature of surgery, I do not feel a diaphragmatic repair in this patient is warranted.  Would only proceed w/ a revision of the ostomy.  Asked anesthesia to speak with the patient re: general anesthesia as the revision will be more difficult to do under local anesthetic and general anesthesia is preferable.  Epidural a possibility but patient has been on Plavix (stopped on Friday).  Would have cardiology evaluate for risk stratification.  Would also have pulmonologist re-evaluate for tolerance of GA for proposed procedure.  In the meantime, would start TPN given cachexia.  I have had multiple detailed conversations with patient, wife and family friend regarding the above.  Will tentatively plan for revision on Tuesday 1/16/17.  Communicated above to medical team.
The patient was seen and examined  He continues to do well  Will make plans for discharge this week.

## 2018-02-15 VITALS
HEART RATE: 99 BPM | DIASTOLIC BLOOD PRESSURE: 75 MMHG | SYSTOLIC BLOOD PRESSURE: 121 MMHG | OXYGEN SATURATION: 97 % | TEMPERATURE: 97 F

## 2018-02-15 LAB
CULTURE RESULTS: SIGNIFICANT CHANGE UP
SPECIMEN SOURCE: SIGNIFICANT CHANGE UP

## 2018-02-15 PROCEDURE — 99232 SBSQ HOSP IP/OBS MODERATE 35: CPT

## 2018-02-15 RX ORDER — ALBUTEROL 90 UG/1
3 AEROSOL, METERED ORAL
Qty: 0 | Refills: 0 | COMMUNITY

## 2018-02-15 RX ORDER — DILTIAZEM HCL 120 MG
1 CAPSULE, EXT RELEASE 24 HR ORAL
Qty: 90 | Refills: 0 | OUTPATIENT
Start: 2018-02-15 | End: 2018-03-16

## 2018-02-15 RX ORDER — LEVALBUTEROL 1.25 MG/.5ML
3 SOLUTION, CONCENTRATE RESPIRATORY (INHALATION)
Qty: 200 | Refills: 0 | OUTPATIENT
Start: 2018-02-15 | End: 2018-02-28

## 2018-02-15 RX ORDER — TAMSULOSIN HYDROCHLORIDE 0.4 MG/1
1 CAPSULE ORAL
Qty: 30 | Refills: 0 | OUTPATIENT
Start: 2018-02-15 | End: 2018-03-16

## 2018-02-15 RX ORDER — ACETAMINOPHEN 500 MG
2 TABLET ORAL
Qty: 0 | Refills: 0 | COMMUNITY
Start: 2018-02-15

## 2018-02-15 RX ORDER — LEVALBUTEROL 1.25 MG/.5ML
2 SOLUTION, CONCENTRATE RESPIRATORY (INHALATION)
Qty: 1 | Refills: 0 | OUTPATIENT
Start: 2018-02-15 | End: 2018-02-28

## 2018-02-15 RX ORDER — ATORVASTATIN CALCIUM 80 MG/1
1 TABLET, FILM COATED ORAL
Qty: 0 | Refills: 0 | COMMUNITY
Start: 2018-02-15

## 2018-02-15 RX ADMIN — Medication 81 MILLIGRAM(S): at 15:43

## 2018-02-15 RX ADMIN — Medication 1 SPRAY(S): at 06:37

## 2018-02-15 RX ADMIN — LORATADINE 10 MILLIGRAM(S): 10 TABLET ORAL at 15:44

## 2018-02-15 RX ADMIN — Medication 1 SPRAY(S): at 06:35

## 2018-02-15 RX ADMIN — FLUTICASONE PROPIONATE AND SALMETEROL 1 DOSE(S): 50; 250 POWDER ORAL; RESPIRATORY (INHALATION) at 08:41

## 2018-02-15 RX ADMIN — CLOPIDOGREL BISULFATE 75 MILLIGRAM(S): 75 TABLET, FILM COATED ORAL at 15:43

## 2018-02-15 RX ADMIN — Medication 1 SPRAY(S): at 15:44

## 2018-02-15 RX ADMIN — Medication 0.25 MILLIGRAM(S): at 15:43

## 2018-02-15 RX ADMIN — Medication 0.25 MILLIGRAM(S): at 00:09

## 2018-02-15 RX ADMIN — Medication 1 TABLET(S): at 15:43

## 2018-02-15 RX ADMIN — LEVALBUTEROL 0.63 MILLIGRAM(S): 1.25 SOLUTION, CONCENTRATE RESPIRATORY (INHALATION) at 15:27

## 2018-02-15 RX ADMIN — LEVALBUTEROL 0.63 MILLIGRAM(S): 1.25 SOLUTION, CONCENTRATE RESPIRATORY (INHALATION) at 03:42

## 2018-02-15 RX ADMIN — Medication 50 MICROGRAM(S): at 06:35

## 2018-02-15 RX ADMIN — Medication 1 SPRAY(S): at 00:09

## 2018-02-15 RX ADMIN — PANTOPRAZOLE SODIUM 40 MILLIGRAM(S): 20 TABLET, DELAYED RELEASE ORAL at 06:36

## 2018-02-15 RX ADMIN — Medication 0.25 MILLIGRAM(S): at 06:35

## 2018-02-15 RX ADMIN — Medication 100 MILLIGRAM(S): at 06:41

## 2018-02-15 RX ADMIN — LEVALBUTEROL 0.63 MILLIGRAM(S): 1.25 SOLUTION, CONCENTRATE RESPIRATORY (INHALATION) at 09:52

## 2018-02-15 RX ADMIN — TIOTROPIUM BROMIDE 1 CAPSULE(S): 18 CAPSULE ORAL; RESPIRATORY (INHALATION) at 09:52

## 2018-02-15 NOTE — PROGRESS NOTE ADULT - SUBJECTIVE AND OBJECTIVE BOX
FRANDY BAUTISTA    [id number]    67y      Male    Patient is a 67y old  Male who presents with a chief complaint of SOB (15 Dec 2017 10:17)      INTERVAL HPI/OVERNIGHT EVENTS:    Patient is much better, his cough has improved, no more sputum production, his SOB is much better as well, has appetite back, no nausea, vomiting, abdominal pain.        REVIEW OF SYSTEMS:    CONSTITUTIONAL: No fever, no fatigue  RESPIRATORY: No cough, no shortness of breath  CARDIOVASCULAR: No chest pain, no palpitations.   GASTROINTESTINAL: No abdominal, No nausea, vomiting  NEUROLOGICAL: No headaches,  loss of strength.  MISCELLANEOUS: No joint swelling or pain.       Vital Signs Last 24 Hrs  T(C): 36.2 (15 Feb 2018 16:14), Max: 36.6 (15 Feb 2018 12:00)  T(F): 97.2 (15 Feb 2018 16:14), Max: 97.8 (15 Feb 2018 12:00)  HR: 99 (15 Feb 2018 16:14) (77 - 99)  BP: 121/75 (15 Feb 2018 16:14) (101/61 - 121/75)  RR: 20 (15 Feb 2018 12:00) (18 - 20)  SpO2: 97% (15 Feb 2018 16:14) (97% - 97%)    PHYSICAL EXAM:    GENERAL: Thin built  Elderly male looking comfortable  HEENT: MADELINE  NECK: soft, Supple, No JVD,   CHEST/LUNG: Decrease air entry bilaterally; no wheezing  HEART: S1S2+, regular rate and rhythm; No murmurs  ABDOMEN: Soft, Nontender, Bowel sounds present, s/p Ileostomy with stool in the bag  EXTREMITIES:  1+ Peripheral Pulses, No edema  SKIN: No rashes or lesions  NEURO: AAOX3, no focal deficits, no motor r sensory loss  PSYCH: normal mood      LABS:                  I&O's Summary    14 Feb 2018 07:01  -  15 Feb 2018 07:00  --------------------------------------------------------  IN: 0 mL / OUT: 1075 mL / NET: -1075 mL    15 Feb 2018 07:01  -  15 Feb 2018 20:19  --------------------------------------------------------  IN: 360 mL / OUT: 575 mL / NET: -215 mL        MEDICATIONS  (STANDING):  ALPRAZolam 0.25 milliGRAM(s) Oral every 6 hours  aspirin enteric coated 81 milliGRAM(s) Oral daily  atorvastatin 40 milliGRAM(s) Oral at bedtime  clopidogrel Tablet 75 milliGRAM(s) Oral daily  dextrose 5%. 1000 milliLiter(s) (50 mL/Hr) IV Continuous <Continuous>  dextrose 5%. 1000 milliLiter(s) (50 mL/Hr) IV Continuous <Continuous>  dextrose 50% Injectable 12.5 Gram(s) IV Push once  dextrose 50% Injectable 25 Gram(s) IV Push once  dextrose 50% Injectable 12.5 Gram(s) IV Push once  dextrose 50% Injectable 25 Gram(s) IV Push once  dextrose 50% Injectable 25 Gram(s) IV Push once  diltiazem    Tablet 120 milliGRAM(s) Oral every 8 hours  enoxaparin Injectable 40 milliGRAM(s) SubCutaneous daily  FIRST- Mouthwash  BLM 10 milliLiter(s) Swish and Spit two times a day  fluticasone propionate 50 MICROgram(s)/spray Nasal Spray 1 Spray(s) Both Nostrils two times a day  fluticasone propionate/ salmeterol 250-50 MICROgram(s) Diskus 1 Dose(s) Inhalation two times a day  insulin lispro (HumaLOG) corrective regimen sliding scale   SubCutaneous three times a day before meals  insulin lispro (HumaLOG) corrective regimen sliding scale   SubCutaneous at bedtime  levalbuterol Inhalation 0.63 milliGRAM(s) Inhalation every 6 hours  levothyroxine 50 MICROGram(s) Oral daily  loratadine 10 milliGRAM(s) Oral daily  methylPREDNISolone sodium succinate Injectable 20 milliGRAM(s) IV Push every 12 hours  Mucinex DM 1200 mg/ 60 mg tablet 1 Tablet(s) 1 Tablet(s) Oral two times a day  multivitamin 1 Tablet(s) Oral daily  pantoprazole    Tablet 40 milliGRAM(s) Oral two times a day before meals  sodium chloride 0.65% Nasal 1 Spray(s) Both Nostrils four times a day  tamsulosin 0.4 milliGRAM(s) Oral at bedtime  tiotropium 18 MICROgram(s) Capsule 1 Capsule(s) Inhalation daily    MEDICATIONS  (PRN):  acetaminophen   Tablet 650 milliGRAM(s) Oral every 6 hours PRN Fever, pain, headache  acetylcysteine 20% Inhalation 2.5 milliLiter(s) Inhalation every 6 hours PRN expectorant  benzocaine 15 mG/menthol 3.6 mG Lozenge 1 Lozenge Oral every 4 hours PRN Sore Throat  benzonatate 100 milliGRAM(s) Oral every 6 hours PRN Cough  dextrose Gel 1 Dose(s) Oral once PRN Blood Glucose LESS THAN 70 milliGRAM(s)/deciliter  glucagon  Injectable 1 milliGRAM(s) IntraMuscular once PRN Glucose LESS THAN 70 milligrams/deciliter  ondansetron Injectable 4 milliGRAM(s) IV Push every 4 hours PRN Nausea and/or Vomiting

## 2018-02-15 NOTE — PROGRESS NOTE ADULT - PROVIDER SPECIALTY LIST ADULT
Anesthesia
Cardiology
Gastroenterology
Hospitalist
Internal Medicine
Nephrology
Pulmonology
Rehab Medicine
Rehab Medicine
SICU
Surgery
Trauma Surgery
Critical Care
Gastroenterology
Hospitalist
Trauma Surgery
Gastroenterology
Hospitalist
Nephrology
Surgery
Gastroenterology
Hospitalist
Pulmonology
Pulmonology
Hospitalist
Hospitalist
Pulmonology
Gastroenterology

## 2018-02-15 NOTE — PROGRESS NOTE ADULT - ASSESSMENT
A/P 78M s/p revision of jejunostomy and creation of loop ileostomy. Doing well. Cleared for discharge today.    -c/w current diet  -steroids taper  -No longer needs abx  -monitor ostomy output  -strict Is and Os  -pain control  -encourage ambulation and IS  -DVT ppx  -PT/OT BID  -Dispo planning: home with home care   -f/u OPD

## 2018-02-15 NOTE — PROGRESS NOTE ADULT - ASSESSMENT
67 year old male with PMH of COPD stage 4 s/p right lung reduction in 2010 (on home oxygen 4L (sat 92-93%), HTN, CVA on 2014, DM, hypothyroidism, hydrocele, and nephrolithiasis who is admitted for a COPD exacerbation. Patient has been weaned off ventimask to 4 liters nasal cannula and respiratory status is stable. CTA of the chest was negative for PE but showed bilateral lower lobe infiltrates with mucous plugging for which he was started on Levaquin.  Respiratory status stable and steroids are being tapered. Hospital course complicated by constipation, which had not improved despite aggressive bowel regimen, laxatives and enemas. XRAY with large stool burden. CT with diaphragmatic hernia and patient was consequently taken to the OR for loop ileostomy under local anesthesia. Pt cont, to have copious amount in ileostomy. Pt is noted to have hyponatremia due to high output in ileostomy, got IVF, hyponatremia and hypokalemia improving, ileostomy secretion are getting thicker, he is tolerating diet, ostomy care as per ostomy RN,  he is continued with cholestyramine, he has been continued with IV hydration, Ileostomy out is still high, but secretions are getting thicker, monitored, I/Os, being continued with IV fluid rate to 125ml/h and GI has increased Octreotide to 150mcgs tid,  has CT abdomen and Plevis done, results reviewed, GI add Imodium 2 mg po bid and continued with octreotide along, GI recommended Surgical consult for Ileostomy reversal and pulmonary consult for optimization for surgery, appreciate surgical and pulmonary consults, GI recommended to get  CT abdomen and pelvis with oral contrast, done showed Proximal jejunum emptying into a loop jejunostomy the right upper quadrant as described. No contrast seen within the distal small bowel, Large bilateral scrotal hydroceles, talked with surgical team, they did the upper GI series confirmed Jejunostomy, patient is s/p jejunostomy closure and s/p Ileostomy was eating and drinking later on developed, nausea and vomiting, NG was put in and XRays were done showed penumoperitonium, CT abdomen done showed same pneumo peritoneum, surgery is following closely, as per surgery team, patient is most likely having refeeding syndrome, NG secretions are less, NG was  taken out, started on oral feeding able to tolerate well, he has been started  getting TPN, finished, change Cardizem po  dose was increased to 300mg, continued on Protonix patient Ileostomy is working well, has no abdominal pain, tolerating diet well.     During the earlier part of the hospitalization patient was treated for acute on chronic hypoxic respiratory failure secondary to COPD exacerbation and CAP, was treated with steroids now tapered and duo nebs, SOB resolved, he uses home oxygen (4 liters), bicarb elevated likely due to metabolic compensation for resp acidosis  continue bronchodilators and prednisone tapered off and completed Levaquin as well.  His electrolytes monitored and replaced accordingly, he was found to have right femoral artery dissection as incidental finding on CT, no intervention as per vascular, follow up with vascular clinic as out patient.     Plan:    1. High out put from Ileostomy: Patient has been continued on cholestyramine, Octreotide, Imodium, he is tolerating diet, ostomy care as per ostomy RN, IV hydration, Ileostomy output is still high, but secretions are thicker, encouraged for oral hydration, monitor I/Os, GI is following, GI recommended Surgical consult for Ileostomy reversal and pulmonary consult for optimization for surgery, appreciate surgical and pulmonary consults, GI has changed his  diet to high fiber diet, fluid restriction, he is feeling little dehydrated, his electrolytes has been stable will monitor I/O, BMP, will continued with IV fluids, GI recommended CT abdomen and pelvis with oral contrast, done today showed Proximal jejunum emptying into a loop jejunostomy the right upper quadrant as described. No contrast seen within the distal small bowel, Large bilateral scrotal hydroceles, talked with surgical team surgical team, they did the upper GI series confirmed Jejunostomy, patient is s/p jejunostomy closure and s/p Ileostomy was eating and drinking later on developed, nausea and vomiting, NG was put in and XRays were done showed penumoperitonium, CT abdomen done showed same pneumo peritoneum, surgery is follwoing closely, as per surgery team, patient is most likely having refeeding syndrome, NG secretions became less, NG was  taken out, started on oral feeding able to tolerate well, he has finished TPN course, patient Ileostomy is working well, has no abdominal pain, he is tolerating diet very well, continue with current diet, he has good appetite, he is being followed by dietician.     2. Acute on chronic hypoxic respiratory failure secondary to COPD exacerbation, patient's SOB with cough is improving, CXR done no changes compare to previous CXR, currently on oxygen (4 liters) same as before, bronchodilators, will change methylprednisolone Q12 and will tapered off to oral once better, will continue with Mucomyst PRN, [place] peariva, will continue antibiotics as could not  really differentia on CXR if patient has infiltrated or not, acute viral panel -ve, Patient SOB has been much better at base line, change steriods to oral and continue with supplemental oxygen, need to follow up with pulmonary.     3. Hypertension - stable, continue Cardizem.     4. Hyperlipidemia, continue statin    5.hyponatremia: resolved, it was due to high ileostomy output.   Hypokalemia - being replaced  Hypophosphatemia: improved  Hypomagnesemia: improved  on multiple electrolyte sol.   monitor Electrolytes    6. Scrotal edema chronic with urinary retention, no more retention, Roy if not able to urinate, no redness going down  - patient following with  as outpatient, cont scrotal elevation    7. Right femoral artery dissection, incidental finding on CT, no intervention as per vascular       8. DVT Prophylaxis - Lovenox       9. anxiety: xanax at hs PRN.    10. Afib/ SInus tachycardia: Multifactorial, patient HR up in setting of COPD exacerbation, patient has been followed by cardiology, his Cardizem changed to short acting one because of small bowel absorption issues, will continue to monitor, repeat TTE done showed Normal global left ventricular systolic function,  Left ventricular ejection fraction, by visual estimation, is 60 to 65%, Moderately enlarged right ventricle, Estimated pulmonary artery systolic pressure is 44.7 mmHg assuming a right atrial pressure of 3 mmHg, which is consistent with mild pulmonary hypertension, There is no evidence of pericardial effusion, will follow along, not on anticoagulation.     11. Hx of TIA: on plavix and aspirin resumed, will continue    12. hx of hypothyroidism: levothyroxine resumed, his TSH is 8, will repeat TSH in 4 weeks as out patient by PMD     Dispo: patient is doing well, he has no more cough or SOB, doing well, he is being discharged home by surgical team, plan of care discussed with surgery team to continue current meds form medical point of view, TSH in 4 weeks by PMD, follow up with PCP, pulmonary and Cardiology as out patient.

## 2018-02-15 NOTE — ADVANCED PRACTICE NURSE CONSULT - RECOMMEDATIONS
Instructed wife to contact ostomy nurse if there is concern with ostomy care during the hospitalization.

## 2018-02-15 NOTE — ADVANCED PRACTICE NURSE CONSULT - ASSESSMENT
Called pt's wife Sandra at home, told pt's wife that ostomy nurse had left two boxes of wafers and pouchings, and other ostomy accessories in the unit nurse manager's office. Will provide the home ostomy supplies upon discharge. Wife needs to order the additional ostomy supplies for future use after pt is discharged home. Wife also reported no issues with ostomy care.

## 2018-02-15 NOTE — PROGRESS NOTE ADULT - SUBJECTIVE AND OBJECTIVE BOX
INTERVAL HPI/OVERNIGHT EVENTS:    Patient seen and examined this AM  No ON events  No symptoms of tachycardia, diaphoresis, delirium or agitation  No respiratory distress  poppy diet, adequate UOP, ambulating  denies f/c/n/v, SOB or CP      MEDICATIONS  (STANDING):  ALPRAZolam 0.25 milliGRAM(s) Oral every 6 hours  aspirin enteric coated 81 milliGRAM(s) Oral daily  atorvastatin 40 milliGRAM(s) Oral at bedtime  clopidogrel Tablet 75 milliGRAM(s) Oral daily  dextrose 5%. 1000 milliLiter(s) (50 mL/Hr) IV Continuous <Continuous>  dextrose 5%. 1000 milliLiter(s) (50 mL/Hr) IV Continuous <Continuous>  dextrose 50% Injectable 12.5 Gram(s) IV Push once  dextrose 50% Injectable 25 Gram(s) IV Push once  dextrose 50% Injectable 12.5 Gram(s) IV Push once  dextrose 50% Injectable 25 Gram(s) IV Push once  dextrose 50% Injectable 25 Gram(s) IV Push once  diltiazem    Tablet 120 milliGRAM(s) Oral every 8 hours  enoxaparin Injectable 40 milliGRAM(s) SubCutaneous daily  FIRST- Mouthwash  BLM 10 milliLiter(s) Swish and Spit two times a day  fluticasone propionate 50 MICROgram(s)/spray Nasal Spray 1 Spray(s) Both Nostrils two times a day  fluticasone propionate/ salmeterol 250-50 MICROgram(s) Diskus 1 Dose(s) Inhalation two times a day  insulin lispro (HumaLOG) corrective regimen sliding scale   SubCutaneous three times a day before meals  insulin lispro (HumaLOG) corrective regimen sliding scale   SubCutaneous at bedtime  levalbuterol Inhalation 0.63 milliGRAM(s) Inhalation every 6 hours  levothyroxine 50 MICROGram(s) Oral daily  loratadine 10 milliGRAM(s) Oral daily  methylPREDNISolone sodium succinate Injectable 20 milliGRAM(s) IV Push every 12 hours  Mucinex DM 1200 mg/ 60 mg tablet 1 Tablet(s) 1 Tablet(s) Oral two times a day  multivitamin 1 Tablet(s) Oral daily  pantoprazole    Tablet 40 milliGRAM(s) Oral two times a day before meals  sodium chloride 0.65% Nasal 1 Spray(s) Both Nostrils four times a day  tamsulosin 0.4 milliGRAM(s) Oral at bedtime  tiotropium 18 MICROgram(s) Capsule 1 Capsule(s) Inhalation daily    MEDICATIONS  (PRN):  acetaminophen   Tablet 650 milliGRAM(s) Oral every 6 hours PRN Fever, pain, headache  acetylcysteine 20% Inhalation 2.5 milliLiter(s) Inhalation every 6 hours PRN expectorant  benzocaine 15 mG/menthol 3.6 mG Lozenge 1 Lozenge Oral every 4 hours PRN Sore Throat  benzonatate 100 milliGRAM(s) Oral every 6 hours PRN Cough  dextrose Gel 1 Dose(s) Oral once PRN Blood Glucose LESS THAN 70 milliGRAM(s)/deciliter  glucagon  Injectable 1 milliGRAM(s) IntraMuscular once PRN Glucose LESS THAN 70 milligrams/deciliter  ondansetron Injectable 4 milliGRAM(s) IV Push every 4 hours PRN Nausea and/or Vomiting      Vital Signs Last 24 Hrs  T(C): 36.6 (15 Feb 2018 12:00), Max: 36.6 (15 Feb 2018 12:00)  T(F): 97.8 (15 Feb 2018 12:00), Max: 97.8 (15 Feb 2018 12:00)  HR: 77 (15 Feb 2018 12:00) (77 - 84)  BP: 107/65 (15 Feb 2018 12:00) (101/61 - 107/65)  BP(mean): --  RR: 20 (15 Feb 2018 12:00) (18 - 20)  SpO2: 97% (15 Feb 2018 12:00) (96% - 97%)    PE  AAOx3, NAD  CTAx2  RRR, S1/S2  S/ND/NT, no rebound or guarding, ileostomy functioning; stoma ispink and viable, +gas, red rubber bridge and formed stool in ostomy bag  no pitting edema      I&O's Detail    14 Feb 2018 07:01  -  15 Feb 2018 07:00  --------------------------------------------------------  IN:  Total IN: 0 mL    OUT:    Ileostomy: 675 mL    Voided: 400 mL  Total OUT: 1075 mL    Total NET: -1075 mL      15 Feb 2018 07:01  -  15 Feb 2018 16:04  --------------------------------------------------------  IN:    Oral Fluid: 360 mL  Total IN: 360 mL    OUT:    Ileostomy: 300 mL    Voided: 275 mL  Total OUT: 575 mL    Total NET: -215 mL                          RADIOLOGY & ADDITIONAL STUDIES:

## 2018-02-16 ENCOUNTER — INPATIENT (INPATIENT)
Facility: HOSPITAL | Age: 68
LOS: 11 days | Discharge: HOME | DRG: 871 | End: 2018-02-28
Attending: INTERNAL MEDICINE | Admitting: GENERAL ACUTE CARE HOSPITAL
Payer: MEDICARE

## 2018-02-16 VITALS
HEART RATE: 139 BPM | DIASTOLIC BLOOD PRESSURE: 86 MMHG | WEIGHT: 100.09 LBS | TEMPERATURE: 100 F | SYSTOLIC BLOOD PRESSURE: 128 MMHG | OXYGEN SATURATION: 97 % | RESPIRATION RATE: 28 BRPM | HEIGHT: 68 IN

## 2018-02-16 DIAGNOSIS — I10 ESSENTIAL (PRIMARY) HYPERTENSION: ICD-10-CM

## 2018-02-16 DIAGNOSIS — J44.9 CHRONIC OBSTRUCTIVE PULMONARY DISEASE, UNSPECIFIED: ICD-10-CM

## 2018-02-16 DIAGNOSIS — I63.9 CEREBRAL INFARCTION, UNSPECIFIED: ICD-10-CM

## 2018-02-16 DIAGNOSIS — E03.9 HYPOTHYROIDISM, UNSPECIFIED: ICD-10-CM

## 2018-02-16 DIAGNOSIS — J18.9 PNEUMONIA, UNSPECIFIED ORGANISM: ICD-10-CM

## 2018-02-16 DIAGNOSIS — E11.59 TYPE 2 DIABETES MELLITUS WITH OTHER CIRCULATORY COMPLICATIONS: ICD-10-CM

## 2018-02-16 DIAGNOSIS — A41.9 SEPSIS, UNSPECIFIED ORGANISM: ICD-10-CM

## 2018-02-16 DIAGNOSIS — Z98.890 OTHER SPECIFIED POSTPROCEDURAL STATES: Chronic | ICD-10-CM

## 2018-02-16 DIAGNOSIS — Z93.2 ILEOSTOMY STATUS: ICD-10-CM

## 2018-02-16 DIAGNOSIS — J11.1 INFLUENZA DUE TO UNIDENTIFIED INFLUENZA VIRUS WITH OTHER RESPIRATORY MANIFESTATIONS: ICD-10-CM

## 2018-02-16 DIAGNOSIS — R09.02 HYPOXEMIA: ICD-10-CM

## 2018-02-16 LAB
ALBUMIN SERPL ELPH-MCNC: 3.4 G/DL — SIGNIFICANT CHANGE UP (ref 3.3–5.2)
ALP SERPL-CCNC: 153 U/L — HIGH (ref 40–120)
ALT FLD-CCNC: 100 U/L — HIGH
ANION GAP SERPL CALC-SCNC: 12 MMOL/L — SIGNIFICANT CHANGE UP (ref 5–17)
ANISOCYTOSIS BLD QL: SLIGHT — SIGNIFICANT CHANGE UP
APPEARANCE UR: CLEAR — SIGNIFICANT CHANGE UP
APTT BLD: 22.3 SEC — LOW (ref 27.5–37.4)
AST SERPL-CCNC: 31 U/L — SIGNIFICANT CHANGE UP
BACTERIA # UR AUTO: ABNORMAL
BASOPHILS # BLD AUTO: 0 K/UL — SIGNIFICANT CHANGE UP (ref 0–0.2)
BILIRUB SERPL-MCNC: 0.7 MG/DL — SIGNIFICANT CHANGE UP (ref 0.4–2)
BILIRUB UR-MCNC: NEGATIVE — SIGNIFICANT CHANGE UP
BUN SERPL-MCNC: 12 MG/DL — SIGNIFICANT CHANGE UP (ref 8–20)
CALCIUM SERPL-MCNC: 8.6 MG/DL — SIGNIFICANT CHANGE UP (ref 8.6–10.2)
CHLORIDE SERPL-SCNC: 97 MMOL/L — LOW (ref 98–107)
CO2 SERPL-SCNC: 29 MMOL/L — SIGNIFICANT CHANGE UP (ref 22–29)
COLOR SPEC: YELLOW — SIGNIFICANT CHANGE UP
CREAT SERPL-MCNC: 0.41 MG/DL — LOW (ref 0.5–1.3)
DIFF PNL FLD: ABNORMAL
EOSINOPHIL # BLD AUTO: 0 K/UL — SIGNIFICANT CHANGE UP (ref 0–0.5)
EOSINOPHIL NFR BLD AUTO: 0 % — SIGNIFICANT CHANGE UP (ref 0–6)
EPI CELLS # UR: SIGNIFICANT CHANGE UP
FLUAV H1 2009 PAND RNA SPEC QL NAA+PROBE: DETECTED
GLUCOSE SERPL-MCNC: 113 MG/DL — SIGNIFICANT CHANGE UP (ref 70–115)
GLUCOSE UR QL: NEGATIVE MG/DL — SIGNIFICANT CHANGE UP
HCT VFR BLD CALC: 41.2 % — LOW (ref 42–52)
HGB BLD-MCNC: 12.9 G/DL — LOW (ref 14–18)
HYPOCHROMIA BLD QL: SLIGHT — SIGNIFICANT CHANGE UP
INR BLD: 0.98 RATIO — SIGNIFICANT CHANGE UP (ref 0.88–1.16)
KETONES UR-MCNC: NEGATIVE — SIGNIFICANT CHANGE UP
LACTATE BLDV-MCNC: 1.3 MMOL/L — SIGNIFICANT CHANGE UP (ref 0.5–2)
LACTATE BLDV-MCNC: 2.2 MMOL/L — HIGH (ref 0.5–2)
LEUKOCYTE ESTERASE UR-ACNC: ABNORMAL
LIDOCAIN IGE QN: 12 U/L — LOW (ref 22–51)
LYMPHOCYTES # BLD AUTO: 1.2 K/UL — SIGNIFICANT CHANGE UP (ref 1–4.8)
LYMPHOCYTES # BLD AUTO: 6 % — LOW (ref 20–55)
MACROCYTES BLD QL: SLIGHT — SIGNIFICANT CHANGE UP
MCHC RBC-ENTMCNC: 27.9 PG — SIGNIFICANT CHANGE UP (ref 27–31)
MCHC RBC-ENTMCNC: 31.3 G/DL — LOW (ref 32–36)
MCV RBC AUTO: 89.2 FL — SIGNIFICANT CHANGE UP (ref 80–94)
MICROCYTES BLD QL: SLIGHT — SIGNIFICANT CHANGE UP
MONOCYTES # BLD AUTO: 1.2 K/UL — HIGH (ref 0–0.8)
MONOCYTES NFR BLD AUTO: 5 % — SIGNIFICANT CHANGE UP (ref 3–10)
NEUTROPHILS # BLD AUTO: 18.5 K/UL — HIGH (ref 1.8–8)
NEUTROPHILS NFR BLD AUTO: 89 % — HIGH (ref 37–73)
NITRITE UR-MCNC: NEGATIVE — SIGNIFICANT CHANGE UP
NT-PROBNP SERPL-SCNC: 265 PG/ML — SIGNIFICANT CHANGE UP (ref 0–300)
OVALOCYTES BLD QL SMEAR: SLIGHT — SIGNIFICANT CHANGE UP
PH UR: 8 — SIGNIFICANT CHANGE UP (ref 5–8)
PLAT MORPH BLD: ABNORMAL
PLATELET # BLD AUTO: 513 K/UL — HIGH (ref 150–400)
PLATELET CLUMP BLD QL SMEAR: SIGNIFICANT CHANGE UP
POIKILOCYTOSIS BLD QL AUTO: SLIGHT — SIGNIFICANT CHANGE UP
POTASSIUM SERPL-MCNC: 4.2 MMOL/L — SIGNIFICANT CHANGE UP (ref 3.5–5.3)
POTASSIUM SERPL-SCNC: 4.2 MMOL/L — SIGNIFICANT CHANGE UP (ref 3.5–5.3)
PROT SERPL-MCNC: 7.3 G/DL — SIGNIFICANT CHANGE UP (ref 6.6–8.7)
PROT UR-MCNC: NEGATIVE MG/DL — SIGNIFICANT CHANGE UP
PROTHROM AB SERPL-ACNC: 10.8 SEC — SIGNIFICANT CHANGE UP (ref 9.8–12.7)
RAPID RVP RESULT: DETECTED
RBC # BLD: 4.62 M/UL — SIGNIFICANT CHANGE UP (ref 4.6–6.2)
RBC # FLD: 18.4 % — HIGH (ref 11–15.6)
RBC BLD AUTO: ABNORMAL
RBC CASTS # UR COMP ASSIST: ABNORMAL /HPF (ref 0–4)
SODIUM SERPL-SCNC: 138 MMOL/L — SIGNIFICANT CHANGE UP (ref 135–145)
SP GR SPEC: 1.01 — SIGNIFICANT CHANGE UP (ref 1.01–1.02)
UROBILINOGEN FLD QL: NEGATIVE MG/DL — SIGNIFICANT CHANGE UP
WBC # BLD: 21 K/UL — HIGH (ref 4.8–10.8)
WBC # FLD AUTO: 21 K/UL — HIGH (ref 4.8–10.8)
WBC UR QL: ABNORMAL

## 2018-02-16 PROCEDURE — 71045 X-RAY EXAM CHEST 1 VIEW: CPT | Mod: 26

## 2018-02-16 PROCEDURE — 71250 CT THORAX DX C-: CPT | Mod: 26

## 2018-02-16 PROCEDURE — 93010 ELECTROCARDIOGRAM REPORT: CPT

## 2018-02-16 PROCEDURE — 99285 EMERGENCY DEPT VISIT HI MDM: CPT

## 2018-02-16 RX ORDER — DEXTROSE 50 % IN WATER 50 %
1 SYRINGE (ML) INTRAVENOUS ONCE
Qty: 0 | Refills: 0 | Status: DISCONTINUED | OUTPATIENT
Start: 2018-02-16 | End: 2018-02-23

## 2018-02-16 RX ORDER — VANCOMYCIN HCL 1 G
1000 VIAL (EA) INTRAVENOUS EVERY 12 HOURS
Qty: 0 | Refills: 0 | Status: DISCONTINUED | OUTPATIENT
Start: 2018-02-16 | End: 2018-02-16

## 2018-02-16 RX ORDER — VANCOMYCIN HCL 1 G
1000 VIAL (EA) INTRAVENOUS EVERY 8 HOURS
Qty: 0 | Refills: 0 | Status: DISCONTINUED | OUTPATIENT
Start: 2018-02-16 | End: 2018-02-17

## 2018-02-16 RX ORDER — AZTREONAM 2 G
1000 VIAL (EA) INJECTION ONCE
Qty: 0 | Refills: 0 | Status: COMPLETED | OUTPATIENT
Start: 2018-02-16 | End: 2018-02-16

## 2018-02-16 RX ORDER — SODIUM CHLORIDE 9 MG/ML
1000 INJECTION INTRAMUSCULAR; INTRAVENOUS; SUBCUTANEOUS
Qty: 0 | Refills: 0 | Status: DISCONTINUED | OUTPATIENT
Start: 2018-02-16 | End: 2018-02-16

## 2018-02-16 RX ORDER — SODIUM CHLORIDE 9 MG/ML
3 INJECTION INTRAMUSCULAR; INTRAVENOUS; SUBCUTANEOUS EVERY 8 HOURS
Qty: 0 | Refills: 0 | Status: DISCONTINUED | OUTPATIENT
Start: 2018-02-16 | End: 2018-02-28

## 2018-02-16 RX ORDER — CEFEPIME 1 G/1
1000 INJECTION, POWDER, FOR SOLUTION INTRAMUSCULAR; INTRAVENOUS EVERY 12 HOURS
Qty: 0 | Refills: 0 | Status: DISCONTINUED | OUTPATIENT
Start: 2018-02-16 | End: 2018-02-17

## 2018-02-16 RX ORDER — ACETAMINOPHEN 500 MG
650 TABLET ORAL EVERY 6 HOURS
Qty: 0 | Refills: 0 | Status: DISCONTINUED | OUTPATIENT
Start: 2018-02-16 | End: 2018-02-28

## 2018-02-16 RX ORDER — SODIUM CHLORIDE 9 MG/ML
3 INJECTION INTRAMUSCULAR; INTRAVENOUS; SUBCUTANEOUS ONCE
Qty: 0 | Refills: 0 | Status: COMPLETED | OUTPATIENT
Start: 2018-02-16 | End: 2018-02-16

## 2018-02-16 RX ORDER — LEVOTHYROXINE SODIUM 125 MCG
50 TABLET ORAL DAILY
Qty: 0 | Refills: 0 | Status: DISCONTINUED | OUTPATIENT
Start: 2018-02-16 | End: 2018-02-28

## 2018-02-16 RX ORDER — DEXTROSE 50 % IN WATER 50 %
25 SYRINGE (ML) INTRAVENOUS ONCE
Qty: 0 | Refills: 0 | Status: DISCONTINUED | OUTPATIENT
Start: 2018-02-16 | End: 2018-02-23

## 2018-02-16 RX ORDER — GLUCAGON INJECTION, SOLUTION 0.5 MG/.1ML
1 INJECTION, SOLUTION SUBCUTANEOUS ONCE
Qty: 0 | Refills: 0 | Status: DISCONTINUED | OUTPATIENT
Start: 2018-02-16 | End: 2018-02-23

## 2018-02-16 RX ORDER — SODIUM CHLORIDE 9 MG/ML
1000 INJECTION INTRAMUSCULAR; INTRAVENOUS; SUBCUTANEOUS
Qty: 0 | Refills: 0 | Status: DISCONTINUED | OUTPATIENT
Start: 2018-02-16 | End: 2018-02-17

## 2018-02-16 RX ORDER — DEXTROSE 50 % IN WATER 50 %
12.5 SYRINGE (ML) INTRAVENOUS ONCE
Qty: 0 | Refills: 0 | Status: DISCONTINUED | OUTPATIENT
Start: 2018-02-16 | End: 2018-02-23

## 2018-02-16 RX ORDER — SACCHAROMYCES BOULARDII 250 MG
250 POWDER IN PACKET (EA) ORAL
Qty: 0 | Refills: 0 | Status: DISCONTINUED | OUTPATIENT
Start: 2018-02-16 | End: 2018-02-28

## 2018-02-16 RX ORDER — TIOTROPIUM BROMIDE 18 UG/1
1 CAPSULE ORAL; RESPIRATORY (INHALATION) DAILY
Qty: 0 | Refills: 0 | Status: DISCONTINUED | OUTPATIENT
Start: 2018-02-16 | End: 2018-02-28

## 2018-02-16 RX ORDER — TAMSULOSIN HYDROCHLORIDE 0.4 MG/1
0.4 CAPSULE ORAL AT BEDTIME
Qty: 0 | Refills: 0 | Status: DISCONTINUED | OUTPATIENT
Start: 2018-02-16 | End: 2018-02-28

## 2018-02-16 RX ORDER — ATORVASTATIN CALCIUM 80 MG/1
40 TABLET, FILM COATED ORAL AT BEDTIME
Qty: 0 | Refills: 0 | Status: DISCONTINUED | OUTPATIENT
Start: 2018-02-16 | End: 2018-02-28

## 2018-02-16 RX ORDER — ASPIRIN/CALCIUM CARB/MAGNESIUM 324 MG
81 TABLET ORAL DAILY
Qty: 0 | Refills: 0 | Status: DISCONTINUED | OUTPATIENT
Start: 2018-02-16 | End: 2018-02-28

## 2018-02-16 RX ORDER — ACETAMINOPHEN 500 MG
650 TABLET ORAL ONCE
Qty: 0 | Refills: 0 | Status: COMPLETED | OUTPATIENT
Start: 2018-02-16 | End: 2018-02-16

## 2018-02-16 RX ORDER — ALPRAZOLAM 0.25 MG
0.25 TABLET ORAL EVERY 6 HOURS
Qty: 0 | Refills: 0 | Status: DISCONTINUED | OUTPATIENT
Start: 2018-02-16 | End: 2018-02-17

## 2018-02-16 RX ORDER — FLUTICASONE PROPIONATE AND SALMETEROL 50; 250 UG/1; UG/1
1 POWDER ORAL; RESPIRATORY (INHALATION)
Qty: 0 | Refills: 0 | Status: DISCONTINUED | OUTPATIENT
Start: 2018-02-16 | End: 2018-02-28

## 2018-02-16 RX ORDER — SODIUM CHLORIDE 9 MG/ML
1000 INJECTION, SOLUTION INTRAVENOUS
Qty: 0 | Refills: 0 | Status: DISCONTINUED | OUTPATIENT
Start: 2018-02-16 | End: 2018-02-23

## 2018-02-16 RX ORDER — LEVALBUTEROL 1.25 MG/.5ML
0.63 SOLUTION, CONCENTRATE RESPIRATORY (INHALATION) EVERY 6 HOURS
Qty: 0 | Refills: 0 | Status: DISCONTINUED | OUTPATIENT
Start: 2018-02-16 | End: 2018-02-28

## 2018-02-16 RX ORDER — CLOPIDOGREL BISULFATE 75 MG/1
75 TABLET, FILM COATED ORAL DAILY
Qty: 0 | Refills: 0 | Status: DISCONTINUED | OUTPATIENT
Start: 2018-02-16 | End: 2018-02-28

## 2018-02-16 RX ORDER — PANTOPRAZOLE SODIUM 20 MG/1
40 TABLET, DELAYED RELEASE ORAL
Qty: 0 | Refills: 0 | Status: DISCONTINUED | OUTPATIENT
Start: 2018-02-16 | End: 2018-02-28

## 2018-02-16 RX ORDER — ENOXAPARIN SODIUM 100 MG/ML
40 INJECTION SUBCUTANEOUS EVERY 24 HOURS
Qty: 0 | Refills: 0 | Status: DISCONTINUED | OUTPATIENT
Start: 2018-02-16 | End: 2018-02-28

## 2018-02-16 RX ORDER — VANCOMYCIN HCL 1 G
1000 VIAL (EA) INTRAVENOUS ONCE
Qty: 0 | Refills: 0 | Status: COMPLETED | OUTPATIENT
Start: 2018-02-16 | End: 2018-02-16

## 2018-02-16 RX ORDER — INSULIN LISPRO 100/ML
VIAL (ML) SUBCUTANEOUS
Qty: 0 | Refills: 0 | Status: DISCONTINUED | OUTPATIENT
Start: 2018-02-16 | End: 2018-02-23

## 2018-02-16 RX ADMIN — Medication 125 MILLIGRAM(S): at 14:53

## 2018-02-16 RX ADMIN — SODIUM CHLORIDE 3 MILLILITER(S): 9 INJECTION INTRAMUSCULAR; INTRAVENOUS; SUBCUTANEOUS at 23:23

## 2018-02-16 RX ADMIN — Medication 50 MILLIGRAM(S): at 14:40

## 2018-02-16 RX ADMIN — SODIUM CHLORIDE 1000 MILLILITER(S): 9 INJECTION INTRAMUSCULAR; INTRAVENOUS; SUBCUTANEOUS at 14:32

## 2018-02-16 RX ADMIN — Medication 650 MILLIGRAM(S): at 14:44

## 2018-02-16 RX ADMIN — SODIUM CHLORIDE 1000 MILLILITER(S): 9 INJECTION INTRAMUSCULAR; INTRAVENOUS; SUBCUTANEOUS at 14:41

## 2018-02-16 RX ADMIN — SODIUM CHLORIDE 3 MILLILITER(S): 9 INJECTION INTRAMUSCULAR; INTRAVENOUS; SUBCUTANEOUS at 14:32

## 2018-02-16 RX ADMIN — ATORVASTATIN CALCIUM 40 MILLIGRAM(S): 80 TABLET, FILM COATED ORAL at 23:11

## 2018-02-16 RX ADMIN — Medication 250 MILLIGRAM(S): at 23:12

## 2018-02-16 RX ADMIN — Medication 75 MILLIGRAM(S): at 22:10

## 2018-02-16 RX ADMIN — Medication 250 MILLIGRAM(S): at 14:51

## 2018-02-16 RX ADMIN — TAMSULOSIN HYDROCHLORIDE 0.4 MILLIGRAM(S): 0.4 CAPSULE ORAL at 23:11

## 2018-02-16 NOTE — ED PROVIDER NOTE - CARE PLAN
Principal Discharge DX:	Chronic obstructive pulmonary disease, unspecified COPD type  Secondary Diagnosis:	Fever Principal Discharge DX:	Chronic obstructive pulmonary disease, unspecified COPD type  Secondary Diagnosis:	Fever  Secondary Diagnosis:	Leukocytosis

## 2018-02-16 NOTE — H&P ADULT - ASSESSMENT
66 y/o male with HCAP, Sepsis, Influenza A, Hypoxia, Hx. of SVT, Advanced COPD/ILD, DM-2, CVA old, Hydroceles, DM-2, Hypothyroid,

## 2018-02-16 NOTE — ED PROVIDER NOTE - PMH
Chronic obstructive pulmonary disease, unspecified COPD type    CVA (cerebral vascular accident)    Diabetes mellitus    Hydrocele    Hypertension    Hypothyroidism    Renal calculi

## 2018-02-16 NOTE — H&P ADULT - PROBLEM SELECTOR PLAN 1
based on recent prolonged hospital course will need to cover for MRSA, Gram negative and anaerobes for possible aspiration based on CT findings. Repeat lactate normal, cont IVF, maintain SBP >90 MAP >65, florastor, Vancomycin/Cefepime, f/u cultures, prn 02, spirometer, Chest PT, Ambulation, DVT-P, fall/aspiration precautions. Supplemental 02

## 2018-02-16 NOTE — ED CLERICAL - NS ED CLERK UNITS
RVP PENDING/4TWR iso/4TWR 2GUL/iso mon/ 5TWR/[id number] 5TWR 2GUL/mon iso +FLU A/2GUL +FLU A/5TWR +FLU A- bed pend/5TWR 5222-01 +FLU A/5TWR

## 2018-02-16 NOTE — ED ADULT NURSE NOTE - OBJECTIVE STATEMENT
Assumed patient care at 1420.  A+Ox4, c/o increasing short of breath x 2days.  d/c from  [facility] yesterday.  wife at bedside.  placed on 2l nc.  LS diminished b/l, productive cough with yellow mucus

## 2018-02-16 NOTE — H&P ADULT - ATTENDING COMMENTS
Full Code    20 min spent discussing advanced care directives and goals of care. Patient with high risk of complication and death due to HCAP/Flu/Sepsis, and advanced lung disease. Patient wishes to remain Full Code at this time.

## 2018-02-16 NOTE — ED PROVIDER NOTE - OBJECTIVE STATEMENT
66 y/o M pt with a pmhx of COPD, CVA, DM, hydrocele, HTN and hypothyroidism BIBA to the ED c/o difficulty breathing, fever, increased respirations s/p surgery. Luis Carlos fever at home of 101.1F. Ileostomy placed in his stomach, lost 40 lbs. He was then admitted to [facility] for an ileostomy repair, unknown when the surgery was preformed. Released from [facility] 1 day ago s/p ileostomy revision. EMS notes that pt is tachycardic, tachypneic, hypoxic and hot to tough. Pt is coming from home, EMS activated by wife at home. Since pt was discharged from [facility] he has not taken any of his medication. Uses oxygen therapy at home, EMS notes that his O2 stat is 88 on 4lpm. He has NOT received Tylenol/Ibuprofen. Allergic to penicillin and codeine. Unable to obtain full Hx and Ros due to pt's illness. 68 y/o M pt with a pmhx of COPD, CVA, DM, hydrocele, HTN and hypothyroidism BIBA to the ED c/o difficulty breathing, fever, increased respirations s/p surgery. Luis Carlos fever at home of 101.1F. Ileostomy placed in his stomach, lost 40 lbs. He was then admitted to [facility] for an ileostomy repair, preformed 1/16/18; released from 1 day ago. EMS notes that pt is tachycardic, tachypneic, hypoxic and hot to tough. Pt is coming from home, EMS activated by wife at home. Since pt was discharged from [facility] he has not taken any of his medication. Uses oxygen therapy at home, EMS notes that his O2 stat is 88 on 4lpm. He has NOT received Tylenol/Ibuprofen. Allergic to penicillin and codeine. Unable to obtain full Hx and Ros due to pt's illness. 66 y/o M pt with a pmhx of COPD, CVA, DM, hydrocele, HTN and hypothyroidism BIBA to the ED c/o difficulty breathing, fever, increased difficulty breathing. He was d/c'd home yesterday. Luis Carlos fever at home of 101.1F. He was then admitted to [facility] several weeks ago and had an ileostomy done, preformed 1/16/18; released from 1 day ago. EMS notes that pt is tachycardic, tachypneic, hypoxic and hot to touch Pt is coming from home, EMS activated by wife at home. Since pt was discharged from [facility] he has not taken any of his medication. Uses oxygen therapy at home, EMS notes that his O2 stat is 88 on 4lpm. He has NOT received Tylenol/Ibuprofen. Allergic to penicillin and codeine. Unable to obtain full Hx and Ros due to pt's illness.

## 2018-02-16 NOTE — H&P ADULT - HISTORY OF PRESENT ILLNESS
68 y/o male with multiple medical problems including ILD/Advanced COPD on home 02, Hypthyroid, CVA in 14, DM-2, B/L Hydroceles, recently dcd home after prolonged hospital stay due to ILD/COPD exacerbation complicated by SBO requiring emergent surgery that lead to jejunostomy which was reversed and replaced with Ileostomy. Patient had prolonged post op ileus requiring TPN for malnutrition. He was also followed by Cardiology for persistent tachycardia, seen by Urology for Hydroceles, Vascular for right femoral artery dissection, and GI for high output from ostomy. He was dcd to home in stable condition as per chart notes. EMS was called after above symptoms started today. His wife also apparently has the flu. In the ED patient found to have Pos Flu A and multi focal PNA on Chest CT, he ruled in for sepsis. He was given IVF bolus which normalized his lactate, BP is stable, satting well on supplemental 02. He was given Tamiflu and HCAP coverage. No reports of aspiration from family. No reported Ostomy issues. He will be admitted for HCAP, sepsis and has high ROM with advanced underlying Pulmonary disease.

## 2018-02-17 DIAGNOSIS — Z88.0 ALLERGY STATUS TO PENICILLIN: ICD-10-CM

## 2018-02-17 DIAGNOSIS — J18.9 PNEUMONIA, UNSPECIFIED ORGANISM: ICD-10-CM

## 2018-02-17 DIAGNOSIS — J10.1 INFLUENZA DUE TO OTHER IDENTIFIED INFLUENZA VIRUS WITH OTHER RESPIRATORY MANIFESTATIONS: ICD-10-CM

## 2018-02-17 PROCEDURE — 99233 SBSQ HOSP IP/OBS HIGH 50: CPT

## 2018-02-17 PROCEDURE — 99223 1ST HOSP IP/OBS HIGH 75: CPT

## 2018-02-17 RX ORDER — CEFEPIME 1 G/1
2000 INJECTION, POWDER, FOR SOLUTION INTRAMUSCULAR; INTRAVENOUS EVERY 12 HOURS
Qty: 0 | Refills: 0 | Status: DISCONTINUED | OUTPATIENT
Start: 2018-02-17 | End: 2018-02-28

## 2018-02-17 RX ORDER — VANCOMYCIN HCL 1 G
750 VIAL (EA) INTRAVENOUS
Qty: 0 | Refills: 0 | Status: DISCONTINUED | OUTPATIENT
Start: 2018-02-17 | End: 2018-02-18

## 2018-02-17 RX ORDER — SODIUM CHLORIDE 9 MG/ML
1000 INJECTION INTRAMUSCULAR; INTRAVENOUS; SUBCUTANEOUS
Qty: 0 | Refills: 0 | Status: DISCONTINUED | OUTPATIENT
Start: 2018-02-17 | End: 2018-02-19

## 2018-02-17 RX ORDER — SODIUM CHLORIDE 0.65 %
1 AEROSOL, SPRAY (ML) NASAL EVERY 4 HOURS
Qty: 0 | Refills: 0 | Status: DISCONTINUED | OUTPATIENT
Start: 2018-02-17 | End: 2018-02-28

## 2018-02-17 RX ORDER — ALPRAZOLAM 0.25 MG
0.25 TABLET ORAL EVERY 6 HOURS
Qty: 0 | Refills: 0 | Status: DISCONTINUED | OUTPATIENT
Start: 2018-02-17 | End: 2018-02-24

## 2018-02-17 RX ADMIN — Medication 150 MILLIGRAM(S): at 16:35

## 2018-02-17 RX ADMIN — SODIUM CHLORIDE 1000 MILLILITER(S): 9 INJECTION INTRAMUSCULAR; INTRAVENOUS; SUBCUTANEOUS at 18:23

## 2018-02-17 RX ADMIN — SODIUM CHLORIDE 3 MILLILITER(S): 9 INJECTION INTRAMUSCULAR; INTRAVENOUS; SUBCUTANEOUS at 13:11

## 2018-02-17 RX ADMIN — Medication 81 MILLIGRAM(S): at 12:25

## 2018-02-17 RX ADMIN — ATORVASTATIN CALCIUM 40 MILLIGRAM(S): 80 TABLET, FILM COATED ORAL at 22:26

## 2018-02-17 RX ADMIN — Medication 0.25 MILLIGRAM(S): at 10:04

## 2018-02-17 RX ADMIN — LEVALBUTEROL 0.63 MILLIGRAM(S): 1.25 SOLUTION, CONCENTRATE RESPIRATORY (INHALATION) at 16:40

## 2018-02-17 RX ADMIN — Medication 1 TABLET(S): at 12:25

## 2018-02-17 RX ADMIN — Medication 150 MILLIGRAM(S): at 10:04

## 2018-02-17 RX ADMIN — SODIUM CHLORIDE 3 MILLILITER(S): 9 INJECTION INTRAMUSCULAR; INTRAVENOUS; SUBCUTANEOUS at 22:36

## 2018-02-17 RX ADMIN — Medication 250 MILLIGRAM(S): at 18:22

## 2018-02-17 RX ADMIN — Medication 1200 MILLIGRAM(S): at 18:31

## 2018-02-17 RX ADMIN — FLUTICASONE PROPIONATE AND SALMETEROL 1 DOSE(S): 50; 250 POWDER ORAL; RESPIRATORY (INHALATION) at 13:11

## 2018-02-17 RX ADMIN — Medication 75 MILLIGRAM(S): at 10:03

## 2018-02-17 RX ADMIN — SODIUM CHLORIDE 3 MILLILITER(S): 9 INJECTION INTRAMUSCULAR; INTRAVENOUS; SUBCUTANEOUS at 05:22

## 2018-02-17 RX ADMIN — Medication 100 MILLIGRAM(S): at 18:22

## 2018-02-17 RX ADMIN — CEFEPIME 100 MILLIGRAM(S): 1 INJECTION, POWDER, FOR SOLUTION INTRAMUSCULAR; INTRAVENOUS at 05:51

## 2018-02-17 RX ADMIN — Medication 100 MILLIGRAM(S): at 10:04

## 2018-02-17 RX ADMIN — Medication 75 MILLIGRAM(S): at 22:25

## 2018-02-17 RX ADMIN — TIOTROPIUM BROMIDE 1 CAPSULE(S): 18 CAPSULE ORAL; RESPIRATORY (INHALATION) at 12:58

## 2018-02-17 RX ADMIN — CLOPIDOGREL BISULFATE 75 MILLIGRAM(S): 75 TABLET, FILM COATED ORAL at 12:25

## 2018-02-17 RX ADMIN — TAMSULOSIN HYDROCHLORIDE 0.4 MILLIGRAM(S): 0.4 CAPSULE ORAL at 22:27

## 2018-02-17 RX ADMIN — LEVALBUTEROL 0.63 MILLIGRAM(S): 1.25 SOLUTION, CONCENTRATE RESPIRATORY (INHALATION) at 12:58

## 2018-02-17 RX ADMIN — FLUTICASONE PROPIONATE AND SALMETEROL 1 DOSE(S): 50; 250 POWDER ORAL; RESPIRATORY (INHALATION) at 22:25

## 2018-02-17 RX ADMIN — CEFEPIME 100 MILLIGRAM(S): 1 INJECTION, POWDER, FOR SOLUTION INTRAMUSCULAR; INTRAVENOUS at 18:23

## 2018-02-17 RX ADMIN — Medication 1 SPRAY(S): at 14:00

## 2018-02-17 RX ADMIN — Medication 0.25 MILLIGRAM(S): at 18:22

## 2018-02-17 NOTE — CONSULT NOTE ADULT - ASSESSMENT
68y/o  Male with h/o ILD/Advanced COPD s/p right lung reduction in 2010 on home 02, Hypothyroidism, CVA, DM-2, B/L Hydroceles, and nephrolithiasis recently was admitted to the hospital for a complicated hospital stay discharged on 2/15/18 after being treated for COPD exacerbation, Pneumonia with Levaquin, complicated by SBO requiring emergent surgery now s/p jejunostomy closure and s/p Ileostomy, prolonged post op ileus.  Now here with Influenza A and multifocal pneumonia on CT

## 2018-02-17 NOTE — CONSULT NOTE ADULT - SUBJECTIVE AND OBJECTIVE BOX
[facility]  INFECTIOUS DISEASES AND INTERNAL MEDICINE at [place]  =======================================================  Kevin Bird MD FACIVETT Manriquez MD  Diplomates American Board of Internal Medicine and Infectious Diseases  =======================================================      [id number]  FRANDY BAUTISTA     CC: Patient is a 67y old  Male who presents with a chief complaint of Came for rehab (2018 04:08)    66y/o  Male with h/o ILD/Advanced COPD s/p right lung reduction in  on home 02, Hypothyroidism, CVA, DM-2, B/L Hydroceles, and nephrolithiasis recently was admitted to the hospital for a complicated hospital stay discharged on 2/15/18 after being treated for COPD exacerbation, Pneumonia with Levaquin, complicated by SBO requiring emergent surgery now s/p jejunostomy closure and s/p Ileostomy, prolonged post op ileus. Patient was discharged on 2/15/18. His wife was supposed to take care of him at home. She was diagnosed with influenza on  and has been avoiding her  up until 2/15. Patient report we was well up until he tried to walk at which time he developed SOB and fatigue. Denies fevers or chills. Wife brought him back to the ER. In the ER patient was tachycardic to 139, afebrile, RVP done reported + influenza A. CT Chest with multifocal pneumonia. Patient was started on IV Vancomycin, Cefepime and Tamiflu. ID input requested.       Past Medical & Surgical Hx:  Hypothyroidism  Hydrocele  Renal calculi  Diabetes mellitus  Hypertension  CVA (cerebral vascular accident)  Chronic obstructive pulmonary disease, unspecified COPD type  H/O ileostomy: s/p repair.  History of lung surgery      Social Hx:  Former smoker      FAMILY HISTORY:  No pertinent family history in first degree relatives      Allergies  codeine (Unknown)  no veges (Unknown)  penicillin (Unknown) - Tolerating Cefepime      Antibiotics:  cefepime  IVPB 1000 milliGRAM(s) IV Intermittent every 12 hours  oseltamivir 75 milliGRAM(s) Oral two times a day  vancomycin  IVPB 750 milliGRAM(s) IV Intermittent <User Schedule>       REVIEW OF SYSTEMS:  CONSTITUTIONAL:  No Fever or chills  HEENT:  No diplopia or blurred vision.  No earache, sore throat or runny nose.  CARDIOVASCULAR:  No pressure, squeezing, strangling, tightness, heaviness or aching about the chest, neck, axilla or epigastrium.  RESPIRATORY:  No cough, shortness of breath  GASTROINTESTINAL:  No nausea, vomiting or diarrhea.  GENITOURINARY:  No dysuria, frequency or urgency.  MUSCULOSKELETAL:  no joint aches, no muscle pain  SKIN:  No change in skin, hair or nails.  NEUROLOGIC:  No paresthesias, fasciculations  PSYCHIATRIC:  No disorder of thought or mood.  ENDOCRINE:  No heat or cold intolerance  HEMATOLOGICAL:  No easy bruising or bleeding.       Physical Exam:  Vital Signs Last 24 Hrs  T(C): 36.8 (2018 11:23), Max: 37.7 (2018 14:18)  T(F): 98.3 (2018 11:23), Max: 99.9 (2018 14:18)  HR: 104 (2018 11:23) (88 - 139)  BP: 137/75 (2018 11:23) (104/60 - 137/75)  BP(mean): 91 (2018 21:36) (91 - 91)  RR: 26 (2018 11:23) (16 - 28)  SpO2: 93% (2018 11:23) (92% - 97%)  Height (cm): 172.72 ( @ 14:18)  Weight (kg): 45.4 ( @ 14:18)  BMI (kg/m2): 15.2 ( @ 14:18)  BSA (m2): 1.52 ( @ 14:18)      GEN: NAD, + Fatigue  HEENT: normocephalic and atraumatic. EOMI. PERRL.    NECK: Supple.   LUNGS: Scattered rhonchi.   HEART: Regular rate and rhythm  ABDOMEN: Soft, nontender, and nondistended.  Positive bowel sounds.  + Ileostomy   : No CVA tenderness  EXTREMITIES: Without any edema.  MSK: No joint swelling  NEUROLOGIC: + weakness B/L, alert and oriented.   PSYCHIATRIC: Appropriate affect .  SKIN: No rash      Labs:      138  |  97<L>  |  12.0  ----------------------------<  113  4.2   |  29.0  |  0.41<L>    Ca    8.6      2018 14:40    TPro  7.3  /  Alb  3.4  /  TBili  0.7  /  DBili  x   /  AST  31  /  ALT  100<H>  /  AlkPhos  153<H>                 12.9   21.0  )-----------( 513      ( 2018 14:40 )             41.2       PT/INR - ( 2018 14:40 )   PT: 10.8 sec;   INR: 0.98 ratio         PTT - ( 2018 14:40 )  PTT:22.3 sec  Urinalysis Basic - ( 2018 16:35 )    Color: Yellow / Appearance: Clear / S.015 / pH: x  Gluc: x / Ketone: Negative  / Bili: Negative / Urobili: Negative mg/dL   Blood: x / Protein: Negative mg/dL / Nitrite: Negative   Leuk Esterase: Trace / RBC: 3-5 /HPF / WBC 11-25   Sq Epi: x / Non Sq Epi: Occasional / Bacteria: Occasional      LIVER FUNCTIONS - ( 2018 14:40 )  Alb: 3.4 g/dL / Pro: 7.3 g/dL / ALK PHOS: 153 U/L / ALT: 100 U/L / AST: 31 U/L / GGT: x             RECENT CULTURES:   @ 15:59    RVP  Detected - Influenza A      02-10 @ 07:22 .Blood Blood-Peripheral     No growth at 5 days.       @ 15:52 .Blood Blood-Peripheral     No growth at 5 days.       @ 15:51 .Blood Blood-Peripheral     No growth at 5 days.       @ 14:05 .Sputum Sputum Escherichia coli  Pseudomonas aeruginosa    Moderate Escherichia coli  Moderate Pseudomonas aeruginosa  Few Routine respiratory swetha present  Few White blood cells  Moderate Gram Positive Cocci in Pairs and Chains         EXAM:  CT CHEST                        PROCEDURE DATE:  2018    INTERPRETATION:  HISTORY: Shortness of breath.  Date and Time of Exam: 2018 7:02 PM  TECHNIQUE:  Sections were obtained from the apices to the diaphragm   without intravenous contrast.  COMPARISON EXAMINATION:   2017.  FINDINGS: No evidence of mediastinal or hilar lymphadenopathy. No   evidence of pleural or pericardial effusion. No evidence of axillary   adenopathy. Extensive coronary artery calcifications are noted.  Emphysematous changes and hyperexpansion of the left lower lobe unchanged   from the prior study. There is an infiltrate in the left lower lobe which   is a new finding since 2017.  Small right upper lobe infiltrate, a new finding since the prior study.   There is a right lower lobe infiltrate, similar to the infiltrate noted   on the prior examination.  No significant osseous abnormality.    IMPRESSION:     Left lower lobe and right upper lobe infiltrates, new since 2017.  Right lower lobe infiltrate without significant change from the prior study.

## 2018-02-17 NOTE — CONSULT NOTE ADULT - PROBLEM SELECTOR RECOMMENDATION 2
Patient was treated for pneumonia last visit now with New infiltrate on CT chest  Sputum cx from prior hospitalization was pseudomonas resistant to levaquin  Will Continue Cefepime for now since that pseudomonas was not treated  Adjusted Cefepime dose  Obtain new Sputum Cx  Follow up Repeat Blood cultures  Will D/C Vancomycin for now  Afebrile  Trend leukocytosis

## 2018-02-17 NOTE — CONSULT NOTE ADULT - PROBLEM SELECTOR RECOMMENDATION 9
RVP Positive for Influenza A  Continue Tamiflu  Complete 5 days of treatment  Sick contact is patient's wife

## 2018-02-17 NOTE — PROGRESS NOTE ADULT - SUBJECTIVE AND OBJECTIVE BOX
FRANDY BAUTISTA    [id number]    67y      Male    Patient is a 67y old  Male who presents with a chief complaint of Came for rehab (2018 04:08)      INTERVAL HPI/OVERNIGHT EVENTS:    Patient is feeling some improvement of his SOB and cough, denies, fever, chills, chest pain, nausea and vomiting    REVIEW OF SYSTEMS:    CONSTITUTIONAL: No fever, has fatigue  RESPIRATORY: im proving cough and shortness of breath  CARDIOVASCULAR: No chest pain, palpitations  GASTROINTESTINAL: No abdominal, No nausea, vomiting  NEUROLOGICAL: No headaches,  loss of strength.  MISCELLANEOUS: No joint swelling or pain       Vital Signs Last 24 Hrs  T(C): 36.1 (2018 07:52), Max: 37.7 (2018 14:18)  T(F): 97 (2018 07:52), Max: 99.9 (2018 14:18)  HR: 97 (2018 07:52) (88 - 139)  BP: 112/67 (2018 07:52) (104/60 - 128/86)  BP(mean): 91 (2018 21:36) (91 - 91)  RR: 24 (2018 07:52) (16 - 28)  SpO2: 96% (2018 07:52) (92% - 97%)    PHYSICAL EXAM:    GENERAL: Elderly male thin built male looking comfortable    HEENT: PERRL, +EOMI  NECK: soft, Supple, No JVD,   CHEST/LUNG: Decrease air entry bilaterally; some occasional scattered rhonchi, No wheezing  HEART: S1S2+, Regular rate and rhythm; No murmurs, rubs, or gallops  ABDOMEN: Soft, Nontender, Nondistended; Bowel sounds present, Ileostomy   EXTREMITIES:  1+ Peripheral Pulses, No edema  SKIN: No rashes or lesions  NEURO: AAOX3, no focal deficits, no motor r sensory loss  PSYCH: normal mood      LABS:                        12.9   21.0  )-----------( 513      ( 2018 14:40 )             41.2     02-16    138  |  97<L>  |  12.0  ----------------------------<  113  4.2   |  29.0  |  0.41<L>    Ca    8.6      2018 14:40    TPro  7.3  /  Alb  3.4  /  TBili  0.7  /  DBili  x   /  AST  31  /  ALT  100<H>  /  AlkPhos  153<H>  02-16    PT/INR - ( 2018 14:40 )   PT: 10.8 sec;   INR: 0.98 ratio         PTT - ( 2018 14:40 )  PTT:22.3 sec  Urinalysis Basic - ( 2018 16:35 )    Color: Yellow / Appearance: Clear / S.015 / pH: x  Gluc: x / Ketone: Negative  / Bili: Negative / Urobili: Negative mg/dL   Blood: x / Protein: Negative mg/dL / Nitrite: Negative   Leuk Esterase: Trace / RBC: 3-5 /HPF / WBC 11-25   Sq Epi: x / Non Sq Epi: Occasional / Bacteria: Occasional          I&O's Summary      MEDICATIONS  (STANDING):  aspirin enteric coated 81 milliGRAM(s) Oral daily  atorvastatin 40 milliGRAM(s) Oral at bedtime  benzonatate 100 milliGRAM(s) Oral every 6 hours  cefepime  IVPB 1000 milliGRAM(s) IV Intermittent every 12 hours  clopidogrel Tablet 75 milliGRAM(s) Oral daily  dextrose 5%. 1000 milliLiter(s) (50 mL/Hr) IV Continuous <Continuous>  dextrose 50% Injectable 12.5 Gram(s) IV Push once  dextrose 50% Injectable 25 Gram(s) IV Push once  dextrose 50% Injectable 25 Gram(s) IV Push once  diltiazem    Tablet 120 milliGRAM(s) Oral every 8 hours  enoxaparin Injectable 40 milliGRAM(s) SubCutaneous every 24 hours  fluticasone propionate/ salmeterol 250-50 MICROgram(s) Diskus 1 Dose(s) Inhalation two times a day  guaiFENesin ER 1200 milliGRAM(s) Oral every 12 hours  insulin lispro (HumaLOG) corrective regimen sliding scale   SubCutaneous Before meals and at bedtime  levothyroxine 50 MICROGram(s) Oral daily  multivitamin 1 Tablet(s) Oral daily  oseltamivir 75 milliGRAM(s) Oral two times a day  pantoprazole    Tablet 40 milliGRAM(s) Oral before breakfast  predniSONE   Tablet 40 milliGRAM(s) Oral daily  saccharomyces boulardii 250 milliGRAM(s) Oral two times a day  sodium chloride 0.9% lock flush 3 milliLiter(s) IV Push every 8 hours  sodium chloride 0.9%. 1000 milliLiter(s) (1000 mL/Hr) IV Continuous <Continuous>  tamsulosin 0.4 milliGRAM(s) Oral at bedtime  tiotropium 18 MICROgram(s) Capsule 1 Capsule(s) Inhalation daily  vancomycin  IVPB 750 milliGRAM(s) IV Intermittent <User Schedule>    MEDICATIONS  (PRN):  acetaminophen   Tablet 650 milliGRAM(s) Oral every 6 hours PRN For Temp greater than 38.5 C (101.3 F)  ALPRAZolam 0.25 milliGRAM(s) Oral every 6 hours PRN anxiety  dextrose Gel 1 Dose(s) Oral once PRN Blood Glucose LESS THAN 70 milliGRAM(s)/deciliter  glucagon  Injectable 1 milliGRAM(s) IntraMuscular once PRN Glucose LESS THAN 70 milligrams/deciliter  levalbuterol Inhalation 0.63 milliGRAM(s) Inhalation every 4 hours PRN Shortness of Breath

## 2018-02-17 NOTE — PROGRESS NOTE ADULT - PROBLEM SELECTOR PLAN 1
based on recent prolonged hospital course will need to cover for MRSA, Gram negative and anaerobes for possible aspiration based on CT findings, Repeat lactate normal, cont IVF, maintain SBP >90 MAP >65, florastor, Vancomycin/Cefepime, f/u cultures pending, continued 02 as he uses 4 liters at home, spirometer, Chest PT, Ambulation, DVT-P, fall/aspiration precaution, will get an ID consult as patient has previous sputum cultures showed Pseudomonas, will repeat sputum cultures.

## 2018-02-17 NOTE — PROGRESS NOTE ADULT - ATTENDING COMMENTS
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    Afib/ SInus tachycardia: Multifactorial, patient HR up in setting of COPD exacerbation, patient has been followed by cardiology during the previous admission, his Cardizem changed to short acting one because of small bowel absorption issues, will continue to monitor, repeat TTE done showed Normal global left ventricular systolic function,  Left ventricular ejection fraction, by visual estimation, is 60 to 65%, Moderately enlarged right ventricle, Estimated pulmonary artery systolic pressure is 44.7 mmHg assuming a right atrial pressure of 3 mmHg, which is consistent with mild pulmonary hypertension, There is no evidence of pericardial effusion, will follow along, not on anticoagulation.    8. DVT Prophylaxis - Lovenox

## 2018-02-18 LAB
ANION GAP SERPL CALC-SCNC: 12 MMOL/L — SIGNIFICANT CHANGE UP (ref 5–17)
BUN SERPL-MCNC: 22 MG/DL — HIGH (ref 8–20)
CALCIUM SERPL-MCNC: 8 MG/DL — LOW (ref 8.6–10.2)
CHLORIDE SERPL-SCNC: 102 MMOL/L — SIGNIFICANT CHANGE UP (ref 98–107)
CO2 SERPL-SCNC: 25 MMOL/L — SIGNIFICANT CHANGE UP (ref 22–29)
CREAT SERPL-MCNC: 0.4 MG/DL — LOW (ref 0.5–1.3)
GLUCOSE SERPL-MCNC: 158 MG/DL — HIGH (ref 70–115)
HCT VFR BLD CALC: 34.5 % — LOW (ref 42–52)
HGB BLD-MCNC: 10.9 G/DL — LOW (ref 14–18)
MCHC RBC-ENTMCNC: 27.9 PG — SIGNIFICANT CHANGE UP (ref 27–31)
MCHC RBC-ENTMCNC: 31.6 G/DL — LOW (ref 32–36)
MCV RBC AUTO: 88.5 FL — SIGNIFICANT CHANGE UP (ref 80–94)
PLATELET # BLD AUTO: 417 K/UL — HIGH (ref 150–400)
POTASSIUM SERPL-MCNC: 4.2 MMOL/L — SIGNIFICANT CHANGE UP (ref 3.5–5.3)
POTASSIUM SERPL-SCNC: 4.2 MMOL/L — SIGNIFICANT CHANGE UP (ref 3.5–5.3)
RBC # BLD: 3.9 M/UL — LOW (ref 4.6–6.2)
RBC # FLD: 17.9 % — HIGH (ref 11–15.6)
SODIUM SERPL-SCNC: 139 MMOL/L — SIGNIFICANT CHANGE UP (ref 135–145)
VANCOMYCIN TROUGH SERPL-MCNC: 11.5 UG/ML — SIGNIFICANT CHANGE UP (ref 10–20)
WBC # BLD: 12.9 K/UL — HIGH (ref 4.8–10.8)
WBC # FLD AUTO: 12.9 K/UL — HIGH (ref 4.8–10.8)

## 2018-02-18 PROCEDURE — 99233 SBSQ HOSP IP/OBS HIGH 50: CPT

## 2018-02-18 PROCEDURE — 99232 SBSQ HOSP IP/OBS MODERATE 35: CPT

## 2018-02-18 RX ORDER — LORATADINE 10 MG/1
10 TABLET ORAL DAILY
Qty: 0 | Refills: 0 | Status: DISCONTINUED | OUTPATIENT
Start: 2018-02-18 | End: 2018-02-28

## 2018-02-18 RX ADMIN — SODIUM CHLORIDE 3 MILLILITER(S): 9 INJECTION INTRAMUSCULAR; INTRAVENOUS; SUBCUTANEOUS at 21:17

## 2018-02-18 RX ADMIN — FLUTICASONE PROPIONATE AND SALMETEROL 1 DOSE(S): 50; 250 POWDER ORAL; RESPIRATORY (INHALATION) at 19:41

## 2018-02-18 RX ADMIN — Medication 150 MILLIGRAM(S): at 00:05

## 2018-02-18 RX ADMIN — Medication 50 MICROGRAM(S): at 05:33

## 2018-02-18 RX ADMIN — CLOPIDOGREL BISULFATE 75 MILLIGRAM(S): 75 TABLET, FILM COATED ORAL at 13:33

## 2018-02-18 RX ADMIN — Medication 0.25 MILLIGRAM(S): at 00:05

## 2018-02-18 RX ADMIN — ATORVASTATIN CALCIUM 40 MILLIGRAM(S): 80 TABLET, FILM COATED ORAL at 21:21

## 2018-02-18 RX ADMIN — Medication 250 MILLIGRAM(S): at 05:33

## 2018-02-18 RX ADMIN — Medication 150 MILLIGRAM(S): at 08:23

## 2018-02-18 RX ADMIN — TAMSULOSIN HYDROCHLORIDE 0.4 MILLIGRAM(S): 0.4 CAPSULE ORAL at 21:21

## 2018-02-18 RX ADMIN — LEVALBUTEROL 0.63 MILLIGRAM(S): 1.25 SOLUTION, CONCENTRATE RESPIRATORY (INHALATION) at 15:46

## 2018-02-18 RX ADMIN — Medication 100 MILLIGRAM(S): at 00:06

## 2018-02-18 RX ADMIN — Medication 40 MILLIGRAM(S): at 05:33

## 2018-02-18 RX ADMIN — Medication 75 MILLIGRAM(S): at 05:33

## 2018-02-18 RX ADMIN — Medication 0.25 MILLIGRAM(S): at 13:05

## 2018-02-18 RX ADMIN — LEVALBUTEROL 0.63 MILLIGRAM(S): 1.25 SOLUTION, CONCENTRATE RESPIRATORY (INHALATION) at 09:47

## 2018-02-18 RX ADMIN — Medication 1200 MILLIGRAM(S): at 18:21

## 2018-02-18 RX ADMIN — Medication 1200 MILLIGRAM(S): at 05:38

## 2018-02-18 RX ADMIN — PANTOPRAZOLE SODIUM 40 MILLIGRAM(S): 20 TABLET, DELAYED RELEASE ORAL at 05:33

## 2018-02-18 RX ADMIN — Medication 81 MILLIGRAM(S): at 13:03

## 2018-02-18 RX ADMIN — Medication 250 MILLIGRAM(S): at 18:02

## 2018-02-18 RX ADMIN — Medication 100 MILLIGRAM(S): at 18:02

## 2018-02-18 RX ADMIN — FLUTICASONE PROPIONATE AND SALMETEROL 1 DOSE(S): 50; 250 POWDER ORAL; RESPIRATORY (INHALATION) at 09:01

## 2018-02-18 RX ADMIN — Medication 100 MILLIGRAM(S): at 05:33

## 2018-02-18 RX ADMIN — Medication 100 MILLIGRAM(S): at 13:01

## 2018-02-18 RX ADMIN — CEFEPIME 100 MILLIGRAM(S): 1 INJECTION, POWDER, FOR SOLUTION INTRAMUSCULAR; INTRAVENOUS at 18:02

## 2018-02-18 RX ADMIN — SODIUM CHLORIDE 3 MILLILITER(S): 9 INJECTION INTRAMUSCULAR; INTRAVENOUS; SUBCUTANEOUS at 16:29

## 2018-02-18 RX ADMIN — SODIUM CHLORIDE 100 MILLILITER(S): 9 INJECTION INTRAMUSCULAR; INTRAVENOUS; SUBCUTANEOUS at 07:47

## 2018-02-18 RX ADMIN — Medication 75 MILLIGRAM(S): at 18:02

## 2018-02-18 RX ADMIN — SODIUM CHLORIDE 3 MILLILITER(S): 9 INJECTION INTRAMUSCULAR; INTRAVENOUS; SUBCUTANEOUS at 05:20

## 2018-02-18 RX ADMIN — Medication 1 TABLET(S): at 13:33

## 2018-02-18 RX ADMIN — LEVALBUTEROL 0.63 MILLIGRAM(S): 1.25 SOLUTION, CONCENTRATE RESPIRATORY (INHALATION) at 02:23

## 2018-02-18 RX ADMIN — ENOXAPARIN SODIUM 40 MILLIGRAM(S): 100 INJECTION SUBCUTANEOUS at 13:33

## 2018-02-18 RX ADMIN — Medication 0.25 MILLIGRAM(S): at 18:01

## 2018-02-18 RX ADMIN — CEFEPIME 100 MILLIGRAM(S): 1 INJECTION, POWDER, FOR SOLUTION INTRAMUSCULAR; INTRAVENOUS at 05:34

## 2018-02-18 RX ADMIN — TIOTROPIUM BROMIDE 1 CAPSULE(S): 18 CAPSULE ORAL; RESPIRATORY (INHALATION) at 09:47

## 2018-02-18 NOTE — PROGRESS NOTE ADULT - SUBJECTIVE AND OBJECTIVE BOX
FRANDY BAUTISTA    [id number]    67y      Male    Patient is a 67y old  Male who presents with a chief complaint of Came for rehab (2018 04:08)      INTERVAL HPI/OVERNIGHT EVENTS:    Patient is still having SOB and cough, denies, fever, chills, chest pain, nausea and vomiting.     REVIEW OF SYSTEMS:    CONSTITUTIONAL: No fever, has fatigue  RESPIRATORY: cough and shortness of breath  CARDIOVASCULAR: No chest pain, palpitations  GASTROINTESTINAL: No abdominal, No nausea, vomiting  NEUROLOGICAL: No headaches,  loss of strength.  MISCELLANEOUS: No joint swelling or pain      Vital Signs Last 24 Hrs  T(C): 36 (2018 04:24), Max: 36.9 (2018 20:45)  T(F): 96.8 (2018 04:24), Max: 98.4 (2018 20:45)  HR: 78 (2018 04:24) (78 - 105)  BP: 90/53 (2018 04:24) (90/53 - 137/75)  RR: 20 (2018 21:40) (20 - 26)  SpO2: 90% (2018 21:40) (90% - 93%)    PHYSICAL EXAM:    GENERAL: Elderly male thin built male looking comfortable    HEENT: PERRL, +EOMI  NECK: soft, Supple, No JVD,   CHEST/LUNG: Decrease air entry bilaterally; No wheezing  HEART: S1S2+, Regular rate and rhythm; No murmurs  ABDOMEN: Soft, Nontender, Nondistended; Bowel sounds present, Ileostomy   EXTREMITIES:  1+ Peripheral Pulses, No edema  SKIN: No rashes or lesions  NEURO: AAOX3, no focal deficits, no motor r sensory loss  PSYCH: normal mood        LABS:                        12.9   21.0  )-----------( 513      ( 2018 14:40 )             41.2     02-18    139  |  102  |  22.0<H>  ----------------------------<  158<H>  4.2   |  25.0  |  0.40<L>    Ca    8.0<L>      2018 07:25    TPro  7.3  /  Alb  3.4  /  TBili  0.7  /  DBili  x   /  AST  31  /  ALT  100<H>  /  AlkPhos  153<H>  02-16    PT/INR - ( 2018 14:40 )   PT: 10.8 sec;   INR: 0.98 ratio         PTT - ( 2018 14:40 )  PTT:22.3 sec  Urinalysis Basic - ( 2018 16:35 )    Color: Yellow / Appearance: Clear / S.015 / pH: x  Gluc: x / Ketone: Negative  / Bili: Negative / Urobili: Negative mg/dL   Blood: x / Protein: Negative mg/dL / Nitrite: Negative   Leuk Esterase: Trace / RBC: 3-5 /HPF / WBC 11-25   Sq Epi: x / Non Sq Epi: Occasional / Bacteria: Occasional          I&O's Summary    2018 07:01  -  2018 07:00  --------------------------------------------------------  IN: 1000 mL / OUT: 575 mL / NET: 425 mL        MEDICATIONS  (STANDING):  ALPRAZolam 0.25 milliGRAM(s) Oral every 6 hours  aspirin enteric coated 81 milliGRAM(s) Oral daily  atorvastatin 40 milliGRAM(s) Oral at bedtime  benzonatate 100 milliGRAM(s) Oral every 6 hours  cefepime  IVPB 2000 milliGRAM(s) IV Intermittent every 12 hours  clopidogrel Tablet 75 milliGRAM(s) Oral daily  dextrose 5%. 1000 milliLiter(s) (50 mL/Hr) IV Continuous <Continuous>  dextrose 50% Injectable 12.5 Gram(s) IV Push once  dextrose 50% Injectable 25 Gram(s) IV Push once  dextrose 50% Injectable 25 Gram(s) IV Push once  diltiazem    Tablet 120 milliGRAM(s) Oral every 8 hours  enoxaparin Injectable 40 milliGRAM(s) SubCutaneous every 24 hours  fluticasone propionate/ salmeterol 250-50 MICROgram(s) Diskus 1 Dose(s) Inhalation two times a day  guaiFENesin ER 1200 milliGRAM(s) Oral every 12 hours  insulin lispro (HumaLOG) corrective regimen sliding scale   SubCutaneous Before meals and at bedtime  levothyroxine 50 MICROGram(s) Oral daily  multivitamin 1 Tablet(s) Oral daily  oseltamivir 75 milliGRAM(s) Oral two times a day  pantoprazole    Tablet 40 milliGRAM(s) Oral before breakfast  predniSONE   Tablet 40 milliGRAM(s) Oral daily  saccharomyces boulardii 250 milliGRAM(s) Oral two times a day  sodium chloride 0.9% lock flush 3 milliLiter(s) IV Push every 8 hours  sodium chloride 0.9%. 1000 milliLiter(s) (100 mL/Hr) IV Continuous <Continuous>  tamsulosin 0.4 milliGRAM(s) Oral at bedtime  tiotropium 18 MICROgram(s) Capsule 1 Capsule(s) Inhalation daily    MEDICATIONS  (PRN):  acetaminophen   Tablet 650 milliGRAM(s) Oral every 6 hours PRN For Temp greater than 38.5 C (101.3 F)  dextrose Gel 1 Dose(s) Oral once PRN Blood Glucose LESS THAN 70 milliGRAM(s)/deciliter  glucagon  Injectable 1 milliGRAM(s) IntraMuscular once PRN Glucose LESS THAN 70 milligrams/deciliter  levalbuterol Inhalation 0.63 milliGRAM(s) Inhalation every 4 hours PRN Shortness of Breath  sodium chloride 0.65% Nasal 1 Spray(s) Both Nostrils every 4 hours PRN Nasal Congestion

## 2018-02-18 NOTE — DIETITIAN INITIAL EVALUATION ADULT. - ETIOLOGY
related to inadequate energy intake in setting of COPD, now with pneumonia and flu, high residuals w/ ileostomy last admit (now improved)

## 2018-02-18 NOTE — CHART NOTE - NSCHARTNOTEFT_GEN_A_CORE
Upon Nutritional Assessment by the Registered Dietitian your patient was determined to meet criteria / has evidence of the following diagnosis/diagnoses:          [ ]  Mild Protein Calorie Malnutrition        [ ]  Moderate Protein Calorie Malnutrition        [x ] Severe Protein Calorie Malnutrition    chronic        [ ] Unspecified Protein Calorie Malnutrition        [ ] Underweight / BMI <19        [ ] Morbid Obesity / BMI > 40      Findings as based on:  •  Comprehensive nutrition assessment and consultation  •  Calorie counts (nutrient intake analysis)  •  Food acceptance and intake status from observations by staff  •  Follow up  •  Patient education  •  Intervention secondary to interdisciplinary rounds  •   concerns      Treatment:    The following diet has been recommended:  Rec add ensure clears TID      PROVIDER Section:     By signing this assessment you are acknowledging and agree with the diagnosis/diagnoses assigned by the Registered Dietitian    Comments:

## 2018-02-18 NOTE — PROGRESS NOTE ADULT - PROBLEM SELECTOR PLAN 1
based on recent prolonged hospital course will need to cover for MRSA, Gram negative and anaerobes, Repeat lactate normal, cont IVF, maintain SBP >90 MAP >65, florastor, Cefepime, f/u cultures pending, continued 02 as he uses 4 liters at home, spirometer, Chest PT, Ambulation, DVT-P, fall/aspiration precaution, ID consult appreciated, Vancomycin d/feliz as patient has previous sputum cultures showed Pseudomonas, will repeat sputum cultures, will continued with some steriods and supportive meds

## 2018-02-18 NOTE — PROGRESS NOTE ADULT - SUBJECTIVE AND OBJECTIVE BOX
[facility]  INFECTIOUS DISEASES AND INTERNAL MEDICINE at [place]  =======================================================  Kevin Manriquez MD  Diplomates American Board of Internal Medicine and Infectious Diseases  =======================================================    FRANDY BAUTISTA [id number]    Follow up: Pneumonia    Still with fatigue and SOB  No fevers    Allergies:  codeine (Unknown)  DO NOT SEND ORANGES (Unknown)  no veges (Unknown)  penicillin (Unknown)  Symbicort (Short breath)      Antibiotics:  cefepime  IVPB 2000 milliGRAM(s) IV Intermittent every 12 hours  oseltamivir 75 milliGRAM(s) Oral two times a day        REVIEW OF SYSTEMS:  CONSTITUTIONAL:  No Fever or chills  HEENT:  No diplopia or blurred vision.  No earache, sore throat or runny nose.  CARDIOVASCULAR:  No pressure, squeezing, strangling, tightness, heaviness or aching about the chest, neck, axilla or epigastrium.  RESPIRATORY:  No cough, shortness of breath  GASTROINTESTINAL:  No nausea, vomiting or diarrhea.  GENITOURINARY:  No dysuria, frequency or urgency.  MUSCULOSKELETAL:  no joint aches, no muscle pain  SKIN:  No change in skin, hair or nails.  NEUROLOGIC:  No paresthesias, fasciculations  PSYCHIATRIC:  No disorder of thought or mood.  ENDOCRINE:  No heat or cold intolerance  HEMATOLOGICAL:  No easy bruising or bleeding.       Physical Exam:  Vital Signs Last 24 Hrs  T(C): 36.7 (2018 09:35), Max: 36.9 (2018 20:45)  T(F): 98 (2018 09:35), Max: 98.4 (2018 20:45)  HR: 96 (2018 09:35) (78 - 105)  BP: 136/84 (2018 09:35) (90/53 - 136/84)  RR: 20 (2018 09:35) (20 - 22)  SpO2: 92% (2018 09:35) (90% - 92%)      GEN: NAD, + Fatigue  HEENT: normocephalic and atraumatic. EOMI. PERRL.    NECK: Supple.   LUNGS: Scattered rhonchi.   HEART: Regular rate and rhythm  ABDOMEN: Soft, nontender, and nondistended.  Positive bowel sounds.  + Ileostomy   : No CVA tenderness  EXTREMITIES: Without any edema.  MSK: No joint swelling  NEUROLOGIC: + weakness B/L, alert and oriented.   PSYCHIATRIC: Appropriate affect .  SKIN: No rash      Labs:      139  |  102  |  22.0<H>  ----------------------------<  158<H>  4.2   |  25.0  |  0.40<L>    Ca    8.0<L>      2018 07:25    TPro  7.3  /  Alb  3.4  /  TBili  0.7  /  DBili  x   /  AST  31  /  ALT  100<H>  /  AlkPhos  153<H>                            10.9   12.9  )-----------( 417      ( 2018 07:25 )             34.5       PT/INR - ( 2018 14:40 )   PT: 10.8 sec;   INR: 0.98 ratio         PTT - ( 2018 14:40 )  PTT:22.3 sec  Urinalysis Basic - ( 2018 16:35 )    Color: Yellow / Appearance: Clear / S.015 / pH: x  Gluc: x / Ketone: Negative  / Bili: Negative / Urobili: Negative mg/dL   Blood: x / Protein: Negative mg/dL / Nitrite: Negative   Leuk Esterase: Trace / RBC: 3-5 /HPF / WBC 11-25   Sq Epi: x / Non Sq Epi: Occasional / Bacteria: Occasional      LIVER FUNCTIONS - ( 2018 14:40 )  Alb: 3.4 g/dL / Pro: 7.3 g/dL / ALK PHOS: 153 U/L / ALT: 100 U/L / AST: 31 U/L / GGT: x             RECENT CULTURES:   @ 16:35 .Urine Clean Catch (Midstream)     Culture in progress       @ 15:59    RVP  Detected - Influenza A      02-10 @ 07:22 .Blood Blood-Peripheral     No growth at 5 days.       @ 15:52 .Blood Blood-Peripheral     No growth at 5 days.       @ 15:51 .Blood Blood-Peripheral     No growth at 5 days.       @ 14:05 .Sputum Sputum Escherichia coli  Pseudomonas aeruginosa    Moderate Escherichia coli  Moderate Pseudomonas aeruginosa  Few Routine respiratory swetha present  Few White blood cells  Moderate Gram Positive Cocci in Pairs and Chains      EXAM:  CT CHEST                        PROCEDURE DATE:  2018    INTERPRETATION:  HISTORY: Shortness of breath.  Date and Time of Exam: 2018 7:02 PM  TECHNIQUE:  Sections were obtained from the apices to the diaphragm   without intravenous contrast.  COMPARISON EXAMINATION:   2017.  FINDINGS: No evidence of mediastinal or hilar lymphadenopathy. No   evidence of pleural or pericardial effusion. No evidence of axillary   adenopathy. Extensive coronary artery calcifications are noted.  Emphysematous changes and hyperexpansion of the left lower lobe unchanged   from the prior study. There is an infiltrate in the left lower lobe which   is a new finding since 2017.  Small right upper lobe infiltrate, a new finding since the prior study.   There is a right lower lobe infiltrate, similar to the infiltrate noted   on the prior examination.  No significant osseous abnormality.    IMPRESSION:     Left lower lobe and right upper lobe infiltrates, new since 2017.  Right lower lobe infiltrate without significant change from the prior study.

## 2018-02-18 NOTE — DIETITIAN INITIAL EVALUATION ADULT. - SIGNS/SYMPTOMS
as evidenced by severe muscle/fat depletion (temples, clavicles, orbital 33% weight loss in 3 months

## 2018-02-18 NOTE — DIETITIAN INITIAL EVALUATION ADULT. - OTHER INFO
BMI 15.2, Pt had been since Nov, discharged on thursday 2/15. Now back with flu and pneumonia, po intake ~50%

## 2018-02-18 NOTE — PROGRESS NOTE ADULT - ATTENDING COMMENTS
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    Afib/ SInus tachycardia: Multifactorial, patient HR up in setting of COPD exacerbation, patient has been followed by cardiology during the previous admission, his Cardizem changed to short acting one because of small bowel absorption issues, will continue to monitor, repeat TTE done showed Normal global left ventricular systolic function,  Left ventricular ejection fraction, by visual estimation, is 60 to 65%, Moderately enlarged right ventricle, Estimated pulmonary artery systolic pressure is 44.7 mmHg assuming a right atrial pressure of 3 mmHg, which is consistent with mild pulmonary hypertension, There is no evidence of pericardial effusion, not on anticoagulation, heart rate is in 70s now.     8. DVT Prophylaxis - Lovenox

## 2018-02-19 LAB
ANION GAP SERPL CALC-SCNC: 13 MMOL/L — SIGNIFICANT CHANGE UP (ref 5–17)
BUN SERPL-MCNC: 13 MG/DL — SIGNIFICANT CHANGE UP (ref 8–20)
CALCIUM SERPL-MCNC: 7.7 MG/DL — LOW (ref 8.6–10.2)
CHLORIDE SERPL-SCNC: 104 MMOL/L — SIGNIFICANT CHANGE UP (ref 98–107)
CO2 SERPL-SCNC: 23 MMOL/L — SIGNIFICANT CHANGE UP (ref 22–29)
CREAT SERPL-MCNC: 0.36 MG/DL — LOW (ref 0.5–1.3)
CULTURE RESULTS: SIGNIFICANT CHANGE UP
GLUCOSE SERPL-MCNC: 140 MG/DL — HIGH (ref 70–115)
GRAM STN FLD: SIGNIFICANT CHANGE UP
HCT VFR BLD CALC: 32.8 % — LOW (ref 42–52)
HGB BLD-MCNC: 10 G/DL — LOW (ref 14–18)
MCHC RBC-ENTMCNC: 27 PG — SIGNIFICANT CHANGE UP (ref 27–31)
MCHC RBC-ENTMCNC: 30.5 G/DL — LOW (ref 32–36)
MCV RBC AUTO: 88.4 FL — SIGNIFICANT CHANGE UP (ref 80–94)
PLATELET # BLD AUTO: 337 K/UL — SIGNIFICANT CHANGE UP (ref 150–400)
POTASSIUM SERPL-MCNC: 3.8 MMOL/L — SIGNIFICANT CHANGE UP (ref 3.5–5.3)
POTASSIUM SERPL-SCNC: 3.8 MMOL/L — SIGNIFICANT CHANGE UP (ref 3.5–5.3)
PROCALCITONIN SERPL-MCNC: <0.05 NG/ML — SIGNIFICANT CHANGE UP (ref 0–0.04)
RBC # BLD: 3.71 M/UL — LOW (ref 4.6–6.2)
RBC # FLD: 17.7 % — HIGH (ref 11–15.6)
SODIUM SERPL-SCNC: 140 MMOL/L — SIGNIFICANT CHANGE UP (ref 135–145)
SPECIMEN SOURCE: SIGNIFICANT CHANGE UP
SPECIMEN SOURCE: SIGNIFICANT CHANGE UP
WBC # BLD: 11.3 K/UL — HIGH (ref 4.8–10.8)
WBC # FLD AUTO: 11.3 K/UL — HIGH (ref 4.8–10.8)

## 2018-02-19 PROCEDURE — 99232 SBSQ HOSP IP/OBS MODERATE 35: CPT

## 2018-02-19 PROCEDURE — 99222 1ST HOSP IP/OBS MODERATE 55: CPT | Mod: GC

## 2018-02-19 PROCEDURE — 99233 SBSQ HOSP IP/OBS HIGH 50: CPT

## 2018-02-19 RX ADMIN — Medication 250 MILLIGRAM(S): at 05:41

## 2018-02-19 RX ADMIN — FLUTICASONE PROPIONATE AND SALMETEROL 1 DOSE(S): 50; 250 POWDER ORAL; RESPIRATORY (INHALATION) at 20:27

## 2018-02-19 RX ADMIN — Medication 75 MILLIGRAM(S): at 17:52

## 2018-02-19 RX ADMIN — Medication 0.25 MILLIGRAM(S): at 17:52

## 2018-02-19 RX ADMIN — LORATADINE 10 MILLIGRAM(S): 10 TABLET ORAL at 12:48

## 2018-02-19 RX ADMIN — CEFEPIME 100 MILLIGRAM(S): 1 INJECTION, POWDER, FOR SOLUTION INTRAMUSCULAR; INTRAVENOUS at 17:52

## 2018-02-19 RX ADMIN — FLUTICASONE PROPIONATE AND SALMETEROL 1 DOSE(S): 50; 250 POWDER ORAL; RESPIRATORY (INHALATION) at 08:24

## 2018-02-19 RX ADMIN — Medication 0.25 MILLIGRAM(S): at 12:43

## 2018-02-19 RX ADMIN — Medication 0.25 MILLIGRAM(S): at 00:21

## 2018-02-19 RX ADMIN — CLOPIDOGREL BISULFATE 75 MILLIGRAM(S): 75 TABLET, FILM COATED ORAL at 12:44

## 2018-02-19 RX ADMIN — SODIUM CHLORIDE 3 MILLILITER(S): 9 INJECTION INTRAMUSCULAR; INTRAVENOUS; SUBCUTANEOUS at 05:32

## 2018-02-19 RX ADMIN — LEVALBUTEROL 0.63 MILLIGRAM(S): 1.25 SOLUTION, CONCENTRATE RESPIRATORY (INHALATION) at 16:08

## 2018-02-19 RX ADMIN — Medication 100 MILLIGRAM(S): at 05:41

## 2018-02-19 RX ADMIN — TAMSULOSIN HYDROCHLORIDE 0.4 MILLIGRAM(S): 0.4 CAPSULE ORAL at 20:28

## 2018-02-19 RX ADMIN — Medication 0.25 MILLIGRAM(S): at 05:58

## 2018-02-19 RX ADMIN — Medication 1200 MILLIGRAM(S): at 17:53

## 2018-02-19 RX ADMIN — Medication 1 TABLET(S): at 12:44

## 2018-02-19 RX ADMIN — CEFEPIME 100 MILLIGRAM(S): 1 INJECTION, POWDER, FOR SOLUTION INTRAMUSCULAR; INTRAVENOUS at 05:38

## 2018-02-19 RX ADMIN — TIOTROPIUM BROMIDE 1 CAPSULE(S): 18 CAPSULE ORAL; RESPIRATORY (INHALATION) at 09:29

## 2018-02-19 RX ADMIN — ATORVASTATIN CALCIUM 40 MILLIGRAM(S): 80 TABLET, FILM COATED ORAL at 20:27

## 2018-02-19 RX ADMIN — Medication 100 MILLIGRAM(S): at 17:52

## 2018-02-19 RX ADMIN — Medication 100 MILLIGRAM(S): at 23:23

## 2018-02-19 RX ADMIN — Medication 81 MILLIGRAM(S): at 12:44

## 2018-02-19 RX ADMIN — Medication 75 MILLIGRAM(S): at 05:41

## 2018-02-19 RX ADMIN — Medication 100 MILLIGRAM(S): at 00:21

## 2018-02-19 RX ADMIN — LEVALBUTEROL 0.63 MILLIGRAM(S): 1.25 SOLUTION, CONCENTRATE RESPIRATORY (INHALATION) at 09:29

## 2018-02-19 RX ADMIN — PANTOPRAZOLE SODIUM 40 MILLIGRAM(S): 20 TABLET, DELAYED RELEASE ORAL at 05:58

## 2018-02-19 RX ADMIN — Medication 0.25 MILLIGRAM(S): at 23:23

## 2018-02-19 RX ADMIN — Medication 40 MILLIGRAM(S): at 05:41

## 2018-02-19 RX ADMIN — Medication 50 MICROGRAM(S): at 05:44

## 2018-02-19 RX ADMIN — LEVALBUTEROL 0.63 MILLIGRAM(S): 1.25 SOLUTION, CONCENTRATE RESPIRATORY (INHALATION) at 20:41

## 2018-02-19 RX ADMIN — Medication 1200 MILLIGRAM(S): at 05:44

## 2018-02-19 RX ADMIN — SODIUM CHLORIDE 3 MILLILITER(S): 9 INJECTION INTRAMUSCULAR; INTRAVENOUS; SUBCUTANEOUS at 22:43

## 2018-02-19 RX ADMIN — SODIUM CHLORIDE 3 MILLILITER(S): 9 INJECTION INTRAMUSCULAR; INTRAVENOUS; SUBCUTANEOUS at 16:42

## 2018-02-19 RX ADMIN — Medication 100 MILLIGRAM(S): at 12:44

## 2018-02-19 RX ADMIN — Medication 250 MILLIGRAM(S): at 17:52

## 2018-02-19 NOTE — CONSULT NOTE ADULT - SUBJECTIVE AND OBJECTIVE BOX
Patient is a 67y old  Male who presents with a chief complaint of Came for rehab (17 Feb 2018 04:08)      HPI:  68 yo M with multiple medical comorbidities as below including  ILD/Advanced COPD on home 02, Hypthyroid, CVA in 2014recently dcd home on 2/15/18 after prolonged hospital stay due to ILD/COPD exacerbation complicated by SBO requiring emergent surgery that lead to jejunostomy which was reversed and replaced with Ileostomy  (on TPN).     Found to be tachycardic and tachypneic  suggestive of sepsis and Flu positive started on tamiflu  with multifocal pneumonia on CT, followed by ID. tachycardia improved    REVIEW OF SYSTEMS  Constitutional - No fever, No weight loss, No fatigue  HEENT - No eye pain, No visual disturbances, No difficulty hearing, No tinnitus, No vertigo, No neck pain  Respiratory - No cough, No wheezing, No shortness of breath  Cardiovascular - No chest pain, No palpitations  Gastrointestinal - No abdominal pain, No nausea, No vomiting, No diarrhea, No constipation  Genitourinary - No dysuria, No frequency, No hematuria, No incontinence  Neurological - No headaches, No memory loss, No loss of strength, No numbness, No tremors  Skin - No itching, No rashes, No lesions   Endocrine - No temperature intolerance  Musculoskeletal - No joint pain, No joint swelling, No muscle pain  Psychiatric - No depression, No anxiety    PAST MEDICAL & SURGICAL HISTORY  Hypothyroidism  Hydrocele  Renal calculi  Diabetes mellitus  Hypertension  CVA (cerebral vascular accident)  Chronic obstructive pulmonary disease, unspecified COPD type  H/O ileostomy  History of lung surgery      SOCIAL HISTORY  Smoking - Denied  EtOH - Denied   Drugs - Denied    FUNCTIONAL HISTORY  Lives   Independent    CURRENT FUNCTIONAL STATUS      FAMILY HISTORY   No pertinent family history in first degree relatives      RECENT LABS/IMAGING  CBC Full  -  ( 18 Feb 2018 07:25 )  WBC Count : 12.9 K/uL  Hemoglobin : 10.9 g/dL  Hematocrit : 34.5 %  Platelet Count - Automated : 417 K/uL  Mean Cell Volume : 88.5 fl  Mean Cell Hemoglobin : 27.9 pg  Mean Cell Hemoglobin Concentration : 31.6 g/dL  Auto Neutrophil # : x  Auto Lymphocyte # : x  Auto Monocyte # : x  Auto Eosinophil # : x  Auto Basophil # : x  Auto Neutrophil % : x  Auto Lymphocyte % : x  Auto Monocyte % : x  Auto Eosinophil % : x  Auto Basophil % : x    02-18    139  |  102  |  22.0<H>  ----------------------------<  158<H>  4.2   |  25.0  |  0.40<L>    Ca    8.0<L>      18 Feb 2018 07:25          VITALS  T(C): 36.4 (02-19-18 @ 04:37), Max: 36.7 (02-18-18 @ 09:35)  HR: 75 (02-19-18 @ 04:37) (75 - 99)  BP: 109/69 (02-19-18 @ 04:37) (109/69 - 136/84)  RR: 20 (02-19-18 @ 04:37) (18 - 20)  SpO2: 94% (02-19-18 @ 04:37) (92% - 98%)  Wt(kg): --    ALLERGIES  codeine (Unknown)  DO NOT SEND ORANGES (Unknown)  no veges (Unknown)  penicillin (Unknown)  Symbicort (Short breath)      MEDICATIONS   acetaminophen   Tablet 650 milliGRAM(s) Oral every 6 hours PRN  ALPRAZolam 0.25 milliGRAM(s) Oral every 6 hours  aspirin enteric coated 81 milliGRAM(s) Oral daily  atorvastatin 40 milliGRAM(s) Oral at bedtime  benzonatate 100 milliGRAM(s) Oral every 6 hours  cefepime  IVPB 2000 milliGRAM(s) IV Intermittent every 12 hours  clopidogrel Tablet 75 milliGRAM(s) Oral daily  dextrose 5%. 1000 milliLiter(s) IV Continuous <Continuous>  dextrose 50% Injectable 12.5 Gram(s) IV Push once  dextrose 50% Injectable 25 Gram(s) IV Push once  dextrose 50% Injectable 25 Gram(s) IV Push once  dextrose Gel 1 Dose(s) Oral once PRN  diltiazem    Tablet 120 milliGRAM(s) Oral every 8 hours  enoxaparin Injectable 40 milliGRAM(s) SubCutaneous every 24 hours  fluticasone propionate/ salmeterol 250-50 MICROgram(s) Diskus 1 Dose(s) Inhalation two times a day  glucagon  Injectable 1 milliGRAM(s) IntraMuscular once PRN  guaiFENesin ER 1200 milliGRAM(s) Oral every 12 hours  insulin lispro (HumaLOG) corrective regimen sliding scale   SubCutaneous Before meals and at bedtime  levalbuterol Inhalation 0.63 milliGRAM(s) Inhalation every 4 hours PRN  levothyroxine 50 MICROGram(s) Oral daily  loratadine 10 milliGRAM(s) Oral daily  multivitamin 1 Tablet(s) Oral daily  oseltamivir 75 milliGRAM(s) Oral two times a day  pantoprazole    Tablet 40 milliGRAM(s) Oral before breakfast  predniSONE   Tablet 40 milliGRAM(s) Oral daily  saccharomyces boulardii 250 milliGRAM(s) Oral two times a day  sodium chloride 0.65% Nasal 1 Spray(s) Both Nostrils every 4 hours PRN  sodium chloride 0.9% lock flush 3 milliLiter(s) IV Push every 8 hours  sodium chloride 0.9%. 1000 milliLiter(s) IV Continuous <Continuous>  tamsulosin 0.4 milliGRAM(s) Oral at bedtime  tiotropium 18 MICROgram(s) Capsule 1 Capsule(s) Inhalation daily  ----------------------------------------------------------------------------------------  PHYSICAL EXAM  Constitutional - NAD, Comfortable  HEENT - NCAT, EOMI  Neck - Supple, No limited ROM  Chest - CTA bilaterally, No wheeze, No rhonchi, No crackles  Cardiovascular - RRR, S1S2, No murmurs  Abdomen - BS+, Soft, NTND  Extremities - No C/C/E, No calf tenderness   Neurologic Exam -                    Cognitive - Awake, Alert, AAO to self, place, date, year, situation     Communication - Fluent, No dysarthria     Cranial Nerves - CN 2-12 intact     Motor - No focal deficits                    LEFT    UE - ShAB 5/5, EF 5/5, EE 5/5, WE 5/5,  5/5                    RIGHT UE - ShAB 5/5, EF 5/5, EE 5/5, WE 5/5,  5/5                    LEFT    LE - HF 5/5, KE 5/5, DF 5/5, PF 5/5                    RIGHT LE - HF 5/5, KE 5/5, DF 5/5, PF 5/5        Sensory - Intact to LT     Reflexes - DTR Intact, No primitive reflexive     Coordination - FTN intact     OculoVestibular - No saccades, No nystagmus, VOR         Balance - WNL Static  Psychiatric - Mood stable, Affect WNL  ----------------------------------------------------------------------------------------  ASSESSMENT/PLAN - 67M with multiple comorbidities admitted with sepsis 2/2 flu/multifocal PNA.     ID - cefepime, tamiflu  Hx of CVA - ASA/plavix, lipitor  COPD - on home O2, prednisone and COPD medications  DVT PPX - Lovenox  Rehab - Patient is a 67y old  Male who presents with a chief complaint of Came for rehab (17 Feb 2018 04:08)      HPI:  66 yo M with multiple medical comorbidities as below including  ILD/Advanced COPD on home 02, Hypthyroid, CVA in 2014recently dcd home on 2/15/18 after prolonged hospital stay due to ILD/COPD exacerbation complicated by SBO requiring emergent surgery that lead to jejunostomy which was reversed and replaced with Ileostomy  (on TPN).     Found to be tachycardic and tachypneic  suggestive of sepsis and Flu positive started on tamiflu  with multifocal pneumonia on CT, followed by ID. tachycardia improved    REVIEW OF SYSTEMS  Constitutional - + fatigue  HEENT - No eye pain, No visual disturbances, +difficulty hearing,   Respiratory - + cough  Cardiovascular - No chest pain, No palpitations  Gastrointestinal - No abdominal pain, No nausea, No vomiting  Genitourinary - No dysuria  Neurological - No headaches, No memory loss, generalized weakness  Skin - No itching, No rashes, No lesions   Musculoskeletal - No joint pain  Psychiatric - No depression, No anxiety    PAST MEDICAL & SURGICAL HISTORY  Hypothyroidism  Hydrocele  Renal calculi  Diabetes mellitus  Hypertension  CVA (cerebral vascular accident)  Chronic obstructive pulmonary disease, unspecified COPD type  H/O ileostomy  History of lung surgery      SOCIAL HISTORY  Smoking - Denied  EtOH - Denied   Drugs - Denied    FUNCTIONAL HISTORY  Lives with wife in house with +VA and +stairs inside  Ambulated with quadcane/RW  adn required assistance with ADLs on discharge from last hospitalization; independent with ADLs and ambulation prior to initial admission    CURRENT FUNCTIONAL STATUS  Bed mobility - min assist  Sit-stand - min assist    FAMILY HISTORY   No pertinent family history in first degree relatives      RECENT LABS/IMAGING  CBC Full  -  ( 18 Feb 2018 07:25 )  WBC Count : 12.9 K/uL  Hemoglobin : 10.9 g/dL  Hematocrit : 34.5 %  Platelet Count - Automated : 417 K/uL  Mean Cell Volume : 88.5 fl  Mean Cell Hemoglobin : 27.9 pg  Mean Cell Hemoglobin Concentration : 31.6 g/dL  Auto Neutrophil # : x  Auto Lymphocyte # : x  Auto Monocyte # : x  Auto Eosinophil # : x  Auto Basophil # : x  Auto Neutrophil % : x  Auto Lymphocyte % : x  Auto Monocyte % : x  Auto Eosinophil % : x  Auto Basophil % : x    02-18    139  |  102  |  22.0<H>  ----------------------------<  158<H>  4.2   |  25.0  |  0.40<L>    Ca    8.0<L>      18 Feb 2018 07:25          VITALS  T(C): 36.4 (02-19-18 @ 04:37), Max: 36.7 (02-18-18 @ 09:35)  HR: 75 (02-19-18 @ 04:37) (75 - 99)  BP: 109/69 (02-19-18 @ 04:37) (109/69 - 136/84)  RR: 20 (02-19-18 @ 04:37) (18 - 20)  SpO2: 94% (02-19-18 @ 04:37) (92% - 98%)  Wt(kg): --    ALLERGIES  codeine (Unknown)  DO NOT SEND ORANGES (Unknown)  no veges (Unknown)  penicillin (Unknown)  Symbicort (Short breath)      MEDICATIONS   acetaminophen   Tablet 650 milliGRAM(s) Oral every 6 hours PRN  ALPRAZolam 0.25 milliGRAM(s) Oral every 6 hours  aspirin enteric coated 81 milliGRAM(s) Oral daily  atorvastatin 40 milliGRAM(s) Oral at bedtime  benzonatate 100 milliGRAM(s) Oral every 6 hours  cefepime  IVPB 2000 milliGRAM(s) IV Intermittent every 12 hours  clopidogrel Tablet 75 milliGRAM(s) Oral daily  dextrose 5%. 1000 milliLiter(s) IV Continuous <Continuous>  dextrose 50% Injectable 12.5 Gram(s) IV Push once  dextrose 50% Injectable 25 Gram(s) IV Push once  dextrose 50% Injectable 25 Gram(s) IV Push once  dextrose Gel 1 Dose(s) Oral once PRN  diltiazem    Tablet 120 milliGRAM(s) Oral every 8 hours  enoxaparin Injectable 40 milliGRAM(s) SubCutaneous every 24 hours  fluticasone propionate/ salmeterol 250-50 MICROgram(s) Diskus 1 Dose(s) Inhalation two times a day  glucagon  Injectable 1 milliGRAM(s) IntraMuscular once PRN  guaiFENesin ER 1200 milliGRAM(s) Oral every 12 hours  insulin lispro (HumaLOG) corrective regimen sliding scale   SubCutaneous Before meals and at bedtime  levalbuterol Inhalation 0.63 milliGRAM(s) Inhalation every 4 hours PRN  levothyroxine 50 MICROGram(s) Oral daily  loratadine 10 milliGRAM(s) Oral daily  multivitamin 1 Tablet(s) Oral daily  oseltamivir 75 milliGRAM(s) Oral two times a day  pantoprazole    Tablet 40 milliGRAM(s) Oral before breakfast  predniSONE   Tablet 40 milliGRAM(s) Oral daily  saccharomyces boulardii 250 milliGRAM(s) Oral two times a day  sodium chloride 0.65% Nasal 1 Spray(s) Both Nostrils every 4 hours PRN  sodium chloride 0.9% lock flush 3 milliLiter(s) IV Push every 8 hours  sodium chloride 0.9%. 1000 milliLiter(s) IV Continuous <Continuous>  tamsulosin 0.4 milliGRAM(s) Oral at bedtime  tiotropium 18 MICROgram(s) Capsule 1 Capsule(s) Inhalation daily  ----------------------------------------------------------------------------------------  PHYSICAL EXAM  Constitutional - NAD, Comfortable  HEENT - NCAT, EOMI, )2 by NC  Chest -No distress, symmetrical chest expansion  Cardiovascular - RRR, S1S2  Abdomen -  Soft, NTND  Extremities - No calf tenderness, b/l symmetric foot edema  Neurologic Exam -                    Cognitive - Awake, Alert, AAO to self, place, date, year, situation     Communication - Fluent, No dysarthria     Cranial Nerves - CN 2-12 intact     Motor -                     LEFT    UE - ShAB 5/5, EF 5/5, EE 5/5, ,  5/5                    RIGHT UE - ShAB 5/5, EF 5/5, EE 5/5, Gip 5/5                    LEFT    LE - HF 4/5, KE 5/5, DF 5/5, PF 5/5                    RIGHT LE - HF 4/5, KE 5/5, DF 5/5, PF 5/5        Sensory - Intact to LT  Psychiatric - Mood stable, Affect WNL  ----------------------------------------------------------------------------------------  ASSESSMENT/PLAN - 67M with multiple comorbidities admitted with sepsis 2/2 flu/multifocal PNA.     ID - cefepime, tamiflu  Hx of CVA - ASA/plavix, lipitor  COPD - on home O2, prednisone and COPD medications  DVT PPX - Lovenox  Rehab - Patient is a 67y old  Male who presents with a chief complaint of Came for rehab (17 Feb 2018 04:08)      HPI:  68 yo M with multiple medical comorbidities as below including  ILD/Advanced COPD on home 02, Hypthyroid, CVA in 2014recently dcd home on 2/15/18 after prolonged hospital stay due to ILD/COPD exacerbation complicated by SBO requiring emergent surgery that lead to jejunostomy which was reversed and replaced with Ileostomy  (on TPN).     Found to be tachycardic and tachypneic  suggestive of sepsis and Flu positive started on tamiflu  with multifocal pneumonia on CT, followed by ID. tachycardia improved    REVIEW OF SYSTEMS  Constitutional - + fatigue  HEENT - No eye pain, No visual disturbances, +difficulty hearing,   Respiratory - + cough  Cardiovascular - No chest pain, No palpitations  Gastrointestinal - No abdominal pain, No nausea, No vomiting  Genitourinary - No dysuria  Neurological - No headaches, No memory loss, generalized weakness  Skin - No itching, No rashes, No lesions   Musculoskeletal - No joint pain  Psychiatric - No depression, No anxiety    PAST MEDICAL & SURGICAL HISTORY  Hypothyroidism  Hydrocele  Renal calculi  Diabetes mellitus  Hypertension  CVA (cerebral vascular accident)  Chronic obstructive pulmonary disease, unspecified COPD type  H/O ileostomy  History of lung surgery      SOCIAL HISTORY  Smoking - Denied  EtOH - Denied   Drugs - Denied    FUNCTIONAL HISTORY  Lives with wife in house with +VA and +stairs inside  Ambulated with quadcane/RW  adn required assistance with ADLs on discharge from last hospitalization; independent with ADLs and ambulation prior to initial admission    CURRENT FUNCTIONAL STATUS  Bed mobility - min assist  Sit-stand - min assist    FAMILY HISTORY   No pertinent family history in first degree relatives      RECENT LABS/IMAGING  CBC Full  -  ( 18 Feb 2018 07:25 )  WBC Count : 12.9 K/uL  Hemoglobin : 10.9 g/dL  Hematocrit : 34.5 %  Platelet Count - Automated : 417 K/uL  Mean Cell Volume : 88.5 fl  Mean Cell Hemoglobin : 27.9 pg  Mean Cell Hemoglobin Concentration : 31.6 g/dL  Auto Neutrophil # : x  Auto Lymphocyte # : x  Auto Monocyte # : x  Auto Eosinophil # : x  Auto Basophil # : x  Auto Neutrophil % : x  Auto Lymphocyte % : x  Auto Monocyte % : x  Auto Eosinophil % : x  Auto Basophil % : x    02-18    139  |  102  |  22.0<H>  ----------------------------<  158<H>  4.2   |  25.0  |  0.40<L>    Ca    8.0<L>      18 Feb 2018 07:25          VITALS  T(C): 36.4 (02-19-18 @ 04:37), Max: 36.7 (02-18-18 @ 09:35)  HR: 75 (02-19-18 @ 04:37) (75 - 99)  BP: 109/69 (02-19-18 @ 04:37) (109/69 - 136/84)  RR: 20 (02-19-18 @ 04:37) (18 - 20)  SpO2: 94% (02-19-18 @ 04:37) (92% - 98%)  Wt(kg): --    ALLERGIES  codeine (Unknown)  DO NOT SEND ORANGES (Unknown)  no veges (Unknown)  penicillin (Unknown)  Symbicort (Short breath)      MEDICATIONS   acetaminophen   Tablet 650 milliGRAM(s) Oral every 6 hours PRN  ALPRAZolam 0.25 milliGRAM(s) Oral every 6 hours  aspirin enteric coated 81 milliGRAM(s) Oral daily  atorvastatin 40 milliGRAM(s) Oral at bedtime  benzonatate 100 milliGRAM(s) Oral every 6 hours  cefepime  IVPB 2000 milliGRAM(s) IV Intermittent every 12 hours  clopidogrel Tablet 75 milliGRAM(s) Oral daily  dextrose 5%. 1000 milliLiter(s) IV Continuous <Continuous>  dextrose 50% Injectable 12.5 Gram(s) IV Push once  dextrose 50% Injectable 25 Gram(s) IV Push once  dextrose 50% Injectable 25 Gram(s) IV Push once  dextrose Gel 1 Dose(s) Oral once PRN  diltiazem    Tablet 120 milliGRAM(s) Oral every 8 hours  enoxaparin Injectable 40 milliGRAM(s) SubCutaneous every 24 hours  fluticasone propionate/ salmeterol 250-50 MICROgram(s) Diskus 1 Dose(s) Inhalation two times a day  glucagon  Injectable 1 milliGRAM(s) IntraMuscular once PRN  guaiFENesin ER 1200 milliGRAM(s) Oral every 12 hours  insulin lispro (HumaLOG) corrective regimen sliding scale   SubCutaneous Before meals and at bedtime  levalbuterol Inhalation 0.63 milliGRAM(s) Inhalation every 4 hours PRN  levothyroxine 50 MICROGram(s) Oral daily  loratadine 10 milliGRAM(s) Oral daily  multivitamin 1 Tablet(s) Oral daily  oseltamivir 75 milliGRAM(s) Oral two times a day  pantoprazole    Tablet 40 milliGRAM(s) Oral before breakfast  predniSONE   Tablet 40 milliGRAM(s) Oral daily  saccharomyces boulardii 250 milliGRAM(s) Oral two times a day  sodium chloride 0.65% Nasal 1 Spray(s) Both Nostrils every 4 hours PRN  sodium chloride 0.9% lock flush 3 milliLiter(s) IV Push every 8 hours  sodium chloride 0.9%. 1000 milliLiter(s) IV Continuous <Continuous>  tamsulosin 0.4 milliGRAM(s) Oral at bedtime  tiotropium 18 MICROgram(s) Capsule 1 Capsule(s) Inhalation daily  ----------------------------------------------------------------------------------------  PHYSICAL EXAM  Constitutional - NAD, Comfortable  HEENT - NCAT, EOMI, )2 by NC  Chest -No distress, symmetrical chest expansion  Cardiovascular - RRR, S1S2  Abdomen -  Soft, NTND  Extremities - No calf tenderness, b/l symmetric foot edema  Neurologic Exam -                    Cognitive - Awake, Alert, AAO to self, place, date, year, situation     Communication - Fluent, No dysarthria     Cranial Nerves - CN 2-12 intact     Motor -                     LEFT    UE - ShAB 5/5, EF 5/5, EE 5/5, ,  5/5                    RIGHT UE - ShAB 5/5, EF 5/5, EE 5/5, Gip 5/5                    LEFT    LE - HF 4/5, KE 5/5, DF 5/5, PF 5/5                    RIGHT LE - HF 4/5, KE 5/5, DF 5/5, PF 5/5        Sensory - Intact to LT  Psychiatric - Mood stable, Affect WNL  ----------------------------------------------------------------------------------------  ASSESSMENT/PLAN - 67M with multiple comorbidities admitted with sepsis 2/2 flu/multifocal PNA with functional impairments.     ID - cefepime, tamiflu  Hx of CVA - ASA/plavix, lipitor  COPD - on home O2, prednisone and COPD medications  DVT PPX - Lovenox  Rehab - At this time, recommend ACUTE inpatient rehabilitation for the functional impairments VERSUS home if makes progress with PT.

## 2018-02-19 NOTE — CONSULT NOTE ADULT - ATTENDING COMMENTS
Patient told to not get OOB today.  Recommended patient work with PT for goals to achieve DC HOME with HOME CARE.  Will continue to follow for need for other rehab dispo. Know patient and wife well from previous visit, will not go to [facility].
Will Follow

## 2018-02-19 NOTE — PROGRESS NOTE ADULT - SUBJECTIVE AND OBJECTIVE BOX
[facility]  INFECTIOUS DISEASES AND INTERNAL MEDICINE at [place]  =======================================================  Kevin Manriquez MD  Diplomates American Board of Internal Medicine and Infectious Diseases  =======================================================    FRANDY BAUTISTA [id number]    Follow up: Pneumonia    Still with fatigue and SOB  EATING BREAKFAST  No fevers    Allergies:  codeine (Unknown)  DO NOT SEND ORANGES (Unknown)  no veges (Unknown)  penicillin (Unknown)  Symbicort (Short breath)      Antibiotics:  cefepime  IVPB 2000 milliGRAM(s) IV Intermittent every 12 hours  oseltamivir 75 milliGRAM(s) Oral two times a day        REVIEW OF SYSTEMS:  CONSTITUTIONAL:  No Fever or chills  HEENT:  No diplopia or blurred vision.  No earache, sore throat or runny nose.  CARDIOVASCULAR:  No pressure, squeezing, strangling, tightness, heaviness or aching about the chest, neck, axilla or epigastrium.  RESPIRATORY:  No cough, shortness of breath  GASTROINTESTINAL:  No nausea, vomiting or diarrhea.  GENITOURINARY:  No dysuria, frequency or urgency.  MUSCULOSKELETAL:  no joint aches, no muscle pain  SKIN:  No change in skin, hair or nails.  NEUROLOGIC:  No paresthesias, fasciculations  PSYCHIATRIC:  No disorder of thought or mood.  ENDOCRINE:  No heat or cold intolerance  HEMATOLOGICAL:  No easy bruising or bleeding.       Physical Exam:   Vital Signs Last 24 Hrs  T(C): 36.4 (2018 04:37), Max: 36.7 (2018 16:05)  T(F): 97.5 (2018 04:37), Max: 98.1 (2018 21:01)  HR: 75 (2018 04:37) (75 - 99)  BP: 109/69 (2018 04:37) (109/69 - 135/89)  BP(mean): --  RR: 20 (2018 04:37) (18 - 20)  SpO2: 94% (2018 04:37) (92% - 98%)      GEN: NAD, + Fatigue  HEENT: normocephalic and atraumatic. EOMI. PERRL.    NECK: Supple.   LUNGS: Scattered rhonchi.   HEART: Regular rate and rhythm  ABDOMEN: Soft, nontender, and nondistended.  Positive bowel sounds.  + Ileostomy   : No CVA tenderness  EXTREMITIES: Without any edema.  MSK: No joint swelling  NEUROLOGIC: + weakness B/L, alert and oriented.   PSYCHIATRIC: Appropriate affect .  SKIN: No rash      Labs:                         10.9   12.9  )-----------( 417      ( 2018 07:25 )             34.5       139  |  102  |  22.0<H>  ----------------------------<  158<H>  4.2   |  25.0  |  0.40<L>    Ca    8.0<L>      2018 07:25        Color: Yellow / Appearance: Clear / S.015 / pH: x  Gluc: x / Ketone: Negative  / Bili: Negative / Urobili: Negative mg/dL   Blood: x / Protein: Negative mg/dL / Nitrite: Negative   Leuk Esterase: Trace / RBC: 3-5 /HPF / WBC 11-25   Sq Epi: x / Non Sq Epi: Occasional / Bacteria: Occasional      LIVER FUNCTIONS - ( 2018 14:40 )  Alb: 3.4 g/dL / Pro: 7.3 g/dL / ALK PHOS: 153 U/L / ALT: 100 U/L / AST: 31 U/L / GGT: x             RECENT CULTURES:   @ 16:35 .Urine Clean Catch (Midstream)     Culture in progress       @ 15:59    RVP  Detected - Influenza A      02-10 @ 07:22 .Blood Blood-Peripheral     No growth at 5 days.       @ 15:52 .Blood Blood-Peripheral     No growth at 5 days.       @ 15:51 .Blood Blood-Peripheral     No growth at 5 days.       @ 14:05 .Sputum Sputum Escherichia coli  Pseudomonas aeruginosa    Moderate Escherichia coli  Moderate Pseudomonas aeruginosa  Few Routine respiratory swetha present  Few White blood cells  Moderate Gram Positive Cocci in Pairs and Chains      EXAM:  CT CHEST                        PROCEDURE DATE:  2018    INTERPRETATION:  HISTORY: Shortness of breath.  Date and Time of Exam: 2018 7:02 PM  TECHNIQUE:  Sections were obtained from the apices to the diaphragm   without intravenous contrast.  COMPARISON EXAMINATION:   2017.  FINDINGS: No evidence of mediastinal or hilar lymphadenopathy. No   evidence of pleural or pericardial effusion. No evidence of axillary   adenopathy. Extensive coronary artery calcifications are noted.  Emphysematous changes and hyperexpansion of the left lower lobe unchanged   from the prior study. There is an infiltrate in the left lower lobe which   is a new finding since 2017.  Small right upper lobe infiltrate, a new finding since the prior study.   There is a right lower lobe infiltrate, similar to the infiltrate noted   on the prior examination.  No significant osseous abnormality.    IMPRESSION:     Left lower lobe and right upper lobe infiltrates, new since 2017.  Right lower lobe infiltrate without significant change from the prior study.

## 2018-02-19 NOTE — PHYSICAL THERAPY INITIAL EVALUATION ADULT - ADDITIONAL COMMENTS
pt recently d/c'd from [facility] to home, has steps to enter and steps in the home. Pt reports having been able to perform 2 steps before feeling very week and sick once D/C'd home. Pt prior to previous admission was independent however as of late requires the use of a RW.

## 2018-02-19 NOTE — PROGRESS NOTE ADULT - ASSESSMENT
68y/o  Male with h/o ILD/Advanced COPD s/p right lung reduction in 2010 on home 02, Hypothyroidism, CVA, DM-2, B/L Hydroceles, and nephrolithiasis recently was admitted to the hospital for a complicated hospital stay discharged on 2/15/18 after being treated for COPD exacerbation, Pneumonia with Levaquin, complicated by SBO requiring emergent surgery now s/p jejunostomy closure and s/p Ileostomy, prolonged post op ileus.  Now here with Influenza A and multifocal pneumonia on CT   ON CEFEPIME AND TAMIFLU  SLOW IMPROVEMENT

## 2018-02-19 NOTE — PHYSICAL THERAPY INITIAL EVALUATION ADULT - LEVEL OF INDEPENDENCE: SIT/STAND, REHAB EVAL
TBA pt deferred OOB activity/dependent (less than 25% patients effort)/minimum assist (75% patients effort)

## 2018-02-19 NOTE — PROGRESS NOTE ADULT - PROBLEM SELECTOR PLAN 1
Will continue with Cefepime, blood cultures negative, continued 02 as he uses 4 liters at home, spirometer, Chest PT, Ambulation, DVT-P, fall/aspiration precaution, ID consult appreciated, Vancomycin d/feliz as patient has previous sputum cultures showed Pseudomonas, will repeat sputum cultures, will continued with some steriods and supportive meds, incentive spirometry.

## 2018-02-19 NOTE — PROGRESS NOTE ADULT - SUBJECTIVE AND OBJECTIVE BOX
FRANDY BAUTISTA    [id number]    67y      Male    Patient is a 67y old  Male who presents with a chief complaint of Came for rehab (17 Feb 2018 04:08)      INTERVAL HPI/OVERNIGHT EVENTS:    Patient feels some improvement of SOB and cough, denies, fever, chills, chest pain, nausea and vomiting, able to eat well.     REVIEW OF SYSTEMS:    CONSTITUTIONAL: No fever, has fatigue  RESPIRATORY: cough and shortness of breath is improving.   CARDIOVASCULAR: No chest pain, palpitations  GASTROINTESTINAL: No abdominal, No nausea, vomiting  NEUROLOGICAL: No headaches,  loss of strength.  MISCELLANEOUS: No joint swelling or pain      Vital Signs Last 24 Hrs  T(C): 36.4 (19 Feb 2018 04:37), Max: 36.7 (18 Feb 2018 16:05)  T(F): 97.5 (19 Feb 2018 04:37), Max: 98.1 (18 Feb 2018 21:01)  HR: 75 (19 Feb 2018 04:37) (75 - 99)  BP: 109/69 (19 Feb 2018 04:37) (109/69 - 135/89)  RR: 20 (19 Feb 2018 04:37) (18 - 20)  SpO2: 94% (19 Feb 2018 04:37) (92% - 98%)    PHYSICAL EXAM:    GENERAL: Elderly male thin built male looking comfortable    HEENT: PERRL, +EOMI  NECK: soft, Supple, No JVD,   CHEST/LUNG: Decrease air entry bilaterally; No wheezing  HEART: S1S2+, Regular rate and rhythm; No murmurs  ABDOMEN: Soft, Nontender, Nondistended; Bowel sounds present, s/p Ileostomy   EXTREMITIES:  1+ Peripheral Pulses, No edema  SKIN: No rashes or lesions  NEURO: AAOX3, no focal deficits, no motor r sensory loss  PSYCH: normal mood      LABS:                        10.0   11.3  )-----------( 337      ( 19 Feb 2018 09:42 )             32.8     02-18    139  |  102  |  22.0<H>  ----------------------------<  158<H>  4.2   |  25.0  |  0.40<L>    Ca    8.0<L>      18 Feb 2018 07:25              I&O's Summary    18 Feb 2018 07:01  -  19 Feb 2018 07:00  --------------------------------------------------------  IN: 3420 mL / OUT: 3040 mL / NET: 380 mL    19 Feb 2018 07:01  -  19 Feb 2018 10:29  --------------------------------------------------------  IN: 600 mL / OUT: 300 mL / NET: 300 mL        MEDICATIONS  (STANDING):  ALPRAZolam 0.25 milliGRAM(s) Oral every 6 hours  aspirin enteric coated 81 milliGRAM(s) Oral daily  atorvastatin 40 milliGRAM(s) Oral at bedtime  benzonatate 100 milliGRAM(s) Oral every 6 hours  cefepime  IVPB 2000 milliGRAM(s) IV Intermittent every 12 hours  clopidogrel Tablet 75 milliGRAM(s) Oral daily  dextrose 5%. 1000 milliLiter(s) (50 mL/Hr) IV Continuous <Continuous>  dextrose 50% Injectable 12.5 Gram(s) IV Push once  dextrose 50% Injectable 25 Gram(s) IV Push once  dextrose 50% Injectable 25 Gram(s) IV Push once  diltiazem    Tablet 120 milliGRAM(s) Oral every 8 hours  enoxaparin Injectable 40 milliGRAM(s) SubCutaneous every 24 hours  fluticasone propionate/ salmeterol 250-50 MICROgram(s) Diskus 1 Dose(s) Inhalation two times a day  guaiFENesin ER 1200 milliGRAM(s) Oral every 12 hours  insulin lispro (HumaLOG) corrective regimen sliding scale   SubCutaneous Before meals and at bedtime  levothyroxine 50 MICROGram(s) Oral daily  loratadine 10 milliGRAM(s) Oral daily  multivitamin 1 Tablet(s) Oral daily  oseltamivir 75 milliGRAM(s) Oral two times a day  pantoprazole    Tablet 40 milliGRAM(s) Oral before breakfast  predniSONE   Tablet 40 milliGRAM(s) Oral daily  saccharomyces boulardii 250 milliGRAM(s) Oral two times a day  sodium chloride 0.9% lock flush 3 milliLiter(s) IV Push every 8 hours  sodium chloride 0.9%. 1000 milliLiter(s) (100 mL/Hr) IV Continuous <Continuous>  tamsulosin 0.4 milliGRAM(s) Oral at bedtime  tiotropium 18 MICROgram(s) Capsule 1 Capsule(s) Inhalation daily    MEDICATIONS  (PRN):  acetaminophen   Tablet 650 milliGRAM(s) Oral every 6 hours PRN For Temp greater than 38.5 C (101.3 F)  dextrose Gel 1 Dose(s) Oral once PRN Blood Glucose LESS THAN 70 milliGRAM(s)/deciliter  glucagon  Injectable 1 milliGRAM(s) IntraMuscular once PRN Glucose LESS THAN 70 milligrams/deciliter  levalbuterol Inhalation 0.63 milliGRAM(s) Inhalation every 4 hours PRN Shortness of Breath  sodium chloride 0.65% Nasal 1 Spray(s) Both Nostrils every 4 hours PRN Nasal Congestion

## 2018-02-20 PROCEDURE — 99232 SBSQ HOSP IP/OBS MODERATE 35: CPT

## 2018-02-20 RX ORDER — INFLUENZA VIRUS VACCINE 15; 15; 15; 15 UG/.5ML; UG/.5ML; UG/.5ML; UG/.5ML
0.5 SUSPENSION INTRAMUSCULAR ONCE
Qty: 0 | Refills: 0 | Status: COMPLETED | OUTPATIENT
Start: 2018-02-20 | End: 2018-02-20

## 2018-02-20 RX ADMIN — Medication 0.25 MILLIGRAM(S): at 05:40

## 2018-02-20 RX ADMIN — FLUTICASONE PROPIONATE AND SALMETEROL 1 DOSE(S): 50; 250 POWDER ORAL; RESPIRATORY (INHALATION) at 19:51

## 2018-02-20 RX ADMIN — Medication 81 MILLIGRAM(S): at 12:42

## 2018-02-20 RX ADMIN — LORATADINE 10 MILLIGRAM(S): 10 TABLET ORAL at 12:43

## 2018-02-20 RX ADMIN — Medication 1200 MILLIGRAM(S): at 05:46

## 2018-02-20 RX ADMIN — Medication 0.25 MILLIGRAM(S): at 12:42

## 2018-02-20 RX ADMIN — SODIUM CHLORIDE 3 MILLILITER(S): 9 INJECTION INTRAMUSCULAR; INTRAVENOUS; SUBCUTANEOUS at 05:46

## 2018-02-20 RX ADMIN — Medication 100 MILLIGRAM(S): at 12:42

## 2018-02-20 RX ADMIN — Medication 0.25 MILLIGRAM(S): at 17:52

## 2018-02-20 RX ADMIN — Medication 0.25 MILLIGRAM(S): at 23:16

## 2018-02-20 RX ADMIN — SODIUM CHLORIDE 3 MILLILITER(S): 9 INJECTION INTRAMUSCULAR; INTRAVENOUS; SUBCUTANEOUS at 12:40

## 2018-02-20 RX ADMIN — FLUTICASONE PROPIONATE AND SALMETEROL 1 DOSE(S): 50; 250 POWDER ORAL; RESPIRATORY (INHALATION) at 08:00

## 2018-02-20 RX ADMIN — Medication 1 TABLET(S): at 12:42

## 2018-02-20 RX ADMIN — PANTOPRAZOLE SODIUM 40 MILLIGRAM(S): 20 TABLET, DELAYED RELEASE ORAL at 05:40

## 2018-02-20 RX ADMIN — Medication 100 MILLIGRAM(S): at 05:41

## 2018-02-20 RX ADMIN — Medication 75 MILLIGRAM(S): at 05:41

## 2018-02-20 RX ADMIN — TAMSULOSIN HYDROCHLORIDE 0.4 MILLIGRAM(S): 0.4 CAPSULE ORAL at 21:42

## 2018-02-20 RX ADMIN — Medication 50 MICROGRAM(S): at 05:41

## 2018-02-20 RX ADMIN — SODIUM CHLORIDE 3 MILLILITER(S): 9 INJECTION INTRAMUSCULAR; INTRAVENOUS; SUBCUTANEOUS at 21:38

## 2018-02-20 RX ADMIN — Medication 100 MILLIGRAM(S): at 23:16

## 2018-02-20 RX ADMIN — Medication 250 MILLIGRAM(S): at 17:52

## 2018-02-20 RX ADMIN — LEVALBUTEROL 0.63 MILLIGRAM(S): 1.25 SOLUTION, CONCENTRATE RESPIRATORY (INHALATION) at 05:00

## 2018-02-20 RX ADMIN — LEVALBUTEROL 0.63 MILLIGRAM(S): 1.25 SOLUTION, CONCENTRATE RESPIRATORY (INHALATION) at 16:19

## 2018-02-20 RX ADMIN — CEFEPIME 100 MILLIGRAM(S): 1 INJECTION, POWDER, FOR SOLUTION INTRAMUSCULAR; INTRAVENOUS at 05:41

## 2018-02-20 RX ADMIN — Medication 250 MILLIGRAM(S): at 05:40

## 2018-02-20 RX ADMIN — TIOTROPIUM BROMIDE 1 CAPSULE(S): 18 CAPSULE ORAL; RESPIRATORY (INHALATION) at 08:38

## 2018-02-20 RX ADMIN — ATORVASTATIN CALCIUM 40 MILLIGRAM(S): 80 TABLET, FILM COATED ORAL at 21:42

## 2018-02-20 RX ADMIN — Medication 40 MILLIGRAM(S): at 05:40

## 2018-02-20 RX ADMIN — CEFEPIME 100 MILLIGRAM(S): 1 INJECTION, POWDER, FOR SOLUTION INTRAMUSCULAR; INTRAVENOUS at 17:52

## 2018-02-20 RX ADMIN — CLOPIDOGREL BISULFATE 75 MILLIGRAM(S): 75 TABLET, FILM COATED ORAL at 12:42

## 2018-02-20 RX ADMIN — Medication 100 MILLIGRAM(S): at 17:52

## 2018-02-20 RX ADMIN — Medication 1200 MILLIGRAM(S): at 17:53

## 2018-02-20 RX ADMIN — Medication 75 MILLIGRAM(S): at 17:52

## 2018-02-20 NOTE — CHART NOTE - NSCHARTNOTEFT_GEN_A_CORE
Source: Patient [x ]  Family [ ]   other [ ]    Pt with HCAP, Sepsis, Influenza A, Hypoxia, Hx. of SVT, Advanced COPD/ILD, DM-2, ileostomy    Current Diet: Diet, DASH/TLC:   Sodium & Cholesterol Restricted  Consistent Carbohydrate {No Snacks} (02-16-18 @ 23:30)    PO intake:  Pt currently eating breakfast, reports good po intake at meals.  Almost 75% consumed at breakfast so far.    Current Weight: (2/16) 100#    % Weight Change - no new wt to assess    Pertinent Medications: MEDICATIONS  (STANDING):  ALPRAZolam 0.25 milliGRAM(s) Oral every 6 hours  aspirin enteric coated 81 milliGRAM(s) Oral daily  atorvastatin 40 milliGRAM(s) Oral at bedtime  benzonatate 100 milliGRAM(s) Oral every 6 hours  cefepime  IVPB 2000 milliGRAM(s) IV Intermittent every 12 hours  clopidogrel Tablet 75 milliGRAM(s) Oral daily  dextrose 5%. 1000 milliLiter(s) (50 mL/Hr) IV Continuous <Continuous>  dextrose 50% Injectable 12.5 Gram(s) IV Push once  dextrose 50% Injectable 25 Gram(s) IV Push once  dextrose 50% Injectable 25 Gram(s) IV Push once  diltiazem    Tablet 120 milliGRAM(s) Oral every 8 hours  enoxaparin Injectable 40 milliGRAM(s) SubCutaneous every 24 hours  fluticasone propionate/ salmeterol 250-50 MICROgram(s) Diskus 1 Dose(s) Inhalation two times a day  guaiFENesin ER 1200 milliGRAM(s) Oral every 12 hours  insulin lispro (HumaLOG) corrective regimen sliding scale   SubCutaneous Before meals and at bedtime  levothyroxine 50 MICROGram(s) Oral daily  loratadine 10 milliGRAM(s) Oral daily  multivitamin 1 Tablet(s) Oral daily  oseltamivir 75 milliGRAM(s) Oral two times a day  pantoprazole    Tablet 40 milliGRAM(s) Oral before breakfast  predniSONE   Tablet 40 milliGRAM(s) Oral daily  saccharomyces boulardii 250 milliGRAM(s) Oral two times a day  sodium chloride 0.9% lock flush 3 milliLiter(s) IV Push every 8 hours  tamsulosin 0.4 milliGRAM(s) Oral at bedtime  tiotropium 18 MICROgram(s) Capsule 1 Capsule(s) Inhalation daily    MEDICATIONS  (PRN):  acetaminophen   Tablet 650 milliGRAM(s) Oral every 6 hours PRN For Temp greater than 38.5 C (101.3 F)  dextrose Gel 1 Dose(s) Oral once PRN Blood Glucose LESS THAN 70 milliGRAM(s)/deciliter  glucagon  Injectable 1 milliGRAM(s) IntraMuscular once PRN Glucose LESS THAN 70 milligrams/deciliter  levalbuterol Inhalation 0.63 milliGRAM(s) Inhalation every 4 hours PRN Shortness of Breath  sodium chloride 0.65% Nasal 1 Spray(s) Both Nostrils every 4 hours PRN Nasal Congestion    Pertinent Labs: CBC Full  -  ( 19 Feb 2018 09:42 )  WBC Count : 11.3 K/uL  Hemoglobin : 10.0 g/dL  Hematocrit : 32.8 %  Platelet Count - Automated : 337 K/uL  Mean Cell Volume : 88.4 fl  Mean Cell Hemoglobin : 27.0 pg  Mean Cell Hemoglobin Concentration : 30.5 g/dL  02-19 Na140 mmol/L Glu 140 mg/dL<H> K+ 3.8 mmol/L Cr  0.36 mg/dL<L> BUN 13.0 mg/dL Phos n/a   Alb n/a   PAB n/a       Skin: + ileostomy    Nutrition focused physical exam conducted - found signs of malnutrition [ ]absent [ x]present    Subcutaneous fat loss: [ ] Orbital fat pads region, [x ]Buccal fat region, [ ]Triceps region,  [ ]Ribs region    Muscle wasting: [x ]Temples region, [x ]Clavicle region, [x ]Shoulder region, [ ]Scapula region, [ ]Interosseous region,  [ ]thigh region, [ ]Calf region    Estimated Needs:   [ x] no change since previous assessment  [ ] recalculated:     Current Nutrition Diagnosis:  Pt remains at nutrition risk secondary to chronic severe protein calorie malnutrition (chronic) related to inadequate protein energy intake in setting of COPD, flu, pneumonia, high ileostomy output on last admission (now resolved) as evidenced by pt previously meeting <75% estimated nutrition needs > 1 month,  50# unintentional wt loss x 3 months (33%) and severe muscle wasting (temple, clavicle, shoulder).     Pt completes his menu's and wife will sometimes call diet office for food preferences.  Pt with no complaints at this time.    Recommendations:   Add Ensure clear tid  Continue with MVI daily  Daily wts    Monitoring and Evaluation:   [x ] PO intake [x ] Tolerance to diet prescription [X] Weights  [X] Follow up per protocol [X] Labs:

## 2018-02-20 NOTE — PROGRESS NOTE ADULT - ATTENDING COMMENTS
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    Afib/ SInus tachycardia: Multifactorial, patient HR up in setting of COPD exacerbation, patient has been followed by cardiology during the previous admission, his Cardizem changed to short acting one because of small bowel absorption issues, will continue to monitor, repeat TTE done showed Normal global left ventricular systolic function,  Left ventricular ejection fraction, by visual estimation, is 60 to 65%, Moderately enlarged right ventricle, Estimated pulmonary artery systolic pressure is 44.7 mmHg assuming a right atrial pressure of 3 mmHg, which is consistent with mild pulmonary hypertension, There is no evidence of pericardial effusion, not on anticoagulation, heart rate is in 70s now.     DVT Prophylaxis - Lovenox    Dispo: Rehab working with him and will see if he is going for acute rehab.

## 2018-02-20 NOTE — PROGRESS NOTE ADULT - SUBJECTIVE AND OBJECTIVE BOX
[facility]  INFECTIOUS DISEASES AND INTERNAL MEDICINE at [place]  =======================================================  Kevin Manriquez MD  Diplomates American Board of Internal Medicine and Infectious Diseases  =======================================================    FRANDY BAUTISTA [id number]    Follow up: Pneumonia    Still with fatigue and SOB   HE IS USING INCENTIVE SPIROMETRY  No fevers    Allergies:  codeine (Unknown)  DO NOT SEND ORANGES (Unknown)  no veges (Unknown)  penicillin (Unknown)  Symbicort (Short breath)      Antibiotics:  cefepime  IVPB 2000 milliGRAM(s) IV Intermittent every 12 hours  oseltamivir 75 milliGRAM(s) Oral two times a day        REVIEW OF SYSTEMS:  CONSTITUTIONAL:  No Fever or chills  HEENT:  No diplopia or blurred vision.  No earache, sore throat or runny nose.  CARDIOVASCULAR:  No pressure, squeezing, strangling, tightness, heaviness or aching about the chest, neck, axilla or epigastrium.  RESPIRATORY:  No cough, shortness of breath  GASTROINTESTINAL:  No nausea, vomiting or diarrhea.  GENITOURINARY:  No dysuria, frequency or urgency.  MUSCULOSKELETAL:  no joint aches, no muscle pain  SKIN:  No change in skin, hair or nails.  NEUROLOGIC:  No paresthesias, fasciculations  PSYCHIATRIC:  No disorder of thought or mood.  ENDOCRINE:  No heat or cold intolerance  HEMATOLOGICAL:  No easy bruising or bleeding.       Physical Exam:   V Vital Signs Last 24 Hrs  T(C): 36.4 (20 Feb 2018 04:45), Max: 36.8 (19 Feb 2018 16:54)  T(F): 97.6 (20 Feb 2018 04:45), Max: 98.3 (19 Feb 2018 16:54)  HR: 82 (20 Feb 2018 04:45) (82 - 120)  BP: 103/64 (20 Feb 2018 04:45) (96/72 - 122/85)  BP(mean): --  RR: 20 (20 Feb 2018 04:45) (20 - 20)  SpO2: 99% (20 Feb 2018 04:45) (97% - 99%)      GEN: NAD, + Fatigue  HEENT: normocephalic and atraumatic. EOMI. PERRL.    NECK: Supple.   LUNGS: Scattered rhonchi.   HEART: Regular rate and rhythm  ABDOMEN: Soft, nontender, and nondistended.  Positive bowel sounds.  + Ileostomy   : No CVA tenderness  EXTREMITIES: Without any edema.  MSK: No joint swelling  NEUROLOGIC: + weakness B/L, alert and oriented.   PSYCHIATRIC: Appropriate affect .  SKIN: No rash      Labs:                                              10.0   11.3  )-----------( 337      ( 19 Feb 2018 09:42 )             32.8      02-19    140  |  104  |  13.0  ----------------------------<  140<H>  3.8   |  23.0  |  0.36<L>    Ca    7.7<L>      19 Feb 2018 09:42      RECENT CULTURES:  02-16 @ 16:35 .Urine Clean Catch (Midstream)     Culture in progress      02-16 @ 15:59    RVP  Detected - Influenza A      02-10 @ 07:22 .Blood Blood-Peripheral     No growth at 5 days.      02-09 @ 15:52 .Blood Blood-Peripheral     No growth at 5 days.      02-09 @ 15:51 .Blood Blood-Peripheral     No growth at 5 days.      02-09 @ 14:05 .Sputum Sputum Escherichia coli  Pseudomonas aeruginosa    Moderate Escherichia coli  Moderate Pseudomonas aeruginosa  Few Routine respiratory swetha present  Few White blood cells  Moderate Gram Positive Cocci in Pairs and Chains      EXAM:  CT CHEST                        PROCEDURE DATE:  02/16/2018    INTERPRETATION:  HISTORY: Shortness of breath.  Date and Time of Exam: 2/16/2018 7:02 PM  TECHNIQUE:  Sections were obtained from the apices to the diaphragm   without intravenous contrast.  COMPARISON EXAMINATION:   11/29/2017.  FINDINGS: No evidence of mediastinal or hilar lymphadenopathy. No   evidence of pleural or pericardial effusion. No evidence of axillary   adenopathy. Extensive coronary artery calcifications are noted.  Emphysematous changes and hyperexpansion of the left lower lobe unchanged   from the prior study. There is an infiltrate in the left lower lobe which   is a new finding since 11/29/2017.  Small right upper lobe infiltrate, a new finding since the prior study.   There is a right lower lobe infiltrate, similar to the infiltrate noted   on the prior examination.  No significant osseous abnormality.    IMPRESSION:     Left lower lobe and right upper lobe infiltrates, new since 11/29/2017.  Right lower lobe infiltrate without significant change from the prior study.

## 2018-02-20 NOTE — PROGRESS NOTE ADULT - SUBJECTIVE AND OBJECTIVE BOX
FRANDY BAUTISTA    [id number]    67y      Male    Patient is a 67y old  Male who presents with a chief complaint of Came for rehab (17 Feb 2018 04:08)      INTERVAL HPI/OVERNIGHT EVENTS:    Patient feels improvement of SOB and cough, denies, fever, chills, chest pain, nausea and vomiting, able to eat well.    REVIEW OF SYSTEMS:    CONSTITUTIONAL: No fever, has fatigue  RESPIRATORY: cough and shortness is better.    CARDIOVASCULAR: No chest pain, palpitations  GASTROINTESTINAL: No abdominal, No nausea, vomiting  NEUROLOGICAL: No headaches,  loss of strength.  MISCELLANEOUS: No joint swelling or pain       Vital Signs Last 24 Hrs  T(C): 36.4 (20 Feb 2018 04:45), Max: 36.8 (19 Feb 2018 16:54)  T(F): 97.6 (20 Feb 2018 04:45), Max: 98.3 (19 Feb 2018 16:54)  HR: 82 (20 Feb 2018 04:45) (82 - 120)  BP: 103/64 (20 Feb 2018 04:45) (96/72 - 122/85)  RR: 20 (20 Feb 2018 04:45) (20 - 20)  SpO2: 99% (20 Feb 2018 04:45) (97% - 99%)    PHYSICAL EXAM:    GENERAL: Elderly male thin built male looking comfortable    HEENT: PERRL, +EOMI  NECK: soft, Supple, No JVD  CHEST/LUNG: Decrease air entry bilaterally; No wheezing  HEART: S1S2+, Regular rate and rhythm; No murmurs  ABDOMEN: Soft, Nontender, Nondistended; Bowel sounds present, s/p Ileostomy   EXTREMITIES:  1+ Peripheral Pulses, No edema  SKIN: No rashes or lesions  NEURO: AAOX3, no focal deficits, no motor r sensory loss  PSYCH: normal mood      LABS:                        10.0   11.3  )-----------( 337      ( 19 Feb 2018 09:42 )             32.8     02-19    140  |  104  |  13.0  ----------------------------<  140<H>  3.8   |  23.0  |  0.36<L>    Ca    7.7<L>      19 Feb 2018 09:42              I&O's Summary    19 Feb 2018 07:01  -  20 Feb 2018 07:00  --------------------------------------------------------  IN: 1640 mL / OUT: 2001 mL / NET: -361 mL        MEDICATIONS  (STANDING):  ALPRAZolam 0.25 milliGRAM(s) Oral every 6 hours  aspirin enteric coated 81 milliGRAM(s) Oral daily  atorvastatin 40 milliGRAM(s) Oral at bedtime  benzonatate 100 milliGRAM(s) Oral every 6 hours  cefepime  IVPB 2000 milliGRAM(s) IV Intermittent every 12 hours  clopidogrel Tablet 75 milliGRAM(s) Oral daily  dextrose 5%. 1000 milliLiter(s) (50 mL/Hr) IV Continuous <Continuous>  dextrose 50% Injectable 12.5 Gram(s) IV Push once  dextrose 50% Injectable 25 Gram(s) IV Push once  dextrose 50% Injectable 25 Gram(s) IV Push once  diltiazem    Tablet 120 milliGRAM(s) Oral every 8 hours  enoxaparin Injectable 40 milliGRAM(s) SubCutaneous every 24 hours  fluticasone propionate/ salmeterol 250-50 MICROgram(s) Diskus 1 Dose(s) Inhalation two times a day  guaiFENesin ER 1200 milliGRAM(s) Oral every 12 hours  insulin lispro (HumaLOG) corrective regimen sliding scale   SubCutaneous Before meals and at bedtime  levothyroxine 50 MICROGram(s) Oral daily  loratadine 10 milliGRAM(s) Oral daily  multivitamin 1 Tablet(s) Oral daily  oseltamivir 75 milliGRAM(s) Oral two times a day  pantoprazole    Tablet 40 milliGRAM(s) Oral before breakfast  predniSONE   Tablet 40 milliGRAM(s) Oral daily  saccharomyces boulardii 250 milliGRAM(s) Oral two times a day  sodium chloride 0.9% lock flush 3 milliLiter(s) IV Push every 8 hours  tamsulosin 0.4 milliGRAM(s) Oral at bedtime  tiotropium 18 MICROgram(s) Capsule 1 Capsule(s) Inhalation daily    MEDICATIONS  (PRN):  acetaminophen   Tablet 650 milliGRAM(s) Oral every 6 hours PRN For Temp greater than 38.5 C (101.3 F)  dextrose Gel 1 Dose(s) Oral once PRN Blood Glucose LESS THAN 70 milliGRAM(s)/deciliter  glucagon  Injectable 1 milliGRAM(s) IntraMuscular once PRN Glucose LESS THAN 70 milligrams/deciliter  levalbuterol Inhalation 0.63 milliGRAM(s) Inhalation every 4 hours PRN Shortness of Breath  sodium chloride 0.65% Nasal 1 Spray(s) Both Nostrils every 4 hours PRN Nasal Congestion

## 2018-02-20 NOTE — PROGRESS NOTE ADULT - SUBJECTIVE AND OBJECTIVE BOX
Patient in bed. Now allowed to be OOB. Wife not present.  Patient is frustrated, wants to be able to walk more.   He is quite SOB and significantly fatigued. Upset about the therapy he did receive. Will attempt to get him to be seen daily. However, given his deficits and new added pulmonary diagnosis, recommend acute.     FUNCTIONAL PROGRESS  2/19  Bed Mobility: Sit to Supine:     · Level of [place]	minimum assist (75% patients effort)	  · Physical Assist/Nonphysical Assist	1 person assist	    Bed Mobility: Supine to Sit:     · Level of [place]	minimum assist (75% patients effort)	  · Physical Assist/Nonphysical Assist	1 person assist	    Bed Mobility Analysis:     · Bed Mobility Limitations	decreased ability to use arms for pushing/pulling; decreased ability to use legs for bridging/pushing; impaired ability to control trunk for mobility	  · Impairments Contributing to Impaired Bed Mobility	impaired balance; decreased strength	    Transfer: Sit to Stand:     · Level of [place]	minimum assist (75% patients effort); dependent (less than 25% patients effort); TBA pt deferred OOB activity	    Stair Negotiation:     · Level of [place]	TBA	      REVIEW OF SYSTEMS  Constitutional - No fever,  +fatigue  Neurological - +loss of strength, No numbness, No tremors  Musculoskeletal - No joint pain, No joint swelling, No muscle pain  Psychiatric - +depression, No anxiety    VITALS  T(C): 36.4 (02-20-18 @ 04:45), Max: 36.8 (02-19-18 @ 16:54)  HR: 82 (02-20-18 @ 04:45) (82 - 120)  BP: 103/64 (02-20-18 @ 04:45) (96/72 - 122/85)  RR: 20 (02-20-18 @ 04:45) (20 - 20)  SpO2: 99% (02-20-18 @ 04:45) (97% - 99%)  Wt(kg): --    MEDICATIONS   acetaminophen   Tablet 650 milliGRAM(s) every 6 hours PRN  ALPRAZolam 0.25 milliGRAM(s) every 6 hours  aspirin enteric coated 81 milliGRAM(s) daily  atorvastatin 40 milliGRAM(s) at bedtime  benzonatate 100 milliGRAM(s) every 6 hours  cefepime  IVPB 2000 milliGRAM(s) every 12 hours  clopidogrel Tablet 75 milliGRAM(s) daily  dextrose 5%. 1000 milliLiter(s) <Continuous>  dextrose 50% Injectable 12.5 Gram(s) once  dextrose 50% Injectable 25 Gram(s) once  dextrose 50% Injectable 25 Gram(s) once  dextrose Gel 1 Dose(s) once PRN  diltiazem    Tablet 120 milliGRAM(s) every 8 hours  enoxaparin Injectable 40 milliGRAM(s) every 24 hours  fluticasone propionate/ salmeterol 250-50 MICROgram(s) Diskus 1 Dose(s) two times a day  glucagon  Injectable 1 milliGRAM(s) once PRN  guaiFENesin ER 1200 milliGRAM(s) every 12 hours  insulin lispro (HumaLOG) corrective regimen sliding scale   Before meals and at bedtime  levalbuterol Inhalation 0.63 milliGRAM(s) every 4 hours PRN  levothyroxine 50 MICROGram(s) daily  loratadine 10 milliGRAM(s) daily  multivitamin 1 Tablet(s) daily  oseltamivir 75 milliGRAM(s) two times a day  pantoprazole    Tablet 40 milliGRAM(s) before breakfast  predniSONE   Tablet 40 milliGRAM(s) daily  saccharomyces boulardii 250 milliGRAM(s) two times a day  sodium chloride 0.65% Nasal 1 Spray(s) every 4 hours PRN  sodium chloride 0.9% lock flush 3 milliLiter(s) every 8 hours  tamsulosin 0.4 milliGRAM(s) at bedtime  tiotropium 18 MICROgram(s) Capsule 1 Capsule(s) daily      RECENT LABS/IMAGING  CBC Full  -  ( 19 Feb 2018 09:42 )  WBC Count : 11.3 K/uL  Hemoglobin : 10.0 g/dL  Hematocrit : 32.8 %  Platelet Count - Automated : 337 K/uL  Mean Cell Volume : 88.4 fl  Mean Cell Hemoglobin : 27.0 pg  Mean Cell Hemoglobin Concentration : 30.5 g/dL  Auto Neutrophil # : x  Auto Lymphocyte # : x  Auto Monocyte # : x  Auto Eosinophil # : x  Auto Basophil # : x  Auto Neutrophil % : x  Auto Lymphocyte % : x  Auto Monocyte % : x  Auto Eosinophil % : x  Auto Basophil % : x    02-19    140  |  104  |  13.0  ----------------------------<  140<H>  3.8   |  23.0  |  0.36<L>    Ca    7.7<L>      19 Feb 2018 09:42      --------------------------------------------------------------------  PHYSICAL EXAM  Constitutional - NAD, Comfortable  Chest -No distress, symmetrical chest expansion  Neurologic Exam -                    Cognitive - Awake, Alert, AAO to self, place, date, year, situation     Motor - No focal deficits     Sensory - Intact to LT  Psychiatric - Mood depressed  ----------------------------------------------------------------------------------------  ASSESSMENT/PLAN - 67M with multiple comorbidities admitted with sepsis 2/2 flu/multifocal PNA with functional impairments.   ID - Cefepime, Tamiflu  Hx of CVA - ASA, Plavix, Lipitor  COPD - Home O2, Prednisone   DVT PPX - Lovenox  Rehab - Recommend ACUTE inpatient rehabilitation for the functional deficits consisting of 3 hours of therapy/day & 24 hour RN/daily PMR physician for comorbid medical management. Will continue to follow for ongoing rehab needs and recommendations.     Continue bedside therapy as well as OOB throughout the day with mobilization throughout the day with staff to maintain cardiopulmonary function and prevention of secondary complications related to debility.

## 2018-02-20 NOTE — PROGRESS NOTE ADULT - ASSESSMENT
66y/o  Male with h/o ILD/Advanced COPD s/p right lung reduction in 2010 on home 02, Hypothyroidism, CVA, DM-2, B/L Hydroceles, and nephrolithiasis recently was admitted to the hospital for a complicated hospital stay discharged on 2/15/18 after being treated for COPD exacerbation, Pneumonia with Levaquin, complicated by SBO requiring emergent surgery now s/p jejunostomy closure and s/p Ileostomy, prolonged post op ileus.  Now here with Influenza A and multifocal pneumonia on CT   ON CEFEPIME AND TAMIFLU  APPEARS IMPROVED

## 2018-02-20 NOTE — PROGRESS NOTE ADULT - PROBLEM SELECTOR PLAN 1
Will continue with Cefepime, blood cultures negative, continued 02 as he uses 4 liters at home, spirometer, Chest PT, Ambulation, DVT-P, fall/aspiration precaution, ID consult appreciated, Vancomycin d/feliz as patient has previous sputum cultures showed Pseudomonas, will repeat sputum cultures, will continued with some steroids and supportive meds, incentive spirometry, will continue with supportive measure.

## 2018-02-21 LAB
CULTURE RESULTS: SIGNIFICANT CHANGE UP
SPECIMEN SOURCE: SIGNIFICANT CHANGE UP

## 2018-02-21 PROCEDURE — 99232 SBSQ HOSP IP/OBS MODERATE 35: CPT

## 2018-02-21 RX ADMIN — Medication 1 TABLET(S): at 13:11

## 2018-02-21 RX ADMIN — FLUTICASONE PROPIONATE AND SALMETEROL 1 DOSE(S): 50; 250 POWDER ORAL; RESPIRATORY (INHALATION) at 08:12

## 2018-02-21 RX ADMIN — Medication 100 MILLIGRAM(S): at 05:19

## 2018-02-21 RX ADMIN — ATORVASTATIN CALCIUM 40 MILLIGRAM(S): 80 TABLET, FILM COATED ORAL at 21:32

## 2018-02-21 RX ADMIN — Medication 100 MILLIGRAM(S): at 13:11

## 2018-02-21 RX ADMIN — Medication 81 MILLIGRAM(S): at 13:11

## 2018-02-21 RX ADMIN — FLUTICASONE PROPIONATE AND SALMETEROL 1 DOSE(S): 50; 250 POWDER ORAL; RESPIRATORY (INHALATION) at 20:00

## 2018-02-21 RX ADMIN — CLOPIDOGREL BISULFATE 75 MILLIGRAM(S): 75 TABLET, FILM COATED ORAL at 13:11

## 2018-02-21 RX ADMIN — Medication 250 MILLIGRAM(S): at 05:21

## 2018-02-21 RX ADMIN — Medication 40 MILLIGRAM(S): at 05:18

## 2018-02-21 RX ADMIN — TIOTROPIUM BROMIDE 1 CAPSULE(S): 18 CAPSULE ORAL; RESPIRATORY (INHALATION) at 11:26

## 2018-02-21 RX ADMIN — PANTOPRAZOLE SODIUM 40 MILLIGRAM(S): 20 TABLET, DELAYED RELEASE ORAL at 05:26

## 2018-02-21 RX ADMIN — SODIUM CHLORIDE 3 MILLILITER(S): 9 INJECTION INTRAMUSCULAR; INTRAVENOUS; SUBCUTANEOUS at 13:14

## 2018-02-21 RX ADMIN — Medication 75 MILLIGRAM(S): at 05:19

## 2018-02-21 RX ADMIN — LEVALBUTEROL 0.63 MILLIGRAM(S): 1.25 SOLUTION, CONCENTRATE RESPIRATORY (INHALATION) at 15:52

## 2018-02-21 RX ADMIN — Medication 75 MILLIGRAM(S): at 17:38

## 2018-02-21 RX ADMIN — Medication 1200 MILLIGRAM(S): at 05:21

## 2018-02-21 RX ADMIN — Medication 1200 MILLIGRAM(S): at 17:38

## 2018-02-21 RX ADMIN — Medication 0.25 MILLIGRAM(S): at 17:38

## 2018-02-21 RX ADMIN — SODIUM CHLORIDE 3 MILLILITER(S): 9 INJECTION INTRAMUSCULAR; INTRAVENOUS; SUBCUTANEOUS at 21:30

## 2018-02-21 RX ADMIN — LEVALBUTEROL 0.63 MILLIGRAM(S): 1.25 SOLUTION, CONCENTRATE RESPIRATORY (INHALATION) at 09:33

## 2018-02-21 RX ADMIN — Medication 0.25 MILLIGRAM(S): at 13:11

## 2018-02-21 RX ADMIN — Medication 100 MILLIGRAM(S): at 17:38

## 2018-02-21 RX ADMIN — SODIUM CHLORIDE 3 MILLILITER(S): 9 INJECTION INTRAMUSCULAR; INTRAVENOUS; SUBCUTANEOUS at 05:24

## 2018-02-21 RX ADMIN — Medication 100 MILLIGRAM(S): at 23:42

## 2018-02-21 RX ADMIN — CEFEPIME 100 MILLIGRAM(S): 1 INJECTION, POWDER, FOR SOLUTION INTRAMUSCULAR; INTRAVENOUS at 19:03

## 2018-02-21 RX ADMIN — Medication 0.25 MILLIGRAM(S): at 23:42

## 2018-02-21 RX ADMIN — Medication 50 MICROGRAM(S): at 05:18

## 2018-02-21 RX ADMIN — Medication 0.25 MILLIGRAM(S): at 05:26

## 2018-02-21 RX ADMIN — CEFEPIME 100 MILLIGRAM(S): 1 INJECTION, POWDER, FOR SOLUTION INTRAMUSCULAR; INTRAVENOUS at 05:20

## 2018-02-21 RX ADMIN — Medication 250 MILLIGRAM(S): at 17:38

## 2018-02-21 RX ADMIN — TAMSULOSIN HYDROCHLORIDE 0.4 MILLIGRAM(S): 0.4 CAPSULE ORAL at 21:32

## 2018-02-21 NOTE — PROGRESS NOTE ADULT - SUBJECTIVE AND OBJECTIVE BOX
FRANDY BAUTISTA    [id number]    67y      Male    Patient is a 67y old  Male who presents with a chief complaint of Came for rehab (17 Feb 2018 04:08)      INTERVAL HPI/OVERNIGHT EVENTS:    Patient feels improvement of SOB and cough, denies, fever, chills, chest pain, nausea and vomiting, able to eat well, he is able to participate with PT, feels energetic.      REVIEW OF SYSTEMS:    CONSTITUTIONAL: No fever, has fatigue  RESPIRATORY: cough and shortness is better.    CARDIOVASCULAR: No chest pain, palpitations  GASTROINTESTINAL: No abdominal, No nausea, vomiting  NEUROLOGICAL: No headaches,  loss of strength.  MISCELLANEOUS: No joint swelling or pain        Vital Signs Last 24 Hrs  T(C): 36.6 (21 Feb 2018 08:08), Max: 37.2 (20 Feb 2018 21:02)  T(F): 97.9 (21 Feb 2018 08:08), Max: 98.9 (20 Feb 2018 21:02)  HR: 87 (21 Feb 2018 08:08) (87 - 115)  BP: 103/65 (21 Feb 2018 08:08) (103/65 - 129/83)  RR: 20 (21 Feb 2018 08:08) (20 - 20)  SpO2: 95% (21 Feb 2018 08:08) (95% - 98%)    PHYSICAL EXAM:    GENERAL: Elderly male thin built male looking comfortable    HEENT: PERRL, +EOMI  NECK: soft, Supple, No JVD  CHEST/LUNG: Decrease air entry bilaterally; No wheezing  HEART: S1S2+, Regular rate and rhythm; No murmurs  ABDOMEN: Soft, Nontender, Nondistended; Bowel sounds present, s/p Ileostomy   EXTREMITIES:  1+ Peripheral Pulses, No edema  SKIN: No rashes or lesions  NEURO: AAOX3, no focal deficits, no motor r sensory loss  PSYCH: normal mood      LABS:                        10.0   11.3  )-----------( 337      ( 19 Feb 2018 09:42 )             32.8     02-19    140  |  104  |  13.0  ----------------------------<  140<H>  3.8   |  23.0  |  0.36<L>    Ca    7.7<L>      19 Feb 2018 09:42              I&O's Summary    20 Feb 2018 07:01  -  21 Feb 2018 07:00  --------------------------------------------------------  IN: 320 mL / OUT: 2390 mL / NET: -2070 mL    21 Feb 2018 07:01  -  21 Feb 2018 09:31  --------------------------------------------------------  IN: 0 mL / OUT: 200 mL / NET: -200 mL        MEDICATIONS  (STANDING):  ALPRAZolam 0.25 milliGRAM(s) Oral every 6 hours  aspirin enteric coated 81 milliGRAM(s) Oral daily  atorvastatin 40 milliGRAM(s) Oral at bedtime  benzonatate 100 milliGRAM(s) Oral every 6 hours  cefepime  IVPB 2000 milliGRAM(s) IV Intermittent every 12 hours  clopidogrel Tablet 75 milliGRAM(s) Oral daily  dextrose 5%. 1000 milliLiter(s) (50 mL/Hr) IV Continuous <Continuous>  dextrose 50% Injectable 12.5 Gram(s) IV Push once  dextrose 50% Injectable 25 Gram(s) IV Push once  dextrose 50% Injectable 25 Gram(s) IV Push once  diltiazem    Tablet 120 milliGRAM(s) Oral every 8 hours  enoxaparin Injectable 40 milliGRAM(s) SubCutaneous every 24 hours  fluticasone propionate/ salmeterol 250-50 MICROgram(s) Diskus 1 Dose(s) Inhalation two times a day  guaiFENesin ER 1200 milliGRAM(s) Oral every 12 hours  insulin lispro (HumaLOG) corrective regimen sliding scale   SubCutaneous Before meals and at bedtime  levothyroxine 50 MICROGram(s) Oral daily  loratadine 10 milliGRAM(s) Oral daily  multivitamin 1 Tablet(s) Oral daily  oseltamivir 75 milliGRAM(s) Oral two times a day  pantoprazole    Tablet 40 milliGRAM(s) Oral before breakfast  predniSONE   Tablet 40 milliGRAM(s) Oral daily  saccharomyces boulardii 250 milliGRAM(s) Oral two times a day  sodium chloride 0.9% lock flush 3 milliLiter(s) IV Push every 8 hours  tamsulosin 0.4 milliGRAM(s) Oral at bedtime  tiotropium 18 MICROgram(s) Capsule 1 Capsule(s) Inhalation daily    MEDICATIONS  (PRN):  acetaminophen   Tablet 650 milliGRAM(s) Oral every 6 hours PRN For Temp greater than 38.5 C (101.3 F)  dextrose Gel 1 Dose(s) Oral once PRN Blood Glucose LESS THAN 70 milliGRAM(s)/deciliter  glucagon  Injectable 1 milliGRAM(s) IntraMuscular once PRN Glucose LESS THAN 70 milligrams/deciliter  levalbuterol Inhalation 0.63 milliGRAM(s) Inhalation every 4 hours PRN Shortness of Breath  sodium chloride 0.65% Nasal 1 Spray(s) Both Nostrils every 4 hours PRN Nasal Congestion

## 2018-02-21 NOTE — PROGRESS NOTE ADULT - SUBJECTIVE AND OBJECTIVE BOX
Patient in chair. Slept poorly.  Hates the bed and does not want to sit in the bed all day.  He wants to get up and move so he can go home.  Discussed acute rehab and now more amenable to this.   He reports no pain. Some SOB, has fatigue.     FUNCTIONAL PROGRESS  2/20  Sit-Stand Transfer Training  Transfer Training Sit-to-Stand Transfer: contact guard;  1 person assist;  full weight-bearing   rolling walker  Transfer Training Stand-to-Sit Transfer: contact guard;  1 person assist;  full weight-bearing   rolling walker  Sit-to-Stand Transfer Training Transfer Safety Analysis: decreased strength    Gait Training  Gait Training: contact guard;  1 person assist;  full weight-bearing   rolling walker;  10 feet  Gait Analysis: swing-to gait   decreased donta;  decreased step length;  decreased stride length;  decreased strength;  poor endurance   Gait Number of Times:: x 2    REVIEW OF SYSTEMS  Constitutional - No fever,  +fatigue  Neurological - No headaches, No memory loss, +loss of strength, No numbness   Musculoskeletal - No joint pain, No joint swelling, No muscle pain  Psychiatric - +depression, +anxiety    VITALS  T(C): 36.6 (02-21-18 @ 08:08), Max: 37.2 (02-20-18 @ 21:02)  HR: 87 (02-21-18 @ 08:08) (87 - 115)  BP: 103/65 (02-21-18 @ 08:08) (103/65 - 129/83)  RR: 20 (02-21-18 @ 08:08) (20 - 20)  SpO2: 95% (02-21-18 @ 08:08) (95% - 98%)  Wt(kg): --    MEDICATIONS   acetaminophen   Tablet 650 milliGRAM(s) every 6 hours PRN  ALPRAZolam 0.25 milliGRAM(s) every 6 hours  aspirin enteric coated 81 milliGRAM(s) daily  atorvastatin 40 milliGRAM(s) at bedtime  benzonatate 100 milliGRAM(s) every 6 hours  cefepime  IVPB 2000 milliGRAM(s) every 12 hours  clopidogrel Tablet 75 milliGRAM(s) daily  dextrose 5%. 1000 milliLiter(s) <Continuous>  dextrose 50% Injectable 12.5 Gram(s) once  dextrose 50% Injectable 25 Gram(s) once  dextrose 50% Injectable 25 Gram(s) once  dextrose Gel 1 Dose(s) once PRN  diltiazem    Tablet 120 milliGRAM(s) every 8 hours  enoxaparin Injectable 40 milliGRAM(s) every 24 hours  fluticasone propionate/ salmeterol 250-50 MICROgram(s) Diskus 1 Dose(s) two times a day  glucagon  Injectable 1 milliGRAM(s) once PRN  guaiFENesin ER 1200 milliGRAM(s) every 12 hours  insulin lispro (HumaLOG) corrective regimen sliding scale   Before meals and at bedtime  levalbuterol Inhalation 0.63 milliGRAM(s) every 4 hours PRN  levothyroxine 50 MICROGram(s) daily  loratadine 10 milliGRAM(s) daily  multivitamin 1 Tablet(s) daily  oseltamivir 75 milliGRAM(s) two times a day  pantoprazole    Tablet 40 milliGRAM(s) before breakfast  saccharomyces boulardii 250 milliGRAM(s) two times a day  sodium chloride 0.65% Nasal 1 Spray(s) every 4 hours PRN  sodium chloride 0.9% lock flush 3 milliLiter(s) every 8 hours  tamsulosin 0.4 milliGRAM(s) at bedtime  tiotropium 18 MICROgram(s) Capsule 1 Capsule(s) daily      RECENT LABS/IMAGING  CBC Full  -  ( 19 Feb 2018 09:42 )  WBC Count : 11.3 K/uL  Hemoglobin : 10.0 g/dL  Hematocrit : 32.8 %  Platelet Count - Automated : 337 K/uL  Mean Cell Volume : 88.4 fl  Mean Cell Hemoglobin : 27.0 pg  Mean Cell Hemoglobin Concentration : 30.5 g/dL  Auto Neutrophil # : x  Auto Lymphocyte # : x  Auto Monocyte # : x  Auto Eosinophil # : x  Auto Basophil # : x  Auto Neutrophil % : x  Auto Lymphocyte % : x  Auto Monocyte % : x  Auto Eosinophil % : x  Auto Basophil % : x    02-19    140  |  104  |  13.0  ----------------------------<  140<H>  3.8   |  23.0  |  0.36<L>    Ca    7.7<L>      19 Feb 2018 09:42        --------------------------------------------------------------------  PHYSICAL EXAM  Constitutional - NAD, Comfortable  Chest - No distress, symmetrical chest expansion  Neurologic Exam -                    Cognitive - AAOx4     Motor - No focal deficits     Sensory - Intact to LT  Psychiatric - Mood depressed/anxious about recovery  ----------------------------------------------------------------------------------------  ASSESSMENT/PLAN - 67M with multiple comorbidities admitted with sepsis 2/2 flu/multifocal PNA with functional impairments.   ID - Cefepime, Tamiflu  CVA - ASA, Plavix, Lipitor  COPD - O2, Prednisone   DVT PPX - Lovenox  Rehab - PATIENT MAY ACHIEVE GOALS FOR DC HOME WITH HOME CARE, depending on ongiong progress. If unable to achive in time for DC, plan for  ACUTE inpatient rehabilitation for the functional deficits consisting of 3 hours of therapy/day & 24 hour RN/daily PMR physician for comorbid medical management. Will continue to follow for ongoing rehab needs and recommendations.     Continue bedside therapy as well as OOB throughout the day with mobilization throughout the day with staff to maintain cardiopulmonary function and prevention of secondary complications related to debility.

## 2018-02-21 NOTE — PROGRESS NOTE ADULT - ASSESSMENT
66y/o  Male with h/o ILD/Advanced COPD s/p right lung reduction in 2010 on home 02, Hypothyroidism, CVA, DM-2, B/L Hydroceles, and nephrolithiasis recently was admitted to the hospital for a complicated hospital stay discharged on 2/15/18 after being treated for COPD exacerbation, Pneumonia with Levaquin, complicated by SBO requiring emergent surgery now s/p jejunostomy closure and s/p Ileostomy, prolonged post op ileus.  Now here with Influenza A and multifocal pneumonia on CT   ON CEFEPIME AND TAMIFLU  APPEARS IMPROVED  OOB TO CHAIR  PLAN CONTINUE IV ABX CEFEPIME TO COMPLETE 7 DAYS Through 2/33  TAMIFLU TO COMPLETE 5 DAYS THROUGH Today  WILL FOLLOW UP   AS NEEDED PLEASE CALL IF QUESTIONS

## 2018-02-21 NOTE — PROGRESS NOTE ADULT - ASSESSMENT
68 y/o male with HCAP, Sepsis, Influenza A, Hypoxia, Hx. of SVT, Advanced COPD/ILD, DM-2, CVA old, Hydroceles, DM-2, Hypothyroid,

## 2018-02-21 NOTE — ADVANCED PRACTICE NURSE CONSULT - RECOMMEDATIONS
Please continue foam dressing to sacrum and coccyx for pressure injury prevention and contact wound care nurse for any pressure injuries if noted. Skin assessment and finding discussed with MASON Pteers at the bedside.

## 2018-02-21 NOTE — PROGRESS NOTE ADULT - SUBJECTIVE AND OBJECTIVE BOX
[facility]  INFECTIOUS DISEASES AND INTERNAL MEDICINE at [place]  =======================================================  Kevin Manriquez MD  Diplomates American Board of Internal Medicine and Infectious Diseases  =======================================================    FRANDY BAUTISTA [id number]    Follow up: Pneumonia    Still with fatigue and SOB   HE IS USING INCENTIVE SPIROMETRY  OOB TO CHAIR  No fevers    Allergies:  codeine (Unknown)  DO NOT SEND ORANGES (Unknown)  no veges (Unknown)  penicillin (Unknown)  Symbicort (Short breath)      Antibiotics:  cefepime  IVPB 2000 milliGRAM(s) IV Intermittent every 12 hours  oseltamivir 75 milliGRAM(s) Oral two times a day        REVIEW OF SYSTEMS:  CONSTITUTIONAL:  No Fever or chills  HEENT:  No diplopia or blurred vision.  No earache, sore throat or runny nose.  CARDIOVASCULAR:  No pressure, squeezing, strangling, tightness, heaviness or aching about the chest, neck, axilla or epigastrium.  RESPIRATORY:  No cough, shortness of breath  GASTROINTESTINAL:  No nausea, vomiting or diarrhea.  GENITOURINARY:  No dysuria, frequency or urgency.  MUSCULOSKELETAL:  no joint aches, no muscle pain  SKIN:  No change in skin, hair or nails.  NEUROLOGIC:  No paresthesias, fasciculations  PSYCHIATRIC:  No disorder of thought or mood.  ENDOCRINE:  No heat or cold intolerance  HEMATOLOGICAL:  No easy bruising or bleeding.       Physical Exam:    Vital Signs Last 24 Hrs  T(C): 36.6 (21 Feb 2018 08:08), Max: 37.2 (20 Feb 2018 21:02)  T(F): 97.9 (21 Feb 2018 08:08), Max: 98.9 (20 Feb 2018 21:02)  HR: 96 (21 Feb 2018 12:48) (87 - 115)  BP: 130/86 (21 Feb 2018 12:48) (103/65 - 130/86)  BP(mean): --  RR: 20 (21 Feb 2018 08:08) (20 - 20)  SpO2: 95% (21 Feb 2018 08:08) (95% - 98%)      GEN: NAD, + Fatigue  HEENT: normocephalic and atraumatic. EOMI. PERRL.    NECK: Supple.   LUNGS: Scattered rhonchi.   HEART: Regular rate and rhythm  ABDOMEN: Soft, nontender, and nondistended.  Positive bowel sounds.  + Ileostomy   : No CVA tenderness  EXTREMITIES: Without any edema.  MSK: No joint swelling  NEUROLOGIC: + weakness B/L, alert and oriented.   PSYCHIATRIC: Appropriate affect .  SKIN: No rash      Labs:                                                 RECENT CULTURES:  02-16 @ 16:35 .Urine Clean Catch (Midstream)     Culture in progress      02-16 @ 15:59    RVP  Detected - Influenza A      02-10 @ 07:22 .Blood Blood-Peripheral     No growth at 5 days.      02-09 @ 15:52 .Blood Blood-Peripheral     No growth at 5 days.      02-09 @ 15:51 .Blood Blood-Peripheral     No growth at 5 days.      02-09 @ 14:05 .Sputum Sputum Escherichia coli  Pseudomonas aeruginosa    Moderate Escherichia coli  Moderate Pseudomonas aeruginosa  Few Routine respiratory swetha present  Few White blood cells  Moderate Gram Positive Cocci in Pairs and Chains      EXAM:  CT CHEST                        PROCEDURE DATE:  02/16/2018    INTERPRETATION:  HISTORY: Shortness of breath.  Date and Time of Exam: 2/16/2018 7:02 PM  TECHNIQUE:  Sections were obtained from the apices to the diaphragm   without intravenous contrast.  COMPARISON EXAMINATION:   11/29/2017.  FINDINGS: No evidence of mediastinal or hilar lymphadenopathy. No   evidence of pleural or pericardial effusion. No evidence of axillary   adenopathy. Extensive coronary artery calcifications are noted.  Emphysematous changes and hyperexpansion of the left lower lobe unchanged   from the prior study. There is an infiltrate in the left lower lobe which   is a new finding since 11/29/2017.  Small right upper lobe infiltrate, a new finding since the prior study.   There is a right lower lobe infiltrate, similar to the infiltrate noted   on the prior examination.  No significant osseous abnormality.    IMPRESSION:     Left lower lobe and right upper lobe infiltrates, new since 11/29/2017.  Right lower lobe infiltrate without significant change from the prior study.

## 2018-02-21 NOTE — PROGRESS NOTE ADULT - PROBLEM SELECTOR PLAN 1
Will continue with Cefepime, blood cultures negative, continued 02 as he uses 4 liters at home, spirometer, Chest PT, Ambulation, DVT-P, fall/aspiration precaution, ID consult appreciated, Vancomycin d/feliz as patient has previous sputum cultures showed Pseudomonas, blood cultures are negative, repeat sputum cultures is growing gram +ve cocci in clusters, will continued with current antibiotics, taper steroids and supportive meds, incentive spirometry, will continue with supportive measure.

## 2018-02-21 NOTE — PROGRESS NOTE ADULT - ATTENDING COMMENTS
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    Afib/ SInus tachycardia: Multifactorial, patient HR up in setting of COPD exacerbation, patient has been followed by cardiology during the previous admission, his Cardizem changed to short acting one because of small bowel absorption issues, will continue to monitor, repeat TTE done showed Normal global left ventricular systolic function,  Left ventricular ejection fraction, by visual estimation, is 60 to 65%, Moderately enlarged right ventricle, Estimated pulmonary artery systolic pressure is 44.7 mmHg assuming a right atrial pressure of 3 mmHg, which is consistent with mild pulmonary hypertension, There is no evidence of pericardial effusion, not on anticoagulation, heart rate is in 70s now.     DVT Prophylaxis - Lovenox    Dispo: Rehab working with him and will see if he is going for acute rehab or home.

## 2018-02-21 NOTE — ADVANCED PRACTICE NURSE CONSULT - ASSESSMENT
Pt is alert and oriented x3, to bedside chair. Pt stated that he is eating well and want to gain more weight. Pt is known to [facility] since last visit. Pt has ileostomy with formed stool output, incontinence with urine periodically. Assessed pt's posterior pelvic area with MASON Peters today, no visible pressure injury noted at sacrum/coccyx area, sacrum/coccyx perineal area with blanchable erythema and denuded skin. As per LINUS Cm, pt had incontinence associated dermatitis due to leaking urine. Foam dressing applied to pt's sacrum/coccyx bony prominence area for pressure injury prevention since pt appeared cachetic.   Finding: incontinence associated dermatitis at sacrum/coccyx and perineal area.

## 2018-02-22 LAB
ANION GAP SERPL CALC-SCNC: 12 MMOL/L — SIGNIFICANT CHANGE UP (ref 5–17)
ANION GAP SERPL CALC-SCNC: 13 MMOL/L — SIGNIFICANT CHANGE UP (ref 5–17)
APPEARANCE UR: CLEAR — SIGNIFICANT CHANGE UP
BACTERIA # UR AUTO: ABNORMAL
BILIRUB UR-MCNC: NEGATIVE — SIGNIFICANT CHANGE UP
BUN SERPL-MCNC: 19 MG/DL — SIGNIFICANT CHANGE UP (ref 8–20)
BUN SERPL-MCNC: 19 MG/DL — SIGNIFICANT CHANGE UP (ref 8–20)
CALCIUM SERPL-MCNC: 8.4 MG/DL — LOW (ref 8.6–10.2)
CALCIUM SERPL-MCNC: 8.9 MG/DL — SIGNIFICANT CHANGE UP (ref 8.6–10.2)
CHLORIDE SERPL-SCNC: 95 MMOL/L — LOW (ref 98–107)
CHLORIDE SERPL-SCNC: 97 MMOL/L — LOW (ref 98–107)
CO2 SERPL-SCNC: 24 MMOL/L — SIGNIFICANT CHANGE UP (ref 22–29)
CO2 SERPL-SCNC: 27 MMOL/L — SIGNIFICANT CHANGE UP (ref 22–29)
COLOR SPEC: YELLOW — SIGNIFICANT CHANGE UP
CREAT SERPL-MCNC: 0.4 MG/DL — LOW (ref 0.5–1.3)
CREAT SERPL-MCNC: 0.42 MG/DL — LOW (ref 0.5–1.3)
DIFF PNL FLD: ABNORMAL
EPI CELLS # UR: SIGNIFICANT CHANGE UP
GLUCOSE SERPL-MCNC: 172 MG/DL — HIGH (ref 70–115)
GLUCOSE SERPL-MCNC: 238 MG/DL — HIGH (ref 70–115)
GLUCOSE UR QL: 1000 MG/DL
GRAM STN FLD: SIGNIFICANT CHANGE UP
HCT VFR BLD CALC: 37.2 % — LOW (ref 42–52)
HCT VFR BLD CALC: 40.4 % — LOW (ref 42–52)
HGB BLD-MCNC: 11.3 G/DL — LOW (ref 14–18)
HGB BLD-MCNC: 12.9 G/DL — LOW (ref 14–18)
KETONES UR-MCNC: NEGATIVE — SIGNIFICANT CHANGE UP
LEUKOCYTE ESTERASE UR-ACNC: NEGATIVE — SIGNIFICANT CHANGE UP
MCHC RBC-ENTMCNC: 27 PG — SIGNIFICANT CHANGE UP (ref 27–31)
MCHC RBC-ENTMCNC: 27.8 PG — SIGNIFICANT CHANGE UP (ref 27–31)
MCHC RBC-ENTMCNC: 30.4 G/DL — LOW (ref 32–36)
MCHC RBC-ENTMCNC: 31.9 G/DL — LOW (ref 32–36)
MCV RBC AUTO: 87.1 FL — SIGNIFICANT CHANGE UP (ref 80–94)
MCV RBC AUTO: 89 FL — SIGNIFICANT CHANGE UP (ref 80–94)
NITRITE UR-MCNC: NEGATIVE — SIGNIFICANT CHANGE UP
PH UR: 6 — SIGNIFICANT CHANGE UP (ref 5–8)
PLATELET # BLD AUTO: 271 K/UL — SIGNIFICANT CHANGE UP (ref 150–400)
PLATELET # BLD AUTO: 376 K/UL — SIGNIFICANT CHANGE UP (ref 150–400)
POTASSIUM SERPL-MCNC: 4.8 MMOL/L — SIGNIFICANT CHANGE UP (ref 3.5–5.3)
POTASSIUM SERPL-MCNC: 5.7 MMOL/L — HIGH (ref 3.5–5.3)
POTASSIUM SERPL-SCNC: 4.8 MMOL/L — SIGNIFICANT CHANGE UP (ref 3.5–5.3)
POTASSIUM SERPL-SCNC: 5.7 MMOL/L — HIGH (ref 3.5–5.3)
PROCALCITONIN SERPL-MCNC: 0.11 NG/ML — HIGH (ref 0–0.04)
PROT UR-MCNC: 30 MG/DL
RBC # BLD: 4.18 M/UL — LOW (ref 4.6–6.2)
RBC # BLD: 4.64 M/UL — SIGNIFICANT CHANGE UP (ref 4.6–6.2)
RBC # FLD: 17.5 % — HIGH (ref 11–15.6)
RBC # FLD: 17.7 % — HIGH (ref 11–15.6)
RBC CASTS # UR COMP ASSIST: SIGNIFICANT CHANGE UP /HPF (ref 0–4)
SODIUM SERPL-SCNC: 133 MMOL/L — LOW (ref 135–145)
SODIUM SERPL-SCNC: 135 MMOL/L — SIGNIFICANT CHANGE UP (ref 135–145)
SP GR SPEC: 1.01 — SIGNIFICANT CHANGE UP (ref 1.01–1.02)
SPECIMEN SOURCE: SIGNIFICANT CHANGE UP
UROBILINOGEN FLD QL: NEGATIVE MG/DL — SIGNIFICANT CHANGE UP
WBC # BLD: 23.8 K/UL — HIGH (ref 4.8–10.8)
WBC # BLD: 28.8 K/UL — HIGH (ref 4.8–10.8)
WBC # FLD AUTO: 23.8 K/UL — HIGH (ref 4.8–10.8)
WBC # FLD AUTO: 28.8 K/UL — HIGH (ref 4.8–10.8)
WBC UR QL: SIGNIFICANT CHANGE UP

## 2018-02-22 PROCEDURE — 99232 SBSQ HOSP IP/OBS MODERATE 35: CPT

## 2018-02-22 RX ADMIN — Medication 1200 MILLIGRAM(S): at 18:30

## 2018-02-22 RX ADMIN — TAMSULOSIN HYDROCHLORIDE 0.4 MILLIGRAM(S): 0.4 CAPSULE ORAL at 21:28

## 2018-02-22 RX ADMIN — Medication 1200 MILLIGRAM(S): at 06:27

## 2018-02-22 RX ADMIN — Medication 250 MILLIGRAM(S): at 06:26

## 2018-02-22 RX ADMIN — SODIUM CHLORIDE 3 MILLILITER(S): 9 INJECTION INTRAMUSCULAR; INTRAVENOUS; SUBCUTANEOUS at 21:22

## 2018-02-22 RX ADMIN — Medication 100 MILLIGRAM(S): at 23:22

## 2018-02-22 RX ADMIN — Medication 0.25 MILLIGRAM(S): at 13:38

## 2018-02-22 RX ADMIN — Medication 0.25 MILLIGRAM(S): at 23:22

## 2018-02-22 RX ADMIN — Medication 250 MILLIGRAM(S): at 18:30

## 2018-02-22 RX ADMIN — Medication 1 TABLET(S): at 13:39

## 2018-02-22 RX ADMIN — Medication 0.25 MILLIGRAM(S): at 06:26

## 2018-02-22 RX ADMIN — FLUTICASONE PROPIONATE AND SALMETEROL 1 DOSE(S): 50; 250 POWDER ORAL; RESPIRATORY (INHALATION) at 20:25

## 2018-02-22 RX ADMIN — CEFEPIME 100 MILLIGRAM(S): 1 INJECTION, POWDER, FOR SOLUTION INTRAMUSCULAR; INTRAVENOUS at 06:26

## 2018-02-22 RX ADMIN — TIOTROPIUM BROMIDE 1 CAPSULE(S): 18 CAPSULE ORAL; RESPIRATORY (INHALATION) at 14:45

## 2018-02-22 RX ADMIN — ATORVASTATIN CALCIUM 40 MILLIGRAM(S): 80 TABLET, FILM COATED ORAL at 21:29

## 2018-02-22 RX ADMIN — Medication 100 MILLIGRAM(S): at 06:27

## 2018-02-22 RX ADMIN — SODIUM CHLORIDE 3 MILLILITER(S): 9 INJECTION INTRAMUSCULAR; INTRAVENOUS; SUBCUTANEOUS at 06:27

## 2018-02-22 RX ADMIN — PANTOPRAZOLE SODIUM 40 MILLIGRAM(S): 20 TABLET, DELAYED RELEASE ORAL at 06:26

## 2018-02-22 RX ADMIN — SODIUM CHLORIDE 3 MILLILITER(S): 9 INJECTION INTRAMUSCULAR; INTRAVENOUS; SUBCUTANEOUS at 13:00

## 2018-02-22 RX ADMIN — Medication 100 MILLIGRAM(S): at 18:30

## 2018-02-22 RX ADMIN — CLOPIDOGREL BISULFATE 75 MILLIGRAM(S): 75 TABLET, FILM COATED ORAL at 13:38

## 2018-02-22 RX ADMIN — FLUTICASONE PROPIONATE AND SALMETEROL 1 DOSE(S): 50; 250 POWDER ORAL; RESPIRATORY (INHALATION) at 08:38

## 2018-02-22 RX ADMIN — Medication 0.25 MILLIGRAM(S): at 18:30

## 2018-02-22 RX ADMIN — Medication 100 MILLIGRAM(S): at 13:38

## 2018-02-22 RX ADMIN — Medication 81 MILLIGRAM(S): at 13:38

## 2018-02-22 RX ADMIN — CEFEPIME 100 MILLIGRAM(S): 1 INJECTION, POWDER, FOR SOLUTION INTRAMUSCULAR; INTRAVENOUS at 18:31

## 2018-02-22 RX ADMIN — Medication 20 MILLIGRAM(S): at 06:27

## 2018-02-22 RX ADMIN — Medication 50 MICROGRAM(S): at 06:26

## 2018-02-22 RX ADMIN — LEVALBUTEROL 0.63 MILLIGRAM(S): 1.25 SOLUTION, CONCENTRATE RESPIRATORY (INHALATION) at 14:50

## 2018-02-22 NOTE — PROGRESS NOTE ADULT - PROBLEM SELECTOR PLAN 1
Will continue with Cefepime, blood cultures negative, continued 02 as he uses 4 liters at home, spirometer, Chest PT, Ambulation, DVT-P, fall/aspiration precaution, ID consult appreciated, Vancomycin d/feliz as patient has previous sputum cultures showed Pseudomonas, blood cultures are negative, repeat sputum cultures is growing gram +ve cocci in clusters, will continued with current antibiotics, taper steroids and supportive meds, incentive spirometry, will continue with supportive measure, his respiratory status has been stable

## 2018-02-22 NOTE — PROGRESS NOTE ADULT - SUBJECTIVE AND OBJECTIVE BOX
FRANDY BAUTISTA    [id number]    67y      Male    Patient is a 67y old  Male who presents with a chief complaint of Came for rehab (17 Feb 2018 04:08)      INTERVAL HPI/OVERNIGHT EVENTS:    Patient feels improvement of SOB and cough, denies, fever, chills, chest pain, nausea and vomiting, able to eat well, he is able to participate with PT, feels energetic, his WBCs are trending up, his Iliostomy secretions are little thing compare to normal, he denies have diarrheal condition     REVIEW OF SYSTEMS:    CONSTITUTIONAL: No fever, has fatigue  RESPIRATORY: cough and shortness is better.    CARDIOVASCULAR: No chest pain, palpitations  GASTROINTESTINAL: No abdominal, No nausea, vomiting  NEUROLOGICAL: No headaches,  loss of strength.  MISCELLANEOUS: No joint swelling or pain        Vital Signs Last 24 Hrs  T(C): 36.6 (22 Feb 2018 08:30), Max: 36.9 (22 Feb 2018 04:43)  T(F): 97.9 (22 Feb 2018 08:30), Max: 98.5 (22 Feb 2018 04:43)  HR: 104 (22 Feb 2018 13:35) (90 - 112)  BP: 114/71 (22 Feb 2018 13:35) (107/73 - 128/71)  RR: 20 (22 Feb 2018 08:30) (20 - 20)  SpO2: 96% (22 Feb 2018 04:43) (93% - 96%)    PHYSICAL EXAM:    GENERAL: Elderly male thin built male looking comfortable    HEENT: PERRL, +EOMI  NECK: soft, Supple, No JVD  CHEST/LUNG: Decrease air entry bilaterally; No wheezing  HEART: S1S2+, Regular rate and rhythm; No murmurs  ABDOMEN: Soft, Nontender, Nondistended; Bowel sounds present, s/p Ileostomy   EXTREMITIES:  1+ Peripheral Pulses, No edema  SKIN: No rashes or lesions  NEURO: AAOX3, no focal deficits, no motor r sensory loss  PSYCH: normal mood      LABS:                        11.3   28.8  )-----------( 376      ( 22 Feb 2018 13:01 )             37.2     02-22    135  |  95<L>  |  19.0  ----------------------------<  238<H>  4.8   |  27.0  |  0.42<L>    Ca    8.4<L>      22 Feb 2018 13:01              I&O's Summary    21 Feb 2018 07:01  -  22 Feb 2018 07:00  --------------------------------------------------------  IN: 0 mL / OUT: 1640 mL / NET: -1640 mL    22 Feb 2018 07:01  -  22 Feb 2018 15:03  --------------------------------------------------------  IN: 0 mL / OUT: 500 mL / NET: -500 mL        MEDICATIONS  (STANDING):  ALPRAZolam 0.25 milliGRAM(s) Oral every 6 hours  aspirin enteric coated 81 milliGRAM(s) Oral daily  atorvastatin 40 milliGRAM(s) Oral at bedtime  benzonatate 100 milliGRAM(s) Oral every 6 hours  cefepime  IVPB 2000 milliGRAM(s) IV Intermittent every 12 hours  clopidogrel Tablet 75 milliGRAM(s) Oral daily  dextrose 5%. 1000 milliLiter(s) (50 mL/Hr) IV Continuous <Continuous>  dextrose 50% Injectable 12.5 Gram(s) IV Push once  dextrose 50% Injectable 25 Gram(s) IV Push once  dextrose 50% Injectable 25 Gram(s) IV Push once  diltiazem    Tablet 120 milliGRAM(s) Oral every 8 hours  enoxaparin Injectable 40 milliGRAM(s) SubCutaneous every 24 hours  fluticasone propionate/ salmeterol 250-50 MICROgram(s) Diskus 1 Dose(s) Inhalation two times a day  guaiFENesin ER 1200 milliGRAM(s) Oral every 12 hours  insulin lispro (HumaLOG) corrective regimen sliding scale   SubCutaneous Before meals and at bedtime  levothyroxine 50 MICROGram(s) Oral daily  loratadine 10 milliGRAM(s) Oral daily  multivitamin 1 Tablet(s) Oral daily  pantoprazole    Tablet 40 milliGRAM(s) Oral before breakfast  predniSONE   Tablet 20 milliGRAM(s) Oral daily  saccharomyces boulardii 250 milliGRAM(s) Oral two times a day  sodium chloride 0.9% lock flush 3 milliLiter(s) IV Push every 8 hours  tamsulosin 0.4 milliGRAM(s) Oral at bedtime  tiotropium 18 MICROgram(s) Capsule 1 Capsule(s) Inhalation daily    MEDICATIONS  (PRN):  acetaminophen   Tablet 650 milliGRAM(s) Oral every 6 hours PRN For Temp greater than 38.5 C (101.3 F)  dextrose Gel 1 Dose(s) Oral once PRN Blood Glucose LESS THAN 70 milliGRAM(s)/deciliter  glucagon  Injectable 1 milliGRAM(s) IntraMuscular once PRN Glucose LESS THAN 70 milligrams/deciliter  levalbuterol Inhalation 0.63 milliGRAM(s) Inhalation every 4 hours PRN Shortness of Breath  sodium chloride 0.65% Nasal 1 Spray(s) Both Nostrils every 4 hours PRN Nasal Congestion

## 2018-02-22 NOTE — PROGRESS NOTE ADULT - SUBJECTIVE AND OBJECTIVE BOX
Patient in bed.   Feels he is doing better, worked with PT and ambulated to the nurses station.  Has not worked on stairs.  Patient is very concerned about his IV and ensuring it does not blow.     FUNCTIONAL PROGRESS  2/21  Sit-Stand Transfer Training  Transfer Training Sit-to-Stand Transfer: minimum assist (75% patient effort);  1 person assist;  full weight-bearing   rolling walker  Transfer Training Stand-to-Sit Transfer: minimum assist (75% patient effort);  1 person assist;  full weight-bearing   rolling walker  Sit-to-Stand Transfer Training Transfer Safety Analysis: decreased balance;  decreased strength;  impaired balance;  rolling walker    Gait Training  Gait Training: minimum assist (75% patient effort);  1 person assist;  full weight-bearing   rolling walker;  50 feet  Gait Analysis: 3-point gait   decreased donta;  decreased strength;  impaired balance;  50 feet;  rolling walker  Gait Number of Times:: x 1    REVIEW OF SYSTEMS  Constitutional - No fever,  +fatigue  Musculoskeletal - No joint pain, No joint swelling, No muscle pain  Psychiatric - +depression, +anxiety    VITALS  T(C): 36.6 (02-22-18 @ 08:30), Max: 36.9 (02-22-18 @ 04:43)  HR: 94 (02-22-18 @ 08:30) (90 - 112)  BP: 111/68 (02-22-18 @ 08:30) (107/73 - 130/86)  RR: 20 (02-22-18 @ 08:30) (20 - 20)  SpO2: 96% (02-22-18 @ 04:43) (93% - 96%)  Wt(kg): --    MEDICATIONS   acetaminophen   Tablet 650 milliGRAM(s) every 6 hours PRN  ALPRAZolam 0.25 milliGRAM(s) every 6 hours  aspirin enteric coated 81 milliGRAM(s) daily  atorvastatin 40 milliGRAM(s) at bedtime  benzonatate 100 milliGRAM(s) every 6 hours  cefepime  IVPB 2000 milliGRAM(s) every 12 hours  clopidogrel Tablet 75 milliGRAM(s) daily  dextrose 5%. 1000 milliLiter(s) <Continuous>  dextrose 50% Injectable 12.5 Gram(s) once  dextrose 50% Injectable 25 Gram(s) once  dextrose 50% Injectable 25 Gram(s) once  dextrose Gel 1 Dose(s) once PRN  diltiazem    Tablet 120 milliGRAM(s) every 8 hours  enoxaparin Injectable 40 milliGRAM(s) every 24 hours  fluticasone propionate/ salmeterol 250-50 MICROgram(s) Diskus 1 Dose(s) two times a day  glucagon  Injectable 1 milliGRAM(s) once PRN  guaiFENesin ER 1200 milliGRAM(s) every 12 hours  insulin lispro (HumaLOG) corrective regimen sliding scale   Before meals and at bedtime  levalbuterol Inhalation 0.63 milliGRAM(s) every 4 hours PRN  levothyroxine 50 MICROGram(s) daily  loratadine 10 milliGRAM(s) daily  multivitamin 1 Tablet(s) daily  pantoprazole    Tablet 40 milliGRAM(s) before breakfast  predniSONE   Tablet 20 milliGRAM(s) daily  saccharomyces boulardii 250 milliGRAM(s) two times a day  sodium chloride 0.65% Nasal 1 Spray(s) every 4 hours PRN  sodium chloride 0.9% lock flush 3 milliLiter(s) every 8 hours  tamsulosin 0.4 milliGRAM(s) at bedtime  tiotropium 18 MICROgram(s) Capsule 1 Capsule(s) daily      RECENT LABS/IMAGING  CBC Full  -  ( 22 Feb 2018 08:14 )  WBC Count : 23.8 K/uL  Hemoglobin : 12.9 g/dL  Hematocrit : 40.4 %  Platelet Count - Automated : 271 K/uL  Mean Cell Volume : 87.1 fl  Mean Cell Hemoglobin : 27.8 pg  Mean Cell Hemoglobin Concentration : 31.9 g/dL  Auto Neutrophil # : x  Auto Lymphocyte # : x  Auto Monocyte # : x  Auto Eosinophil # : x  Auto Basophil # : x  Auto Neutrophil % : x  Auto Lymphocyte % : x  Auto Monocyte % : x  Auto Eosinophil % : x  Auto Basophil % : x    02-22    133<L>  |  97<L>  |  19.0  ----------------------------<  172<H>  5.7<H>   |  24.0  |  0.40<L>    Ca    8.9      22 Feb 2018 08:14      -----------------------------------------------  PHYSICAL EXAM  Constitutional - NAD, Comfortable  Chest - No distress, symmetrical chest expansion  Neurologic Exam -                    Cognitive - AAOx4     Motor - No focal deficits     Sensory - Intact to LT  Psychiatric - Mood depressed/anxious about recovery  ----------------------------------------------------------------------------------------  ASSESSMENT/PLAN - 67M with multiple comorbidities admitted with sepsis 2/2 flu/multifocal PNA with functional impairments.   ID - Cefepime  CVA - ASA, Plavix, Lipitor  COPD - O2, Prednisone   DVT PPX - Lovenox  Rehab - PATIENT MAY ACHIEVE GOALS FOR DC HOME WITH HOME CARE, depending on ongoing progress. If unable to achieve in time for DC, plan for  ACUTE inpatient rehabilitation for the functional deficits consisting of 3 hours of therapy/day & 24 hour RN/daily PMR physician for comorbid medical management. Will continue to follow for ongoing rehab needs and recommendations.     Continue bedside therapy as well as OOB throughout the day with mobilization throughout the day with staff to maintain cardiopulmonary function and prevention of secondary complications related to debility.

## 2018-02-22 NOTE — PROGRESS NOTE ADULT - ATTENDING COMMENTS
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    Afib/ SInus tachycardia: Multifactorial, patient HR up in setting of COPD exacerbation, patient has been followed by cardiology during the previous admission, his Cardizem changed to short acting one because of small bowel absorption issues, will continue to monitor, repeat TTE done showed Normal global left ventricular systolic function,  Left ventricular ejection fraction, by visual estimation, is 60 to 65%, Moderately enlarged right ventricle, Estimated pulmonary artery systolic pressure is 44.7 mmHg assuming a right atrial pressure of 3 mmHg, which is consistent with mild pulmonary hypertension, There is no evidence of pericardial effusion, not on anticoagulation, heart rate is in 70s now.     Leucocytosis: Patient's WBC count has been trending up, he denies having fever, chills, his respiratory status has been stable, no difficulty urination, procalcitonin is up again, will get Blood cultures, will send C dif from the Ileostomy secretions, will get UA and urine cultures.     DVT Prophylaxis - Lovenox    Dispo: Rehab working with him and will see if he is going for acute rehab or home.

## 2018-02-23 LAB
ANION GAP SERPL CALC-SCNC: 13 MMOL/L — SIGNIFICANT CHANGE UP (ref 5–17)
BUN SERPL-MCNC: 13 MG/DL — SIGNIFICANT CHANGE UP (ref 8–20)
C DIFF BY PCR RESULT: SIGNIFICANT CHANGE UP
C DIFF BY PCR RESULT: SIGNIFICANT CHANGE UP
C DIFF TOX GENS STL QL NAA+PROBE: SIGNIFICANT CHANGE UP
C DIFF TOX GENS STL QL NAA+PROBE: SIGNIFICANT CHANGE UP
CALCIUM SERPL-MCNC: 8.7 MG/DL — SIGNIFICANT CHANGE UP (ref 8.6–10.2)
CHLORIDE SERPL-SCNC: 97 MMOL/L — LOW (ref 98–107)
CO2 SERPL-SCNC: 27 MMOL/L — SIGNIFICANT CHANGE UP (ref 22–29)
CREAT SERPL-MCNC: 0.39 MG/DL — LOW (ref 0.5–1.3)
CULTURE RESULTS: NO GROWTH — SIGNIFICANT CHANGE UP
GLUCOSE SERPL-MCNC: 158 MG/DL — HIGH (ref 70–115)
POTASSIUM SERPL-MCNC: 4.3 MMOL/L — SIGNIFICANT CHANGE UP (ref 3.5–5.3)
POTASSIUM SERPL-SCNC: 4.3 MMOL/L — SIGNIFICANT CHANGE UP (ref 3.5–5.3)
SODIUM SERPL-SCNC: 137 MMOL/L — SIGNIFICANT CHANGE UP (ref 135–145)
SPECIMEN SOURCE: SIGNIFICANT CHANGE UP

## 2018-02-23 PROCEDURE — 99232 SBSQ HOSP IP/OBS MODERATE 35: CPT

## 2018-02-23 PROCEDURE — 71250 CT THORAX DX C-: CPT | Mod: 26

## 2018-02-23 PROCEDURE — 99223 1ST HOSP IP/OBS HIGH 75: CPT

## 2018-02-23 PROCEDURE — 71046 X-RAY EXAM CHEST 2 VIEWS: CPT | Mod: 26

## 2018-02-23 RX ORDER — VANCOMYCIN HCL 1 G
750 VIAL (EA) INTRAVENOUS EVERY 12 HOURS
Qty: 0 | Refills: 0 | Status: DISCONTINUED | OUTPATIENT
Start: 2018-02-23 | End: 2018-02-25

## 2018-02-23 RX ADMIN — Medication 100 MILLIGRAM(S): at 17:37

## 2018-02-23 RX ADMIN — FLUTICASONE PROPIONATE AND SALMETEROL 1 DOSE(S): 50; 250 POWDER ORAL; RESPIRATORY (INHALATION) at 21:31

## 2018-02-23 RX ADMIN — Medication 1200 MILLIGRAM(S): at 18:12

## 2018-02-23 RX ADMIN — SODIUM CHLORIDE 3 MILLILITER(S): 9 INJECTION INTRAMUSCULAR; INTRAVENOUS; SUBCUTANEOUS at 21:40

## 2018-02-23 RX ADMIN — TIOTROPIUM BROMIDE 1 CAPSULE(S): 18 CAPSULE ORAL; RESPIRATORY (INHALATION) at 11:24

## 2018-02-23 RX ADMIN — Medication 100 MILLIGRAM(S): at 12:58

## 2018-02-23 RX ADMIN — ATORVASTATIN CALCIUM 40 MILLIGRAM(S): 80 TABLET, FILM COATED ORAL at 21:31

## 2018-02-23 RX ADMIN — Medication 20 MILLIGRAM(S): at 05:05

## 2018-02-23 RX ADMIN — Medication 250 MILLIGRAM(S): at 17:37

## 2018-02-23 RX ADMIN — Medication 0.25 MILLIGRAM(S): at 05:05

## 2018-02-23 RX ADMIN — SODIUM CHLORIDE 3 MILLILITER(S): 9 INJECTION INTRAMUSCULAR; INTRAVENOUS; SUBCUTANEOUS at 05:06

## 2018-02-23 RX ADMIN — CEFEPIME 100 MILLIGRAM(S): 1 INJECTION, POWDER, FOR SOLUTION INTRAMUSCULAR; INTRAVENOUS at 05:04

## 2018-02-23 RX ADMIN — Medication 50 MICROGRAM(S): at 05:05

## 2018-02-23 RX ADMIN — Medication 100 MILLIGRAM(S): at 05:05

## 2018-02-23 RX ADMIN — Medication 1 TABLET(S): at 12:58

## 2018-02-23 RX ADMIN — Medication 0.25 MILLIGRAM(S): at 12:58

## 2018-02-23 RX ADMIN — CEFEPIME 100 MILLIGRAM(S): 1 INJECTION, POWDER, FOR SOLUTION INTRAMUSCULAR; INTRAVENOUS at 17:37

## 2018-02-23 RX ADMIN — Medication 250 MILLIGRAM(S): at 05:05

## 2018-02-23 RX ADMIN — Medication 100 MILLIGRAM(S): at 23:03

## 2018-02-23 RX ADMIN — PANTOPRAZOLE SODIUM 40 MILLIGRAM(S): 20 TABLET, DELAYED RELEASE ORAL at 05:05

## 2018-02-23 RX ADMIN — Medication 1200 MILLIGRAM(S): at 05:06

## 2018-02-23 RX ADMIN — FLUTICASONE PROPIONATE AND SALMETEROL 1 DOSE(S): 50; 250 POWDER ORAL; RESPIRATORY (INHALATION) at 12:57

## 2018-02-23 RX ADMIN — SODIUM CHLORIDE 3 MILLILITER(S): 9 INJECTION INTRAMUSCULAR; INTRAVENOUS; SUBCUTANEOUS at 13:01

## 2018-02-23 RX ADMIN — CLOPIDOGREL BISULFATE 75 MILLIGRAM(S): 75 TABLET, FILM COATED ORAL at 12:57

## 2018-02-23 RX ADMIN — LEVALBUTEROL 0.63 MILLIGRAM(S): 1.25 SOLUTION, CONCENTRATE RESPIRATORY (INHALATION) at 11:26

## 2018-02-23 RX ADMIN — Medication 150 MILLIGRAM(S): at 18:12

## 2018-02-23 RX ADMIN — TAMSULOSIN HYDROCHLORIDE 0.4 MILLIGRAM(S): 0.4 CAPSULE ORAL at 21:31

## 2018-02-23 RX ADMIN — Medication 0.25 MILLIGRAM(S): at 17:37

## 2018-02-23 RX ADMIN — Medication 81 MILLIGRAM(S): at 12:58

## 2018-02-23 RX ADMIN — Medication 0.25 MILLIGRAM(S): at 23:03

## 2018-02-23 NOTE — PROGRESS NOTE ADULT - SUBJECTIVE AND OBJECTIVE BOX
Patient did not have a great day yesterday and was not able to walk as far nor do the steps. He will try today.   He feels fatigued. Denies pain. Ate very well this morning.   Patient's WBC is uptrending and being worked up. Is on steroids. He has had no fevers. UA shows positive bacteria only. CDIFF negative.     FUNCTIONAL PROGRESS    Sit-Stand Transfer Training  Transfer Training Sit-to-Stand Transfer: minimum assist (75% patient effort);  1 person assist;  full weight-bearing   rolling walker  Transfer Training Stand-to-Sit Transfer: minimum assist (75% patient effort);  1 person assist;  full weight-bearing   rolling walker  Sit-to-Stand Transfer Training Transfer Safety Analysis: decreased balance;  decreased strength;  impaired balance;  rolling walker    Gait Training  Gait Training: minimum assist (75% patient effort);  1 person assist;  O2 in tow;  full weight-bearing   rolling walker;  50 feet  Gait Analysis: 3-point gait   decreased donta;  forward flexed head;  decreased step length;  decreased strength;  impaired balance;  50 feet;  rolling walker  Gait Number of Times:: x 1    REVIEW OF SYSTEMS  Constitutional - No fever,  +fatigue  Neurological - +loss of strength  Musculoskeletal - No joint pain, No joint swelling, No muscle pain  Psychiatric - +depression, + anxiety    VITALS  T(C): 36.4 (18 @ 04:50), Max: 36.7 (18 @ 16:31)  HR: 85 (18 @ 04:50) (84 - 104)  BP: 106/62 (18 @ 04:50) (105/56 - 115/73)  RR: 19 (18 @ 04:50) (18 - 20)  SpO2: 97% (18 @ 21:30) (97% - 97%)  Wt(kg): --    MEDICATIONS   acetaminophen   Tablet 650 milliGRAM(s) every 6 hours PRN  ALPRAZolam 0.25 milliGRAM(s) every 6 hours  aspirin enteric coated 81 milliGRAM(s) daily  atorvastatin 40 milliGRAM(s) at bedtime  benzonatate 100 milliGRAM(s) every 6 hours  cefepime  IVPB 2000 milliGRAM(s) every 12 hours  clopidogrel Tablet 75 milliGRAM(s) daily  dextrose 5%. 1000 milliLiter(s) <Continuous>  dextrose 50% Injectable 12.5 Gram(s) once  dextrose 50% Injectable 25 Gram(s) once  dextrose 50% Injectable 25 Gram(s) once  dextrose Gel 1 Dose(s) once PRN  diltiazem    Tablet 120 milliGRAM(s) every 8 hours  enoxaparin Injectable 40 milliGRAM(s) every 24 hours  fluticasone propionate/ salmeterol 250-50 MICROgram(s) Diskus 1 Dose(s) two times a day  glucagon  Injectable 1 milliGRAM(s) once PRN  guaiFENesin ER 1200 milliGRAM(s) every 12 hours  insulin lispro (HumaLOG) corrective regimen sliding scale   Before meals and at bedtime  levalbuterol Inhalation 0.63 milliGRAM(s) every 4 hours PRN  levothyroxine 50 MICROGram(s) daily  loratadine 10 milliGRAM(s) daily  multivitamin 1 Tablet(s) daily  pantoprazole    Tablet 40 milliGRAM(s) before breakfast  predniSONE   Tablet 20 milliGRAM(s) daily  saccharomyces boulardii 250 milliGRAM(s) two times a day  sodium chloride 0.65% Nasal 1 Spray(s) every 4 hours PRN  sodium chloride 0.9% lock flush 3 milliLiter(s) every 8 hours  tamsulosin 0.4 milliGRAM(s) at bedtime  tiotropium 18 MICROgram(s) Capsule 1 Capsule(s) daily      RECENT LABS/IMAGING  CBC Full  -  ( 2018 13:01 )  WBC Count : 28.8 K/uL  Hemoglobin : 11.3 g/dL  Hematocrit : 37.2 %  Platelet Count - Automated : 376 K/uL  Mean Cell Volume : 89.0 fl  Mean Cell Hemoglobin : 27.0 pg  Mean Cell Hemoglobin Concentration : 30.4 g/dL  Auto Neutrophil # : x  Auto Lymphocyte # : x  Auto Monocyte # : x  Auto Eosinophil # : x  Auto Basophil # : x  Auto Neutrophil % : x  Auto Lymphocyte % : x  Auto Monocyte % : x  Auto Eosinophil % : x  Auto Basophil % : x        137  |  97<L>  |  13.0  ----------------------------<  158<H>  4.3   |  27.0  |  0.39<L>    Ca    8.7      2018 07:23      Urinalysis Basic - ( 2018 17:41 )    Color: Yellow / Appearance: Clear / S.015 / pH: x  Gluc: x / Ketone: Negative  / Bili: Negative / Urobili: Negative mg/dL   Blood: x / Protein: 30 mg/dL / Nitrite: Negative   Leuk Esterase: Negative / RBC: 0-2 /HPF / WBC 0-2   Sq Epi: x / Non Sq Epi: Occasional / Bacteria: Occasional      -----------------------------------------------  PHYSICAL EXAM  Constitutional - NAD, Comfortable  Chest - No distress, symmetrical chest expansion  Neurologic Exam -                    Cognitive - AAOx4     Motor - No focal deficits     Sensory - Intact to LT  Psychiatric - Mood depressed/anxious about recovery  ----------------------------------------------------------------------------------------  ASSESSMENT/PLAN - 67M with multiple comorbidities admitted with sepsis 2/2 flu/multifocal PNA with functional impairments.   ID - Cefepime  CVA - ASA, Plavix, Lipitor  COPD - O2, Prednisone   DVT PPX - Lovenox  Mood - Xanax - consider more long term use of medication as opposed to benzo   Rehab -Medically being optimized. Patient unable to achieve goals for DC home. Recommend ACUTE inpatient rehabilitation for the functional deficits consisting of 3 hours of therapy/day & 24 hour RN/daily PMR physician for comorbid medical management. Will continue to follow for ongoing rehab needs and recommendations.     Continue bedside therapy as well as OOB throughout the day with mobilization throughout the day with staff to maintain cardiopulmonary function and prevention of secondary complications related to debility.

## 2018-02-23 NOTE — PROGRESS NOTE ADULT - PROBLEM SELECTOR PLAN 1
Will continue with Cefepime, blood cultures negative, continued 02 as he uses 4 liters at home, spirometer, Chest PT, Ambulation, DVT-P, fall/aspiration precaution, ID consult appreciated, Vancomycin d/feliz as patient has previous sputum cultures showed Pseudomonas, blood cultures are negative, repeat sputum cultures is growing gram +ve cocci in clusters, will continued with current antibiotics, taper steroids and supportive meds, incentive spirometry, will continue with supportive measure, his respiratory status has been stable Patient is on Cefepime, blood cultures negative, continued 02 as he uses 4 liters at home, spirometer, Chest PT, Ambulation, DVT-P, fall/aspiration precaution, ID consult appreciated, repeat sputum cultures is growing gram +ve cocci in clusters, tapering steroids down, incentive spirometry, his WBCs are still trending up, got CXR showed worsening infiltrates, discussed with ID recommanded adding vancomycin, continue with Cefepime and repeat Chest CT, blood cultures repeated, will monitor CBC.

## 2018-02-23 NOTE — PROGRESS NOTE ADULT - SUBJECTIVE AND OBJECTIVE BOX
FRANDY BAUTISTA    [id number]    67y      Male    Patient is a 67y old  Male who presents with a chief complaint of Came for rehab (2018 04:08)      INTERVAL HPI/OVERNIGHT EVENTS:    REVIEW OF SYSTEMS:    CONSTITUTIONAL: No fever, weight loss, or fatigue  RESPIRATORY: No cough, wheezing, hemoptysis; No shortness of breath  CARDIOVASCULAR: No chest pain, palpitations  GASTROINTESTINAL: No abdominal or epigastric pain. No nausea, vomiting  NEUROLOGICAL: No headaches,  loss of strength.  MISCELLANEOUS: No joint swelling or pain       Vital Signs Last 24 Hrs  T(C): 36.7 (2018 10:29), Max: 36.7 (2018 16:31)  T(F): 98.1 (2018 10:29), Max: 98.1 (2018 10:29)  HR: 107 (2018 10:29) (84 - 107)  BP: 122/68 (2018 10:29) (105/56 - 122/68)  RR: 18 (2018 10:) (18 - 20)  SpO2: 97% (2018 10:29) (97% - 97%)    PHYSICAL EXAM:    GENERAL: Elderly male thin built male looking comfortable    HEENT: PERRL, +EOMI  NECK: soft, Supple, No JVD  CHEST/LUNG: Decrease air entry bilaterally; No wheezing  HEART: S1S2+, Regular rate and rhythm; No murmurs  ABDOMEN: Soft, Nontender, Nondistended; Bowel sounds present, s/p Ileostomy   EXTREMITIES:  1+ Peripheral Pulses, No edema  SKIN: No rashes or lesions  NEURO: AAOX3, no focal deficits, no motor r sensory loss  PSYCH: normal mood      LABS:                        11.3   28.8  )-----------( 376      ( 2018 13:01 )             37.2     02-23    137  |  97<L>  |  13.0  ----------------------------<  158<H>  4.3   |  27.0  |  0.39<L>    Ca    8.7      2018 07:23        Urinalysis Basic - ( 2018 17:41 )    Color: Yellow / Appearance: Clear / S.015 / pH: x  Gluc: x / Ketone: Negative  / Bili: Negative / Urobili: Negative mg/dL   Blood: x / Protein: 30 mg/dL / Nitrite: Negative   Leuk Esterase: Negative / RBC: 0-2 /HPF / WBC 0-2   Sq Epi: x / Non Sq Epi: Occasional / Bacteria: Occasional          I&O's Summary    2018 07:01  -  2018 07:00  --------------------------------------------------------  IN: 0 mL / OUT: 2020 mL / NET: -2020 mL        MEDICATIONS  (STANDING):  ALPRAZolam 0.25 milliGRAM(s) Oral every 6 hours  aspirin enteric coated 81 milliGRAM(s) Oral daily  atorvastatin 40 milliGRAM(s) Oral at bedtime  benzonatate 100 milliGRAM(s) Oral every 6 hours  cefepime  IVPB 2000 milliGRAM(s) IV Intermittent every 12 hours  clopidogrel Tablet 75 milliGRAM(s) Oral daily  dextrose 5%. 1000 milliLiter(s) (50 mL/Hr) IV Continuous <Continuous>  dextrose 50% Injectable 12.5 Gram(s) IV Push once  dextrose 50% Injectable 25 Gram(s) IV Push once  dextrose 50% Injectable 25 Gram(s) IV Push once  diltiazem    Tablet 120 milliGRAM(s) Oral every 8 hours  enoxaparin Injectable 40 milliGRAM(s) SubCutaneous every 24 hours  fluticasone propionate/ salmeterol 250-50 MICROgram(s) Diskus 1 Dose(s) Inhalation two times a day  guaiFENesin ER 1200 milliGRAM(s) Oral every 12 hours  insulin lispro (HumaLOG) corrective regimen sliding scale   SubCutaneous Before meals and at bedtime  levothyroxine 50 MICROGram(s) Oral daily  loratadine 10 milliGRAM(s) Oral daily  multivitamin 1 Tablet(s) Oral daily  pantoprazole    Tablet 40 milliGRAM(s) Oral before breakfast  predniSONE   Tablet 20 milliGRAM(s) Oral daily  saccharomyces boulardii 250 milliGRAM(s) Oral two times a day  sodium chloride 0.9% lock flush 3 milliLiter(s) IV Push every 8 hours  tamsulosin 0.4 milliGRAM(s) Oral at bedtime  tiotropium 18 MICROgram(s) Capsule 1 Capsule(s) Inhalation daily    MEDICATIONS  (PRN):  acetaminophen   Tablet 650 milliGRAM(s) Oral every 6 hours PRN For Temp greater than 38.5 C (101.3 F)  dextrose Gel 1 Dose(s) Oral once PRN Blood Glucose LESS THAN 70 milliGRAM(s)/deciliter  glucagon  Injectable 1 milliGRAM(s) IntraMuscular once PRN Glucose LESS THAN 70 milligrams/deciliter  levalbuterol Inhalation 0.63 milliGRAM(s) Inhalation every 4 hours PRN Shortness of Breath  sodium chloride 0.65% Nasal 1 Spray(s) Both Nostrils every 4 hours PRN Nasal Congestion FRANDY BAUTISTA    [id number]    67y      Male    Patient is a 67y old  Male who presents with a chief complaint of Came for rehab (2018 04:08)      INTERVAL HPI/OVERNIGHT EVENTS:    Patient feels improvement of SOB and cough, denies, fever, chills, chest pain, nausea and vomiting, he is able to participate with PT, his WBCs are still trending up, got CXR showed worsening infiltrates, discussed with ID recommanded adding vancomycin, continue with Cefepime and repeat Chest CT,  his Iliostomy secretions are little thing compare to normal, C dif sent, he denies have diarrheal condition     REVIEW OF SYSTEMS:    CONSTITUTIONAL: No fever, has fatigue  RESPIRATORY: cough and shortness is better.    CARDIOVASCULAR: No chest pain, palpitations  GASTROINTESTINAL: No abdominal, No nausea, vomiting  NEUROLOGICAL: No headaches,  loss of strength.  MISCELLANEOUS: No joint swelling or pain       Vital Signs Last 24 Hrs  T(C): 36.7 (2018 10:29), Max: 36.7 (2018 16:31)  T(F): 98.1 (2018 10:29), Max: 98.1 (2018 10:29)  HR: 107 (2018 10:29) (84 - 107)  BP: 122/68 (2018 10:29) (105/56 - 122/68)  RR: 18 (2018 10:29) (18 - 20)  SpO2: 97% (2018 10:29) (97% - 97%)    PHYSICAL EXAM:    GENERAL: Elderly male thin built male looking comfortable    HEENT: PERRL, +EOMI  NECK: soft, Supple, No JVD  CHEST/LUNG: Decrease air entry bilaterally; No wheezing  HEART: S1S2+, Regular rate and rhythm; No murmurs  ABDOMEN: Soft, Nontender, Nondistended; Bowel sounds present, s/p Ileostomy   EXTREMITIES:  1+ Peripheral Pulses, No edema  SKIN: No rashes or lesions  NEURO: AAOX3, no focal deficits, no motor r sensory loss  PSYCH: normal mood      LABS:                        11.3   28.8  )-----------( 376      ( 2018 13:01 )             37.2     02-23    137  |  97<L>  |  13.0  ----------------------------<  158<H>  4.3   |  27.0  |  0.39<L>    Ca    8.7      2018 07:23        Urinalysis Basic - ( 2018 17:41 )    Color: Yellow / Appearance: Clear / S.015 / pH: x  Gluc: x / Ketone: Negative  / Bili: Negative / Urobili: Negative mg/dL   Blood: x / Protein: 30 mg/dL / Nitrite: Negative   Leuk Esterase: Negative / RBC: 0-2 /HPF / WBC 0-2   Sq Epi: x / Non Sq Epi: Occasional / Bacteria: Occasional          I&O's Summary    2018 07:01  -  2018 07:00  --------------------------------------------------------  IN: 0 mL / OUT: 2020 mL / NET: -2020 mL        MEDICATIONS  (STANDING):  ALPRAZolam 0.25 milliGRAM(s) Oral every 6 hours  aspirin enteric coated 81 milliGRAM(s) Oral daily  atorvastatin 40 milliGRAM(s) Oral at bedtime  benzonatate 100 milliGRAM(s) Oral every 6 hours  cefepime  IVPB 2000 milliGRAM(s) IV Intermittent every 12 hours  clopidogrel Tablet 75 milliGRAM(s) Oral daily  dextrose 5%. 1000 milliLiter(s) (50 mL/Hr) IV Continuous <Continuous>  dextrose 50% Injectable 12.5 Gram(s) IV Push once  dextrose 50% Injectable 25 Gram(s) IV Push once  dextrose 50% Injectable 25 Gram(s) IV Push once  diltiazem    Tablet 120 milliGRAM(s) Oral every 8 hours  enoxaparin Injectable 40 milliGRAM(s) SubCutaneous every 24 hours  fluticasone propionate/ salmeterol 250-50 MICROgram(s) Diskus 1 Dose(s) Inhalation two times a day  guaiFENesin ER 1200 milliGRAM(s) Oral every 12 hours  insulin lispro (HumaLOG) corrective regimen sliding scale   SubCutaneous Before meals and at bedtime  levothyroxine 50 MICROGram(s) Oral daily  loratadine 10 milliGRAM(s) Oral daily  multivitamin 1 Tablet(s) Oral daily  pantoprazole    Tablet 40 milliGRAM(s) Oral before breakfast  predniSONE   Tablet 20 milliGRAM(s) Oral daily  saccharomyces boulardii 250 milliGRAM(s) Oral two times a day  sodium chloride 0.9% lock flush 3 milliLiter(s) IV Push every 8 hours  tamsulosin 0.4 milliGRAM(s) Oral at bedtime  tiotropium 18 MICROgram(s) Capsule 1 Capsule(s) Inhalation daily    MEDICATIONS  (PRN):  acetaminophen   Tablet 650 milliGRAM(s) Oral every 6 hours PRN For Temp greater than 38.5 C (101.3 F)  dextrose Gel 1 Dose(s) Oral once PRN Blood Glucose LESS THAN 70 milliGRAM(s)/deciliter  glucagon  Injectable 1 milliGRAM(s) IntraMuscular once PRN Glucose LESS THAN 70 milligrams/deciliter  levalbuterol Inhalation 0.63 milliGRAM(s) Inhalation every 4 hours PRN Shortness of Breath  sodium chloride 0.65% Nasal 1 Spray(s) Both Nostrils every 4 hours PRN Nasal Congestion

## 2018-02-23 NOTE — PROGRESS NOTE ADULT - SUBJECTIVE AND OBJECTIVE BOX
[facility]  INFECTIOUS DISEASES AND INTERNAL MEDICINE at Left Hand  =======================================================  Kevin Manriquez MD  Diplomates American Board of Internal Medicine and Infectious Diseases  =======================================================    FRANDY BAUTISTA [id number]    Follow up: Pneumonia    Still with fatigue and SOB    ON CEFEPIME AS EKD TO REVALUATE SECONDARY TO INCREASED WBC  PT NON TOXIC    Allergies:  codeine (Unknown)  DO NOT SEND ORANGES (Unknown)  no veges (Unknown)  penicillin (Unknown)  Symbicort (Short breath)      Antibiotics:  cefepime  IVPB 2000 milliGRAM(s) IV Intermittent every 12 hours  oseltamivir 75 milliGRAM(s) Oral two times a day        REVIEW OF SYSTEMS:  CONSTITUTIONAL:  No Fever or chills  HEENT:  No diplopia or blurred vision.  No earache, sore throat or runny nose.  CARDIOVASCULAR:  No pressure, squeezing, strangling, tightness, heaviness or aching about the chest, neck, axilla or epigastrium.  RESPIRATORY:  No cough, shortness of breath  GASTROINTESTINAL:  No nausea, vomiting or diarrhea.  GENITOURINARY:  No dysuria, frequency or urgency.  MUSCULOSKELETAL:  no joint aches, no muscle pain  SKIN:  No change in skin, hair or nails.  NEUROLOGIC:  No paresthesias, fasciculations  PSYCHIATRIC:  No disorder of thought or mood.  ENDOCRINE:  No heat or cold intolerance  HEMATOLOGICAL:  No easy bruising or bleeding.       Physical Exam:   ICU Vital Signs Last 24 Hrs  T(C): 36.4 (23 Feb 2018 04:50), Max: 36.7 (22 Feb 2018 16:31)  T(F): 97.5 (23 Feb 2018 04:50), Max: 98 (22 Feb 2018 16:31)  HR: 85 (23 Feb 2018 04:50) (84 - 104)  BP: 106/62 (23 Feb 2018 04:50) (105/56 - 115/73)  BP(mean): --  ABP: --  ABP(mean): --  RR: 19 (23 Feb 2018 04:50) (18 - 20)  SpO2: 97% (22 Feb 2018 21:30) (97% - 97%)        GEN: NAD, + Fatigue  HEENT: normocephalic and atraumatic. EOMI. PERRL.    NECK: Supple.   LUNGS: Scattered rhonchi.   HEART: Regular rate and rhythm  ABDOMEN: Soft, nontender, and nondistended.  Positive bowel sounds.  + Ileostomy   : No CVA tenderness  EXTREMITIES: Without any edema.  MSK: No joint swelling  NEUROLOGIC: + weakness B/L, alert and oriented.   PSYCHIATRIC: Appropriate affect .  SKIN: No rash      Labs:                                                 RECENT CULTURES:  02-16 @ 16:35 .Urine Clean Catch (Midstream)     Culture in progress      02-16 @ 15:59    RVP  Detected - Influenza A      02-10 @ 07:22 .Blood Blood-Peripheral     No growth at 5 days.      02-09 @ 15:52 .Blood Blood-Peripheral     No growth at 5 days.      02-09 @ 15:51 .Blood Blood-Peripheral     No growth at 5 days.      02-09 @ 14:05 .Sputum Sputum Escherichia coli  Pseudomonas aeruginosa    Moderate Escherichia coli  Moderate Pseudomonas aeruginosa  Few Routine respiratory swetha present  Few White blood cells  Moderate Gram Positive Cocci in Pairs and Chains      EXAM:  CT CHEST                        PROCEDURE DATE:  02/16/2018    INTERPRETATION:  HISTORY: Shortness of breath.  Date and Time of Exam: 2/16/2018 7:02 PM  TECHNIQUE:  Sections were obtained from the apices to the diaphragm   without intravenous contrast.  COMPARISON EXAMINATION:   11/29/2017.  FINDINGS: No evidence of mediastinal or hilar lymphadenopathy. No   evidence of pleural or pericardial effusion. No evidence of axillary   adenopathy. Extensive coronary artery calcifications are noted.  Emphysematous changes and hyperexpansion of the left lower lobe unchanged   from the prior study. There is an infiltrate in the left lower lobe which   is a new finding since 11/29/2017.  Small right upper lobe infiltrate, a new finding since the prior study.   There is a right lower lobe infiltrate, similar to the infiltrate noted   on the prior examination.  No significant osseous abnormality.    IMPRESSION:     Left lower lobe and right upper lobe infiltrates, new since 11/29/2017.  Right lower lobe infiltrate without significant change from the prior study.

## 2018-02-23 NOTE — PROGRESS NOTE ADULT - ATTENDING COMMENTS
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    Afib/ SInus tachycardia: Multifactorial, patient HR up in setting of COPD exacerbation, patient has been followed by cardiology during the previous admission, his Cardizem changed to short acting one because of small bowel absorption issues, will continue to monitor, repeat TTE done showed Normal global left ventricular systolic function,  Left ventricular ejection fraction, by visual estimation, is 60 to 65%, Moderately enlarged right ventricle, Estimated pulmonary artery systolic pressure is 44.7 mmHg assuming a right atrial pressure of 3 mmHg, which is consistent with mild pulmonary hypertension, There is no evidence of pericardial effusion, not on anticoagulation, heart rate is in 70s now.     Leucocytosis: Patient's WBC count has been trending up, he denies having fever, chills, his respiratory status has been stable, no difficulty urination, procalcitonin is up again, will get Blood cultures, will send C dif from the Ileostomy secretions, will get UA and urine cultures.     DVT Prophylaxis - Lovenox    Dispo: Rehab working with him and will see if he is going for acute rehab or home. 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    Afib/ SInus tachycardia: Multifactorial, patient HR up in setting of COPD exacerbation, patient has been followed by cardiology during the previous admission, his Cardizem changed to short acting one because of small bowel absorption issues, will continue to monitor, repeat TTE done showed Normal global left ventricular systolic function,  Left ventricular ejection fraction, by visual estimation, is 60 to 65%, Moderately enlarged right ventricle, Estimated pulmonary artery systolic pressure is 44.7 mmHg assuming a right atrial pressure of 3 mmHg, which is consistent with mild pulmonary hypertension, There is no evidence of pericardial effusion, not on anticoagulation, heart rate is in 70s now.     Leucocytosis: Patient's WBC count has been trending up, he denies having fever, chills, his respiratory status has been stable, no difficulty urination, procalcitonin is up again, send Blood cultures,  his WBCs are still trending up, got CXR showed worsening infiltrates, discussed with ID recommended adding vancomycin, continue with Cefepime and repeat Chest CT,  his Iliostomy secretions are little thing compare to normal, C dif sent.     DVT Prophylaxis - Lovenox    Dispo: Rehab working with him and will see if he is going for acute rehab or home.

## 2018-02-23 NOTE — PROGRESS NOTE ADULT - ASSESSMENT
66y/o  Male with h/o ILD/Advanced COPD s/p right lung reduction in 2010 on home 02, Hypothyroidism, CVA, DM-2, B/L Hydroceles, and nephrolithiasis recently was admitted to the hospital for a complicated hospital stay discharged on 2/15/18 after being treated for COPD exacerbation, Pneumonia with Levaquin, complicated by SBO requiring emergent surgery now s/p jejunostomy closure and s/p Ileostomy, prolonged post op ileus.  Now here with Influenza A and multifocal pneumonia on CT   ON CEFEPIME AND TAMIFLU  APPEARS IMPROVED     ON IV ABX CEFEPIME TO    TAMIFLU TO COMPLETED 5 DAYS    ASKED TO  REEVALUATE WITH INCREASED WBC  REPEAT BLOOD CX ARE DRAWN PT  DOES NOT APPEAR TOXIC  REPEAT CXR TO BE DONE  WILL FOLLOWUP

## 2018-02-24 LAB
-  AMIKACIN: SIGNIFICANT CHANGE UP
-  AZTREONAM: SIGNIFICANT CHANGE UP
-  CEFEPIME: SIGNIFICANT CHANGE UP
-  CEFTAZIDIME: SIGNIFICANT CHANGE UP
-  CIPROFLOXACIN: SIGNIFICANT CHANGE UP
-  GENTAMICIN: SIGNIFICANT CHANGE UP
-  IMIPENEM: SIGNIFICANT CHANGE UP
-  LEVOFLOXACIN: SIGNIFICANT CHANGE UP
-  MEROPENEM: SIGNIFICANT CHANGE UP
-  PIPERACILLIN/TAZOBACTAM: SIGNIFICANT CHANGE UP
-  TOBRAMYCIN: SIGNIFICANT CHANGE UP
ALBUMIN SERPL ELPH-MCNC: 2.7 G/DL — LOW (ref 3.3–5.2)
ALP SERPL-CCNC: 131 U/L — HIGH (ref 40–120)
ALT FLD-CCNC: 57 U/L — HIGH
ANION GAP SERPL CALC-SCNC: 11 MMOL/L — SIGNIFICANT CHANGE UP (ref 5–17)
AST SERPL-CCNC: 34 U/L — SIGNIFICANT CHANGE UP
BASOPHILS # BLD AUTO: 0 K/UL — SIGNIFICANT CHANGE UP (ref 0–0.2)
BASOPHILS NFR BLD AUTO: 0.1 % — SIGNIFICANT CHANGE UP (ref 0–2)
BILIRUB SERPL-MCNC: 0.4 MG/DL — SIGNIFICANT CHANGE UP (ref 0.4–2)
BUN SERPL-MCNC: 15 MG/DL — SIGNIFICANT CHANGE UP (ref 8–20)
CALCIUM SERPL-MCNC: 8.3 MG/DL — LOW (ref 8.6–10.2)
CHLORIDE SERPL-SCNC: 98 MMOL/L — SIGNIFICANT CHANGE UP (ref 98–107)
CO2 SERPL-SCNC: 27 MMOL/L — SIGNIFICANT CHANGE UP (ref 22–29)
CREAT SERPL-MCNC: 0.34 MG/DL — LOW (ref 0.5–1.3)
CULTURE RESULTS: SIGNIFICANT CHANGE UP
EOSINOPHIL # BLD AUTO: 0.1 K/UL — SIGNIFICANT CHANGE UP (ref 0–0.5)
EOSINOPHIL NFR BLD AUTO: 0.5 % — SIGNIFICANT CHANGE UP (ref 0–5)
ERYTHROCYTE [SEDIMENTATION RATE] IN BLOOD: 52 MM/HR — HIGH (ref 0–20)
GLUCOSE SERPL-MCNC: 153 MG/DL — HIGH (ref 70–115)
HCT VFR BLD CALC: 31.3 % — LOW (ref 42–52)
HGB BLD-MCNC: 9.7 G/DL — LOW (ref 14–18)
LYMPHOCYTES # BLD AUTO: 1 K/UL — SIGNIFICANT CHANGE UP (ref 1–4.8)
LYMPHOCYTES # BLD AUTO: 5.8 % — LOW (ref 20–55)
MCHC RBC-ENTMCNC: 27.3 PG — SIGNIFICANT CHANGE UP (ref 27–31)
MCHC RBC-ENTMCNC: 31 G/DL — LOW (ref 32–36)
MCV RBC AUTO: 88.2 FL — SIGNIFICANT CHANGE UP (ref 80–94)
METHOD TYPE: SIGNIFICANT CHANGE UP
MONOCYTES # BLD AUTO: 1 K/UL — HIGH (ref 0–0.8)
MONOCYTES NFR BLD AUTO: 5.7 % — SIGNIFICANT CHANGE UP (ref 3–10)
NEUTROPHILS # BLD AUTO: 15.1 K/UL — HIGH (ref 1.8–8)
NEUTROPHILS NFR BLD AUTO: 87.4 % — HIGH (ref 37–73)
ORGANISM # SPEC MICROSCOPIC CNT: SIGNIFICANT CHANGE UP
ORGANISM # SPEC MICROSCOPIC CNT: SIGNIFICANT CHANGE UP
PLATELET # BLD AUTO: 281 K/UL — SIGNIFICANT CHANGE UP (ref 150–400)
POTASSIUM SERPL-MCNC: 4.2 MMOL/L — SIGNIFICANT CHANGE UP (ref 3.5–5.3)
POTASSIUM SERPL-SCNC: 4.2 MMOL/L — SIGNIFICANT CHANGE UP (ref 3.5–5.3)
PROT SERPL-MCNC: 6.1 G/DL — LOW (ref 6.6–8.7)
RBC # BLD: 3.55 M/UL — LOW (ref 4.6–6.2)
RBC # FLD: 17.8 % — HIGH (ref 11–15.6)
SODIUM SERPL-SCNC: 136 MMOL/L — SIGNIFICANT CHANGE UP (ref 135–145)
SPECIMEN SOURCE: SIGNIFICANT CHANGE UP
WBC # BLD: 17.3 K/UL — HIGH (ref 4.8–10.8)
WBC # FLD AUTO: 17.3 K/UL — HIGH (ref 4.8–10.8)

## 2018-02-24 PROCEDURE — 99232 SBSQ HOSP IP/OBS MODERATE 35: CPT

## 2018-02-24 RX ORDER — ALPRAZOLAM 0.25 MG
0.25 TABLET ORAL ONCE
Qty: 0 | Refills: 0 | Status: DISCONTINUED | OUTPATIENT
Start: 2018-02-24 | End: 2018-02-24

## 2018-02-24 RX ADMIN — FLUTICASONE PROPIONATE AND SALMETEROL 1 DOSE(S): 50; 250 POWDER ORAL; RESPIRATORY (INHALATION) at 12:31

## 2018-02-24 RX ADMIN — CEFEPIME 100 MILLIGRAM(S): 1 INJECTION, POWDER, FOR SOLUTION INTRAMUSCULAR; INTRAVENOUS at 17:10

## 2018-02-24 RX ADMIN — Medication 150 MILLIGRAM(S): at 17:10

## 2018-02-24 RX ADMIN — Medication 100 MILLIGRAM(S): at 17:10

## 2018-02-24 RX ADMIN — TAMSULOSIN HYDROCHLORIDE 0.4 MILLIGRAM(S): 0.4 CAPSULE ORAL at 21:44

## 2018-02-24 RX ADMIN — Medication 50 MICROGRAM(S): at 05:29

## 2018-02-24 RX ADMIN — Medication 250 MILLIGRAM(S): at 05:29

## 2018-02-24 RX ADMIN — FLUTICASONE PROPIONATE AND SALMETEROL 1 DOSE(S): 50; 250 POWDER ORAL; RESPIRATORY (INHALATION) at 21:43

## 2018-02-24 RX ADMIN — Medication 1 TABLET(S): at 12:31

## 2018-02-24 RX ADMIN — Medication 20 MILLIGRAM(S): at 05:29

## 2018-02-24 RX ADMIN — Medication 100 MILLIGRAM(S): at 05:28

## 2018-02-24 RX ADMIN — TIOTROPIUM BROMIDE 1 CAPSULE(S): 18 CAPSULE ORAL; RESPIRATORY (INHALATION) at 09:38

## 2018-02-24 RX ADMIN — SODIUM CHLORIDE 3 MILLILITER(S): 9 INJECTION INTRAMUSCULAR; INTRAVENOUS; SUBCUTANEOUS at 21:40

## 2018-02-24 RX ADMIN — Medication 81 MILLIGRAM(S): at 12:31

## 2018-02-24 RX ADMIN — Medication 0.25 MILLIGRAM(S): at 17:10

## 2018-02-24 RX ADMIN — Medication 1200 MILLIGRAM(S): at 17:10

## 2018-02-24 RX ADMIN — CEFEPIME 100 MILLIGRAM(S): 1 INJECTION, POWDER, FOR SOLUTION INTRAMUSCULAR; INTRAVENOUS at 05:28

## 2018-02-24 RX ADMIN — Medication 100 MILLIGRAM(S): at 12:31

## 2018-02-24 RX ADMIN — SODIUM CHLORIDE 3 MILLILITER(S): 9 INJECTION INTRAMUSCULAR; INTRAVENOUS; SUBCUTANEOUS at 05:37

## 2018-02-24 RX ADMIN — CLOPIDOGREL BISULFATE 75 MILLIGRAM(S): 75 TABLET, FILM COATED ORAL at 12:31

## 2018-02-24 RX ADMIN — LEVALBUTEROL 0.63 MILLIGRAM(S): 1.25 SOLUTION, CONCENTRATE RESPIRATORY (INHALATION) at 09:39

## 2018-02-24 RX ADMIN — ATORVASTATIN CALCIUM 40 MILLIGRAM(S): 80 TABLET, FILM COATED ORAL at 21:44

## 2018-02-24 RX ADMIN — PANTOPRAZOLE SODIUM 40 MILLIGRAM(S): 20 TABLET, DELAYED RELEASE ORAL at 05:29

## 2018-02-24 RX ADMIN — Medication 1200 MILLIGRAM(S): at 05:30

## 2018-02-24 RX ADMIN — Medication 0.25 MILLIGRAM(S): at 12:30

## 2018-02-24 RX ADMIN — Medication 150 MILLIGRAM(S): at 05:29

## 2018-02-24 RX ADMIN — Medication 0.25 MILLIGRAM(S): at 05:28

## 2018-02-24 RX ADMIN — Medication 250 MILLIGRAM(S): at 17:09

## 2018-02-24 RX ADMIN — SODIUM CHLORIDE 3 MILLILITER(S): 9 INJECTION INTRAMUSCULAR; INTRAVENOUS; SUBCUTANEOUS at 15:22

## 2018-02-24 NOTE — PROGRESS NOTE ADULT - PROBLEM SELECTOR PLAN 1
Patient is on Cefepime, blood cultures negative, continued 02 as he uses 4 liters at home, spirometer, Chest PT, Ambulation, DVT-P, fall/aspiration precaution, ID consult appreciated, repeat sputum cultures is growing gram +ve cocci in clusters, tapering steroids down, incentive spirometry, his WBCs are still trending up, got CXR showed worsening infiltrates, discussed with ID recommanded adding vancomycin, continue with Cefepime and repeat Chest CT, blood cultures repeated, will monitor CBC. Patient is on Cefepime, blood cultures negative, continued 02 as he uses 4 liters at home, spirometer, Chest PT, Ambulation, DVT-P, fall/aspiration precaution, ID consult appreciated, repeat sputum cultures is growing gram +ve cocci in clusters, tapering steroids down, incentive spirometry, his WBCs were trending up, got CXR showed worsening infiltrates, discussed with ID recommended adding vancomycin, continue with Cefepime and repeated Chest CT done showed worsening infiltrates, blood cultures repeated, since we added Vancomycine wbcs are trending down, will monitor CBC.

## 2018-02-24 NOTE — PROGRESS NOTE ADULT - ATTENDING COMMENTS
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    Afib/ SInus tachycardia: Multifactorial, patient HR up in setting of COPD exacerbation, patient has been followed by cardiology during the previous admission, his Cardizem changed to short acting one because of small bowel absorption issues, will continue to monitor, repeat TTE done showed Normal global left ventricular systolic function,  Left ventricular ejection fraction, by visual estimation, is 60 to 65%, Moderately enlarged right ventricle, Estimated pulmonary artery systolic pressure is 44.7 mmHg assuming a right atrial pressure of 3 mmHg, which is consistent with mild pulmonary hypertension, There is no evidence of pericardial effusion, not on anticoagulation, heart rate is in 70s now.     Leucocytosis: Patient's WBC count has been trending up, he denies having fever, chills, his respiratory status has been stable, no difficulty urination, procalcitonin is up again, send Blood cultures,  his WBCs are still trending up, got CXR showed worsening infiltrates, discussed with ID recommended adding vancomycin, continue with Cefepime and repeat Chest CT,  his Iliostomy secretions are little thing compare to normal, C dif sent.     DVT Prophylaxis - Lovenox    Dispo: Rehab working with him and will see if he is going for acute rehab or home. 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    Afib/ SInus tachycardia: Multifactorial, patient HR up in setting of COPD exacerbation, patient has been followed by cardiology during the previous admission, his Cardizem changed to short acting one because of small bowel absorption issues, will continue to monitor, repeat TTE done showed Normal global left ventricular systolic function,  Left ventricular ejection fraction, by visual estimation, is 60 to 65%, Moderately enlarged right ventricle, Estimated pulmonary artery systolic pressure is 44.7 mmHg assuming a right atrial pressure of 3 mmHg, which is consistent with mild pulmonary hypertension, There is no evidence of pericardial effusion, not on anticoagulation, heart rate is in 70s now.     Leucocytosis: Patient's WBC count was trending up, he denies having fever, chills, his respiratory status has been stable, no difficulty urination, procalcitonin is up again, sent Blood cultures, got CXR showed worsening infiltrates, discussed with ID recommended adding vancomycin, continue with Cefepime and repeat Chest CT showed worsening infiltrates, C dif sent came negative, wbcs are trending down.     DVT Prophylaxis - Lovenox    Dispo: Rehab working with him and will see if he is going for acute rehab or home.

## 2018-02-24 NOTE — PROGRESS NOTE ADULT - SUBJECTIVE AND OBJECTIVE BOX
FRANDY BAUTISTA    [id number]    67y      Male    Patient is a 67y old  Male who presents with a chief complaint of Came for rehab (2018 04:08)      INTERVAL HPI/OVERNIGHT EVENTS:    Patient feels improvement of SOB and cough, denies, fever, chills, chest pain, nausea and vomiting, he is able to participate with PT, his WBCs are still trending up, got CXR showed worsening infiltrates, discussed with ID recommanded adding vancomycin, continue with Cefepime and repeat Chest CT,  his Iliostomy secretions are little thing compare to normal, C dif sent, he denies have diarrheal condition     REVIEW OF SYSTEMS:    CONSTITUTIONAL: No fever, has fatigue  RESPIRATORY: cough and shortness is better.    CARDIOVASCULAR: No chest pain, palpitations  GASTROINTESTINAL: No abdominal, No nausea, vomiting  NEUROLOGICAL: No headaches,  loss of strength.  MISCELLANEOUS: No joint swelling or pain        Vital Signs Last 24 Hrs  T(C): 36.7 (2018 08:14), Max: 36.9 (2018 23:00)  T(F): 98.1 (2018 08:14), Max: 98.5 (2018 23:00)  HR: 89 (2018 08:14) (83 - 125)  BP: 122/64 (2018 08:14) (98/60 - 122/64)  RR: 16 (2018 08:14) (16 - 18)  SpO2: 98% (2018 08:14) (92% - 98%)    PHYSICAL EXAM:    GENERAL: Elderly male thin built male looking comfortable    HEENT: PERRL, +EOMI  NECK: soft, Supple, No JVD  CHEST/LUNG: Decrease air entry bilaterally; No wheezing  HEART: S1S2+, Regular rate and rhythm; No murmurs  ABDOMEN: Soft, Nontender, Nondistended; Bowel sounds present, s/p Ileostomy   EXTREMITIES:  1+ Peripheral Pulses, No edema  SKIN: No rashes or lesions  NEURO: AAOX3, no focal deficits, no motor r sensory loss  PSYCH: normal mood      LABS:                        9.7    17.3  )-----------( 281      ( 2018 05:53 )             31.3     02-24    136  |  98  |  15.0  ----------------------------<  153<H>  4.2   |  27.0  |  0.34<L>    Ca    8.3<L>      2018 05:54    TPro  6.1<L>  /  Alb  2.7<L>  /  TBili  0.4  /  DBili  x   /  AST  34  /  ALT  57<H>  /  AlkPhos  131<H>  02-24      Urinalysis Basic - ( 2018 17:41 )    Color: Yellow / Appearance: Clear / S.015 / pH: x  Gluc: x / Ketone: Negative  / Bili: Negative / Urobili: Negative mg/dL   Blood: x / Protein: 30 mg/dL / Nitrite: Negative   Leuk Esterase: Negative / RBC: 0-2 /HPF / WBC 0-2   Sq Epi: x / Non Sq Epi: Occasional / Bacteria: Occasional          I&O's Summary    2018 07:01  -  2018 07:00  --------------------------------------------------------  IN: 400 mL / OUT: 1500 mL / NET: -1100 mL        MEDICATIONS  (STANDING):  ALPRAZolam 0.25 milliGRAM(s) Oral every 6 hours  aspirin enteric coated 81 milliGRAM(s) Oral daily  atorvastatin 40 milliGRAM(s) Oral at bedtime  benzonatate 100 milliGRAM(s) Oral every 6 hours  cefepime  IVPB 2000 milliGRAM(s) IV Intermittent every 12 hours  clopidogrel Tablet 75 milliGRAM(s) Oral daily  diltiazem    Tablet 120 milliGRAM(s) Oral every 8 hours  enoxaparin Injectable 40 milliGRAM(s) SubCutaneous every 24 hours  fluticasone propionate/ salmeterol 250-50 MICROgram(s) Diskus 1 Dose(s) Inhalation two times a day  guaiFENesin ER 1200 milliGRAM(s) Oral every 12 hours  levothyroxine 50 MICROGram(s) Oral daily  loratadine 10 milliGRAM(s) Oral daily  multivitamin 1 Tablet(s) Oral daily  pantoprazole    Tablet 40 milliGRAM(s) Oral before breakfast  predniSONE   Tablet 20 milliGRAM(s) Oral daily  saccharomyces boulardii 250 milliGRAM(s) Oral two times a day  sodium chloride 0.9% lock flush 3 milliLiter(s) IV Push every 8 hours  tamsulosin 0.4 milliGRAM(s) Oral at bedtime  tiotropium 18 MICROgram(s) Capsule 1 Capsule(s) Inhalation daily  vancomycin  IVPB 750 milliGRAM(s) IV Intermittent every 12 hours    MEDICATIONS  (PRN):  acetaminophen   Tablet 650 milliGRAM(s) Oral every 6 hours PRN For Temp greater than 38.5 C (101.3 F)  levalbuterol Inhalation 0.63 milliGRAM(s) Inhalation every 4 hours PRN Shortness of Breath  sodium chloride 0.65% Nasal 1 Spray(s) Both Nostrils every 4 hours PRN Nasal Congestion FRANDY BAUTISTA    [id number]    67y      Male    Patient is a 67y old  Male who presents with a chief complaint of Came for rehab (2018 04:08)      INTERVAL HPI/OVERNIGHT EVENTS:    Patient feels improvement of SOB and cough, denies, fever, chills, chest pain, nausea and vomiting, he is able to participate with PT, his WBCs are trending down, got CXR showed worsening infiltrates, CT chest done showed worsening infiltrates, vancomycin added along with continue Cefepime, C dif came negative, he denies have diarrhea    REVIEW OF SYSTEMS:    CONSTITUTIONAL: No fever, has fatigue  RESPIRATORY: cough and shortness is better.    CARDIOVASCULAR: No chest pain, palpitations  GASTROINTESTINAL: No abdominal, No nausea, vomiting  NEUROLOGICAL: No headaches,  loss of strength.  MISCELLANEOUS: No joint swelling or pain        Vital Signs Last 24 Hrs  T(C): 36.7 (2018 08:14), Max: 36.9 (2018 23:00)  T(F): 98.1 (2018 08:14), Max: 98.5 (2018 23:00)  HR: 89 (2018 08:14) (83 - 125)  BP: 122/64 (2018 08:14) (98/60 - 122/64)  RR: 16 (2018 08:14) (16 - 18)  SpO2: 98% (2018 08:14) (92% - 98%)    PHYSICAL EXAM:    GENERAL: Elderly male thin built male looking comfortable    HEENT: PERRL, +EOMI  NECK: soft, Supple, No JVD  CHEST/LUNG: Decrease air entry bilaterally; No wheezing  HEART: S1S2+, Regular rate and rhythm; No murmurs  ABDOMEN: Soft, Nontender, Nondistended; Bowel sounds present, s/p Ileostomy   EXTREMITIES:  1+ Peripheral Pulses, No edema  SKIN: No rashes or lesions  NEURO: AAOX3, no focal deficits, no motor r sensory loss  PSYCH: normal mood      LABS:                        9.7    17.3  )-----------( 281      ( 2018 05:53 )             31.3     02-24    136  |  98  |  15.0  ----------------------------<  153<H>  4.2   |  27.0  |  0.34<L>    Ca    8.3<L>      2018 05:54    TPro  6.1<L>  /  Alb  2.7<L>  /  TBili  0.4  /  DBili  x   /  AST  34  /  ALT  57<H>  /  AlkPhos  131<H>  02-24      Urinalysis Basic - ( 2018 17:41 )    Color: Yellow / Appearance: Clear / S.015 / pH: x  Gluc: x / Ketone: Negative  / Bili: Negative / Urobili: Negative mg/dL   Blood: x / Protein: 30 mg/dL / Nitrite: Negative   Leuk Esterase: Negative / RBC: 0-2 /HPF / WBC 0-2   Sq Epi: x / Non Sq Epi: Occasional / Bacteria: Occasional          I&O's Summary    2018 07:01  -  2018 07:00  --------------------------------------------------------  IN: 400 mL / OUT: 1500 mL / NET: -1100 mL        MEDICATIONS  (STANDING):  ALPRAZolam 0.25 milliGRAM(s) Oral every 6 hours  aspirin enteric coated 81 milliGRAM(s) Oral daily  atorvastatin 40 milliGRAM(s) Oral at bedtime  benzonatate 100 milliGRAM(s) Oral every 6 hours  cefepime  IVPB 2000 milliGRAM(s) IV Intermittent every 12 hours  clopidogrel Tablet 75 milliGRAM(s) Oral daily  diltiazem    Tablet 120 milliGRAM(s) Oral every 8 hours  enoxaparin Injectable 40 milliGRAM(s) SubCutaneous every 24 hours  fluticasone propionate/ salmeterol 250-50 MICROgram(s) Diskus 1 Dose(s) Inhalation two times a day  guaiFENesin ER 1200 milliGRAM(s) Oral every 12 hours  levothyroxine 50 MICROGram(s) Oral daily  loratadine 10 milliGRAM(s) Oral daily  multivitamin 1 Tablet(s) Oral daily  pantoprazole    Tablet 40 milliGRAM(s) Oral before breakfast  predniSONE   Tablet 20 milliGRAM(s) Oral daily  saccharomyces boulardii 250 milliGRAM(s) Oral two times a day  sodium chloride 0.9% lock flush 3 milliLiter(s) IV Push every 8 hours  tamsulosin 0.4 milliGRAM(s) Oral at bedtime  tiotropium 18 MICROgram(s) Capsule 1 Capsule(s) Inhalation daily  vancomycin  IVPB 750 milliGRAM(s) IV Intermittent every 12 hours    MEDICATIONS  (PRN):  acetaminophen   Tablet 650 milliGRAM(s) Oral every 6 hours PRN For Temp greater than 38.5 C (101.3 F)  levalbuterol Inhalation 0.63 milliGRAM(s) Inhalation every 4 hours PRN Shortness of Breath  sodium chloride 0.65% Nasal 1 Spray(s) Both Nostrils every 4 hours PRN Nasal Congestion

## 2018-02-24 NOTE — CHART NOTE - NSCHARTNOTEFT_GEN_A_CORE
Delayed entry note: Called by virtual radiology concerning positive imaging for :  1.  Increasing size of mass in the lingula 10.6 x 5.6 x 1.8 cm.  rule out malignancy.

## 2018-02-25 LAB
HCT VFR BLD CALC: 29.1 % — LOW (ref 42–52)
HGB BLD-MCNC: 9.1 G/DL — LOW (ref 14–18)
MCHC RBC-ENTMCNC: 27.7 PG — SIGNIFICANT CHANGE UP (ref 27–31)
MCHC RBC-ENTMCNC: 31.3 G/DL — LOW (ref 32–36)
MCV RBC AUTO: 88.4 FL — SIGNIFICANT CHANGE UP (ref 80–94)
PLATELET # BLD AUTO: 263 K/UL — SIGNIFICANT CHANGE UP (ref 150–400)
RBC # BLD: 3.29 M/UL — LOW (ref 4.6–6.2)
RBC # FLD: 17.6 % — HIGH (ref 11–15.6)
VANCOMYCIN TROUGH SERPL-MCNC: 8.2 UG/ML — LOW (ref 10–20)
WBC # BLD: 12.7 K/UL — HIGH (ref 4.8–10.8)
WBC # FLD AUTO: 12.7 K/UL — HIGH (ref 4.8–10.8)

## 2018-02-25 PROCEDURE — 99232 SBSQ HOSP IP/OBS MODERATE 35: CPT

## 2018-02-25 RX ORDER — ALPRAZOLAM 0.25 MG
0.25 TABLET ORAL EVERY 6 HOURS
Qty: 0 | Refills: 0 | Status: DISCONTINUED | OUTPATIENT
Start: 2018-02-25 | End: 2018-02-28

## 2018-02-25 RX ORDER — IPRATROPIUM/ALBUTEROL SULFATE 18-103MCG
3 AEROSOL WITH ADAPTER (GRAM) INHALATION EVERY 6 HOURS
Qty: 0 | Refills: 0 | Status: DISCONTINUED | OUTPATIENT
Start: 2018-02-25 | End: 2018-02-25

## 2018-02-25 RX ORDER — ALPRAZOLAM 0.25 MG
0.25 TABLET ORAL ONCE
Qty: 0 | Refills: 0 | Status: DISCONTINUED | OUTPATIENT
Start: 2018-02-25 | End: 2018-02-25

## 2018-02-25 RX ORDER — VANCOMYCIN HCL 1 G
1000 VIAL (EA) INTRAVENOUS EVERY 12 HOURS
Qty: 0 | Refills: 0 | Status: DISCONTINUED | OUTPATIENT
Start: 2018-02-25 | End: 2018-02-26

## 2018-02-25 RX ADMIN — Medication 100 MILLIGRAM(S): at 00:04

## 2018-02-25 RX ADMIN — Medication 100 MILLIGRAM(S): at 05:38

## 2018-02-25 RX ADMIN — ATORVASTATIN CALCIUM 40 MILLIGRAM(S): 80 TABLET, FILM COATED ORAL at 21:45

## 2018-02-25 RX ADMIN — TAMSULOSIN HYDROCHLORIDE 0.4 MILLIGRAM(S): 0.4 CAPSULE ORAL at 21:45

## 2018-02-25 RX ADMIN — CLOPIDOGREL BISULFATE 75 MILLIGRAM(S): 75 TABLET, FILM COATED ORAL at 11:13

## 2018-02-25 RX ADMIN — Medication 1 TABLET(S): at 11:13

## 2018-02-25 RX ADMIN — SODIUM CHLORIDE 3 MILLILITER(S): 9 INJECTION INTRAMUSCULAR; INTRAVENOUS; SUBCUTANEOUS at 13:23

## 2018-02-25 RX ADMIN — Medication 50 MICROGRAM(S): at 05:39

## 2018-02-25 RX ADMIN — Medication 81 MILLIGRAM(S): at 11:13

## 2018-02-25 RX ADMIN — Medication 0.25 MILLIGRAM(S): at 00:04

## 2018-02-25 RX ADMIN — Medication 250 MILLIGRAM(S): at 18:08

## 2018-02-25 RX ADMIN — Medication 0.25 MILLIGRAM(S): at 23:10

## 2018-02-25 RX ADMIN — Medication 150 MILLIGRAM(S): at 06:41

## 2018-02-25 RX ADMIN — PANTOPRAZOLE SODIUM 40 MILLIGRAM(S): 20 TABLET, DELAYED RELEASE ORAL at 05:38

## 2018-02-25 RX ADMIN — SODIUM CHLORIDE 3 MILLILITER(S): 9 INJECTION INTRAMUSCULAR; INTRAVENOUS; SUBCUTANEOUS at 05:48

## 2018-02-25 RX ADMIN — Medication 100 MILLIGRAM(S): at 18:08

## 2018-02-25 RX ADMIN — Medication 0.25 MILLIGRAM(S): at 18:08

## 2018-02-25 RX ADMIN — FLUTICASONE PROPIONATE AND SALMETEROL 1 DOSE(S): 50; 250 POWDER ORAL; RESPIRATORY (INHALATION) at 21:45

## 2018-02-25 RX ADMIN — Medication 100 MILLIGRAM(S): at 23:10

## 2018-02-25 RX ADMIN — LEVALBUTEROL 0.63 MILLIGRAM(S): 1.25 SOLUTION, CONCENTRATE RESPIRATORY (INHALATION) at 00:08

## 2018-02-25 RX ADMIN — Medication 250 MILLIGRAM(S): at 05:39

## 2018-02-25 RX ADMIN — CEFEPIME 100 MILLIGRAM(S): 1 INJECTION, POWDER, FOR SOLUTION INTRAMUSCULAR; INTRAVENOUS at 05:39

## 2018-02-25 RX ADMIN — Medication 0.25 MILLIGRAM(S): at 12:43

## 2018-02-25 RX ADMIN — TIOTROPIUM BROMIDE 1 CAPSULE(S): 18 CAPSULE ORAL; RESPIRATORY (INHALATION) at 12:37

## 2018-02-25 RX ADMIN — CEFEPIME 100 MILLIGRAM(S): 1 INJECTION, POWDER, FOR SOLUTION INTRAMUSCULAR; INTRAVENOUS at 18:08

## 2018-02-25 RX ADMIN — Medication 0.25 MILLIGRAM(S): at 05:38

## 2018-02-25 RX ADMIN — LEVALBUTEROL 0.63 MILLIGRAM(S): 1.25 SOLUTION, CONCENTRATE RESPIRATORY (INHALATION) at 19:58

## 2018-02-25 RX ADMIN — Medication 20 MILLIGRAM(S): at 05:38

## 2018-02-25 RX ADMIN — SODIUM CHLORIDE 3 MILLILITER(S): 9 INJECTION INTRAMUSCULAR; INTRAVENOUS; SUBCUTANEOUS at 21:49

## 2018-02-25 NOTE — PROGRESS NOTE ADULT - SUBJECTIVE AND OBJECTIVE BOX
FRANDY BAUTISTA    [id number]    67y      Male    Patient is a 67y old  Male who presents with a chief complaint of Came for rehab (17 Feb 2018 04:08)      INTERVAL HPI/OVERNIGHT EVENTS:    Patient feels improvement of SOB and cough, denies, fever, chills, chest pain, nausea and vomiting, he is able to participate with PT, his WBCs are trending down, got CXR showed worsening infiltrates, CT chest done showed worsening infiltrates, vancomycin added along with continue Cefepime, C dif came negative, he denies have diarrhea    REVIEW OF SYSTEMS:    CONSTITUTIONAL: No fever, has fatigue  RESPIRATORY: cough and shortness is better.    CARDIOVASCULAR: No chest pain, palpitations  GASTROINTESTINAL: No abdominal, No nausea, vomiting  NEUROLOGICAL: No headaches,  loss of strength.  MISCELLANEOUS: No joint swelling or pain        Vital Signs Last 24 Hrs  T(C): 36.6 (25 Feb 2018 07:42), Max: 36.8 (25 Feb 2018 05:12)  T(F): 97.9 (25 Feb 2018 07:42), Max: 98.3 (25 Feb 2018 05:12)  HR: 93 (25 Feb 2018 07:42) (93 - 104)  BP: 102/60 (25 Feb 2018 07:42) (97/67 - 110/60)  BP(mean): --  RR: 16 (25 Feb 2018 07:42) (16 - 18)  SpO2: 98% (25 Feb 2018 07:42) (94% - 98%)    PHYSICAL EXAM:    GENERAL: Elderly male thin built male looking comfortable    HEENT: PERRL, +EOMI  NECK: soft, Supple, No JVD  CHEST/LUNG: Decrease air entry bilaterally; No wheezing  HEART: S1S2+, Regular rate and rhythm; No murmurs  ABDOMEN: Soft, Nontender, Nondistended; Bowel sounds present, s/p Ileostomy   EXTREMITIES:  1+ Peripheral Pulses, No edema  SKIN: No rashes or lesions  NEURO: AAOX3, no focal deficits, no motor r sensory loss  PSYCH: normal mood      LABS:                        9.1    12.7  )-----------( 263      ( 25 Feb 2018 05:43 )             29.1     02-24    136  |  98  |  15.0  ----------------------------<  153<H>  4.2   |  27.0  |  0.34<L>    Ca    8.3<L>      24 Feb 2018 05:54    TPro  6.1<L>  /  Alb  2.7<L>  /  TBili  0.4  /  DBili  x   /  AST  34  /  ALT  57<H>  /  AlkPhos  131<H>  02-24            I&O's Summary    24 Feb 2018 07:01  -  25 Feb 2018 07:00  --------------------------------------------------------  IN: 200 mL / OUT: 1725 mL / NET: -1525 mL        MEDICATIONS  (STANDING):  aspirin enteric coated 81 milliGRAM(s) Oral daily  atorvastatin 40 milliGRAM(s) Oral at bedtime  benzonatate 100 milliGRAM(s) Oral every 6 hours  cefepime  IVPB 2000 milliGRAM(s) IV Intermittent every 12 hours  clopidogrel Tablet 75 milliGRAM(s) Oral daily  diltiazem    Tablet 120 milliGRAM(s) Oral every 8 hours  enoxaparin Injectable 40 milliGRAM(s) SubCutaneous every 24 hours  fluticasone propionate/ salmeterol 250-50 MICROgram(s) Diskus 1 Dose(s) Inhalation two times a day  levothyroxine 50 MICROGram(s) Oral daily  loratadine 10 milliGRAM(s) Oral daily  Mucinex DM 1200mg/60mg Tablet 1 Tablet(s) 1 Tablet(s) Oral every 12 hours  multivitamin 1 Tablet(s) Oral daily  pantoprazole    Tablet 40 milliGRAM(s) Oral before breakfast  predniSONE   Tablet 20 milliGRAM(s) Oral daily  saccharomyces boulardii 250 milliGRAM(s) Oral two times a day  sodium chloride 0.9% lock flush 3 milliLiter(s) IV Push every 8 hours  tamsulosin 0.4 milliGRAM(s) Oral at bedtime  tiotropium 18 MICROgram(s) Capsule 1 Capsule(s) Inhalation daily  vancomycin  IVPB 1000 milliGRAM(s) IV Intermittent every 12 hours    MEDICATIONS  (PRN):  acetaminophen   Tablet 650 milliGRAM(s) Oral every 6 hours PRN For Temp greater than 38.5 C (101.3 F)  levalbuterol Inhalation 0.63 milliGRAM(s) Inhalation every 4 hours PRN Shortness of Breath  sodium chloride 0.65% Nasal 1 Spray(s) Both Nostrils every 4 hours PRN Nasal Congestion FRANDY BAUTISTA    [id number]    67y      Male    Patient is a 67y old  Male who presents with a chief complaint of Came for rehab (17 Feb 2018 04:08)      INTERVAL HPI/OVERNIGHT EVENTS:    Patient feels improvement of SOB and cough, denies, fever, chills, chest pain, nausea and vomiting, he is able to participate with PT, his WBCs are trending down again, got CXR showed worsening infiltrates, CT chest done showed worsening infiltrates, vancomycin added along with continue Cefepime, C dif came negative, he denies have diarrhea, participating with PT well.     REVIEW OF SYSTEMS:    CONSTITUTIONAL: No fever, has fatigue  RESPIRATORY: cough and shortness is better.    CARDIOVASCULAR: No chest pain, palpitations  GASTROINTESTINAL: No abdominal, No nausea, vomiting  NEUROLOGICAL: No headaches,  loss of strength.  MISCELLANEOUS: No joint swelling or pain        Vital Signs Last 24 Hrs  T(C): 36.6 (25 Feb 2018 07:42), Max: 36.8 (25 Feb 2018 05:12)  T(F): 97.9 (25 Feb 2018 07:42), Max: 98.3 (25 Feb 2018 05:12)  HR: 93 (25 Feb 2018 07:42) (93 - 104)  BP: 102/60 (25 Feb 2018 07:42) (97/67 - 110/60)  RR: 16 (25 Feb 2018 07:42) (16 - 18)  SpO2: 98% (25 Feb 2018 07:42) (94% - 98%)    PHYSICAL EXAM:    GENERAL: Elderly male thin built male looking comfortable    HEENT: PERRL, +EOMI  NECK: soft, Supple, No JVD  CHEST/LUNG: Decrease air entry bilaterally; No wheezing  HEART: S1S2+, Regular rate and rhythm; No murmurs  ABDOMEN: Soft, Nontender, Nondistended; Bowel sounds present, s/p Ileostomy   EXTREMITIES:  1+ Peripheral Pulses, No edema  SKIN: No rashes or lesions  NEURO: AAOX3, no focal deficits, no motor r sensory loss  PSYCH: normal mood      LABS:                        9.1    12.7  )-----------( 263      ( 25 Feb 2018 05:43 )             29.1     02-24    136  |  98  |  15.0  ----------------------------<  153<H>  4.2   |  27.0  |  0.34<L>    Ca    8.3<L>      24 Feb 2018 05:54    TPro  6.1<L>  /  Alb  2.7<L>  /  TBili  0.4  /  DBili  x   /  AST  34  /  ALT  57<H>  /  AlkPhos  131<H>  02-24            I&O's Summary    24 Feb 2018 07:01  -  25 Feb 2018 07:00  --------------------------------------------------------  IN: 200 mL / OUT: 1725 mL / NET: -1525 mL        MEDICATIONS  (STANDING):  aspirin enteric coated 81 milliGRAM(s) Oral daily  atorvastatin 40 milliGRAM(s) Oral at bedtime  benzonatate 100 milliGRAM(s) Oral every 6 hours  cefepime  IVPB 2000 milliGRAM(s) IV Intermittent every 12 hours  clopidogrel Tablet 75 milliGRAM(s) Oral daily  diltiazem    Tablet 120 milliGRAM(s) Oral every 8 hours  enoxaparin Injectable 40 milliGRAM(s) SubCutaneous every 24 hours  fluticasone propionate/ salmeterol 250-50 MICROgram(s) Diskus 1 Dose(s) Inhalation two times a day  levothyroxine 50 MICROGram(s) Oral daily  loratadine 10 milliGRAM(s) Oral daily  Mucinex DM 1200mg/60mg Tablet 1 Tablet(s) 1 Tablet(s) Oral every 12 hours  multivitamin 1 Tablet(s) Oral daily  pantoprazole    Tablet 40 milliGRAM(s) Oral before breakfast  predniSONE   Tablet 20 milliGRAM(s) Oral daily  saccharomyces boulardii 250 milliGRAM(s) Oral two times a day  sodium chloride 0.9% lock flush 3 milliLiter(s) IV Push every 8 hours  tamsulosin 0.4 milliGRAM(s) Oral at bedtime  tiotropium 18 MICROgram(s) Capsule 1 Capsule(s) Inhalation daily  vancomycin  IVPB 1000 milliGRAM(s) IV Intermittent every 12 hours    MEDICATIONS  (PRN):  acetaminophen   Tablet 650 milliGRAM(s) Oral every 6 hours PRN For Temp greater than 38.5 C (101.3 F)  levalbuterol Inhalation 0.63 milliGRAM(s) Inhalation every 4 hours PRN Shortness of Breath  sodium chloride 0.65% Nasal 1 Spray(s) Both Nostrils every 4 hours PRN Nasal Congestion

## 2018-02-25 NOTE — PROGRESS NOTE ADULT - ASSESSMENT
66 y/o male with HCAP, Sepsis, Influenza A, Hypoxia, Hx. of SVT, Advanced COPD/ILD, DM-2, CVA old, Hydroceles, DM-2, Hypothyroid, 66 y/o male with HCAP, Sepsis, Influenza A, Hypoxia, Hx. of SVT, Advanced COPD/ILD, DM-2, CVA old, Hydroceles, DM-2, Hypothyroid.   Patient was admitted under medicine under impression of  HCAP (healthcare-associated pneumonia), patient has been started on IV fluids, blood and sputum cultures obtained and has been started on Cefepime, blood cultures came negative, continued 02 as he uses 4 liters at home, spirometer, Chest PT, ID consult appreciated, repeat sputum cultures is growing gram +ve cocci in clusters, tapering steroids down, incentive spirometry, his WBCs were trending up, got CXR showed worsening infiltrates, discussed with ID recommended adding vancomycin, continue with Cefepime and repeated Chest CT done showed worsening infiltrates, blood cultures repeated, since we added Vancomycine wbcs are trending down, patient was also found to have Flu infection, he has been provided with supportive care, Tamiflu course completed, Tylenol, Droplet isolation d/feliz as he has completed treatment, Sepsis resolved over the course, d/feliz IV fluids.   Chronic obstructive pulmonary disease, No wheezing on exam, cont. Xopenex, spiriva, oxygen, spirometer, will erin down steroids slowly 10mg for couple of days then d/c   Patient has Hx of Afib/ SInus tachycardia: Multifactorial, patient HR up in setting of COPD exacerbation, patient has been followed by cardiology during the previous admission, his Cardizem changed to short acting one because of small bowel absorption issues, will continue to monitor, repeat TTE done showed Normal global left ventricular systolic function,  Left ventricular ejection fraction, by visual estimation, is 60 to 65%, Moderately enlarged right ventricle, Estimated pulmonary artery systolic pressure is 44.7 mmHg assuming a right atrial pressure of 3 mmHg, which is consistent with mild pulmonary hypertension, There is no evidence of pericardial effusion, not on anticoagulation, heart rate is in 70s now.

## 2018-02-25 NOTE — PROGRESS NOTE ADULT - ATTENDING COMMENTS
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    Afib/ SInus tachycardia: Multifactorial, patient HR up in setting of COPD exacerbation, patient has been followed by cardiology during the previous admission, his Cardizem changed to short acting one because of small bowel absorption issues, will continue to monitor, repeat TTE done showed Normal global left ventricular systolic function,  Left ventricular ejection fraction, by visual estimation, is 60 to 65%, Moderately enlarged right ventricle, Estimated pulmonary artery systolic pressure is 44.7 mmHg assuming a right atrial pressure of 3 mmHg, which is consistent with mild pulmonary hypertension, There is no evidence of pericardial effusion, not on anticoagulation, heart rate is in 70s now.     Leucocytosis: Patient's WBC count was trending up, he denies having fever, chills, his respiratory status has been stable, no difficulty urination, procalcitonin is up again, sent Blood cultures, got CXR showed worsening infiltrates, discussed with ID recommended adding vancomycin, continue with Cefepime and repeat Chest CT showed worsening infiltrates, C dif sent came negative, wbcs are trending down.     DVT Prophylaxis - Lovenox    Dispo: Rehab working with him and will see if he is going for acute rehab or home.

## 2018-02-25 NOTE — PROGRESS NOTE ADULT - PROBLEM SELECTOR PLAN 1
Patient is on Cefepime, blood cultures negative, continued 02 as he uses 4 liters at home, spirometer, Chest PT, Ambulation, DVT-P, fall/aspiration precaution, ID consult appreciated, repeat sputum cultures is growing gram +ve cocci in clusters, tapering steroids down, incentive spirometry, his WBCs were trending up, got CXR showed worsening infiltrates, discussed with ID recommended adding vancomycin, continue with Cefepime and repeated Chest CT done showed worsening infiltrates, blood cultures repeated, since we added Vancomycine wbcs are trending down, will monitor CBC.

## 2018-02-26 DIAGNOSIS — J96.01 ACUTE RESPIRATORY FAILURE WITH HYPOXIA: ICD-10-CM

## 2018-02-26 LAB
HCT VFR BLD CALC: 32.6 % — LOW (ref 42–52)
HGB BLD-MCNC: 9.8 G/DL — LOW (ref 14–18)
MCHC RBC-ENTMCNC: 27 PG — SIGNIFICANT CHANGE UP (ref 27–31)
MCHC RBC-ENTMCNC: 30.1 G/DL — LOW (ref 32–36)
MCV RBC AUTO: 89.8 FL — SIGNIFICANT CHANGE UP (ref 80–94)
PLATELET # BLD AUTO: 302 K/UL — SIGNIFICANT CHANGE UP (ref 150–400)
RBC # BLD: 3.63 M/UL — LOW (ref 4.6–6.2)
RBC # FLD: 17.7 % — HIGH (ref 11–15.6)
WBC # BLD: 13 K/UL — HIGH (ref 4.8–10.8)
WBC # FLD AUTO: 13 K/UL — HIGH (ref 4.8–10.8)

## 2018-02-26 PROCEDURE — 99232 SBSQ HOSP IP/OBS MODERATE 35: CPT

## 2018-02-26 RX ADMIN — LEVALBUTEROL 0.63 MILLIGRAM(S): 1.25 SOLUTION, CONCENTRATE RESPIRATORY (INHALATION) at 02:58

## 2018-02-26 RX ADMIN — SODIUM CHLORIDE 3 MILLILITER(S): 9 INJECTION INTRAMUSCULAR; INTRAVENOUS; SUBCUTANEOUS at 05:53

## 2018-02-26 RX ADMIN — ATORVASTATIN CALCIUM 40 MILLIGRAM(S): 80 TABLET, FILM COATED ORAL at 21:32

## 2018-02-26 RX ADMIN — Medication 250 MILLIGRAM(S): at 18:20

## 2018-02-26 RX ADMIN — Medication 100 MILLIGRAM(S): at 05:50

## 2018-02-26 RX ADMIN — CLOPIDOGREL BISULFATE 75 MILLIGRAM(S): 75 TABLET, FILM COATED ORAL at 11:56

## 2018-02-26 RX ADMIN — Medication 0.25 MILLIGRAM(S): at 11:56

## 2018-02-26 RX ADMIN — CEFEPIME 100 MILLIGRAM(S): 1 INJECTION, POWDER, FOR SOLUTION INTRAMUSCULAR; INTRAVENOUS at 18:20

## 2018-02-26 RX ADMIN — TAMSULOSIN HYDROCHLORIDE 0.4 MILLIGRAM(S): 0.4 CAPSULE ORAL at 21:32

## 2018-02-26 RX ADMIN — Medication 250 MILLIGRAM(S): at 05:49

## 2018-02-26 RX ADMIN — Medication 100 MILLIGRAM(S): at 18:20

## 2018-02-26 RX ADMIN — Medication 0.25 MILLIGRAM(S): at 18:20

## 2018-02-26 RX ADMIN — FLUTICASONE PROPIONATE AND SALMETEROL 1 DOSE(S): 50; 250 POWDER ORAL; RESPIRATORY (INHALATION) at 08:03

## 2018-02-26 RX ADMIN — Medication 0.25 MILLIGRAM(S): at 05:51

## 2018-02-26 RX ADMIN — TIOTROPIUM BROMIDE 1 CAPSULE(S): 18 CAPSULE ORAL; RESPIRATORY (INHALATION) at 09:06

## 2018-02-26 RX ADMIN — SODIUM CHLORIDE 3 MILLILITER(S): 9 INJECTION INTRAMUSCULAR; INTRAVENOUS; SUBCUTANEOUS at 14:40

## 2018-02-26 RX ADMIN — Medication 0.25 MILLIGRAM(S): at 23:57

## 2018-02-26 RX ADMIN — Medication 81 MILLIGRAM(S): at 11:56

## 2018-02-26 RX ADMIN — CEFEPIME 100 MILLIGRAM(S): 1 INJECTION, POWDER, FOR SOLUTION INTRAMUSCULAR; INTRAVENOUS at 05:51

## 2018-02-26 RX ADMIN — PANTOPRAZOLE SODIUM 40 MILLIGRAM(S): 20 TABLET, DELAYED RELEASE ORAL at 06:01

## 2018-02-26 RX ADMIN — Medication 100 MILLIGRAM(S): at 23:57

## 2018-02-26 RX ADMIN — LEVALBUTEROL 0.63 MILLIGRAM(S): 1.25 SOLUTION, CONCENTRATE RESPIRATORY (INHALATION) at 15:49

## 2018-02-26 RX ADMIN — LEVALBUTEROL 0.63 MILLIGRAM(S): 1.25 SOLUTION, CONCENTRATE RESPIRATORY (INHALATION) at 21:10

## 2018-02-26 RX ADMIN — Medication 100 MILLIGRAM(S): at 11:56

## 2018-02-26 RX ADMIN — FLUTICASONE PROPIONATE AND SALMETEROL 1 DOSE(S): 50; 250 POWDER ORAL; RESPIRATORY (INHALATION) at 21:32

## 2018-02-26 RX ADMIN — Medication 1 TABLET(S): at 11:56

## 2018-02-26 RX ADMIN — Medication 50 MICROGRAM(S): at 05:49

## 2018-02-26 RX ADMIN — LEVALBUTEROL 0.63 MILLIGRAM(S): 1.25 SOLUTION, CONCENTRATE RESPIRATORY (INHALATION) at 09:06

## 2018-02-26 RX ADMIN — Medication 10 MILLIGRAM(S): at 06:01

## 2018-02-26 RX ADMIN — Medication 250 MILLIGRAM(S): at 05:51

## 2018-02-26 RX ADMIN — SODIUM CHLORIDE 3 MILLILITER(S): 9 INJECTION INTRAMUSCULAR; INTRAVENOUS; SUBCUTANEOUS at 21:19

## 2018-02-26 NOTE — PROGRESS NOTE ADULT - SUBJECTIVE AND OBJECTIVE BOX
FRANDY BAUTISTA    [id number]    67y      Male    INTERVAL HPI/OVERNIGHT EVENTS:  66 y/o male with HCAP, Sepsis, Influenza A, Hypoxia, Hx. of SVT, Advanced COPD/ILD, DM-2, CVA old, Hydroceles, DM-2, Hypothyroid.   Patient was admitted under medicine under impression of  HCAP    today pt denies any pain, improved cough and sob, no chest pain, no new events overnight    REVIEW OF SYSTEMS:    CONSTITUTIONAL: No fever, weight loss, or fatigue  RESPIRATORY: min cough, no wheezing, hemoptysis; min shortness of breath  CARDIOVASCULAR: No chest pain, palpitations      Vital Signs Last 24 Hrs  T(C): 36.5 (26 Feb 2018 10:02), Max: 36.9 (25 Feb 2018 16:10)  T(F): 97.7 (26 Feb 2018 10:02), Max: 98.4 (25 Feb 2018 16:10)  HR: 88 (26 Feb 2018 10:02) (88 - 120)  BP: 110/54 (26 Feb 2018 10:02) (110/54 - 144/71)  BP(mean): --  RR: 14 (26 Feb 2018 10:02) (14 - 18)  SpO2: 97% (26 Feb 2018 10:02) (93% - 97%)    PHYSICAL EXAM:  GENERAL: Elderly male thin built male looking comfortable    CHEST/LUNG: Decrease air entry bilaterally; No wheezing  HEART: S1S2+, Regular rate and rhythm; No murmurs  ABDOMEN: Soft, Nontender, Nondistended; Bowel sounds present, s/p Ileostomy   EXTREMITIES:  1+ Peripheral Pulses, No edema      LABS:                        9.8    13.0  )-----------( 302      ( 26 Feb 2018 07:17 )             32.6         MEDICATIONS  (STANDING):  ALPRAZolam 0.25 milliGRAM(s) Oral every 6 hours  aspirin enteric coated 81 milliGRAM(s) Oral daily  atorvastatin 40 milliGRAM(s) Oral at bedtime  benzonatate 100 milliGRAM(s) Oral every 6 hours  cefepime  IVPB 2000 milliGRAM(s) IV Intermittent every 12 hours  clopidogrel Tablet 75 milliGRAM(s) Oral daily  diltiazem    Tablet 120 milliGRAM(s) Oral every 8 hours  enoxaparin Injectable 40 milliGRAM(s) SubCutaneous every 24 hours  fluticasone propionate/ salmeterol 250-50 MICROgram(s) Diskus 1 Dose(s) Inhalation two times a day  levalbuterol Inhalation 0.63 milliGRAM(s) Inhalation every 6 hours  levothyroxine 50 MICROGram(s) Oral daily  loratadine 10 milliGRAM(s) Oral daily  Mucinex DM 1200mg/60mg Tablet 1 Tablet(s) 1 Tablet(s) Oral every 12 hours  multivitamin 1 Tablet(s) Oral daily  pantoprazole    Tablet 40 milliGRAM(s) Oral before breakfast  predniSONE   Tablet 10 milliGRAM(s) Oral daily  saccharomyces boulardii 250 milliGRAM(s) Oral two times a day  sodium chloride 0.9% lock flush 3 milliLiter(s) IV Push every 8 hours  tamsulosin 0.4 milliGRAM(s) Oral at bedtime  tiotropium 18 MICROgram(s) Capsule 1 Capsule(s) Inhalation daily    MEDICATIONS  (PRN):  acetaminophen   Tablet 650 milliGRAM(s) Oral every 6 hours PRN For Temp greater than 38.5 C (101.3 F)  sodium chloride 0.65% Nasal 1 Spray(s) Both Nostrils every 4 hours PRN Nasal Congestion      RADIOLOGY & ADDITIONAL TESTS:  reveiwed

## 2018-02-26 NOTE — CHART NOTE - NSCHARTNOTEFT_GEN_A_CORE
Pt requesting to be placed on regular diet. Pt c/o diabetic foods and claims he does not have diabetes.  Pending diet order placed.  Please see diet order pending verification.    thank you    Kala Ivey RD, CDN

## 2018-02-26 NOTE — PROGRESS NOTE ADULT - ASSESSMENT
68y/o  Male with h/o ILD/Advanced COPD s/p right lung reduction in 2010 on home 02, Hypothyroidism, CVA, DM-2, B/L Hydroceles, and nephrolithiasis recently was admitted to the hospital for a complicated hospital stay discharged on 2/15/18 after being treated for COPD exacerbation, Pneumonia with Levaquin, complicated by SBO requiring emergent surgery now s/p jejunostomy closure and s/p Ileostomy, prolonged post op ileus.  Now here with Influenza A and multifocal pneumonia on CT   ON CEFEPIME completed TAMIFLU  APPEARS IMPROVED     ON IV ABX CEFEPIME     WBC BACK DOWN       REPEAT BLOOD CX ARE DRAWN PT  DOES NOT APPEAR TOXIC      WILL RECOMMEND TO COMPLETE CEFEPIME THROUGH 3/1   WILL FOLLOW UP

## 2018-02-26 NOTE — PROGRESS NOTE ADULT - ATTENDING COMMENTS
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    14) Afib/ SInus tachycardia:   Multifactorial, patient HR up in setting of COPD exacerbation, patient has been followed by cardiology during the previous admission, his Cardizem changed to short acting one because of small bowel absorption issues, will continue to monitor, repeat TTE done showed Normal global left ventricular systolic function,  Left ventricular ejection fraction, by visual estimation, is 60 to 65%, Moderately enlarged right ventricle, Estimated pulmonary artery systolic pressure is 44.7 mmHg assuming a right atrial pressure of 3 mmHg, which is consistent with mild pulmonary hypertension, There is no evidence of pericardial effusion, not on anticoagulation, heart rate is in 70s now.     DVT Prophylaxis   - Lovenox    Dispo: Acute Rehab once medically stable

## 2018-02-26 NOTE — PROGRESS NOTE ADULT - PROBLEM SELECTOR PLAN 1
with Pseudomonas sputum positive  - on Cefepime, D/C vanco per ID  - blood cultures negative so far  - continued 02 as he uses 4 liters at home, spirometer, Chest PT, Ambulation  - fall/aspiration precaution  - ID consult appreciated

## 2018-02-26 NOTE — PROGRESS NOTE ADULT - SUBJECTIVE AND OBJECTIVE BOX
[facility]  INFECTIOUS DISEASES AND INTERNAL MEDICINE at [place]  =======================================================  Kevin Manriquez MD  Diplomates American Board of Internal Medicine and Infectious Diseases  =======================================================    FRANDY BAUTISTA [id number]    Follow up: Pneumonia    Still with fatigue and SOB    ON CEFEPIME   PT NON TOXIC    Allergies:  codeine (Unknown)  DO NOT SEND ORANGES (Unknown)  no veges (Unknown)  penicillin (Unknown)  Symbicort (Short breath)      Antibiotics:  cefepime  IVPB 2000 milliGRAM(s) IV Intermittent every 12 hours  oseltamivir 75 milliGRAM(s) Oral two times a day        REVIEW OF SYSTEMS:  CONSTITUTIONAL:  No Fever or chills  HEENT:  No diplopia or blurred vision.  No earache, sore throat or runny nose.  CARDIOVASCULAR:  No pressure, squeezing, strangling, tightness, heaviness or aching about the chest, neck, axilla or epigastrium.  RESPIRATORY:  No cough, shortness of breath  GASTROINTESTINAL:  No nausea, vomiting or diarrhea.  GENITOURINARY:  No dysuria, frequency or urgency.  MUSCULOSKELETAL:  no joint aches, no muscle pain  SKIN:  No change in skin, hair or nails.  NEUROLOGIC:  No paresthesias, fasciculations  PSYCHIATRIC:  No disorder of thought or mood.  ENDOCRINE:  No heat or cold intolerance  HEMATOLOGICAL:  No easy bruising or bleeding.       Physical Exam:   Vital Signs Last 24 Hrs  T(C): 36.1 (26 Feb 2018 16:41), Max: 36.5 (25 Feb 2018 23:20)  T(F): 96.9 (26 Feb 2018 16:41), Max: 97.7 (25 Feb 2018 23:20)  HR: 95 (26 Feb 2018 16:41) (88 - 96)  BP: 124/64 (26 Feb 2018 16:41) (110/54 - 144/71)  BP(mean): --  RR: 19 (26 Feb 2018 16:41) (14 - 19)  SpO2: 91% (26 Feb 2018 16:41) (91% - 98%)      GEN: NAD, + Fatigue  HEENT: normocephalic and atraumatic. EOMI. PERRL.    NECK: Supple.   LUNGS: Scattered rhonchi.   HEART: Regular rate and rhythm  ABDOMEN: Soft, nontender, and nondistended.  Positive bowel sounds.  + Ileostomy   : No CVA tenderness  EXTREMITIES: Without any edema.  MSK: No joint swelling  NEUROLOGIC: + weakness B/L, alert and oriented.   PSYCHIATRIC: Appropriate affect .  SKIN: No rash      Labs:                                       9.8    13.0  )-----------( 302      ( 26 Feb 2018 07:17 )             32.6                                   RECENT CULTURES:  02-16 @ 16:35 .Urine Clean Catch (Midstream)     Culture in progress      02-16 @ 15:59    RVP  Detected - Influenza A      02-10 @ 07:22 .Blood Blood-Peripheral     No growth at 5 days.      02-09 @ 15:52 .Blood Blood-Peripheral     No growth at 5 days.      02-09 @ 15:51 .Blood Blood-Peripheral     No growth at 5 days.      02-09 @ 14:05 .Sputum Sputum Escherichia coli  Pseudomonas aeruginosa    Moderate Escherichia coli  Moderate Pseudomonas aeruginosa  Few Routine respiratory swetha present  Few White blood cells  Moderate Gram Positive Cocci in Pairs and Chains      EXAM:  CT CHEST                        PROCEDURE DATE:  02/16/2018    INTERPRETATION:  HISTORY: Shortness of breath.  Date and Time of Exam: 2/16/2018 7:02 PM  TECHNIQUE:  Sections were obtained from the apices to the diaphragm   without intravenous contrast.  COMPARISON EXAMINATION:   11/29/2017.  FINDINGS: No evidence of mediastinal or hilar lymphadenopathy. No   evidence of pleural or pericardial effusion. No evidence of axillary   adenopathy. Extensive coronary artery calcifications are noted.  Emphysematous changes and hyperexpansion of the left lower lobe unchanged   from the prior study. There is an infiltrate in the left lower lobe which   is a new finding since 11/29/2017.  Small right upper lobe infiltrate, a new finding since the prior study.   There is a right lower lobe infiltrate, similar to the infiltrate noted   on the prior examination.  No significant osseous abnormality.    IMPRESSION:     Left lower lobe and right upper lobe infiltrates, new since 11/29/2017.  Right lower lobe infiltrate without significant change from the prior study.

## 2018-02-26 NOTE — PROGRESS NOTE ADULT - ASSESSMENT
66 y/o male with HCAP, Sepsis, Influenza A, Hypoxia, Hx. of SVT, Advanced COPD/ILD, DM-2, CVA old, Hydroceles, DM-2, Hypothyroid.   Patient was admitted for Sepsis due to HCAP, Flu+ and COPD

## 2018-02-26 NOTE — PROGRESS NOTE ADULT - SUBJECTIVE AND OBJECTIVE BOX
Patient perseverating about how he had to come back to the hospital.  Denies pain. Slept well.   Eating well.  Frustrated.    FUNCTIONAL PROGRESS  2/23  Gait Training  Gait Training: minimum assist (75% patient effort);  1 person assist;  full weight-bearing   rolling walker;  50 feet;  25 feet  Gait Analysis: 3-point gait   decreased donta;  decreased step length;  decreased strength;  impaired balance;  25 feet;  50 feet;  rolling walker  Type of Rest Type of Rest: standing  Duration of Rest Duration of Rest: approx 2mins x 2     REVIEW OF SYSTEMS  Constitutional - No fever,  +fatigue  Neurological - +loss of strength  Psychiatric - +anxiety    VITALS  T(C): 36.5 (02-26-18 @ 05:00), Max: 36.9 (02-25-18 @ 16:10)  HR: 88 (02-26-18 @ 05:00) (88 - 120)  BP: 144/71 (02-26-18 @ 05:00) (110/60 - 144/71)  RR: 18 (02-26-18 @ 05:00) (18 - 18)  SpO2: 94% (02-26-18 @ 05:00) (93% - 94%)  Wt(kg): --    MEDICATIONS   acetaminophen   Tablet 650 milliGRAM(s) every 6 hours PRN  ALPRAZolam 0.25 milliGRAM(s) every 6 hours  aspirin enteric coated 81 milliGRAM(s) daily  atorvastatin 40 milliGRAM(s) at bedtime  benzonatate 100 milliGRAM(s) every 6 hours  cefepime  IVPB 2000 milliGRAM(s) every 12 hours  clopidogrel Tablet 75 milliGRAM(s) daily  diltiazem    Tablet 120 milliGRAM(s) every 8 hours  enoxaparin Injectable 40 milliGRAM(s) every 24 hours  fluticasone propionate/ salmeterol 250-50 MICROgram(s) Diskus 1 Dose(s) two times a day  levalbuterol Inhalation 0.63 milliGRAM(s) every 6 hours  levothyroxine 50 MICROGram(s) daily  loratadine 10 milliGRAM(s) daily  Mucinex DM 1200mg/60mg Tablet 1 Tablet(s) 1 Tablet(s) every 12 hours  multivitamin 1 Tablet(s) daily  pantoprazole    Tablet 40 milliGRAM(s) before breakfast  predniSONE   Tablet 10 milliGRAM(s) daily  saccharomyces boulardii 250 milliGRAM(s) two times a day  sodium chloride 0.65% Nasal 1 Spray(s) every 4 hours PRN  sodium chloride 0.9% lock flush 3 milliLiter(s) every 8 hours  tamsulosin 0.4 milliGRAM(s) at bedtime  tiotropium 18 MICROgram(s) Capsule 1 Capsule(s) daily  vancomycin  IVPB 1000 milliGRAM(s) every 12 hours      RECENT LABS/IMAGING  CBC Full  -  ( 26 Feb 2018 07:17 )  WBC Count : 13.0 K/uL  Hemoglobin : 9.8 g/dL  Hematocrit : 32.6 %  Platelet Count - Automated : 302 K/uL  Mean Cell Volume : 89.8 fl  Mean Cell Hemoglobin : 27.0 pg  Mean Cell Hemoglobin Concentration : 30.1 g/dL  Auto Neutrophil # : x  Auto Lymphocyte # : x  Auto Monocyte # : x  Auto Eosinophil # : x  Auto Basophil # : x  Auto Neutrophil % : x  Auto Lymphocyte % : x  Auto Monocyte % : x  Auto Eosinophil % : x  Auto Basophil % : x    -------------------------------  PHYSICAL EXAM  Constitutional - NAD, Comfortable  Chest - No distress, symmetrical chest expansion  Neurologic Exam -                    Cognitive - AAOx4     Motor - No focal deficits     Sensory - Intact to LT  Psychiatric - Mood depressed/anxious about recovery  ----------------------------------------------------------------------------------------  ASSESSMENT/PLAN - 67M with multiple comorbidities admitted with sepsis 2/2 flu/multifocal PNA with functional impairments.   ID - Cefepime  CVA - ASA, Plavix, Lipitor  COPD - O2, Prednisone   DVT PPX - Lovenox  Mood - Xanax - consider more long term use of medication as opposed to benzo   Rehab -Medically being optimized. Patient unable to achieve goals for DC home. Recommend ACUTE inpatient rehabilitation for the functional deficits consisting of 3 hours of therapy/day & 24 hour RN/daily PMR physician for comorbid medical management. Will continue to follow for ongoing rehab needs and recommendations.     Continue bedside therapy as well as OOB throughout the day with mobilization throughout the day with staff to maintain cardiopulmonary function and prevention of secondary complications related to debility.

## 2018-02-27 ENCOUNTER — TRANSCRIPTION ENCOUNTER (OUTPATIENT)
Age: 68
End: 2018-02-27

## 2018-02-27 LAB
ANION GAP SERPL CALC-SCNC: 13 MMOL/L — SIGNIFICANT CHANGE UP (ref 5–17)
BUN SERPL-MCNC: 17 MG/DL — SIGNIFICANT CHANGE UP (ref 8–20)
CALCIUM SERPL-MCNC: 8.5 MG/DL — LOW (ref 8.6–10.2)
CHLORIDE SERPL-SCNC: 99 MMOL/L — SIGNIFICANT CHANGE UP (ref 98–107)
CO2 SERPL-SCNC: 26 MMOL/L — SIGNIFICANT CHANGE UP (ref 22–29)
CREAT SERPL-MCNC: 0.35 MG/DL — LOW (ref 0.5–1.3)
GLUCOSE SERPL-MCNC: 151 MG/DL — HIGH (ref 70–115)
HCT VFR BLD CALC: 32.7 % — LOW (ref 42–52)
HGB BLD-MCNC: 9.8 G/DL — LOW (ref 14–18)
MAGNESIUM SERPL-MCNC: 1.4 MG/DL — LOW (ref 1.6–2.6)
MCHC RBC-ENTMCNC: 26.6 PG — LOW (ref 27–31)
MCHC RBC-ENTMCNC: 30 G/DL — LOW (ref 32–36)
MCV RBC AUTO: 88.9 FL — SIGNIFICANT CHANGE UP (ref 80–94)
PLATELET # BLD AUTO: 238 K/UL — SIGNIFICANT CHANGE UP (ref 150–400)
POTASSIUM SERPL-MCNC: 4.4 MMOL/L — SIGNIFICANT CHANGE UP (ref 3.5–5.3)
POTASSIUM SERPL-SCNC: 4.4 MMOL/L — SIGNIFICANT CHANGE UP (ref 3.5–5.3)
RBC # BLD: 3.68 M/UL — LOW (ref 4.6–6.2)
RBC # FLD: 17.5 % — HIGH (ref 11–15.6)
SODIUM SERPL-SCNC: 138 MMOL/L — SIGNIFICANT CHANGE UP (ref 135–145)
WBC # BLD: 10.7 K/UL — SIGNIFICANT CHANGE UP (ref 4.8–10.8)
WBC # FLD AUTO: 10.7 K/UL — SIGNIFICANT CHANGE UP (ref 4.8–10.8)

## 2018-02-27 PROCEDURE — 99232 SBSQ HOSP IP/OBS MODERATE 35: CPT

## 2018-02-27 RX ORDER — NYSTATIN CREAM 100000 [USP'U]/G
1 CREAM TOPICAL
Qty: 0 | Refills: 0 | Status: DISCONTINUED | OUTPATIENT
Start: 2018-02-27 | End: 2018-02-28

## 2018-02-27 RX ORDER — SACCHAROMYCES BOULARDII 250 MG
1 POWDER IN PACKET (EA) ORAL
Qty: 0 | Refills: 0 | COMMUNITY
Start: 2018-02-27

## 2018-02-27 RX ORDER — CEFEPIME 1 G/1
2 INJECTION, POWDER, FOR SOLUTION INTRAMUSCULAR; INTRAVENOUS
Qty: 0 | Refills: 0 | COMMUNITY
Start: 2018-02-27

## 2018-02-27 RX ADMIN — Medication 1 TABLET(S): at 13:00

## 2018-02-27 RX ADMIN — Medication 0.25 MILLIGRAM(S): at 17:29

## 2018-02-27 RX ADMIN — LEVALBUTEROL 0.63 MILLIGRAM(S): 1.25 SOLUTION, CONCENTRATE RESPIRATORY (INHALATION) at 03:48

## 2018-02-27 RX ADMIN — LEVALBUTEROL 0.63 MILLIGRAM(S): 1.25 SOLUTION, CONCENTRATE RESPIRATORY (INHALATION) at 14:23

## 2018-02-27 RX ADMIN — FLUTICASONE PROPIONATE AND SALMETEROL 1 DOSE(S): 50; 250 POWDER ORAL; RESPIRATORY (INHALATION) at 08:41

## 2018-02-27 RX ADMIN — Medication 50 MICROGRAM(S): at 06:16

## 2018-02-27 RX ADMIN — SODIUM CHLORIDE 3 MILLILITER(S): 9 INJECTION INTRAMUSCULAR; INTRAVENOUS; SUBCUTANEOUS at 06:07

## 2018-02-27 RX ADMIN — SODIUM CHLORIDE 3 MILLILITER(S): 9 INJECTION INTRAMUSCULAR; INTRAVENOUS; SUBCUTANEOUS at 21:48

## 2018-02-27 RX ADMIN — TAMSULOSIN HYDROCHLORIDE 0.4 MILLIGRAM(S): 0.4 CAPSULE ORAL at 21:54

## 2018-02-27 RX ADMIN — Medication 250 MILLIGRAM(S): at 17:29

## 2018-02-27 RX ADMIN — NYSTATIN CREAM 1 APPLICATION(S): 100000 CREAM TOPICAL at 17:29

## 2018-02-27 RX ADMIN — CLOPIDOGREL BISULFATE 75 MILLIGRAM(S): 75 TABLET, FILM COATED ORAL at 13:00

## 2018-02-27 RX ADMIN — Medication 0.25 MILLIGRAM(S): at 13:00

## 2018-02-27 RX ADMIN — CEFEPIME 100 MILLIGRAM(S): 1 INJECTION, POWDER, FOR SOLUTION INTRAMUSCULAR; INTRAVENOUS at 17:29

## 2018-02-27 RX ADMIN — Medication 250 MILLIGRAM(S): at 06:16

## 2018-02-27 RX ADMIN — SODIUM CHLORIDE 3 MILLILITER(S): 9 INJECTION INTRAMUSCULAR; INTRAVENOUS; SUBCUTANEOUS at 12:55

## 2018-02-27 RX ADMIN — LORATADINE 10 MILLIGRAM(S): 10 TABLET ORAL at 12:56

## 2018-02-27 RX ADMIN — TIOTROPIUM BROMIDE 1 CAPSULE(S): 18 CAPSULE ORAL; RESPIRATORY (INHALATION) at 09:09

## 2018-02-27 RX ADMIN — Medication 100 MILLIGRAM(S): at 06:17

## 2018-02-27 RX ADMIN — Medication 81 MILLIGRAM(S): at 13:00

## 2018-02-27 RX ADMIN — CEFEPIME 100 MILLIGRAM(S): 1 INJECTION, POWDER, FOR SOLUTION INTRAMUSCULAR; INTRAVENOUS at 06:17

## 2018-02-27 RX ADMIN — LEVALBUTEROL 0.63 MILLIGRAM(S): 1.25 SOLUTION, CONCENTRATE RESPIRATORY (INHALATION) at 09:07

## 2018-02-27 RX ADMIN — Medication 0.25 MILLIGRAM(S): at 06:19

## 2018-02-27 RX ADMIN — Medication 100 MILLIGRAM(S): at 17:29

## 2018-02-27 RX ADMIN — LEVALBUTEROL 0.63 MILLIGRAM(S): 1.25 SOLUTION, CONCENTRATE RESPIRATORY (INHALATION) at 21:19

## 2018-02-27 RX ADMIN — Medication 10 MILLIGRAM(S): at 06:16

## 2018-02-27 RX ADMIN — FLUTICASONE PROPIONATE AND SALMETEROL 1 DOSE(S): 50; 250 POWDER ORAL; RESPIRATORY (INHALATION) at 21:54

## 2018-02-27 RX ADMIN — PANTOPRAZOLE SODIUM 40 MILLIGRAM(S): 20 TABLET, DELAYED RELEASE ORAL at 06:16

## 2018-02-27 RX ADMIN — Medication 100 MILLIGRAM(S): at 13:00

## 2018-02-27 RX ADMIN — ATORVASTATIN CALCIUM 40 MILLIGRAM(S): 80 TABLET, FILM COATED ORAL at 21:54

## 2018-02-27 NOTE — PROGRESS NOTE ADULT - PROBLEM SELECTOR PLAN 1
with Pseudomonas sputum positive  - on Cefepime continue unitl 3/1/18, D/C vanco per ID on 2/26/18  - blood cultures negative  - continued 02 as he uses 4 liters at home, spirometer, Chest PT, Ambulation  - fall/aspiration precaution  - ID following

## 2018-02-27 NOTE — CHART NOTE - NSCHARTNOTEFT_GEN_A_CORE
Source: Patient [x ]  Family [x ]   other [ ]    Current Diet: Now on regular diet. Changed from consistent CHO, dash on 2/26    Pt with HCAP, Sepsis, Influenza A, Hypoxia, Hx. of SVT, Advanced COPD/ILD, DM-2, ileostomy      PO intake:  < 50% [ ]   50-75%  [ ]   %  [x ]  other :    Source for PO intake [x ] Patient [ ] family [ ] chart [ ] staff [ ] other    Enteral /Parenteral Nutrition:     Current Weight: no new weights    % Weight Change     Pertinent Medications: MEDICATIONS  (STANDING):  ALPRAZolam 0.25 milliGRAM(s) Oral every 6 hours  aspirin enteric coated 81 milliGRAM(s) Oral daily  atorvastatin 40 milliGRAM(s) Oral at bedtime  benzonatate 100 milliGRAM(s) Oral every 6 hours  cefepime  IVPB 2000 milliGRAM(s) IV Intermittent every 12 hours  clopidogrel Tablet 75 milliGRAM(s) Oral daily  diltiazem    Tablet 120 milliGRAM(s) Oral every 8 hours  enoxaparin Injectable 40 milliGRAM(s) SubCutaneous every 24 hours  fluticasone propionate/ salmeterol 250-50 MICROgram(s) Diskus 1 Dose(s) Inhalation two times a day  levalbuterol Inhalation 0.63 milliGRAM(s) Inhalation every 6 hours  levothyroxine 50 MICROGram(s) Oral daily  loratadine 10 milliGRAM(s) Oral daily  Mucinex DM 1200mg/60mg Tablet 1 Tablet(s) 1 Tablet(s) Oral every 12 hours  multivitamin 1 Tablet(s) Oral daily  pantoprazole    Tablet 40 milliGRAM(s) Oral before breakfast  predniSONE   Tablet 10 milliGRAM(s) Oral daily  saccharomyces boulardii 250 milliGRAM(s) Oral two times a day  sodium chloride 0.9% lock flush 3 milliLiter(s) IV Push every 8 hours  tamsulosin 0.4 milliGRAM(s) Oral at bedtime  tiotropium 18 MICROgram(s) Capsule 1 Capsule(s) Inhalation daily    MEDICATIONS  (PRN):  acetaminophen   Tablet 650 milliGRAM(s) Oral every 6 hours PRN For Temp greater than 38.5 C (101.3 F)  sodium chloride 0.65% Nasal 1 Spray(s) Both Nostrils every 4 hours PRN Nasal Congestion    Pertinent Labs: CBC Full  -  ( 27 Feb 2018 08:04 )  WBC Count : 10.7 K/uL  Hemoglobin : 9.8 g/dL  Hematocrit : 32.7 %  Platelet Count - Automated : 238 K/uL  Mean Cell Volume : 88.9 fl  Mean Cell Hemoglobin : 26.6 pg  Mean Cell Hemoglobin Concentration : 30.0 g/dL  Auto Neutrophil # : x  Auto Lymphocyte # : x  Auto Monocyte # : x  Auto Eosinophil # : x  Auto Basophil # : x  Auto Neutrophil % : x  Auto Lymphocyte % : x  Auto Monocyte % : x  Auto Eosinophil % : x  Auto Basophil % : x    02-27 Na138 mmol/L Glu 151 mg/dL<H> K+ 4.4 mmol/L Cr  0.35 mg/dL<L> BUN 17.0 mg/dL Phos n/a   Alb n/a   PAB n/a             Skin:     Nutrition focused physical exam conducted - found signs of malnutrition [ ]absent [x ]present    Subcutaneous fat loss: [x ] Orbital fat pads region, [x ]Buccal fat region, [ ]Triceps region,  [ x]Ribs region    Muscle wasting: [x ]Temples region, [x ]Clavicle region, [x ]Shoulder region, [ ]Scapula region, [ ]Interosseous region,  [ ]thigh region, [ ]Calf region    Estimated Needs:   [x ] no change since previous assessment  [ ] recalculated:     Current Nutrition Diagnosis:   Pt remains at nutrition risk secondary to chronic severe protein calorie malnutrition (chronic) related to inadequate protein energy intake in setting of COPD, flu, pneumonia, high ileostomy output on last admission (now resolved) as evidenced by pt previously meeting <75% estimated nutrition needs > 1 month,  50# unintentional wt loss x 3 months (33%) and severe muscle wasting (temple, clavicle, shoulder). Pt now on a regular diet tolerating well with a good appetite.  Pt wants to go to rehab.       Recommendations: Continue regular diet. Pt does not want any nutritional supplements at this time.    Monitoring and Evaluation:   [x ] PO intake [ x] Tolerance to diet prescription [X] Weights  [X] Follow up per protocol [X] Labs:

## 2018-02-27 NOTE — PROGRESS NOTE ADULT - SUBJECTIVE AND OBJECTIVE BOX
Patient in chair, wife at bedside.   Discussed [place] denying patient.   Patient is very distressed and wife is as distressed with patient's mood.  Plan for GC.  Continue bedside therapy.     FUNCTIONAL PROGRESS  2/26  Sit-Stand Transfer Training  Transfer Training Sit-to-Stand Transfer: contact guard;  weight-bearing as tolerated   rolling walker  Transfer Training Stand-to-Sit Transfer: contact guard;  weight-bearing as tolerated   rolling walker  Sit-to-Stand Transfer Training Transfer Safety Analysis: decreased balance;  decreased strength;  impaired balance    Gait Training  Gait Training: contact guard;  weight-bearing as tolerated   rolling walker;  50 feet  Gait Analysis: 3-point gait   decreased step length;  decreased stride length;  impaired balance;  decreased strength;  50 feet;  rolling walker  Gait Number of Times:: x 2    REVIEW OF SYSTEMS  Constitutional - No fever,  +fatigue  HEENT - No vertigo, No neck pain  Neurological - No headaches, No memory loss, No loss of strength, No numbness, No tremors  Skin - No rashes, No lesions   Musculoskeletal - No joint pain, No joint swelling, No muscle pain  Psychiatric - No depression, +anxiety    VITALS  T(C): 36.3 (02-27-18 @ 08:37), Max: 36.4 (02-27-18 @ 06:05)  HR: 83 (02-27-18 @ 08:37) (70 - 95)  BP: 108/62 (02-27-18 @ 08:37) (108/62 - 124/64)  RR: 18 (02-27-18 @ 08:37) (16 - 20)  SpO2: 96% (02-27-18 @ 08:37) (91% - 98%)  Wt(kg): --    MEDICATIONS   acetaminophen   Tablet 650 milliGRAM(s) every 6 hours PRN  ALPRAZolam 0.25 milliGRAM(s) every 6 hours  aspirin enteric coated 81 milliGRAM(s) daily  atorvastatin 40 milliGRAM(s) at bedtime  benzonatate 100 milliGRAM(s) every 6 hours  cefepime  IVPB 2000 milliGRAM(s) every 12 hours  clopidogrel Tablet 75 milliGRAM(s) daily  diltiazem    Tablet 120 milliGRAM(s) every 8 hours  enoxaparin Injectable 40 milliGRAM(s) every 24 hours  fluticasone propionate/ salmeterol 250-50 MICROgram(s) Diskus 1 Dose(s) two times a day  levalbuterol Inhalation 0.63 milliGRAM(s) every 6 hours  levothyroxine 50 MICROGram(s) daily  loratadine 10 milliGRAM(s) daily  Mucinex DM 1200mg/60mg Tablet 1 Tablet(s) 1 Tablet(s) every 12 hours  multivitamin 1 Tablet(s) daily  nystatin Powder 1 Application(s) two times a day  pantoprazole    Tablet 40 milliGRAM(s) before breakfast  predniSONE   Tablet 10 milliGRAM(s) daily  saccharomyces boulardii 250 milliGRAM(s) two times a day  sodium chloride 0.65% Nasal 1 Spray(s) every 4 hours PRN  sodium chloride 0.9% lock flush 3 milliLiter(s) every 8 hours  tamsulosin 0.4 milliGRAM(s) at bedtime  tiotropium 18 MICROgram(s) Capsule 1 Capsule(s) daily      RECENT LABS/IMAGING  CBC Full  -  ( 27 Feb 2018 08:04 )  WBC Count : 10.7 K/uL  Hemoglobin : 9.8 g/dL  Hematocrit : 32.7 %  Platelet Count - Automated : 238 K/uL  Mean Cell Volume : 88.9 fl  Mean Cell Hemoglobin : 26.6 pg  Mean Cell Hemoglobin Concentration : 30.0 g/dL  Auto Neutrophil # : x  Auto Lymphocyte # : x  Auto Monocyte # : x  Auto Eosinophil # : x  Auto Basophil # : x  Auto Neutrophil % : x  Auto Lymphocyte % : x  Auto Monocyte % : x  Auto Eosinophil % : x  Auto Basophil % : x    02-27    138  |  99  |  17.0  ----------------------------<  151<H>  4.4   |  26.0  |  0.35<L>    Ca    8.5<L>      27 Feb 2018 08:04  Mg     1.4     02-27      ---------------------  PHYSICAL EXAM  Constitutional - NAD, Comfortable  Chest - No distress, symmetrical chest expansion  Neurologic Exam -                    Cognitive - AAOx4     Motor - No focal deficits     Sensory - Intact to LT  Psychiatric - Mood anxious and irritable  ----------------------------------------------------------------------------------------  ASSESSMENT/PLAN - 67M with multiple comorbidities admitted with sepsis 2/2 flu/multifocal PNA with functional impairments.   ID - Cefepime  CVA - ASA, Plavix, Lipitor  COPD - O2, Prednisone   DVT PPX - Lovenox  Mood - Xanax - consider more long term use of medication as opposed to benzo   Rehab - Recommend ACUTE inpatient rehabilitation for the functional deficits consisting of 3 hours of therapy/day & 24 hour RN/daily PMR physician for comorbid medical management. Will continue to follow for ongoing rehab needs and recommendations.     Continue bedside therapy as well as OOB throughout the day with mobilization throughout the day with staff to maintain cardiopulmonary function and prevention of secondary complications related to debility.     Will sign off

## 2018-02-27 NOTE — DISCHARGE NOTE ADULT - HOSPITAL COURSE
66 y/o male with HCAP, Sepsis, Influenza A, Hypoxia, Hx. of SVT, Advanced COPD/ILD, DM-2, CVA old, Hydroceles, DM-2, Hypothyroid. Patient was admitted under medicine under impression of sepsis with acute hypoxic resp failure copd excerebration due to HCAP and flu +    Problem/Plan - 1 HCAP (healthcare-associated pneumonia).   Plan: with Pseudomonas sputum positive  - on Cefepime continue unitl 3/1/18 per ID  - blood cultures negative  - continued 02 as he uses 4 liters at home, spirometer, Chest PT, Ambulation  - fall/aspiration precaution     Problem/Plan - 2: Acute respiratory failure with hypoxia.    Plan: due to PNA   - cont. supplemental 02  - treatment of pneumonia, plan as above.      Problem/Plan - 3: Chronic obstructive pulmonary disease, unspecified COPD type.    Plan: stable  - cont. Xopenex, Spiriva oxygen, spirometer, erin down steroids slowly.      Problem/Plan - 4: Flu.  Plan: s/p Tamiflu treatment.      Problem/Plan - 5: Sepsis  Plan: due to HCAP: resolved.      Problem/Plan - 6: Hypothyroidism  - Cont. Synthroid.     Problem/Plan - 7: Ileostomy, has currently  Plan: - cont. ostomy care.      Problem/Plan - 8: Type 2 diabetes mellitus with other circulatory complication, with long-term current use of insulin  Plan: - ADA diet, HISS.      Problem/Plan - 9: Hypertension  Plan: - DASH diet, cont. o/p regimen.      Problem/Plan - 10: Cerebrovascular accident (CVA), unspecified mechanism.   Plan; - cont. aspirin, Plavix statin.    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    14) Afib/ SInus tachycardia:   Multifactorial, patient HR up in setting of COPD exacerbation, patient has been followed by cardiology during the previous admission, his Cardizem changed to short acting one because of small bowel absorption issues, will continue to monitor, repeat TTE done showed Normal global left ventricular systolic function,  Left ventricular ejection fraction, by visual estimation, is 60 to 65%, Moderately enlarged right ventricle, Estimated pulmonary artery systolic pressure is 44.7 mmHg assuming a right atrial pressure of 3 mmHg, which is consistent with mild pulmonary hypertension, There is no evidence of pericardial effusion, not on anticoagulation, heart rate is in 70s now.     Dispo: Acute Rehab.     total time spent for discharge: 40 minutes 66 y/o male with HCAP, Sepsis, Influenza A, Hypoxia, Hx. of SVT, Advanced COPD/ILD, DM-2, CVA old, Hydroceles, DM-2, Hypothyroid. Patient was admitted under medicine under impression of sepsis with acute hypoxic resp failure copd excerebration due to HCAP and flu +    Problem/Plan - 1 HCAP (healthcare-associated pneumonia).   Plan: with Pseudomonas sputum positive  - on Cefepime continue through 3/1/18 per ID  - blood cultures negative  - continued 02 as he uses 4 liters at home, spirometer, Chest PT, Ambulation  - fall/aspiration precaution     Problem/Plan - 2: Acute respiratory failure with hypoxia.    Plan: due to PNA   - cont. supplemental 02  - treatment of pneumonia, plan as above.      Problem/Plan - 3: Chronic obstructive pulmonary disease, unspecified COPD type.    Plan: stable  - cont. Xopenex, Spiriva oxygen, spirometer, erin down steroids slowly.      Problem/Plan - 4: Flu.  Plan: s/p Tamiflu treatment.      Problem/Plan - 5: Sepsis  Plan: due to HCAP: resolved.      Problem/Plan - 6: Hypothyroidism  - Cont. Synthroid.     Problem/Plan - 7: Ileostomy, has currently  Plan: - cont. ostomy care.      Problem/Plan - 8: Type 2 diabetes mellitus with other circulatory complication, with long-term current use of insulin  Plan: - ADA diet, HISS.      Problem/Plan - 9: Hypertension  Plan: - DASH diet, cont. o/p regimen.      Problem/Plan - 10: Cerebrovascular accident (CVA), unspecified mechanism.   Plan; - cont. aspirin, Plavix statin.    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    14) Afib/ SInus tachycardia:   Multifactorial, patient HR up in setting of COPD exacerbation, patient has been followed by cardiology during the previous admission, his Cardizem changed to short acting one because of small bowel absorption issues, will continue to monitor, repeat TTE done showed Normal global left ventricular systolic function,  Left ventricular ejection fraction, by visual estimation, is 60 to 65%, Moderately enlarged right ventricle, Estimated pulmonary artery systolic pressure is 44.7 mmHg assuming a right atrial pressure of 3 mmHg, which is consistent with mild pulmonary hypertension, There is no evidence of pericardial effusion, not on anticoagulation, heart rate is in 70s now.     Dispo: Acute Rehab.     total time spent for discharge: 40 minutes

## 2018-02-27 NOTE — DISCHARGE NOTE ADULT - PLAN OF CARE
due tp pna/copd/flu: resolved complete IV cefepime until 3/1/18 resolved stable complete iv cefepime until 3/1/18 s/p complete treatment with tamiflu continue nebs and copd meds s/p complete treatment with Tamiflu due to pna/copd/flu: resolved complete IV cefepime through 3/1/18 complete iv cefepime through 3/1/18

## 2018-02-27 NOTE — PROGRESS NOTE ADULT - ATTENDING COMMENTS
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    14) Afib/ SInus tachycardia:   Multifactorial, patient HR up in setting of COPD exacerbation, patient has been followed by cardiology during the previous admission, his Cardizem changed to short acting one because of small bowel absorption issues, will continue to monitor, repeat TTE done showed Normal global left ventricular systolic function,  Left ventricular ejection fraction, by visual estimation, is 60 to 65%, Moderately enlarged right ventricle, Estimated pulmonary artery systolic pressure is 44.7 mmHg assuming a right atrial pressure of 3 mmHg, which is consistent with mild pulmonary hypertension, There is no evidence of pericardial effusion, not on anticoagulation, heart rate is in 70s now.     DVT Prophylaxis   - Lovenox    Dispo: Acute Rehab

## 2018-02-27 NOTE — DISCHARGE NOTE ADULT - CARE PLAN
Principal Discharge DX:	Acute respiratory failure with hypoxia  Goal:	due tp pna/copd/flu: resolved  Assessment and plan of treatment:	complete IV cefepime until 3/1/18  Secondary Diagnosis:	Sepsis, due to unspecified organism  Goal:	resolved  Secondary Diagnosis:	HCAP (healthcare-associated pneumonia)  Goal:	stable  Assessment and plan of treatment:	complete iv cefepime until 3/1/18  Secondary Diagnosis:	Influenza A  Assessment and plan of treatment:	s/p complete treatment with tamiflu  Secondary Diagnosis:	Type 2 diabetes mellitus with other circulatory complication, with long-term current use of insulin  Secondary Diagnosis:	Hypothyroidism  Secondary Diagnosis:	Chronic obstructive pulmonary disease, unspecified COPD type  Goal:	stable  Assessment and plan of treatment:	continue nebs and copd meds Principal Discharge DX:	Acute respiratory failure with hypoxia  Goal:	due to pna/copd/flu: resolved  Assessment and plan of treatment:	complete IV cefepime through 3/1/18  Secondary Diagnosis:	Sepsis, due to unspecified organism  Goal:	resolved  Secondary Diagnosis:	HCAP (healthcare-associated pneumonia)  Goal:	stable  Assessment and plan of treatment:	complete iv cefepime through 3/1/18  Secondary Diagnosis:	Influenza A  Assessment and plan of treatment:	s/p complete treatment with Tamiflu  Secondary Diagnosis:	Type 2 diabetes mellitus with other circulatory complication, with long-term current use of insulin  Secondary Diagnosis:	Hypothyroidism  Secondary Diagnosis:	Chronic obstructive pulmonary disease, unspecified COPD type  Goal:	stable  Assessment and plan of treatment:	continue nebs and copd meds

## 2018-02-27 NOTE — DISCHARGE NOTE ADULT - PATIENT PORTAL LINK FT
You can access the [facility] Patient Portal, offered by [facility], by registering with the following website: [email or web address]

## 2018-02-27 NOTE — PROGRESS NOTE ADULT - SUBJECTIVE AND OBJECTIVE BOX
FRANDY BAUTISTA    [id number]    67y      Male    INTERVAL HPI/OVERNIGHT EVENTS:  66 y/o male with HCAP, Sepsis, Influenza A, Hypoxia, Hx. of SVT, Advanced COPD/ILD, DM-2, CVA old, Hydroceles, DM-2, Hypothyroid.   Patient was admitted under medicine under impression of  HCAP    today pt denies any pain, improved cough and sob, no chest pain, no new events overnight    REVIEW OF SYSTEMS:  CONSTITUTIONAL: No fever, weight loss, or fatigue  RESPIRATORY: min cough, no wheezing, hemoptysis; min shortness of breath  CARDIOVASCULAR: No chest pain, palpitations      Vital Signs Last 24 Hrs  T(C): 36.3 (27 Feb 2018 08:37), Max: 36.4 (27 Feb 2018 06:05)  T(F): 97.4 (27 Feb 2018 08:37), Max: 97.6 (27 Feb 2018 06:05)  HR: 83 (27 Feb 2018 08:37) (70 - 95)  BP: 108/62 (27 Feb 2018 08:37) (108/62 - 124/64)  BP(mean): --  RR: 18 (27 Feb 2018 08:37) (16 - 20)  SpO2: 96% (27 Feb 2018 08:37) (91% - 98%)    PHYSICAL EXAM:  GENERAL: Elderly male thin built male looking comfortable    CHEST/LUNG: Decrease air entry bilaterally; No wheezing  HEART: S1S2+, Regular rate and rhythm; No murmurs  ABDOMEN: Soft, Nontender, Nondistended; Bowel sounds present, s/p Ileostomy   EXTREMITIES:  1+ Peripheral Pulses, No edema      LABS:                        9.8    10.7  )-----------( 238      ( 27 Feb 2018 08:04 )             32.7     02-27    138  |  99  |  17.0  ----------------------------<  151<H>  4.4   |  26.0  |  0.35<L>    Ca    8.5<L>      27 Feb 2018 08:04  Mg     1.4     02-27        MEDICATIONS  (STANDING):  ALPRAZolam 0.25 milliGRAM(s) Oral every 6 hours  aspirin enteric coated 81 milliGRAM(s) Oral daily  atorvastatin 40 milliGRAM(s) Oral at bedtime  benzonatate 100 milliGRAM(s) Oral every 6 hours  cefepime  IVPB 2000 milliGRAM(s) IV Intermittent every 12 hours  clopidogrel Tablet 75 milliGRAM(s) Oral daily  diltiazem    Tablet 120 milliGRAM(s) Oral every 8 hours  enoxaparin Injectable 40 milliGRAM(s) SubCutaneous every 24 hours  fluticasone propionate/ salmeterol 250-50 MICROgram(s) Diskus 1 Dose(s) Inhalation two times a day  levalbuterol Inhalation 0.63 milliGRAM(s) Inhalation every 6 hours  levothyroxine 50 MICROGram(s) Oral daily  loratadine 10 milliGRAM(s) Oral daily  Mucinex DM 1200mg/60mg Tablet 1 Tablet(s) 1 Tablet(s) Oral every 12 hours  multivitamin 1 Tablet(s) Oral daily  nystatin Powder 1 Application(s) Topical two times a day  pantoprazole    Tablet 40 milliGRAM(s) Oral before breakfast  predniSONE   Tablet 10 milliGRAM(s) Oral daily  saccharomyces boulardii 250 milliGRAM(s) Oral two times a day  sodium chloride 0.9% lock flush 3 milliLiter(s) IV Push every 8 hours  tamsulosin 0.4 milliGRAM(s) Oral at bedtime  tiotropium 18 MICROgram(s) Capsule 1 Capsule(s) Inhalation daily    MEDICATIONS  (PRN):  acetaminophen   Tablet 650 milliGRAM(s) Oral every 6 hours PRN For Temp greater than 38.5 C (101.3 F)  sodium chloride 0.65% Nasal 1 Spray(s) Both Nostrils every 4 hours PRN Nasal Congestion

## 2018-02-27 NOTE — DISCHARGE NOTE ADULT - SECONDARY DIAGNOSIS.
Sepsis, due to unspecified organism HCAP (healthcare-associated pneumonia) Influenza A Type 2 diabetes mellitus with other circulatory complication, with long-term current use of insulin Hypothyroidism Chronic obstructive pulmonary disease, unspecified COPD type

## 2018-02-28 ENCOUNTER — INPATIENT (INPATIENT)
Facility: HOSPITAL | Age: 68
LOS: 6 days | Discharge: ANOTHER HOSPITAL | DRG: 949 | End: 2018-03-07
Attending: STUDENT IN AN ORGANIZED HEALTH CARE EDUCATION/TRAINING PROGRAM | Admitting: STUDENT IN AN ORGANIZED HEALTH CARE EDUCATION/TRAINING PROGRAM
Payer: MEDICARE

## 2018-02-28 VITALS
DIASTOLIC BLOOD PRESSURE: 73 MMHG | SYSTOLIC BLOOD PRESSURE: 114 MMHG | OXYGEN SATURATION: 95 % | HEART RATE: 114 BPM | RESPIRATION RATE: 20 BRPM | TEMPERATURE: 98 F

## 2018-02-28 DIAGNOSIS — R26.9 UNSPECIFIED ABNORMALITIES OF GAIT AND MOBILITY: ICD-10-CM

## 2018-02-28 DIAGNOSIS — I10 ESSENTIAL (PRIMARY) HYPERTENSION: ICD-10-CM

## 2018-02-28 DIAGNOSIS — R09.02 HYPOXEMIA: ICD-10-CM

## 2018-02-28 DIAGNOSIS — J09.X1 INFLUENZA DUE TO IDENTIFIED NOVEL INFLUENZA A VIRUS WITH PNEUMONIA: ICD-10-CM

## 2018-02-28 DIAGNOSIS — J44.9 CHRONIC OBSTRUCTIVE PULMONARY DISEASE, UNSPECIFIED: ICD-10-CM

## 2018-02-28 DIAGNOSIS — R05 COUGH: ICD-10-CM

## 2018-02-28 DIAGNOSIS — Z43.2 ENCOUNTER FOR ATTENTION TO ILEOSTOMY: ICD-10-CM

## 2018-02-28 DIAGNOSIS — R64 CACHEXIA: ICD-10-CM

## 2018-02-28 DIAGNOSIS — L89.151 PRESSURE ULCER OF SACRAL REGION, STAGE 1: ICD-10-CM

## 2018-02-28 DIAGNOSIS — E88.09 OTHER DISORDERS OF PLASMA-PROTEIN METABOLISM, NOT ELSEWHERE CLASSIFIED: ICD-10-CM

## 2018-02-28 DIAGNOSIS — I48.0 PAROXYSMAL ATRIAL FIBRILLATION: ICD-10-CM

## 2018-02-28 DIAGNOSIS — F32.9 MAJOR DEPRESSIVE DISORDER, SINGLE EPISODE, UNSPECIFIED: ICD-10-CM

## 2018-02-28 DIAGNOSIS — F41.9 ANXIETY DISORDER, UNSPECIFIED: ICD-10-CM

## 2018-02-28 DIAGNOSIS — Z99.81 DEPENDENCE ON SUPPLEMENTAL OXYGEN: ICD-10-CM

## 2018-02-28 DIAGNOSIS — R53.81 OTHER MALAISE: ICD-10-CM

## 2018-02-28 DIAGNOSIS — Z98.890 OTHER SPECIFIED POSTPROCEDURAL STATES: Chronic | ICD-10-CM

## 2018-02-28 DIAGNOSIS — E03.9 HYPOTHYROIDISM, UNSPECIFIED: ICD-10-CM

## 2018-02-28 DIAGNOSIS — R04.2 HEMOPTYSIS: ICD-10-CM

## 2018-02-28 DIAGNOSIS — Z51.89 ENCOUNTER FOR OTHER SPECIFIED AFTERCARE: ICD-10-CM

## 2018-02-28 DIAGNOSIS — Z86.73 PERSONAL HISTORY OF TRANSIENT ISCHEMIC ATTACK (TIA), AND CEREBRAL INFARCTION WITHOUT RESIDUAL DEFICITS: ICD-10-CM

## 2018-02-28 DIAGNOSIS — R06.02 SHORTNESS OF BREATH: ICD-10-CM

## 2018-02-28 DIAGNOSIS — L89.892 PRESSURE ULCER OF OTHER SITE, STAGE 2: ICD-10-CM

## 2018-02-28 LAB
CULTURE RESULTS: SIGNIFICANT CHANGE UP
SPECIMEN SOURCE: SIGNIFICANT CHANGE UP

## 2018-02-28 PROCEDURE — 83880 ASSAY OF NATRIURETIC PEPTIDE: CPT

## 2018-02-28 PROCEDURE — 80053 COMPREHEN METABOLIC PANEL: CPT

## 2018-02-28 PROCEDURE — 97110 THERAPEUTIC EXERCISES: CPT

## 2018-02-28 PROCEDURE — 71046 X-RAY EXAM CHEST 2 VIEWS: CPT

## 2018-02-28 PROCEDURE — 87493 C DIFF AMPLIFIED PROBE: CPT

## 2018-02-28 PROCEDURE — 99239 HOSP IP/OBS DSCHRG MGMT >30: CPT

## 2018-02-28 PROCEDURE — 83690 ASSAY OF LIPASE: CPT

## 2018-02-28 PROCEDURE — 80048 BASIC METABOLIC PNL TOTAL CA: CPT

## 2018-02-28 PROCEDURE — 85610 PROTHROMBIN TIME: CPT

## 2018-02-28 PROCEDURE — 94640 AIRWAY INHALATION TREATMENT: CPT

## 2018-02-28 PROCEDURE — 97116 GAIT TRAINING THERAPY: CPT

## 2018-02-28 PROCEDURE — 94760 N-INVAS EAR/PLS OXIMETRY 1: CPT

## 2018-02-28 PROCEDURE — 99285 EMERGENCY DEPT VISIT HI MDM: CPT | Mod: 25

## 2018-02-28 PROCEDURE — 85027 COMPLETE CBC AUTOMATED: CPT

## 2018-02-28 PROCEDURE — 97530 THERAPEUTIC ACTIVITIES: CPT

## 2018-02-28 PROCEDURE — 87581 M.PNEUMON DNA AMP PROBE: CPT

## 2018-02-28 PROCEDURE — 97163 PT EVAL HIGH COMPLEX 45 MIN: CPT

## 2018-02-28 PROCEDURE — 83735 ASSAY OF MAGNESIUM: CPT

## 2018-02-28 PROCEDURE — 87633 RESP VIRUS 12-25 TARGETS: CPT

## 2018-02-28 PROCEDURE — 71250 CT THORAX DX C-: CPT

## 2018-02-28 PROCEDURE — 85730 THROMBOPLASTIN TIME PARTIAL: CPT

## 2018-02-28 PROCEDURE — 87798 DETECT AGENT NOS DNA AMP: CPT

## 2018-02-28 PROCEDURE — 81001 URINALYSIS AUTO W/SCOPE: CPT

## 2018-02-28 PROCEDURE — 87486 CHLMYD PNEUM DNA AMP PROBE: CPT

## 2018-02-28 PROCEDURE — 87070 CULTURE OTHR SPECIMN AEROBIC: CPT

## 2018-02-28 PROCEDURE — 80202 ASSAY OF VANCOMYCIN: CPT

## 2018-02-28 PROCEDURE — 71045 X-RAY EXAM CHEST 1 VIEW: CPT

## 2018-02-28 PROCEDURE — 96375 TX/PRO/DX INJ NEW DRUG ADDON: CPT

## 2018-02-28 PROCEDURE — 83605 ASSAY OF LACTIC ACID: CPT

## 2018-02-28 PROCEDURE — 87086 URINE CULTURE/COLONY COUNT: CPT

## 2018-02-28 PROCEDURE — 84145 PROCALCITONIN (PCT): CPT

## 2018-02-28 PROCEDURE — 36415 COLL VENOUS BLD VENIPUNCTURE: CPT

## 2018-02-28 PROCEDURE — 96374 THER/PROPH/DIAG INJ IV PUSH: CPT

## 2018-02-28 PROCEDURE — 93005 ELECTROCARDIOGRAM TRACING: CPT

## 2018-02-28 PROCEDURE — 85652 RBC SED RATE AUTOMATED: CPT

## 2018-02-28 PROCEDURE — 87040 BLOOD CULTURE FOR BACTERIA: CPT

## 2018-02-28 PROCEDURE — 87186 SC STD MICRODIL/AGAR DIL: CPT

## 2018-02-28 RX ORDER — MAGNESIUM SULFATE 500 MG/ML
2 VIAL (ML) INJECTION ONCE
Qty: 0 | Refills: 0 | Status: COMPLETED | OUTPATIENT
Start: 2018-02-28 | End: 2018-02-28

## 2018-02-28 RX ADMIN — Medication 0.25 MILLIGRAM(S): at 17:42

## 2018-02-28 RX ADMIN — Medication 10 MILLIGRAM(S): at 06:05

## 2018-02-28 RX ADMIN — Medication 250 MILLIGRAM(S): at 17:42

## 2018-02-28 RX ADMIN — CLOPIDOGREL BISULFATE 75 MILLIGRAM(S): 75 TABLET, FILM COATED ORAL at 12:38

## 2018-02-28 RX ADMIN — Medication 250 MILLIGRAM(S): at 06:06

## 2018-02-28 RX ADMIN — Medication 0.25 MILLIGRAM(S): at 06:06

## 2018-02-28 RX ADMIN — CEFEPIME 100 MILLIGRAM(S): 1 INJECTION, POWDER, FOR SOLUTION INTRAMUSCULAR; INTRAVENOUS at 17:42

## 2018-02-28 RX ADMIN — NYSTATIN CREAM 1 APPLICATION(S): 100000 CREAM TOPICAL at 17:43

## 2018-02-28 RX ADMIN — Medication 0.25 MILLIGRAM(S): at 12:38

## 2018-02-28 RX ADMIN — Medication 100 MILLIGRAM(S): at 12:38

## 2018-02-28 RX ADMIN — Medication 81 MILLIGRAM(S): at 12:38

## 2018-02-28 RX ADMIN — Medication 1 TABLET(S): at 12:38

## 2018-02-28 RX ADMIN — LEVALBUTEROL 0.63 MILLIGRAM(S): 1.25 SOLUTION, CONCENTRATE RESPIRATORY (INHALATION) at 09:07

## 2018-02-28 RX ADMIN — Medication 100 GRAM(S): at 12:38

## 2018-02-28 RX ADMIN — Medication 100 MILLIGRAM(S): at 00:12

## 2018-02-28 RX ADMIN — SODIUM CHLORIDE 3 MILLILITER(S): 9 INJECTION INTRAMUSCULAR; INTRAVENOUS; SUBCUTANEOUS at 12:38

## 2018-02-28 RX ADMIN — PANTOPRAZOLE SODIUM 40 MILLIGRAM(S): 20 TABLET, DELAYED RELEASE ORAL at 06:06

## 2018-02-28 RX ADMIN — LEVALBUTEROL 0.63 MILLIGRAM(S): 1.25 SOLUTION, CONCENTRATE RESPIRATORY (INHALATION) at 03:50

## 2018-02-28 RX ADMIN — Medication 50 MICROGRAM(S): at 06:06

## 2018-02-28 RX ADMIN — Medication 0.25 MILLIGRAM(S): at 00:12

## 2018-02-28 RX ADMIN — NYSTATIN CREAM 1 APPLICATION(S): 100000 CREAM TOPICAL at 06:06

## 2018-02-28 RX ADMIN — FLUTICASONE PROPIONATE AND SALMETEROL 1 DOSE(S): 50; 250 POWDER ORAL; RESPIRATORY (INHALATION) at 11:20

## 2018-02-28 RX ADMIN — Medication 100 MILLIGRAM(S): at 17:42

## 2018-02-28 RX ADMIN — TIOTROPIUM BROMIDE 1 CAPSULE(S): 18 CAPSULE ORAL; RESPIRATORY (INHALATION) at 09:08

## 2018-02-28 RX ADMIN — LORATADINE 10 MILLIGRAM(S): 10 TABLET ORAL at 12:38

## 2018-02-28 RX ADMIN — Medication 100 MILLIGRAM(S): at 06:06

## 2018-02-28 RX ADMIN — SODIUM CHLORIDE 3 MILLILITER(S): 9 INJECTION INTRAMUSCULAR; INTRAVENOUS; SUBCUTANEOUS at 05:58

## 2018-02-28 RX ADMIN — CEFEPIME 100 MILLIGRAM(S): 1 INJECTION, POWDER, FOR SOLUTION INTRAMUSCULAR; INTRAVENOUS at 06:06

## 2018-02-28 RX ADMIN — LEVALBUTEROL 0.63 MILLIGRAM(S): 1.25 SOLUTION, CONCENTRATE RESPIRATORY (INHALATION) at 15:18

## 2018-02-28 NOTE — PROGRESS NOTE ADULT - PROBLEM SELECTOR PLAN 1
with Pseudomonas sputum positive  - on Cefepime continue through 3/1/18  - blood cultures negative  - continued 02 as he uses 4 liters at home, spirometer, Chest PT, Ambulation  - fall/aspiration precaution

## 2018-02-28 NOTE — PROGRESS NOTE ADULT - PROBLEM SELECTOR PLAN 7
cont. ostomy care.
- cont. ostomy care.
cont. ostomy care.
cont. ostomy care.
- cont. ostomy care.
cont. ostomy care.
- cont. ostomy care.

## 2018-02-28 NOTE — PROGRESS NOTE ADULT - PROBLEM SELECTOR PLAN 3
Reported PCN allergy since childhood  No known allergy  Tolerating Cefepime  Will Monitor
Reported PCN allergy since childhood  No known allergy  Tolerating Cefepime  Will Monitor
stable  - cont. Xopenex, spiriva, oxygen, spirometer, erin down steroids slowly.
Reported PCN allergy since childhood  No known allergy  Tolerating Cefepime  Will Monitor
Cont treatment plan as above, resolving.
Reported PCN allergy since childhood  No known allergy  Tolerating Cefepime  Will Monitor
Resolved, Cont treatment plan as above, he is eating well, will d/c IV fluids.
Resolved, Cont treatment plan as above, he is eating well, will d/c IV fluids.
Cont treatment plan as above, resolved, he is eating well, will d/c IV fluids.
Cont treatment plan as above, resolving.
Resolved, Cont treatment plan as above, he is eating well, will d/c IV fluids.
Resolved, Cont treatment plan as above, he is eating well, will d/c IV fluids.
stable  - cont. Xopenex, spiriva, oxygen, spirometer, erin down steroids slowly.
Resolved, Cont treatment plan as above, he is eating well, will d/c IV fluids.
stable  - cont. Xopenex, spiriva, oxygen, spirometer, erin down steroids slowly.
Resolved, Cont treatment plan as above, he is eating well, will d/c IV fluids.

## 2018-02-28 NOTE — PROGRESS NOTE ADULT - PROBLEM SELECTOR PLAN 8
ADA diet, HISS
- ADA diet, HISS
ADA diet, HISS
ADA diet, HISS
- ADA diet, HISS
ADA diet, HISS
- ADA diet, HISS

## 2018-02-28 NOTE — PROGRESS NOTE ADULT - PROBLEM SELECTOR PLAN 2
due to PNA   - cont. supplemental 02  - treatment of pneumonia, plan as above
Supportive care, Tamiflu, Tylenol, Droplet isolation
Supportive care, Tamiflu, Tylenol, Droplet isolation d/feliz as he has completed treatment.
Supportive care, Tamiflu, Tylenol, Droplet isolation.
Supportive care, Tamiflu, Tylenol, Droplet isolation
Supportive care, Tamiflu, Tylenol, Droplet isolation
Supportive care, Tamiflu, Tylenol, Droplet isolation d/feliz as he has completed treatment.
Supportive care, Tamiflu, Tylenol, Droplet isolation d/feliz as he has completed treatment.
due to PNA   - cont. supplemental 02  - treatment of pneumonia, plan as above
Supportive care, Tamiflu, Tylenol, Droplet isolation d/feliz as he has completed treatment.
Patient was treated for pneumonia last visit now with New infiltrate on CT chest  Sputum cx from prior hospitalization was pseudomonas resistant to levaquin  Will Continue Cefepime for now since that pseudomonas was not treated  Obtain new Sputum Cx  Follow up Repeat Blood cultures  Afebrile  Trend leukocytosis, downtrending
due to PNA   - cont. supplemental 02  - treatment of pneumonia, plan as above
Supportive care, Tamiflu, Tylenol, Droplet isolation.

## 2018-02-28 NOTE — PROGRESS NOTE ADULT - PROBLEM SELECTOR PROBLEM 9
Hypertension, unspecified type

## 2018-02-28 NOTE — PROGRESS NOTE ADULT - PROBLEM SELECTOR PLAN 9
- DASH diet, cont. o/p regimen
DASH diet, cont. o/p regimen
- DASH diet, cont. o/p regimen
DASH diet, cont. o/p regimen
DASH diet, cont. o/p regimen
- DASH diet, cont. o/p regimen
DASH diet, cont. o/p regimen

## 2018-02-28 NOTE — PROGRESS NOTE ADULT - PROVIDER SPECIALTY LIST ADULT
Hospitalist
Infectious Disease
Rehab Medicine
Hospitalist
Hospitalist
Infectious Disease
Infectious Disease
Hospitalist

## 2018-02-28 NOTE — PROGRESS NOTE ADULT - PROBLEM SELECTOR PROBLEM 1
HCAP (healthcare-associated pneumonia)
Influenza A
HCAP (healthcare-associated pneumonia)
Influenza A
HCAP (healthcare-associated pneumonia)
Influenza A
HCAP (healthcare-associated pneumonia)
Influenza A
HCAP (healthcare-associated pneumonia)
HCAP (healthcare-associated pneumonia)

## 2018-02-28 NOTE — PROGRESS NOTE ADULT - PROBLEM SELECTOR PROBLEM 2
Acute respiratory failure with hypoxia
HAP (hospital-acquired pneumonia)
Flu
HAP (hospital-acquired pneumonia)
Acute respiratory failure with hypoxia
Flu
HAP (hospital-acquired pneumonia)
Flu
HAP (hospital-acquired pneumonia)
Acute respiratory failure with hypoxia
Flu

## 2018-02-28 NOTE — PROGRESS NOTE ADULT - PROBLEM SELECTOR PROBLEM 8
Type 2 diabetes mellitus with other circulatory complication, with long-term current use of insulin

## 2018-02-28 NOTE — PROGRESS NOTE ADULT - PROBLEM SELECTOR PROBLEM 5
Hypoxia
Sepsis, due to unspecified organism
Hypoxia
Sepsis, due to unspecified organism
Hypoxia
Sepsis, due to unspecified organism

## 2018-02-28 NOTE — PROGRESS NOTE ADULT - PROBLEM SELECTOR PROBLEM 7
Ileostomy, has currently

## 2018-02-28 NOTE — PROGRESS NOTE ADULT - PROBLEM SELECTOR PLAN 4
No wheezing on exam, cont. Xopenex, spiriva, oxygen, spirometer, will erin down steroids slowly.
s/p Tamiflu treatment
No wheezing on exam, cont. Xopenex, spiriva, oxygen, spirometer, will erin down steroids slowly.
No wheezing on exam, cont. Xopenex, spiriva, oxygen, spirometer, will give steroids oral as he was discharged on that.
No wheezing on exam, cont. Xopenex, spiriva, oxygen, spirometer, will erin down steroids slowly.
No wheezing on exam, cont. Xopenex, spiriva, oxygen, spirometer, will give steroids oral as he was discharged on that.
s/p Tamiflu treatment
No wheezing on exam, cont. Xopenex, spiriva, oxygen, spirometer, will erin down steroids slowly.
s/p Tamiflu treatment

## 2018-02-28 NOTE — PROGRESS NOTE ADULT - PROBLEM SELECTOR PROBLEM 3
Penicillin allergy
Penicillin allergy
Chronic obstructive pulmonary disease, unspecified COPD type
Penicillin allergy
Sepsis, due to unspecified organism
Chronic obstructive pulmonary disease, unspecified COPD type
Sepsis, due to unspecified organism
Chronic obstructive pulmonary disease, unspecified COPD type
Sepsis, due to unspecified organism

## 2018-02-28 NOTE — PROGRESS NOTE ADULT - PROBLEM SELECTOR PROBLEM 4
Chronic obstructive pulmonary disease, unspecified COPD type
Flu
Chronic obstructive pulmonary disease, unspecified COPD type
Flu
Chronic obstructive pulmonary disease, unspecified COPD type
Flu

## 2018-02-28 NOTE — PROGRESS NOTE ADULT - SUBJECTIVE AND OBJECTIVE BOX
FRANDY BAUTISTA    [id number]    67y      Male    INTERVAL HPI/OVERNIGHT EVENTS:  66 y/o male with HCAP, Sepsis, Influenza A, Hypoxia, Hx. of SVT, Advanced COPD/ILD, DM-2, CVA old, Hydroceles, DM-2, Hypothyroid.   Patient was admitted under medicine under impression of  HCAP    today pt denies any pain, improved cough and sob, no chest pain, no new events overnight    REVIEW OF SYSTEMS:  CONSTITUTIONAL: No fever, weight loss, or fatigue  RESPIRATORY: min cough, no wheezing, hemoptysis; min shortness of breath  CARDIOVASCULAR: No chest pain, palpitations      Vital Signs Last 24 Hrs  T(C): 36.7 (28 Feb 2018 05:50), Max: 36.7 (27 Feb 2018 16:52)  T(F): 98.1 (28 Feb 2018 05:50), Max: 98.1 (28 Feb 2018 05:50)  HR: 90 (28 Feb 2018 09:10) (87 - 110)  BP: 105/64 (28 Feb 2018 05:50) (105/64 - 132/73)  BP(mean): --  RR: 19 (28 Feb 2018 05:50) (18 - 20)  SpO2: 99% (28 Feb 2018 09:10) (99% - 99%)    PHYSICAL EXAM:  GENERAL: Elderly male thin built male looking comfortable    CHEST/LUNG: Decrease air entry bilaterally; No wheezing  HEART: S1S2+, Regular rate and rhythm; No murmurs  ABDOMEN: Soft, Nontender, Nondistended; Bowel sounds present, s/p Ileostomy   EXTREMITIES:  1+ Peripheral Pulses, No edema      LABS:                        9.8    10.7  )-----------( 238      ( 27 Feb 2018 08:04 )             32.7     02-27    138  |  99  |  17.0  ----------------------------<  151<H>  4.4   |  26.0  |  0.35<L>    Ca    8.5<L>      27 Feb 2018 08:04  Mg     1.4     02-27      MEDICATIONS  (STANDING):  ALPRAZolam 0.25 milliGRAM(s) Oral every 6 hours  aspirin enteric coated 81 milliGRAM(s) Oral daily  atorvastatin 40 milliGRAM(s) Oral at bedtime  benzonatate 100 milliGRAM(s) Oral every 6 hours  cefepime  IVPB 2000 milliGRAM(s) IV Intermittent every 12 hours  clopidogrel Tablet 75 milliGRAM(s) Oral daily  diltiazem    Tablet 120 milliGRAM(s) Oral every 8 hours  enoxaparin Injectable 40 milliGRAM(s) SubCutaneous every 24 hours  fluticasone propionate/ salmeterol 250-50 MICROgram(s) Diskus 1 Dose(s) Inhalation two times a day  levalbuterol Inhalation 0.63 milliGRAM(s) Inhalation every 6 hours  levothyroxine 50 MICROGram(s) Oral daily  loratadine 10 milliGRAM(s) Oral daily  magnesium sulfate  IVPB 2 Gram(s) IV Intermittent once  Mucinex DM 1200mg/60mg Tablet 1 Tablet(s) 1 Tablet(s) Oral every 12 hours  multivitamin 1 Tablet(s) Oral daily  nystatin Powder 1 Application(s) Topical two times a day  pantoprazole    Tablet 40 milliGRAM(s) Oral before breakfast  predniSONE   Tablet 10 milliGRAM(s) Oral daily  saccharomyces boulardii 250 milliGRAM(s) Oral two times a day  sodium chloride 0.9% lock flush 3 milliLiter(s) IV Push every 8 hours  tamsulosin 0.4 milliGRAM(s) Oral at bedtime  tiotropium 18 MICROgram(s) Capsule 1 Capsule(s) Inhalation daily    MEDICATIONS  (PRN):  acetaminophen   Tablet 650 milliGRAM(s) Oral every 6 hours PRN For Temp greater than 38.5 C (101.3 F)  sodium chloride 0.65% Nasal 1 Spray(s) Both Nostrils every 4 hours PRN Nasal Congestion

## 2018-02-28 NOTE — PROGRESS NOTE ADULT - PROBLEM SELECTOR PLAN 5
cont. supplemental 02, treatment of pneumonia.
due to HCAP: resolved
cont. supplemental 02, treatment of pneumonia.
due to HCAP: resolved
cont. supplemental 02, treatment of pneumonia.
due to HCAP: resolved

## 2018-02-28 NOTE — PROGRESS NOTE ADULT - PROBLEM SELECTOR PLAN 10
- cont. aspirin, Plavix statin
cont. aspirin, Plavix statin
- cont. aspirin, Plavix statin
cont. aspirin, Plavix statin
cont. aspirin, Plavix statin
- cont. aspirin, Plavix statin
cont. aspirin, Plavix statin

## 2018-02-28 NOTE — PROGRESS NOTE ADULT - PROBLEM SELECTOR PROBLEM 10
Cerebrovascular accident (CVA), unspecified mechanism

## 2018-02-28 NOTE — PROGRESS NOTE ADULT - ASSESSMENT
68 y/o male with HCAP, Sepsis, Influenza A, Hypoxia, Hx. of SVT, Advanced COPD/ILD, DM-2, CVA old, Hydroceles, DM-2, Hypothyroid.   Patient was admitted for Sepsis due to HCAP, Flu+ and COPD

## 2018-03-01 PROCEDURE — 93010 ELECTROCARDIOGRAM REPORT: CPT

## 2018-03-01 PROCEDURE — 99223 1ST HOSP IP/OBS HIGH 75: CPT

## 2018-03-02 PROCEDURE — 99233 SBSQ HOSP IP/OBS HIGH 50: CPT

## 2018-03-03 PROCEDURE — 99233 SBSQ HOSP IP/OBS HIGH 50: CPT

## 2018-03-04 PROCEDURE — 99233 SBSQ HOSP IP/OBS HIGH 50: CPT

## 2018-03-04 PROCEDURE — 93010 ELECTROCARDIOGRAM REPORT: CPT

## 2018-03-04 PROCEDURE — 71045 X-RAY EXAM CHEST 1 VIEW: CPT | Mod: 26

## 2018-03-05 DIAGNOSIS — R53.81 OTHER MALAISE: ICD-10-CM

## 2018-03-05 PROCEDURE — 99233 SBSQ HOSP IP/OBS HIGH 50: CPT

## 2018-03-05 PROCEDURE — 99223 1ST HOSP IP/OBS HIGH 75: CPT

## 2018-03-05 PROCEDURE — 99221 1ST HOSP IP/OBS SF/LOW 40: CPT

## 2018-03-06 PROCEDURE — 99233 SBSQ HOSP IP/OBS HIGH 50: CPT

## 2018-03-06 PROCEDURE — 99232 SBSQ HOSP IP/OBS MODERATE 35: CPT

## 2018-03-06 PROCEDURE — 90792 PSYCH DIAG EVAL W/MED SRVCS: CPT

## 2018-03-07 ENCOUNTER — INPATIENT (INPATIENT)
Facility: HOSPITAL | Age: 68
LOS: 9 days | Discharge: ANOTHER HOSPITAL | DRG: 196 | End: 2018-03-17
Attending: INTERNAL MEDICINE | Admitting: INTERNAL MEDICINE
Payer: MEDICARE
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
## 2018-03-07 DIAGNOSIS — J44.1 CHRONIC OBSTRUCTIVE PULMONARY DISEASE WITH (ACUTE) EXACERBATION: ICD-10-CM

## 2018-03-07 DIAGNOSIS — J96.21 ACUTE AND CHRONIC RESPIRATORY FAILURE WITH HYPOXIA: ICD-10-CM

## 2018-03-07 DIAGNOSIS — N40.0 BENIGN PROSTATIC HYPERPLASIA WITHOUT LOWER URINARY TRACT SYMPTOMS: ICD-10-CM

## 2018-03-07 DIAGNOSIS — I48.0 PAROXYSMAL ATRIAL FIBRILLATION: ICD-10-CM

## 2018-03-07 DIAGNOSIS — Z88.6 ALLERGY STATUS TO ANALGESIC AGENT: ICD-10-CM

## 2018-03-07 DIAGNOSIS — R06.03 ACUTE RESPIRATORY DISTRESS: ICD-10-CM

## 2018-03-07 DIAGNOSIS — Z88.8 ALLERGY STATUS TO OTHER DRUGS, MEDICAMENTS AND BIOLOGICAL SUBSTANCES: ICD-10-CM

## 2018-03-07 DIAGNOSIS — T38.0X5A ADVERSE EFFECT OF GLUCOCORTICOIDS AND SYNTHETIC ANALOGUES, INITIAL ENCOUNTER: ICD-10-CM

## 2018-03-07 DIAGNOSIS — Z99.81 DEPENDENCE ON SUPPLEMENTAL OXYGEN: ICD-10-CM

## 2018-03-07 DIAGNOSIS — F33.1 MAJOR DEPRESSIVE DISORDER, RECURRENT, MODERATE: ICD-10-CM
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
## 2018-03-07 DIAGNOSIS — Z79.51 LONG TERM (CURRENT) USE OF INHALED STEROIDS: ICD-10-CM

## 2018-03-07 DIAGNOSIS — I27.20 PULMONARY HYPERTENSION, UNSPECIFIED: ICD-10-CM

## 2018-03-07 DIAGNOSIS — Z91.018 ALLERGY TO OTHER FOODS: ICD-10-CM

## 2018-03-07 DIAGNOSIS — I10 ESSENTIAL (PRIMARY) HYPERTENSION: ICD-10-CM

## 2018-03-07 DIAGNOSIS — D63.8 ANEMIA IN OTHER CHRONIC DISEASES CLASSIFIED ELSEWHERE: ICD-10-CM

## 2018-03-07 DIAGNOSIS — Z87.442 PERSONAL HISTORY OF URINARY CALCULI: ICD-10-CM

## 2018-03-07 DIAGNOSIS — J84.9 INTERSTITIAL PULMONARY DISEASE, UNSPECIFIED: ICD-10-CM

## 2018-03-07 DIAGNOSIS — F41.9 ANXIETY DISORDER, UNSPECIFIED: ICD-10-CM

## 2018-03-07 DIAGNOSIS — Z98.890 OTHER SPECIFIED POSTPROCEDURAL STATES: Chronic | ICD-10-CM

## 2018-03-07 DIAGNOSIS — Z88.0 ALLERGY STATUS TO PENICILLIN: ICD-10-CM

## 2018-03-07 DIAGNOSIS — F41.1 GENERALIZED ANXIETY DISORDER: ICD-10-CM

## 2018-03-07 DIAGNOSIS — E78.5 HYPERLIPIDEMIA, UNSPECIFIED: ICD-10-CM

## 2018-03-07 DIAGNOSIS — E03.9 HYPOTHYROIDISM, UNSPECIFIED: ICD-10-CM

## 2018-03-07 DIAGNOSIS — Z93.2 ILEOSTOMY STATUS: ICD-10-CM

## 2018-03-07 DIAGNOSIS — Z86.73 PERSONAL HISTORY OF TRANSIENT ISCHEMIC ATTACK (TIA), AND CEREBRAL INFARCTION WITHOUT RESIDUAL DEFICITS: ICD-10-CM

## 2018-03-07 DIAGNOSIS — Z79.02 LONG TERM (CURRENT) USE OF ANTITHROMBOTICS/ANTIPLATELETS: ICD-10-CM

## 2018-03-07 DIAGNOSIS — R73.9 HYPERGLYCEMIA, UNSPECIFIED: ICD-10-CM
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
## 2018-03-07 DIAGNOSIS — E43 UNSPECIFIED SEVERE PROTEIN-CALORIE MALNUTRITION: ICD-10-CM

## 2018-03-07 PROCEDURE — 99232 SBSQ HOSP IP/OBS MODERATE 35: CPT

## 2018-03-07 PROCEDURE — 71045 X-RAY EXAM CHEST 1 VIEW: CPT | Mod: 26

## 2018-03-07 PROCEDURE — 71275 CT ANGIOGRAPHY CHEST: CPT | Mod: 26

## 2018-03-07 PROCEDURE — 99233 SBSQ HOSP IP/OBS HIGH 50: CPT

## 2018-03-07 PROCEDURE — 90791 PSYCH DIAGNOSTIC EVALUATION: CPT

## 2018-03-08 PROCEDURE — 99232 SBSQ HOSP IP/OBS MODERATE 35: CPT

## 2018-03-09 PROCEDURE — 99232 SBSQ HOSP IP/OBS MODERATE 35: CPT

## 2018-03-10 PROCEDURE — 99232 SBSQ HOSP IP/OBS MODERATE 35: CPT

## 2018-03-11 PROCEDURE — 99232 SBSQ HOSP IP/OBS MODERATE 35: CPT

## 2018-03-12 ENCOUNTER — RX RENEWAL (OUTPATIENT)
Age: 68
End: 2018-03-12

## 2018-03-12 PROCEDURE — 99232 SBSQ HOSP IP/OBS MODERATE 35: CPT

## 2018-03-13 PROCEDURE — 99232 SBSQ HOSP IP/OBS MODERATE 35: CPT

## 2018-03-14 PROCEDURE — 99232 SBSQ HOSP IP/OBS MODERATE 35: CPT

## 2018-03-15 PROCEDURE — 99232 SBSQ HOSP IP/OBS MODERATE 35: CPT

## 2018-03-16 PROCEDURE — 81003 URINALYSIS AUTO W/O SCOPE: CPT

## 2018-03-16 PROCEDURE — 97163 PT EVAL HIGH COMPLEX 45 MIN: CPT

## 2018-03-16 PROCEDURE — 97530 THERAPEUTIC ACTIVITIES: CPT

## 2018-03-16 PROCEDURE — 71045 X-RAY EXAM CHEST 1 VIEW: CPT

## 2018-03-16 PROCEDURE — 87040 BLOOD CULTURE FOR BACTERIA: CPT

## 2018-03-16 PROCEDURE — 97110 THERAPEUTIC EXERCISES: CPT

## 2018-03-16 PROCEDURE — 83605 ASSAY OF LACTIC ACID: CPT

## 2018-03-16 PROCEDURE — 36600 WITHDRAWAL OF ARTERIAL BLOOD: CPT

## 2018-03-16 PROCEDURE — 87086 URINE CULTURE/COLONY COUNT: CPT

## 2018-03-16 PROCEDURE — 80069 RENAL FUNCTION PANEL: CPT

## 2018-03-16 PROCEDURE — 80053 COMPREHEN METABOLIC PANEL: CPT

## 2018-03-16 PROCEDURE — 97167 OT EVAL HIGH COMPLEX 60 MIN: CPT

## 2018-03-16 PROCEDURE — 97116 GAIT TRAINING THERAPY: CPT

## 2018-03-16 PROCEDURE — 84484 ASSAY OF TROPONIN QUANT: CPT

## 2018-03-16 PROCEDURE — 99232 SBSQ HOSP IP/OBS MODERATE 35: CPT

## 2018-03-16 PROCEDURE — 83735 ASSAY OF MAGNESIUM: CPT

## 2018-03-16 PROCEDURE — 97535 SELF CARE MNGMENT TRAINING: CPT

## 2018-03-16 PROCEDURE — 71275 CT ANGIOGRAPHY CHEST: CPT

## 2018-03-16 PROCEDURE — 85025 COMPLETE CBC W/AUTO DIFF WBC: CPT

## 2018-03-16 PROCEDURE — 82550 ASSAY OF CK (CPK): CPT

## 2018-03-16 PROCEDURE — 82803 BLOOD GASES ANY COMBINATION: CPT

## 2018-03-16 PROCEDURE — 84100 ASSAY OF PHOSPHORUS: CPT

## 2018-03-16 PROCEDURE — 93005 ELECTROCARDIOGRAM TRACING: CPT

## 2018-03-16 PROCEDURE — 94640 AIRWAY INHALATION TREATMENT: CPT

## 2018-03-16 PROCEDURE — 85027 COMPLETE CBC AUTOMATED: CPT

## 2018-03-17 PROCEDURE — 83735 ASSAY OF MAGNESIUM: CPT

## 2018-03-17 PROCEDURE — 94660 CPAP INITIATION&MGMT: CPT

## 2018-03-17 PROCEDURE — 80069 RENAL FUNCTION PANEL: CPT

## 2018-03-17 PROCEDURE — 97162 PT EVAL MOD COMPLEX 30 MIN: CPT

## 2018-03-17 PROCEDURE — 85025 COMPLETE CBC W/AUTO DIFF WBC: CPT

## 2018-03-17 PROCEDURE — 97116 GAIT TRAINING THERAPY: CPT

## 2018-03-17 PROCEDURE — 83550 IRON BINDING TEST: CPT

## 2018-03-17 PROCEDURE — 82607 VITAMIN B-12: CPT

## 2018-03-17 PROCEDURE — 92526 ORAL FUNCTION THERAPY: CPT

## 2018-03-17 PROCEDURE — 94640 AIRWAY INHALATION TREATMENT: CPT

## 2018-03-17 PROCEDURE — 80048 BASIC METABOLIC PNL TOTAL CA: CPT

## 2018-03-17 PROCEDURE — 85045 AUTOMATED RETICULOCYTE COUNT: CPT

## 2018-03-17 PROCEDURE — 82272 OCCULT BLD FECES 1-3 TESTS: CPT

## 2018-03-17 PROCEDURE — 92610 EVALUATE SWALLOWING FUNCTION: CPT

## 2018-03-17 PROCEDURE — 99232 SBSQ HOSP IP/OBS MODERATE 35: CPT

## 2018-03-17 PROCEDURE — 82728 ASSAY OF FERRITIN: CPT

## 2018-03-17 PROCEDURE — 84100 ASSAY OF PHOSPHORUS: CPT

## 2018-03-17 PROCEDURE — 82746 ASSAY OF FOLIC ACID SERUM: CPT

## 2018-03-26 ENCOUNTER — APPOINTMENT (OUTPATIENT)
Dept: PULMONOLOGY | Facility: CLINIC | Age: 68
End: 2018-03-26

## 2018-03-29 ENCOUNTER — APPOINTMENT (OUTPATIENT)
Dept: CARDIOLOGY | Facility: CLINIC | Age: 68
End: 2018-03-29

## 2018-04-24 ENCOUNTER — APPOINTMENT (OUTPATIENT)
Dept: PULMONOLOGY | Facility: CLINIC | Age: 68
End: 2018-04-24
Payer: OTHER MISCELLANEOUS

## 2018-04-24 ENCOUNTER — APPOINTMENT (OUTPATIENT)
Dept: CARDIOLOGY | Facility: CLINIC | Age: 68
End: 2018-04-24
Payer: OTHER MISCELLANEOUS

## 2018-04-24 VITALS
HEIGHT: 65 IN | DIASTOLIC BLOOD PRESSURE: 60 MMHG | BODY MASS INDEX: 18.33 KG/M2 | HEART RATE: 76 BPM | OXYGEN SATURATION: 99 % | SYSTOLIC BLOOD PRESSURE: 100 MMHG | WEIGHT: 110 LBS

## 2018-04-24 VITALS — OXYGEN SATURATION: 100 % | HEART RATE: 81 BPM | SYSTOLIC BLOOD PRESSURE: 104 MMHG | DIASTOLIC BLOOD PRESSURE: 64 MMHG

## 2018-04-24 DIAGNOSIS — R09.02 HYPOXEMIA: ICD-10-CM

## 2018-04-24 DIAGNOSIS — R93.8 ABNORMAL FINDINGS ON DIAGNOSTIC IMAGING OF OTHER SPECIFIED BODY STRUCTURES: ICD-10-CM

## 2018-04-24 DIAGNOSIS — R06.02 SHORTNESS OF BREATH: ICD-10-CM

## 2018-04-24 DIAGNOSIS — Z00.00 ENCOUNTER FOR GENERAL ADULT MEDICAL EXAMINATION W/OUT ABNORMAL FINDINGS: ICD-10-CM

## 2018-04-24 DIAGNOSIS — R91.8 OTHER NONSPECIFIC ABNORMAL FINDING OF LUNG FIELD: ICD-10-CM

## 2018-04-24 DIAGNOSIS — R05 COUGH: ICD-10-CM

## 2018-04-24 DIAGNOSIS — R06.00 DYSPNEA, UNSPECIFIED: ICD-10-CM

## 2018-04-24 DIAGNOSIS — R00.0 TACHYCARDIA, UNSPECIFIED: ICD-10-CM

## 2018-04-24 DIAGNOSIS — E66.3 OVERWEIGHT: ICD-10-CM

## 2018-04-24 DIAGNOSIS — J44.9 CHRONIC OBSTRUCTIVE PULMONARY DISEASE, UNSPECIFIED: ICD-10-CM

## 2018-04-24 PROCEDURE — 99215 OFFICE O/P EST HI 40 MIN: CPT

## 2018-04-24 PROCEDURE — 93000 ELECTROCARDIOGRAM COMPLETE: CPT

## 2018-04-24 PROCEDURE — 99214 OFFICE O/P EST MOD 30 MIN: CPT | Mod: 25

## 2018-04-24 RX ORDER — METOPROLOL TARTRATE 75 MG/1
TABLET, FILM COATED ORAL
Refills: 0 | Status: ACTIVE | COMMUNITY

## 2018-04-24 RX ORDER — DIGOXIN 250 MCG
0.25 TABLET ORAL
Refills: 0 | Status: ACTIVE | COMMUNITY

## 2018-04-24 RX ORDER — LOPERAMIDE HYDROCHLORIDE 1 MG/7.5ML
1 SOLUTION ORAL
Refills: 0 | Status: ACTIVE | COMMUNITY

## 2018-04-24 RX ORDER — BENZONATATE 100 MG/1
100 CAPSULE ORAL
Qty: 21 | Refills: 0 | Status: ACTIVE | COMMUNITY
Start: 2018-02-15

## 2018-04-24 RX ORDER — ROSUVASTATIN CALCIUM 5 MG/1
TABLET, FILM COATED ORAL
Refills: 0 | Status: ACTIVE | COMMUNITY

## 2018-04-24 RX ORDER — INSULIN GLARGINE 100 [IU]/ML
INJECTION, SOLUTION SUBCUTANEOUS
Refills: 0 | Status: ACTIVE | COMMUNITY

## 2018-04-24 RX ORDER — LOPERAMIDE HYDROCHLORIDE 2 MG/1
2 CAPSULE ORAL
Qty: 60 | Refills: 0 | Status: ACTIVE | COMMUNITY
Start: 2017-12-15

## 2018-04-24 RX ORDER — DILTIAZEM HYDROCHLORIDE 60 MG/1
60 TABLET ORAL
Qty: 120 | Refills: 0 | Status: ACTIVE | COMMUNITY
Start: 2018-03-16

## 2018-04-24 RX ORDER — CLOPIDOGREL 75 MG/1
TABLET, FILM COATED ORAL
Refills: 0 | Status: ACTIVE | COMMUNITY

## 2018-04-24 RX ORDER — LEVALBUTEROL HYDROCHLORIDE 0.63 MG/3ML
0.63 SOLUTION RESPIRATORY (INHALATION)
Qty: 216 | Refills: 0 | Status: ACTIVE | COMMUNITY
Start: 2018-02-15

## 2018-04-24 RX ORDER — TAMSULOSIN HYDROCHLORIDE 0.4 MG/1
0.4 CAPSULE ORAL
Qty: 30 | Refills: 0 | Status: ACTIVE | COMMUNITY
Start: 2018-02-15

## 2018-04-24 RX ORDER — DILTIAZEM HYDROCHLORIDE 120 MG/1
120 TABLET ORAL
Qty: 90 | Refills: 0 | Status: ACTIVE | COMMUNITY
Start: 2018-02-15

## 2018-04-25 ENCOUNTER — NON-APPOINTMENT (OUTPATIENT)
Age: 68
End: 2018-04-25

## 2018-05-14 ENCOUNTER — RX RENEWAL (OUTPATIENT)
Age: 68
End: 2018-05-14

## 2018-05-18 ENCOUNTER — RX RENEWAL (OUTPATIENT)
Age: 68
End: 2018-05-18

## 2018-05-18 RX ORDER — UMECLIDINIUM 62.5 UG/1
62.5 AEROSOL, POWDER ORAL DAILY
Qty: 1 | Refills: 5 | Status: ACTIVE | COMMUNITY
Start: 2018-05-18 | End: 1900-01-01

## 2018-05-24 ENCOUNTER — APPOINTMENT (OUTPATIENT)
Dept: CARDIOLOGY | Facility: CLINIC | Age: 68
End: 2018-05-24

## 2018-07-05 NOTE — PROGRESS NOTE ADULT - PROBLEM SELECTOR PLAN 6
July 5, 2018      To Whom It May Concern:      Ed KAE Arshad was seen in our Emergency Department today, 07/05/18.  Please excuse him from work today.    Sincerely,        Nayan Durant MD        
Cont. Synthroid, TSH in 3 weeks
- Cont. Synthroid
Cont. Synthroid, TSH in 3 weeks
Cont. Synthroid, TSH in 3 weeks
- Cont. Synthroid
Cont. Synthroid, TSH in 3 weeks
- Cont. Synthroid

## 2018-07-13 ENCOUNTER — RX RENEWAL (OUTPATIENT)
Age: 68
End: 2018-07-13

## 2018-07-18 ENCOUNTER — APPOINTMENT (OUTPATIENT)
Dept: PULMONOLOGY | Facility: CLINIC | Age: 68
End: 2018-07-18

## 2018-09-05 NOTE — PATIENT PROFILE ADULT. - ALCOHOL USE HISTORY SINGLE SELECT
Quality 137: Melanoma: Continuity Of Care - Recall System: Patient information entered into a recall system that includes: target date for the next exam specified AND a process to follow up with patients regarding missed or unscheduled appointments Detail Level: Zone Detail Level: Simple Quality 224: Stage 0-Iic Melanoma: Overutilization Of Imaging Studies For Only Stage 0-Iic Melanoma: None of the following diagnostic imaging studies ordered: chest X-ray, CT, Ultrasound, MRI, PET, or nuclear medicine scans (ML) never

## 2018-09-14 PROCEDURE — 94760 N-INVAS EAR/PLS OXIMETRY 1: CPT

## 2018-09-14 PROCEDURE — 80076 HEPATIC FUNCTION PANEL: CPT

## 2018-09-14 PROCEDURE — 81001 URINALYSIS AUTO W/SCOPE: CPT

## 2018-09-14 PROCEDURE — 71045 X-RAY EXAM CHEST 1 VIEW: CPT

## 2018-09-14 PROCEDURE — 97116 GAIT TRAINING THERAPY: CPT

## 2018-09-14 PROCEDURE — 82962 GLUCOSE BLOOD TEST: CPT

## 2018-09-14 PROCEDURE — 84443 ASSAY THYROID STIM HORMONE: CPT

## 2018-09-14 PROCEDURE — 96375 TX/PRO/DX INJ NEW DRUG ADDON: CPT | Mod: XU

## 2018-09-14 PROCEDURE — 82570 ASSAY OF URINE CREATININE: CPT

## 2018-09-14 PROCEDURE — 97163 PT EVAL HIGH COMPLEX 45 MIN: CPT

## 2018-09-14 PROCEDURE — 85610 PROTHROMBIN TIME: CPT

## 2018-09-14 PROCEDURE — 85014 HEMATOCRIT: CPT

## 2018-09-14 PROCEDURE — 82436 ASSAY OF URINE CHLORIDE: CPT

## 2018-09-14 PROCEDURE — 87633 RESP VIRUS 12-25 TARGETS: CPT

## 2018-09-14 PROCEDURE — 82330 ASSAY OF CALCIUM: CPT

## 2018-09-14 PROCEDURE — 84100 ASSAY OF PHOSPHORUS: CPT

## 2018-09-14 PROCEDURE — 84436 ASSAY OF TOTAL THYROXINE: CPT

## 2018-09-14 PROCEDURE — 87486 CHLMYD PNEUM DNA AMP PROBE: CPT

## 2018-09-14 PROCEDURE — 83735 ASSAY OF MAGNESIUM: CPT

## 2018-09-14 PROCEDURE — 87449 NOS EACH ORGANISM AG IA: CPT

## 2018-09-14 PROCEDURE — P9016: CPT

## 2018-09-14 PROCEDURE — 74176 CT ABD & PELVIS W/O CONTRAST: CPT

## 2018-09-14 PROCEDURE — 87070 CULTURE OTHR SPECIMN AEROBIC: CPT

## 2018-09-14 PROCEDURE — 74000: CPT

## 2018-09-14 PROCEDURE — 94660 CPAP INITIATION&MGMT: CPT

## 2018-09-14 PROCEDURE — 83935 ASSAY OF URINE OSMOLALITY: CPT

## 2018-09-14 PROCEDURE — 80053 COMPREHEN METABOLIC PANEL: CPT

## 2018-09-14 PROCEDURE — 71275 CT ANGIOGRAPHY CHEST: CPT

## 2018-09-14 PROCEDURE — 86923 COMPATIBILITY TEST ELECTRIC: CPT

## 2018-09-14 PROCEDURE — 87493 C DIFF AMPLIFIED PROBE: CPT

## 2018-09-14 PROCEDURE — 88305 TISSUE EXAM BY PATHOLOGIST: CPT

## 2018-09-14 PROCEDURE — 84484 ASSAY OF TROPONIN QUANT: CPT

## 2018-09-14 PROCEDURE — 87581 M.PNEUMON DNA AMP PROBE: CPT

## 2018-09-14 PROCEDURE — 74020: CPT

## 2018-09-14 PROCEDURE — 82435 ASSAY OF BLOOD CHLORIDE: CPT

## 2018-09-14 PROCEDURE — 94640 AIRWAY INHALATION TREATMENT: CPT

## 2018-09-14 PROCEDURE — 82941 ASSAY OF GASTRIN: CPT

## 2018-09-14 PROCEDURE — 74018 RADEX ABDOMEN 1 VIEW: CPT

## 2018-09-14 PROCEDURE — 84145 PROCALCITONIN (PCT): CPT

## 2018-09-14 PROCEDURE — 82803 BLOOD GASES ANY COMBINATION: CPT

## 2018-09-14 PROCEDURE — 96374 THER/PROPH/DIAG INJ IV PUSH: CPT | Mod: XU

## 2018-09-14 PROCEDURE — 36430 TRANSFUSION BLD/BLD COMPNT: CPT

## 2018-09-14 PROCEDURE — 85027 COMPLETE CBC AUTOMATED: CPT

## 2018-09-14 PROCEDURE — 86901 BLOOD TYPING SEROLOGIC RH(D): CPT

## 2018-09-14 PROCEDURE — 87798 DETECT AGENT NOS DNA AMP: CPT

## 2018-09-14 PROCEDURE — 84134 ASSAY OF PREALBUMIN: CPT

## 2018-09-14 PROCEDURE — 87186 SC STD MICRODIL/AGAR DIL: CPT

## 2018-09-14 PROCEDURE — 99291 CRITICAL CARE FIRST HOUR: CPT | Mod: 25

## 2018-09-14 PROCEDURE — 84132 ASSAY OF SERUM POTASSIUM: CPT

## 2018-09-14 PROCEDURE — 80048 BASIC METABOLIC PNL TOTAL CA: CPT

## 2018-09-14 PROCEDURE — C8929: CPT

## 2018-09-14 PROCEDURE — 82271 OCCULT BLOOD OTHER SOURCES: CPT

## 2018-09-14 PROCEDURE — 85730 THROMBOPLASTIN TIME PARTIAL: CPT

## 2018-09-14 PROCEDURE — 94002 VENT MGMT INPAT INIT DAY: CPT

## 2018-09-14 PROCEDURE — 93005 ELECTROCARDIOGRAM TRACING: CPT

## 2018-09-14 PROCEDURE — 84550 ASSAY OF BLOOD/URIC ACID: CPT

## 2018-09-14 PROCEDURE — 83880 ASSAY OF NATRIURETIC PEPTIDE: CPT

## 2018-09-14 PROCEDURE — 74250 X-RAY XM SM INT 1CNTRST STD: CPT

## 2018-09-14 PROCEDURE — 83605 ASSAY OF LACTIC ACID: CPT

## 2018-09-14 PROCEDURE — 97530 THERAPEUTIC ACTIVITIES: CPT

## 2018-09-14 PROCEDURE — 83036 HEMOGLOBIN GLYCOSYLATED A1C: CPT

## 2018-09-14 PROCEDURE — 82947 ASSAY GLUCOSE BLOOD QUANT: CPT

## 2018-09-14 PROCEDURE — 84300 ASSAY OF URINE SODIUM: CPT

## 2018-09-14 PROCEDURE — 87040 BLOOD CULTURE FOR BACTERIA: CPT

## 2018-09-14 PROCEDURE — 36415 COLL VENOUS BLD VENIPUNCTURE: CPT

## 2018-09-14 PROCEDURE — 97110 THERAPEUTIC EXERCISES: CPT

## 2018-09-14 PROCEDURE — 86900 BLOOD TYPING SEROLOGIC ABO: CPT

## 2018-09-14 PROCEDURE — 74177 CT ABD & PELVIS W/CONTRAST: CPT

## 2018-09-14 PROCEDURE — 86850 RBC ANTIBODY SCREEN: CPT

## 2018-09-14 PROCEDURE — 84295 ASSAY OF SERUM SODIUM: CPT

## 2018-10-15 ENCOUNTER — APPOINTMENT (OUTPATIENT)
Dept: CARDIOLOGY | Facility: CLINIC | Age: 68
End: 2018-10-15

## 2019-02-16 NOTE — ED ADULT NURSE NOTE - NSSISCREENINGQ4_ED_A_ED
Implemented All Universal Safety Interventions:  [place] to call system. Call bell, personal items and telephone within reach. Instruct patient to call for assistance. Room bathroom lighting operational. Non-slip footwear when patient is off stretcher. Physically safe environment: no spills, clutter or unnecessary equipment. Stretcher in lowest position, wheels locked, appropriate side rails in place.
No
- - -

## 2019-11-14 NOTE — PHYSICAL THERAPY INITIAL EVALUATION ADULT - ASSISTIVE DEVICE FOR TRANSFER: BED/CHAIR, REHAB EVAL
The patient has been examined and the H&P has been reviewed:    I concur with the findings and no changes have occurred since H&P was written.    Anesthesia/Surgery risks, benefits and alternative options discussed and understood by patient/family.    Pre-Procedure H and P Addendum    Patient seen and examined.  History and exam unchanged from prior history and physical.      Procedure: EGD w Endoflip  Indication: chest pain, gerd, dysphagia  ASA Class: per anesthesiology  Airway: per anesthesia  Neck Mobility: full range of motion  Mallampatti score: per anesthesia  History of anesthesia problems: no  Family history of anesthesia problems: no  Anesthesia Plan: per anesthesia      There are no hospital problems to display for this patient.    
rolling walker

## 2019-12-24 NOTE — ASU PREOP CHECKLIST - ALLERGIES REVIEWED
Occupational Therapy      Patient Name:  Sandy Townsend   MRN:  [id number]    Patient not seen today secondary to patient unwilling to participate due to R LE pain; OT unable to return for 2nd attempt. Will follow-up as scheduled and appropriate.    YONNY Youssef  12/24/2019   done

## 2020-09-23 NOTE — PHYSICAL THERAPY INITIAL EVALUATION ADULT - GROSSLY INTACT, SENSORY
Suture/Staple Removal    After having your stitches or staples removed it is typical to have minor discomfort, swelling, or redness in the area. The wound is still healing so continue to protect it from injury. Keep the wound dry and if given creams, ointments, or medication, take as instructed to by your health care professional.    SEEK IMMEDIATE MEDICAL CARE IF YOU HAVE ANY OF THE FOLLOWING SYMPTOMS: increasing redness/swelling/pain in the wound, pus coming from the wound, bad smell coming from the wound, or fever. Grossly Intact/Left LE/Right UE/Right LE/Left UE

## 2020-10-16 NOTE — CHART NOTE - NSCHARTNOTEFT_GEN_A_CORE
Bed: 01  Expected date:   Expected time:   Means of arrival:   Comments:  Chasity Lozano RN  10/16/20 [id number] Dr Aadms,    Please see pending diet order verification for dash diet with Ensure clears TID. (Pt requesting)      Thank YOU    Kala Ivey RD, CDN

## 2020-12-01 NOTE — ED ADULT TRIAGE NOTE - HEART RATE (BEATS/MIN)
Tariq Calle is a 20 year old male presenting to the walk-in clinic today for STI concerns; patient reports a recent female sexual partner from a couple weeks ago tested positive for chlamydia; denies any condom use; he denies any current dysuria, penile discharge, scrotal pain or swelling, abdominal pain, fever, chills    Patient would like communication of their results via:        Cell Phone:   Telephone Information:   Mobile [phone]     Okay to leave a message containing results? Yes    Patient was wearing a mask during rooming process.  Writer was wearing a mask, face shield, gown and gloves during rooming process.     112

## 2020-12-28 NOTE — PROGRESS NOTE ADULT - ASSESSMENT
Patient with increased ileostomy output. Slowly improving    1. Continue octreotide, cholestyramine and lomotil  2. If needed, may need to increase octreotide to 150 mcg sq tid Consent 2/Introductory Paragraph: Mohs surgery was explained to the patient and consent was obtained. The risks, benefits and alternatives to therapy were discussed in detail. Specifically, the risks of infection, scarring, bleeding, prolonged wound healing, incomplete removal, allergy to anesthesia, nerve injury and recurrence were addressed. Prior to the procedure, the treatment site was clearly identified and confirmed by the patient. All components of Universal Protocol/PAUSE Rule completed.

## 2024-01-30 NOTE — PROGRESS NOTE ADULT - ASSESSMENT
Monitor A1c.    I. 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  Will keep hydrated and replete electrolyte.     cardiology and pulm cleared for surgery      2. Acute on chronic hypoxic respiratory failure secondary to COPD exacerbation, improved, he is at his baseline, on home oxygen (4 liters), continue nebulized bronchodilators    3. CAP - completed course of Levaquin on 12/5.     4. Hypertension - stable   - continue cardizem XR  at 120mg daily.     5.Electrolyte abnormalities  : resolved, due to high ileostomy output.  Replete electrolytes.   monitor Electrolytes    7. Urinary retention,   no further retention, Roy in place  - Scrotal swelling and redness resolving  - patient following with  as outpatient    8. Right femoral artery dissection  - incidental finding on CT, no intervention as per vascular     9. anxiety: xanax at hs PRN.

## 2024-02-13 NOTE — H&P ADULT - PROBLEM SELECTOR PROBLEM 4
Quality 130: Documentation Of Current Medications In The Medical Record: Current Medications Documented Quality 431: Preventive Care And Screening: Unhealthy Alcohol Use - Screening: Patient not identified as an unhealthy alcohol user when screened for unhealthy alcohol use using a systematic screening method Detail Level: Detailed Quality 47: Advance Care Plan: Advance Care Planning discussed and documented in the medical record; patient did not wish or was not able to name a surrogate decision maker or provide an advance care plan. Quality 226: Preventive Care And Screening: Tobacco Use: Screening And Cessation Intervention: Patient screened for tobacco use and is an ex/non-smoker Quality 111:Pneumonia Vaccination Status For Older Adults: Patient received any pneumococcal conjugate or polysaccharide vaccine on or after their 60th birthday and before the end of the measurement period Quality 110: Preventive Care And Screening: Influenza Immunization: Influenza Immunization Administered during Influenza season Chronic obstructive pulmonary disease, unspecified COPD type

## 2024-04-11 NOTE — CONSULT NOTE ADULT - ASSESSMENT
HypoNatremia clinically pt Euvolemic  Will check Urine osm, uric acid  serum Na had been overall improving- HypoNatremia clinically pt Euvolemic   Elevated uric acid suggests pt on dry side   Will check Urine osm- pending  serum Na had been overall improving [As Noted in HPI] : as noted in HPI [Skin Lesions] : skin lesion [Skin Wound] : skin wound [Negative] : Heme/Lymph

## 2024-11-21 NOTE — PROGRESS NOTE ADULT - MINUTES
Subjective   History of Present Illness  Mr Vásquez presents with weakness and shortness of breath.  He has nausea and loss of appetite.  He reports high ostomy output.  He and his wife tell me anything he eats or drinks immediately goes into his ostomy.  He had ileostomy on October 10.  Has had a couple of episodes since then of severe dehydration with acute renal failure.      Review of Systems    Past Medical History:   Diagnosis Date    Arthritis     Colon cancer     Colon polyp     Diabetes mellitus     Diverticulitis of colon     GERD (gastroesophageal reflux disease)     Gout     Hyperlipidemia     Hypertension     Injury of back     Kidney stone     passed and surgery-   x3 times surgery    Pancreatitis     Tear of right rotator cuff     Wears glasses     readers       No Known Allergies    Past Surgical History:   Procedure Laterality Date    ADENOIDECTOMY      COLON RESECTION WITH ILEOSTOMY N/A 10/10/2024    Procedure: CONVERTED TO OPEN COLON RESECTION ULTRA LOW ANTERIOR LAPAROSCOPIC WITH LOOP ILEOSTOMY;  Surgeon: José Manuel Hutchins MD;  Location:  [facility];  Service: [facility];  Laterality: N/A;    COLONOSCOPY N/A 05/21/2024    Procedure: COLONOSCOPY;  Surgeon: Rosa Patrick MD;  Location:  [facility];  Service: Gastroenterology;  Laterality: N/A;    CYSTOSCOPY Bilateral 10/10/2024    Procedure: CYSTOSCOPY TEMPORARY PLACEMENT BILATERAL URETERAL CATHETER;  Surgeon: Ke Courtney MD;  Location:  [facility];  Service: [facility] - [facility];  Laterality: Bilateral;    CYSTOSCOPY LITHOLAPAXY BLADDER STONE EXTRACTION N/A 09/28/2022    Procedure: CYSTOSCOPY LASER LITHOLAPAXY BLADDER STONE EXTRACTION;  Surgeon: Mykel Jeffers MD;  Location:  [facility];  Service: Urology;  Laterality: N/A;    KIDNEY STONE SURGERY      x3 surgeries-  same stone    SHOULDER ARTHROSCOPY W/ ROTATOR CUFF REPAIR Right 07/19/2022    Procedure: RIGHT SHOULDER ARTHROSCOPY WITH OPEN ACROMIOPLASTY,  ROTATOR CUFF REPAIR,  EXCISION LATERAL CLAVICLE;  Surgeon: Edmundo Huitron MD;  Location: [facility];  Service: Orthopedics;  Laterality: Right;    TONSILLECTOMY         Family History   Problem Relation Age of Onset    Diabetes Father     Diabetes Sister        Social History     Socioeconomic History    Marital status:    Tobacco Use    Smoking status: Never    Smokeless tobacco: Current     Types: Snuff   Vaping Use    Vaping status: Never Used   Substance and Sexual Activity    Alcohol use: Not Currently     Comment: occasionally    Drug use: No    Sexual activity: Not Currently     Partners: Female           Objective   Physical Exam  Vitals and nursing note reviewed.   Constitutional:       General: He is not in acute distress.     Appearance: Normal appearance. He is ill-appearing.   HENT:      Head: Normocephalic and atraumatic.      Nose: Nose normal. No congestion or rhinorrhea.   Eyes:      General: No scleral icterus.     Conjunctiva/sclera: Conjunctivae normal.   Neck:      Comments: No JVD   Cardiovascular:      Rate and Rhythm: Regular rhythm. Tachycardia present.      Heart sounds: No murmur heard.     No friction rub.   Pulmonary:      Effort: Pulmonary effort is normal.      Breath sounds: Normal breath sounds. No wheezing or rales.   Abdominal:      General: Bowel sounds are normal.      Palpations: Abdomen is soft.      Tenderness: There is no abdominal tenderness. There is no guarding or rebound.   Musculoskeletal:         General: No tenderness.      Cervical back: Normal range of motion and neck supple.      Right lower leg: No edema.      Left lower leg: No edema.   Skin:     General: Skin is warm and dry.      Coloration: Skin is not pale.      Findings: No erythema.   Neurological:      General: No focal deficit present.      Mental Status: He is alert and oriented to person, place, and time.      Motor: No weakness.      Coordination: Coordination normal.   Psychiatric:         Behavior: Behavior  normal.         Procedures           ED Course  ED Course as of 11/21/24 1406   Thu Nov 21, 2024   1332 Has had 1600 cc and heart rate is 113.  Will likely require admission for additional IV fluids.  Paged Dr. Hutchins. [DT]   8215 Spoke with Dr. Hutchins who asks that Dr. VELEZ admit.  He advises that he started him on Imodium and Colestid and Lomotil today.  Will give him Imodium.  Will contact Dr. VELEZ. [DT]   134 I spoke with Dr. VELEZ who asked that I consult nephrology. [DT]   1343 Spoke with Dr. Baez who is familiar with Mr. Vásquez.  He will come see him [DT]      ED Course User Index  [DT] Yayo Fernandez MD                                                       Medical Decision Making  Ordered and interpreted labs.  Gave copious IV fluids.  Had reevaluation.  Consulted colorectal surgery.  Gave multiple medications and admitted him to the hospital.  Consulted nephrology as well.    Problems Addressed:  DAJA (acute kidney injury): complicated acute illness or injury that poses a threat to life or bodily functions  Hyponatremia: complicated acute illness or injury that poses a threat to life or bodily functions  Volume depletion: complicated acute illness or injury    Amount and/or Complexity of Data Reviewed  External Data Reviewed: labs and notes.  Labs: ordered. Decision-making details documented in ED Course.  Radiology: ordered. Decision-making details documented in ED Course.  ECG/medicine tests: ordered. Decision-making details documented in ED Course.    Risk  Prescription drug management.  Decision regarding hospitalization.        Final diagnoses:   Hyponatremia   Volume depletion   DAJA (acute kidney injury)       ED Disposition  ED Disposition       ED Disposition   Decision to Admit    Condition   --    Comment   Level of Care: Telemetry [5]   Admitting Physician: JAYLEN SALCEDO [[id number]]                 No follow-up provider specified.       Medication List      No changes were made to your prescriptions  during this visit.            Yayo Fernandez MD  11/21/24 [id number]     20